# Patient Record
Sex: FEMALE | Race: WHITE | NOT HISPANIC OR LATINO | Employment: OTHER | URBAN - METROPOLITAN AREA
[De-identification: names, ages, dates, MRNs, and addresses within clinical notes are randomized per-mention and may not be internally consistent; named-entity substitution may affect disease eponyms.]

---

## 2017-01-18 ENCOUNTER — TRANSCRIBE ORDERS (OUTPATIENT)
Dept: ADMINISTRATIVE | Facility: HOSPITAL | Age: 62
End: 2017-01-18

## 2017-01-18 ENCOUNTER — LAB (OUTPATIENT)
Dept: LAB | Facility: HOSPITAL | Age: 62
End: 2017-01-18

## 2017-01-18 DIAGNOSIS — E03.9 UNSPECIFIED HYPOTHYROIDISM: ICD-10-CM

## 2017-01-18 DIAGNOSIS — I10 ESSENTIAL HYPERTENSION, MALIGNANT: Primary | ICD-10-CM

## 2017-01-18 DIAGNOSIS — D64.9 ANEMIA, UNSPECIFIED: ICD-10-CM

## 2017-01-18 DIAGNOSIS — I10 ESSENTIAL HYPERTENSION, MALIGNANT: ICD-10-CM

## 2017-01-18 DIAGNOSIS — E78.00 HYPERCHOLESTEREMIA: ICD-10-CM

## 2017-01-18 LAB
25(OH)D3 SERPL-MCNC: 26 NG/ML (ref 30–100)
ALBUMIN SERPL-MCNC: 4.3 G/DL (ref 3.5–5.2)
ALBUMIN/GLOB SERPL: 1.3 G/DL
ALP SERPL-CCNC: 91 U/L (ref 39–117)
ALT SERPL W P-5'-P-CCNC: 17 U/L (ref 1–33)
ANION GAP SERPL CALCULATED.3IONS-SCNC: 15.3 MMOL/L
AST SERPL-CCNC: 34 U/L (ref 1–32)
BASOPHILS # BLD AUTO: 0.04 10*3/MM3 (ref 0–0.2)
BASOPHILS NFR BLD AUTO: 0.6 % (ref 0–1.5)
BILIRUB SERPL-MCNC: 0.4 MG/DL (ref 0.1–1.2)
BUN BLD-MCNC: 32 MG/DL (ref 8–23)
BUN/CREAT SERPL: 26.2 (ref 7–25)
CALCIUM SPEC-SCNC: 9.5 MG/DL (ref 8.6–10.5)
CHLORIDE SERPL-SCNC: 102 MMOL/L (ref 98–107)
CHOLEST SERPL-MCNC: 222 MG/DL (ref 0–200)
CO2 SERPL-SCNC: 22.7 MMOL/L (ref 22–29)
CREAT BLD-MCNC: 1.22 MG/DL (ref 0.57–1)
DEPRECATED RDW RBC AUTO: 57.2 FL (ref 37–54)
EOSINOPHIL # BLD AUTO: 0.25 10*3/MM3 (ref 0–0.7)
EOSINOPHIL NFR BLD AUTO: 3.7 % (ref 0.3–6.2)
ERYTHROCYTE [DISTWIDTH] IN BLOOD BY AUTOMATED COUNT: 16.6 % (ref 11.7–13)
GFR SERPL CREATININE-BSD FRML MDRD: 45 ML/MIN/1.73
GLOBULIN UR ELPH-MCNC: 3.2 GM/DL
GLUCOSE BLD-MCNC: 111 MG/DL (ref 65–99)
HCT VFR BLD AUTO: 32.9 % (ref 35.6–45.5)
HDLC SERPL-MCNC: 69 MG/DL (ref 40–60)
HGB BLD-MCNC: 9.6 G/DL (ref 11.9–15.5)
IMM GRANULOCYTES # BLD: 0 10*3/MM3 (ref 0–0.03)
IMM GRANULOCYTES NFR BLD: 0 % (ref 0–0.5)
LDLC SERPL CALC-MCNC: 111 MG/DL (ref 0–100)
LDLC/HDLC SERPL: 1.6 {RATIO}
LYMPHOCYTES # BLD AUTO: 1.33 10*3/MM3 (ref 0.9–4.8)
LYMPHOCYTES NFR BLD AUTO: 19.6 % (ref 19.6–45.3)
MCH RBC QN AUTO: 27.4 PG (ref 26.9–32)
MCHC RBC AUTO-ENTMCNC: 29.2 G/DL (ref 32.4–36.3)
MCV RBC AUTO: 94 FL (ref 80.5–98.2)
MONOCYTES # BLD AUTO: 0.8 10*3/MM3 (ref 0.2–1.2)
MONOCYTES NFR BLD AUTO: 11.8 % (ref 5–12)
NEUTROPHILS # BLD AUTO: 4.38 10*3/MM3 (ref 1.9–8.1)
NEUTROPHILS NFR BLD AUTO: 64.3 % (ref 42.7–76)
PLATELET # BLD AUTO: 340 10*3/MM3 (ref 140–500)
PMV BLD AUTO: 9.7 FL (ref 6–12)
POTASSIUM BLD-SCNC: 4.4 MMOL/L (ref 3.5–5.2)
PROT SERPL-MCNC: 7.5 G/DL (ref 6–8.5)
RBC # BLD AUTO: 3.5 10*6/MM3 (ref 3.9–5.2)
SODIUM BLD-SCNC: 140 MMOL/L (ref 136–145)
TRIGL SERPL-MCNC: 212 MG/DL (ref 0–150)
TSH SERPL DL<=0.05 MIU/L-ACNC: 2.41 MIU/ML (ref 0.27–4.2)
VLDLC SERPL-MCNC: 42.4 MG/DL (ref 5–40)
WBC NRBC COR # BLD: 6.8 10*3/MM3 (ref 4.5–10.7)

## 2017-01-18 PROCEDURE — 84443 ASSAY THYROID STIM HORMONE: CPT | Performed by: INTERNAL MEDICINE

## 2017-01-18 PROCEDURE — 80061 LIPID PANEL: CPT | Performed by: INTERNAL MEDICINE

## 2017-01-18 PROCEDURE — 36415 COLL VENOUS BLD VENIPUNCTURE: CPT

## 2017-01-18 PROCEDURE — 85025 COMPLETE CBC W/AUTO DIFF WBC: CPT | Performed by: INTERNAL MEDICINE

## 2017-01-18 PROCEDURE — 80053 COMPREHEN METABOLIC PANEL: CPT | Performed by: INTERNAL MEDICINE

## 2017-01-18 PROCEDURE — 82306 VITAMIN D 25 HYDROXY: CPT | Performed by: INTERNAL MEDICINE

## 2017-01-26 ENCOUNTER — LAB (OUTPATIENT)
Dept: LAB | Facility: HOSPITAL | Age: 62
End: 2017-01-26

## 2017-01-26 ENCOUNTER — TRANSCRIBE ORDERS (OUTPATIENT)
Dept: LAB | Facility: HOSPITAL | Age: 62
End: 2017-01-26

## 2017-01-26 DIAGNOSIS — D64.9 ANEMIA, UNSPECIFIED: ICD-10-CM

## 2017-01-26 DIAGNOSIS — D64.9 ANEMIA, UNSPECIFIED: Primary | ICD-10-CM

## 2017-01-26 LAB
BASOPHILS # BLD AUTO: 0.05 10*3/MM3 (ref 0–0.2)
BASOPHILS NFR BLD AUTO: 0.8 % (ref 0–1.5)
DEPRECATED RDW RBC AUTO: 57.6 FL (ref 37–54)
EOSINOPHIL # BLD AUTO: 0.21 10*3/MM3 (ref 0–0.7)
EOSINOPHIL NFR BLD AUTO: 3.3 % (ref 0.3–6.2)
ERYTHROCYTE [DISTWIDTH] IN BLOOD BY AUTOMATED COUNT: 16.8 % (ref 11.7–13)
FERRITIN SERPL-MCNC: 26.59 NG/ML (ref 13–150)
FOLATE SERPL-MCNC: 9.66 NG/ML (ref 4.78–24.2)
HCT VFR BLD AUTO: 33.7 % (ref 35.6–45.5)
HGB BLD-MCNC: 9.6 G/DL (ref 11.9–15.5)
IMM GRANULOCYTES # BLD: 0.02 10*3/MM3 (ref 0–0.03)
IMM GRANULOCYTES NFR BLD: 0.3 % (ref 0–0.5)
IRON 24H UR-MRATE: 23 MCG/DL (ref 37–145)
LYMPHOCYTES # BLD AUTO: 1.17 10*3/MM3 (ref 0.9–4.8)
LYMPHOCYTES NFR BLD AUTO: 18.7 % (ref 19.6–45.3)
MCH RBC QN AUTO: 26.7 PG (ref 26.9–32)
MCHC RBC AUTO-ENTMCNC: 28.5 G/DL (ref 32.4–36.3)
MCV RBC AUTO: 93.9 FL (ref 80.5–98.2)
MONOCYTES # BLD AUTO: 0.67 10*3/MM3 (ref 0.2–1.2)
MONOCYTES NFR BLD AUTO: 10.7 % (ref 5–12)
NEUTROPHILS # BLD AUTO: 4.15 10*3/MM3 (ref 1.9–8.1)
NEUTROPHILS NFR BLD AUTO: 66.2 % (ref 42.7–76)
PLATELET # BLD AUTO: 330 10*3/MM3 (ref 140–500)
PMV BLD AUTO: 9.9 FL (ref 6–12)
RBC # BLD AUTO: 3.59 10*6/MM3 (ref 3.9–5.2)
RETICS/RBC NFR AUTO: 2.24 % (ref 0.5–1.5)
TRANSFERRIN SERPL-MCNC: 394 MG/DL (ref 200–360)
VIT B12 BLD-MCNC: 407 PG/ML (ref 211–946)
WBC NRBC COR # BLD: 6.27 10*3/MM3 (ref 4.5–10.7)

## 2017-01-26 PROCEDURE — 83540 ASSAY OF IRON: CPT | Performed by: INTERNAL MEDICINE

## 2017-01-26 PROCEDURE — 85045 AUTOMATED RETICULOCYTE COUNT: CPT | Performed by: INTERNAL MEDICINE

## 2017-01-26 PROCEDURE — 82728 ASSAY OF FERRITIN: CPT | Performed by: INTERNAL MEDICINE

## 2017-01-26 PROCEDURE — 84466 ASSAY OF TRANSFERRIN: CPT | Performed by: INTERNAL MEDICINE

## 2017-01-26 PROCEDURE — 85025 COMPLETE CBC W/AUTO DIFF WBC: CPT | Performed by: INTERNAL MEDICINE

## 2017-01-26 PROCEDURE — 36415 COLL VENOUS BLD VENIPUNCTURE: CPT

## 2017-01-26 PROCEDURE — 82607 VITAMIN B-12: CPT | Performed by: INTERNAL MEDICINE

## 2017-01-26 PROCEDURE — 82746 ASSAY OF FOLIC ACID SERUM: CPT | Performed by: INTERNAL MEDICINE

## 2017-03-20 ENCOUNTER — TRANSCRIBE ORDERS (OUTPATIENT)
Dept: ADMINISTRATIVE | Facility: HOSPITAL | Age: 62
End: 2017-03-20

## 2017-03-20 ENCOUNTER — LAB (OUTPATIENT)
Dept: LAB | Facility: HOSPITAL | Age: 62
End: 2017-03-20

## 2017-03-20 DIAGNOSIS — E78.5 HYPERLIPIDEMIA, UNSPECIFIED HYPERLIPIDEMIA TYPE: ICD-10-CM

## 2017-03-20 DIAGNOSIS — I10 ESSENTIAL HYPERTENSION, MALIGNANT: ICD-10-CM

## 2017-03-20 DIAGNOSIS — D55.9: ICD-10-CM

## 2017-03-20 DIAGNOSIS — D64.9 ANEMIA, UNSPECIFIED: Primary | ICD-10-CM

## 2017-03-20 DIAGNOSIS — D64.9 ANEMIA, UNSPECIFIED: ICD-10-CM

## 2017-03-20 LAB
25(OH)D3 SERPL-MCNC: 24.1 NG/ML (ref 30–100)
ALBUMIN SERPL-MCNC: 4.4 G/DL (ref 3.5–5.2)
ALBUMIN/GLOB SERPL: 1.4 G/DL
ALP SERPL-CCNC: 81 U/L (ref 39–117)
ALT SERPL W P-5'-P-CCNC: 17 U/L (ref 1–33)
ANION GAP SERPL CALCULATED.3IONS-SCNC: 18.1 MMOL/L
AST SERPL-CCNC: 26 U/L (ref 1–32)
BASOPHILS # BLD AUTO: 0.06 10*3/MM3 (ref 0–0.2)
BASOPHILS NFR BLD AUTO: 1.1 % (ref 0–1.5)
BILIRUB SERPL-MCNC: 0.5 MG/DL (ref 0.1–1.2)
BUN BLD-MCNC: 24 MG/DL (ref 8–23)
BUN/CREAT SERPL: 21.2 (ref 7–25)
CALCIUM SPEC-SCNC: 9.6 MG/DL (ref 8.6–10.5)
CHLORIDE SERPL-SCNC: 99 MMOL/L (ref 98–107)
CHOLEST SERPL-MCNC: 273 MG/DL (ref 0–200)
CO2 SERPL-SCNC: 23.9 MMOL/L (ref 22–29)
CREAT BLD-MCNC: 1.13 MG/DL (ref 0.57–1)
DEPRECATED RDW RBC AUTO: 79.7 FL (ref 37–54)
EOSINOPHIL # BLD AUTO: 0.23 10*3/MM3 (ref 0–0.7)
EOSINOPHIL NFR BLD AUTO: 4.2 % (ref 0.3–6.2)
ERYTHROCYTE [DISTWIDTH] IN BLOOD BY AUTOMATED COUNT: 23.7 % (ref 11.7–13)
FERRITIN SERPL-MCNC: 42.53 NG/ML (ref 13–150)
FOLATE SERPL-MCNC: 10 NG/ML (ref 4.78–24.2)
GFR SERPL CREATININE-BSD FRML MDRD: 49 ML/MIN/1.73
GLOBULIN UR ELPH-MCNC: 3.2 GM/DL
GLUCOSE BLD-MCNC: 120 MG/DL (ref 65–99)
HCT VFR BLD AUTO: 40.6 % (ref 35.6–45.5)
HDLC SERPL-MCNC: 75 MG/DL (ref 40–60)
HGB BLD-MCNC: 12.8 G/DL (ref 11.9–15.5)
IMM GRANULOCYTES # BLD: 0.02 10*3/MM3 (ref 0–0.03)
IMM GRANULOCYTES NFR BLD: 0.4 % (ref 0–0.5)
LDLC SERPL CALC-MCNC: 167 MG/DL (ref 0–100)
LDLC/HDLC SERPL: 2.22 {RATIO}
LYMPHOCYTES # BLD AUTO: 1.16 10*3/MM3 (ref 0.9–4.8)
LYMPHOCYTES NFR BLD AUTO: 21.4 % (ref 19.6–45.3)
MCH RBC QN AUTO: 29.6 PG (ref 26.9–32)
MCHC RBC AUTO-ENTMCNC: 31.5 G/DL (ref 32.4–36.3)
MCV RBC AUTO: 93.8 FL (ref 80.5–98.2)
MONOCYTES # BLD AUTO: 0.74 10*3/MM3 (ref 0.2–1.2)
MONOCYTES NFR BLD AUTO: 13.6 % (ref 5–12)
NEUTROPHILS # BLD AUTO: 3.22 10*3/MM3 (ref 1.9–8.1)
NEUTROPHILS NFR BLD AUTO: 59.3 % (ref 42.7–76)
PLATELET # BLD AUTO: 278 10*3/MM3 (ref 140–500)
PMV BLD AUTO: 9.9 FL (ref 6–12)
POTASSIUM BLD-SCNC: 4.1 MMOL/L (ref 3.5–5.2)
PROT SERPL-MCNC: 7.6 G/DL (ref 6–8.5)
RBC # BLD AUTO: 4.33 10*6/MM3 (ref 3.9–5.2)
SODIUM BLD-SCNC: 141 MMOL/L (ref 136–145)
TRIGL SERPL-MCNC: 157 MG/DL (ref 0–150)
VIT B12 BLD-MCNC: 232 PG/ML (ref 211–946)
VLDLC SERPL-MCNC: 31.4 MG/DL (ref 5–40)
WBC NRBC COR # BLD: 5.43 10*3/MM3 (ref 4.5–10.7)

## 2017-03-20 PROCEDURE — 82607 VITAMIN B-12: CPT | Performed by: INTERNAL MEDICINE

## 2017-03-20 PROCEDURE — 85025 COMPLETE CBC W/AUTO DIFF WBC: CPT | Performed by: INTERNAL MEDICINE

## 2017-03-20 PROCEDURE — 82746 ASSAY OF FOLIC ACID SERUM: CPT | Performed by: INTERNAL MEDICINE

## 2017-03-20 PROCEDURE — 36415 COLL VENOUS BLD VENIPUNCTURE: CPT

## 2017-03-20 PROCEDURE — 86757 RICKETTSIA ANTIBODY: CPT | Performed by: INTERNAL MEDICINE

## 2017-03-20 PROCEDURE — 82728 ASSAY OF FERRITIN: CPT | Performed by: INTERNAL MEDICINE

## 2017-03-20 PROCEDURE — 82306 VITAMIN D 25 HYDROXY: CPT | Performed by: INTERNAL MEDICINE

## 2017-03-20 PROCEDURE — 80061 LIPID PANEL: CPT | Performed by: INTERNAL MEDICINE

## 2017-03-20 PROCEDURE — 80053 COMPREHEN METABOLIC PANEL: CPT | Performed by: INTERNAL MEDICINE

## 2017-03-30 LAB
BRUCELLA IGG SER QL IA: NEGATIVE
BRUCELLA IGM SER QL IA: NEGATIVE
RICK SF IGG TITR SER IF: NORMAL {TITER}
RICK SF IGM TITR SER IF: NORMAL {TITER}
TYPHUS FEVER GROUP IGG: NORMAL
TYPHUS FEVER GROUP IGM: NORMAL

## 2017-10-09 ENCOUNTER — LAB (OUTPATIENT)
Dept: LAB | Facility: HOSPITAL | Age: 62
End: 2017-10-09

## 2017-10-09 ENCOUNTER — TRANSCRIBE ORDERS (OUTPATIENT)
Dept: ADMINISTRATIVE | Facility: HOSPITAL | Age: 62
End: 2017-10-09

## 2017-10-09 DIAGNOSIS — E03.9 MYXEDEMA HEART DISEASE: ICD-10-CM

## 2017-10-09 DIAGNOSIS — E78.5 HYPERLIPIDEMIA, UNSPECIFIED HYPERLIPIDEMIA TYPE: ICD-10-CM

## 2017-10-09 DIAGNOSIS — I10 ESSENTIAL HYPERTENSION, MALIGNANT: Primary | ICD-10-CM

## 2017-10-09 DIAGNOSIS — D64.9 ANEMIA, UNSPECIFIED TYPE: ICD-10-CM

## 2017-10-09 DIAGNOSIS — I51.9 MYXEDEMA HEART DISEASE: ICD-10-CM

## 2017-10-09 DIAGNOSIS — I10 ESSENTIAL HYPERTENSION, MALIGNANT: ICD-10-CM

## 2017-10-09 LAB
ALBUMIN SERPL-MCNC: 4.3 G/DL (ref 3.5–5.2)
ALBUMIN/GLOB SERPL: 1.3 G/DL
ALP SERPL-CCNC: 81 U/L (ref 39–117)
ALT SERPL W P-5'-P-CCNC: 14 U/L (ref 1–33)
ANION GAP SERPL CALCULATED.3IONS-SCNC: 13.5 MMOL/L
AST SERPL-CCNC: 21 U/L (ref 1–32)
BASOPHILS # BLD AUTO: 0.03 10*3/MM3 (ref 0–0.2)
BASOPHILS NFR BLD AUTO: 0.7 % (ref 0–1.5)
BILIRUB SERPL-MCNC: 0.4 MG/DL (ref 0.1–1.2)
BUN BLD-MCNC: 33 MG/DL (ref 8–23)
BUN/CREAT SERPL: 25.6 (ref 7–25)
CALCIUM SPEC-SCNC: 9.5 MG/DL (ref 8.6–10.5)
CHLORIDE SERPL-SCNC: 102 MMOL/L (ref 98–107)
CHOLEST SERPL-MCNC: 185 MG/DL (ref 0–200)
CO2 SERPL-SCNC: 24.5 MMOL/L (ref 22–29)
CREAT BLD-MCNC: 1.29 MG/DL (ref 0.57–1)
DEPRECATED RDW RBC AUTO: 54.6 FL (ref 37–54)
EOSINOPHIL # BLD AUTO: 0.21 10*3/MM3 (ref 0–0.7)
EOSINOPHIL NFR BLD AUTO: 4.8 % (ref 0.3–6.2)
ERYTHROCYTE [DISTWIDTH] IN BLOOD BY AUTOMATED COUNT: 14.2 % (ref 11.7–13)
FERRITIN SERPL-MCNC: 44.33 NG/ML (ref 13–150)
FOLATE SERPL-MCNC: 6.22 NG/ML (ref 4.78–24.2)
GFR SERPL CREATININE-BSD FRML MDRD: 42 ML/MIN/1.73
GLOBULIN UR ELPH-MCNC: 3.2 GM/DL
GLUCOSE BLD-MCNC: 96 MG/DL (ref 65–99)
HCT VFR BLD AUTO: 39.5 % (ref 35.6–45.5)
HDLC SERPL-MCNC: 68 MG/DL (ref 40–60)
HGB BLD-MCNC: 12.5 G/DL (ref 11.9–15.5)
IMM GRANULOCYTES # BLD: 0 10*3/MM3 (ref 0–0.03)
IMM GRANULOCYTES NFR BLD: 0 % (ref 0–0.5)
IRON 24H UR-MRATE: 83 MCG/DL (ref 37–145)
LDLC SERPL CALC-MCNC: 92 MG/DL (ref 0–100)
LDLC/HDLC SERPL: 1.36 {RATIO}
LYMPHOCYTES # BLD AUTO: 0.9 10*3/MM3 (ref 0.9–4.8)
LYMPHOCYTES NFR BLD AUTO: 20.6 % (ref 19.6–45.3)
MCH RBC QN AUTO: 33.3 PG (ref 26.9–32)
MCHC RBC AUTO-ENTMCNC: 31.6 G/DL (ref 32.4–36.3)
MCV RBC AUTO: 105.3 FL (ref 80.5–98.2)
MONOCYTES # BLD AUTO: 0.47 10*3/MM3 (ref 0.2–1.2)
MONOCYTES NFR BLD AUTO: 10.8 % (ref 5–12)
NEUTROPHILS # BLD AUTO: 2.76 10*3/MM3 (ref 1.9–8.1)
NEUTROPHILS NFR BLD AUTO: 63.1 % (ref 42.7–76)
PLAT MORPH BLD: NORMAL
PLATELET # BLD AUTO: 221 10*3/MM3 (ref 140–500)
PMV BLD AUTO: 10.5 FL (ref 6–12)
POTASSIUM BLD-SCNC: 4.3 MMOL/L (ref 3.5–5.2)
PROT SERPL-MCNC: 7.5 G/DL (ref 6–8.5)
RBC # BLD AUTO: 3.75 10*6/MM3 (ref 3.9–5.2)
RBC MORPH BLD: NORMAL
SODIUM BLD-SCNC: 140 MMOL/L (ref 136–145)
TRIGL SERPL-MCNC: 123 MG/DL (ref 0–150)
TSH SERPL DL<=0.05 MIU/L-ACNC: 1.5 MIU/ML (ref 0.27–4.2)
VIT B12 BLD-MCNC: 235 PG/ML (ref 211–946)
VLDLC SERPL-MCNC: 24.6 MG/DL (ref 5–40)
WBC MORPH BLD: NORMAL
WBC NRBC COR # BLD: 4.37 10*3/MM3 (ref 4.5–10.7)

## 2017-10-09 PROCEDURE — 82746 ASSAY OF FOLIC ACID SERUM: CPT | Performed by: INTERNAL MEDICINE

## 2017-10-09 PROCEDURE — 80053 COMPREHEN METABOLIC PANEL: CPT | Performed by: INTERNAL MEDICINE

## 2017-10-09 PROCEDURE — 85025 COMPLETE CBC W/AUTO DIFF WBC: CPT | Performed by: INTERNAL MEDICINE

## 2017-10-09 PROCEDURE — 36415 COLL VENOUS BLD VENIPUNCTURE: CPT

## 2017-10-09 PROCEDURE — 82607 VITAMIN B-12: CPT | Performed by: INTERNAL MEDICINE

## 2017-10-09 PROCEDURE — 84443 ASSAY THYROID STIM HORMONE: CPT | Performed by: INTERNAL MEDICINE

## 2017-10-09 PROCEDURE — 85007 BL SMEAR W/DIFF WBC COUNT: CPT | Performed by: INTERNAL MEDICINE

## 2017-10-09 PROCEDURE — 82728 ASSAY OF FERRITIN: CPT | Performed by: INTERNAL MEDICINE

## 2017-10-09 PROCEDURE — 83540 ASSAY OF IRON: CPT | Performed by: INTERNAL MEDICINE

## 2017-10-09 PROCEDURE — 80061 LIPID PANEL: CPT | Performed by: INTERNAL MEDICINE

## 2017-10-12 ENCOUNTER — OFFICE VISIT (OUTPATIENT)
Dept: SURGERY | Facility: CLINIC | Age: 62
End: 2017-10-12

## 2017-10-12 VITALS — HEART RATE: 89 BPM | WEIGHT: 266 LBS | HEIGHT: 70 IN | BODY MASS INDEX: 38.08 KG/M2 | OXYGEN SATURATION: 98 %

## 2017-10-12 DIAGNOSIS — K43.2 INCISIONAL HERNIA, WITHOUT OBSTRUCTION OR GANGRENE: Primary | ICD-10-CM

## 2017-10-12 PROCEDURE — 99244 OFF/OP CNSLTJ NEW/EST MOD 40: CPT | Performed by: SURGERY

## 2017-10-12 RX ORDER — AMLODIPINE BESYLATE 5 MG/1
10 TABLET ORAL DAILY
COMMUNITY
Start: 2017-09-17 | End: 2021-06-23 | Stop reason: HOSPADM

## 2017-10-12 RX ORDER — METOPROLOL TARTRATE 100 MG/1
200 TABLET ORAL 2 TIMES DAILY
COMMUNITY
Start: 2017-08-23

## 2017-10-12 RX ORDER — SERTRALINE HYDROCHLORIDE 100 MG/1
100 TABLET, FILM COATED ORAL DAILY
COMMUNITY
Start: 2017-07-31 | End: 2019-09-16

## 2017-10-13 NOTE — PROGRESS NOTES
SUMMARY (A/P):    62-year-old lady with moderate size incisional hernia secondary to previous primary epigastric hernia repair.  She is symptomatic enough that she does wish to proceed with open incisional hernia repair.  She understands that her risks with surgical intervention in general anesthetic are increased secondary to BMI of 38, associated medical comorbidities, and tobacco use (discussed potential of decreased morbidity associated with surgery as well as overall improvement in health associated with weight loss and tobacco cessation).  She also understands this increases her risk of recurrence markedly.  She understands the risks including but not limited to bleeding, infection, use of mesh, and recurrence.      CC:  Referred for consultation regarding hernia by Dr. Jimenez    HPI:  62-year-old lady with several month history of moderate epigastric abdominal pain that is worse with activity and associated with visible and palpable bulge.    PHYSICAL EXAM:   Constitutional: Well-developed well-nourished, no acute distress   Heart rate 89   Weight (pounds) 266   BMI 38   Height (inches) 70  Eyes: Conjunctiva normal, sclera nonicteric  ENMT: Hearing grossly normal, oral mucosa moist  Neck: Supple, no palpable mass, normal thyroid, trachea midline  Respiratory: Clear to auscultation, normal inspiratory effort  Cardiovascular: Regular rate, no murmur, no carotid bruit, no peripheral edema, no jugular venous distention  Gastrointestinal: Soft, nontender, no palpable mass, no hepatosplenomegaly, moderate palpable hernia in the epigastrium just superior to previous vertical midline scar above the umbilicus which is reducible in supine position, bowel sounds normal  Lymphatics (palpable nodes):  cervical-negative, axillary-negative  Skin:  Warm, dry, no rash on visualized skin surfaces  Musculoskeletal: Symmetric strength, normal gait  Psychiatric: Alert and oriented ×3, normal affect     ALLERGIES: reviewed, in  Epic    MEDICATIONS: reviewed, in Epic    PMH:    Hypertension  Hyperlipidemia  Arthritis    PSH:    -Open incarcerated epigastric primary hernia repair with Prolene suture 10/22/2015 Dr. Lopez  -Colonoscopy at the same time normal  -Laparoscopic cholecystectomy 2010  -Hysterectomy  -Bilateral total knee arthroplasty    FAMILY HISTORY:    Negative for colorectal cancer    SOCIAL HISTORY:   Daily tobacco use  Occasional alcohol use    ROS:  No chest pain or shortness of air.  Negative for constipation, dysuria, nausea, vomiting, unexpected weight loss.  All other systems reviewed and negative other than presenting complaints.    LABS:  10/9/2017  CMP: GFR 42, otherwise normal  CBC: WBC 4.37, hemoglobin 12.5, platelets 221    JUNIOR SANDRA M.D.

## 2017-10-23 ENCOUNTER — TELEPHONE (OUTPATIENT)
Dept: SURGERY | Facility: CLINIC | Age: 62
End: 2017-10-23

## 2017-11-01 ENCOUNTER — APPOINTMENT (OUTPATIENT)
Dept: PREADMISSION TESTING | Facility: HOSPITAL | Age: 62
End: 2017-11-01

## 2018-04-11 ENCOUNTER — LAB (OUTPATIENT)
Dept: LAB | Facility: HOSPITAL | Age: 63
End: 2018-04-11

## 2018-04-11 ENCOUNTER — TRANSCRIBE ORDERS (OUTPATIENT)
Dept: ADMINISTRATIVE | Facility: HOSPITAL | Age: 63
End: 2018-04-11

## 2018-04-11 DIAGNOSIS — D64.9 ANEMIA, UNSPECIFIED TYPE: ICD-10-CM

## 2018-04-11 DIAGNOSIS — I10 ESSENTIAL HYPERTENSION, MALIGNANT: Primary | ICD-10-CM

## 2018-04-11 DIAGNOSIS — E78.5 HYPERLIPIDEMIA, UNSPECIFIED HYPERLIPIDEMIA TYPE: ICD-10-CM

## 2018-04-11 DIAGNOSIS — E55.9 VITAMIN D DEFICIENCY: ICD-10-CM

## 2018-04-11 DIAGNOSIS — I10 ESSENTIAL HYPERTENSION, MALIGNANT: ICD-10-CM

## 2018-04-11 LAB
25(OH)D3 SERPL-MCNC: 42.3 NG/ML (ref 30–100)
ALBUMIN SERPL-MCNC: 4.3 G/DL (ref 3.5–5.2)
ALBUMIN/GLOB SERPL: 1.4 G/DL
ALP SERPL-CCNC: 79 U/L (ref 39–117)
ALT SERPL W P-5'-P-CCNC: 11 U/L (ref 1–33)
ANION GAP SERPL CALCULATED.3IONS-SCNC: 14.7 MMOL/L
AST SERPL-CCNC: 17 U/L (ref 1–32)
BASOPHILS # BLD AUTO: 0.05 10*3/MM3 (ref 0–0.2)
BASOPHILS NFR BLD AUTO: 1 % (ref 0–1.5)
BILIRUB SERPL-MCNC: 0.4 MG/DL (ref 0.1–1.2)
BUN BLD-MCNC: 34 MG/DL (ref 8–23)
BUN/CREAT SERPL: 25.2 (ref 7–25)
CALCIUM SPEC-SCNC: 9.6 MG/DL (ref 8.6–10.5)
CHLORIDE SERPL-SCNC: 101 MMOL/L (ref 98–107)
CHOLEST SERPL-MCNC: 199 MG/DL (ref 0–200)
CO2 SERPL-SCNC: 25.3 MMOL/L (ref 22–29)
CREAT BLD-MCNC: 1.35 MG/DL (ref 0.57–1)
DEPRECATED RDW RBC AUTO: 53.7 FL (ref 37–54)
EOSINOPHIL # BLD AUTO: 0.22 10*3/MM3 (ref 0–0.7)
EOSINOPHIL NFR BLD AUTO: 4.3 % (ref 0.3–6.2)
ERYTHROCYTE [DISTWIDTH] IN BLOOD BY AUTOMATED COUNT: 14 % (ref 11.7–13)
FERRITIN SERPL-MCNC: 73.09 NG/ML (ref 13–150)
FOLATE SERPL-MCNC: 6.28 NG/ML (ref 4.78–24.2)
GFR SERPL CREATININE-BSD FRML MDRD: 40 ML/MIN/1.73
GLOBULIN UR ELPH-MCNC: 3.1 GM/DL
GLUCOSE BLD-MCNC: 115 MG/DL (ref 65–99)
HCT VFR BLD AUTO: 40.6 % (ref 35.6–45.5)
HDLC SERPL-MCNC: 66 MG/DL (ref 40–60)
HGB BLD-MCNC: 12.5 G/DL (ref 11.9–15.5)
IMM GRANULOCYTES # BLD: 0.02 10*3/MM3 (ref 0–0.03)
IMM GRANULOCYTES NFR BLD: 0.4 % (ref 0–0.5)
IRON 24H UR-MRATE: 97 MCG/DL (ref 37–145)
LDLC SERPL CALC-MCNC: 106 MG/DL (ref 0–100)
LDLC/HDLC SERPL: 1.6 {RATIO}
LYMPHOCYTES # BLD AUTO: 1.3 10*3/MM3 (ref 0.9–4.8)
LYMPHOCYTES NFR BLD AUTO: 25.7 % (ref 19.6–45.3)
MCH RBC QN AUTO: 32.7 PG (ref 26.9–32)
MCHC RBC AUTO-ENTMCNC: 30.8 G/DL (ref 32.4–36.3)
MCV RBC AUTO: 106.3 FL (ref 80.5–98.2)
MONOCYTES # BLD AUTO: 0.56 10*3/MM3 (ref 0.2–1.2)
MONOCYTES NFR BLD AUTO: 11.1 % (ref 5–12)
NEUTROPHILS # BLD AUTO: 2.91 10*3/MM3 (ref 1.9–8.1)
NEUTROPHILS NFR BLD AUTO: 57.5 % (ref 42.7–76)
PLATELET # BLD AUTO: 227 10*3/MM3 (ref 140–500)
PMV BLD AUTO: 10.7 FL (ref 6–12)
POTASSIUM BLD-SCNC: 4.4 MMOL/L (ref 3.5–5.2)
PROT SERPL-MCNC: 7.4 G/DL (ref 6–8.5)
RBC # BLD AUTO: 3.82 10*6/MM3 (ref 3.9–5.2)
SODIUM BLD-SCNC: 141 MMOL/L (ref 136–145)
T4 FREE SERPL-MCNC: 0.81 NG/DL (ref 0.93–1.7)
T4 SERPL-MCNC: 4.55 MCG/DL (ref 4.5–11.7)
TRIGL SERPL-MCNC: 136 MG/DL (ref 0–150)
TSH SERPL DL<=0.05 MIU/L-ACNC: 1.98 MIU/ML (ref 0.27–4.2)
VIT B12 BLD-MCNC: 226 PG/ML (ref 211–946)
VLDLC SERPL-MCNC: 27.2 MG/DL (ref 5–40)
WBC NRBC COR # BLD: 5.06 10*3/MM3 (ref 4.5–10.7)

## 2018-04-11 PROCEDURE — 84436 ASSAY OF TOTAL THYROXINE: CPT | Performed by: INTERNAL MEDICINE

## 2018-04-11 PROCEDURE — 84443 ASSAY THYROID STIM HORMONE: CPT | Performed by: INTERNAL MEDICINE

## 2018-04-11 PROCEDURE — 82728 ASSAY OF FERRITIN: CPT | Performed by: INTERNAL MEDICINE

## 2018-04-11 PROCEDURE — 85025 COMPLETE CBC W/AUTO DIFF WBC: CPT | Performed by: INTERNAL MEDICINE

## 2018-04-11 PROCEDURE — 36415 COLL VENOUS BLD VENIPUNCTURE: CPT

## 2018-04-11 PROCEDURE — 82306 VITAMIN D 25 HYDROXY: CPT | Performed by: INTERNAL MEDICINE

## 2018-04-11 PROCEDURE — 82746 ASSAY OF FOLIC ACID SERUM: CPT | Performed by: INTERNAL MEDICINE

## 2018-04-11 PROCEDURE — 80053 COMPREHEN METABOLIC PANEL: CPT | Performed by: INTERNAL MEDICINE

## 2018-04-11 PROCEDURE — 82607 VITAMIN B-12: CPT | Performed by: INTERNAL MEDICINE

## 2018-04-11 PROCEDURE — 84439 ASSAY OF FREE THYROXINE: CPT | Performed by: INTERNAL MEDICINE

## 2018-04-11 PROCEDURE — 83540 ASSAY OF IRON: CPT | Performed by: INTERNAL MEDICINE

## 2018-04-11 PROCEDURE — 80061 LIPID PANEL: CPT | Performed by: INTERNAL MEDICINE

## 2018-10-09 ENCOUNTER — LAB (OUTPATIENT)
Dept: LAB | Facility: HOSPITAL | Age: 63
End: 2018-10-09

## 2018-10-09 ENCOUNTER — TRANSCRIBE ORDERS (OUTPATIENT)
Dept: ADMINISTRATIVE | Facility: HOSPITAL | Age: 63
End: 2018-10-09

## 2018-10-09 DIAGNOSIS — D64.9 ANEMIA, UNSPECIFIED TYPE: ICD-10-CM

## 2018-10-09 DIAGNOSIS — E03.9 HYPOTHYROIDISM, UNSPECIFIED TYPE: ICD-10-CM

## 2018-10-09 DIAGNOSIS — I10 ESSENTIAL HYPERTENSION, MALIGNANT: ICD-10-CM

## 2018-10-09 DIAGNOSIS — E78.5 HYPERLIPIDEMIA, UNSPECIFIED HYPERLIPIDEMIA TYPE: ICD-10-CM

## 2018-10-09 DIAGNOSIS — Z00.00 ROUTINE GENERAL MEDICAL EXAMINATION AT A HEALTH CARE FACILITY: ICD-10-CM

## 2018-10-09 DIAGNOSIS — Z00.00 ROUTINE GENERAL MEDICAL EXAMINATION AT A HEALTH CARE FACILITY: Primary | ICD-10-CM

## 2018-10-09 DIAGNOSIS — E11.9 DIABETES MELLITUS WITHOUT COMPLICATION (HCC): ICD-10-CM

## 2018-10-09 LAB
ALBUMIN SERPL-MCNC: 4.6 G/DL (ref 3.5–5.2)
ALBUMIN/GLOB SERPL: 1.4 G/DL
ALP SERPL-CCNC: 88 U/L (ref 39–117)
ALT SERPL W P-5'-P-CCNC: 18 U/L (ref 1–33)
ANION GAP SERPL CALCULATED.3IONS-SCNC: 20.1 MMOL/L
AST SERPL-CCNC: 32 U/L (ref 1–32)
BASOPHILS # BLD AUTO: 0.04 10*3/MM3 (ref 0–0.2)
BASOPHILS NFR BLD AUTO: 0.7 % (ref 0–1.5)
BILIRUB SERPL-MCNC: 0.6 MG/DL (ref 0.1–1.2)
BILIRUB UR QL STRIP: NEGATIVE
BUN BLD-MCNC: 32 MG/DL (ref 8–23)
BUN/CREAT SERPL: 23 (ref 7–25)
CALCIUM SPEC-SCNC: 9.6 MG/DL (ref 8.6–10.5)
CHLORIDE SERPL-SCNC: 98 MMOL/L (ref 98–107)
CHOLEST SERPL-MCNC: 201 MG/DL (ref 0–200)
CLARITY UR: CLEAR
CO2 SERPL-SCNC: 18.9 MMOL/L (ref 22–29)
COLOR UR: YELLOW
CREAT BLD-MCNC: 1.39 MG/DL (ref 0.57–1)
DEPRECATED RDW RBC AUTO: 53.4 FL (ref 37–54)
EOSINOPHIL # BLD AUTO: 0.13 10*3/MM3 (ref 0–0.7)
EOSINOPHIL NFR BLD AUTO: 2.2 % (ref 0.3–6.2)
ERYTHROCYTE [DISTWIDTH] IN BLOOD BY AUTOMATED COUNT: 13.9 % (ref 11.7–13)
FERRITIN SERPL-MCNC: 95.84 NG/ML (ref 13–150)
FOLATE SERPL-MCNC: 7.26 NG/ML (ref 4.78–24.2)
GFR SERPL CREATININE-BSD FRML MDRD: 38 ML/MIN/1.73
GLOBULIN UR ELPH-MCNC: 3.4 GM/DL
GLUCOSE BLD-MCNC: 101 MG/DL (ref 65–99)
GLUCOSE UR STRIP-MCNC: NEGATIVE MG/DL
HCT VFR BLD AUTO: 43.5 % (ref 35.6–45.5)
HDLC SERPL-MCNC: 85 MG/DL (ref 40–60)
HGB BLD-MCNC: 13.5 G/DL (ref 11.9–15.5)
HGB UR QL STRIP.AUTO: NEGATIVE
IMM GRANULOCYTES # BLD: 0.03 10*3/MM3 (ref 0–0.03)
IMM GRANULOCYTES NFR BLD: 0.5 % (ref 0–0.5)
IRON 24H UR-MRATE: 155 MCG/DL (ref 37–145)
KETONES UR QL STRIP: ABNORMAL
LDLC SERPL CALC-MCNC: 96 MG/DL (ref 0–100)
LDLC/HDLC SERPL: 1.13 {RATIO}
LEUKOCYTE ESTERASE UR QL STRIP.AUTO: NEGATIVE
LYMPHOCYTES # BLD AUTO: 1.1 10*3/MM3 (ref 0.9–4.8)
LYMPHOCYTES NFR BLD AUTO: 18.8 % (ref 19.6–45.3)
MCH RBC QN AUTO: 32.8 PG (ref 26.9–32)
MCHC RBC AUTO-ENTMCNC: 31 G/DL (ref 32.4–36.3)
MCV RBC AUTO: 105.6 FL (ref 80.5–98.2)
MONOCYTES # BLD AUTO: 0.68 10*3/MM3 (ref 0.2–1.2)
MONOCYTES NFR BLD AUTO: 11.6 % (ref 5–12)
NEUTROPHILS # BLD AUTO: 3.88 10*3/MM3 (ref 1.9–8.1)
NEUTROPHILS NFR BLD AUTO: 66.2 % (ref 42.7–76)
NITRITE UR QL STRIP: NEGATIVE
PH UR STRIP.AUTO: 5.5 [PH] (ref 5–8)
PLATELET # BLD AUTO: 260 10*3/MM3 (ref 140–500)
PMV BLD AUTO: 10.2 FL (ref 6–12)
POTASSIUM BLD-SCNC: 4.8 MMOL/L (ref 3.5–5.2)
PROT SERPL-MCNC: 8 G/DL (ref 6–8.5)
PROT UR QL STRIP: NEGATIVE
RBC # BLD AUTO: 4.12 10*6/MM3 (ref 3.9–5.2)
SODIUM BLD-SCNC: 137 MMOL/L (ref 136–145)
SP GR UR STRIP: 1.02 (ref 1–1.03)
T3 SERPL-MCNC: 70.3 NG/DL (ref 80–200)
T4 FREE SERPL-MCNC: 1.06 NG/DL (ref 0.93–1.7)
TRIGL SERPL-MCNC: 101 MG/DL (ref 0–150)
TSH SERPL DL<=0.05 MIU/L-ACNC: 1.47 MIU/ML (ref 0.27–4.2)
UROBILINOGEN UR QL STRIP: ABNORMAL
VIT B12 BLD-MCNC: 327 PG/ML (ref 211–946)
VLDLC SERPL-MCNC: 20.2 MG/DL (ref 5–40)
WBC NRBC COR # BLD: 5.86 10*3/MM3 (ref 4.5–10.7)

## 2018-10-09 PROCEDURE — 80053 COMPREHEN METABOLIC PANEL: CPT | Performed by: INTERNAL MEDICINE

## 2018-10-09 PROCEDURE — 83540 ASSAY OF IRON: CPT | Performed by: INTERNAL MEDICINE

## 2018-10-09 PROCEDURE — 85025 COMPLETE CBC W/AUTO DIFF WBC: CPT | Performed by: INTERNAL MEDICINE

## 2018-10-09 PROCEDURE — 80061 LIPID PANEL: CPT | Performed by: INTERNAL MEDICINE

## 2018-10-09 PROCEDURE — 81003 URINALYSIS AUTO W/O SCOPE: CPT | Performed by: INTERNAL MEDICINE

## 2018-10-09 PROCEDURE — 82746 ASSAY OF FOLIC ACID SERUM: CPT | Performed by: INTERNAL MEDICINE

## 2018-10-09 PROCEDURE — 84443 ASSAY THYROID STIM HORMONE: CPT | Performed by: INTERNAL MEDICINE

## 2018-10-09 PROCEDURE — 82607 VITAMIN B-12: CPT | Performed by: INTERNAL MEDICINE

## 2018-10-09 PROCEDURE — 36415 COLL VENOUS BLD VENIPUNCTURE: CPT

## 2018-10-09 PROCEDURE — 82728 ASSAY OF FERRITIN: CPT | Performed by: INTERNAL MEDICINE

## 2018-10-09 PROCEDURE — 84439 ASSAY OF FREE THYROXINE: CPT | Performed by: INTERNAL MEDICINE

## 2018-10-09 PROCEDURE — 84480 ASSAY TRIIODOTHYRONINE (T3): CPT | Performed by: INTERNAL MEDICINE

## 2018-10-18 ENCOUNTER — TRANSCRIBE ORDERS (OUTPATIENT)
Dept: ADMINISTRATIVE | Facility: HOSPITAL | Age: 63
End: 2018-10-18

## 2018-10-18 DIAGNOSIS — Z78.0 POST-MENOPAUSAL: ICD-10-CM

## 2018-10-18 DIAGNOSIS — Z12.39 SCREENING BREAST EXAMINATION: Primary | ICD-10-CM

## 2018-10-24 ENCOUNTER — HOSPITAL ENCOUNTER (OUTPATIENT)
Dept: MAMMOGRAPHY | Facility: HOSPITAL | Age: 63
Discharge: HOME | End: 2018-10-24
Admitting: INTERNAL MEDICINE

## 2018-10-24 ENCOUNTER — HOSPITAL ENCOUNTER (OUTPATIENT)
Dept: BONE DENSITY | Facility: HOSPITAL | Age: 63
Discharge: HOME | End: 2018-10-24

## 2018-10-24 DIAGNOSIS — Z78.0 POST-MENOPAUSAL: ICD-10-CM

## 2018-10-24 DIAGNOSIS — Z12.39 SCREENING BREAST EXAMINATION: ICD-10-CM

## 2018-10-24 PROCEDURE — 77080 DXA BONE DENSITY AXIAL: CPT

## 2018-10-24 PROCEDURE — 77067 SCR MAMMO BI INCL CAD: CPT

## 2018-10-24 PROCEDURE — 77063 BREAST TOMOSYNTHESIS BI: CPT

## 2019-01-15 ENCOUNTER — TRANSCRIBE ORDERS (OUTPATIENT)
Dept: ADMINISTRATIVE | Facility: HOSPITAL | Age: 64
End: 2019-01-15

## 2019-01-15 ENCOUNTER — LAB (OUTPATIENT)
Dept: LAB | Facility: HOSPITAL | Age: 64
End: 2019-01-15

## 2019-01-15 DIAGNOSIS — I10 ESSENTIAL HYPERTENSION, MALIGNANT: ICD-10-CM

## 2019-01-15 DIAGNOSIS — I10 ESSENTIAL HYPERTENSION, MALIGNANT: Primary | ICD-10-CM

## 2019-01-15 DIAGNOSIS — D64.9 ANEMIA, UNSPECIFIED TYPE: ICD-10-CM

## 2019-01-15 DIAGNOSIS — E78.5 HYPERLIPIDEMIA, UNSPECIFIED HYPERLIPIDEMIA TYPE: ICD-10-CM

## 2019-01-15 LAB
ALBUMIN SERPL-MCNC: 4.3 G/DL (ref 3.5–5.2)
ALBUMIN/GLOB SERPL: 1.3 G/DL
ALP SERPL-CCNC: 82 U/L (ref 39–117)
ALT SERPL W P-5'-P-CCNC: 16 U/L (ref 1–33)
ANION GAP SERPL CALCULATED.3IONS-SCNC: 16.6 MMOL/L
AST SERPL-CCNC: 34 U/L (ref 1–32)
BASOPHILS # BLD AUTO: 0.04 10*3/MM3 (ref 0–0.2)
BASOPHILS NFR BLD AUTO: 0.7 % (ref 0–1.5)
BILIRUB SERPL-MCNC: 0.6 MG/DL (ref 0.1–1.2)
BILIRUB UR QL STRIP: NEGATIVE
BUN BLD-MCNC: 32 MG/DL (ref 8–23)
BUN/CREAT SERPL: 30.8 (ref 7–25)
CALCIUM SPEC-SCNC: 9.7 MG/DL (ref 8.6–10.5)
CHLORIDE SERPL-SCNC: 97 MMOL/L (ref 98–107)
CHOLEST SERPL-MCNC: 245 MG/DL (ref 0–200)
CLARITY UR: CLEAR
CO2 SERPL-SCNC: 23.4 MMOL/L (ref 22–29)
COLOR UR: YELLOW
CREAT BLD-MCNC: 1.04 MG/DL (ref 0.57–1)
DEPRECATED RDW RBC AUTO: 53.8 FL (ref 37–54)
EOSINOPHIL # BLD AUTO: 0.17 10*3/MM3 (ref 0–0.7)
EOSINOPHIL NFR BLD AUTO: 2.9 % (ref 0.3–6.2)
ERYTHROCYTE [DISTWIDTH] IN BLOOD BY AUTOMATED COUNT: 13.8 % (ref 11.7–13)
FOLATE SERPL-MCNC: 5.89 NG/ML (ref 4.78–24.2)
GFR SERPL CREATININE-BSD FRML MDRD: 54 ML/MIN/1.73
GLOBULIN UR ELPH-MCNC: 3.4 GM/DL
GLUCOSE BLD-MCNC: 117 MG/DL (ref 65–99)
GLUCOSE UR STRIP-MCNC: NEGATIVE MG/DL
HCT VFR BLD AUTO: 43.3 % (ref 35.6–45.5)
HDLC SERPL-MCNC: 77 MG/DL (ref 40–60)
HGB BLD-MCNC: 14.2 G/DL (ref 11.9–15.5)
HGB UR QL STRIP.AUTO: NEGATIVE
IMM GRANULOCYTES # BLD AUTO: 0.02 10*3/MM3 (ref 0–0.03)
IMM GRANULOCYTES NFR BLD AUTO: 0.3 % (ref 0–0.5)
KETONES UR QL STRIP: NEGATIVE
LDLC SERPL CALC-MCNC: 142 MG/DL (ref 0–100)
LDLC/HDLC SERPL: 1.84 {RATIO}
LEUKOCYTE ESTERASE UR QL STRIP.AUTO: NEGATIVE
LYMPHOCYTES # BLD AUTO: 1.42 10*3/MM3 (ref 0.9–4.8)
LYMPHOCYTES NFR BLD AUTO: 24.2 % (ref 19.6–45.3)
MCH RBC QN AUTO: 34.6 PG (ref 26.9–32)
MCHC RBC AUTO-ENTMCNC: 32.8 G/DL (ref 32.4–36.3)
MCV RBC AUTO: 105.6 FL (ref 80.5–98.2)
MONOCYTES # BLD AUTO: 0.54 10*3/MM3 (ref 0.2–1.2)
MONOCYTES NFR BLD AUTO: 9.2 % (ref 5–12)
NEUTROPHILS # BLD AUTO: 3.7 10*3/MM3 (ref 1.9–8.1)
NEUTROPHILS NFR BLD AUTO: 63 % (ref 42.7–76)
NITRITE UR QL STRIP: NEGATIVE
PH UR STRIP.AUTO: 6 [PH] (ref 5–8)
PLATELET # BLD AUTO: 225 10*3/MM3 (ref 140–500)
PMV BLD AUTO: 10.5 FL (ref 6–12)
POTASSIUM BLD-SCNC: 4.6 MMOL/L (ref 3.5–5.2)
PROT SERPL-MCNC: 7.7 G/DL (ref 6–8.5)
PROT UR QL STRIP: NEGATIVE
RBC # BLD AUTO: 4.1 10*6/MM3 (ref 3.9–5.2)
SODIUM BLD-SCNC: 137 MMOL/L (ref 136–145)
SP GR UR STRIP: 1.01 (ref 1–1.03)
TRIGL SERPL-MCNC: 131 MG/DL (ref 0–150)
UROBILINOGEN UR QL STRIP: NORMAL
VIT B12 BLD-MCNC: 676 PG/ML (ref 211–946)
VLDLC SERPL-MCNC: 26.2 MG/DL (ref 5–40)
WBC NRBC COR # BLD: 5.87 10*3/MM3 (ref 4.5–10.7)

## 2019-01-15 PROCEDURE — 82607 VITAMIN B-12: CPT | Performed by: INTERNAL MEDICINE

## 2019-01-15 PROCEDURE — 36415 COLL VENOUS BLD VENIPUNCTURE: CPT

## 2019-01-15 PROCEDURE — 80053 COMPREHEN METABOLIC PANEL: CPT | Performed by: INTERNAL MEDICINE

## 2019-01-15 PROCEDURE — 85025 COMPLETE CBC W/AUTO DIFF WBC: CPT | Performed by: INTERNAL MEDICINE

## 2019-01-15 PROCEDURE — 81003 URINALYSIS AUTO W/O SCOPE: CPT | Performed by: INTERNAL MEDICINE

## 2019-01-15 PROCEDURE — 82746 ASSAY OF FOLIC ACID SERUM: CPT | Performed by: INTERNAL MEDICINE

## 2019-01-15 PROCEDURE — 80061 LIPID PANEL: CPT | Performed by: INTERNAL MEDICINE

## 2019-09-04 ENCOUNTER — HOSPITAL ENCOUNTER (EMERGENCY)
Facility: HOSPITAL | Age: 64
Discharge: HOME | End: 2019-09-04
Attending: EMERGENCY MEDICINE | Admitting: EMERGENCY MEDICINE

## 2019-09-04 ENCOUNTER — APPOINTMENT (OUTPATIENT)
Dept: CT IMAGING | Facility: HOSPITAL | Age: 64
End: 2019-09-04

## 2019-09-04 VITALS
OXYGEN SATURATION: 98 % | HEART RATE: 56 BPM | SYSTOLIC BLOOD PRESSURE: 148 MMHG | BODY MASS INDEX: 38.17 KG/M2 | DIASTOLIC BLOOD PRESSURE: 93 MMHG | TEMPERATURE: 97.8 F | RESPIRATION RATE: 16 BRPM | HEIGHT: 70 IN

## 2019-09-04 DIAGNOSIS — R10.9 ABDOMINAL PAIN IN FEMALE: Primary | ICD-10-CM

## 2019-09-04 DIAGNOSIS — K43.9 VENTRAL HERNIA WITHOUT OBSTRUCTION OR GANGRENE: ICD-10-CM

## 2019-09-04 LAB
ALBUMIN SERPL-MCNC: 4.4 G/DL (ref 3.5–5.2)
ALBUMIN/GLOB SERPL: 1.3 G/DL
ALP SERPL-CCNC: 81 U/L (ref 39–117)
ALT SERPL W P-5'-P-CCNC: 13 U/L (ref 1–33)
ANION GAP SERPL CALCULATED.3IONS-SCNC: 16.9 MMOL/L (ref 5–15)
AST SERPL-CCNC: 23 U/L (ref 1–32)
BACTERIA UR QL AUTO: ABNORMAL /HPF
BASOPHILS # BLD AUTO: 0.03 10*3/MM3 (ref 0–0.2)
BASOPHILS NFR BLD AUTO: 0.6 % (ref 0–1.5)
BILIRUB SERPL-MCNC: 0.6 MG/DL (ref 0.2–1.2)
BILIRUB UR QL STRIP: NEGATIVE
BUN BLD-MCNC: 20 MG/DL (ref 8–23)
BUN/CREAT SERPL: 17.9 (ref 7–25)
CALCIUM SPEC-SCNC: 9.5 MG/DL (ref 8.6–10.5)
CHLORIDE SERPL-SCNC: 97 MMOL/L (ref 98–107)
CLARITY UR: ABNORMAL
CO2 SERPL-SCNC: 23.1 MMOL/L (ref 22–29)
COLOR UR: ABNORMAL
CREAT BLD-MCNC: 1.12 MG/DL (ref 0.57–1)
DEPRECATED RDW RBC AUTO: 47.5 FL (ref 37–54)
EOSINOPHIL # BLD AUTO: 0.16 10*3/MM3 (ref 0–0.4)
EOSINOPHIL NFR BLD AUTO: 3.2 % (ref 0.3–6.2)
ERYTHROCYTE [DISTWIDTH] IN BLOOD BY AUTOMATED COUNT: 12.6 % (ref 12.3–15.4)
GFR SERPL CREATININE-BSD FRML MDRD: 49 ML/MIN/1.73
GLOBULIN UR ELPH-MCNC: 3.4 GM/DL
GLUCOSE BLD-MCNC: 133 MG/DL (ref 65–99)
GLUCOSE UR STRIP-MCNC: NEGATIVE MG/DL
HCT VFR BLD AUTO: 42.6 % (ref 34–46.6)
HGB BLD-MCNC: 13.8 G/DL (ref 12–15.9)
HGB UR QL STRIP.AUTO: ABNORMAL
HOLD SPECIMEN: NORMAL
HOLD SPECIMEN: NORMAL
HYALINE CASTS UR QL AUTO: ABNORMAL /LPF
IMM GRANULOCYTES # BLD AUTO: 0.02 10*3/MM3 (ref 0–0.05)
IMM GRANULOCYTES NFR BLD AUTO: 0.4 % (ref 0–0.5)
KETONES UR QL STRIP: ABNORMAL
LEUKOCYTE ESTERASE UR QL STRIP.AUTO: ABNORMAL
LIPASE SERPL-CCNC: 35 U/L (ref 13–60)
LYMPHOCYTES # BLD AUTO: 0.85 10*3/MM3 (ref 0.7–3.1)
LYMPHOCYTES NFR BLD AUTO: 17 % (ref 19.6–45.3)
MCH RBC QN AUTO: 33.3 PG (ref 26.6–33)
MCHC RBC AUTO-ENTMCNC: 32.4 G/DL (ref 31.5–35.7)
MCV RBC AUTO: 102.9 FL (ref 79–97)
MONOCYTES # BLD AUTO: 0.41 10*3/MM3 (ref 0.1–0.9)
MONOCYTES NFR BLD AUTO: 8.2 % (ref 5–12)
NEUTROPHILS # BLD AUTO: 3.53 10*3/MM3 (ref 1.7–7)
NEUTROPHILS NFR BLD AUTO: 70.6 % (ref 42.7–76)
NITRITE UR QL STRIP: NEGATIVE
NRBC BLD AUTO-RTO: 0 /100 WBC (ref 0–0.2)
PH UR STRIP.AUTO: <=5 [PH] (ref 5–8)
PLATELET # BLD AUTO: 209 10*3/MM3 (ref 140–450)
PMV BLD AUTO: 10.2 FL (ref 6–12)
POTASSIUM BLD-SCNC: 3.5 MMOL/L (ref 3.5–5.2)
PROT SERPL-MCNC: 7.8 G/DL (ref 6–8.5)
PROT UR QL STRIP: ABNORMAL
RBC # BLD AUTO: 4.14 10*6/MM3 (ref 3.77–5.28)
RBC # UR: ABNORMAL /HPF
REF LAB TEST METHOD: ABNORMAL
SODIUM BLD-SCNC: 137 MMOL/L (ref 136–145)
SP GR UR STRIP: 1.03 (ref 1–1.03)
SQUAMOUS #/AREA URNS HPF: ABNORMAL /HPF
UROBILINOGEN UR QL STRIP: ABNORMAL
WBC NRBC COR # BLD: 5 10*3/MM3 (ref 3.4–10.8)
WBC UR QL AUTO: ABNORMAL /HPF
WHOLE BLOOD HOLD SPECIMEN: NORMAL
WHOLE BLOOD HOLD SPECIMEN: NORMAL
YEAST URNS QL MICRO: ABNORMAL /HPF

## 2019-09-04 PROCEDURE — 25010000002 IOPAMIDOL 61 % SOLUTION: Performed by: NURSE PRACTITIONER

## 2019-09-04 PROCEDURE — 81001 URINALYSIS AUTO W/SCOPE: CPT | Performed by: NURSE PRACTITIONER

## 2019-09-04 PROCEDURE — 85025 COMPLETE CBC W/AUTO DIFF WBC: CPT | Performed by: NURSE PRACTITIONER

## 2019-09-04 PROCEDURE — 80053 COMPREHEN METABOLIC PANEL: CPT | Performed by: NURSE PRACTITIONER

## 2019-09-04 PROCEDURE — 83690 ASSAY OF LIPASE: CPT | Performed by: NURSE PRACTITIONER

## 2019-09-04 PROCEDURE — 74177 CT ABD & PELVIS W/CONTRAST: CPT

## 2019-09-04 PROCEDURE — 99283 EMERGENCY DEPT VISIT LOW MDM: CPT

## 2019-09-04 RX ORDER — DICYCLOMINE HCL 20 MG
20 TABLET ORAL EVERY 8 HOURS PRN
Qty: 9 TABLET | Refills: 0 | Status: SHIPPED | OUTPATIENT
Start: 2019-09-04 | End: 2021-06-23 | Stop reason: HOSPADM

## 2019-09-04 RX ADMIN — IOPAMIDOL 85 ML: 612 INJECTION, SOLUTION INTRAVENOUS at 13:19

## 2019-09-04 RX ADMIN — SODIUM CHLORIDE 1000 ML: 9 INJECTION, SOLUTION INTRAVENOUS at 12:25

## 2019-09-04 NOTE — ED PROVIDER NOTES
Pt is a 64 y.o. female who presents to the ED complaining of intermittent generalized abdominal pain for several months. She states her pain typically occurs every 1-2 weeks, but states this episode has been lasting longer than normal and has been present since yesterday. She states when she coughs and sneezes she reports pain in the middle of her abdomen. She states she is scheduled to f/u with Willy on the 26th for her hernia.     On exam,  Constitutional: NAD  Abdomen: soft, mild diffuse abd tenderness.   Neurological: Awake, alert       Plan: Check labs and CT a/p      MD ATTESTATION NOTE    The XOCHITL and I have discussed this patient's history, physical exam, and treatment plan.  I have reviewed the documentation and personally had a face to face interaction with the patient. I affirm the documentation and agree with the treatment and plan.  The attached note describes my personal findings.      Documentation assistance provided by inge Valenzuela for Dr. Steele. Information recorded by the scribe was done at my direction and has been verified and validated by me.             Caesar Valenzuela  09/04/19 7533       Yury Steele MD  09/04/19 9038

## 2019-09-04 NOTE — ED NOTES
"Had a hernia repair by Dr. Lopez years ago, cannot remember when. Was supposed to have it redone by Dr. Aguilera in 2015, but her 's health declined and he passed away, so patient never had the second hernia repair. For past several months, has felt abdominal discomfort. Patient denies nausea, vomiting, constipation or diarrhea, but complains of worsening bloating and bilateral abdominal pain for the past 2-3 weeks. Last bowel movement yesterday. Denies dysuria. Reports episodes of chills and sweating at home when she has these \"attacks\" of pain.     Nicki Shea, RN  09/04/19 1211    "

## 2019-09-04 NOTE — ED NOTES
Patient requesting a prescription for pain medicine or antacids to help with her abdominal discomfort. Waiting for provider availability to communicate request.     Nicki Shea RN  09/04/19 4537

## 2019-09-04 NOTE — DISCHARGE INSTRUCTIONS
Home to rest  Follow up with Dr Aguilera. Call today to schedule appt to be seen sooner.  Return if worse or new concerns   Continue care with your primary care physician and have your blood pressure regularly checked and managed. Normal blood pressure is 120/80.

## 2019-09-04 NOTE — ED PROVIDER NOTES
EMERGENCY DEPARTMENT ENCOUNTER    CHIEF COMPLAINT  Chief Complaint: abdominal pain  History given by: patient  History limited by: nothing  Room Number: 39/39  PMD: Anatoly Jimenez MD      HPI:  Pt is a 64 y.o. female with a hx of a-fib who presents complaining of mid-abdominal pain that worsened yesterday morning but has been ongoing for 'months.' Pt states her pain has improved since yesterday. Pain is worsened with movement and relieved when pt is laying flat on her back. She had two normal BMs yesterday. She confirms decreased PO intake and chills but denies vomiting, diarrhea, dysuria, SOA, CP and all other complaints at this time. Pt smokes 1ppd and drinks 3 drinks per day. She has not consumed EtOH today.  She note a hx of cholecystectomy and hernia hx. She denies a hx of diverticulitis. She had an unremarkable colonoscopy in the past.     Duration:  One day  Onset: gradual  Timing: constant  Location: mid-abd  Radiation: none  Quality: pain  Intensity/Severity: moderate  Progression: improved  Associated Symptoms: decreased PO intake and chills  Aggravating Factors: movement  Alleviating Factors: laying flat  Previous Episodes: none  Treatment before arrival: none    PAST MEDICAL HISTORY  Active Ambulatory Problems     Diagnosis Date Noted   • Incisional hernia, without obstruction or gangrene 10/12/2017     Resolved Ambulatory Problems     Diagnosis Date Noted   • No Resolved Ambulatory Problems     Past Medical History:   Diagnosis Date   • Arthritis    • Asthma    • Colon polyps 2015   • Hyperlipidemia    • Hypertension        PAST SURGICAL HISTORY  Past Surgical History:   Procedure Laterality Date   • COLONOSCOPY N/A 10/22/2015    Rectal polyp-Dr. Elías Keating   • HERNIA REPAIR N/A 10/22/2015    Open reduction of incarcerated ventral hernia with layered closure-Dr. Elías Keating   • HYSTERECTOMY  2001   • LAPAROSCOPIC CHOLECYSTECTOMY N/A 01/04/2010    Dr. Heath Whitlock   • OOPHORECTOMY  2001   •  REPLACEMENT TOTAL KNEE Left 06/22/2015    Dr. Yo Aguayo   • REPLACEMENT TOTAL KNEE Right 02/26/2015    Dr. Yo Aguayo       FAMILY HISTORY  Family History   Problem Relation Age of Onset   • No Known Problems Mother    • No Known Problems Father        SOCIAL HISTORY  Social History     Socioeconomic History   • Marital status:      Spouse name: Not on file   • Number of children: Not on file   • Years of education: Not on file   • Highest education level: Not on file   Tobacco Use   • Smoking status: Current Every Day Smoker   Substance and Sexual Activity   • Alcohol use: Yes       ALLERGIES  Patient has no known allergies.    REVIEW OF SYSTEMS  Review of Systems   Constitutional: Positive for appetite change (decreased) and chills. Negative for fever.   HENT: Negative for sore throat.    Eyes: Negative.    Respiratory: Negative for cough and shortness of breath.    Cardiovascular: Negative for chest pain.   Gastrointestinal: Positive for abdominal pain (mid). Negative for diarrhea and vomiting.   Genitourinary: Negative for dysuria.   Musculoskeletal: Negative for neck pain.   Skin: Negative for rash.   Allergic/Immunologic: Negative.    Neurological: Negative for weakness, numbness and headaches.   Hematological: Negative.    Psychiatric/Behavioral: Negative.    All other systems reviewed and are negative.      PHYSICAL EXAM  ED Triage Vitals   Temp Heart Rate Resp BP SpO2   09/04/19 1146 09/04/19 1146 09/04/19 1146 09/04/19 1200 09/04/19 1146   97.8 °F (36.6 °C) 56 16 (!) 138/102 100 %       Physical Exam   Constitutional: She is oriented to person, place, and time. No distress.   HENT:   Head: Normocephalic and atraumatic.   Eyes: EOM are normal. Pupils are equal, round, and reactive to light.   Neck: Normal range of motion. Neck supple.   Cardiovascular: Normal rate, regular rhythm and normal heart sounds.   Pulmonary/Chest: Effort normal and breath sounds normal. No respiratory distress.    Abdominal: Soft. There is tenderness (diffuse). There is no rebound and no guarding.   Diffuse tenderness.   Musculoskeletal: Normal range of motion. She exhibits no edema.   Neurological: She is alert and oriented to person, place, and time. She has normal sensation and normal strength.   Skin: Skin is warm and dry. No rash noted.   Psychiatric: Mood and affect normal.   Nursing note and vitals reviewed.      LAB RESULTS  Lab Results (last 24 hours)     Procedure Component Value Units Date/Time    CBC & Differential [[id number]] Collected:  09/04/19 1212    Specimen:  Blood Updated:  09/04/19 1222    Narrative:       The following orders were created for panel order CBC & Differential.  Procedure                               Abnormality         Status                     ---------                               -----------         ------                     CBC Auto Differential[[id number]]        Abnormal            Final result                 Please view results for these tests on the individual orders.    Comprehensive Metabolic Panel [[id number]]  (Abnormal) Collected:  09/04/19 1212    Specimen:  Blood Updated:  09/04/19 1251     Glucose 133 mg/dL      BUN 20 mg/dL      Creatinine 1.12 mg/dL      Sodium 137 mmol/L      Potassium 3.5 mmol/L      Chloride 97 mmol/L      CO2 23.1 mmol/L      Calcium 9.5 mg/dL      Total Protein 7.8 g/dL      Albumin 4.40 g/dL      ALT (SGPT) 13 U/L      AST (SGOT) 23 U/L      Alkaline Phosphatase 81 U/L      Total Bilirubin 0.6 mg/dL      eGFR Non African Amer 49 mL/min/1.73      Globulin 3.4 gm/dL      A/G Ratio 1.3 g/dL      BUN/Creatinine Ratio 17.9     Anion Gap 16.9 mmol/L     Narrative:       GFR Normal >60  Chronic Kidney Disease <60  Kidney Failure <15    Lipase [[id number]]  (Normal) Collected:  09/04/19 1212    Specimen:  Blood Updated:  09/04/19 1251     Lipase 35 U/L     CBC Auto Differential [[id number]]  (Abnormal) Collected:  09/04/19 1212    Specimen:  Blood  Updated:  09/04/19 1222     WBC 5.00 10*3/mm3      RBC 4.14 10*6/mm3      Hemoglobin 13.8 g/dL      Hematocrit 42.6 %      .9 fL      MCH 33.3 pg      MCHC 32.4 g/dL      RDW 12.6 %      RDW-SD 47.5 fl      MPV 10.2 fL      Platelets 209 10*3/mm3      Neutrophil % 70.6 %      Lymphocyte % 17.0 %      Monocyte % 8.2 %      Eosinophil % 3.2 %      Basophil % 0.6 %      Immature Grans % 0.4 %      Neutrophils, Absolute 3.53 10*3/mm3      Lymphocytes, Absolute 0.85 10*3/mm3      Monocytes, Absolute 0.41 10*3/mm3      Eosinophils, Absolute 0.16 10*3/mm3      Basophils, Absolute 0.03 10*3/mm3      Immature Grans, Absolute 0.02 10*3/mm3      nRBC 0.0 /100 WBC     Urinalysis With Microscopic If Indicated (No Culture) - Urine, Clean Catch [[id number]]  (Abnormal) Collected:  09/04/19 1224    Specimen:  Urine, Clean Catch Updated:  09/04/19 1252     Color, UA Dark Yellow     Appearance, UA Cloudy     pH, UA <=5.0     Specific Gravity, UA 1.027     Glucose, UA Negative     Ketones, UA Trace     Bilirubin, UA Negative     Blood, UA Trace     Protein, UA Trace     Leuk Esterase, UA Small (1+)     Nitrite, UA Negative     Urobilinogen, UA 1.0 E.U./dL    Urinalysis, Microscopic Only - Urine, Clean Catch [[id number]]  (Abnormal) Collected:  09/04/19 1224    Specimen:  Urine, Clean Catch Updated:  09/04/19 1323     RBC, UA 3-5 /HPF      WBC, UA 31-50 /HPF      Bacteria, UA 4+ /HPF      Squamous Epithelial Cells, UA 31-50 /HPF      Yeast, UA Moderate/2+ Yeast /HPF      Hyaline Casts, UA 21-30 /LPF      Methodology Manual Light Microscopy          I ordered the above labs and reviewed the results    RADIOLOGY  CT Abdomen Pelvis With Contrast   Final Result   Large midline supraumbilical ventral abdominal wall hernia   containing numerous loops of small bowel. There is mild edema within the   mesenteric fat within the hernia that is nonspecific. There is no   evidence of obstruction. Small hepatic cysts and bilateral renal cysts.    Small right renal angiomyolipoma.       This report was finalized on 9/4/2019 1:53 PM by Dr. Wei Loza M.D.               I ordered the above noted radiological studies. Interpreted by radiologist. Reviewed by me in PACS.         PROGRESS AND CONSULTS  Final Diagnosis: as of Sep 04 1734   Abdominal pain in female   Ventral hernia without obstruction or gangrene     1212. Ordered labwork for workup.     1216. Ordered CT abd/pelvis for workup.     1250. Discussed case with Dr Steele. After a bedside evaluation, he agrees with the plan of care.     1403. BP- 148/93 HR- 56 Temp- 97.8 °F (36.6 °C) (Tympanic) O2 sat- 99%  Rechecked the patient who is in NAD and states her pain has improved. Informed pt of nothing emergent seen on CT but discussed the need to follow-up with Dr Aguilera for an evaluation of her chronic hernia with worsening pain. Pt understands and agrees with the plan, all questions answered.      MEDICAL DECISION MAKING  Results were reviewed/discussed with the patient and they were also made aware of online access. Pt also made aware that some labs, such as cultures, will not be resulted during ER visit and follow up with PMD is necessary.     MDM  Number of Diagnoses or Management Options  Abdominal pain in female:   Ventral hernia without obstruction or gangrene:      Amount and/or Complexity of Data Reviewed  Clinical lab tests: reviewed and ordered (Creatinine 1.12)  Tests in the radiology section of CPT®: reviewed and ordered (ventral abdominal wall hernia without bowel obstruction seen on CT abd/pelvis)  Discuss the patient with other providers: yes (Dr Steele)    Patient Progress  Patient progress: stable         DIAGNOSIS  Final diagnoses:   Abdominal pain in female   Ventral hernia without obstruction or gangrene       DISPOSITION  DISCHARGE    Patient discharged in stable condition.    Reviewed implications of results, diagnosis, meds, responsibility to follow up, warning signs and  symptoms of possible worsening, potential complications and reasons to return to ER.    Patient/Family voiced understanding of above instructions.    Discussed plan for discharge, as there is no emergent indication for admission. Patient referred to primary care provider for BP management due to today's BP. Pt/family is agreeable and understands need for follow up and repeat testing.  Pt is aware that discharge does not mean that nothing is wrong but it indicates no emergency is present that requires admission and they must continue care with follow-up as given below or physician of their choice.     FOLLOW-UP  Anatoly Jimenez MD  [address]  [facility] [id number] [phone]    Call       Yo Aguilera MD  [address]  [facility] [id number] [phone]    Call            Medication List      New Prescriptions    dicyclomine 20 MG tablet  Commonly known as:  BENTYL  Take 1 tablet by mouth Every 8 (Eight) Hours As Needed (pain).           Latest Documented Vital Signs:  As of 5:34 PM  BP- 148/93 HR- 56 Temp- 97.8 °F (36.6 °C) (Tympanic) O2 sat- 98%    --  Documentation assistance provided by inge Smith for DEBBI Meier.  Information recorded by the scribe was done at my direction and has been verified and validated by me.     Tash Smith  09/04/19 0224       Johanne Meier APRN  09/04/19 9448

## 2019-09-16 ENCOUNTER — OFFICE VISIT (OUTPATIENT)
Dept: SURGERY | Facility: CLINIC | Age: 64
End: 2019-09-16

## 2019-09-16 VITALS — OXYGEN SATURATION: 98 % | HEIGHT: 70 IN | WEIGHT: 293 LBS | HEART RATE: 88 BPM | BODY MASS INDEX: 41.95 KG/M2

## 2019-09-16 DIAGNOSIS — K43.2 INCISIONAL HERNIA, WITHOUT OBSTRUCTION OR GANGRENE: Primary | ICD-10-CM

## 2019-09-16 PROCEDURE — 99214 OFFICE O/P EST MOD 30 MIN: CPT | Performed by: SURGERY

## 2019-09-18 NOTE — PROGRESS NOTES
SUMMARY (A/P):    64-year-old lady with incisional hernia superior to the umbilicus.  She continues to smoke half pack per day of cigarettes and has gained 37 pounds since her visit here in 2017, now with a BMI of 43.5.  Due to morbid obesity, associated comorbidities, and pack per day tobacco use, I feel that her risks of surgical intervention far exceed any benefit and certainly exceed the risk of strangulation.  I described to her the symptoms to look for in terms of strangulation and the rationale for avoiding surgery at this point.  I offered assistance in terms of smoking cessation and weight loss recommendations.      CC:    Hernia    HPI:    64-year-old lady with intermittent moderately severe pain in the supraumbilical region associated with visible and palpable bulge, symptoms have been present for several years.  I saw her previously for this problem in October 2017.    PSH:    Colonoscopy 2015 Dr. Lopez  Open ventral hernia repair 2015 Dr. Lopez  Right total knee replacement 2015  Laparoscopic cholecystectomy 2010  Hysterectomy 2000    PMH:    Morbid obesity  Hypertension  Sleep apnea  Hyperlipidemia  Atrial fibrillation  Arthritis  Asthma    FAMILY HISTORY:    Negative for colorectal cancer    SOCIAL HISTORY:   Pack per day tobacco use  Discussed risks of surgery with patient and in particular increased risk of wound infection, poor wound healing, hernias (with abdominal surgery) and post-operative pulmonary complications associated with smoking.   Occasional alcohol use    ALLERGIES: reviewed, in Epic    MEDICATIONS: reviewed, in Epic    ROS:  No chest pain or shortness of air.  All other systems reviewed and negative other than presenting complaints.    RADIOLOGY/ENDOSCOPY:    CT abdomen pelvis 9/4/2019: Large midline supraumbilical ventral hernia containing loops of small bowel.  I reviewed the images and concur.    PHYSICAL EXAM:   Constitutional: Well-developed well-nourished, no acute  distress  Vital signs: HR 88, weight 303 pounds, height 70 inches, BMI 43.5  Eyes: Conjunctiva normal, sclera nonicteric  ENMT: Hearing grossly normal, oral mucosa moist  Neck: Supple, no palpable mass, trachea midline  Respiratory: Clear to auscultation, normal inspiratory effort  Cardiovascular: Regular rate, no murmur, no carotid bruit, no peripheral edema, no jugular venous distention  Gastrointestinal: Soft, nontender, no palpable mass, no hepatosplenomegaly, asymmetric protrusion above into the right of the umbilicus with palpable bulge consistent with hernia, bowel sounds normal  Lymphatics (palpable nodes):  cervical-negative, axillary-negative  Skin:  Warm, dry, no rash on visualized skin surfaces  Musculoskeletal: Symmetric strength, normal gait  Psychiatric: Alert and oriented ×3, normal affect     JUNIOR SANDRA M.D.

## 2019-09-21 ENCOUNTER — APPOINTMENT (OUTPATIENT)
Dept: CT IMAGING | Facility: HOSPITAL | Age: 64
End: 2019-09-21

## 2019-09-21 ENCOUNTER — HOSPITAL ENCOUNTER (EMERGENCY)
Facility: HOSPITAL | Age: 64
Discharge: HOME | End: 2019-09-21
Attending: EMERGENCY MEDICINE | Admitting: EMERGENCY MEDICINE

## 2019-09-21 VITALS
OXYGEN SATURATION: 97 % | RESPIRATION RATE: 18 BRPM | HEART RATE: 89 BPM | TEMPERATURE: 97.8 F | DIASTOLIC BLOOD PRESSURE: 90 MMHG | SYSTOLIC BLOOD PRESSURE: 127 MMHG

## 2019-09-21 DIAGNOSIS — K43.9 VENTRAL HERNIA WITHOUT OBSTRUCTION OR GANGRENE: Primary | ICD-10-CM

## 2019-09-21 LAB
ALBUMIN SERPL-MCNC: 4.6 G/DL (ref 3.5–5.2)
ALBUMIN/GLOB SERPL: 1.4 G/DL
ALP SERPL-CCNC: 91 U/L (ref 39–117)
ALT SERPL W P-5'-P-CCNC: 17 U/L (ref 1–33)
ANION GAP SERPL CALCULATED.3IONS-SCNC: 19.3 MMOL/L (ref 5–15)
AST SERPL-CCNC: 33 U/L (ref 1–32)
BASOPHILS # BLD AUTO: 0.04 10*3/MM3 (ref 0–0.2)
BASOPHILS NFR BLD AUTO: 0.7 % (ref 0–1.5)
BILIRUB SERPL-MCNC: 0.7 MG/DL (ref 0.2–1.2)
BUN BLD-MCNC: 19 MG/DL (ref 8–23)
BUN/CREAT SERPL: 15.4 (ref 7–25)
CALCIUM SPEC-SCNC: 9.2 MG/DL (ref 8.6–10.5)
CHLORIDE SERPL-SCNC: 96 MMOL/L (ref 98–107)
CO2 SERPL-SCNC: 22.7 MMOL/L (ref 22–29)
CREAT BLD-MCNC: 1.23 MG/DL (ref 0.57–1)
D-LACTATE SERPL-SCNC: 2.5 MMOL/L (ref 0.5–2)
DEPRECATED RDW RBC AUTO: 46.5 FL (ref 37–54)
EOSINOPHIL # BLD AUTO: 0.11 10*3/MM3 (ref 0–0.4)
EOSINOPHIL NFR BLD AUTO: 1.9 % (ref 0.3–6.2)
ERYTHROCYTE [DISTWIDTH] IN BLOOD BY AUTOMATED COUNT: 12.7 % (ref 12.3–15.4)
GFR SERPL CREATININE-BSD FRML MDRD: 44 ML/MIN/1.73
GLOBULIN UR ELPH-MCNC: 3.2 GM/DL
GLUCOSE BLD-MCNC: 185 MG/DL (ref 65–99)
HCT VFR BLD AUTO: 43.5 % (ref 34–46.6)
HGB BLD-MCNC: 14.1 G/DL (ref 12–15.9)
HOLD SPECIMEN: NORMAL
IMM GRANULOCYTES # BLD AUTO: 0.04 10*3/MM3 (ref 0–0.05)
IMM GRANULOCYTES NFR BLD AUTO: 0.7 % (ref 0–0.5)
LIPASE SERPL-CCNC: 31 U/L (ref 13–60)
LYMPHOCYTES # BLD AUTO: 1.35 10*3/MM3 (ref 0.7–3.1)
LYMPHOCYTES NFR BLD AUTO: 23 % (ref 19.6–45.3)
MAGNESIUM SERPL-MCNC: 2.1 MG/DL (ref 1.6–2.4)
MCH RBC QN AUTO: 32.6 PG (ref 26.6–33)
MCHC RBC AUTO-ENTMCNC: 32.4 G/DL (ref 31.5–35.7)
MCV RBC AUTO: 100.5 FL (ref 79–97)
MONOCYTES # BLD AUTO: 0.44 10*3/MM3 (ref 0.1–0.9)
MONOCYTES NFR BLD AUTO: 7.5 % (ref 5–12)
NEUTROPHILS # BLD AUTO: 3.9 10*3/MM3 (ref 1.7–7)
NEUTROPHILS NFR BLD AUTO: 66.2 % (ref 42.7–76)
NRBC BLD AUTO-RTO: 0.2 /100 WBC (ref 0–0.2)
PLATELET # BLD AUTO: 249 10*3/MM3 (ref 140–450)
PMV BLD AUTO: 9.7 FL (ref 6–12)
POTASSIUM BLD-SCNC: 3.4 MMOL/L (ref 3.5–5.2)
PROT SERPL-MCNC: 7.8 G/DL (ref 6–8.5)
RBC # BLD AUTO: 4.33 10*6/MM3 (ref 3.77–5.28)
SODIUM BLD-SCNC: 138 MMOL/L (ref 136–145)
TROPONIN T SERPL-MCNC: <0.01 NG/ML (ref 0–0.03)
WBC NRBC COR # BLD: 5.88 10*3/MM3 (ref 3.4–10.8)

## 2019-09-21 PROCEDURE — 85025 COMPLETE CBC W/AUTO DIFF WBC: CPT | Performed by: EMERGENCY MEDICINE

## 2019-09-21 PROCEDURE — 74177 CT ABD & PELVIS W/CONTRAST: CPT

## 2019-09-21 PROCEDURE — 83605 ASSAY OF LACTIC ACID: CPT | Performed by: EMERGENCY MEDICINE

## 2019-09-21 PROCEDURE — 84484 ASSAY OF TROPONIN QUANT: CPT | Performed by: EMERGENCY MEDICINE

## 2019-09-21 PROCEDURE — 99284 EMERGENCY DEPT VISIT MOD MDM: CPT

## 2019-09-21 PROCEDURE — 83690 ASSAY OF LIPASE: CPT | Performed by: EMERGENCY MEDICINE

## 2019-09-21 PROCEDURE — 96374 THER/PROPH/DIAG INJ IV PUSH: CPT

## 2019-09-21 PROCEDURE — 80053 COMPREHEN METABOLIC PANEL: CPT | Performed by: EMERGENCY MEDICINE

## 2019-09-21 PROCEDURE — 25010000002 IOPAMIDOL 61 % SOLUTION: Performed by: EMERGENCY MEDICINE

## 2019-09-21 PROCEDURE — 83735 ASSAY OF MAGNESIUM: CPT | Performed by: EMERGENCY MEDICINE

## 2019-09-21 PROCEDURE — 96375 TX/PRO/DX INJ NEW DRUG ADDON: CPT

## 2019-09-21 PROCEDURE — 25010000002 MORPHINE PER 10 MG: Performed by: EMERGENCY MEDICINE

## 2019-09-21 PROCEDURE — 25010000002 ONDANSETRON PER 1 MG: Performed by: EMERGENCY MEDICINE

## 2019-09-21 RX ORDER — ONDANSETRON 2 MG/ML
4 INJECTION INTRAMUSCULAR; INTRAVENOUS ONCE
Status: COMPLETED | OUTPATIENT
Start: 2019-09-21 | End: 2019-09-21

## 2019-09-21 RX ORDER — MORPHINE SULFATE 2 MG/ML
4 INJECTION, SOLUTION INTRAMUSCULAR; INTRAVENOUS ONCE
Status: COMPLETED | OUTPATIENT
Start: 2019-09-21 | End: 2019-09-21

## 2019-09-21 RX ORDER — DICYCLOMINE HYDROCHLORIDE 10 MG/1
20 CAPSULE ORAL 3 TIMES DAILY PRN
Qty: 20 CAPSULE | Refills: 0 | Status: ON HOLD | OUTPATIENT
Start: 2019-09-21 | End: 2021-06-17 | Stop reason: DRUGHIGH

## 2019-09-21 RX ADMIN — SODIUM CHLORIDE 500 ML: 9 INJECTION, SOLUTION INTRAVENOUS at 12:39

## 2019-09-21 RX ADMIN — ONDANSETRON 4 MG: 2 INJECTION INTRAMUSCULAR; INTRAVENOUS at 13:13

## 2019-09-21 RX ADMIN — MORPHINE SULFATE 4 MG: 2 INJECTION, SOLUTION INTRAMUSCULAR; INTRAVENOUS at 13:13

## 2019-09-21 RX ADMIN — SODIUM CHLORIDE 500 ML: 9 INJECTION, SOLUTION INTRAVENOUS at 14:48

## 2019-09-21 RX ADMIN — IOPAMIDOL 85 ML: 612 INJECTION, SOLUTION INTRAVENOUS at 13:59

## 2019-09-21 NOTE — ED PROVIDER NOTES
" EMERGENCY DEPARTMENT ENCOUNTER    Room Number:  MARYLIN/I  Date of encounter:  9/21/2019  PCP: Anatoly Jimenez MD  Historian: patient      HPI:  Chief Complaint: abdominal pain  Context: Marilu Avery is a 64 y.o. female with Hx of a-fib, who presents to the ED c/o an acute exacerbation of generalized abd pain that began this morning. She reports a Hx of a known hernia that will intermittently \"pop out\", causing her pain. The hernia is typically reducible, but today the hernia was \"rock hard\" and was \"severely painful\". At the time of my examination, he pain is improving and she feels that the hernia is beginning to subside. Pt confirms associated nausea and vomiting. She reports having a small BM this morning. Currently on Xarelto. Denies fever. There are no other complaints.    PAST MEDICAL HISTORY  Active Ambulatory Problems     Diagnosis Date Noted   • Incisional hernia, without obstruction or gangrene 10/12/2017     Resolved Ambulatory Problems     Diagnosis Date Noted   • No Resolved Ambulatory Problems     Past Medical History:   Diagnosis Date   • Arthritis    • Asthma    • Atrial fibrillation (CMS/HCC)    • Colon polyps 2015   • Hyperlipidemia    • Hypertension    • Sleep apnea          PAST SURGICAL HISTORY  Past Surgical History:   Procedure Laterality Date   • BACK SURGERY N/A 2001   • COLONOSCOPY N/A 10/22/2015    Rectal polyp-Dr. Elías Keating   • HYSTERECTOMY N/A 2000   • LAPAROSCOPIC CHOLECYSTECTOMY N/A 01/04/2010    Dr. Heath Whitlock   • OOPHORECTOMY  2001   • REPLACEMENT TOTAL KNEE Left 06/22/2015    Dr. Yo Aguayo   • REPLACEMENT TOTAL KNEE Right 02/26/2015    Dr. Yo Aguayo   • VENTRAL HERNIA REPAIR N/A 10/22/2015    Open reduction of incarcerated ventral hernia with layered closure-Dr. Elías Keating         FAMILY HISTORY  Family History   Problem Relation Age of Onset   • No Known Problems Mother    • No Known Problems Father          SOCIAL HISTORY  Social History     Socioeconomic " History   • Marital status:      Spouse name: Not on file   • Number of children: Not on file   • Years of education: Not on file   • Highest education level: Not on file   Tobacco Use   • Smoking status: Current Every Day Smoker     Packs/day: 1.00   Substance and Sexual Activity   • Alcohol use: Yes     Comment: 3 drinks daily   • Sexual activity: Defer         ALLERGIES  Patient has no known allergies.        REVIEW OF SYSTEMS  Review of Systems   Constitutional: Negative for fever.   HENT: Negative for sore throat.    Eyes: Negative.    Respiratory: Negative for cough and shortness of breath.    Cardiovascular: Negative for chest pain.   Gastrointestinal: Positive for abdominal pain, nausea and vomiting. Negative for diarrhea.   Genitourinary: Negative for dysuria.   Musculoskeletal: Negative for neck pain.   Skin: Negative for rash.   Allergic/Immunologic: Negative.    Neurological: Negative for weakness, numbness and headaches.   Hematological: Negative.    Psychiatric/Behavioral: Negative.    All other systems reviewed and are negative.     PHYSICAL EXAM    I have reviewed the triage vital signs and nursing notes.    ED Triage Vitals   Temp Heart Rate Resp BP SpO2   09/21/19 1144 09/21/19 1142 09/21/19 1142 09/21/19 1142 09/21/19 1142   97.8 °F (36.6 °C) 98 24 160/98 98 %      Temp src Heart Rate Source Patient Position BP Location FiO2 (%)   09/21/19 1144 09/21/19 1204 -- -- --   Tympanic Monitor            Physical Exam   Constitutional: She is oriented to person, place, and time and well-developed, well-nourished, and in no distress.  Non-toxic appearance. No distress.   Appears uncomfortable.    HENT:   Head: Normocephalic.   Mouth/Throat: Mucous membranes are normal.   Eyes: EOM are normal.   Neck: Normal range of motion.   Cardiovascular: Normal rate and regular rhythm. Exam reveals no gallop and no friction rub.   No murmur heard.  Pulmonary/Chest: Effort normal. No respiratory distress. She has  no wheezes. She has no rhonchi. She has no rales.   Abdominal: Soft. Bowel sounds are normal. She exhibits no distension. There is generalized tenderness (mild). There is no guarding.   Reducible anterior R abd wall hernia with no skin changes.    Musculoskeletal: Normal range of motion. She exhibits no deformity.   Neurological: She is alert and oriented to person, place, and time. GCS score is 15.   moving all extremities, no focal deficits   Skin: Skin is warm and dry.   Psychiatric:   Calm, cooperative   Nursing note and vitals reviewed.      LAB RESULTS  Recent Results (from the past 24 hour(s))   Lactic Acid, Plasma    Collection Time: 09/21/19 12:37 PM   Result Value Ref Range    Lactate 2.5 (C) 0.5 - 2.0 mmol/L   CBC Auto Differential    Collection Time: 09/21/19 12:37 PM   Result Value Ref Range    WBC 5.88 3.40 - 10.80 10*3/mm3    RBC 4.33 3.77 - 5.28 10*6/mm3    Hemoglobin 14.1 12.0 - 15.9 g/dL    Hematocrit 43.5 34.0 - 46.6 %    .5 (H) 79.0 - 97.0 fL    MCH 32.6 26.6 - 33.0 pg    MCHC 32.4 31.5 - 35.7 g/dL    RDW 12.7 12.3 - 15.4 %    RDW-SD 46.5 37.0 - 54.0 fl    MPV 9.7 6.0 - 12.0 fL    Platelets 249 140 - 450 10*3/mm3    Neutrophil % 66.2 42.7 - 76.0 %    Lymphocyte % 23.0 19.6 - 45.3 %    Monocyte % 7.5 5.0 - 12.0 %    Eosinophil % 1.9 0.3 - 6.2 %    Basophil % 0.7 0.0 - 1.5 %    Immature Grans % 0.7 (H) 0.0 - 0.5 %    Neutrophils, Absolute 3.90 1.70 - 7.00 10*3/mm3    Lymphocytes, Absolute 1.35 0.70 - 3.10 10*3/mm3    Monocytes, Absolute 0.44 0.10 - 0.90 10*3/mm3    Eosinophils, Absolute 0.11 0.00 - 0.40 10*3/mm3    Basophils, Absolute 0.04 0.00 - 0.20 10*3/mm3    Immature Grans, Absolute 0.04 0.00 - 0.05 10*3/mm3    nRBC 0.2 0.0 - 0.2 /100 WBC   Lactic Acid, Reflex Timer (This will reflex a repeat order 3-3:15 hours after ordered.)    Collection Time: 09/21/19 12:37 PM   Result Value Ref Range    Extra Tube Hold for add-ons.    Comprehensive Metabolic Panel    Collection Time: 09/21/19 12:39  PM   Result Value Ref Range    Glucose 185 (H) 65 - 99 mg/dL    BUN 19 8 - 23 mg/dL    Creatinine 1.23 (H) 0.57 - 1.00 mg/dL    Sodium 138 136 - 145 mmol/L    Potassium 3.4 (L) 3.5 - 5.2 mmol/L    Chloride 96 (L) 98 - 107 mmol/L    CO2 22.7 22.0 - 29.0 mmol/L    Calcium 9.2 8.6 - 10.5 mg/dL    Total Protein 7.8 6.0 - 8.5 g/dL    Albumin 4.60 3.50 - 5.20 g/dL    ALT (SGPT) 17 1 - 33 U/L    AST (SGOT) 33 (H) 1 - 32 U/L    Alkaline Phosphatase 91 39 - 117 U/L    Total Bilirubin 0.7 0.2 - 1.2 mg/dL    eGFR Non African Amer 44 (L) >60 mL/min/1.73    Globulin 3.2 gm/dL    A/G Ratio 1.4 g/dL    BUN/Creatinine Ratio 15.4 7.0 - 25.0    Anion Gap 19.3 (H) 5.0 - 15.0 mmol/L   Lipase    Collection Time: 09/21/19 12:39 PM   Result Value Ref Range    Lipase 31 13 - 60 U/L   Troponin    Collection Time: 09/21/19 12:39 PM   Result Value Ref Range    Troponin T <0.010 0.000 - 0.030 ng/mL   Magnesium    Collection Time: 09/21/19 12:39 PM   Result Value Ref Range    Magnesium 2.1 1.6 - 2.4 mg/dL       Ordered the above labs and independently reviewed the results.        RADIOLOGY  Ct Abdomen Pelvis With Contrast    Result Date: 9/21/2019  CT ABDOMEN AND PELVIS WITH CONTRAST  HISTORY: 64-year-old female with a history of abdominal pain and vomiting.  TECHNIQUE: Contiguous axial images were obtained through the abdomen and pelvis following intravenous administration of nonionic contrast. Oral contrast was not administered.  COMPARISON: CT of the abdomen and pelvis with contrast, 09/04/2019.  FINDINGS: The visualized portions of the lung bases are clear. The visualized portion of the heart has a normal appearance. The spleen, adrenal glands, liver and pancreas appear normal. 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 A 1.3 cm right renal cyst is noted. There is a 1.4 cm left renal cyst.  There is a right-sided anterior abdominal hernia with the hernia sac measuring on the order of 16 x 4.7 cm in transverse dimensions compared to prior  measurements of 14 x 5.5 cm. No significant change in the overall volume of bowel extending through the hernia into the subcutaneous tissues is appreciated. There is some stranding of the mesenteric fat in the region. Scattered diverticulosis of the sigmoid colon is noted. A scant amount of free fluid is seen within the pelvis. No evidence for intra-abdominal or pelvic adenopathy is appreciated. Moderate atheromatous calcification of the abdominal aorta and bilateral iliac arteries is noted. No adenopathy in the abdomen or pelvis is appreciated.      1. There is no significant change in the overall size of the right hemiabdominal ventral hernia. However, there is some stranding within the omental fat that extends into the hernia sac as well as the fat in the midabdominal mesentery. No adenopathy is appreciated. A scant amount of free fluid in the pelvis is noted. 2. There is stable colonic diverticulosis.  Radiation dose reduction techniques were utilized, including automated exposure control and exposure modulation based on body size.  This report was finalized on 9/21/2019 3:46 PM by Dr. Krunal Howell M.D.        I ordered the above noted radiological studies. Reviewed by me. Discussed with radiologist (Dr. Howell).  See dictation for official radiology interpretation.      PROCEDURES    Procedures    MEDICATIONS GIVEN IN ER    Medications   ondansetron (ZOFRAN) injection 4 mg (4 mg Intravenous Given 9/21/19 1313)   sodium chloride 0.9 % bolus 500 mL (0 mL Intravenous Stopped 9/21/19 1314)   morphine injection 4 mg (4 mg Intravenous Given 9/21/19 1313)   iopamidol (ISOVUE-300) 61 % injection 100 mL (85 mL Intravenous Given by Other 9/21/19 1359)   sodium chloride 0.9 % bolus 500 mL (0 mL Intravenous Stopped 9/21/19 1530)         PROGRESS, DATA ANALYSIS, CONSULTS, AND MEDICAL DECISION MAKING    All labs have been independently reviewed by me.  All radiology studies have been reviewed by me and discussed with  "radiologist dictating the report.   EKG's independently viewed and interpreted by me.  Discussion below represents my analysis of pertinent findings related to patient's condition, differential diagnosis, treatment plan and final disposition.    ED Course as of Sep 21 2001   Sat Sep 21, 2019   1504 Patient was evaluated in the emergency department on September 4 for abdominal pain related to her hernia.  [DC]   1504 Patient came in for evaluation today for severe abdominal pain with nausea and one episode of emesis today.  She states that she gets these \"flareups \"every couple of weeks.  She states that she has seen surgery but they currently do not recommend surgery due to her obesity and smoking status at this time.  On exam, the patient hernia was reducible without significant difficulty, no evidence of acute strangulation or incarceration on exam.  [DC]   1505   Laboratory work-up shows a mild elevated lactic acid of 2.5 without leukocytosis.  She has been given a liter of IV fluids.  Pain is significantly improved and nearly resolved after a dose of IV morphine.  I discussed the CT findings of some mesenteric stranding with Dr. Aguilera with general surgery, and at this time he states that since the hernia is still easily reducible he does not feel like there is anything to move forward with at this time.  I have given the patient appropriate home precautions including avoiding constipation and increasing her water intake, and to continue to follow-up with her surgeon as needed.  She has been advised to return to the emergency department for worsening symptoms as needed.  [DC]      ED Course User Index  [DC] Lucho Vivas MD     1414- Discussed the pt's CT abd results with Dr. Howell (Radiologist). He states that there are new stranding and inflammatory markers within the bowel-containing hernia. This is new compared to previous CT scans.     1419- Rechecked pt. Pt is resting comfortably. Her pain and nausea " have improved. Notified pt of her imaging and lab results. Discussed the plan to discuss the pt with general surgery. Pt understands and agrees with the plan, all questions answered.    1428- Discussed pt with Dr. Agiulera (General Surgeon). He states that the pt can be d/c'd home for outpatient f/u.     1432- Rechecked pt. Pt is resting comfortably. Notified pt of my discussion with Dr. Aguilera. Discussed the plan to discharge the pt home with Rx for Bentyl. I instructed the pt to f/u with her PCP and Surgery. Advised pt to consume a diet that will help avoid constipation. RTED instructions given. Pt understands and agrees with the plan, all questions answered.  --  AS OF 8:01 PM VITALS:    BP - 127/90  HR - 89  TEMP - 97.8 °F (36.6 °C) (Tympanic)  02 SATS - 97%  --    DIAGNOSIS  Final diagnoses:   Ventral hernia without obstruction or gangrene         DISPOSITION  DISCHARGE    Patient discharged in stable condition.    Reviewed implications of results, diagnosis, meds, responsibility to follow up, warning signs and symptoms of possible worsening, potential complications and reasons to return to ER.    Patient/Family voiced understanding of above instructions.    Discussed plan for discharge, as there is no emergent indication for admission. Patient referred to primary care provider for BP management due to today's BP. Pt/family is agreeable and understands need for follow up and repeat testing.  Pt is aware that discharge does not mean that nothing is wrong but it indicates no emergency is present that requires admission and they must continue care with follow-up as given below or physician of their choice.     FOLLOW-UP  [facility] Emergency Department  [address]  [facility] [address] [phone]    As needed, If symptoms worsen    Anatoly Jimenez MD  [address] [facility] [address]  [facility] [address]  [phone]    Schedule an appointment as soon as possible for a visit   As  needed    Yo Aguilera MD  [address]  [address] [address]  [phone]    Schedule an appointment as soon as possible for a visit   As needed         Medication List      Changed    * dicyclomine 20 MG tablet  Commonly known as:  BENTYL  Take 1 tablet by mouth Every 8 (Eight) Hours As Needed (pain).  What changed:  Another medication with the same name was added. Make sure   you understand how and when to take each.     * dicyclomine 10 MG capsule  Commonly known as:  BENTYL  Take 2 capsules by mouth 3 (Three) Times a Day As Needed (bowel spasm).   Drink plenty of water  What changed:  You were already taking a medication with the same name,   and this prescription was added. Make sure you understand how and when to   take each.         * This list has 2 medication(s) that are the same as other medications   prescribed for you. Read the directions carefully, and ask your doctor or   other care provider to review them with you.              --  Documentation assistance provided by inge Zhang for Dr. Ortega MD.  Information recorded by the scribe was done at my direction and has been verified and validated by me.       Caesar Zhang  09/21/19 1539       Lucho Vivas MD  09/21/19 2001

## 2019-09-21 NOTE — DISCHARGE INSTRUCTIONS
Continue current medications, drink plenty of water, the counter bowel regimen may help limit any constipation and prevent serious obstructive process, follow-up with your primary care provider and your surgeon for reevaluation as needed, return to the emergency department for worsening symptoms as needed.

## 2019-11-11 ENCOUNTER — LAB (OUTPATIENT)
Dept: LAB | Facility: HOSPITAL | Age: 64
End: 2019-11-11

## 2019-11-11 ENCOUNTER — TRANSCRIBE ORDERS (OUTPATIENT)
Dept: ADMINISTRATIVE | Facility: HOSPITAL | Age: 64
End: 2019-11-11

## 2019-11-11 DIAGNOSIS — I10 ESSENTIAL HYPERTENSION, MALIGNANT: ICD-10-CM

## 2019-11-11 DIAGNOSIS — E11.9 DIABETES MELLITUS WITHOUT COMPLICATION (HCC): ICD-10-CM

## 2019-11-11 DIAGNOSIS — E55.9 AVITAMINOSIS D: ICD-10-CM

## 2019-11-11 DIAGNOSIS — E78.5 HYPERLIPIDEMIA, UNSPECIFIED HYPERLIPIDEMIA TYPE: ICD-10-CM

## 2019-11-11 DIAGNOSIS — Z00.00 ROUTINE GENERAL MEDICAL EXAMINATION AT A HEALTH CARE FACILITY: ICD-10-CM

## 2019-11-11 DIAGNOSIS — Z00.00 ROUTINE GENERAL MEDICAL EXAMINATION AT A HEALTH CARE FACILITY: Primary | ICD-10-CM

## 2019-11-11 LAB
25(OH)D3 SERPL-MCNC: 35.4 NG/ML (ref 30–100)
ALBUMIN SERPL-MCNC: 4.6 G/DL (ref 3.5–5.2)
ALBUMIN/GLOB SERPL: 1.4 G/DL
ALP SERPL-CCNC: 76 U/L (ref 39–117)
ALT SERPL W P-5'-P-CCNC: 15 U/L (ref 1–33)
ANION GAP SERPL CALCULATED.3IONS-SCNC: 17.1 MMOL/L (ref 5–15)
AST SERPL-CCNC: 23 U/L (ref 1–32)
BACTERIA UR QL AUTO: ABNORMAL /HPF
BASOPHILS # BLD AUTO: 0.05 10*3/MM3 (ref 0–0.2)
BASOPHILS NFR BLD AUTO: 1 % (ref 0–1.5)
BILIRUB SERPL-MCNC: 0.6 MG/DL (ref 0.2–1.2)
BILIRUB UR QL STRIP: NEGATIVE
BUN BLD-MCNC: 29 MG/DL (ref 8–23)
BUN/CREAT SERPL: 22.5 (ref 7–25)
CALCIUM SPEC-SCNC: 9.7 MG/DL (ref 8.6–10.5)
CHLORIDE SERPL-SCNC: 98 MMOL/L (ref 98–107)
CHOLEST SERPL-MCNC: 185 MG/DL (ref 0–200)
CLARITY UR: CLEAR
CO2 SERPL-SCNC: 22.9 MMOL/L (ref 22–29)
COLOR UR: YELLOW
CREAT BLD-MCNC: 1.29 MG/DL (ref 0.57–1)
DEPRECATED RDW RBC AUTO: 50.6 FL (ref 37–54)
EOSINOPHIL # BLD AUTO: 0.17 10*3/MM3 (ref 0–0.4)
EOSINOPHIL NFR BLD AUTO: 3.2 % (ref 0.3–6.2)
ERYTHROCYTE [DISTWIDTH] IN BLOOD BY AUTOMATED COUNT: 13.2 % (ref 12.3–15.4)
FOLATE SERPL-MCNC: 10.1 NG/ML (ref 4.78–24.2)
GFR SERPL CREATININE-BSD FRML MDRD: 42 ML/MIN/1.73
GLOBULIN UR ELPH-MCNC: 3.3 GM/DL
GLUCOSE BLD-MCNC: 130 MG/DL (ref 65–99)
GLUCOSE UR STRIP-MCNC: NEGATIVE MG/DL
HBA1C MFR BLD: 6.41 % (ref 4.8–5.6)
HCT VFR BLD AUTO: 42.3 % (ref 34–46.6)
HDLC SERPL-MCNC: 48 MG/DL (ref 40–60)
HGB BLD-MCNC: 13.5 G/DL (ref 12–15.9)
HGB UR QL STRIP.AUTO: NEGATIVE
HYALINE CASTS UR QL AUTO: ABNORMAL /LPF
IMM GRANULOCYTES # BLD AUTO: 0.02 10*3/MM3 (ref 0–0.05)
IMM GRANULOCYTES NFR BLD AUTO: 0.4 % (ref 0–0.5)
KETONES UR QL STRIP: NEGATIVE
LDLC SERPL CALC-MCNC: 94 MG/DL (ref 0–100)
LDLC/HDLC SERPL: 1.96 {RATIO}
LEUKOCYTE ESTERASE UR QL STRIP.AUTO: ABNORMAL
LYMPHOCYTES # BLD AUTO: 1.4 10*3/MM3 (ref 0.7–3.1)
LYMPHOCYTES NFR BLD AUTO: 26.6 % (ref 19.6–45.3)
MCH RBC QN AUTO: 32.8 PG (ref 26.6–33)
MCHC RBC AUTO-ENTMCNC: 31.9 G/DL (ref 31.5–35.7)
MCV RBC AUTO: 102.7 FL (ref 79–97)
MONOCYTES # BLD AUTO: 0.57 10*3/MM3 (ref 0.1–0.9)
MONOCYTES NFR BLD AUTO: 10.8 % (ref 5–12)
NEUTROPHILS # BLD AUTO: 3.05 10*3/MM3 (ref 1.7–7)
NEUTROPHILS NFR BLD AUTO: 58 % (ref 42.7–76)
NITRITE UR QL STRIP: NEGATIVE
NRBC BLD AUTO-RTO: 0 /100 WBC (ref 0–0.2)
PH UR STRIP.AUTO: 6 [PH] (ref 5–8)
PLATELET # BLD AUTO: 254 10*3/MM3 (ref 140–450)
PMV BLD AUTO: 10.5 FL (ref 6–12)
POTASSIUM BLD-SCNC: 4.8 MMOL/L (ref 3.5–5.2)
PROT SERPL-MCNC: 7.9 G/DL (ref 6–8.5)
PROT UR QL STRIP: NEGATIVE
RBC # BLD AUTO: 4.12 10*6/MM3 (ref 3.77–5.28)
RBC # UR: ABNORMAL /HPF
REF LAB TEST METHOD: ABNORMAL
SODIUM BLD-SCNC: 138 MMOL/L (ref 136–145)
SP GR UR STRIP: 1.01 (ref 1–1.03)
SQUAMOUS #/AREA URNS HPF: ABNORMAL /HPF
T3 SERPL-MCNC: 94.2 NG/DL (ref 80–200)
T4 FREE SERPL-MCNC: 1.08 NG/DL (ref 0.93–1.7)
TRIGL SERPL-MCNC: 215 MG/DL (ref 0–150)
TSH SERPL DL<=0.05 MIU/L-ACNC: 1.77 UIU/ML (ref 0.27–4.2)
UROBILINOGEN UR QL STRIP: ABNORMAL
VIT B12 BLD-MCNC: 346 PG/ML (ref 211–946)
VLDLC SERPL-MCNC: 43 MG/DL (ref 5–40)
WBC NRBC COR # BLD: 5.26 10*3/MM3 (ref 3.4–10.8)
WBC UR QL AUTO: ABNORMAL /HPF

## 2019-11-11 PROCEDURE — 84439 ASSAY OF FREE THYROXINE: CPT | Performed by: INTERNAL MEDICINE

## 2019-11-11 PROCEDURE — 82746 ASSAY OF FOLIC ACID SERUM: CPT | Performed by: INTERNAL MEDICINE

## 2019-11-11 PROCEDURE — 80053 COMPREHEN METABOLIC PANEL: CPT | Performed by: INTERNAL MEDICINE

## 2019-11-11 PROCEDURE — 85025 COMPLETE CBC W/AUTO DIFF WBC: CPT | Performed by: INTERNAL MEDICINE

## 2019-11-11 PROCEDURE — 36415 COLL VENOUS BLD VENIPUNCTURE: CPT

## 2019-11-11 PROCEDURE — 80061 LIPID PANEL: CPT | Performed by: INTERNAL MEDICINE

## 2019-11-11 PROCEDURE — 84443 ASSAY THYROID STIM HORMONE: CPT | Performed by: INTERNAL MEDICINE

## 2019-11-11 PROCEDURE — 82306 VITAMIN D 25 HYDROXY: CPT | Performed by: INTERNAL MEDICINE

## 2019-11-11 PROCEDURE — 81001 URINALYSIS AUTO W/SCOPE: CPT | Performed by: INTERNAL MEDICINE

## 2019-11-11 PROCEDURE — 84480 ASSAY TRIIODOTHYRONINE (T3): CPT | Performed by: INTERNAL MEDICINE

## 2019-11-11 PROCEDURE — 83036 HEMOGLOBIN GLYCOSYLATED A1C: CPT | Performed by: INTERNAL MEDICINE

## 2019-11-11 PROCEDURE — 82607 VITAMIN B-12: CPT | Performed by: INTERNAL MEDICINE

## 2020-03-18 ENCOUNTER — APPOINTMENT (OUTPATIENT)
Dept: LAB | Facility: HOSPITAL | Age: 65
End: 2020-03-18

## 2020-03-18 ENCOUNTER — TRANSCRIBE ORDERS (OUTPATIENT)
Dept: ADMINISTRATIVE | Facility: HOSPITAL | Age: 65
End: 2020-03-18

## 2020-03-18 DIAGNOSIS — E78.5 HYPERLIPIDEMIA, UNSPECIFIED HYPERLIPIDEMIA TYPE: ICD-10-CM

## 2020-03-18 DIAGNOSIS — E03.9 MYXEDEMA HEART DISEASE: ICD-10-CM

## 2020-03-18 DIAGNOSIS — Z00.00 ROUTINE GENERAL MEDICAL EXAMINATION AT A HEALTH CARE FACILITY: Primary | ICD-10-CM

## 2020-03-18 DIAGNOSIS — I51.9 MYXEDEMA HEART DISEASE: ICD-10-CM

## 2020-03-18 DIAGNOSIS — I10 ESSENTIAL HYPERTENSION, MALIGNANT: ICD-10-CM

## 2020-03-18 DIAGNOSIS — E55.9 AVITAMINOSIS D: ICD-10-CM

## 2020-03-18 DIAGNOSIS — E11.9 DIABETES MELLITUS WITHOUT COMPLICATION (HCC): ICD-10-CM

## 2021-06-13 ENCOUNTER — APPOINTMENT (OUTPATIENT)
Dept: CT IMAGING | Facility: HOSPITAL | Age: 66
End: 2021-06-13

## 2021-06-13 ENCOUNTER — HOSPITAL ENCOUNTER (INPATIENT)
Facility: HOSPITAL | Age: 66
LOS: 10 days | Discharge: NURSING, REHAB OR LONG-TERM CARE | End: 2021-06-23
Attending: EMERGENCY MEDICINE | Admitting: SURGERY

## 2021-06-13 DIAGNOSIS — K43.6 VENTRAL HERNIA WITH BOWEL OBSTRUCTION: Primary | ICD-10-CM

## 2021-06-13 DIAGNOSIS — Z79.01 ANTICOAGULATED: ICD-10-CM

## 2021-06-13 DIAGNOSIS — K46.0 INCARCERATED HERNIA: ICD-10-CM

## 2021-06-13 DIAGNOSIS — K56.609 SMALL BOWEL OBSTRUCTION (HCC): ICD-10-CM

## 2021-06-13 DIAGNOSIS — E66.01 MORBID OBESITY (HCC): ICD-10-CM

## 2021-06-13 DIAGNOSIS — J44.9 CHRONIC OBSTRUCTIVE PULMONARY DISEASE, UNSPECIFIED COPD TYPE (HCC): ICD-10-CM

## 2021-06-13 PROBLEM — I48.91 ATRIAL FIBRILLATION (HCC): Status: ACTIVE | Noted: 2021-06-13

## 2021-06-13 PROBLEM — G47.33 OSA (OBSTRUCTIVE SLEEP APNEA): Status: ACTIVE | Noted: 2021-06-13

## 2021-06-13 PROBLEM — N18.30 CKD (CHRONIC KIDNEY DISEASE) STAGE 3, GFR 30-59 ML/MIN (HCC): Status: ACTIVE | Noted: 2021-06-13

## 2021-06-13 PROBLEM — E78.5 HLD (HYPERLIPIDEMIA): Status: ACTIVE | Noted: 2021-06-13

## 2021-06-13 PROBLEM — I10 HTN (HYPERTENSION): Status: ACTIVE | Noted: 2021-06-13

## 2021-06-13 LAB
ALBUMIN SERPL-MCNC: 4.8 G/DL (ref 3.5–5.2)
ALBUMIN/GLOB SERPL: 1.5 G/DL
ALP SERPL-CCNC: 89 U/L (ref 39–117)
ALT SERPL W P-5'-P-CCNC: 23 U/L (ref 1–33)
ANION GAP SERPL CALCULATED.3IONS-SCNC: 15.8 MMOL/L (ref 5–15)
APTT PPP: 31.9 SECONDS (ref 22.7–35.4)
AST SERPL-CCNC: 33 U/L (ref 1–32)
BASOPHILS # BLD AUTO: 0.05 10*3/MM3 (ref 0–0.2)
BASOPHILS NFR BLD AUTO: 0.6 % (ref 0–1.5)
BILIRUB SERPL-MCNC: 1.1 MG/DL (ref 0–1.2)
BUN SERPL-MCNC: 16 MG/DL (ref 8–23)
BUN/CREAT SERPL: 12.1 (ref 7–25)
CALCIUM SPEC-SCNC: 10.2 MG/DL (ref 8.6–10.5)
CHLORIDE SERPL-SCNC: 96 MMOL/L (ref 98–107)
CO2 SERPL-SCNC: 22.2 MMOL/L (ref 22–29)
CREAT SERPL-MCNC: 1.32 MG/DL (ref 0.57–1)
D-LACTATE SERPL-SCNC: 1.6 MMOL/L (ref 0.5–2)
DEPRECATED RDW RBC AUTO: 47.3 FL (ref 37–54)
EOSINOPHIL # BLD AUTO: 0.08 10*3/MM3 (ref 0–0.4)
EOSINOPHIL NFR BLD AUTO: 1 % (ref 0.3–6.2)
ERYTHROCYTE [DISTWIDTH] IN BLOOD BY AUTOMATED COUNT: 13.1 % (ref 12.3–15.4)
GFR SERPL CREATININE-BSD FRML MDRD: 41 ML/MIN/1.73
GLOBULIN UR ELPH-MCNC: 3.2 GM/DL
GLUCOSE SERPL-MCNC: 142 MG/DL (ref 65–99)
HCT VFR BLD AUTO: 45.3 % (ref 34–46.6)
HGB BLD-MCNC: 15.8 G/DL (ref 12–15.9)
IMM GRANULOCYTES # BLD AUTO: 0.04 10*3/MM3 (ref 0–0.05)
IMM GRANULOCYTES NFR BLD AUTO: 0.5 % (ref 0–0.5)
INR PPP: 1.31 (ref 0.9–1.1)
LYMPHOCYTES # BLD AUTO: 0.93 10*3/MM3 (ref 0.7–3.1)
LYMPHOCYTES NFR BLD AUTO: 11.1 % (ref 19.6–45.3)
MCH RBC QN AUTO: 34.2 PG (ref 26.6–33)
MCHC RBC AUTO-ENTMCNC: 34.9 G/DL (ref 31.5–35.7)
MCV RBC AUTO: 98.1 FL (ref 79–97)
MONOCYTES # BLD AUTO: 0.55 10*3/MM3 (ref 0.1–0.9)
MONOCYTES NFR BLD AUTO: 6.6 % (ref 5–12)
NEUTROPHILS NFR BLD AUTO: 6.74 10*3/MM3 (ref 1.7–7)
NEUTROPHILS NFR BLD AUTO: 80.2 % (ref 42.7–76)
NRBC BLD AUTO-RTO: 0 /100 WBC (ref 0–0.2)
PLATELET # BLD AUTO: 246 10*3/MM3 (ref 140–450)
PMV BLD AUTO: 9.4 FL (ref 6–12)
POTASSIUM SERPL-SCNC: 3.8 MMOL/L (ref 3.5–5.2)
PROT SERPL-MCNC: 8 G/DL (ref 6–8.5)
PROTHROMBIN TIME: 16.1 SECONDS (ref 11.7–14.2)
RBC # BLD AUTO: 4.62 10*6/MM3 (ref 3.77–5.28)
SARS-COV-2 RNA RESP QL NAA+PROBE: NOT DETECTED
SODIUM SERPL-SCNC: 134 MMOL/L (ref 136–145)
WBC # BLD AUTO: 8.39 10*3/MM3 (ref 3.4–10.8)

## 2021-06-13 PROCEDURE — 85025 COMPLETE CBC W/AUTO DIFF WBC: CPT | Performed by: EMERGENCY MEDICINE

## 2021-06-13 PROCEDURE — 25010000002 ONDANSETRON PER 1 MG: Performed by: NURSE PRACTITIONER

## 2021-06-13 PROCEDURE — 25010000002 HYDROMORPHONE PER 4 MG: Performed by: EMERGENCY MEDICINE

## 2021-06-13 PROCEDURE — U0003 INFECTIOUS AGENT DETECTION BY NUCLEIC ACID (DNA OR RNA); SEVERE ACUTE RESPIRATORY SYNDROME CORONAVIRUS 2 (SARS-COV-2) (CORONAVIRUS DISEASE [COVID-19]), AMPLIFIED PROBE TECHNIQUE, MAKING USE OF HIGH THROUGHPUT TECHNOLOGIES AS DESCRIBED BY CMS-2020-01-R: HCPCS | Performed by: EMERGENCY MEDICINE

## 2021-06-13 PROCEDURE — 25010000002 ONDANSETRON PER 1 MG: Performed by: EMERGENCY MEDICINE

## 2021-06-13 PROCEDURE — 80053 COMPREHEN METABOLIC PANEL: CPT | Performed by: EMERGENCY MEDICINE

## 2021-06-13 PROCEDURE — 74177 CT ABD & PELVIS W/CONTRAST: CPT

## 2021-06-13 PROCEDURE — 25010000002 IOPAMIDOL 61 % SOLUTION: Performed by: EMERGENCY MEDICINE

## 2021-06-13 PROCEDURE — 0D9670Z DRAINAGE OF STOMACH WITH DRAINAGE DEVICE, VIA NATURAL OR ARTIFICIAL OPENING: ICD-10-PCS | Performed by: HOSPITALIST

## 2021-06-13 PROCEDURE — 85610 PROTHROMBIN TIME: CPT | Performed by: EMERGENCY MEDICINE

## 2021-06-13 PROCEDURE — 25010000002 HYDROMORPHONE PER 4 MG: Performed by: NURSE PRACTITIONER

## 2021-06-13 PROCEDURE — 83605 ASSAY OF LACTIC ACID: CPT | Performed by: EMERGENCY MEDICINE

## 2021-06-13 PROCEDURE — 99254 IP/OBS CNSLTJ NEW/EST MOD 60: CPT | Performed by: SURGERY

## 2021-06-13 PROCEDURE — 85730 THROMBOPLASTIN TIME PARTIAL: CPT | Performed by: EMERGENCY MEDICINE

## 2021-06-13 PROCEDURE — 99284 EMERGENCY DEPT VISIT MOD MDM: CPT

## 2021-06-13 RX ORDER — SODIUM CHLORIDE 9 MG/ML
75 INJECTION, SOLUTION INTRAVENOUS CONTINUOUS
Status: DISCONTINUED | OUTPATIENT
Start: 2021-06-13 | End: 2021-06-17

## 2021-06-13 RX ORDER — NITROGLYCERIN 0.4 MG/1
0.4 TABLET SUBLINGUAL
Status: DISCONTINUED | OUTPATIENT
Start: 2021-06-13 | End: 2021-06-23 | Stop reason: HOSPADM

## 2021-06-13 RX ORDER — SODIUM CHLORIDE 9 MG/ML
125 INJECTION, SOLUTION INTRAVENOUS CONTINUOUS
Status: DISCONTINUED | OUTPATIENT
Start: 2021-06-13 | End: 2021-06-13

## 2021-06-13 RX ORDER — METOPROLOL TARTRATE 50 MG/1
150 TABLET, FILM COATED ORAL EVERY 12 HOURS SCHEDULED
Status: DISCONTINUED | OUTPATIENT
Start: 2021-06-14 | End: 2021-06-14

## 2021-06-13 RX ORDER — ALLOPURINOL 100 MG/1
100 TABLET ORAL DAILY
COMMUNITY

## 2021-06-13 RX ORDER — HYDROMORPHONE HYDROCHLORIDE 1 MG/ML
0.5 INJECTION, SOLUTION INTRAMUSCULAR; INTRAVENOUS; SUBCUTANEOUS ONCE
Status: COMPLETED | OUTPATIENT
Start: 2021-06-13 | End: 2021-06-13

## 2021-06-13 RX ORDER — SODIUM CHLORIDE 0.9 % (FLUSH) 0.9 %
10 SYRINGE (ML) INJECTION AS NEEDED
Status: DISCONTINUED | OUTPATIENT
Start: 2021-06-13 | End: 2021-06-23 | Stop reason: HOSPADM

## 2021-06-13 RX ORDER — BUDESONIDE 0.5 MG/2ML
0.25 INHALANT ORAL
Status: DISCONTINUED | OUTPATIENT
Start: 2021-06-14 | End: 2021-06-16

## 2021-06-13 RX ORDER — HYDROMORPHONE HYDROCHLORIDE 1 MG/ML
0.5 INJECTION, SOLUTION INTRAMUSCULAR; INTRAVENOUS; SUBCUTANEOUS
Status: DISCONTINUED | OUTPATIENT
Start: 2021-06-13 | End: 2021-06-18

## 2021-06-13 RX ORDER — ALLOPURINOL 100 MG/1
100 TABLET ORAL DAILY
Status: DISCONTINUED | OUTPATIENT
Start: 2021-06-14 | End: 2021-06-14

## 2021-06-13 RX ORDER — ONDANSETRON 2 MG/ML
4 INJECTION INTRAMUSCULAR; INTRAVENOUS ONCE
Status: COMPLETED | OUTPATIENT
Start: 2021-06-13 | End: 2021-06-13

## 2021-06-13 RX ORDER — ONDANSETRON 2 MG/ML
4 INJECTION INTRAMUSCULAR; INTRAVENOUS EVERY 6 HOURS PRN
Status: DISCONTINUED | OUTPATIENT
Start: 2021-06-13 | End: 2021-06-23 | Stop reason: HOSPADM

## 2021-06-13 RX ORDER — COLCHICINE 0.6 MG/1
0.6 TABLET ORAL DAILY
Status: DISCONTINUED | OUTPATIENT
Start: 2021-06-14 | End: 2021-06-14

## 2021-06-13 RX ORDER — SODIUM CHLORIDE 0.9 % (FLUSH) 0.9 %
10 SYRINGE (ML) INJECTION EVERY 12 HOURS SCHEDULED
Status: DISCONTINUED | OUTPATIENT
Start: 2021-06-13 | End: 2021-06-23 | Stop reason: HOSPADM

## 2021-06-13 RX ORDER — ACETAMINOPHEN 160 MG/5ML
650 SOLUTION ORAL EVERY 4 HOURS PRN
Status: DISCONTINUED | OUTPATIENT
Start: 2021-06-13 | End: 2021-06-23 | Stop reason: HOSPADM

## 2021-06-13 RX ORDER — ACETAMINOPHEN 325 MG/1
650 TABLET ORAL EVERY 4 HOURS PRN
Status: DISCONTINUED | OUTPATIENT
Start: 2021-06-13 | End: 2021-06-23 | Stop reason: HOSPADM

## 2021-06-13 RX ORDER — AMLODIPINE BESYLATE 10 MG/1
10 TABLET ORAL DAILY
Status: DISCONTINUED | OUTPATIENT
Start: 2021-06-14 | End: 2021-06-14

## 2021-06-13 RX ORDER — VITAMIN B COMPLEX
2 CAPSULE ORAL DAILY
COMMUNITY

## 2021-06-13 RX ORDER — ACETAMINOPHEN 650 MG/1
650 SUPPOSITORY RECTAL EVERY 4 HOURS PRN
Status: DISCONTINUED | OUTPATIENT
Start: 2021-06-13 | End: 2021-06-23 | Stop reason: HOSPADM

## 2021-06-13 RX ORDER — ATORVASTATIN CALCIUM 80 MG/1
80 TABLET, FILM COATED ORAL DAILY
Status: DISCONTINUED | OUTPATIENT
Start: 2021-06-14 | End: 2021-06-14

## 2021-06-13 RX ORDER — COLCHICINE 0.6 MG/1
0.6 TABLET ORAL DAILY
COMMUNITY
End: 2021-06-23 | Stop reason: HOSPADM

## 2021-06-13 RX ORDER — LISINOPRIL 40 MG/1
40 TABLET ORAL
Status: DISCONTINUED | OUTPATIENT
Start: 2021-06-14 | End: 2021-06-14

## 2021-06-13 RX ADMIN — ONDANSETRON 4 MG: 2 INJECTION INTRAMUSCULAR; INTRAVENOUS at 22:42

## 2021-06-13 RX ADMIN — HYDROMORPHONE HYDROCHLORIDE 0.5 MG: 1 INJECTION, SOLUTION INTRAMUSCULAR; INTRAVENOUS; SUBCUTANEOUS at 22:41

## 2021-06-13 RX ADMIN — HYDROMORPHONE HYDROCHLORIDE 0.5 MG: 1 INJECTION, SOLUTION INTRAMUSCULAR; INTRAVENOUS; SUBCUTANEOUS at 18:17

## 2021-06-13 RX ADMIN — IOPAMIDOL 85 ML: 612 INJECTION, SOLUTION INTRAVENOUS at 19:18

## 2021-06-13 RX ADMIN — SODIUM CHLORIDE 500 ML: 9 INJECTION, SOLUTION INTRAVENOUS at 18:19

## 2021-06-13 RX ADMIN — ONDANSETRON 4 MG: 2 INJECTION INTRAMUSCULAR; INTRAVENOUS at 18:18

## 2021-06-13 RX ADMIN — HYDROMORPHONE HYDROCHLORIDE 0.5 MG: 1 INJECTION, SOLUTION INTRAMUSCULAR; INTRAVENOUS; SUBCUTANEOUS at 18:52

## 2021-06-13 RX ADMIN — SODIUM CHLORIDE, PRESERVATIVE FREE 10 ML: 5 INJECTION INTRAVENOUS at 22:24

## 2021-06-13 RX ADMIN — SODIUM CHLORIDE 500 ML: 9 INJECTION, SOLUTION INTRAVENOUS at 18:50

## 2021-06-13 RX ADMIN — SODIUM CHLORIDE 100 ML/HR: 9 INJECTION, SOLUTION INTRAVENOUS at 22:24

## 2021-06-13 RX ADMIN — SODIUM CHLORIDE 125 ML/HR: 9 INJECTION, SOLUTION INTRAVENOUS at 18:50

## 2021-06-13 NOTE — ED TRIAGE NOTES
Pt c/o generalized abd pain x 2 days, endorses nausea. Pt arrives aaox4, abc's intact, NAD noted at this time. Pt placed in mask at triage. This RN also wearing a mask.

## 2021-06-14 ENCOUNTER — ANESTHESIA (OUTPATIENT)
Dept: PERIOP | Facility: HOSPITAL | Age: 66
End: 2021-06-14

## 2021-06-14 ENCOUNTER — ANESTHESIA EVENT (OUTPATIENT)
Dept: PERIOP | Facility: HOSPITAL | Age: 66
End: 2021-06-14

## 2021-06-14 PROBLEM — E66.01 MORBID OBESITY WITH BMI OF 40.0-44.9, ADULT: Status: ACTIVE | Noted: 2021-06-14

## 2021-06-14 PROBLEM — Z72.0 TOBACCO ABUSE: Status: ACTIVE | Noted: 2021-06-14

## 2021-06-14 PROBLEM — J44.9 COPD (CHRONIC OBSTRUCTIVE PULMONARY DISEASE) (HCC): Status: ACTIVE | Noted: 2021-06-14

## 2021-06-14 PROBLEM — N18.32 STAGE 3B CHRONIC KIDNEY DISEASE: Status: ACTIVE | Noted: 2021-06-13

## 2021-06-14 LAB
ANION GAP SERPL CALCULATED.3IONS-SCNC: 14 MMOL/L (ref 5–15)
BUN SERPL-MCNC: 17 MG/DL (ref 8–23)
BUN/CREAT SERPL: 15.2 (ref 7–25)
CALCIUM SPEC-SCNC: 9 MG/DL (ref 8.6–10.5)
CHLORIDE SERPL-SCNC: 99 MMOL/L (ref 98–107)
CO2 SERPL-SCNC: 24 MMOL/L (ref 22–29)
CREAT SERPL-MCNC: 1.12 MG/DL (ref 0.57–1)
DEPRECATED RDW RBC AUTO: 48.9 FL (ref 37–54)
ERYTHROCYTE [DISTWIDTH] IN BLOOD BY AUTOMATED COUNT: 13 % (ref 12.3–15.4)
GFR SERPL CREATININE-BSD FRML MDRD: 49 ML/MIN/1.73
GLUCOSE SERPL-MCNC: 128 MG/DL (ref 65–99)
HCT VFR BLD AUTO: 42.3 % (ref 34–46.6)
HGB BLD-MCNC: 14.1 G/DL (ref 12–15.9)
INR PPP: 1.21 (ref 0.9–1.1)
MCH RBC QN AUTO: 33.9 PG (ref 26.6–33)
MCHC RBC AUTO-ENTMCNC: 33.3 G/DL (ref 31.5–35.7)
MCV RBC AUTO: 101.7 FL (ref 79–97)
PLATELET # BLD AUTO: 185 10*3/MM3 (ref 140–450)
PMV BLD AUTO: 9.7 FL (ref 6–12)
POTASSIUM SERPL-SCNC: 4 MMOL/L (ref 3.5–5.2)
PROTHROMBIN TIME: 15.1 SECONDS (ref 11.7–14.2)
RBC # BLD AUTO: 4.16 10*6/MM3 (ref 3.77–5.28)
SODIUM SERPL-SCNC: 137 MMOL/L (ref 136–145)
WBC # BLD AUTO: 4.01 10*3/MM3 (ref 3.4–10.8)

## 2021-06-14 PROCEDURE — 25010000002 HYDROMORPHONE PER 4 MG: Performed by: SURGERY

## 2021-06-14 PROCEDURE — 25010000002 ONDANSETRON PER 1 MG: Performed by: ANESTHESIOLOGY

## 2021-06-14 PROCEDURE — 85027 COMPLETE CBC AUTOMATED: CPT | Performed by: NURSE PRACTITIONER

## 2021-06-14 PROCEDURE — 36415 COLL VENOUS BLD VENIPUNCTURE: CPT | Performed by: NURSE PRACTITIONER

## 2021-06-14 PROCEDURE — 85610 PROTHROMBIN TIME: CPT | Performed by: NURSE PRACTITIONER

## 2021-06-14 PROCEDURE — 25010000002 FENTANYL CITRATE (PF) 50 MCG/ML SOLUTION: Performed by: ANESTHESIOLOGY

## 2021-06-14 PROCEDURE — 25010000002 HYDROMORPHONE PER 4 MG: Performed by: ANESTHESIOLOGY

## 2021-06-14 PROCEDURE — 99231 SBSQ HOSP IP/OBS SF/LOW 25: CPT | Performed by: SURGERY

## 2021-06-14 PROCEDURE — 88304 TISSUE EXAM BY PATHOLOGIST: CPT | Performed by: SURGERY

## 2021-06-14 PROCEDURE — C1781 MESH (IMPLANTABLE): HCPCS | Performed by: SURGERY

## 2021-06-14 PROCEDURE — 80048 BASIC METABOLIC PNL TOTAL CA: CPT | Performed by: NURSE PRACTITIONER

## 2021-06-14 PROCEDURE — 25010000002 CEFAZOLIN PER 500 MG: Performed by: SURGERY

## 2021-06-14 PROCEDURE — 25010000002 HYDROMORPHONE PER 4 MG: Performed by: NURSE PRACTITIONER

## 2021-06-14 PROCEDURE — 0DTJ0ZZ RESECTION OF APPENDIX, OPEN APPROACH: ICD-10-PCS | Performed by: SURGERY

## 2021-06-14 PROCEDURE — 94799 UNLISTED PULMONARY SVC/PX: CPT

## 2021-06-14 PROCEDURE — 49560 PR REPAIR INCISIONAL HERNIA,REDUCIBLE: CPT | Performed by: PHYSICIAN ASSISTANT

## 2021-06-14 PROCEDURE — 88302 TISSUE EXAM BY PATHOLOGIST: CPT | Performed by: SURGERY

## 2021-06-14 PROCEDURE — 49560 PR REPAIR INCISIONAL HERNIA,REDUCIBLE: CPT | Performed by: SURGERY

## 2021-06-14 PROCEDURE — 25010000002 MIDAZOLAM PER 1 MG: Performed by: ANESTHESIOLOGY

## 2021-06-14 PROCEDURE — 49568 PR IMPLANT MESH HERNIA REPAIR/DEBRIDEMENT CLOSURE: CPT | Performed by: PHYSICIAN ASSISTANT

## 2021-06-14 PROCEDURE — 49568 PR IMPLANT MESH HERNIA REPAIR/DEBRIDEMENT CLOSURE: CPT | Performed by: SURGERY

## 2021-06-14 PROCEDURE — 25010000002 PROPOFOL 10 MG/ML EMULSION: Performed by: ANESTHESIOLOGY

## 2021-06-14 PROCEDURE — 0WUF0JZ SUPPLEMENT ABDOMINAL WALL WITH SYNTHETIC SUBSTITUTE, OPEN APPROACH: ICD-10-PCS | Performed by: SURGERY

## 2021-06-14 DEVICE — MESH TISS REINFORCEMENT BIO/A 20X20: Type: IMPLANTABLE DEVICE | Site: ABDOMEN | Status: FUNCTIONAL

## 2021-06-14 RX ORDER — EPHEDRINE SULFATE 50 MG/ML
INJECTION, SOLUTION INTRAVENOUS AS NEEDED
Status: DISCONTINUED | OUTPATIENT
Start: 2021-06-14 | End: 2021-06-14 | Stop reason: SURG

## 2021-06-14 RX ORDER — IPRATROPIUM BROMIDE AND ALBUTEROL SULFATE 2.5; .5 MG/3ML; MG/3ML
3 SOLUTION RESPIRATORY (INHALATION) EVERY 4 HOURS PRN
Status: DISCONTINUED | OUTPATIENT
Start: 2021-06-14 | End: 2021-06-23 | Stop reason: HOSPADM

## 2021-06-14 RX ORDER — BUPIVACAINE HYDROCHLORIDE 2.5 MG/ML
INJECTION, SOLUTION EPIDURAL; INFILTRATION; INTRACAUDAL AS NEEDED
Status: DISCONTINUED | OUTPATIENT
Start: 2021-06-14 | End: 2021-06-14 | Stop reason: HOSPADM

## 2021-06-14 RX ORDER — SODIUM CHLORIDE 0.9 % (FLUSH) 0.9 %
3-10 SYRINGE (ML) INJECTION AS NEEDED
Status: DISCONTINUED | OUTPATIENT
Start: 2021-06-14 | End: 2021-06-14 | Stop reason: HOSPADM

## 2021-06-14 RX ORDER — PROMETHAZINE HYDROCHLORIDE 25 MG/1
12.5 TABLET ORAL ONCE AS NEEDED
Status: DISCONTINUED | OUTPATIENT
Start: 2021-06-14 | End: 2021-06-14

## 2021-06-14 RX ORDER — IPRATROPIUM BROMIDE AND ALBUTEROL SULFATE 2.5; .5 MG/3ML; MG/3ML
3 SOLUTION RESPIRATORY (INHALATION)
Status: DISCONTINUED | OUTPATIENT
Start: 2021-06-14 | End: 2021-06-23 | Stop reason: HOSPADM

## 2021-06-14 RX ORDER — MAGNESIUM HYDROXIDE 1200 MG/15ML
LIQUID ORAL AS NEEDED
Status: DISCONTINUED | OUTPATIENT
Start: 2021-06-14 | End: 2021-06-14 | Stop reason: HOSPADM

## 2021-06-14 RX ORDER — FENTANYL CITRATE 50 UG/ML
50 INJECTION, SOLUTION INTRAMUSCULAR; INTRAVENOUS
Status: DISCONTINUED | OUTPATIENT
Start: 2021-06-14 | End: 2021-06-14 | Stop reason: HOSPADM

## 2021-06-14 RX ORDER — ONDANSETRON 2 MG/ML
INJECTION INTRAMUSCULAR; INTRAVENOUS AS NEEDED
Status: DISCONTINUED | OUTPATIENT
Start: 2021-06-14 | End: 2021-06-14 | Stop reason: SURG

## 2021-06-14 RX ORDER — SODIUM CHLORIDE 0.9 % (FLUSH) 0.9 %
3 SYRINGE (ML) INJECTION EVERY 12 HOURS SCHEDULED
Status: DISCONTINUED | OUTPATIENT
Start: 2021-06-14 | End: 2021-06-14 | Stop reason: HOSPADM

## 2021-06-14 RX ORDER — FAMOTIDINE 10 MG/ML
20 INJECTION, SOLUTION INTRAVENOUS EVERY 12 HOURS SCHEDULED
Status: DISCONTINUED | OUTPATIENT
Start: 2021-06-14 | End: 2021-06-21

## 2021-06-14 RX ORDER — ROCURONIUM BROMIDE 10 MG/ML
INJECTION, SOLUTION INTRAVENOUS AS NEEDED
Status: DISCONTINUED | OUTPATIENT
Start: 2021-06-14 | End: 2021-06-14 | Stop reason: SURG

## 2021-06-14 RX ORDER — FENTANYL CITRATE 50 UG/ML
50 INJECTION, SOLUTION INTRAMUSCULAR; INTRAVENOUS
Status: DISCONTINUED | OUTPATIENT
Start: 2021-06-14 | End: 2021-06-14

## 2021-06-14 RX ORDER — LIDOCAINE HYDROCHLORIDE 20 MG/ML
INJECTION, SOLUTION INFILTRATION; PERINEURAL AS NEEDED
Status: DISCONTINUED | OUTPATIENT
Start: 2021-06-14 | End: 2021-06-14 | Stop reason: SURG

## 2021-06-14 RX ORDER — PROMETHAZINE HYDROCHLORIDE 25 MG/1
25 SUPPOSITORY RECTAL ONCE AS NEEDED
Status: DISCONTINUED | OUTPATIENT
Start: 2021-06-14 | End: 2021-06-14

## 2021-06-14 RX ORDER — SODIUM CHLORIDE, SODIUM LACTATE, POTASSIUM CHLORIDE, CALCIUM CHLORIDE 600; 310; 30; 20 MG/100ML; MG/100ML; MG/100ML; MG/100ML
9 INJECTION, SOLUTION INTRAVENOUS CONTINUOUS
Status: DISCONTINUED | OUTPATIENT
Start: 2021-06-14 | End: 2021-06-14

## 2021-06-14 RX ORDER — LIDOCAINE HYDROCHLORIDE 10 MG/ML
0.5 INJECTION, SOLUTION EPIDURAL; INFILTRATION; INTRACAUDAL; PERINEURAL ONCE AS NEEDED
Status: DISCONTINUED | OUTPATIENT
Start: 2021-06-14 | End: 2021-06-14 | Stop reason: HOSPADM

## 2021-06-14 RX ORDER — HYDROMORPHONE HYDROCHLORIDE 1 MG/ML
0.25 INJECTION, SOLUTION INTRAMUSCULAR; INTRAVENOUS; SUBCUTANEOUS
Status: DISCONTINUED | OUTPATIENT
Start: 2021-06-14 | End: 2021-06-14

## 2021-06-14 RX ORDER — CEFAZOLIN SODIUM IN 0.9 % NACL 3 G/100 ML
3 INTRAVENOUS SOLUTION, PIGGYBACK (ML) INTRAVENOUS ONCE
Status: COMPLETED | OUTPATIENT
Start: 2021-06-14 | End: 2021-06-14

## 2021-06-14 RX ORDER — FENTANYL CITRATE 50 UG/ML
INJECTION, SOLUTION INTRAMUSCULAR; INTRAVENOUS AS NEEDED
Status: DISCONTINUED | OUTPATIENT
Start: 2021-06-14 | End: 2021-06-14 | Stop reason: SURG

## 2021-06-14 RX ORDER — MIDAZOLAM HYDROCHLORIDE 1 MG/ML
1 INJECTION INTRAMUSCULAR; INTRAVENOUS
Status: DISCONTINUED | OUTPATIENT
Start: 2021-06-14 | End: 2021-06-14 | Stop reason: HOSPADM

## 2021-06-14 RX ORDER — FAMOTIDINE 10 MG/ML
20 INJECTION, SOLUTION INTRAVENOUS ONCE
Status: COMPLETED | OUTPATIENT
Start: 2021-06-14 | End: 2021-06-14

## 2021-06-14 RX ORDER — PROPOFOL 10 MG/ML
VIAL (ML) INTRAVENOUS AS NEEDED
Status: DISCONTINUED | OUTPATIENT
Start: 2021-06-14 | End: 2021-06-14 | Stop reason: SURG

## 2021-06-14 RX ADMIN — HYDROMORPHONE HYDROCHLORIDE 0.5 MG: 1 INJECTION, SOLUTION INTRAMUSCULAR; INTRAVENOUS; SUBCUTANEOUS at 10:03

## 2021-06-14 RX ADMIN — ROCURONIUM BROMIDE 35 MG: 50 INJECTION INTRAVENOUS at 12:18

## 2021-06-14 RX ADMIN — SUGAMMADEX 200 MG: 100 INJECTION, SOLUTION INTRAVENOUS at 14:17

## 2021-06-14 RX ADMIN — METOPROLOL TARTRATE 5 MG: 5 INJECTION, SOLUTION INTRAVENOUS at 09:01

## 2021-06-14 RX ADMIN — LIDOCAINE HYDROCHLORIDE 60 MG: 20 INJECTION, SOLUTION INFILTRATION; PERINEURAL at 12:18

## 2021-06-14 RX ADMIN — FAMOTIDINE 20 MG: 10 INJECTION INTRAVENOUS at 11:18

## 2021-06-14 RX ADMIN — ROCURONIUM BROMIDE 15 MG: 50 INJECTION INTRAVENOUS at 13:12

## 2021-06-14 RX ADMIN — HYDROMORPHONE HYDROCHLORIDE 0.25 MG: 1 INJECTION, SOLUTION INTRAMUSCULAR; INTRAVENOUS; SUBCUTANEOUS at 16:00

## 2021-06-14 RX ADMIN — HYDROMORPHONE HYDROCHLORIDE 0.5 MG: 1 INJECTION, SOLUTION INTRAMUSCULAR; INTRAVENOUS; SUBCUTANEOUS at 17:56

## 2021-06-14 RX ADMIN — HYDROMORPHONE HYDROCHLORIDE 0.5 MG: 1 INJECTION, SOLUTION INTRAMUSCULAR; INTRAVENOUS; SUBCUTANEOUS at 07:31

## 2021-06-14 RX ADMIN — FAMOTIDINE 20 MG: 10 INJECTION INTRAVENOUS at 20:05

## 2021-06-14 RX ADMIN — FENTANYL CITRATE 50 MCG: 50 INJECTION INTRAMUSCULAR; INTRAVENOUS at 12:18

## 2021-06-14 RX ADMIN — FENTANYL CITRATE 50 MCG: 50 INJECTION, SOLUTION INTRAMUSCULAR; INTRAVENOUS at 14:49

## 2021-06-14 RX ADMIN — FENTANYL CITRATE 50 MCG: 50 INJECTION, SOLUTION INTRAMUSCULAR; INTRAVENOUS at 15:23

## 2021-06-14 RX ADMIN — HYDROMORPHONE HYDROCHLORIDE 0.25 MG: 1 INJECTION, SOLUTION INTRAMUSCULAR; INTRAVENOUS; SUBCUTANEOUS at 15:15

## 2021-06-14 RX ADMIN — PROPOFOL 200 MG: 10 INJECTION, EMULSION INTRAVENOUS at 12:18

## 2021-06-14 RX ADMIN — HYDROMORPHONE HYDROCHLORIDE 0.25 MG: 1 INJECTION, SOLUTION INTRAMUSCULAR; INTRAVENOUS; SUBCUTANEOUS at 15:05

## 2021-06-14 RX ADMIN — METOPROLOL TARTRATE 5 MG: 5 INJECTION, SOLUTION INTRAVENOUS at 17:56

## 2021-06-14 RX ADMIN — SODIUM CHLORIDE, POTASSIUM CHLORIDE, SODIUM LACTATE AND CALCIUM CHLORIDE: 600; 310; 30; 20 INJECTION, SOLUTION INTRAVENOUS at 13:31

## 2021-06-14 RX ADMIN — SODIUM CHLORIDE, POTASSIUM CHLORIDE, SODIUM LACTATE AND CALCIUM CHLORIDE 9 ML/HR: 600; 310; 30; 20 INJECTION, SOLUTION INTRAVENOUS at 11:19

## 2021-06-14 RX ADMIN — MIDAZOLAM 1 MG: 1 INJECTION INTRAMUSCULAR; INTRAVENOUS at 11:30

## 2021-06-14 RX ADMIN — BUDESONIDE 0.25 MG: 0.5 INHALANT ORAL at 20:40

## 2021-06-14 RX ADMIN — HYDROMORPHONE HYDROCHLORIDE 0.5 MG: 1 INJECTION, SOLUTION INTRAMUSCULAR; INTRAVENOUS; SUBCUTANEOUS at 04:01

## 2021-06-14 RX ADMIN — HYDROMORPHONE HYDROCHLORIDE 0.5 MG: 1 INJECTION, SOLUTION INTRAMUSCULAR; INTRAVENOUS; SUBCUTANEOUS at 20:05

## 2021-06-14 RX ADMIN — SODIUM CHLORIDE, PRESERVATIVE FREE 10 ML: 5 INJECTION INTRAVENOUS at 20:05

## 2021-06-14 RX ADMIN — HYDROMORPHONE HYDROCHLORIDE 0.5 MG: 1 INJECTION, SOLUTION INTRAMUSCULAR; INTRAVENOUS; SUBCUTANEOUS at 22:26

## 2021-06-14 RX ADMIN — EPHEDRINE SULFATE 10 MG: 50 INJECTION INTRAVENOUS at 13:57

## 2021-06-14 RX ADMIN — FENTANYL CITRATE 50 MCG: 50 INJECTION INTRAMUSCULAR; INTRAVENOUS at 12:41

## 2021-06-14 RX ADMIN — SODIUM CHLORIDE, PRESERVATIVE FREE 10 ML: 5 INJECTION INTRAVENOUS at 09:01

## 2021-06-14 RX ADMIN — IPRATROPIUM BROMIDE AND ALBUTEROL SULFATE 3 ML: 2.5; .5 SOLUTION RESPIRATORY (INHALATION) at 07:05

## 2021-06-14 RX ADMIN — IPRATROPIUM BROMIDE AND ALBUTEROL SULFATE 3 ML: 2.5; .5 SOLUTION RESPIRATORY (INHALATION) at 20:41

## 2021-06-14 RX ADMIN — FAMOTIDINE 20 MG: 10 INJECTION INTRAVENOUS at 09:01

## 2021-06-14 RX ADMIN — HYDROMORPHONE HYDROCHLORIDE 0.25 MG: 1 INJECTION, SOLUTION INTRAMUSCULAR; INTRAVENOUS; SUBCUTANEOUS at 16:10

## 2021-06-14 RX ADMIN — HYDROMORPHONE HYDROCHLORIDE 0.5 MG: 1 INJECTION, SOLUTION INTRAMUSCULAR; INTRAVENOUS; SUBCUTANEOUS at 01:24

## 2021-06-14 RX ADMIN — SODIUM CHLORIDE 75 ML/HR: 9 INJECTION, SOLUTION INTRAVENOUS at 19:41

## 2021-06-14 RX ADMIN — ONDANSETRON 4 MG: 2 INJECTION INTRAMUSCULAR; INTRAVENOUS at 14:16

## 2021-06-14 RX ADMIN — METOPROLOL TARTRATE 5 MG: 5 INJECTION, SOLUTION INTRAVENOUS at 01:25

## 2021-06-14 RX ADMIN — CEFAZOLIN SODIUM 3 G: 10 INJECTION, POWDER, FOR SOLUTION INTRAVENOUS at 12:04

## 2021-06-14 NOTE — PROGRESS NOTES
Name: Marilu Avery ADMIT: 2021   : 1955  PCP: Anatoly Jimenez MD    MRN: [id number] LOS: 1 days   AGE/SEX: 65 y.o. female  ROOM: [facility]     Subjective   Subjective    just back from surgery some abdominal pain    Review of Systems   Constitutional: Negative for fever.   Respiratory: Negative for cough and shortness of breath.    Cardiovascular: Negative for chest pain.   Gastrointestinal: Positive for abdominal pain.      Objective   Objective   Vital Signs  Temp:  [97.8 °F (36.6 °C)-99.3 °F (37.4 °C)] 99.3 °F (37.4 °C)  Heart Rate:  [] 120  Resp:  [11-20] 16  BP: (114-151)/() 114/92  SpO2:  [89 %-100 %] 93 %  on  Flow (L/min):  [2-6] 4;   Device (Oxygen Therapy): nasal cannula  Body mass index is 43.33 kg/m².    Physical Exam  Constitutional:       Appearance: Normal appearance.   HENT:      Head: Normocephalic and atraumatic.      Comments: NGT  Cardiovascular:      Rate and Rhythm: Normal rate and regular rhythm.   Pulmonary:      Effort: Pulmonary effort is normal. No respiratory distress.      Breath sounds: Wheezing (mild end expiratory) present.   Abdominal:      Tenderness: There is abdominal tenderness.      Comments: abdominal binder   Musculoskeletal:         General: No swelling.      Right lower leg: No edema.      Left lower leg: No edema.   Skin:     General: Skin is warm and dry.   Neurological:      General: No focal deficit present.      Mental Status: She is alert.   Psychiatric:         Mood and Affect: Mood normal.         Behavior: Behavior normal.     Results Review  I reviewed the patient's new clinical results.  Results from last 7 days   Lab Units 21  0512 21  1810   WBC 10*3/mm3 4.01 8.39   HEMOGLOBIN g/dL 14.1 15.8   PLATELETS 10*3/mm3 185 246     Results from last 7 days   Lab Units 21  0512 21  1810   SODIUM mmol/L 137 134*   POTASSIUM mmol/L 4.0 3.8   CHLORIDE mmol/L 99 96*   CO2 mmol/L 24.0 22.2   BUN mg/dL 17 16   CREATININE  mg/dL 1.12* 1.32*   GLUCOSE mg/dL 128* 142*     Estimated Creatinine Clearance: 76.8 mL/min (A) (by C-G formula based on SCr of 1.12 mg/dL (H)).    Results from last 7 days   Lab Units 06/13/21  1810   ALBUMIN g/dL 4.80   BILIRUBIN mg/dL 1.1   ALK PHOS U/L 89   AST (SGOT) U/L 33*   ALT (SGPT) U/L 23     Results from last 7 days   Lab Units 06/14/21  0512 06/13/21  1810   CALCIUM mg/dL 9.0 10.2   ALBUMIN g/dL  --  4.80     Results from last 7 days   Lab Units 06/13/21  1810   LACTATE mmol/L 1.6     No results found for: HGBA1C, POCGLU    Scheduled Meds  budesonide, 0.25 mg, Nebulization, BID - RT  famotidine, 20 mg, Intravenous, Q12H  ipratropium-albuterol, 3 mL, Nebulization, 4x Daily - RT  metoprolol tartrate, 5 mg, Intravenous, Q8H  sodium chloride, 10 mL, Intravenous, Q12H    Continuous Infusions  sodium chloride, 100 mL/hr, Last Rate: 100 mL/hr (06/13/21 2224)    PRN Meds  •  acetaminophen **OR** acetaminophen **OR** acetaminophen  •  HYDROmorphone  •  ipratropium-albuterol  •  nitroglycerin  •  ondansetron  •  [COMPLETED] Insert peripheral IV **AND** sodium chloride  •  sodium chloride    sodium chloride, 100 mL/hr, Last Rate: 100 mL/hr (06/13/21 2224)    Diet  NPO Diet     I personally reviewed images     Assessment/Plan     Active Hospital Problems    Diagnosis  POA   • **Incarcerated ventral hernia [K43.6]  Yes   • Tobacco abuse [Z72.0]  Yes   • Morbid obesity with BMI of 40.0-44.9, adult (CMS/[facility]) [E66.01, Z68.41]  Not Applicable   • COPD (chronic obstructive pulmonary disease) (CMS/[facility]) [J44.9]  Yes   • Atrial fibrillation (CMS/[facility]) [I48.91]  Yes   • HTN (hypertension) [I10]  Yes   • LISA (obstructive sleep apnea) [G47.33]  Yes   • Stage 3b chronic kidney disease (CMS/HCC) [N18.32]  Yes   • Chronic anticoagulation [Z79.01]  Not Applicable   • SBO (small bowel obstruction) (CMS/HCC) [K56.609]  Yes      Resolved Hospital Problems   No resolved problems to display.     65 y.o. female admitted with Incarcerated  ventral hernia.    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  Resume anticoagulation when okay with surgery  Body mass index is 43.33 kg/m².   Full code  Discussed with patient and nursing staff  Anticipate discharge home timing yet to be determined.    Dex Solano MD  [facility]  06/14/21  16:53 EDT    I wore protective equipment throughout this patient encounter including a face mask, gloves, and protective eyewear.  Hand hygiene was performed before donning protective equipment and after removal when leaving the room.

## 2021-06-14 NOTE — CONSULTS
Cc: Incisional hernia    History of presenting illness:   This is a nice morbidly obese 65-year-old female with a history of atrial fibrillation (on Xarelto) and a longstanding incisional hernia.  She has seen Dr. Aguilera over the years and when she last saw him was considering repair.  The patient end up canceling the procedure due to some trouble in the family and then over the course of the last year the pandemic slowed her interest in elective repair.  She reports that she has gained substantial weight, she estimates around 40 or 50 pounds since she last saw Dr. Aguilera.  She describes some intermittent periods of pain which typically resolve over the course of an hour or 2.  She began having pain now about a day and a half ago, which has not resolved.  She has had no bowel function since that time.  She had a small amount of vomiting but more retching and nausea.  There is no appetite.  Since arriving here in the emergency room, her pain is much improved, after she received some narcotics.    Past Medical History: Asthma, obesity, hypertension, sleep apnea, hyperlipidemia, arthritis    Past Surgical History: Relevant for hysterectomy in 2000, laparoscopic cholecystectomy 2010 (Dr. Omkar Whtilock), colonoscopy and open primary incisional hernia repair with Prolene suture 2015 (Dr. Keating).  She also had a right total knee replacement 2015.    Medications: Reviewed, in epic.  Noted to be on Xarelto, most recently taken this morning    Allergies: None known    Social History: Patient is a long-term smoker and continues to smoke, nearly 1 pack/day.  I discussed with the patient the fact that smoking is associated with increased risk of wound infection, poor wound healing, recurrent incisional hernia and pulmonary complications.    Family History: Negative for colon and rectal cancer    Review of Systems:  Constitutional: Positive for decreased appetite, negative for fever or chills  HENT: no hearing change, no runny  nose  Eyes: no visual changes or icterus  Neck: no swollen glands or dysphagia or odynophagia  Respiratory: negative for SOB, cough, hemoptysis or wheezing  Cardiovascular: negative for chest pain, palpitations or peripheral edema  Gastrointestinal: Positive for abdominal pain, nausea, vomiting, negative for rectal bleeding  Musculoskeletal: negative for myalgias or joint pain  Skin: patient denies icterus or rash  Hematologic: negative for easy bleeding or bruising      Physical Exam:  Body mass index: 43  Vitals: Temperature 98.6 heart rate 82 blood pressure 132/97 oxygenation 99% on room air  General: alert and oriented, appropriate, no acute distress  HENT:  Head is normocephalic, atraumatic, mucous membranes are moist  Eyes: Extraocular movements are intact, no scleral icterus  Neck: Supple without lymphadenopathy or thyromegaly, trachea is in the midline  Respiratory: Lungs are clear bilaterally without wheezing, no use of accessory muscles is noted  Cardiovascular: Irregularly irregular rate, no murmur, 2+ peripheral edema  Gastrointestinal: Abdomen is obese.  There is a firm hernia which is minimally tender on exam currently.  She does allow me to push on it fairly well without much resistance, but I am unable to reduce this hernia.  There is no overlying skin change.  The hernia extends over to the patient's left side above the umbilicus.  The extent of the fascial defect is not able to be appreciated on the exam.  Musculoskeletal: Muscle bulk and strength is normal and equal bilaterally.  No gross deformity.  Skin: No rash or icterus noted    Laboratory data: White blood cell count 8.4 hemoglobin 15.8 creatinine 1.3 sodium 134 potassium 3.8    Imaging data: CT images are reviewed by me.  There is evidence for what is likely an incarcerated incisional hernia with some dilated and decompressed small bowel loops seen within the hernia sac.  There is no evidence of perforation or pneumatosis.  The fascial  defect is approximately 6 cm in width and around 7 cm craniocaudal.      Assessment and plan:   -Recurrent incisional hernia, incarcerated with associated at least partial small bowel obstruction  -Morbid obesity with body mass index greater than 40, complicating above  -Tobacco abuse, chronic, complicating above  -Atrial fibrillation on Xarelto, complicating above  -Currently the patient appears reasonably comfortable.  Based on physical exam and laboratory data, I do not see any clear evidence of strangulated or compromised small bowel.  Her symptoms are much improved prior to admission.  -She is an extremely poor candidate for operative intervention given her obesity, tobacco abuse, atrial fibrillation etc.  Still, given her current situation, she is likely require intervention to relieve this obstruction during this admission.  I am not able to completely reduce the hernia.  Given the fact that she is relatively stable at the moment, I would like to decompress her with nasogastric tube placement and hold her Xarelto to try to allow her coagulopathy to improve.  I have tentatively placed the patient on the schedule for tomorrow for incisional hernia repair.  The precise nature of this repair is to be determined.  The primary goal would be the relief of her obstruction.  I do not know that a complex incisional hernia repair at this moment is in her best interest.  I did begin a discussion of the complications known to be associated with hernia repair, particularly given her comorbidities, including bleeding, infection, dehiscence of the wound, hernia recurrence, fistula, injury to intra-abdominal structures and the possible need for further revisional procedures.  We also discussed the possibility of mesh placement and the associated pluses and minuses that go along with this.  Patient is agreeable to proceed as outlined.  We will follow along closely.      Arnulfo Leonard MD, FACS  General, Minimally Invasive  and Endoscopic Surgery  [facility]    [address], [address]  [place], [place], [address]  P: [phone]  F: [phone]

## 2021-06-14 NOTE — OP NOTE
Operative Note :   Arnulfo Leonard MD    Marilu Avery  1955    Procedure Date: 06/14/21    Pre-op Diagnosis:  Incarcerated recurrent incisional hernia with associated small bowel obstruction    Post-Op Diagnosis:  Same    Procedure:   · Open ventral hernia repair with mesh placement  · Incidental appendectomy    Surgeon: Arnulfo Leonard MD    Assistant:Baltazar Mcdaniel PA was responsible for performing the following activities: Retraction, Suction, Irrigation, Suturing, Closing and Placing Dressing and their skilled assistance was necessary for the success of this case.      Anesthesia:  General (general endotracheal tube)    EBL:   100ml    Specimens:   Appendix  Hernia sac    Indications:  · See consult note, but essentially 65-year-old morbidly obese smoker on Xarelto seen previously by Dr. Aguilera for incisional hernia, patient elected to hold off having it repaired and presented last night with acute incarcerated recurrent incisional hernia with associated small bowel obstruction. Decompressed overnight with nasogastric tube, no significant improvement and brought today for repair.    Findings:   · Reversibly ischemic bowel within the hernia sac and proximal and distal points of obstruction identified  · Distinctly abnormal appearing appendix, extremely long at about 18 cm with a bulbous tip.  · Upon initially entering the hernia sac, the bowel was purple, but did not appear grossly necrotic. After relieving the obstruction, the bowel pinked up and appeared viable    Recommendations:   · Long period postoperatively of avoiding lifting, patient is extremely high risk for hernia recurrence giving her obesity and smoking    Description of procedure:    After obtaining informed consent, the patient was brought to the operating room and placed under a general anesthetic. Her abdomen was sterilely prepped and draped after placement of Barrios catheter. Despite relaxation of the patient, I was unable to reduce the  hernia. Incision was made just above the maximal mass and dissected through the subcutaneous tissue. I work my way down onto the fascia just above the hernia sac. I then began to dissect the hernia sac away from the subcutaneous tissues. Once we could clearly visualize the hernia sac, you could see that there was purple bowel within the hernia sac. The hernia sac was opened and there was some murky fluid identified. The hernia sac was dissected completely free from the subcutaneous tissue circumferentially. I continue to attempt to reduce the small bowel, but could not do so. Eventually I identified the Prolene sutures from her prior primary repair done around 6 years ago at the inferior portion of this hernia and remove the sutures. There was a small defect just below this repair anyways. Once I had connected these 2 defects, I was able to reduce the bowel. There was 1 limb of small bowel which was relatively adherent to the peritoneum which was taken down sharply. The small bowel was then run from 1 end to the other. After period of time it began to pink up. There was no evidence of perforation or necrotic bowel. There were no significant interloop adhesions. The small bowel was then finally ran all the way from the terminal ileum to the ligament of Treitz. The appendix was visualized. It was extremely long, at about 18 cm. The tip was a bit bulbous, and I was worried for possible neoplasm. I felt at this time that given the murky fluid, I would probably not attempt a complex repair with permanent mesh at this time and instead do a more simple repair, understanding that there is a very high likelihood of recurrence. As such, I felt performing appendectomy at this time was appropriate to try to minimize the risk of contamination with her next operation. The mesial appendix was taken down between clamps. A 2-0 silk pursestring suture was placed around the base of the appendix. A straight hemostat was placed on the  appendix and a tie was then placed over this. The appendix was divided and passed off. The appendiceal stump was dunked and then the pursestring suture was tied down. This area was oversewn with 2-0 silk sutures. We then confirmed placement of the nasogastric tube. With some difficulty, the small bowel was returned to the abdominal cavity. At this point I elected for a simple repair with primary closure and an onlay of bio a mesh. The fascia was cleared off circumferentially for about 7 or 8 cm in all directions. I placed interrupted U stitch type 2-0 Vicryl sutures circumferentially about 4 or 5 cm from the edge of the fascia. The midline fascia was then reapproximated with running 0 PDS. An onlay bio a mesh was then placed. I selected a 20 x 20 cm mesh and cut this appropriately. The mesh was secured to the fascia circumferentially with these interrupted 2-0 Vicryl sutures. The mesh lay nicely and there did not appear to be undue tension. The abdomen was irrigated. I then placed 2 19 [place] Avinash drains in the subcutaneous space. The subcutaneous tissues were then reapproximated with 2-0 Vicryl suture and the skin was closed with staples. Sterile dressings were applied.    Arnulfo Leonard MD  General and Endoscopic Surgery  [facility]    [address], [address]  [place], [place], [address]  P: [phone]  F: [phone]

## 2021-06-14 NOTE — PAYOR COMM NOTE
"Randi Avery (65 y.o. Female)     PLEASE SEE ATTACHED CLINICAL REVIEW.     REF#[id number]    PLEASE CALL   OR  [phone] WITH INPT AUTH AND DAYS APPROVED    THANK YOU    CLIFFORD MURPHY LPN CCP    Date of Birth Social Security Number Address Home Phone MRN    1955  [address]  [place] [address]  [id number]    Anabaptism Marital Status          Adventism        Admission Date Admission Type Admitting Provider Attending Provider Department, Room/Bed    6/13/21 Emergency Uriah Landers MD Nguyen, Minh Loc, MD [facility] MAIN OR, [facility] Main OR/MAIN OR    Discharge Date Discharge Disposition Discharge Destination                       Attending Provider: Dex Solano MD    Allergies: No Known Allergies    Isolation: None   Infection: None   Code Status: CPR    Ht: 177.8 cm (70\")   Wt: 137 kg (302 lb)    Admission Cmt: None   Principal Problem: Incarcerated ventral hernia [K43.6]                 Active Insurance as of 6/13/2021     Primary Coverage     Payor Plan Insurance Group Employer/Plan Group    ANTH [employer]TH [employer] TRANSITION HMO NON PAR [id number]     Payor Plan Address Payor Plan Phone Number Payor Plan Fax Number Effective Dates    [address]   1/1/2021 - None Entered    [facility] [id number]       Subscriber Name Subscriber Birth Date Member ID       RANDI AVERY 1955 [id number]                 Emergency Contacts      (Rel.) Home Phone Work Phone Mobile Phone    Jameel Avery (Son) [phone] -- [phone]               History & Physical      Rhina Dawkins APRN at 06/13/21 2031     Attestation signed by Uriah Landers MD at 06/14/21 0031 (Updated)      I have seen and examined the patient, performing an independent face-to-face diagnostic evaluation with plan of care reviewed and developed with the APRN.    65-year-old female presents with abdominal pain and known ventral hernia.  CT scan " shows bowel obstruction.  General surgery has seen and ordered NG tube and considering surgical intervention.  She has atrial fibrillation and is on Eliquis.  This will be held.  Continue fluids, IV analgesics, antiemetics etc.    She is currently n.p.o. with NGT.  Will stop her oral medications including Lopressor.  Will start her on Lopressor 5 mg IV every 8 hours.  She has COPD and continued tobacco abuse.  Scheduled nebulizers as well as incentive spirometry.  IV Pepcid ordered.    Attending Physical Exam  Temp:  [98.6 °F (37 °C)] 98.6 °F (37 °C)  Heart Rate:  [] 107  Resp:  [16-20] 20  BP: (125-136)/(83-97) 136/83  Constitutional: alert, cooperative, and mild distress  Neck: no JVD  Respiratory: diminished breath sounds  Cardiovascular: irregularly irregular rhythm with tachycardia and murmur  Abdominal: obese, abnormal findings: hypoactive bowel sounds and mild tenderness in the entire abdomen  Musculoskeletal: trace edema BLE  Neurological: alert and oriented x 3    Active Hospital Problems    Diagnosis  POA   • **Incarcerated ventral hernia [K43.6]  Yes   • Tobacco abuse [Z72.0]  Yes   • Morbid obesity with BMI of 40.0-44.9, adult (CMS/[facility]) [E66.01, Z68.41]  Not Applicable   • COPD (chronic obstructive pulmonary disease) (CMS/[facility]) [J44.9]  Yes   • Atrial fibrillation (CMS/[facility]) [I48.91]  Yes   • HTN (hypertension) [I10]  Yes   • LISA (obstructive sleep apnea) [G47.33]  Yes   • Stage 3b chronic kidney disease (CMS/[facility]) [N18.32]  Yes   • Chronic anticoagulation [Z79.01]  Not Applicable   • SBO (small bowel obstruction) (CMS/[facility]) [K56.609]  Yes      Resolved Hospital Problems   No resolved problems to display.                           Patient Name:  Marilu Avery  YOB: 1955  MRN:  [id number]  Admit Date:  6/13/2021  Patient Care Team:  Anatoly Jimenez MD as PCP - General  Anatoly Jimenez MD as PCP - Family Medicine      Chief Complaint   Patient presents with   • Abdominal Pain              Subjective     Ms. Avery is a 65 y.o. female with a history of a fib on Eliquis, HTN, HLD, CKD3, LISA that presents to [facility] complaining of abdominal pain. Patient reports history of abdominal hernia for years and has been seen by surgery in the past. She reports abdominal distention and pain for past 2 days and states that her abdominal hernia isn't reducing like it normally does. Patient reports the pain as a burning sensation with associated gas discomfort and constipation, no aggravating or alleviating factors. Patient additionally confirms chronic shortness of breath and cough, though denies fever or chest pain. She reports swelling of lower extremities since several medication changes were made by her PCP a few months ago, and she was taken off of her Lasix. Labs done tonight were fairly unremarkable. CT abdomen showed small bowel obstruction with transition point suspected within ventral hernia sac in addition to stranding and fluid within hernia sac. General surgery was consulted and saw patient while in ED tonight, NG tube was ordered. She was given IV Dilaudid with reported improvement in her pain and appears in no acute distress on exam.     History of Present Illness    Past Medical History:   Diagnosis Date   • Arthritis    • Asthma    • Atrial fibrillation (CMS/HCC)    • Colon polyps 2015    hyperplastic rectal polyp   • Hyperlipidemia    • Hypertension    • Sleep apnea      Past Surgical History:   Procedure Laterality Date   • BACK SURGERY N/A 2001   • COLONOSCOPY N/A 10/22/2015    Rectal polyp-Dr. Elías Keating   • HYSTERECTOMY N/A 2000   • LAPAROSCOPIC CHOLECYSTECTOMY N/A 01/04/2010    Dr. Heath Whitlock   • OOPHORECTOMY  2001   • REPLACEMENT TOTAL KNEE Left 06/22/2015    Dr. Yo Aguayo   • REPLACEMENT TOTAL KNEE Right 02/26/2015    Dr. Yo Aguayo   • VENTRAL HERNIA REPAIR N/A 10/22/2015    Open reduction of incarcerated ventral hernia with layered closure-Dr. Mckinley  Zuri     Family History   Problem Relation Age of Onset   • No Known Problems Mother    • No Known Problems Father      Social History     Tobacco Use   • Smoking status: Current Every Day Smoker     Packs/day: 1.00   Substance Use Topics   • Alcohol use: Yes     Comment: 3 drinks daily   • Drug use: Not on file     (Not in a hospital admission)    Allergies:  No Known Allergies    Review of Systems   Constitutional: Negative.  Negative for chills and fever.   HENT: Negative.  Negative for congestion and sore throat.    Eyes: Negative.  Negative for visual disturbance.   Respiratory: Positive for cough (chronic) and shortness of breath.    Cardiovascular: Positive for leg swelling. Negative for chest pain.   Gastrointestinal: Positive for abdominal pain, constipation and nausea. Negative for vomiting.   Endocrine: Negative.    Genitourinary: Negative.  Negative for dysuria, frequency and urgency.   Musculoskeletal: Negative.  Negative for arthralgias and myalgias.   Skin: Negative.  Negative for color change and pallor.   Allergic/Immunologic: Negative.    Neurological: Negative.  Negative for dizziness, weakness and light-headedness.   Hematological: Negative.    Psychiatric/Behavioral: Negative.  Negative for agitation, behavioral problems and confusion.        Objective    Vital Signs  Temp:  [98.6 °F (37 °C)] 98.6 °F (37 °C)  Heart Rate:  [70-86] 85  Resp:  [16-18] 16  BP: (125-132)/(92-97) 132/97  SpO2:  [94 %-100 %] 100 %  on   ;   Device (Oxygen Therapy): room air  Body mass index is 43.48 kg/m².    Physical Exam  Vitals and nursing note reviewed.   Constitutional:       General: She is not in acute distress.     Appearance: Normal appearance. She is obese.   HENT:      Head: Normocephalic and atraumatic.      Nose: Nose normal.      Mouth/Throat:      Mouth: Mucous membranes are moist.   Eyes:      Extraocular Movements: Extraocular movements intact.   Cardiovascular:      Rate and Rhythm: Normal rate and  regular rhythm.      Pulses: Normal pulses.      Heart sounds: Normal heart sounds.   Pulmonary:      Effort: Pulmonary effort is normal. No respiratory distress.      Breath sounds: Normal breath sounds.   Abdominal:      General: Bowel sounds are decreased. There is distension.      Tenderness: There is generalized abdominal tenderness. There is no guarding.      Hernia: A hernia is present.      Comments: Firm, rounded abdomen   Musculoskeletal:         General: Swelling (moderate edema BLE) present. Normal range of motion.      Cervical back: Normal range of motion and neck supple. No rigidity.   Skin:     General: Skin is warm and dry.   Neurological:      General: No focal deficit present.      Mental Status: She is alert and oriented to person, place, and time. Mental status is at baseline.   Psychiatric:         Mood and Affect: Mood normal.         Behavior: Behavior normal.         Thought Content: Thought content normal.         Judgment: Judgment normal.         Results Review:   I reviewed the patient's new clinical results including all labs and xrays.    Lab Results (last 24 hours)     Procedure Component Value Units Date/Time    CBC & Differential [[id number]]  (Abnormal) Collected: 06/13/21 1810    Specimen: Blood Updated: 06/13/21 1818    Narrative:      The following orders were created for panel order CBC & Differential.  Procedure                               Abnormality         Status                     ---------                               -----------         ------                     CBC Auto Differential[[id number]]        Abnormal            Final result                 Please view results for these tests on the individual orders.    Comprehensive Metabolic Panel [[id number]]  (Abnormal) Collected: 06/13/21 1810    Specimen: Blood Updated: 06/13/21 1839     Glucose 142 mg/dL      BUN 16 mg/dL      Creatinine 1.32 mg/dL      Sodium 134 mmol/L      Potassium 3.8 mmol/L      Comment: Slight  hemolysis detected by analyzer. Results may be affected.        Chloride 96 mmol/L      CO2 22.2 mmol/L      Calcium 10.2 mg/dL      Total Protein 8.0 g/dL      Albumin 4.80 g/dL      ALT (SGPT) 23 U/L      AST (SGOT) 33 U/L      Alkaline Phosphatase 89 U/L      Total Bilirubin 1.1 mg/dL      eGFR Non African Amer 41 mL/min/1.73      Globulin 3.2 gm/dL      A/G Ratio 1.5 g/dL      BUN/Creatinine Ratio 12.1     Anion Gap 15.8 mmol/L     Narrative:      GFR Normal >60  Chronic Kidney Disease <60  Kidney Failure <15      aPTT [[id number]]  (Normal) Collected: 06/13/21 1810    Specimen: Blood Updated: 06/13/21 1829     PTT 31.9 seconds     Protime-INR [[id number]]  (Abnormal) Collected: 06/13/21 1810    Specimen: Blood Updated: 06/13/21 1829     Protime 16.1 Seconds      INR 1.31    CBC Auto Differential [[id number]]  (Abnormal) Collected: 06/13/21 1810    Specimen: Blood Updated: 06/13/21 1818     WBC 8.39 10*3/mm3      RBC 4.62 10*6/mm3      Hemoglobin 15.8 g/dL      Hematocrit 45.3 %      MCV 98.1 fL      MCH 34.2 pg      MCHC 34.9 g/dL      RDW 13.1 %      RDW-SD 47.3 fl      MPV 9.4 fL      Platelets 246 10*3/mm3      Neutrophil % 80.2 %      Lymphocyte % 11.1 %      Monocyte % 6.6 %      Eosinophil % 1.0 %      Basophil % 0.6 %      Immature Grans % 0.5 %      Neutrophils, Absolute 6.74 10*3/mm3      Lymphocytes, Absolute 0.93 10*3/mm3      Monocytes, Absolute 0.55 10*3/mm3      Eosinophils, Absolute 0.08 10*3/mm3      Basophils, Absolute 0.05 10*3/mm3      Immature Grans, Absolute 0.04 10*3/mm3      nRBC 0.0 /100 WBC     Lactic Acid, Plasma [[id number]]  (Normal) Collected: 06/13/21 1810    Specimen: Blood Updated: 06/13/21 1838     Lactate 1.6 mmol/L     COVID PRE-OP / PRE-PROCEDURE SCREENING ORDER (NO ISOLATION) - Swab, Nasopharynx [[id number]]  (Normal) Collected: 06/13/21 1854    Specimen: Swab from Nasopharynx Updated: 06/13/21 2018    Narrative:      The following orders were created for panel order COVID PRE-OP  / PRE-PROCEDURE SCREENING ORDER (NO ISOLATION) - Swab, Nasopharynx.  Procedure                               Abnormality         Status                     ---------                               -----------         ------                     COVID-19,[facility] IN[facility]HOUSE...[[id number]]  Normal              Final result                 Please view results for these tests on the individual orders.    COVID-19,[facility]HOU[facility] CEPHEID/KADEEM NP SWAB IN TRANSPORT MEDIA 8-12 HR TAT - Swab, Nasopharynx [[id number]]  (Normal) Collected: 06/13/21 1854    Specimen: Swab from Nasopharynx Updated: 06/13/21 2018     COVID19 Not Detected    Narrative:      Fact sheet for providers: https://www.fda.gov/media/220519/download     Fact sheet for patients: https://www.fda.gov/media/374989/download          CT Abdomen Pelvis With Contrast   Final Result       1. Small bowel obstruction, with transition point suspected within the   patient's ventral hernia sac. No pneumatosis or free air is seen at this   time, although there is stranding and trace fluid identified within the   hernia sac.   2. 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   Continued surveillance is recommended, with next follow-up recommended   in one year.       Radiation dose reduction techniques were utilized, including automated   exposure control and exposure modulation based on body size.       This report was finalized on 6/13/2021 7:45 PM by Dr. Kiesha Oconnor M.D.            Assessment/Plan      Active Hospital Problems    Diagnosis  POA   • **Incarcerated ventral hernia [K43.6]  Yes   • Atrial fibrillation (CMS/HCC) [I48.91]  Yes   • HLD (hyperlipidemia) [E78.5]  Yes   • HTN (hypertension) [I10]  Yes   • LISA (obstructive sleep apnea) [G47.33]  Yes   • CKD (chronic kidney disease) stage 3, GFR 30-59 ml/min (CMS/HCC) [N18.30]  Yes   • Chronic anticoagulation [Z79.01]  Not Applicable   • SBO (small bowel obstruction) (CMS/HCC) [K56.609]  Yes       "  Resolved Hospital Problems   No resolved problems to display.     Incarcerated ventral hernia/SBO  -surgery consulted and NG tube ordered  -NPO and IVF overnight  -continue pain medication and antiemetics PRN  -hold home dose Eliquis for now in anticipation for surgery  -labs in AM    CKD3  -baseline renal function tonight, IVF as per above    HTN/HLD/a fib  -stable, may continue home medications with exception of Eliquis once med rec complete    VTE Ppx  -SCDs    CODE status  -full    I discussed the patients findings and my recommendations with patient.    DEBBI García  [facility]  06/13/21  8:31 PM EDT    Electronically signed by Uriah Landers MD at 06/14/21 0031          Emergency Department Notes      Jennifer Hendrix, RN at 06/13/21 1732        Pt c/o generalized abd pain x 2 days, endorses nausea. Pt arrives aaox4, abc's intact, NAD noted at this time. Pt placed in mask at triage. This RN also wearing a mask.      Electronically signed by Jennifer Hendrix, RN at 06/13/21 1733     Julien Decker MD at 06/13/21 1802           EMERGENCY DEPARTMENT ENCOUNTER    Room Number:  21/21  Date of encounter:  6/13/2021  PCP: Anatoly Jimenez MD  Historian: Patient      HPI:  Chief Complaint: Abdominal pain, nausea, vomiting  A complete HPI/ROS/PMH/PSH/SH/FH are unobtainable due to: N/A    Context: Marilu Avery is a 65 y.o. female who presents to the ED c/o abrupt onset of constant \"sharp\" midline abdominal pain starting Saturday morning around 3 AM.  She is very nauseated and has been retching but has not actually vomited.  She reports no bowel movement in 2 days as well as decreased flatus.  Symptoms are aggravated by palpation, there are no relieving factors.  She has had these symptoms several times in the past due to a midline incisional hernia-in fact, Dr. Aguilera was going to repair it electively but she never called to schedule this.    She smokes.  She has atrial " fibrillation and is anticoagulated.  She has NOT been vaccinated for COVID-19.    The patient was placed in a mask in triage, hand hygiene was performed before and after my interaction with the patient.  I wore a mask, safety glasses and gloves during my entire interaction with the patient.    PAST MEDICAL HISTORY  Active Ambulatory Problems     Diagnosis Date Noted   • Incisional hernia, without obstruction or gangrene 10/12/2017     Resolved Ambulatory Problems     Diagnosis Date Noted   • No Resolved Ambulatory Problems     Past Medical History:   Diagnosis Date   • Arthritis    • Asthma    • Atrial fibrillation (CMS/HCC)    • Colon polyps 2015   • Hyperlipidemia    • Hypertension    • Sleep apnea          PAST SURGICAL HISTORY  Past Surgical History:   Procedure Laterality Date   • BACK SURGERY N/A 2001   • COLONOSCOPY N/A 10/22/2015    Rectal polyp-Dr. Elías Keating   • HYSTERECTOMY N/A 2000   • LAPAROSCOPIC CHOLECYSTECTOMY N/A 01/04/2010    Dr. Heath Whitlock   • OOPHORECTOMY  2001   • REPLACEMENT TOTAL KNEE Left 06/22/2015    Dr. Yo Aguayo   • REPLACEMENT TOTAL KNEE Right 02/26/2015    Dr. Yo Aguayo   • VENTRAL HERNIA REPAIR N/A 10/22/2015    Open reduction of incarcerated ventral hernia with layered closure-Dr. Elías Keating         FAMILY HISTORY  Family History   Problem Relation Age of Onset   • No Known Problems Mother    • No Known Problems Father          SOCIAL HISTORY  Social History     Socioeconomic History   • Marital status:      Spouse name: Not on file   • Number of children: Not on file   • Years of education: Not on file   • Highest education level: Not on file   Tobacco Use   • Smoking status: Current Every Day Smoker     Packs/day: 1.00   Substance and Sexual Activity   • Alcohol use: Yes     Comment: 3 drinks daily   • Sexual activity: Defer         ALLERGIES  Patient has no known allergies.        REVIEW OF SYSTEMS  Review of Systems   Constitutional: Negative for chills and  fever.   Respiratory: Positive for shortness of breath (Chronic) and wheezing.    Cardiovascular: Negative for chest pain.   Gastrointestinal: Positive for abdominal pain and nausea. Negative for diarrhea.        All systems reviewed and negative except for those discussed in HPI.       PHYSICAL EXAM    I have reviewed the triage vital signs and nursing notes.    ED Triage Vitals [06/13/21 1733]   Temp Heart Rate Resp BP SpO2   98.6 °F (37 °C) 82 18 -- 96 %      Temp src Heart Rate Source Patient Position BP Location FiO2 (%)   Tympanic -- -- -- --       Physical Exam   Constitutional: Pt. is oriented to person, place, and time and well-developed, well-nourished, and in no distress.  She appears uncomfortable.   HENT: Normocephalic and atraumatic.  Neck: Normal range of motion. Neck supple.  Cardiovascular: Normal rate, regular rhythm and normal heart sounds. Exam reveals no gallop and no friction rub.   No murmur heard.  Pulmonary/Chest: Effort normal and breath sounds normal. No stridor. No respiratory distress.  Mild expiratory scattered wheezing.  No rales..   Abdominal: Obese.  Bowel sounds are diminished.  There is a large, tender midline hernia extending to the right.  This is not reducible.  Musculoskeletal: Normal range of motion.  Bilateral lower extremity edema, no tenderness or deformity.   Neurological: Pt. is alert and oriented to person, place, and time.  She has no focal neurologic deficits.  Skin: Skin is warm and dry. No rash noted. Pt. is not diaphoretic. No erythema.   Psychiatric: Mood, affect and judgment normal.   Nursing note and vitals reviewed.        LAB RESULTS  Recent Results (from the past 24 hour(s))   Comprehensive Metabolic Panel    Collection Time: 06/13/21  6:10 PM    Specimen: Blood   Result Value Ref Range    Glucose 142 (H) 65 - 99 mg/dL    BUN 16 8 - 23 mg/dL    Creatinine 1.32 (H) 0.57 - 1.00 mg/dL    Sodium 134 (L) 136 - 145 mmol/L    Potassium 3.8 3.5 - 5.2 mmol/L     Chloride 96 (L) 98 - 107 mmol/L    CO2 22.2 22.0 - 29.0 mmol/L    Calcium 10.2 8.6 - 10.5 mg/dL    Total Protein 8.0 6.0 - 8.5 g/dL    Albumin 4.80 3.50 - 5.20 g/dL    ALT (SGPT) 23 1 - 33 U/L    AST (SGOT) 33 (H) 1 - 32 U/L    Alkaline Phosphatase 89 39 - 117 U/L    Total Bilirubin 1.1 0.0 - 1.2 mg/dL    eGFR Non African Amer 41 (L) >60 mL/min/1.73    Globulin 3.2 gm/dL    A/G Ratio 1.5 g/dL    BUN/Creatinine Ratio 12.1 7.0 - 25.0    Anion Gap 15.8 (H) 5.0 - 15.0 mmol/L   aPTT    Collection Time: 06/13/21  6:10 PM    Specimen: Blood   Result Value Ref Range    PTT 31.9 22.7 - 35.4 seconds   Protime-INR    Collection Time: 06/13/21  6:10 PM    Specimen: Blood   Result Value Ref Range    Protime 16.1 (H) 11.7 - 14.2 Seconds    INR 1.31 (H) 0.90 - 1.10   CBC Auto Differential    Collection Time: 06/13/21  6:10 PM    Specimen: Blood   Result Value Ref Range    WBC 8.39 3.40 - 10.80 10*3/mm3    RBC 4.62 3.77 - 5.28 10*6/mm3    Hemoglobin 15.8 12.0 - 15.9 g/dL    Hematocrit 45.3 34.0 - 46.6 %    MCV 98.1 (H) 79.0 - 97.0 fL    MCH 34.2 (H) 26.6 - 33.0 pg    MCHC 34.9 31.5 - 35.7 g/dL    RDW 13.1 12.3 - 15.4 %    RDW-SD 47.3 37.0 - 54.0 fl    MPV 9.4 6.0 - 12.0 fL    Platelets 246 140 - 450 10*3/mm3    Neutrophil % 80.2 (H) 42.7 - 76.0 %    Lymphocyte % 11.1 (L) 19.6 - 45.3 %    Monocyte % 6.6 5.0 - 12.0 %    Eosinophil % 1.0 0.3 - 6.2 %    Basophil % 0.6 0.0 - 1.5 %    Immature Grans % 0.5 0.0 - 0.5 %    Neutrophils, Absolute 6.74 1.70 - 7.00 10*3/mm3    Lymphocytes, Absolute 0.93 0.70 - 3.10 10*3/mm3    Monocytes, Absolute 0.55 0.10 - 0.90 10*3/mm3    Eosinophils, Absolute 0.08 0.00 - 0.40 10*3/mm3    Basophils, Absolute 0.05 0.00 - 0.20 10*3/mm3    Immature Grans, Absolute 0.04 0.00 - 0.05 10*3/mm3    nRBC 0.0 0.0 - 0.2 /100 WBC   Lactic Acid, Plasma    Collection Time: 06/13/21  6:10 PM    Specimen: Blood   Result Value Ref Range    Lactate 1.6 0.5 - 2.0 mmol/L       Ordered the above labs and independently reviewed  the results.        RADIOLOGY  CT Abdomen Pelvis With Contrast    Result Date: 6/13/2021  CT OF THE ABDOMEN AND PELVIS WITH CONTRAST  HISTORY: Hernia. Nausea.  COMPARISON: 09/21/2019  TECHNIQUE: Axial CT imaging was obtained through the abdomen and pelvis. IV contrast was administered.  FINDINGS: Images through the lung bases demonstrate some bibasilar scarring. The stomach does appear distended and fluid-filled. Adrenal glands are normal. Tiny cysts or hemangiomata are identified on the spleen. There is a small cyst identified within the liver. Gallbladder is surgically absent. Pancreas is within normal limits. There is some intra and extrahepatic biliary dilatation. This may be postcholecystectomy nature. Similar findings were present on prior study. The kidneys enhance symmetrically. There is no hydronephrosis. Small low-attenuation lesions are identified on both kidneys. A lesion on the right is in attenuation, and is most likely an angiomyolipoma. It measures 1.6 cm. This is slightly larger than on prior exam. Additional lesions on the kidneys are favored to be cysts. There is a retroaortic left renal vein. There is calcification of the aorta. There are multiple dilated and fluid-filled loops of small bowel, and the patient's colon appears relatively decompressed. Transition point appears to be within this patient's ventral hernia sac, which contains both dilated and nondilated loops of small bowel. No pneumatosis or free air is seen. However, there is stranding identified within the mesentery, as well as a trace amount of fluid noted within the hernia sac. The patient does have colonic diverticulosis. There is no diverticulitis. Urinary bladder appears unremarkable. Uterus is surgically absent. There is a trace amount of free fluid noted within the pelvis, likely reactive. The maximum width of the defect within the anterior abdominal wall is approximately 6.2 cm. No acute osseous abnormalities are seen.        1. Small bowel obstruction, with transition point suspected within the patient's ventral hernia sac. No pneumatosis or free air is seen at this time, although there is stranding and trace fluid identified within the hernia sac. 2. 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 Continued surveillance is recommended, with next follow-up recommended in one year.  Radiation dose reduction techniques were utilized, including automated exposure control and exposure modulation based on body size.  This report was finalized on 6/13/2021 7:45 PM by Dr. Kiesha Oconnor M.D.        I ordered the above noted radiological studies. Reviewed by me and discussed with radiologist.  See dictation for official radiology interpretation.      PROCEDURES    Procedures      MEDICATIONS GIVEN IN ER    Medications   sodium chloride 0.9 % flush 10 mL (has no administration in time range)   sodium chloride 0.9 % infusion (125 mL/hr Intravenous New Bag 6/13/21 1850)   sodium chloride 0.9 % bolus 500 mL (0 mL Intravenous Stopped 6/13/21 1848)   HYDROmorphone (DILAUDID) injection 0.5 mg (0.5 mg Intravenous Given 6/13/21 1817)   ondansetron (ZOFRAN) injection 4 mg (4 mg Intravenous Given 6/13/21 1818)   sodium chloride 0.9 % bolus 500 mL (500 mL Intravenous New Bag 6/13/21 1850)   HYDROmorphone (DILAUDID) injection 0.5 mg (0.5 mg Intravenous Given 6/13/21 1852)   iopamidol (ISOVUE-300) 61 % injection 100 mL (85 mL Intravenous Given 6/13/21 1918)         PROGRESS, DATA ANALYSIS, CONSULTS, AND MEDICAL DECISION MAKING    Any/all labs have been independently reviewed by me.  Any/all radiology studies have been reviewed by me and discussed with radiologist dictating the report.   EKG's independently viewed and interpreted by me.  Discussion below represents my analysis of pertinent findings related to patient's condition, differential diagnosis, treatment plan and final disposition.      ED Course as of Jun 13 1955   Sun Jun 13,  2021 1819 CBC is unremarkable.    [WC]   1819 Discussed with Dr. Leonard  (general surgery)-I discussed my physical exam findings and working diagnosis of an incarcerated ventral hernia.  He is currently in the middle of a case, but he will see the patient when he is finished.    [WC]   1829 Noted.   INR(!): 1.31 [WC]   1839 Normal.   Lactate: 1.6 [WC]   1842 Glucose(!): 142 [WC]   1842 Creatinine(!): 1.32 [WC]   1842 Sodium(!): 134 [WC]   1936 Patient states her pain is much improved, however ventral hernia remains tender and firm to palpation.  Discussed with Dr. Leonard who agrees to see the patient in consult but requests medicine to admit her primarily given her multiple comorbidities.    [WC]   1949 Discussed with DEBBI Smyth (on-call for [facility])-she accepts the patient for admission to a telemetry bed on behalf of Dr. Landers.    [WC]      ED Course User Index  [WC] Julien Decker MD       AS OF 19:55 EDT VITALS:    BP - 132/97  HR - 86  TEMP - 98.6 °F (37 °C) (Tympanic)  02 SATS - 100%        DIAGNOSIS  Final diagnoses:   Chronic obstructive pulmonary disease, unspecified COPD type (CMS/HCC)   Anticoagulated   Morbid obesity (CMS/HCC)   Ventral hernia with bowel obstruction         DISPOSITION  Admitted-telemetry           Julien Decker MD  06/13/21 1952       Julien Decker MD  06/13/21 1955      Electronically signed by Julien Decker MD at 06/13/21 1955     Angeline Glaser, RN at 06/13/21 2042        PPE per protocol utilized.       Angeline Glaser, RN  06/13/21 2042      Electronically signed by Angeline Glaser, RN at 06/13/21 2042       {Outbreak/Travel/Exposure Documentation......;  Question Available Choices Patient Response   COVID-19 Outbreak Screen:  Do you currently have a new onset of the following symptoms?        Fever/Chills, Cough, Shortness of air, Loss of taste or smell, No, Unknown  No (06/13/21 2241)   COVID-19 Outbreak Screen: In the last 14 days, have you had  contact with anyone who is ill, has show any of the symptoms listed above and/or has been diagnosis with the 2019 Novel Coronavirus? This includes any immediate household members but excludes any patients with whom you have been in contact within your normal work duties wearing proper PPE, if you are a healthcare worker.  Yes, No, Unknown              No (06/13/21 2241)   COVID-19 Outbreak Screen: Who was notified? Free text (not recorded)   Ebola Screening Outbreak Screen: Have you traveled to the [place] or [place] within the past 21 days?  Yes, No, Unknown No (06/13/21 2241)   Ebola Screening Outbreak Screen: Do you have ANY of the following symptoms: Fever/Chills, Vomiting, Diarrhea, Fatigue, Headache, Muscle pain, Unexplained bleeding, Abdominal (stomach) pain, No, Unknown (not recorded)   Ebola Screening Outbreak Screen: Name of Person notified Free text (not recorded)   Travel Screen: Have you traveled in the last month? If so, to what country have you traveled? If [place] what state? Yes, No, Unknown  List of all countries  List of all States No (06/13/21 2241)  (not recorded)  (not recorded)   Infection Risk: Do you currently have the following symptoms?  (If cough is selected, the Tuberculosis Screen is performed.) Cough, Fever, Rash, No No (06/13/21 2241)   Tuberculosis Screen: Do you have any of the following Tuberculosis Risks?  · Have you lived or spent time with anyone who had or may have TB?  · Have you lived in or visited any of the following areas for more than one month: [place], [place], [place], [place] or [place], the [place] or [place]?  · Do you have HIV/AIDS?  · Have you lived in or worked in a nursing home, homeless shelter, correctional facility, or substance abuse treatment facility?   · No    If Yes do you have any of the following symptoms? Yes responses display to the right    If Yes, symptoms listed are:  Cough greater than or equal to 3 weeks, Loss of  appetite, Unexplained weight loss, Night sweats, Bloody sputum or hemoptysis, Hoarseness, Fever, Fatigue, Chest pain, No (not recorded)  (not recorded)   Exposure Screen: Have you been exposed to any of these contagious diseases in the last month? Measles, Chickenpox, Meningitis, Pertussis, Whooping Cough, No No (06/13/21 2241)       Oxygen Therapy (since admission)     Date/Time   SpO2   Device (Oxygen Therapy)   Flow (L/min)   Oxygen Concentration (%)   ETCO2 (mmHg)    06/14/21 1525   94   --   5   --   --    06/14/21 1515   93   --   5   --   --    06/14/21 1500   93   --   5   --   --    06/14/21 1445   (!) 89   nasal cannula   6   --   --    06/14/21 1440   92   nasal cannula   6   --   --    06/14/21 1435   (!) 89   --   6   --   --    06/14/21 1432   90   nasal cannula   6   --   --    06/14/21 1110   97   room air   --   --   --    06/14/21 0857   --   nasal cannula   2   --   --    06/14/21 0727   95   nasal cannula   2   --   --    06/14/21 0706   97   nasal cannula   2   --   --    06/14/21 0125   93   --   --   --   --    06/14/21 0005   --   nasal cannula   2   --   --    06/13/21 2241   --   nasal cannula   2   --   --    06/13/21 2230   96   nasal cannula   2   --   --    06/13/21 1910   100   --   --   --   --    06/13/21 1854   95   --   --   --   --    06/13/21 1824   94   --   --   --   --    06/13/21 1733   96   room air   --   --   --            Intake & Output (last 2 days)       06/12 0701 - 06/13 0700 06/13 0701 - 06/14 0700 06/14 0701 - 06/15 0700    I.V. (mL/kg)   1300 (9.5)    IV Piggyback  2000     Total Intake(mL/kg)  2000 (14.6) 1300 (9.5)    Urine (mL/kg/hr)   225 (0.2)    Emesis/NG output  700 200    Total Output  700 425    Net  +1300 +875               Lines, Drains & Airways    Active LDAs     Name:   Placement date:   Placement time:   Site:   Days:    Peripheral IV 06/13/21 1809 Right Antecubital   06/13/21 1809    Antecubital   less than 1    Closed/Suction Drain 1 RUQ Bulb  19 Fr.   06/14/21    1409    RUQ   less than 1    Closed/Suction Drain 2 RLQ Bulb 19 Fr.   06/14/21    1409    RLQ   less than 1    NG/OG Tube Nasogastric 16 Fr Right nostril   06/14/21    0005    Right nostril   less than 1    Urethral Catheter Non-latex;Silicone 16 Fr.   06/14/21    1241     less than 1         Inactive LDAs     Name:   Placement date:   Placement time:   Removal date:   Removal time:   Site:   Days:    [REMOVED] ETT    06/14/21    1222 created via procedure documentation    06/14/21    1426     less than 1                CIWA (since admission)     None            Blood Administration Record (From admission, onward)    None        Lab Results (all)     Procedure Component Value Units Date/Time    Tissue Pathology Exam [[id number]] Collected: 06/14/21 1302    Specimen: Tissue from Large Intestine, Appendix; Tissue from Hernia, Sac Updated: 06/14/21 1443    Basic Metabolic Panel [[id number]]  (Abnormal) Collected: 06/14/21 0512    Specimen: Blood Updated: 06/14/21 0628     Glucose 128 mg/dL      BUN 17 mg/dL      Creatinine 1.12 mg/dL      Sodium 137 mmol/L      Potassium 4.0 mmol/L      Chloride 99 mmol/L      CO2 24.0 mmol/L      Calcium 9.0 mg/dL      eGFR Non African Amer 49 mL/min/1.73      BUN/Creatinine Ratio 15.2     Anion Gap 14.0 mmol/L     Narrative:      GFR Normal >60  Chronic Kidney Disease <60  Kidney Failure <15      Protime-INR [[id number]]  (Abnormal) Collected: 06/14/21 0512    Specimen: Blood Updated: 06/14/21 0604     Protime 15.1 Seconds      INR 1.21    CBC (No Diff) [[id number]]  (Abnormal) Collected: 06/14/21 0512    Specimen: Blood Updated: 06/14/21 0557     WBC 4.01 10*3/mm3      RBC 4.16 10*6/mm3      Hemoglobin 14.1 g/dL      Hematocrit 42.3 %      .7 fL      MCH 33.9 pg      MCHC 33.3 g/dL      RDW 13.0 %      RDW-SD 48.9 fl      MPV 9.7 fL      Platelets 185 10*3/mm3     COVID PRE-OP / PRE-PROCEDURE SCREENING ORDER (NO ISOLATION) - Swab, Nasopharynx [[id number]]   (Normal) Collected: 06/13/21 1854    Specimen: Swab from Nasopharynx Updated: 06/13/21 2018    Narrative:      The following orders were created for panel order COVID PRE-OP / PRE-PROCEDURE SCREENING ORDER (NO ISOLATION) - Swab, Nasopharynx.  Procedure                               Abnormality         Status                     ---------                               -----------         ------                     COVID-19,[facility]...[[id number]]  Normal              Final result                 Please view results for these tests on the individual orders.    COVID-19,[facility] CEPHEID/KADEEM NP SWAB IN TRANSPORT MEDIA 8-12 HR TAT - Swab, Nasopharynx [[id number]]  (Normal) Collected: 06/13/21 1854    Specimen: Swab from Nasopharynx Updated: 06/13/21 2018     COVID19 Not Detected    Narrative:      Fact sheet for providers: https://www.fda.gov/media/113619/download     Fact sheet for patients: https://www.fda.gov/media/613479/download    Comprehensive Metabolic Panel [[id number]]  (Abnormal) Collected: 06/13/21 1810    Specimen: Blood Updated: 06/13/21 1839     Glucose 142 mg/dL      BUN 16 mg/dL      Creatinine 1.32 mg/dL      Sodium 134 mmol/L      Potassium 3.8 mmol/L      Comment: Slight hemolysis detected by analyzer. Results may be affected.        Chloride 96 mmol/L      CO2 22.2 mmol/L      Calcium 10.2 mg/dL      Total Protein 8.0 g/dL      Albumin 4.80 g/dL      ALT (SGPT) 23 U/L      AST (SGOT) 33 U/L      Alkaline Phosphatase 89 U/L      Total Bilirubin 1.1 mg/dL      eGFR Non African Amer 41 mL/min/1.73      Globulin 3.2 gm/dL      A/G Ratio 1.5 g/dL      BUN/Creatinine Ratio 12.1     Anion Gap 15.8 mmol/L     Narrative:      GFR Normal >60  Chronic Kidney Disease <60  Kidney Failure <15      Lactic Acid, Plasma [[id number]]  (Normal) Collected: 06/13/21 1810    Specimen: Blood Updated: 06/13/21 1838     Lactate 1.6 mmol/L     aPTT [[id number]]  (Normal) Collected: 06/13/21 1810    Specimen: Blood  Updated: 06/13/21 1829     PTT 31.9 seconds     Protime-INR [[id number]]  (Abnormal) Collected: 06/13/21 1810    Specimen: Blood Updated: 06/13/21 1829     Protime 16.1 Seconds      INR 1.31    CBC & Differential [[id number]]  (Abnormal) Collected: 06/13/21 1810    Specimen: Blood Updated: 06/13/21 1818    Narrative:      The following orders were created for panel order CBC & Differential.  Procedure                               Abnormality         Status                     ---------                               -----------         ------                     CBC Auto Differential[[id number]]        Abnormal            Final result                 Please view results for these tests on the individual orders.    CBC Auto Differential [[id number]]  (Abnormal) Collected: 06/13/21 1810    Specimen: Blood Updated: 06/13/21 1818     WBC 8.39 10*3/mm3      RBC 4.62 10*6/mm3      Hemoglobin 15.8 g/dL      Hematocrit 45.3 %      MCV 98.1 fL      MCH 34.2 pg      MCHC 34.9 g/dL      RDW 13.1 %      RDW-SD 47.3 fl      MPV 9.4 fL      Platelets 246 10*3/mm3      Neutrophil % 80.2 %      Lymphocyte % 11.1 %      Monocyte % 6.6 %      Eosinophil % 1.0 %      Basophil % 0.6 %      Immature Grans % 0.5 %      Neutrophils, Absolute 6.74 10*3/mm3      Lymphocytes, Absolute 0.93 10*3/mm3      Monocytes, Absolute 0.55 10*3/mm3      Eosinophils, Absolute 0.08 10*3/mm3      Basophils, Absolute 0.05 10*3/mm3      Immature Grans, Absolute 0.04 10*3/mm3      nRBC 0.0 /100 WBC           Imaging Results (All)     Procedure Component Value Units Date/Time    CT Abdomen Pelvis With Contrast [[id number]] Collected: 06/13/21 1929     Updated: 06/13/21 1948    Narrative:      CT OF THE ABDOMEN AND PELVIS WITH CONTRAST     HISTORY: Hernia. Nausea.     COMPARISON: 09/21/2019     TECHNIQUE: Axial CT imaging was obtained through the abdomen and pelvis.  IV contrast was administered.     FINDINGS:  Images through the lung bases demonstrate some  bibasilar scarring. The  stomach does appear distended and fluid-filled. Adrenal glands are  normal. Tiny cysts or hemangiomata are identified on the spleen. There  is a small cyst identified within the liver. Gallbladder is surgically  absent. Pancreas is within normal limits. There is some intra and  extrahepatic biliary dilatation. This may be postcholecystectomy nature.  Similar findings were present on prior study. The kidneys enhance  symmetrically. There is no hydronephrosis. Small low-attenuation lesions  are identified on both kidneys. A lesion on the right is in attenuation,  and is most likely an angiomyolipoma. It measures 1.6 cm. This is  slightly larger than on prior exam. Additional lesions on the kidneys  are favored to be cysts. There is a retroaortic left renal vein. There  is calcification of the aorta. There are multiple dilated and  fluid-filled loops of small bowel, and the patient's colon appears  relatively decompressed. Transition point appears to be within this  patient's ventral hernia sac, which contains both dilated and nondilated  loops of small bowel. No pneumatosis or free air is seen. However, there  is stranding identified within the mesentery, as well as a trace amount  of fluid noted within the hernia sac. The patient does have colonic  diverticulosis. There is no diverticulitis. Urinary bladder appears  unremarkable. Uterus is surgically absent. There is a trace amount of  free fluid noted within the pelvis, likely reactive. The maximum width  of the defect within the anterior abdominal wall is approximately 6.2  cm. No acute osseous abnormalities are seen.       Impression:         1. Small bowel obstruction, with transition point suspected within the  patient's ventral hernia sac. No pneumatosis or free air is seen at this  time, although there is stranding and trace fluid identified within the  hernia sac.  2. 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  Continued surveillance is recommended, with next follow-up recommended  in one year.     Radiation dose reduction techniques were utilized, including automated  exposure control and exposure modulation based on body size.     This report was finalized on 6/13/2021 7:45 PM by Dr. Kiesha Oconnor M.D.             ECG/EMG Results (all)     None        Orders (all)      Start     Ordered    06/14/21 1458  Assess Need for Indwelling Urinary Catheter - Follow Removal Protocol  Continuous     Comments: Indwelling Urinary Catheter Removal Criteria  Discontinue Indwelling Urinary Catheter Unless One of the Following is Present:  Urinary Retention or Obstruction  Chronic Urinary Catheter Use  End of Life  Critical Illness with Strict I/O   Tract or Abdominal Surgery  Stage 3/4 Sacral / Perineal Wound  Required Activity Restriction: Trauma  Required Activity Restriction: Spine Surgery  If Patient is Being Followed by Urology Contact Them PRIOR to Removal  Do Not Remove Indwelling Urinary Catheter Order is Present with a CLINICAL REASON to Maintain the Catheter. Provider is Required to Include a Clinical Reason to Maintain a Urinary Catheter    Patient Admitted With Indwelling Urinary Catheter (Not Placed at [facility] [place], operated by [facility])  Assess for Continued Need & Document Medical Necessity  If Infection is Suspected, Contact the Provider        06/14/21 1457    06/14/21 1458  Urinary Catheter Care  Every Shift      06/14/21 1457    06/14/21 1424  Oxygen Therapy- Nasal Cannula; Titrate for SPO2: 90% - 95%  Continuous      06/14/21 1423    06/14/21 1424  Pulse Oximetry, Continuous  Continuous      06/14/21 1423    06/14/21 1424  Vital Signs Every 5 Minutes for 15 Minutes, Every 15 Minutes Thereafter.  Continuous      06/14/21 1423    06/14/21 1424  Notify Anesthesia of Any Acute Changes in Patient Condition  Until Discontinued      06/14/21 1423    06/14/21 1424  Notify Anesthesia for Unrelieved Pain  Until Discontinued       06/14/21 1423    06/14/21 1424  Once Discharge Criteria to Floor Met, Follow Surgeon Orders  Continuous      06/14/21 1423    06/14/21 1424  Discharge Patient From PACU When Discharge Criteria Met  Continuous      06/14/21 1423    06/14/21 1423  promethazine (PHENERGAN) suppository 25 mg  Once As Needed      06/14/21 1423    06/14/21 1423  promethazine (PHENERGAN) tablet 12.5 mg  Once As Needed      06/14/21 1423    06/14/21 1423  HYDROmorphone (DILAUDID) injection 0.25 mg  Every 10 Minutes PRN      06/14/21 1423    06/14/21 1423  fentaNYL citrate (PF) (SUBLIMAZE) injection 50 mcg  Every 10 Minutes PRN      06/14/21 1423    06/14/21 1305  Tissue Pathology Exam  RELEASE UPON ORDERING      06/14/21 1305    06/14/21 1240  bupivacaine (PF) (MARCAINE) 0.25 % injection  As Needed,   Status:  Discontinued      06/14/21 1240    06/14/21 1240  sodium chloride (NS) irrigation solution  As Needed,   Status:  Discontinued      06/14/21 1240    06/14/21 1220  Insert Indwelling Urinary Catheter  Once,   Status:  Canceled      06/14/21 1243    06/14/21 1154  ceFAZolin in Sodium Chloride (ANCEF) IVPB solution 3 g  Once      06/14/21 1152    06/14/21 1115  sodium chloride 0.9 % flush 3 mL  Every 12 Hours Scheduled,   Status:  Discontinued      06/14/21 1113    06/14/21 1115  lactated ringers infusion  Continuous      06/14/21 1113    06/14/21 1115  famotidine (PEPCID) injection 20 mg  Once      06/14/21 1113    06/14/21 1114  Insert Peripheral IV  Once,   Status:  Canceled      06/14/21 1113    06/14/21 1114  Saline Lock & Maintain IV Access  Continuous      06/14/21 1113    06/14/21 1114  May take Beta Blocker from home with sip of water.  Once,   Status:  Canceled     Comments: Only applicable to patients on home Beta Blocker    06/14/21 1113    06/14/21 1114  Pulse Oximetry, Continuous  Continuous,   Status:  Canceled      06/14/21 1113    06/14/21 1114  POC Glucose Once  Once,   Status:  Canceled     Comments: For all diabetic  patients. Notify Anesthesiologist for blood sugar greater than 180 or less than 60..      06/14/21 1113 06/14/21 1113  Vital Signs - Per Anesthesia Protocol  As Needed,   Status:  Canceled      06/14/21 1113 06/14/21 1113  Oxygen Therapy- Nasal Cannula; Titrate for SPO2: Per Policy  Continuous PRN,   Status:  Canceled      06/14/21 1113    06/14/21 1113  Pulse Oximetry, Continuous  Continuous PRN,   Status:  Canceled      06/14/21 1113 06/14/21 1113  POC Glucose PRN  As Needed,   Status:  Canceled     Comments: For all diabetic patients. Notify Anesthesiologist for blood sugar > 180.      06/14/21 1113 06/14/21 1113  sodium chloride 0.9 % flush 3-10 mL  As Needed,   Status:  Discontinued      06/14/21 1113 06/14/21 1113  lidocaine PF 1% (XYLOCAINE) injection 0.5 mL  Once As Needed,   Status:  Discontinued      06/14/21 1113 06/14/21 1113  fentaNYL citrate (PF) (SUBLIMAZE) injection 50 mcg  Every 10 Minutes PRN,   Status:  Discontinued      06/14/21 1113 06/14/21 1113  midazolam (VERSED) injection 1 mg  Every 10 Minutes PRN,   Status:  Discontinued      06/14/21 1113 06/14/21 0900  allopurinol (ZYLOPRIM) tablet 100 mg  Daily,   Status:  Discontinued      06/13/21 2305 06/14/21 0900  amLODIPine (NORVASC) tablet 10 mg  Daily,   Status:  Discontinued      06/13/21 2305 06/14/21 0900  atorvastatin (LIPITOR) tablet 80 mg  Daily,   Status:  Discontinued      06/13/21 2305 06/14/21 0900  colchicine tablet 0.6 mg  Daily,   Status:  Discontinued      06/13/21 2305 06/14/21 0900  lisinopril (PRINIVIL,ZESTRIL) tablet 40 mg  Every 24 Hours Scheduled,   Status:  Discontinued      06/13/21 2305 06/14/21 0830  [MAR Hold]  ipratropium-albuterol (DUO-NEB) nebulizer solution 3 mL  4 Times Daily - RT     (MAR Hold since Mon 6/14/2021 at 1059.Hold Reason: Unreviewed Transfer Orders.)    06/14/21 0028    06/14/21 0600  Basic Metabolic Panel  Morning Draw      06/13/21 2155    06/14/21 0600  CBC (No  Diff)  Morning Draw      06/13/21 2155 06/14/21 0600  Protime-INR  Morning Draw      06/13/21 2155 06/14/21 0600  Incentive Spirometry  Every 2 Hours While Awake      06/14/21 0028    06/14/21 0115  famotidine (PEPCID) injection 20 mg  Every 12 Hours Scheduled      06/14/21 0028    06/14/21 0115  metoprolol tartrate (LOPRESSOR) injection 5 mg  Every 8 Hours      06/14/21 0028    06/14/21 0027  Oxygen Therapy- Nasal Cannula; Titrate for SPO2: 88% - 92%  Continuous PRN,   Status:  Canceled     Comments: If Patient Develops Unresponsiveness, Acute Dyspnea, Cyanosis or Suspected Hypoxemia Start Continuous Pulse Ox Monitoring, Apply Oxygen & Notify Provider    06/14/21 0028 06/14/21 0025  [MAR Hold]  ipratropium-albuterol (DUO-NEB) nebulizer solution 3 mL  Every 4 Hours PRN     (MAR Hold since Mon 6/14/2021 at 1059.Hold Reason: Unreviewed Transfer Orders.)    06/14/21 0028    06/14/21 0000  Vital Signs  Every 4 Hours      06/13/21 2155 06/14/21 0000  metoprolol tartrate (LOPRESSOR) tablet 150 mg  Every 12 Hours Scheduled,   Status:  Discontinued      06/13/21 2305 06/14/21 0000  [MAR Hold]  budesonide (PULMICORT) nebulizer solution 0.25 mg  2 Times Daily - RT     (MAR Hold since Mon 6/14/2021 at 1059.Hold Reason: Unreviewed Transfer Orders.)    06/13/21 2305 06/13/21 2314  Inpatient Case Management  Consult  Once     Provider:  (Not yet assigned)    06/13/21 2313 06/13/21 2157  [MAR Hold]  sodium chloride 0.9 % flush 10 mL  Every 12 Hours Scheduled     (MAR Hold since Mon 6/14/2021 at 1059.Hold Reason: Unreviewed Transfer Orders.)    06/13/21 2155 06/13/21 2157  sodium chloride 0.9 % infusion  Continuous      06/13/21 2155 06/13/21 2156  Inpatient General Surgery Consult  Once     Specialty:  General Surgery  Provider:  (Not yet assigned)    06/13/21 2155    06/13/21 2154  [MAR Hold]  ondansetron (ZOFRAN) injection 4 mg  Every 6 Hours PRN     (MAR Hold since Mon 6/14/2021 at  1059.Hold Reason: Unreviewed Transfer Orders.)    06/13/21 2155 06/13/21 2154  Code Status and Medical Interventions:  Continuous      06/13/21 2155 06/13/21 2154  Place Sequential Compression Device  Once      06/13/21 2155 06/13/21 2154  Maintain Sequential Compression Device  Continuous      06/13/21 2155 06/13/21 2154  Telemetry - Maintain IV Access  Continuous,   Status:  Canceled      06/13/21 2155 06/13/21 2154  Continuous Cardiac Monitoring  Continuous      06/13/21 2155 06/13/21 2154  May Be Off Telemetry for Tests  Continuous      06/13/21 2155 06/13/21 2154  ACLS Protocol For Life Threatening Dysrhythmias (Unless Code Status Indicates Otherwise)  Continuous      06/13/21 2155 06/13/21 2154  Notify Provider if ACLS Protocol Activated  Until Discontinued      06/13/21 2155 06/13/21 2154  NPO Diet  Diet Effective Now      06/13/21 2155 06/13/21 2153  Telemetry - Pulse Oximetry  Continuous PRN,   Status:  Canceled     Comments: If Patient Develops Unresponsiveness, Acute Dyspnea, Cyanosis or Suspected Hypoxemia Start Continuous Pulse Ox Monitoring, Apply Oxygen & Notify Provider    06/13/21 2155 06/13/21 2153  Oxygen Therapy- Nasal Cannula; Titrate for SPO2: 90% - 95%  Continuous PRN,   Status:  Canceled     Comments: If Patient Develops Unresponsiveness, Acute Dyspnea, Cyanosis or Suspected Hypoxemia Start Continuous Pulse Ox Monitoring, Apply Oxygen & Notify Provider    06/13/21 2155 06/13/21 2153  [MAR Hold]  nitroglycerin (NITROSTAT) SL tablet 0.4 mg  Every 5 Minutes PRN     (MAR Hold since Mon 6/14/2021 at 1059.Hold Reason: Unreviewed Transfer Orders.)    06/13/21 2155 06/13/21 2153  Intake & Output  Every Shift      06/13/21 2155 06/13/21 2153  Weigh Patient  Once      06/13/21 2155 06/13/21 2153  Oxygen Therapy- Nasal Cannula; Titrate for SPO2: 90%  Continuous,   Status:  Canceled      06/13/21 2155 06/13/21 2153  Insert Peripheral IV  Once      06/13/21  2155 06/13/21 2153  Saline Lock & Maintain IV Access  Continuous,   Status:  Canceled      06/13/21 2155 06/13/21 2152  [MAR Hold]  sodium chloride 0.9 % flush 10 mL  As Needed     (MAR Hold since Mon 6/14/2021 at 1059.Hold Reason: Unreviewed Transfer Orders.)    06/13/21 2155 06/13/21 2152  [MAR Hold]  acetaminophen (TYLENOL) tablet 650 mg  Every 4 Hours PRN     (MAR Hold since Mon 6/14/2021 at 1059.Hold Reason: Unreviewed Transfer Orders.)    06/13/21 2155 06/13/21 2152  [MAR Hold]  acetaminophen (TYLENOL) 160 MG/5ML solution 650 mg  Every 4 Hours PRN     (MAR Hold since Mon 6/14/2021 at 1059.Hold Reason: Unreviewed Transfer Orders.)    06/13/21 2155 06/13/21 2152  [MAR Hold]  acetaminophen (TYLENOL) suppository 650 mg  Every 4 Hours PRN     (MAR Hold since Mon 6/14/2021 at 1059.Hold Reason: Unreviewed Transfer Orders.)    06/13/21 2155 06/13/21 2152  [MAR Hold]  HYDROmorphone (DILAUDID) injection 0.5 mg  Every 2 Hours PRN     (MAR Hold since Mon 6/14/2021 at 1059.Hold Reason: Unreviewed Transfer Orders.)    06/13/21 2155 06/13/21 1957  Nasogastric tube insertion  Once      06/1956 06/13/21 1952  Inpatient Admission  Once      06/13/21 1952 06/13/21 1937  [facility] (on-call MD unless specified) Details  Once     Specialty:  Hospitalist  Provider:  (Not yet assigned)    06/13/21 1936 06/13/21 1920  iopamidol (ISOVUE-300) 61 % injection 100 mL  Once in Imaging      06/13/21 1918 06/13/21 1840  sodium chloride 0.9 % bolus 500 mL  Once      06/13/21 1838 06/13/21 1840  HYDROmorphone (DILAUDID) injection 0.5 mg  Once      06/13/21 1839 06/13/21 1840  Oxygen Therapy- Nasal Cannula; 2 LPM; Titrate for SPO2: equal to or greater than, 92%  Continuous,   Status:  Canceled      06/13/21 1839 06/13/21 1818  COVID PRE-OP / PRE-PROCEDURE SCREENING ORDER (NO ISOLATION) - Swab, Nasopharynx  Once      06/13/21 1817 06/13/21 1818  [facility] SWAB IN  TRANSPORT MEDIA 8-12 HR TAT - Swab, Nasopharynx  PROCEDURE ONCE      06/13/21 1817    06/13/21 1807  Lactic Acid, Plasma  Once      06/13/21 1806    06/13/21 1807  IP General Consult (Use specialty-specific consult if known)  Once     Provider:  (Not yet assigned)    06/13/21 1806    06/13/21 1804  sodium chloride 0.9 % bolus 500 mL  Once      06/13/21 1802    06/13/21 1804  sodium chloride 0.9 % infusion  Continuous,   Status:  Discontinued      06/13/21 1802    06/13/21 1804  HYDROmorphone (DILAUDID) injection 0.5 mg  Once      06/13/21 1802    06/13/21 1804  ondansetron (ZOFRAN) injection 4 mg  Once      06/13/21 1802    06/13/21 1803  COVID PRE-OP / PRE-PROCEDURE SCREENING ORDER (NO ISOLATION) - Swab, Nasopharynx  Once,   Status:  Canceled      06/13/21 1802    06/13/21 1803  CBC & Differential  Once      06/13/21 1802    06/13/21 1803  Comprehensive Metabolic Panel  Once      06/13/21 1802    06/13/21 1803  aPTT  Once      06/13/21 1802    06/13/21 1803  Protime-INR  Once      06/13/21 1802    06/13/21 1803  CT Abdomen Pelvis With Contrast  1 Time Imaging     Comments: IV CONTRAST ONLY      06/13/21 1802    06/13/21 1803  Monitor Blood Pressure  Per Hospital Policy      06/13/21 1802    06/13/21 1803  Cardiac Monitoring  Once,   Status:  Canceled      06/13/21 1802    06/13/21 1803  Pulse Oximetry, Continuous  Continuous,   Status:  Canceled      06/13/21 1802    06/13/21 1803  NPO Diet  Diet Effective Now,   Status:  Canceled      06/13/21 1802    06/13/21 1803  Insert peripheral IV  Once      06/13/21 1802    06/13/21 1803  COVID-19,[facility] IN-HOUSE, NP/OP SWAB IN UTM/VTM/SALINE TRANSPORT MEDIA,24 HR TAT - Swab, Nasopharynx  PROCEDURE ONCE,   Status:  Canceled      06/13/21 1802    06/13/21 1803  CBC Auto Differential  PROCEDURE ONCE      06/13/21 1803    06/13/21 1802  [MAR Hold]  sodium chloride 0.9 % flush 10 mL  As Needed     (MAR Hold since Mon 6/14/2021 at 1059.Hold Reason: Unreviewed  Transfer Orders.)    06/13/21 1802    Unscheduled  ECG 12 Lead  As Needed     Comments: Nurse to Release if Patient Expericences Acute Chest Pain or Dysrhythmias    06/13/21 2155    Unscheduled  Potassium  As Needed     Comments: For Ventricular Arrhythmias      06/13/21 2155    Unscheduled  Magnesium  As Needed     Comments: For Ventricular Arrhythmias      06/13/21 2155    Unscheduled  Troponin  As Needed     Comments: For Chest Pain      06/13/21 2155    Unscheduled  Blood Gas, Arterial -  As Needed     Comments: Per O2 PolicyNotify Physician      06/13/21 2155    Unscheduled  Up With Assistance  As Needed      06/13/21 2155    --  apixaban (ELIQUIS) 5 MG tablet tablet  2 Times Daily      06/13/21 2233    --  colchicine 0.6 MG tablet  Daily      06/13/21 2233    --  allopurinol (ZYLOPRIM) 100 MG tablet  Daily      06/13/21 2233    --  vitamin D3 125 MCG (5000 UT) capsule capsule  Daily      06/13/21 2233    --  B Complex Vitamins (vitamin b complex) capsule capsule  Daily      06/13/21 2233                   Operative/Procedure Notes (all)      Arnulfo Leonard MD at 06/14/21 1234  Version 1 of 1       Operative Note :   MD Marilu Urias [facility]  1955    Procedure Date: 06/14/21    Pre-op Diagnosis:  Incarcerated recurrent incisional hernia with associated small bowel obstruction    Post-Op Diagnosis:  Same    Procedure:   · Open ventral hernia repair with mesh placement  · Incidental appendectomy    Surgeon: Arnulfo Leonard MD    Assistant:Baltazar Mcdaniel PA was responsible for performing the following activities: Retraction, Suction, Irrigation, Suturing, Closing and Placing Dressing and their skilled assistance was necessary for the success of this case.      Anesthesia:  General (general endotracheal tube)    EBL:   100ml    Specimens:   Appendix  Hernia sac    Indications:  · See consult note, but essentially 65-year-old morbidly obese smoker on Xarelto seen previously by Dr. Aguilera for  incisional hernia, patient elected to hold off having it repaired and presented last night with acute incarcerated recurrent incisional hernia with associated small bowel obstruction. Decompressed overnight with nasogastric tube, no significant improvement and brought today for repair.    Findings:   · Reversibly ischemic bowel within the hernia sac and proximal and distal points of obstruction identified  · Distinctly abnormal appearing appendix, extremely long at about 18 cm with a bulbous tip.  · Upon initially entering the hernia sac, the bowel was purple, but did not appear grossly necrotic. After relieving the obstruction, the bowel pinked up and appeared viable    Recommendations:   · Long period postoperatively of avoiding lifting, patient is extremely high risk for hernia recurrence giving her obesity and smoking    Description of procedure:    After obtaining informed consent, the patient was brought to the operating room and placed under a general anesthetic. Her abdomen was sterilely prepped and draped after placement of Barrios catheter. Despite relaxation of the patient, I was unable to reduce the hernia. Incision was made just above the maximal mass and dissected through the subcutaneous tissue. I work my way down onto the fascia just above the hernia sac. I then began to dissect the hernia sac away from the subcutaneous tissues. Once we could clearly visualize the hernia sac, you could see that there was purple bowel within the hernia sac. The hernia sac was opened and there was some murky fluid identified. The hernia sac was dissected completely free from the subcutaneous tissue circumferentially. I continue to attempt to reduce the small bowel, but could not do so. Eventually I identified the Prolene sutures from her prior primary repair done around 6 years ago at the inferior portion of this hernia and remove the sutures. There was a small defect just below this repair anyways. Once I had connected  these 2 defects, I was able to reduce the bowel. There was 1 limb of small bowel which was relatively adherent to the peritoneum which was taken down sharply. The small bowel was then run from 1 end to the other. After period of time it began to pink up. There was no evidence of perforation or necrotic bowel. There were no significant interloop adhesions. The small bowel was then finally ran all the way from the terminal ileum to the ligament of Treitz. The appendix was visualized. It was extremely long, at about 18 cm. The tip was a bit bulbous, and I was worried for possible neoplasm. I felt at this time that given the murky fluid, I would probably not attempt a complex repair with permanent mesh at this time and instead do a more simple repair, understanding that there is a very high likelihood of recurrence. As such, I felt performing appendectomy at this time was appropriate to try to minimize the risk of contamination with her next operation. The mesial appendix was taken down between clamps. A 2-0 silk pursestring suture was placed around the base of the appendix. A straight hemostat was placed on the appendix and a tie was then placed over this. The appendix was divided and passed off. The appendiceal stump was dunked and then the pursestring suture was tied down. This area was oversewn with 2-0 silk sutures. We then confirmed placement of the nasogastric tube. With some difficulty, the small bowel was returned to the abdominal cavity. At this point I elected for a simple repair with primary closure and an onlay of bio a mesh. The fascia was cleared off circumferentially for about 7 or 8 cm in all directions. I placed interrupted U stitch type 2-0 Vicryl sutures circumferentially about 4 or 5 cm from the edge of the fascia. The midline fascia was then reapproximated with running 0 PDS. An onlay bio a mesh was then placed. I selected a 20 x 20 cm mesh and cut this appropriately. The mesh was secured to the  fascia circumferentially with these interrupted 2-0 Vicryl sutures. The mesh lay nicely and there did not appear to be undue tension. The abdomen was irrigated. I then placed 2 19 [place] Avinash drains in the subcutaneous space. The subcutaneous tissues were then reapproximated with 2-0 Vicryl suture and the skin was closed with staples. Sterile dressings were applied.    Arnulfo Leonard MD  General and Endoscopic Surgery  [facility]    [address], [address]  [place], [place], [address]  P: [phone]  F: [phone]        Electronically signed by Arnulfo Leonard MD at 06/14/21 1448          Physician Progress Notes (all)      Arnulfo Leonard MD at 06/14/21 0918        Follow-up small bowel obstruction secondary to incarcerated hernia    Subjective:  Feels about the same this morning.  Pain persists, not intolerable but no real relief.  No GI function.  NG tube was placed with success, no further vomiting per patient.    Objective:  Patient is afebrile, heart rate 90s to 100s blood pressure 144/98  General: Awake and alert, appears slightly uncomfortable but no acute distress  Eyes: No icterus  Abdomen: Remains slightly distended, the hernia remains quite firm and still on reducible.  There is some associated tenderness, but no guarding or rebound tenderness.    Assessment and plan:  -Small bowel obstruction secondary to incarcerated incisional hernia  -Morbid obesity complicating above  -Tobacco abuse complicating above  -Atrial fibrillation, on Xarelto, last taken greater than 24 hours ago  -Discussed again with the patient today, unfortunately I think her hand is being forced and she is going to require exploration.  She is certainly at high risk for postoperative complications, not the least of which is hernia recurrence.  Despite this, I think we really have no other good options.  I plan to take her today for exploration and repair of hernia, possibly with mesh placement.  Patient is  agreeable to proceed as outlined.    Arnulfo Leonard MD  General and Endoscopic Surgery  [facility]    [address], [address]  [place], [place], [address]  P: [phone]  F: [phone]        Electronically signed by Arnulfo Leonard MD at 06/14/21 0913          Consult Notes (all)      Arnulfo Leonard MD at 06/13/21 2031      Consult Orders    1. IP General Consult (Use specialty-specific consult if known) [[id number]] ordered by Julien Decker MD at 06/13/21 1804             Cc: Incisional hernia    History of presenting illness:   This is a nice morbidly obese 65-year-old female with a history of atrial fibrillation (on Xarelto) and a longstanding incisional hernia.  She has seen Dr. Aguilera over the years and when she last saw him was considering repair.  The patient end up canceling the procedure due to some trouble in the family and then over the course of the last year the pandemic slowed her interest in elective repair.  She reports that she has gained substantial weight, she estimates around 40 or 50 pounds since she last saw Dr. Aguilera.  She describes some intermittent periods of pain which typically resolve over the course of an hour or 2.  She began having pain now about a day and a half ago, which has not resolved.  She has had no bowel function since that time.  She had a small amount of vomiting but more retching and nausea.  There is no appetite.  Since arriving here in the emergency room, her pain is much improved, after she received some narcotics.    Past Medical History: Asthma, obesity, hypertension, sleep apnea, hyperlipidemia, arthritis    Past Surgical History: Relevant for hysterectomy in 2000, laparoscopic cholecystectomy 2010 (Dr. Omkar Whitlock), colonoscopy and open primary incisional hernia repair with Prolene suture 2015 (Dr. Keating).  She also had a right total knee replacement 2015.    Medications: Reviewed, in epic.  Noted to be on Xarelto, most recently  taken this morning    Allergies: None known    Social History: Patient is a long-term smoker and continues to smoke, nearly 1 pack/day.  I discussed with the patient the fact that smoking is associated with increased risk of wound infection, poor wound healing, recurrent incisional hernia and pulmonary complications.    Family History: Negative for colon and rectal cancer    Review of Systems:  Constitutional: Positive for decreased appetite, negative for fever or chills  HENT: no hearing change, no runny nose  Eyes: no visual changes or icterus  Neck: no swollen glands or dysphagia or odynophagia  Respiratory: negative for SOB, cough, hemoptysis or wheezing  Cardiovascular: negative for chest pain, palpitations or peripheral edema  Gastrointestinal: Positive for abdominal pain, nausea, vomiting, negative for rectal bleeding  Musculoskeletal: negative for myalgias or joint pain  Skin: patient denies icterus or rash  Hematologic: negative for easy bleeding or bruising      Physical Exam:  Body mass index: 43  Vitals: Temperature 98.6 heart rate 82 blood pressure 132/97 oxygenation 99% on room air  General: alert and oriented, appropriate, no acute distress  HENT:  Head is normocephalic, atraumatic, mucous membranes are moist  Eyes: Extraocular movements are intact, no scleral icterus  Neck: Supple without lymphadenopathy or thyromegaly, trachea is in the midline  Respiratory: Lungs are clear bilaterally without wheezing, no use of accessory muscles is noted  Cardiovascular: Irregularly irregular rate, no murmur, 2+ peripheral edema  Gastrointestinal: Abdomen is obese.  There is a firm hernia which is minimally tender on exam currently.  She does allow me to push on it fairly well without much resistance, but I am unable to reduce this hernia.  There is no overlying skin change.  The hernia extends over to the patient's left side above the umbilicus.  The extent of the fascial defect is not able to be appreciated on  the exam.  Musculoskeletal: Muscle bulk and strength is normal and equal bilaterally.  No gross deformity.  Skin: No rash or icterus noted    Laboratory data: White blood cell count 8.4 hemoglobin 15.8 creatinine 1.3 sodium 134 potassium 3.8    Imaging data: CT images are reviewed by me.  There is evidence for what is likely an incarcerated incisional hernia with some dilated and decompressed small bowel loops seen within the hernia sac.  There is no evidence of perforation or pneumatosis.  The fascial defect is approximately 6 cm in width and around 7 cm craniocaudal.      Assessment and plan:   -Recurrent incisional hernia, incarcerated with associated at least partial small bowel obstruction  -Morbid obesity with body mass index greater than 40, complicating above  -Tobacco abuse, chronic, complicating above  -Atrial fibrillation on Xarelto, complicating above  -Currently the patient appears reasonably comfortable.  Based on physical exam and laboratory data, I do not see any clear evidence of strangulated or compromised small bowel.  Her symptoms are much improved prior to admission.  -She is an extremely poor candidate for operative intervention given her obesity, tobacco abuse, atrial fibrillation etc.  Still, given her current situation, she is likely require intervention to relieve this obstruction during this admission.  I am not able to completely reduce the hernia.  Given the fact that she is relatively stable at the moment, I would like to decompress her with nasogastric tube placement and hold her Xarelto to try to allow her coagulopathy to improve.  I have tentatively placed the patient on the schedule for tomorrow for incisional hernia repair.  The precise nature of this repair is to be determined.  The primary goal would be the relief of her obstruction.  I do not know that a complex incisional hernia repair at this moment is in her best interest.  I did begin a discussion of the complications known  to be associated with hernia repair, particularly given her comorbidities, including bleeding, infection, dehiscence of the wound, hernia recurrence, fistula, injury to intra-abdominal structures and the possible need for further revisional procedures.  We also discussed the possibility of mesh placement and the associated pluses and minuses that go along with this.  Patient is agreeable to proceed as outlined.  We will follow along closely.      Arnulfo Leonard MD, FACS  General, Minimally Invasive and Endoscopic Surgery  [facility]    [address], [address]  [place], [place], [address]  P: [phone]  F: [phone]         Electronically signed by Arnulfo Leonard MD at 06/13/21 1320

## 2021-06-14 NOTE — PROGRESS NOTES
Follow-up small bowel obstruction secondary to incarcerated hernia    Subjective:  Feels about the same this morning.  Pain persists, not intolerable but no real relief.  No GI function.  NG tube was placed with success, no further vomiting per patient.    Objective:  Patient is afebrile, heart rate 90s to 100s blood pressure 144/98  General: Awake and alert, appears slightly uncomfortable but no acute distress  Eyes: No icterus  Abdomen: Remains slightly distended, the hernia remains quite firm and still on reducible.  There is some associated tenderness, but no guarding or rebound tenderness.    Assessment and plan:  -Small bowel obstruction secondary to incarcerated incisional hernia  -Morbid obesity complicating above  -Tobacco abuse complicating above  -Atrial fibrillation, on Xarelto, last taken greater than 24 hours ago  -Discussed again with the patient today, unfortunately I think her hand is being forced and she is going to require exploration.  She is certainly at high risk for postoperative complications, not the least of which is hernia recurrence.  Despite this, I think we really have no other good options.  I plan to take her today for exploration and repair of hernia, possibly with mesh placement.  Patient is agreeable to proceed as outlined.    Arnulfo Leonard MD  General and Endoscopic Surgery  [facility]    [address], [address]  [place], [place], [address]  P: [phone]  F: [phone]

## 2021-06-14 NOTE — PLAN OF CARE
Goal Outcome Evaluation:  Plan of Care Reviewed With: patient        Progress: no change  Outcome Summary: VSS. Patient went to OR around 10:30 this AM. Returned to floor at 16:45 after having appendectomy and hernia repair with mesh.  NG remains in place to LWS.  PADILLA to RUQ and RLQ. Scant amount of bloody drainage on incisional dressing/midline abdomen. Pt having repeated requests for pain medication and water.  Will monitor.

## 2021-06-14 NOTE — ANESTHESIA PREPROCEDURE EVALUATION
Anesthesia Evaluation     Patient summary reviewed and Nursing notes reviewed                Airway   Mallampati: III  TM distance: >3 FB  Neck ROM: full  Possible difficult intubation  Dental      Pulmonary    (+) a smoker Current, COPD, asthma,  Cardiovascular     ECG reviewed  PT is on anticoagulation therapy  Rhythm: irregular  Rate: normal    (+) hypertension, dysrhythmias Atrial Fib, hyperlipidemia,       Neuro/Psych- negative ROS  GI/Hepatic/Renal/Endo    (+) morbid obesity,  renal disease CRI,     Musculoskeletal     Abdominal    Substance History - negative use     OB/GYN negative ob/gyn ROS         Other   arthritis,                    Anesthesia Plan    ASA 3 - emergent     general   Rapid sequence(Possible post op vent    I have reviewed the patient's history with the patient and the chart, including all pertinent laboratory results and imaging. I have explained the risks of anesthesia including but not limited to dental damage, corneal abrasion, nerve injury, MI, stroke, and death. Questions asked and answered. Anesthetic plan discussed with patient and team as indicated. Patient expressed understanding of the above.     CMAC RSI    NG to suction in place)  intravenous induction     Anesthetic plan, all risks, benefits, and alternatives have been provided, discussed and informed consent has been obtained with: patient.

## 2021-06-14 NOTE — ANESTHESIA PROCEDURE NOTES
Airway  Urgency: elective    Date/Time: 6/14/2021 12:22 PM  Airway not difficult    General Information and Staff    Patient location during procedure: OR  Anesthesiologist: Igor Powers MD    Indications and Patient Condition  Indications for airway management: airway protection    Preoxygenated: yes  MILS maintained throughout  Mask difficulty assessment: 2 - vent by mask + OA or adjuvant +/- NMBA    Final Airway Details  Final airway type: endotracheal airway      Successful airway: ETT  Cuffed: yes   Successful intubation technique: direct laryngoscopy  Endotracheal tube insertion site: oral  Blade: Mallory  Blade size: 2  ETT size (mm): 7.0  Cormack-Lehane Classification: grade I - full view of glottis  Placement verified by: chest auscultation   Inital cuff pressure (cm H2O): 22  Number of attempts at approach: 1  Assessment: lips, teeth, and gum same as pre-op

## 2021-06-14 NOTE — ANESTHESIA POSTPROCEDURE EVALUATION
"Patient: Marilu Avery    Procedure Summary     Date: 06/14/21 Room / Location: [facility] OR 02 / [facility] MAIN OR    Anesthesia Start: 1211 Anesthesia Stop: 1435    Procedure: OPEN VENTRAL/INCISIONAL HERNIA REPAIR WITH MESH AND APPENDECTOMY (N/A Abdomen) Diagnosis:     Surgeons: Arnulfo Leonard MD Provider: Igor Powers MD    Anesthesia Type: general ASA Status: 3 - Emergent          Anesthesia Type: general    Vitals  Vitals Value Taken Time   /92 06/14/21 1610   Temp 37.4 °C (99.3 °F) 06/14/21 1610   Pulse 118 06/14/21 1625   Resp 16 06/14/21 1610   SpO2 93 % 06/14/21 1626   Vitals shown include unvalidated device data.        Post Anesthesia Care and Evaluation    Patient location during evaluation: bedside  Patient participation: complete - patient participated  Level of consciousness: sleepy but conscious  Pain score: 0  Pain management: adequate  Airway patency: patent  Anesthetic complications: No anesthetic complications    Cardiovascular status: acceptable  Respiratory status: acceptable  Hydration status: acceptable    Comments: /92 (BP Location: Left arm, Patient Position: Lying)   Pulse 120   Temp 37.4 °C (99.3 °F) (Oral)   Resp 16   Ht 177.8 cm (70\")   Wt (!) 137 kg (302 lb)   SpO2 93%   BMI 43.33 kg/m²         "

## 2021-06-14 NOTE — CASE MANAGEMENT/SOCIAL WORK
No discussion with pt re non-par insurance, as pt is unstable and is to have emergent surgery this day. Informed on call manager. Brooklyn Herring RN

## 2021-06-14 NOTE — H&P
Patient Name:  Marilu Avery  YOB: 1955  MRN:  [id number]  Admit Date:  6/13/2021  Patient Care Team:  Anatoly Jimenez MD as PCP - General  Anatoly Jimenez MD as PCP - Family Medicine      Chief Complaint   Patient presents with   • Abdominal Pain       Subjective     Ms. Avery is a 65 y.o. female with a history of a fib on Eliquis, HTN, HLD, CKD3, LISA that presents to [facility] complaining of abdominal pain. Patient reports history of abdominal hernia for years and has been seen by surgery in the past. She reports abdominal distention and pain for past 2 days and states that her abdominal hernia isn't reducing like it normally does. Patient reports the pain as a burning sensation with associated gas discomfort and constipation, no aggravating or alleviating factors. Patient additionally confirms chronic shortness of breath and cough, though denies fever or chest pain. She reports swelling of lower extremities since several medication changes were made by her PCP a few months ago, and she was taken off of her Lasix. Labs done tonight were fairly unremarkable. CT abdomen showed small bowel obstruction with transition point suspected within ventral hernia sac in addition to stranding and fluid within hernia sac. General surgery was consulted and saw patient while in ED tonight, NG tube was ordered. She was given IV Dilaudid with reported improvement in her pain and appears in no acute distress on exam.     History of Present Illness    Past Medical History:   Diagnosis Date   • Arthritis    • Asthma    • Atrial fibrillation (CMS/HCC)    • Colon polyps 2015    hyperplastic rectal polyp   • Hyperlipidemia    • Hypertension    • Sleep apnea      Past Surgical History:   Procedure Laterality Date   • BACK SURGERY N/A 2001   • COLONOSCOPY N/A 10/22/2015    Rectal polyp-Dr. Elías Keating   • HYSTERECTOMY N/A 2000   • LAPAROSCOPIC CHOLECYSTECTOMY N/A 01/04/2010    Dr. Heath Whitlock    • OOPHORECTOMY  2001   • REPLACEMENT TOTAL KNEE Left 06/22/2015    Dr. Yo Aguayo   • REPLACEMENT TOTAL KNEE Right 02/26/2015    Dr. Yo Aguayo   • VENTRAL HERNIA REPAIR N/A 10/22/2015    Open reduction of incarcerated ventral hernia with layered closure-Dr. Elías Keating     Family History   Problem Relation Age of Onset   • No Known Problems Mother    • No Known Problems Father      Social History     Tobacco Use   • Smoking status: Current Every Day Smoker     Packs/day: 1.00   Substance Use Topics   • Alcohol use: Yes     Comment: 3 drinks daily   • Drug use: Not on file     (Not in a hospital admission)    Allergies:  No Known Allergies    Review of Systems   Constitutional: Negative.  Negative for chills and fever.   HENT: Negative.  Negative for congestion and sore throat.    Eyes: Negative.  Negative for visual disturbance.   Respiratory: Positive for cough (chronic) and shortness of breath.    Cardiovascular: Positive for leg swelling. Negative for chest pain.   Gastrointestinal: Positive for abdominal pain, constipation and nausea. Negative for vomiting.   Endocrine: Negative.    Genitourinary: Negative.  Negative for dysuria, frequency and urgency.   Musculoskeletal: Negative.  Negative for arthralgias and myalgias.   Skin: Negative.  Negative for color change and pallor.   Allergic/Immunologic: Negative.    Neurological: Negative.  Negative for dizziness, weakness and light-headedness.   Hematological: Negative.    Psychiatric/Behavioral: Negative.  Negative for agitation, behavioral problems and confusion.        Objective    Vital Signs  Temp:  [98.6 °F (37 °C)] 98.6 °F (37 °C)  Heart Rate:  [70-86] 85  Resp:  [16-18] 16  BP: (125-132)/(92-97) 132/97  SpO2:  [94 %-100 %] 100 %  on   ;   Device (Oxygen Therapy): room air  Body mass index is 43.48 kg/m².    Physical Exam  Vitals and nursing note reviewed.   Constitutional:       General: She is not in acute distress.     Appearance: Normal  appearance. She is obese.   HENT:      Head: Normocephalic and atraumatic.      Nose: Nose normal.      Mouth/Throat:      Mouth: Mucous membranes are moist.   Eyes:      Extraocular Movements: Extraocular movements intact.   Cardiovascular:      Rate and Rhythm: Normal rate and regular rhythm.      Pulses: Normal pulses.      Heart sounds: Normal heart sounds.   Pulmonary:      Effort: Pulmonary effort is normal. No respiratory distress.      Breath sounds: Normal breath sounds.   Abdominal:      General: Bowel sounds are decreased. There is distension.      Tenderness: There is generalized abdominal tenderness. There is no guarding.      Hernia: A hernia is present.      Comments: Firm, rounded abdomen   Musculoskeletal:         General: Swelling (moderate edema BLE) present. Normal range of motion.      Cervical back: Normal range of motion and neck supple. No rigidity.   Skin:     General: Skin is warm and dry.   Neurological:      General: No focal deficit present.      Mental Status: She is alert and oriented to person, place, and time. Mental status is at baseline.   Psychiatric:         Mood and Affect: Mood normal.         Behavior: Behavior normal.         Thought Content: Thought content normal.         Judgment: Judgment normal.         Results Review:   I reviewed the patient's new clinical results including all labs and xrays.    Lab Results (last 24 hours)     Procedure Component Value Units Date/Time    CBC & Differential [[id number]]  (Abnormal) Collected: 06/13/21 1810    Specimen: Blood Updated: 06/13/21 1818    Narrative:      The following orders were created for panel order CBC & Differential.  Procedure                               Abnormality         Status                     ---------                               -----------         ------                     CBC Auto Differential[[id number]]        Abnormal            Final result                 Please view results for these tests on the  individual orders.    Comprehensive Metabolic Panel [[id number]]  (Abnormal) Collected: 06/13/21 1810    Specimen: Blood Updated: 06/13/21 1839     Glucose 142 mg/dL      BUN 16 mg/dL      Creatinine 1.32 mg/dL      Sodium 134 mmol/L      Potassium 3.8 mmol/L      Comment: Slight hemolysis detected by analyzer. Results may be affected.        Chloride 96 mmol/L      CO2 22.2 mmol/L      Calcium 10.2 mg/dL      Total Protein 8.0 g/dL      Albumin 4.80 g/dL      ALT (SGPT) 23 U/L      AST (SGOT) 33 U/L      Alkaline Phosphatase 89 U/L      Total Bilirubin 1.1 mg/dL      eGFR Non African Amer 41 mL/min/1.73      Globulin 3.2 gm/dL      A/G Ratio 1.5 g/dL      BUN/Creatinine Ratio 12.1     Anion Gap 15.8 mmol/L     Narrative:      GFR Normal >60  Chronic Kidney Disease <60  Kidney Failure <15      aPTT [[id number]]  (Normal) Collected: 06/13/21 1810    Specimen: Blood Updated: 06/13/21 1829     PTT 31.9 seconds     Protime-INR [[id number]]  (Abnormal) Collected: 06/13/21 1810    Specimen: Blood Updated: 06/13/21 1829     Protime 16.1 Seconds      INR 1.31    CBC Auto Differential [[id number]]  (Abnormal) Collected: 06/13/21 1810    Specimen: Blood Updated: 06/13/21 1818     WBC 8.39 10*3/mm3      RBC 4.62 10*6/mm3      Hemoglobin 15.8 g/dL      Hematocrit 45.3 %      MCV 98.1 fL      MCH 34.2 pg      MCHC 34.9 g/dL      RDW 13.1 %      RDW-SD 47.3 fl      MPV 9.4 fL      Platelets 246 10*3/mm3      Neutrophil % 80.2 %      Lymphocyte % 11.1 %      Monocyte % 6.6 %      Eosinophil % 1.0 %      Basophil % 0.6 %      Immature Grans % 0.5 %      Neutrophils, Absolute 6.74 10*3/mm3      Lymphocytes, Absolute 0.93 10*3/mm3      Monocytes, Absolute 0.55 10*3/mm3      Eosinophils, Absolute 0.08 10*3/mm3      Basophils, Absolute 0.05 10*3/mm3      Immature Grans, Absolute 0.04 10*3/mm3      nRBC 0.0 /100 WBC     Lactic Acid, Plasma [[id number]]  (Normal) Collected: 06/13/21 1810    Specimen: Blood Updated: 06/13/21 1838     Lactate  1.6 mmol/L     COVID PRE-OP / PRE-PROCEDURE SCREENING ORDER (NO ISOLATION) - Swab, Nasopharynx [[id number]]  (Normal) Collected: 06/13/21 1854    Specimen: Swab from Nasopharynx Updated: 06/13/21 2018    Narrative:      The following orders were created for panel order COVID PRE-OP / PRE-PROCEDURE SCREENING ORDER (NO ISOLATION) - Swab, Nasopharynx.  Procedure                               Abnormality         Status                     ---------                               -----------         ------                     COVID-19,[facility]-HOUSE...[[id number]]  Normal              Final result                 Please view results for these tests on the individual orders.    COVID-19,[facility]-HOUSE CEPHEID/KADEEM NP SWAB IN TRANSPORT MEDIA 8-12 HR TAT - Swab, Nasopharynx [[id number]]  (Normal) Collected: 06/13/21 1854    Specimen: Swab from Nasopharynx Updated: 06/13/21 2018     COVID19 Not Detected    Narrative:      Fact sheet for providers: https://www.fda.gov/media/603513/download     Fact sheet for patients: https://www.fda.gov/media/230235/download          CT Abdomen Pelvis With Contrast   Final Result       1. Small bowel obstruction, with transition point suspected within the   patient's ventral hernia sac. No pneumatosis or free air is seen at this   time, although there is stranding and trace fluid identified within the   hernia sac.   2. 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   Continued surveillance is recommended, with next follow-up recommended   in one year.       Radiation dose reduction techniques were utilized, including automated   exposure control and exposure modulation based on body size.       This report was finalized on 6/13/2021 7:45 PM by Dr. Kiesha Oconnor M.D.            Assessment/Plan      Active Hospital Problems    Diagnosis  POA   • **Incarcerated ventral hernia [K43.6]  Yes   • Atrial fibrillation (CMS/HCC) [I48.91]  Yes   • HLD (hyperlipidemia) [E78.5]  Yes    • HTN (hypertension) [I10]  Yes   • LISA (obstructive sleep apnea) [G47.33]  Yes   • CKD (chronic kidney disease) stage 3, GFR 30-59 ml/min (CMS/[facility]) [N18.30]  Yes   • Chronic anticoagulation [Z79.01]  Not Applicable   • SBO (small bowel obstruction) (CMS/[facility]) [K56.609]  Yes      Resolved Hospital Problems   No resolved problems to display.     Incarcerated ventral hernia/SBO  -surgery consulted and NG tube ordered  -NPO and IVF overnight  -continue pain medication and antiemetics PRN  -hold home dose Eliquis for now in anticipation for surgery  -labs in AM    CKD3  -baseline renal function tonight, IVF as per above    HTN/HLD/a fib  -stable, may continue home medications with exception of Eliquis once med rec complete    VTE Ppx  -SCDs    CODE status  -full    I discussed the patients findings and my recommendations with patient.    DEBBI García  [facility]  06/13/21  8:31 PM EDT

## 2021-06-15 ENCOUNTER — APPOINTMENT (OUTPATIENT)
Dept: GENERAL RADIOLOGY | Facility: HOSPITAL | Age: 66
End: 2021-06-15

## 2021-06-15 LAB
CYTO UR: NORMAL
LAB AP CASE REPORT: NORMAL
PATH REPORT.FINAL DX SPEC: NORMAL
PATH REPORT.GROSS SPEC: NORMAL
QT INTERVAL: 338 MS

## 2021-06-15 PROCEDURE — 25010000002 HYDROMORPHONE PER 4 MG: Performed by: SURGERY

## 2021-06-15 PROCEDURE — 94799 UNLISTED PULMONARY SVC/PX: CPT

## 2021-06-15 PROCEDURE — 71045 X-RAY EXAM CHEST 1 VIEW: CPT

## 2021-06-15 PROCEDURE — 99024 POSTOP FOLLOW-UP VISIT: CPT | Performed by: SURGERY

## 2021-06-15 PROCEDURE — 93005 ELECTROCARDIOGRAM TRACING: CPT | Performed by: HOSPITALIST

## 2021-06-15 PROCEDURE — 93010 ELECTROCARDIOGRAM REPORT: CPT | Performed by: INTERNAL MEDICINE

## 2021-06-15 PROCEDURE — 94760 N-INVAS EAR/PLS OXIMETRY 1: CPT

## 2021-06-15 RX ADMIN — HYDROMORPHONE HYDROCHLORIDE 0.5 MG: 1 INJECTION, SOLUTION INTRAMUSCULAR; INTRAVENOUS; SUBCUTANEOUS at 23:52

## 2021-06-15 RX ADMIN — SODIUM CHLORIDE, PRESERVATIVE FREE 10 ML: 5 INJECTION INTRAVENOUS at 08:58

## 2021-06-15 RX ADMIN — FAMOTIDINE 20 MG: 10 INJECTION INTRAVENOUS at 08:58

## 2021-06-15 RX ADMIN — METOPROLOL TARTRATE 5 MG: 5 INJECTION, SOLUTION INTRAVENOUS at 15:22

## 2021-06-15 RX ADMIN — IPRATROPIUM BROMIDE AND ALBUTEROL SULFATE 3 ML: 2.5; .5 SOLUTION RESPIRATORY (INHALATION) at 00:47

## 2021-06-15 RX ADMIN — METOPROLOL TARTRATE 5 MG: 5 INJECTION, SOLUTION INTRAVENOUS at 21:18

## 2021-06-15 RX ADMIN — IPRATROPIUM BROMIDE AND ALBUTEROL SULFATE 3 ML: 2.5; .5 SOLUTION RESPIRATORY (INHALATION) at 11:40

## 2021-06-15 RX ADMIN — BUDESONIDE 0.25 MG: 0.5 INHALANT ORAL at 08:29

## 2021-06-15 RX ADMIN — HYDROMORPHONE HYDROCHLORIDE 0.5 MG: 1 INJECTION, SOLUTION INTRAMUSCULAR; INTRAVENOUS; SUBCUTANEOUS at 15:21

## 2021-06-15 RX ADMIN — METOPROLOL TARTRATE 5 MG: 5 INJECTION, SOLUTION INTRAVENOUS at 08:58

## 2021-06-15 RX ADMIN — HYDROMORPHONE HYDROCHLORIDE 0.5 MG: 1 INJECTION, SOLUTION INTRAMUSCULAR; INTRAVENOUS; SUBCUTANEOUS at 04:48

## 2021-06-15 RX ADMIN — HYDROMORPHONE HYDROCHLORIDE 0.5 MG: 1 INJECTION, SOLUTION INTRAMUSCULAR; INTRAVENOUS; SUBCUTANEOUS at 08:58

## 2021-06-15 RX ADMIN — HYDROMORPHONE HYDROCHLORIDE 0.5 MG: 1 INJECTION, SOLUTION INTRAMUSCULAR; INTRAVENOUS; SUBCUTANEOUS at 21:21

## 2021-06-15 RX ADMIN — HYDROMORPHONE HYDROCHLORIDE 0.5 MG: 1 INJECTION, SOLUTION INTRAMUSCULAR; INTRAVENOUS; SUBCUTANEOUS at 18:49

## 2021-06-15 RX ADMIN — HYDROMORPHONE HYDROCHLORIDE 0.5 MG: 1 INJECTION, SOLUTION INTRAMUSCULAR; INTRAVENOUS; SUBCUTANEOUS at 02:46

## 2021-06-15 RX ADMIN — METOPROLOL TARTRATE 5 MG: 5 INJECTION, SOLUTION INTRAVENOUS at 00:16

## 2021-06-15 RX ADMIN — HYDROMORPHONE HYDROCHLORIDE 0.5 MG: 1 INJECTION, SOLUTION INTRAMUSCULAR; INTRAVENOUS; SUBCUTANEOUS at 00:15

## 2021-06-15 RX ADMIN — HYDROMORPHONE HYDROCHLORIDE 0.5 MG: 1 INJECTION, SOLUTION INTRAMUSCULAR; INTRAVENOUS; SUBCUTANEOUS at 13:00

## 2021-06-15 RX ADMIN — IPRATROPIUM BROMIDE AND ALBUTEROL SULFATE 3 ML: 2.5; .5 SOLUTION RESPIRATORY (INHALATION) at 15:46

## 2021-06-15 RX ADMIN — BUDESONIDE 0.25 MG: 0.5 INHALANT ORAL at 21:41

## 2021-06-15 RX ADMIN — IPRATROPIUM BROMIDE AND ALBUTEROL SULFATE 3 ML: 2.5; .5 SOLUTION RESPIRATORY (INHALATION) at 21:41

## 2021-06-15 RX ADMIN — FAMOTIDINE 20 MG: 10 INJECTION INTRAVENOUS at 20:22

## 2021-06-15 NOTE — PLAN OF CARE
Goal Outcome Evaluation:  Plan of Care Reviewed With: patient        Progress: no change  Outcome Summary: A/O, medicated for pain q2hrs, li to gravity, PADILLA X2, abd binder, IS at bedside, NGT, HR will go into 140s when in pain and moving, will continue to monitor.

## 2021-06-15 NOTE — CASE MANAGEMENT/SOCIAL WORK
Discharge Planning Assessment  [facility]     Patient Name: Marilu Avery  MRN: [id number]  Today's Date: 6/15/2021    Admit Date: 6/13/2021    Discharge Needs Assessment     Row Name 06/15/21 1657       Living Environment    Lives With  alone    Current Living Arrangements  home/apartment/condo    Primary Care Provided by  self    Provides Primary Care For  no one    Family Caregiver if Needed  friend(s)    Family Caregiver Names  friend named bess, son lives in [place]    Quality of Family Relationships  helpful;involved    Able to Return to Prior Arrangements  yes       Resource/Environmental Concerns    Resource/Environmental Concerns  home accessibility    Transportation Concerns  car, none       Transition Planning    Patient/Family Anticipates Transition to  home with help/services;inpatient rehabilitation facility    Transportation Anticipated  family or friend will provide;health plan transportation;car, drives self       Discharge Needs Assessment    Readmission Within the Last 30 Days  no previous admission in last 30 days    Equipment Currently Used at Home  cpap    Concerns to be Addressed  discharge planning    Anticipated Changes Related to Illness  inability to care for self    Equipment Needed After Discharge  other (see comments)    Current Discharge Risk  lives alone        Discharge Plan     Row Name 06/15/21 9939       Plan    Plan  Pending Hospital course- Follow for dc needs    Provided Post Acute Provider List?  Yes    Post Acute Provider List  Nursing Home;Home Health    Provided Post Acute Provider Quality & Resource List?  N/A    Delivered To  Patient    Method of Delivery  In person    Patient/Family in Agreement with Plan  yes    Plan Comments  Attempted to speak with pt at bedside, pt in a deep sleep. CCP left resources at bedside. CCP called pts son Jameel Avery [phone] via outbound call. Introduced self and explained CCP role. Jameel states he lives in [place]. He states  CCP will have to confirm PCP and her stair situation with her as he has not been to her home yet. He states prior to admission pt was IADL with mobility and driving. Does not use any DME for mobility. Does have a CPAP but unsure of DME provider. Pt has no SNF or HH hx. CCP discussed dc planning, explained HH vs SNF and will follow hospital course pending pt dc needs. He verbalized understanding. He states pt has a good friend named Ella who lives in the complex that could assist her. CCP verbalized understanding. Provided CCP contact info and 5N nurses marques Aguilar RN/ZULMA        Continued Care and Services - Admitted Since 6/13/2021    Coordination has not been started for this encounter.         Demographic Summary     Row Name 06/15/21 3075       General Information    Admission Type  inpatient    Referral Source  admission list    Reason for Consult  discharge planning;decision-making    Preferred Language  English     Used During This Interaction  no       Contact Information    Permission Granted to Share Info With  family/designee;facility         Functional Status     Row Name 06/15/21 9382       Functional Status    Usual Activity Tolerance  excellent    Current Activity Tolerance  fair       Functional Status, IADL    Medications  independent    Meal Preparation  independent    Housekeeping  independent    Laundry  independent    Shopping  independent       Mental Status    General Appearance WDL  WDL       Mental Status Summary    Recent Changes in Mental Status/Cognitive Functioning  unable to assess        Psychosocial    No documentation.       Abuse/Neglect    No documentation.       Legal    No documentation.       Substance Abuse    No documentation.       Patient Forms    No documentation.           Ara Jim RN

## 2021-06-15 NOTE — PROGRESS NOTES
Name: Marilu Avery ADMIT: 2021   : 1955  PCP: Anatoly Jimenez MD    MRN: [id number] LOS: 2 days   AGE/SEX: 65 y.o. female  ROOM: [facility]     Subjective   Subjective    No new complaints.  Asking for some water.    Review of Systems   Constitutional: Negative for fever.   Respiratory: Negative for cough and shortness of breath.    Cardiovascular: Negative for chest pain.   Gastrointestinal: Positive for abdominal pain.      Objective   Objective   Vital Signs  Temp:  [97.8 °F (36.6 °C)-99.3 °F (37.4 °C)] 97.9 °F (36.6 °C)  Heart Rate:  [107-136] 115  Resp:  [16-20] 18  BP: (111-151)/() 120/85  SpO2:  [90 %-95 %] 94 %  on  Flow (L/min):  [2.5-5] 3;   Device (Oxygen Therapy): nasal cannula  Body mass index is 43.33 kg/m².    Physical Exam  Constitutional:       Appearance: Normal appearance.   HENT:      Head: Normocephalic and atraumatic.      Comments: NGT  Cardiovascular:      Rate and Rhythm: Normal rate and regular rhythm.   Pulmonary:      Effort: Pulmonary effort is normal. No respiratory distress.      Breath sounds: Wheezing (mild end expiratory) present.   Abdominal:      Tenderness: There is abdominal tenderness.      Comments: abdominal binder   Musculoskeletal:         General: Swelling present.      Comments: trace   Skin:     General: Skin is warm and dry.   Neurological:      General: No focal deficit present.      Mental Status: She is alert.   Psychiatric:         Mood and Affect: Mood normal.         Behavior: Behavior normal.     Results Review  I reviewed the patient's new clinical results.  Results from last 7 days   Lab Units 21  0512 21  1810   WBC 10*3/mm3 4.01 8.39   HEMOGLOBIN g/dL 14.1 15.8   PLATELETS 10*3/mm3 185 246     Results from last 7 days   Lab Units 21  0512 21  1810   SODIUM mmol/L 137 134*   POTASSIUM mmol/L 4.0 3.8   CHLORIDE mmol/L 99 96*   CO2 mmol/L 24.0 22.2   BUN mg/dL 17 16   CREATININE mg/dL 1.12* 1.32*   GLUCOSE mg/dL  128* 142*     Estimated Creatinine Clearance: 76.8 mL/min (A) (by C-G formula based on SCr of 1.12 mg/dL (H)).    Results from last 7 days   Lab Units 06/13/21  1810   ALBUMIN g/dL 4.80   BILIRUBIN mg/dL 1.1   ALK PHOS U/L 89   AST (SGOT) U/L 33*   ALT (SGPT) U/L 23     Results from last 7 days   Lab Units 06/14/21  0512 06/13/21  1810   CALCIUM mg/dL 9.0 10.2   ALBUMIN g/dL  --  4.80     Results from last 7 days   Lab Units 06/13/21  1810   LACTATE mmol/L 1.6     No results found for: HGBA1C, POCGLU    Scheduled Meds  budesonide, 0.25 mg, Nebulization, BID - RT  famotidine, 20 mg, Intravenous, Q12H  ipratropium-albuterol, 3 mL, Nebulization, 4x Daily - RT  metoprolol tartrate, 5 mg, Intravenous, Q8H  sodium chloride, 10 mL, Intravenous, Q12H    Continuous Infusions  sodium chloride, 75 mL/hr, Last Rate: 75 mL/hr (06/14/21 1941)    PRN Meds  •  acetaminophen **OR** acetaminophen **OR** acetaminophen  •  HYDROmorphone  •  ipratropium-albuterol  •  nitroglycerin  •  ondansetron  •  [COMPLETED] Insert peripheral IV **AND** sodium chloride  •  sodium chloride    sodium chloride, 75 mL/hr, Last Rate: 75 mL/hr (06/14/21 1941)    Diet  NPO Diet      Assessment/Plan     Active Hospital Problems    Diagnosis  POA   • **Incarcerated ventral hernia [K43.6]  Yes   • Tobacco abuse [Z72.0]  Yes   • Morbid obesity with BMI of 40.0-44.9, adult (CMS/[facility]) [E66.01, Z68.41]  Not Applicable   • COPD (chronic obstructive pulmonary disease) (CMS/[facility]) [J44.9]  Yes   • Atrial fibrillation (CMS/[facility]) [I48.91]  Yes   • HTN (hypertension) [I10]  Yes   • LISA (obstructive sleep apnea) [G47.33]  Yes   • Stage 3b chronic kidney disease (CMS/[facility]) [N18.32]  Yes   • Chronic anticoagulation [Z79.01]  Not Applicable   • SBO (small bowel obstruction) (CMS/HCC) [K56.609]  Yes      Resolved Hospital Problems   No resolved problems to display.     65 y.o. female admitted with Incarcerated ventral hernia.    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  Anticoagulation as above  Body mass index is 43.33 kg/m².   Full code  Discussed with patient and nursing staff  Anticipate discharge home timing yet to be determined.    Dex Solano MD  [facility]  06/15/21  14:46 EDT    I wore protective equipment throughout this patient encounter including a face mask, gloves, and protective eyewear.  Hand hygiene was performed before donning protective equipment and after removal when leaving the room.

## 2021-06-15 NOTE — PROGRESS NOTES
Postop day 1 after laparotomy, lysis of adhesions and repair of incarcerated incisional hernia    Subjective:  Doing reasonably well, pain is in different character than it was preoperatively.  No GI function to this point.    Objective:  Patient is afebrile with stable vitals  General: Awake and alert, no distress  Abdomen is soft, appropriately tender, dressings are in place  Drain 1 was 65 out yesterday, drain to 40 out, bloody appearance    Assessment and plan:  -Postop day 1 after laparotomy and lysis of adhesions for repair of incarcerated incisional hernia with associated small bowel obstruction  -Reasonable progress to this point, await return of GI function  -Keep abdominal binder on at this time  -NG tube to remain for now  -Watch drain output, hopefully can get back on some sort of anticoagulation (probably Lovenox to start) tomorrow  -Repair will be tenuous for some time    Arnulfo Leonard MD  General and Endoscopic Surgery  [facility]    [address], [address]  [place], [place], [address]  P: [phone]  F: [phone]

## 2021-06-16 PROBLEM — E87.6 HYPOPOTASSEMIA: Status: ACTIVE | Noted: 2021-06-16

## 2021-06-16 LAB
ANION GAP SERPL CALCULATED.3IONS-SCNC: 11.2 MMOL/L (ref 5–15)
ARTERIAL PATENCY WRIST A: ABNORMAL
ATMOSPHERIC PRESS: 750.5 MMHG
BASE EXCESS BLDA CALC-SCNC: -0.4 MMOL/L (ref 0–2)
BDY SITE: ABNORMAL
BUN SERPL-MCNC: 19 MG/DL (ref 8–23)
BUN/CREAT SERPL: 24.4 (ref 7–25)
CALCIUM SPEC-SCNC: 8.1 MG/DL (ref 8.6–10.5)
CHLORIDE SERPL-SCNC: 105 MMOL/L (ref 98–107)
CO2 SERPL-SCNC: 22.8 MMOL/L (ref 22–29)
CREAT SERPL-MCNC: 0.78 MG/DL (ref 0.57–1)
GAS FLOW AIRWAY: 6 LPM
GFR SERPL CREATININE-BSD FRML MDRD: 74 ML/MIN/1.73
GLUCOSE SERPL-MCNC: 119 MG/DL (ref 65–99)
HCO3 BLDA-SCNC: 24.5 MMOL/L (ref 22–28)
HCT VFR BLD AUTO: 34.4 % (ref 34–46.6)
HGB BLD-MCNC: 11.6 G/DL (ref 12–15.9)
MAGNESIUM SERPL-MCNC: 2 MG/DL (ref 1.6–2.4)
MODALITY: ABNORMAL
NT-PROBNP SERPL-MCNC: 2317 PG/ML (ref 0–900)
PCO2 BLDA: 40.2 MM HG (ref 35–45)
PH BLDA: 7.39 PH UNITS (ref 7.35–7.45)
PO2 BLDA: 59.3 MM HG (ref 80–100)
POTASSIUM SERPL-SCNC: 3.3 MMOL/L (ref 3.5–5.2)
SAO2 % BLDCOA: 90.1 % (ref 92–99)
SODIUM SERPL-SCNC: 139 MMOL/L (ref 136–145)
TOTAL RATE: 16 BREATHS/MINUTE

## 2021-06-16 PROCEDURE — 82803 BLOOD GASES ANY COMBINATION: CPT

## 2021-06-16 PROCEDURE — 85018 HEMOGLOBIN: CPT | Performed by: HOSPITALIST

## 2021-06-16 PROCEDURE — 25010000002 FUROSEMIDE PER 20 MG: Performed by: HOSPITALIST

## 2021-06-16 PROCEDURE — 83880 ASSAY OF NATRIURETIC PEPTIDE: CPT | Performed by: INTERNAL MEDICINE

## 2021-06-16 PROCEDURE — 94799 UNLISTED PULMONARY SVC/PX: CPT

## 2021-06-16 PROCEDURE — 83735 ASSAY OF MAGNESIUM: CPT | Performed by: INTERNAL MEDICINE

## 2021-06-16 PROCEDURE — 80048 BASIC METABOLIC PNL TOTAL CA: CPT | Performed by: HOSPITALIST

## 2021-06-16 PROCEDURE — 25010000002 DIGOXIN PER 500 MCG: Performed by: INTERNAL MEDICINE

## 2021-06-16 PROCEDURE — 25010000002 HYDROMORPHONE 1 MG/ML SOLUTION: Performed by: SURGERY

## 2021-06-16 PROCEDURE — 25010000002 HYDROMORPHONE PER 4 MG: Performed by: SURGERY

## 2021-06-16 PROCEDURE — 99024 POSTOP FOLLOW-UP VISIT: CPT | Performed by: SURGERY

## 2021-06-16 PROCEDURE — 99252 IP/OBS CONSLTJ NEW/EST SF 35: CPT | Performed by: NURSE PRACTITIONER

## 2021-06-16 PROCEDURE — 85014 HEMATOCRIT: CPT | Performed by: HOSPITALIST

## 2021-06-16 PROCEDURE — 36600 WITHDRAWAL OF ARTERIAL BLOOD: CPT

## 2021-06-16 RX ORDER — BUDESONIDE 0.25 MG/2ML
0.25 INHALANT ORAL
Status: DISCONTINUED | OUTPATIENT
Start: 2021-06-16 | End: 2021-06-23 | Stop reason: HOSPADM

## 2021-06-16 RX ORDER — POTASSIUM CHLORIDE 1.5 G/1.77G
40 POWDER, FOR SOLUTION ORAL AS NEEDED
Status: DISCONTINUED | OUTPATIENT
Start: 2021-06-16 | End: 2021-06-23 | Stop reason: HOSPADM

## 2021-06-16 RX ORDER — FUROSEMIDE 10 MG/ML
20 INJECTION INTRAMUSCULAR; INTRAVENOUS ONCE
Status: COMPLETED | OUTPATIENT
Start: 2021-06-16 | End: 2021-06-16

## 2021-06-16 RX ORDER — POTASSIUM CHLORIDE 7.45 MG/ML
10 INJECTION INTRAVENOUS
Status: DISCONTINUED | OUTPATIENT
Start: 2021-06-16 | End: 2021-06-23 | Stop reason: HOSPADM

## 2021-06-16 RX ORDER — POTASSIUM CHLORIDE 750 MG/1
40 TABLET, FILM COATED, EXTENDED RELEASE ORAL AS NEEDED
Status: DISCONTINUED | OUTPATIENT
Start: 2021-06-16 | End: 2021-06-23 | Stop reason: HOSPADM

## 2021-06-16 RX ORDER — DIGOXIN 0.25 MG/ML
500 INJECTION INTRAMUSCULAR; INTRAVENOUS ONCE
Status: COMPLETED | OUTPATIENT
Start: 2021-06-16 | End: 2021-06-16

## 2021-06-16 RX ADMIN — METOPROLOL TARTRATE 5 MG: 5 INJECTION, SOLUTION INTRAVENOUS at 14:56

## 2021-06-16 RX ADMIN — SODIUM CHLORIDE, PRESERVATIVE FREE 10 ML: 5 INJECTION INTRAVENOUS at 08:56

## 2021-06-16 RX ADMIN — IPRATROPIUM BROMIDE AND ALBUTEROL SULFATE 3 ML: 2.5; .5 SOLUTION RESPIRATORY (INHALATION) at 19:12

## 2021-06-16 RX ADMIN — FAMOTIDINE 20 MG: 10 INJECTION INTRAVENOUS at 20:56

## 2021-06-16 RX ADMIN — METOPROLOL TARTRATE 5 MG: 5 INJECTION, SOLUTION INTRAVENOUS at 03:22

## 2021-06-16 RX ADMIN — HYDROMORPHONE HYDROCHLORIDE 0.5 MG: 1 INJECTION, SOLUTION INTRAMUSCULAR; INTRAVENOUS; SUBCUTANEOUS at 20:56

## 2021-06-16 RX ADMIN — SODIUM CHLORIDE, PRESERVATIVE FREE 10 ML: 5 INJECTION INTRAVENOUS at 20:57

## 2021-06-16 RX ADMIN — FAMOTIDINE 20 MG: 10 INJECTION INTRAVENOUS at 10:38

## 2021-06-16 RX ADMIN — BUDESONIDE 0.25 MG: 0.25 SUSPENSION RESPIRATORY (INHALATION) at 19:13

## 2021-06-16 RX ADMIN — BUDESONIDE 0.25 MG: 0.25 SUSPENSION RESPIRATORY (INHALATION) at 10:09

## 2021-06-16 RX ADMIN — HYDROMORPHONE HYDROCHLORIDE 0.5 MG: 1 INJECTION, SOLUTION INTRAMUSCULAR; INTRAVENOUS; SUBCUTANEOUS at 12:24

## 2021-06-16 RX ADMIN — HYDROMORPHONE HYDROCHLORIDE 0.5 MG: 1 INJECTION, SOLUTION INTRAMUSCULAR; INTRAVENOUS; SUBCUTANEOUS at 18:07

## 2021-06-16 RX ADMIN — METOPROLOL TARTRATE 5 MG: 5 INJECTION, SOLUTION INTRAVENOUS at 20:56

## 2021-06-16 RX ADMIN — METOPROLOL TARTRATE 5 MG: 5 INJECTION, SOLUTION INTRAVENOUS at 09:01

## 2021-06-16 RX ADMIN — FUROSEMIDE 20 MG: 10 INJECTION, SOLUTION INTRAMUSCULAR; INTRAVENOUS at 14:56

## 2021-06-16 RX ADMIN — DIGOXIN 500 MCG: 250 INJECTION, SOLUTION INTRAMUSCULAR; INTRAVENOUS; PARENTERAL at 17:42

## 2021-06-16 RX ADMIN — HYDROMORPHONE HYDROCHLORIDE 0.5 MG: 1 INJECTION, SOLUTION INTRAMUSCULAR; INTRAVENOUS; SUBCUTANEOUS at 02:20

## 2021-06-16 RX ADMIN — HYDROMORPHONE HYDROCHLORIDE 0.5 MG: 1 INJECTION, SOLUTION INTRAMUSCULAR; INTRAVENOUS; SUBCUTANEOUS at 09:06

## 2021-06-16 RX ADMIN — HYDROMORPHONE HYDROCHLORIDE 0.5 MG: 1 INJECTION, SOLUTION INTRAMUSCULAR; INTRAVENOUS; SUBCUTANEOUS at 23:15

## 2021-06-16 RX ADMIN — HYDROMORPHONE HYDROCHLORIDE 0.5 MG: 1 INJECTION, SOLUTION INTRAMUSCULAR; INTRAVENOUS; SUBCUTANEOUS at 06:05

## 2021-06-16 RX ADMIN — IPRATROPIUM BROMIDE AND ALBUTEROL SULFATE 3 ML: 2.5; .5 SOLUTION RESPIRATORY (INHALATION) at 15:38

## 2021-06-16 NOTE — PROGRESS NOTES
Postop day 2 after repair incarcerated incisional hernia    Subjective:  Overall doing all right.  No GI function.  No significant nausea.    Objective:  Afebrile, heart rate 120s to 130s blood pressure 149/84  General: Awake and alert, no obvious distress  Abdomen: Soft, no evidence of recurrence at this time, dressings are clean    Drains serosanguineous, #1 with minimal output    Hemoglobin dipped a little bit to 11.6 from 14, probably reflects some blood loss and some delusional effect.    Assessment and plan:  -Incarcerated incisional hernia, now 2 days out from primary repair  -We should try to get her up a little bit, at least to the side of the bed today  -Leave nasogastric tube for now, await for return of GI function  -May be able to remove 1 drain tomorrow  -Recheck labs tomorrow    Arnulfo Leonard MD  General and Endoscopic Surgery  [facility]    [address], [address]  [place], [place], [address]  P: [phone]  F: [phone]

## 2021-06-16 NOTE — PROGRESS NOTES
Name: Marilu Avery ADMIT: 2021   : 1955  PCP: Anatoly Jimenez MD    MRN: [id number] LOS: 3 days   AGE/SEX: 65 y.o. female  ROOM: [facility]     Subjective   Subjective    complaining of ankle swelling which is not new but states worse after her PCP dropped lasix from 80 mg a day to 20 mg a day. some abdominal pain but tolerable    Review of Systems   Constitutional: Negative for fever.   Respiratory: Negative for cough and shortness of breath.    Cardiovascular: Negative for chest pain.   Gastrointestinal: Positive for abdominal pain.      Objective   Objective   Vital Signs  Temp:  [97.3 °F (36.3 °C)-98.5 °F (36.9 °C)] 98.5 °F (36.9 °C)  Heart Rate:  [] 136  Resp:  [12-24] 24  BP: (120-149)/(77-85) 149/84  SpO2:  [89 %-96 %] 90 %  on  Flow (L/min):  [3-4] 4;   Device (Oxygen Therapy): nasal cannula  Body mass index is 43.76 kg/m².    Physical Exam  Constitutional:       Appearance: Normal appearance.   HENT:      Head: Normocephalic and atraumatic.      Comments: NGT  Cardiovascular:      Rate and Rhythm: Tachycardia present. Rhythm irregular.   Pulmonary:      Effort: Pulmonary effort is normal. No respiratory distress.      Breath sounds: No wheezing or rhonchi.   Abdominal:      Tenderness: There is abdominal tenderness.      Comments: abdominal binder   Musculoskeletal:         General: Swelling present.      Comments: trace   Skin:     General: Skin is warm and dry.   Neurological:      General: No focal deficit present.      Mental Status: She is alert and oriented to person, place, and time.   Psychiatric:         Mood and Affect: Mood normal.         Behavior: Behavior normal.     Results Review  I reviewed the patient's new clinical results.  Results from last 7 days   Lab Units 21  0516 21  0512 21  1810   WBC 10*3/mm3  --  4.01 8.39   HEMOGLOBIN g/dL 11.6* 14.1 15.8   PLATELETS 10*3/mm3  --  185 246     Results from last 7 days   Lab Units 21  0516  06/14/21  0512 06/13/21  1810   SODIUM mmol/L 139 137 134*   POTASSIUM mmol/L 3.3* 4.0 3.8   CHLORIDE mmol/L 105 99 96*   CO2 mmol/L 22.8 24.0 22.2   BUN mg/dL 19 17 16   CREATININE mg/dL 0.78 1.12* 1.32*   GLUCOSE mg/dL 119* 128* 142*     Estimated Creatinine Clearance: 110.8 mL/min (by C-G formula based on SCr of 0.78 mg/dL).    Results from last 7 days   Lab Units 06/13/21  1810   ALBUMIN g/dL 4.80   BILIRUBIN mg/dL 1.1   ALK PHOS U/L 89   AST (SGOT) U/L 33*   ALT (SGPT) U/L 23     Results from last 7 days   Lab Units 06/16/21  0516 06/14/21  0512 06/13/21  1810   CALCIUM mg/dL 8.1* 9.0 10.2   ALBUMIN g/dL  --   --  4.80     Results from last 7 days   Lab Units 06/13/21  1810   LACTATE mmol/L 1.6     No results found for: HGBA1C, POCGLU    Scheduled Meds  budesonide, 0.25 mg, Nebulization, BID - RT  famotidine, 20 mg, Intravenous, Q12H  ipratropium-albuterol, 3 mL, Nebulization, 4x Daily - RT  metoprolol tartrate, 5 mg, Intravenous, Q6H  sodium chloride, 10 mL, Intravenous, Q12H    Continuous Infusions  sodium chloride, 75 mL/hr, Last Rate: 75 mL/hr (06/14/21 1941)    PRN Meds  •  acetaminophen **OR** acetaminophen **OR** acetaminophen  •  HYDROmorphone  •  ipratropium-albuterol  •  nitroglycerin  •  ondansetron  •  [COMPLETED] Insert peripheral IV **AND** sodium chloride  •  sodium chloride    sodium chloride, 75 mL/hr, Last Rate: 75 mL/hr (06/14/21 1941)    Diet  NPO Diet     I personally reviewed CXR     Assessment/Plan     Active Hospital Problems    Diagnosis  POA   • **Incarcerated ventral hernia [K43.6]  Yes   • Hypopotassemia [E87.6]  Unknown   • Tobacco abuse [Z72.0]  Yes   • Morbid obesity with BMI of 40.0-44.9, adult (CMS/[facility]) [E66.01, Z68.41]  Not Applicable   • COPD (chronic obstructive pulmonary disease) (CMS/[facility]) [J44.9]  Yes   • Atrial fibrillation (CMS/[facility]) [I48.91]  Yes   • HTN (hypertension) [I10]  Yes   • LISA (obstructive sleep apnea) [G47.33]  Yes   • Stage 3b chronic kidney disease (CMS/[facility])  [N18.32]  Yes   • Chronic anticoagulation [Z79.01]  Not Applicable   • SBO (small bowel obstruction) (CMS/HCC) [K56.609]  Yes      Resolved Hospital Problems   No resolved problems to display.     65 y.o. female admitted with Incarcerated ventral hernia.    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  Anticoagulation as above  Body mass index is 43.76 kg/m².   Full code  Discussed with patient and nursing staff  Anticipate discharge home timing yet to be determined. PT rima Solano MD  [facility]  06/16/21  13:14 EDT    I wore protective equipment throughout this patient encounter including a face mask, gloves, and protective eyewear.  Hand hygiene was performed before donning protective equipment and after removal when leaving the room.

## 2021-06-16 NOTE — PLAN OF CARE
Goal Outcome Evaluation:  Plan of Care Reviewed With: patient           Outcome Summary: A/Ox4 makes all needs known clearly, Barrios cath discontinued today voiding well, purwick in place but does have BSC in room when ready. JPx2 with midline incision abd binder to remain on at all times. NGT to suction continuously I.V. Lopressor 5mg, IV and I.V. Lanoxin 500mg tolerated well for HR control still running tachy 119-140's cardio has ordered an Echo, awaiting transport currently. Now in bariatric bed, able to assist with comfort, did sit up on bedside today and is now able to forcefully cough and moving secretions.  O2 4L heated via NC SATS have been running 85-91%, I.V. Dilaudid 0.5mg Q2hrs last administered @1800.  In bed all needs in reach will CTM

## 2021-06-16 NOTE — CONSULTS
Patient Name: Marilu Avery  :1955  65 y.o.    Date of Admission: 2021  Date of Consultation:  21  Encounter Provider: DEBBI Melton  Place of Service: [facility] CARDIOLOGY  Referring Provider: Uriah Landers MD  Patient Care Team:  Anatoly Jimenez MD as PCP - General  Anatoly Jimenez MD as PCP - Family Medicine      Chief complaint: Atrial fibrillation      History of Present Illness:  She is a patient with a past medical history significant for hypertension, hyperlipidemia, CKD stage III, obstructive sleep apnea, and atrial fibrillation.  In , she was seen in our office by Dr. Perez for evaluation and management of atrial fibrillation.  She had previously been started on Toprol and Xarelto by another provider and subsequently reverted to normal sinus rhythm.  She was reporting tachypalpitations with associated symptoms of exertional dyspnea and fatigue.  Dr. Perez recommended she remain on Xarelto.  In regards to her symptomatic nature of atrial fibrillation, he felt she would benefit from an antiarrhythmic.  She was scheduled for a treadmill stress test to rule out coronary disease.  If this was normal, he would recommend flecainide therapy 100 mg twice daily.  She also underwent an echocardiogram which revealed normal LV systolic function, grade 1 diastolic dysfunction, and no significant valvular abnormalities.  She has not been seen since that time.    On 2021, she presented with abdominal pain and was found to have a bowel obstruction.  General surgery was consulted and she is now 2 days postop from a laparotomy and lysis of adhesions for repair of incarcerated incisional hernia with associated small bowel obstruction.  As such, her anticoagulation has been on hold.    We have been consulted for management of atrial fibrillation.  She is normally on chronic anticoagulation with Eliquis and is rate controlled normally with metoprolol  tartrate.  Given her bowel obstruction and n.p.o. status, she is receiving IV Lopressor every 6 hours.  Heart rate poorly controlled.     Upon assessment, she tells me that her PCP has been managing her atrial fibrillation.  She is normally is regular rhythm with only periodic episodes that are minutes in duration.  She is chronically short of breath due to COPD/smoking.  She does say at home she has difficulty breathing at night but blames this mostly on her CPAP.  She denies any chest pain.      Past Medical History:   Diagnosis Date   • Arthritis    • Asthma    • Atrial fibrillation (CMS/HCC)    • Colon polyps 2015    hyperplastic rectal polyp   • Hyperlipidemia    • Hypertension    • Sleep apnea        Past Surgical History:   Procedure Laterality Date   • BACK SURGERY N/A 2001   • COLONOSCOPY N/A 10/22/2015    Rectal polyp-Dr. Elías Keating   • HYSTERECTOMY N/A 2000   • LAPAROSCOPIC CHOLECYSTECTOMY N/A 01/04/2010    Dr. Heath Whitlock   • OOPHORECTOMY  2001   • REPLACEMENT TOTAL KNEE Left 06/22/2015    Dr. Yo Aguayo   • REPLACEMENT TOTAL KNEE Right 02/26/2015    Dr. Yo Aguayo   • VENTRAL HERNIA REPAIR N/A 10/22/2015    Open reduction of incarcerated ventral hernia with layered closure-Dr. Elías Keating   • VENTRAL/INCISIONAL HERNIA REPAIR N/A 6/14/2021    Procedure: OPEN VENTRAL/INCISIONAL HERNIA REPAIR WITH MESH AND APPENDECTOMY;  Surgeon: Arnulfo Leonard MD;  Location: [facility];  Service: General;  Laterality: N/A;         Prior to Admission medications    Medication Sig Start Date End Date Taking? Authorizing Provider   allopurinol (ZYLOPRIM) 100 MG tablet Take 100 mg by mouth Daily.   Yes Provider, MD Leelee   amLODIPine (NORVASC) 5 MG tablet Take 10 mg by mouth Daily. 9/17/17  Yes Provider, MD Leelee   apixaban (ELIQUIS) 5 MG tablet tablet Take 5 mg by mouth 2 (Two) Times a Day.   Yes ProviderLeelee MD   atorvastatin (LIPITOR) 20 MG tablet Take 80 mg by mouth Daily.   Yes  Leelee Godfrey MD   B Complex Vitamins (vitamin b complex) capsule capsule Take 2 capsules by mouth Daily.   Yes Leelee Godfrey MD   colchicine 0.6 MG tablet Take 0.6 mg by mouth Daily.   Yes Leelee Godfrey MD   furosemide (LASIX) 40 MG tablet Take 20 mg by mouth Daily. 12/16/15  Yes Leelee Godfrey MD   metoprolol tartrate (LOPRESSOR) 100 MG tablet Take 150 mg by mouth 2 (Two) Times a Day. 8/23/17  Yes Leelee Godfrey MD   vitamin D3 125 MCG (5000 UT) capsule capsule Take 2,000 Units by mouth Daily.   Yes Leelee Godfrey MD   Cholecalciferol (VITAMIN D PO) Take 1 tablet by mouth Daily.    Leelee Godfrey MD   dicyclomine (BENTYL) 10 MG capsule Take 2 capsules by mouth 3 (Three) Times a Day As Needed (bowel spasm). Drink plenty of water 9/21/19   Lucho Vivas MD   dicyclomine (BENTYL) 20 MG tablet Take 1 tablet by mouth Every 8 (Eight) Hours As Needed (pain). 9/4/19   Johanne Meeir APRN   lisinopril (PRINIVIL,ZESTRIL) 40 MG tablet Take  by mouth daily. 12/16/15   Leelee Godfrey MD   Mometasone Furoate (ASMANEX HFA) 200 MCG/ACT aerosol Asmanex HFA AERO; Patient Sig: Asmanex HFA AERO ; 0; 16-Dec-2015; Active 12/16/15   Leelee Godfrey MD   rivaroxaban (XARELTO) 20 MG tablet Take 20 mg by mouth Daily.    Leelee Godfrey MD       No Known Allergies    Social History     Socioeconomic History   • Marital status:      Spouse name: Not on file   • Number of children: Not on file   • Years of education: Not on file   • Highest education level: Not on file   Tobacco Use   • Smoking status: Current Every Day Smoker     Packs/day: 1.00   Substance and Sexual Activity   • Alcohol use: Yes     Comment: 3 drinks daily   • Sexual activity: Defer       Family History   Problem Relation Age of Onset   • No Known Problems Mother    • No Known Problems Father        REVIEW OF SYSTEMS:   Review of Systems   Constitutional: Negative for chills, fever and  malaise/fatigue.   Cardiovascular: Negative for chest pain, dyspnea on exertion, leg swelling, near-syncope, orthopnea, palpitations, paroxysmal nocturnal dyspnea and syncope.   Respiratory: Positive for cough and shortness of breath.    Musculoskeletal: Negative for joint pain, joint swelling and myalgias.   Gastrointestinal: Positive for abdominal pain. Negative for diarrhea, melena, nausea and vomiting.   Genitourinary: Negative for frequency and hematuria.   Neurological: Negative for light-headedness, numbness, paresthesias and seizures.   Allergic/Immunologic: Negative.    All other systems reviewed and are negative.          Objective:     Vitals:    06/16/21 1010 06/16/21 1015 06/16/21 1538 06/16/21 1549   BP:       BP Location:       Patient Position:       Pulse: (!) 126 (!) 136 118 (!) 125   Resp: 24 24 24 24   Temp:       TempSrc:       SpO2: 90%  94%    Weight:       Height:         Body mass index is 43.76 kg/m².    Physical Exam:  Constitutional:       General: Not in acute distress.     Appearance: Well-developed. Not diaphoretic.   Eyes:      Pupils: Pupils are equal, round, and reactive to light.   HENT:      Head: Normocephalic and atraumatic.   Neck:      Thyroid: No thyromegaly.      Vascular: No JVD.   Pulmonary:      Effort: Pulmonary effort is normal. No respiratory distress.      Breath sounds: Normal breath sounds.   Cardiovascular:      Tachycardia present. Irregular rhythm.   Pulses:     Intact distal pulses.   Edema:     Peripheral edema present.  Abdominal:      General: Bowel sounds are normal. There is no distension.      Palpations: Abdomen is soft. There is no hepatomegaly or splenomegaly.      Tenderness: There is no abdominal tenderness.   Musculoskeletal: Normal range of motion. Skin:     General: Skin is warm and dry.      Findings: No erythema.   Neurological:      Mental Status: Alert and oriented to person, place, and time.   Psychiatric:         Behavior: Behavior normal.          Judgment: Judgment normal.           Lab Review:   Results from last 7 days   Lab Units 06/16/21  0516 06/14/21  0512 06/13/21  1810   SODIUM mmol/L 139 137 134*   POTASSIUM mmol/L 3.3* 4.0 3.8   CHLORIDE mmol/L 105 99 96*   CO2 mmol/L 22.8 24.0 22.2   BUN mg/dL 19 17 16   CREATININE mg/dL 0.78 1.12* 1.32*   GLUCOSE mg/dL 119* 128* 142*   CALCIUM mg/dL 8.1* 9.0 10.2         Results from last 7 days   Lab Units 06/16/21  0516 06/14/21  0512 06/13/21  1810   WBC 10*3/mm3  --  4.01 8.39   HEMOGLOBIN g/dL 11.6* 14.1 15.8   HEMATOCRIT % 34.4 42.3 45.3   PLATELETS 10*3/mm3  --  185 246     Results from last 7 days   Lab Units 06/14/21  0512 06/13/21  1810   INR  1.21* 1.31*   APTT seconds  --  31.9                   I personally viewed and interpreted the patient's EKG/Telemetry data.      Current Facility-Administered Medications:   •  acetaminophen (TYLENOL) tablet 650 mg, 650 mg, Oral, Q4H PRN **OR** acetaminophen (TYLENOL) 160 MG/5ML solution 650 mg, 650 mg, Oral, Q4H PRN **OR** acetaminophen (TYLENOL) suppository 650 mg, 650 mg, Rectal, Q4H PRN, Arnulfo Leonard MD  •  budesonide (PULMICORT) nebulizer solution 0.25 mg, 0.25 mg, Nebulization, BID - RT, Dex Solano MD, 0.25 mg at 06/16/21 1009  •  digoxin (LANOXIN) injection 500 mcg, 500 mcg, Intravenous, Once, Wes Sandoval MD  •  famotidine (PEPCID) injection 20 mg, 20 mg, Intravenous, Q12H, Arnulfo Leonard MD, 20 mg at 06/16/21 1038  •  HYDROmorphone (DILAUDID) injection 0.5 mg, 0.5 mg, Intravenous, Q2H PRN, Arnulfo Leonard MD, 0.5 mg at 06/16/21 1224  •  ipratropium-albuterol (DUO-NEB) nebulizer solution 3 mL, 3 mL, Nebulization, 4x Daily - RT, Arnulfo Leonard MD, 3 mL at 06/16/21 1538  •  ipratropium-albuterol (DUO-NEB) nebulizer solution 3 mL, 3 mL, Nebulization, Q4H PRN, Arnulfo Leonard MD, 3 mL at 06/15/21 0047  •  metoprolol tartrate (LOPRESSOR) injection 5 mg, 5 mg, Intravenous, Q6H, Dex Solano MD, 5 mg at 06/16/21  1456  •  nitroglycerin (NITROSTAT) SL tablet 0.4 mg, 0.4 mg, Sublingual, Q5 Min PRN, Arnulfo Leonard MD  •  ondansetron (ZOFRAN) injection 4 mg, 4 mg, Intravenous, Q6H PRN, Arnulfo Leonard MD, 4 mg at 06/13/21 2242  •  potassium chloride (K-DUR,KLOR-CON) ER tablet 40 mEq, 40 mEq, Oral, PRN **OR** potassium chloride (KLOR-CON) packet 40 mEq, 40 mEq, Oral, PRN **OR** potassium chloride 10 mEq in 100 mL IVPB, 10 mEq, Intravenous, Q1H PRN, Dex Solano MD  •  [COMPLETED] Insert peripheral IV, , , Once **AND** sodium chloride 0.9 % flush 10 mL, 10 mL, Intravenous, PRN, Arnulfo Leonard MD  •  sodium chloride 0.9 % flush 10 mL, 10 mL, Intravenous, Q12H, Arnulfo Leonard MD, 10 mL at 06/16/21 0856  •  sodium chloride 0.9 % flush 10 mL, 10 mL, Intravenous, PRN, Arnulfo Leonard MD  •  sodium chloride 0.9 % infusion, 75 mL/hr, Intravenous, Continuous, Dex Solano MD, Last Rate: 75 mL/hr at 06/14/21 1941, 75 mL/hr at 06/14/21 1941    Assessment and Plan:       Active Hospital Problems    Diagnosis  POA   • **Incarcerated ventral hernia [K43.6]  Yes   • Hypopotassemia [E87.6]  Unknown   • Tobacco abuse [Z72.0]  Yes   • Morbid obesity with BMI of 40.0-44.9, adult (CMS/[facility]) [E66.01, Z68.41]  Not Applicable   • COPD (chronic obstructive pulmonary disease) (CMS/[facility]) [J44.9]  Yes   • Atrial fibrillation (CMS/[facility]) [I48.91]  Yes   • HTN (hypertension) [I10]  Yes   • LISA (obstructive sleep apnea) [G47.33]  Yes   • Stage 3b chronic kidney disease (CMS/[facility]) [N18.32]  Yes   • Chronic anticoagulation [Z79.01]  Not Applicable   • SBO (small bowel obstruction) (CMS/HCC) [K56.606]  Yes      Resolved Hospital Problems   No resolved problems to display.       1.  Incarcerated incisional hernia with small bowel obstruction.  Status post open repair with mesh and incidental appendectomy.  2.  Atrial fibrillation RVR.  She was formally seen in 2015 by Dr. Perez.  At that time, an antiarrhythmic was recommended if her  stress test was normal.  Unfortunately, she was unable to complete the stress test.  Echocardiogram at that time showed normal LV systolic function and no significant valvular abnormalities.  She is currently in atrial fibrillation RVR despite IV Lopressor every 6 hours.  She is NPO.     -Anticoagulation has been on hold given the small bowel obstruction and surgery.  Would recommend resuming anticoagulation when cleared from a surgical standpoint.   -For rate control, we are a little limited given her NPO status.   Will give a one time dose of IV digoxin.  If her rate does not respond, will start a diltiazem drip.   -I think it would be a good idea to check another echo as she has not had one since 2015.  This can wait until tomorrow, as we need to achieve better rate control first.    -She has historically been symptomatic with afib.  She is reporting shortness of breath although she is short of breath at baseline.  Will check a BNP.  She may need more diuresis.   3.  Hypertension.  Stable  4.  COPD.  5.  Current smoker.  6.  Obstructive sleep apnea.  7.  CKD3.  This is listed in her history.  Although her BMP today shows normal creatinine and GFR.    8.  Obesity.        Isa Ennis, APRN  06/16/21  15:53 EDT

## 2021-06-16 NOTE — PLAN OF CARE
Goal Outcome Evaluation:  Plan of Care Reviewed With: patient        Progress: no change  Outcome Summary: A/O, medicated for pain q2hrs, li to gravity, JPX2, abd binder, NGT, HR will still go into the 140s, complained of foot pain as well, will continue to monitor.

## 2021-06-17 ENCOUNTER — APPOINTMENT (OUTPATIENT)
Dept: CARDIOLOGY | Facility: HOSPITAL | Age: 66
End: 2021-06-17

## 2021-06-17 ENCOUNTER — APPOINTMENT (OUTPATIENT)
Dept: GENERAL RADIOLOGY | Facility: HOSPITAL | Age: 66
End: 2021-06-17

## 2021-06-17 LAB
ANION GAP SERPL CALCULATED.3IONS-SCNC: 10.9 MMOL/L (ref 5–15)
BH CV LOWER VASCULAR LEFT COMMON FEMORAL AUGMENT: NORMAL
BH CV LOWER VASCULAR LEFT COMMON FEMORAL COMPETENT: NORMAL
BH CV LOWER VASCULAR LEFT COMMON FEMORAL COMPRESS: NORMAL
BH CV LOWER VASCULAR LEFT COMMON FEMORAL PHASIC: NORMAL
BH CV LOWER VASCULAR LEFT COMMON FEMORAL SPONT: NORMAL
BH CV LOWER VASCULAR LEFT DISTAL FEMORAL COMPRESS: NORMAL
BH CV LOWER VASCULAR LEFT GASTRONEMIUS COMPRESS: NORMAL
BH CV LOWER VASCULAR LEFT GREATER SAPH AK COMPRESS: NORMAL
BH CV LOWER VASCULAR LEFT GREATER SAPH BK COMPRESS: NORMAL
BH CV LOWER VASCULAR LEFT LESSER SAPH COMPRESS: NORMAL
BH CV LOWER VASCULAR LEFT MID FEMORAL AUGMENT: NORMAL
BH CV LOWER VASCULAR LEFT MID FEMORAL COMPETENT: NORMAL
BH CV LOWER VASCULAR LEFT MID FEMORAL COMPRESS: NORMAL
BH CV LOWER VASCULAR LEFT MID FEMORAL PHASIC: NORMAL
BH CV LOWER VASCULAR LEFT MID FEMORAL SPONT: NORMAL
BH CV LOWER VASCULAR LEFT PERONEAL COMPRESS: NORMAL
BH CV LOWER VASCULAR LEFT POPLITEAL AUGMENT: NORMAL
BH CV LOWER VASCULAR LEFT POPLITEAL COMPETENT: NORMAL
BH CV LOWER VASCULAR LEFT POPLITEAL COMPRESS: NORMAL
BH CV LOWER VASCULAR LEFT POPLITEAL PHASIC: NORMAL
BH CV LOWER VASCULAR LEFT POPLITEAL SPONT: NORMAL
BH CV LOWER VASCULAR LEFT POSTERIOR TIBIAL COMPRESS: NORMAL
BH CV LOWER VASCULAR LEFT PROFUNDA FEMORAL COMPRESS: NORMAL
BH CV LOWER VASCULAR LEFT PROXIMAL FEMORAL COMPRESS: NORMAL
BH CV LOWER VASCULAR LEFT SAPHENOFEMORAL JUNCTION COMPRESS: NORMAL
BH CV LOWER VASCULAR RIGHT COMMON FEMORAL AUGMENT: NORMAL
BH CV LOWER VASCULAR RIGHT COMMON FEMORAL COMPETENT: NORMAL
BH CV LOWER VASCULAR RIGHT COMMON FEMORAL COMPRESS: NORMAL
BH CV LOWER VASCULAR RIGHT COMMON FEMORAL PHASIC: NORMAL
BH CV LOWER VASCULAR RIGHT COMMON FEMORAL SPONT: NORMAL
BH CV LOWER VASCULAR RIGHT DISTAL FEMORAL COMPRESS: NORMAL
BH CV LOWER VASCULAR RIGHT GASTRONEMIUS COMPRESS: NORMAL
BH CV LOWER VASCULAR RIGHT GREATER SAPH AK COMPRESS: NORMAL
BH CV LOWER VASCULAR RIGHT GREATER SAPH BK COMPRESS: NORMAL
BH CV LOWER VASCULAR RIGHT LESSER SAPH COMPRESS: NORMAL
BH CV LOWER VASCULAR RIGHT MID FEMORAL AUGMENT: NORMAL
BH CV LOWER VASCULAR RIGHT MID FEMORAL COMPETENT: NORMAL
BH CV LOWER VASCULAR RIGHT MID FEMORAL COMPRESS: NORMAL
BH CV LOWER VASCULAR RIGHT MID FEMORAL PHASIC: NORMAL
BH CV LOWER VASCULAR RIGHT MID FEMORAL SPONT: NORMAL
BH CV LOWER VASCULAR RIGHT PERONEAL COMPRESS: NORMAL
BH CV LOWER VASCULAR RIGHT POPLITEAL AUGMENT: NORMAL
BH CV LOWER VASCULAR RIGHT POPLITEAL COMPETENT: NORMAL
BH CV LOWER VASCULAR RIGHT POPLITEAL COMPRESS: NORMAL
BH CV LOWER VASCULAR RIGHT POPLITEAL PHASIC: NORMAL
BH CV LOWER VASCULAR RIGHT POPLITEAL SPONT: NORMAL
BH CV LOWER VASCULAR RIGHT POSTERIOR TIBIAL COMPRESS: NORMAL
BH CV LOWER VASCULAR RIGHT PROFUNDA FEMORAL COMPRESS: NORMAL
BH CV LOWER VASCULAR RIGHT PROXIMAL FEMORAL COMPRESS: NORMAL
BH CV LOWER VASCULAR RIGHT SAPHENOFEMORAL JUNCTION COMPRESS: NORMAL
BUN SERPL-MCNC: 17 MG/DL (ref 8–23)
BUN/CREAT SERPL: 23 (ref 7–25)
CALCIUM SPEC-SCNC: 8.4 MG/DL (ref 8.6–10.5)
CHLORIDE SERPL-SCNC: 106 MMOL/L (ref 98–107)
CO2 SERPL-SCNC: 24.1 MMOL/L (ref 22–29)
CREAT SERPL-MCNC: 0.74 MG/DL (ref 0.57–1)
GFR SERPL CREATININE-BSD FRML MDRD: 79 ML/MIN/1.73
GLUCOSE SERPL-MCNC: 106 MG/DL (ref 65–99)
POTASSIUM SERPL-SCNC: 3.3 MMOL/L (ref 3.5–5.2)
SODIUM SERPL-SCNC: 141 MMOL/L (ref 136–145)

## 2021-06-17 PROCEDURE — 25010000002 FUROSEMIDE PER 20 MG: Performed by: INTERNAL MEDICINE

## 2021-06-17 PROCEDURE — 94799 UNLISTED PULMONARY SVC/PX: CPT

## 2021-06-17 PROCEDURE — 71045 X-RAY EXAM CHEST 1 VIEW: CPT

## 2021-06-17 PROCEDURE — 25010000002 HYDROMORPHONE PER 4 MG: Performed by: SURGERY

## 2021-06-17 PROCEDURE — 25010000003 POTASSIUM CHLORIDE 10 MEQ/100ML SOLUTION: Performed by: HOSPITALIST

## 2021-06-17 PROCEDURE — 93970 EXTREMITY STUDY: CPT

## 2021-06-17 PROCEDURE — 97162 PT EVAL MOD COMPLEX 30 MIN: CPT

## 2021-06-17 PROCEDURE — 99024 POSTOP FOLLOW-UP VISIT: CPT | Performed by: SURGERY

## 2021-06-17 PROCEDURE — 99233 SBSQ HOSP IP/OBS HIGH 50: CPT | Performed by: INTERNAL MEDICINE

## 2021-06-17 PROCEDURE — 80048 BASIC METABOLIC PNL TOTAL CA: CPT | Performed by: HOSPITALIST

## 2021-06-17 RX ORDER — FUROSEMIDE 10 MG/ML
40 INJECTION INTRAMUSCULAR; INTRAVENOUS ONCE
Status: COMPLETED | OUTPATIENT
Start: 2021-06-17 | End: 2021-06-17

## 2021-06-17 RX ADMIN — METOPROLOL TARTRATE 5 MG: 5 INJECTION, SOLUTION INTRAVENOUS at 14:28

## 2021-06-17 RX ADMIN — HYDROMORPHONE HYDROCHLORIDE 0.5 MG: 1 INJECTION, SOLUTION INTRAMUSCULAR; INTRAVENOUS; SUBCUTANEOUS at 08:25

## 2021-06-17 RX ADMIN — FUROSEMIDE 40 MG: 10 INJECTION, SOLUTION INTRAMUSCULAR; INTRAVENOUS at 13:14

## 2021-06-17 RX ADMIN — METOPROLOL TARTRATE 5 MG: 5 INJECTION, SOLUTION INTRAVENOUS at 20:01

## 2021-06-17 RX ADMIN — POTASSIUM CHLORIDE 10 MEQ: 7.46 INJECTION, SOLUTION INTRAVENOUS at 20:01

## 2021-06-17 RX ADMIN — FAMOTIDINE 20 MG: 10 INJECTION INTRAVENOUS at 20:01

## 2021-06-17 RX ADMIN — IPRATROPIUM BROMIDE AND ALBUTEROL SULFATE 3 ML: 2.5; .5 SOLUTION RESPIRATORY (INHALATION) at 06:36

## 2021-06-17 RX ADMIN — FAMOTIDINE 20 MG: 10 INJECTION INTRAVENOUS at 08:29

## 2021-06-17 RX ADMIN — POTASSIUM CHLORIDE 10 MEQ: 7.46 INJECTION, SOLUTION INTRAVENOUS at 12:24

## 2021-06-17 RX ADMIN — POTASSIUM CHLORIDE 10 MEQ: 7.46 INJECTION, SOLUTION INTRAVENOUS at 08:23

## 2021-06-17 RX ADMIN — SODIUM CHLORIDE, PRESERVATIVE FREE 10 ML: 5 INJECTION INTRAVENOUS at 08:42

## 2021-06-17 RX ADMIN — HYDROMORPHONE HYDROCHLORIDE 0.5 MG: 1 INJECTION, SOLUTION INTRAMUSCULAR; INTRAVENOUS; SUBCUTANEOUS at 02:47

## 2021-06-17 RX ADMIN — METOPROLOL TARTRATE 5 MG: 5 INJECTION, SOLUTION INTRAVENOUS at 08:42

## 2021-06-17 RX ADMIN — IPRATROPIUM BROMIDE AND ALBUTEROL SULFATE 3 ML: 2.5; .5 SOLUTION RESPIRATORY (INHALATION) at 11:57

## 2021-06-17 RX ADMIN — SODIUM CHLORIDE, PRESERVATIVE FREE 10 ML: 5 INJECTION INTRAVENOUS at 20:01

## 2021-06-17 RX ADMIN — HYDROMORPHONE HYDROCHLORIDE 0.5 MG: 1 INJECTION, SOLUTION INTRAMUSCULAR; INTRAVENOUS; SUBCUTANEOUS at 22:04

## 2021-06-17 RX ADMIN — IPRATROPIUM BROMIDE AND ALBUTEROL SULFATE 3 ML: 2.5; .5 SOLUTION RESPIRATORY (INHALATION) at 16:06

## 2021-06-17 RX ADMIN — POTASSIUM CHLORIDE 10 MEQ: 7.46 INJECTION, SOLUTION INTRAVENOUS at 14:26

## 2021-06-17 RX ADMIN — IPRATROPIUM BROMIDE AND ALBUTEROL SULFATE 3 ML: 2.5; .5 SOLUTION RESPIRATORY (INHALATION) at 20:25

## 2021-06-17 RX ADMIN — HYDROMORPHONE HYDROCHLORIDE 0.5 MG: 1 INJECTION, SOLUTION INTRAMUSCULAR; INTRAVENOUS; SUBCUTANEOUS at 12:24

## 2021-06-17 RX ADMIN — HYDROMORPHONE HYDROCHLORIDE 0.5 MG: 1 INJECTION, SOLUTION INTRAMUSCULAR; INTRAVENOUS; SUBCUTANEOUS at 20:01

## 2021-06-17 RX ADMIN — BUDESONIDE 0.25 MG: 0.25 SUSPENSION RESPIRATORY (INHALATION) at 06:38

## 2021-06-17 RX ADMIN — BUDESONIDE 0.25 MG: 0.25 SUSPENSION RESPIRATORY (INHALATION) at 20:26

## 2021-06-17 RX ADMIN — METOPROLOL TARTRATE 5 MG: 5 INJECTION, SOLUTION INTRAVENOUS at 02:47

## 2021-06-17 NOTE — PLAN OF CARE
Goal Outcome Evaluation:  Plan of Care Reviewed With: patient      Pt admit with incarcerated ventrla hernia and is s/p open repair. She is currently on NG suction and 4 L O2. Pt also with afib and RVR post op. PMH of COPD, obesity and CKDPt PLOF is  indep all aspects. She lives alone. in a condo no steps to enter. Pt demonstrated general weakness, impaired balance during transfers and amb , impaired endurance and impaired mobility in all areas. Pt will likely benefit from cont skilled PT for progression toward indep amb with least vs no AD. Anticipate snf rehab vs home with home health. Pt does not have family available to asst. Pt able to STS to rwx and amb 3 steps to recliner today. Pt soa , anxious and reported codey foot pain due to edema as limiting factors today.

## 2021-06-17 NOTE — CONSULTS
Patient Identification:  Marilu Avery  65 y.o.  female  1955  [id number]          LOS 4    Requesting physician: Dr Solano     Reason for Consultation: Respiratory failure    History of Present Illness:     Admitted on the 13th by hospitalist service with abdominal pain and known ventral hernia.  Had evidence of incarceration and bowel obstruction.  Status post surgical repair.  Did not require supplemental oxygen previously now requiring 4 L to maintain saturations in the low 90s.  Much more short of breath when laying flat.  Sitting up in bedside chair at time of visit and she says that she is breathing much better in this position.  Has history of COPD and a persistent smoker.  Denies productive cough, chest pain or worsened wheezing.    Past Medical History:  Past Medical History:   Diagnosis Date   • Arthritis    • Asthma    • Atrial fibrillation (CMS/HCC)    • Colon polyps 2015    hyperplastic rectal polyp   • Hyperlipidemia    • Hypertension    • Sleep apnea        Past Surgical History:  Past Surgical History:   Procedure Laterality Date   • BACK SURGERY N/A 2001   • COLONOSCOPY N/A 10/22/2015    Rectal polyp-Dr. Elías Keating   • HYSTERECTOMY N/A 2000   • LAPAROSCOPIC CHOLECYSTECTOMY N/A 01/04/2010    Dr. Heath Whitlock   • OOPHORECTOMY  2001   • REPLACEMENT TOTAL KNEE Left 06/22/2015    Dr. Yo Aguayo   • REPLACEMENT TOTAL KNEE Right 02/26/2015    Dr. Yo Aguayo   • VENTRAL HERNIA REPAIR N/A 10/22/2015    Open reduction of incarcerated ventral hernia with layered closure-Dr. Elías Keating   • VENTRAL/INCISIONAL HERNIA REPAIR N/A 6/14/2021    Procedure: OPEN VENTRAL/INCISIONAL HERNIA REPAIR WITH MESH AND APPENDECTOMY;  Surgeon: Arnulfo Leonard MD;  Location: [facility];  Service: General;  Laterality: N/A;        Home Meds:  Medications Prior to Admission   Medication Sig Dispense Refill Last Dose   • allopurinol (ZYLOPRIM) 100 MG tablet Take 100 mg by mouth Daily.      • amLODIPine  (NORVASC) 5 MG tablet Take 10 mg by mouth Daily.   Patient Taking Differently at Unknown time   • apixaban (ELIQUIS) 5 MG tablet tablet Take 5 mg by mouth 2 (Two) Times a Day.      • atorvastatin (LIPITOR) 20 MG tablet Take 80 mg by mouth Daily.   Patient Taking Differently at Unknown time   • B Complex Vitamins (vitamin b complex) capsule capsule Take 2 capsules by mouth Daily.      • colchicine 0.6 MG tablet Take 0.6 mg by mouth Daily.      • furosemide (LASIX) 40 MG tablet Take 20 mg by mouth Daily.   Patient Taking Differently at Unknown time   • metoprolol tartrate (LOPRESSOR) 100 MG tablet Take 150 mg by mouth 2 (Two) Times a Day.   Patient Taking Differently at Unknown time   • vitamin D3 125 MCG (5000 UT) capsule capsule Take 2,000 Units by mouth Daily.      • Cholecalciferol (VITAMIN D PO) Take 1 tablet by mouth Daily.      • dicyclomine (BENTYL) 10 MG capsule Take 2 capsules by mouth 3 (Three) Times a Day As Needed (bowel spasm). Drink plenty of water 20 capsule 0    • dicyclomine (BENTYL) 20 MG tablet Take 1 tablet by mouth Every 8 (Eight) Hours As Needed (pain). 9 tablet 0    • lisinopril (PRINIVIL,ZESTRIL) 40 MG tablet Take  by mouth daily.      • Mometasone Furoate (ASMANEX HFA) 200 MCG/ACT aerosol Asmanex HFA AERO; Patient Sig: Asmanex HFA AERO ; 0; 16-Dec-2015; Active      • rivaroxaban (XARELTO) 20 MG tablet Take 20 mg by mouth Daily.            Allergies:  No Known Allergies    Social History:   Social History     Socioeconomic History   • Marital status:      Spouse name: Not on file   • Number of children: Not on file   • Years of education: Not on file   • Highest education level: Not on file   Tobacco Use   • Smoking status: Current Every Day Smoker     Packs/day: 1.00   Substance and Sexual Activity   • Alcohol use: Yes     Comment: 3 drinks daily   • Sexual activity: Defer       Family History:  Family History   Problem Relation Age of Onset   • No Known Problems Mother    • No Known  "Problems Father        Review of Systems:  Denies fevers or chills  Denies nausea or vomiting  No new vision or hearing changes  No chest pain  shortness of breath  No diarrhea, hematemesis or hematochezia, no dysuria or frequency  No musculoskeletal complaints  No heat or cold intolerance  No skin rashes  No dizziness or confusion.  No seizure activity  No new anxiety or depression  12 system review of systems performed and all else negative    Objective:    PHYSICAL EXAM:    /91   Pulse (!) 123   Temp 97.5 °F (36.4 °C) (Oral)   Resp 20   Ht 177.8 cm (70\")   Wt 131 kg (289 lb 11.2 oz)   SpO2 93%   BMI 41.57 kg/m²  Body mass index is 41.57 kg/m². 93% 131 kg (289 lb 11.2 oz)    GENERAL APPEARANCE:   · Morbidly obese  · Well nourished  · No acute distress   EYES:    · PERRL                                                                           · Conjunctivae normal  · Sclerae nonicteric.  HENT:   · Atraumatic, normocephalic  · External ears and nose normal  · Moist mucous membranes and no ulcers  NECK:  · Thyroid not enlarged  · Trachea midline   RESPIRATORY:    · Nonlabored breathing   · Diminished bilateral breath sounds  · No rales. No wheezing  · No dullness  CARDIOVASCULAR:    · RRR  · Normal S1, S2  · No murmur  · Lower extremity edema: Nonpitting edema  GI:   · Bowel sounds normal  · Abdomen soft , nondistended, nontender  · No abdominal masses  MUSCULOSKELETAL:  · Normal movement of extremities  · No tenderness, no deformities  · No clubbing or cyanosis   Skin:    · No visible rashes  · No palpable nodules  · Cap refill normal.  No mottling.   PSYCHIATRIC:  · Speech and behavior appropriate  · Normal mood and affect  · Oriented to person, place and time  NEUROLOGIC:  Cranial nerves II through XII grossly intact.  Sensation intact.      Lab Review:      Results    Collected Updated Procedure Result Status    06/17/2021 0535 06/17/2021 0654 Basic Metabolic Panel [[id number]]    (Abnormal)   Blood "    Final result Component Value Units   Glucose 106High  mg/dL   BUN 17 mg/dL   Creatinine 0.74 mg/dL   Sodium 141 mmol/L   Potassium 3.3Low  mmol/L   Chloride 106 mmol/L   CO2 24.1 mmol/L   Calcium 8.4Low  mg/dL   eGFR Non African Am 79 mL/min/1.73   BUN/Creatinine Ratio 23.0    Anion Gap 10.9 mmol/L           06/16/2021 1632 06/16/2021 1634 Blood Gas, Arterial - [[id number]]   (Abnormal)   Arterial Blood    Final result Component Value Units   Site Arterial: right brachial    Masoud's Test N/A    pH, Arterial 7.393 pH units   pCO2, Arterial 40.2 mm Hg   pO2, Arterial 59.3Low  mm Hg   HCO3, Arterial 24.5 mmol/L   Base Excess -0.4Low  mmol/L   O2 Saturation Calculated 90.1Low  %   Barometric Pressure for Blood Gas 750.5 mmHg   Modality Cannula    Flow Rate 6 lpm   Rate 16 Breaths/minute           06/16/2021 0516 06/16/2021 0644 Hemoglobin & Hematocrit, Blood [[id number]]   (Abnormal)   Blood    Final result Component Value Units   Hemoglobin 11.6Low  g/dL   Hematocrit 34.4 %           06/16/2021 0516 06/16/2021 0708 Basic Metabolic Panel [[id number]]    (Abnormal)   Blood    Final result Component Value Units   Glucose 119High  mg/dL   BUN 19 mg/dL   Creatinine 0.78 mg/dL   Sodium 139 mmol/L   Potassium 3.3Low  mmol/L   Chloride 105 mmol/L   CO2 22.8 mmol/L   Calcium 8.1Low  mg/dL   eGFR Non African Am 74 mL/min/1.73   BUN/Creatinine Ratio 24.4    Anion Gap 11.2 mmol/L           06/16/2021 0516 06/16/2021 1626 Magnesium [[id number]]   Blood    Final result Component Value Units   Magnesium 2.0 mg/dL           06/16/2021 0516 06/16/2021 1626 BNP [[id number]]    (Abnormal)   Blood    Final result Component Value Units   proBNP 2,317.0High  pg/mL           06/14/2021 0512 06/14/2021 0628 Basic Metabolic Panel [[id number]]    (Abnormal)   Blood    Final result Component Value Units   Glucose 128High  mg/dL   BUN 17 mg/dL   Creatinine 1.12High  mg/dL   Sodium 137 mmol/L   Potassium 4.0 mmol/L   Chloride 99 mmol/L   CO2 24.0  mmol/L   Calcium 9.0 mg/dL   eGFR Non African Am 49Low  mL/min/1.73   BUN/Creatinine Ratio 15.2    Anion Gap 14.0 mmol/L           06/14/2021 0512 06/14/2021 0557 CBC (No Diff) [[id number]]   (Abnormal)   Blood    Final result Component Value Units   WBC 4.01 10*3/mm3   RBC 4.16 10*6/mm3   Hemoglobin 14.1 g/dL   Hematocrit 42.3 %   .7High  fL   MCH 33.9High  pg   MCHC 33.3 g/dL   RDW 13.0 %   RDW-SD 48.9 fl   MPV 9.7 fL   Platelets 185 10*3/mm3           06/14/2021 0512 06/14/2021 0604 Protime-INR [[id number]]   (Abnormal)   Blood    Final result Component Value Units   Protime 15.1High  Seconds   INR 1.21High                     Imaging reviewed  chest X-ray 6/15 reviewed shows no focal consolidation.  Cardiomegaly noted without significant vascular edema or effusion.        Microbiology reviewed           Assessment:    Acute hypoxemic respiratory failure requiring 4 L supplemental oxygen to maintain sats in the 90s  LISA on CPAP  Recurrent incisional hernia with small bowel obstruction/incarceration status post repair  COPD  Atrial fibrillation  Chronic kidney disease 3  Hypertension  Morbid obesity: BMI 41.6 kg/m²          Recommendations:    Repeat chest x-ray for evaluation of persistent hypoxemia but suspect that this is predominantly postoperative related with atelectasis and pain.  Encourage incentive spirometer use.  Previously on Eliquis for anticoagulation for atrial fibrillation which has been held due to surgery and plan to resume Lovenox when okay with surgery.  Low suspicion for PE as a cause but if no significant abnormality on chest x-ray would consider further evaluation as a potential component of her hypoxemia.  Would like to avoid using contrast however to evaluate due to her chronic kidney disease.  Would be worth considering venous Dopplers however.  Continue bronchodilators for COPD symptoms.  CPAP for sleep apnea.      Thank you for allowing me to participate in the care of this  patient.  I will continue to follow along with you.      Anatoly Mcdaniel MD  [facility], [facility]  Pulmonary and Critical Care Medicine    6/17/2021  09:48 EDT

## 2021-06-17 NOTE — PROGRESS NOTES
Name: Marilu Avery ADMIT: 2021   : 1955  PCP: Anatoly Jimenez MD    MRN: [id number] LOS: 4 days   AGE/SEX: 65 y.o. female  ROOM: [facility]     Subjective   Subjective    No new complaints.  Sitting up in chair.  Denies any cough, shortness of breath.  Still some abdominal discomfort postoperatively.  Per nursing staff she is having flatus.    Review of Systems   Constitutional: Negative for fever.   Respiratory: Negative for cough and shortness of breath.    Cardiovascular: Negative for chest pain.   Gastrointestinal: Positive for abdominal pain.      Objective   Objective   Vital Signs  Temp:  [97.1 °F (36.2 °C)-98 °F (36.7 °C)] 98 °F (36.7 °C)  Heart Rate:  [] 118  Resp:  [18-24] 18  BP: (124-151)/(74-91) 134/75  SpO2:  [92 %-96 %] 92 %  on  Flow (L/min):  [2-15] 2;   Device (Oxygen Therapy): nasal cannula  Body mass index is 41.57 kg/m².    Physical Exam  Constitutional:       Appearance: Normal appearance.   HENT:      Head: Normocephalic and atraumatic.      Comments: NGT  Cardiovascular:      Rate and Rhythm: Tachycardia present. Rhythm irregular.   Pulmonary:      Effort: Pulmonary effort is normal. No respiratory distress.      Breath sounds: No wheezing or rhonchi.   Abdominal:      Tenderness: There is abdominal tenderness.      Comments: abdominal binder   Musculoskeletal:         General: Swelling present.      Comments: trace   Skin:     General: Skin is warm and dry.   Neurological:      General: No focal deficit present.      Mental Status: She is alert and oriented to person, place, and time.   Psychiatric:         Mood and Affect: Mood normal.         Behavior: Behavior normal.     Results Review  I reviewed the patient's new clinical results.  Results from last 7 days   Lab Units 21  0516 21  0512 21  1810   WBC 10*3/mm3  --  4.01 8.39   HEMOGLOBIN g/dL 11.6* 14.1 15.8   PLATELETS 10*3/mm3  --  185 246     Results from last 7 days   Lab Units 21  0535  06/16/21  0516 06/14/21  0512 06/13/21  1810   SODIUM mmol/L 141 139 137 134*   POTASSIUM mmol/L 3.3* 3.3* 4.0 3.8   CHLORIDE mmol/L 106 105 99 96*   CO2 mmol/L 24.1 22.8 24.0 22.2   BUN mg/dL 17 19 17 16   CREATININE mg/dL 0.74 0.78 1.12* 1.32*   GLUCOSE mg/dL 106* 119* 128* 142*     Estimated Creatinine Clearance: 113.4 mL/min (by C-G formula based on SCr of 0.74 mg/dL).    Results from last 7 days   Lab Units 06/13/21  1810   ALBUMIN g/dL 4.80   BILIRUBIN mg/dL 1.1   ALK PHOS U/L 89   AST (SGOT) U/L 33*   ALT (SGPT) U/L 23     Results from last 7 days   Lab Units 06/17/21  0535 06/16/21  0516 06/14/21  0512 06/13/21  1810   CALCIUM mg/dL 8.4* 8.1* 9.0 10.2   ALBUMIN g/dL  --   --   --  4.80   MAGNESIUM mg/dL  --  2.0  --   --      Results from last 7 days   Lab Units 06/13/21  1810   LACTATE mmol/L 1.6     No results found for: HGBA1C, POCGLU    Scheduled Meds  budesonide, 0.25 mg, Nebulization, BID - RT  famotidine, 20 mg, Intravenous, Q12H  ipratropium-albuterol, 3 mL, Nebulization, 4x Daily - RT  metoprolol tartrate, 5 mg, Intravenous, Q6H  sodium chloride, 10 mL, Intravenous, Q12H    Continuous Infusions   PRN Meds  •  acetaminophen **OR** acetaminophen **OR** acetaminophen  •  HYDROmorphone  •  ipratropium-albuterol  •  nitroglycerin  •  ondansetron  •  potassium chloride **OR** potassium chloride **OR** potassium chloride  •  [COMPLETED] Insert peripheral IV **AND** sodium chloride  •  sodium chloride    sodium chloride, 75 mL/hr, Last Rate: 75 mL/hr (06/14/21 1941)    Diet  NPO Diet       Intake/Output Summary (Last 24 hours) at 6/17/2021 1452  Last data filed at 6/17/2021 1426  Gross per 24 hour   Intake 1125 ml   Output 2040 ml   Net -915 ml         Assessment/Plan     Active Hospital Problems    Diagnosis  POA   • **Incarcerated ventral hernia [K43.6]  Yes   • Hypopotassemia [E87.6]  Unknown   • Tobacco abuse [Z72.0]  Yes   • Morbid obesity with BMI of 40.0-44.9, adult (CMS/[facility]) [E66.01, Z68.41]  Not  Applicable   • COPD (chronic obstructive pulmonary disease) (CMS/[facility]) [J44.9]  Yes   • Atrial fibrillation (CMS/[facility]) [I48.91]  Yes   • HTN (hypertension) [I10]  Yes   • LISA (obstructive sleep apnea) [G47.33]  Yes   • Stage 3b chronic kidney disease (CMS/[facility]) [N18.32]  Yes   • Chronic anticoagulation [Z79.01]  Not Applicable   • SBO (small bowel obstruction) (CMS/[facility]) [K56.609]  Yes      Resolved Hospital Problems   No resolved problems to display.     65 y.o. female admitted with Incarcerated ventral hernia.    Incarcerated recurrent incisional hernia with small bowel obstruction   Status post open ventral hernia repair with mesh and incidental appendectomy 6/14/2021  Postop care per general surgery: Anticipate NGT removal today  Hypokalemia: Replace K   COPD, smoker, LISA  scheduled nebs, no wheezing today  Getting frequent Dilaudid monitor closely  Pulmonology following  Atrial fibrillation  Anticoagulation cleared by surgery resume Eliquis when taking p.o.  Scheduled IV metoprolol, IV dig per cardiology  Had diuresis with IV furosemide yesterday, cardiology continuing today. 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  When taking p.o. resume Eliquis  Body mass index is 41.57 kg/m².   Full code  Discussed with patient and nursing staff  Anticipate discharge home timing yet to be determined. PT eval, encourage increase activity    Dex Solano MD  [facility]  06/17/21  14:50 EDT    I wore protective equipment throughout this patient encounter including a face mask, gloves, and protective eyewear.  Hand hygiene was performed before donning protective equipment and after removal when leaving the room.

## 2021-06-17 NOTE — CASE MANAGEMENT/SOCIAL WORK
Continued Stay Note  [facility]     Patient Name: Marilu Avery  MRN: [id number]  Today's Date: 6/17/2021    Admit Date: 6/13/2021    Discharge Plan     Row Name 06/17/21 1711       Plan    Plan  Referral to [facility] and [facility] vs Home with [facility] Referral    Patient/Family in Agreement with Plan  yes    Plan Comments  CCP Spoke with pt at bedside, pt still has NG tube but feeling better than yesterday. CCP discussed dc planning SNF vs HH services and pre-cert process. Provided her the SNF/HH list and she is unsure of her dc plan. CCP encouraged to have both HH and SNF plan pending on her course. Pt would like HH referral to [facility] and SNF referral to [facility]. CCP made referrals. eli genao/ccp        Discharge Codes    No documentation.             Ara Jim RN

## 2021-06-17 NOTE — PROGRESS NOTES
Postop day #3 open ventral hernia repair    Subjective:  Continues to feel slightly better.  Reports passing a small amount of flatus.  Pain seems adequately controlled.    Objective:  Afebrile, heart rate 110s today, blood pressure 145/91  General: Awake and alert, no acute distress  Abdomen: Softer today, still distended and firm, incision is in good order    Drains are serosanguineous    Assessment and plan:  -Small bowel obstruction secondary to incarcerated incisional hernia, now 3 days out from open ventral hernia repair  -Beginning to show some signs of regaining GI function, NG output is decreased, so I think we can try to get it out today.  We will clamp it first.  -Perhaps clear liquids tomorrow  -Begin to mobilize  -Okay for anticoagulation from my standpoint    Arnulfo Leonard MD  General and Endoscopic Surgery  [facility]    [address], [address]  [place], [place], [address]  P: [phone]  F: [phone]

## 2021-06-17 NOTE — PROGRESS NOTES
"CC: Persistent atrial fibrillation    Interval History: No new acute events overnight      Vital Signs  Temp:  [97.1 °F (36.2 °C)-98 °F (36.7 °C)] 97.5 °F (36.4 °C)  Heart Rate:  [] 123  Resp:  [20-24] 20  BP: (124-151)/(74-91) 145/91    Intake/Output Summary (Last 24 hours) at 6/17/2021 0942  Last data filed at 6/17/2021 0906  Gross per 24 hour   Intake 825 ml   Output 2600 ml   Net -1775 ml     Flowsheet Rows      First Filed Value   Admission Height  177.8 cm (70\") Documented at 06/13/2021 1733   Admission Weight  (!) 137 kg (302 lb) Documented at 06/13/2021 2230          PHYSICAL EXAM:  General: No acute distress, NG tube in place, morbidly obese, patient sitting on chair  Resp:NL Rate, symmetric chest expansion,unlabored, clear  CV: Irregularly irregular, NL PMI, NL S1 and S2, no Murmur, no gallop, no rub, No JVD.   ABD: Dressed surgical site wound  Neuro: alert,cooperative and oriented  Extr:Normal pedal pulses, trace pedal edema, moves all extremities      Results Review:    Results from last 7 days   Lab Units 06/17/21  0535   SODIUM mmol/L 141   POTASSIUM mmol/L 3.3*   CHLORIDE mmol/L 106   CO2 mmol/L 24.1   BUN mg/dL 17   CREATININE mg/dL 0.74   GLUCOSE mg/dL 106*   CALCIUM mg/dL 8.4*         Results from last 7 days   Lab Units 06/16/21  0516 06/14/21  0512   WBC 10*3/mm3  --  4.01   HEMOGLOBIN g/dL 11.6* 14.1   HEMATOCRIT % 34.4 42.3   PLATELETS 10*3/mm3  --  185     Results from last 7 days   Lab Units 06/14/21  0512 06/13/21  1810   INR  1.21* 1.31*   APTT seconds  --  31.9         Results from last 7 days   Lab Units 06/16/21  0516   MAGNESIUM mg/dL 2.0         I reviewed the patient's new clinical results.  I personally viewed and interpreted the patient's EKG/Telemetry data        Medication Review:   Meds reviewed    sodium chloride, 75 mL/hr, Last Rate: 75 mL/hr (06/14/21 1941)        Assessment/Plan    1.  Persistent atrial fibrillation with RVR  -Received IV digoxin 500 mcg x 1 yesterday, " and currently on metoprolol 5 mg IV every 6 hours.  -Also received a dose of IV Lasix 20 mg yesterday.  -Heart rate control is acceptable.  -Anticoagulation on hold because of surgery-DVT prophylactic dose would suffice at this point but restart Eliquis when cleared by surgery.  2.  Acute hypoxemic respiratory failure-on supplemental oxygen  -She has good urine output with a dose of Lasix given yesterday.  She takes Lasix at home regularly.  proBNP is mildly elevated.  I will repeat a dose of IV Lasix today.  3.. Incarcerated incisional hernia status post primary repair on 6/14/2021  3.  Morbidly obese with BMI of 41.6 kg/m² with comorbidities including*active sleep apnea  4.  Reportedly have CKD stage III: Creatinine improved during current hospitalization.  5.  Hypertension-blood pressure almost within normal range on IV metoprolol.  6.  History of tobacco use  7.  Hypercholesterolemia    Patient currently hemodynamically stable.  Heart rate control is reasonable considering acute distress.  Patient has started passing gas and asking for water.  I will switch medications to oral once she started oral intake.  Start Eliquis 5 mg p.o. twice daily when okay with surgery.  Give dose of Lasix 40 mg IV x1 today.      Wes Sandoval MD  06/17/21  09:42 EDT

## 2021-06-17 NOTE — PLAN OF CARE
Goal Outcome Evaluation:  Plan of Care Reviewed With: patient        Progress: improving  Outcome Summary: VSS. Alert, HR still tachy, controlled with IV metoprolol. Currently on 5L with sats in 92-95%. C/O of pain, IV dilaudid given with relief. NG in place to low wall suction, Plan for Echo today per note. Will CTM

## 2021-06-17 NOTE — PROGRESS NOTES
Clinical Pharmacy Services: Medication History    Marilu Avery is a 65 y.o. female presenting to [facility] for Morbid obesity (CMS/HCC) [E66.01]  Incarcerated ventral hernia [K43.6]  Anticoagulated [Z79.01]  Ventral hernia with bowel obstruction [K43.6]  Chronic obstructive pulmonary disease, unspecified COPD type (CMS/HCC) [J44.9]    She  has a past medical history of Arthritis, Asthma, Atrial fibrillation (CMS/HCC), Colon polyps (2015), Hyperlipidemia, Hypertension, and Sleep apnea.    Allergies as of 06/13/2021   • (No Known Allergies)       Medication information was obtained from: patient interview   Pharmacy and Phone Number: [facility] [phone]    Prior to Admission Medications     Prescriptions Last Dose Informant Patient Reported? Taking?    allopurinol (ZYLOPRIM) 100 MG tablet   Yes Yes    Take 100 mg by mouth Daily.    amLODIPine (NORVASC) 5 MG tablet Patient Taking Differently  Yes Yes    Take 10 mg by mouth Daily.    apixaban (ELIQUIS) 5 MG tablet tablet   Yes Yes    Take 5 mg by mouth 2 (Two) Times a Day.    atorvastatin (LIPITOR) 20 MG tablet Patient Taking Differently  Yes Yes    Take 80 mg by mouth Daily.    B Complex Vitamins (vitamin b complex) capsule capsule   Yes Yes    Take 2 capsules by mouth Daily.    colchicine 0.6 MG tablet   Yes Yes    Take 0.6 mg by mouth Daily.    furosemide (LASIX) 40 MG tablet Patient Taking Differently  Yes Yes    Take 20 mg by mouth Daily.    metoprolol tartrate (LOPRESSOR) 100 MG tablet Patient Taking Differently  Yes Yes    Take 150 mg by mouth 2 (Two) Times a Day.    vitamin D3 125 MCG (5000 UT) capsule capsule   Yes Yes    Take 2,000 Units by mouth Daily.    Cholecalciferol (VITAMIN D PO)   Yes No    Take 1 tablet by mouth Daily.    dicyclomine (BENTYL) 20 MG tablet   No No    Take 1 tablet by mouth Every 8 (Eight) Hours As Needed (pain).            Medication notes:   Removed following medications:  Mometasone inhaler, duplicate dicyclomine  order, lisinopril, and rivaroxaban as patient is no longer taking these.    This medication list is complete to the best of my knowledge as of 6/17/2021    Please call if questions.    Thanks,  Rupesh Tobar.D, [facility]  6/17/2021

## 2021-06-17 NOTE — PLAN OF CARE
Goal Outcome Evaluation:  Plan of Care Reviewed With: patient        Progress: improving  Outcome Summary: Vitals stable, HR not sustaining above 120.  Up to chair for 2.5 hrs.  Decreased O2 demand, weaned to 2L.  Pulm consulted. Pt working on IS.  Venous duplex completed.  IV lasix given.  NG out, passing gas.  Continue to monitor.

## 2021-06-17 NOTE — DISCHARGE PLACEMENT REQUEST
"Randi Avery (65 y.o. Female)     Date of Birth Social Security Number Address Home Phone MRN    1955  [address]  [place] [address]  [id number]    Orthodox Marital Status          Congregational        Admission Date Admission Type Admitting Provider Attending Provider Department, Room/Bed    6/13/21 Emergency Ray, MD Xiomara Kruse Minh Loc, MD [address], [address]    Discharge Date Discharge Disposition Discharge Destination                       Attending Provider: Dex Solano MD    Allergies: No Known Allergies    Isolation: None   Infection: None   Code Status: CPR    Ht: 177.8 cm (70\")   Wt: 131 kg (289 lb 11.2 oz)    Admission Cmt: None   Principal Problem: Incarcerated ventral hernia [K43.6]                 Active Insurance as of 6/13/2021     Primary Coverage     Payor Plan Insurance Group Employer/Plan Group    [facility] ANTH PATHWAYS TRANSITION HMO NON PAR [id number]     Payor Plan Address Payor Plan Phone Number Payor Plan Fax Number Effective Dates    [address]   1/1/2021 - None Entered    [facility] [place] [address]       Subscriber Name Subscriber Birth Date Member ID       RANDI AVERY 1955 [id number]                 Emergency Contacts      (Rel.) Home Phone Work Phone Mobile Phone    Jameel Avery (Son) [phone] -- [phone]              "

## 2021-06-17 NOTE — THERAPY EVALUATION
Patient Name: Marilu Avery  : 1955    MRN: [id number]                              Today's Date: 2021       Admit Date: 2021    Visit Dx:     ICD-10-CM ICD-9-CM   1. Ventral hernia with bowel obstruction  K43.6 552.20   2. Chronic obstructive pulmonary disease, unspecified COPD type (CMS/[facility])  J44.9 496   3. Anticoagulated  Z79.01 V58.61   4. Morbid obesity (CMS/[facility])  E66.01 278.01   5. Small bowel obstruction (CMS/[facility])  K56.609 560.9   6. Incarcerated hernia  K46.0 552.9     Patient Active Problem List   Diagnosis   • Incisional hernia, without obstruction or gangrene   • Incarcerated ventral hernia   • Atrial fibrillation (CMS/[facility])   • HLD (hyperlipidemia)   • HTN (hypertension)   • LISA (obstructive sleep apnea)   • Stage 3b chronic kidney disease (CMS/[facility])   • Chronic anticoagulation   • SBO (small bowel obstruction) (CMS/[facility])   • Tobacco abuse   • Morbid obesity with BMI of 40.0-44.9, adult (CMS/[facility])   • COPD (chronic obstructive pulmonary disease) (CMS/[facility])   • Hypopotassemia     Past Medical History:   Diagnosis Date   • Arthritis    • Asthma    • Atrial fibrillation (CMS/[facility])    • Colon polyps 2015    hyperplastic rectal polyp   • Hyperlipidemia    • Hypertension    • Sleep apnea      Past Surgical History:   Procedure Laterality Date   • BACK SURGERY N/A    • COLONOSCOPY N/A 10/22/2015    Rectal polyp-Dr. Elías Keating   • HYSTERECTOMY N/A    • LAPAROSCOPIC CHOLECYSTECTOMY N/A 2010    Dr. Heath Whitlock   • OOPHORECTOMY     • REPLACEMENT TOTAL KNEE Left 2015    Dr. Yo Aguayo   • REPLACEMENT TOTAL KNEE Right 2015    Dr. Yo Aguayo   • VENTRAL HERNIA REPAIR N/A 10/22/2015    Open reduction of incarcerated ventral hernia with layered closure-Dr. Elías Keating   • VENTRAL/INCISIONAL HERNIA REPAIR N/A 2021    Procedure: OPEN VENTRAL/INCISIONAL HERNIA REPAIR WITH MESH AND APPENDECTOMY;  Surgeon: Arnulfo Leonard MD;  Location: [facility] OR;  Service:  General;  Laterality: N/A;     General Information     Row Name 06/17/21 0850          Physical Therapy Time and Intention    Document Type  evaluation  -SV     Mode of Treatment  individual therapy;physical therapy  -SV     Row Name 06/17/21 0850          General Information    Patient Profile Reviewed  yes  -SV     Prior Level of Function  independent:  -SV     Existing Precautions/Restrictions  fall;oxygen therapy device and L/min  -SV     Barriers to Rehab  -- abdominal surgery  -SV     Row Name 06/17/21 0850          Living Environment    Lives With  alone  -SV     Row Name 06/17/21 0850          Home Main Entrance    Number of Stairs, Main Entrance  none  -SV     Row Name 06/17/21 0850          Stairs Within Home, Primary    Stairs, Within Home, Primary  condo first floor  -SV     Number of Stairs, Within Home, Primary  none  -SV     Row Name 06/17/21 0850          Cognition    Orientation Status (Cognition)  oriented x 4  -SV     Row Name 06/17/21 0850          Safety Issues, Functional Mobility    Impairments Affecting Function (Mobility)  balance;endurance/activity tolerance;shortness of breath;pain;strength  -SV       User Key  (r) = Recorded By, (t) = Taken By, (c) = Cosigned By    Initials Name Provider Type    SV Loretta Barrett, PT Physical Therapist        Mobility     Row Name 06/17/21 1523          Bed Mobility    Comment (Bed Mobility)  NT due to up oneob with nsg asst  -SV     Row Name 06/17/21 1520          Sit-Stand Transfer    Sit-Stand [place] (Transfers)  minimum assist (75% patient effort);moderate assist (50% patient effort);2 person assist  -SV     Assistive Device (Sit-Stand Transfers)  walker, front-wheeled  -SV     Row Name 06/17/21 152          Gait/Stairs (Locomotion)    [place] Level (Gait)  minimum assist (75% patient effort);2 person assist  -SV     Distance in Feet (Gait)  2-3 steps from eob to recliner, shuffle steps, ng suction off  -SV       User Key  (r) = Recorded  By, (t) = Taken By, (c) = Cosigned By    Initials Name Provider Type     Loretta Barrett, PT Physical Therapist        Obj/Interventions     Row Name 06/17/21 1530          Range of Motion Comprehensive    General Range of Motion  no range of motion deficits identified  -     Row Name 06/17/21 1530          Strength Comprehensive (MMT)    Comment, General Manual Muscle Testing (MMT) Assessment  BUE/ BLE gross general strength >3/5 obs with mobility  -     Row Name 06/17/21 1530          Motor Skills    Therapeutic Exercise  -- laq x 5 codey  -SV     Row Name 06/17/21 1530          Balance    Comment, Balance  sit bal cga  -SV     Row Name 06/17/21 1530          Sensory Assessment (Somatosensory)    Sensory Assessment (Somatosensory)  not tested  -       User Key  (r) = Recorded By, (t) = Taken By, (c) = Cosigned By    Initials Name Provider Type     Loretta Barrett, PT Physical Therapist        Goals/Plan     Row Name 06/17/21 1533          Bed Mobility Goal 1 (PT)    Activity/Assistive Device (Bed Mobility Goal 1, PT)  sidelying to sit/sit to sidelying  -SV     [place] Level/Cues Needed (Bed Mobility Goal 1, PT)  supervision required;modified independence  -SV     Time Frame (Bed Mobility Goal 1, PT)  10 days  -SV     Row Name 06/17/21 1533          Transfer Goal 1 (PT)    Activity/Assistive Device (Transfer Goal 1, PT)  sit-to-stand/stand-to-sit  -SV     [place] Level/Cues Needed (Transfer Goal 1, PT)  supervision required;modified independence  -SV     Time Frame (Transfer Goal 1, PT)  10 days  -SV     Strategies/Barriers (Transfers Goal 1, PT)  abd incision/pain, least vs no AD  -SV     Row Name 06/17/21 1533          Gait Training Goal 1 (PT)    Activity/Assistive Device (Gait Training Goal 1, PT)  gait (walking locomotion)  -SV     [place] Level (Gait Training Goal 1, PT)  supervision required;modified independence  -SV     Distance (Gait Training Goal 1, PT)  200'  with least vs no AD   -SV     Time Frame (Gait Training Goal 1, PT)  10 days  -SV       User Key  (r) = Recorded By, (t) = Taken By, (c) = Cosigned By    Initials Name Provider Type    SV Loretta Barrett, PT Physical Therapist        Clinical Impression     Row Name 06/17/21 1531          Pain    Additional Documentation  Pain Scale: FACES Pre/Post-Treatment (Group)  -SV     Row Name 06/17/21 1531          Pain Scale: Numbers Pre/Post-Treatment    Pain Intervention(s)  Ambulation/increased activity;Repositioned  -[facility] Name 06/17/21 1531          Pain Scale: FACES Pre/Post-Treatment    Pain: FACES Scale, Pretreatment  6-->hurts even more  -SV     Posttreatment Pain Rating  6-->hurts even more  -SV     Pain Location - Side  Bilateral  -SV     Pain Location  foot  -SV     Pre/Posttreatment Pain Comment  edemal noted codey feet  -SV     Row Name 06/17/21 1531          Plan of Care Review    Plan of Care Reviewed With  patient  -SV     Row Name 06/17/21 1531          Therapy Assessment/Plan (PT)    Patient/Family Therapy Goals Statement (PT)  return to indep amb  -SV     Rehab Potential (PT)  good, to achieve stated therapy goals  -SV     Criteria for Skilled Interventions Met (PT)  yes  -SV     Predicted Duration of Therapy Intervention (PT)  1-2 weeks  -SV     [facility] Name 06/17/21 1531          Vital Signs    Pre Systolic BP Rehab  137  -SV     Pre Treatment Diastolic BP  82  -SV     Pretreatment Heart Rate (beats/min)  111  -SV     Pre SpO2 (%)  93  -SV     O2 Delivery Pre Treatment  supplemental O2  -SV     Intra SpO2 (%)  94  -SV     O2 Delivery Intra Treatment  supplemental O2  -SV     Post SpO2 (%)  5  -SV     O2 Delivery Post Treatment  supplemental O2  -SV     Pre Patient Position  Sitting  -SV     Intra Patient Position  Standing  -SV     Post Patient Position  Sitting  -SV     Row Name 06/17/21 1531          Positioning and Restraints    Pre-Treatment Position  in bed  -SV     Post Treatment Position  chair  -SV     In Chair   notified nsg;sitting;call light within reach;encouraged to call for assist;exit alarm on  -SV       User Key  (r) = Recorded By, (t) = Taken By, (c) = Cosigned By    Initials Name Provider Type    SV Loretta Barrett, PT Physical Therapist        Outcome Measures     Row Name 06/17/21 1534          How much help from another person do you currently need...    Turning from your back to your side while in flat bed without using bedrails?  2  -SV     Moving from lying on back to sitting on the side of a flat bed without bedrails?  2  -SV     Moving to and from a bed to a chair (including a wheelchair)?  2  -SV     Standing up from a chair using your arms (e.g., wheelchair, bedside chair)?  2  -SV     Climbing 3-5 steps with a railing?  1  -SV     To walk in hospital room?  2  -SV     AM-PAC 6 Clicks Score (PT)  11  -SV     Row Name 06/17/21 1534          Functional Assessment    Outcome Measure Options  AM-PAC 6 Clicks Basic Mobility (PT)  -SV       User Key  (r) = Recorded By, (t) = Taken By, (c) = Cosigned By    Initials Name Provider Type    SV Loretta Barrett, PT Physical Therapist        Physical Therapy Education                 Title: PT OT SLP Therapies (Done)     Topic: Physical Therapy (Done)     Point: Mobility training (Done)     Learning Progress Summary           Patient Eager, E,TB,D, VU,NR by  at 6/17/2021 1535                   Point: Home exercise program (Done)     Learning Progress Summary           Patient Eager, E,TB,D, VU,NR by  at 6/17/2021 1535                   Point: Body mechanics (Done)     Learning Progress Summary           Patient Eager, E,TB,D, VU,NR by  at 6/17/2021 1535                               User Key     Initials Effective Dates Name Provider Type Discipline     06/16/21 -  Loretta Barrett, PT Physical Therapist PT              PT Recommendation and Plan  Planned Therapy Interventions (PT): balance training, bed mobility training, gait training, home exercise  program, patient/family education, strengthening, stair training, transfer training  Plan of Care Reviewed With: patient     Time Calculation:   PT Charges     Row Name 06/17/21 1540             Time Calculation    Start Time  0850  -SV      Stop Time  0906  -SV      Time Calculation (min)  16 min  -SV      PT Received On  06/17/21  -SV      PT - Next Appointment  06/18/21  -SV      PT Goal Re-Cert Due Date  06/27/21  -SV        User Key  (r) = Recorded By, (t) = Taken By, (c) = Cosigned By    Initials Name Provider Type    SV Loretta Barrett, PT Physical Therapist        Therapy Charges for Today     Code Description Service Date Service Provider Modifiers Qty    10903319444 HC PT EVAL MOD COMPLEXITY 1 6/17/2021 Loretta Barrett, PT GP 1    80976415366 HC PT THER SUPP EA 15 MIN 6/17/2021 Loretta Barrett, PT GP 1          PT G-Codes  Outcome Measure Options: AM-PAC 6 Clicks Basic Mobility (PT)  AM-PAC 6 Clicks Score (PT): 11    Loretta Barrett PT  6/17/2021

## 2021-06-18 LAB — POTASSIUM SERPL-SCNC: 3.4 MMOL/L (ref 3.5–5.2)

## 2021-06-18 PROCEDURE — 99232 SBSQ HOSP IP/OBS MODERATE 35: CPT | Performed by: INTERNAL MEDICINE

## 2021-06-18 PROCEDURE — 84132 ASSAY OF SERUM POTASSIUM: CPT | Performed by: HOSPITALIST

## 2021-06-18 PROCEDURE — 25010000002 HYDROMORPHONE PER 4 MG: Performed by: HOSPITALIST

## 2021-06-18 PROCEDURE — 25010000002 HYDROMORPHONE PER 4 MG: Performed by: SURGERY

## 2021-06-18 PROCEDURE — 25010000002 FUROSEMIDE PER 20 MG: Performed by: INTERNAL MEDICINE

## 2021-06-18 PROCEDURE — 94799 UNLISTED PULMONARY SVC/PX: CPT

## 2021-06-18 PROCEDURE — 99024 POSTOP FOLLOW-UP VISIT: CPT | Performed by: SURGERY

## 2021-06-18 RX ORDER — METOPROLOL TARTRATE 50 MG/1
100 TABLET, FILM COATED ORAL EVERY 12 HOURS SCHEDULED
Status: DISCONTINUED | OUTPATIENT
Start: 2021-06-18 | End: 2021-06-19

## 2021-06-18 RX ORDER — HYDROMORPHONE HYDROCHLORIDE 1 MG/ML
0.5 INJECTION, SOLUTION INTRAMUSCULAR; INTRAVENOUS; SUBCUTANEOUS EVERY 4 HOURS PRN
Status: DISCONTINUED | OUTPATIENT
Start: 2021-06-18 | End: 2021-06-20

## 2021-06-18 RX ORDER — FUROSEMIDE 40 MG/1
40 TABLET ORAL DAILY
Status: DISCONTINUED | OUTPATIENT
Start: 2021-06-19 | End: 2021-06-23 | Stop reason: HOSPADM

## 2021-06-18 RX ORDER — FUROSEMIDE 10 MG/ML
40 INJECTION INTRAMUSCULAR; INTRAVENOUS ONCE
Status: COMPLETED | OUTPATIENT
Start: 2021-06-18 | End: 2021-06-18

## 2021-06-18 RX ORDER — LOSARTAN POTASSIUM 25 MG/1
25 TABLET ORAL
Status: DISCONTINUED | OUTPATIENT
Start: 2021-06-18 | End: 2021-06-23 | Stop reason: HOSPADM

## 2021-06-18 RX ADMIN — LOSARTAN POTASSIUM 25 MG: 25 TABLET, FILM COATED ORAL at 17:08

## 2021-06-18 RX ADMIN — METOPROLOL TARTRATE 5 MG: 5 INJECTION, SOLUTION INTRAVENOUS at 04:03

## 2021-06-18 RX ADMIN — BUDESONIDE 0.25 MG: 0.25 SUSPENSION RESPIRATORY (INHALATION) at 20:32

## 2021-06-18 RX ADMIN — FAMOTIDINE 20 MG: 10 INJECTION INTRAVENOUS at 22:00

## 2021-06-18 RX ADMIN — IPRATROPIUM BROMIDE AND ALBUTEROL SULFATE 3 ML: 2.5; .5 SOLUTION RESPIRATORY (INHALATION) at 10:14

## 2021-06-18 RX ADMIN — HYDROMORPHONE HYDROCHLORIDE 0.5 MG: 1 INJECTION, SOLUTION INTRAMUSCULAR; INTRAVENOUS; SUBCUTANEOUS at 13:00

## 2021-06-18 RX ADMIN — FAMOTIDINE 20 MG: 10 INJECTION INTRAVENOUS at 09:03

## 2021-06-18 RX ADMIN — SODIUM CHLORIDE, PRESERVATIVE FREE 10 ML: 5 INJECTION INTRAVENOUS at 09:04

## 2021-06-18 RX ADMIN — HYDROMORPHONE HYDROCHLORIDE 0.5 MG: 1 INJECTION, SOLUTION INTRAMUSCULAR; INTRAVENOUS; SUBCUTANEOUS at 17:09

## 2021-06-18 RX ADMIN — HYDROMORPHONE HYDROCHLORIDE 0.5 MG: 1 INJECTION, SOLUTION INTRAMUSCULAR; INTRAVENOUS; SUBCUTANEOUS at 04:16

## 2021-06-18 RX ADMIN — IPRATROPIUM BROMIDE AND ALBUTEROL SULFATE 3 ML: 2.5; .5 SOLUTION RESPIRATORY (INHALATION) at 17:17

## 2021-06-18 RX ADMIN — HYDROMORPHONE HYDROCHLORIDE 0.5 MG: 1 INJECTION, SOLUTION INTRAMUSCULAR; INTRAVENOUS; SUBCUTANEOUS at 00:14

## 2021-06-18 RX ADMIN — LOSARTAN POTASSIUM 25 MG: 25 TABLET, FILM COATED ORAL at 16:59

## 2021-06-18 RX ADMIN — METOPROLOL TARTRATE 100 MG: 50 TABLET, FILM COATED ORAL at 19:55

## 2021-06-18 RX ADMIN — APIXABAN 5 MG: 5 TABLET, FILM COATED ORAL at 21:10

## 2021-06-18 RX ADMIN — METOPROLOL TARTRATE 5 MG: 5 INJECTION, SOLUTION INTRAVENOUS at 09:03

## 2021-06-18 RX ADMIN — FUROSEMIDE 40 MG: 40 INJECTION, SOLUTION INTRAMUSCULAR; INTRAVENOUS at 16:03

## 2021-06-18 RX ADMIN — HYDROMORPHONE HYDROCHLORIDE 0.5 MG: 1 INJECTION, SOLUTION INTRAMUSCULAR; INTRAVENOUS; SUBCUTANEOUS at 09:03

## 2021-06-18 RX ADMIN — BUDESONIDE 0.25 MG: 0.25 SUSPENSION RESPIRATORY (INHALATION) at 10:16

## 2021-06-18 RX ADMIN — IPRATROPIUM BROMIDE AND ALBUTEROL SULFATE 3 ML: 2.5; .5 SOLUTION RESPIRATORY (INHALATION) at 13:46

## 2021-06-18 RX ADMIN — IPRATROPIUM BROMIDE AND ALBUTEROL SULFATE 3 ML: 2.5; .5 SOLUTION RESPIRATORY (INHALATION) at 20:31

## 2021-06-18 RX ADMIN — HYDROMORPHONE HYDROCHLORIDE 0.5 MG: 1 INJECTION, SOLUTION INTRAMUSCULAR; INTRAVENOUS; SUBCUTANEOUS at 21:10

## 2021-06-18 NOTE — PROGRESS NOTES
Name: Marilu Avery ADMIT: 2021   : 1955  PCP: Anatoly Jimenez MD    MRN: [id number] LOS: 5 days   AGE/SEX: 65 y.o. female  ROOM: [facility]     Subjective   Subjective    No new complaints. +gas, no BM    Review of Systems   Constitutional: Negative for fever.   Respiratory: Negative for cough and shortness of breath.    Cardiovascular: Negative for chest pain.      Objective   Objective   Vital Signs  Temp:  [98 °F (36.7 °C)-98.4 °F (36.9 °C)] 98.4 °F (36.9 °C)  Heart Rate:  [] 107  Resp:  [18-20] 20  BP: (121-177)/(72-86) 144/86  SpO2:  [92 %-96 %] 96 %  on  Flow (L/min):  [2] 2;   Device (Oxygen Therapy): nasal cannula  Body mass index is 41.57 kg/m².    Physical Exam  Constitutional:       Appearance: Normal appearance.   HENT:      Head: Normocephalic and atraumatic.   Cardiovascular:      Rate and Rhythm: Normal rate. Rhythm irregular.   Pulmonary:      Effort: Pulmonary effort is normal. No respiratory distress.      Breath sounds: No wheezing or rhonchi.   Abdominal:      Comments: abdominal binder   Musculoskeletal:         General: Swelling (improved) present.   Skin:     General: Skin is warm and dry.   Neurological:      General: No focal deficit present.      Mental Status: She is alert and oriented to person, place, and time.   Psychiatric:         Mood and Affect: Mood normal.         Behavior: Behavior normal.     Results Review  I reviewed the patient's new clinical results.  Results from last 7 days   Lab Units 21  0516 21  0512 21  1810   WBC 10*3/mm3  --  4.01 8.39   HEMOGLOBIN g/dL 11.6* 14.1 15.8   PLATELETS 10*3/mm3  --  185 246     Results from last 7 days   Lab Units 21  0535 21  0516 21  0512 21  1810   SODIUM mmol/L 141 139 137 134*   POTASSIUM mmol/L 3.3* 3.3* 4.0 3.8   CHLORIDE mmol/L 106 105 99 96*   CO2 mmol/L 24.1 22.8 24.0 22.2   BUN mg/dL 17 19 17 16   CREATININE mg/dL 0.74 0.78 1.12* 1.32*   GLUCOSE mg/dL 106* 119*  128* 142*     Estimated Creatinine Clearance: 113.4 mL/min (by C-G formula based on SCr of 0.74 mg/dL).    Results from last 7 days   Lab Units 06/13/21  1810   ALBUMIN g/dL 4.80   BILIRUBIN mg/dL 1.1   ALK PHOS U/L 89   AST (SGOT) U/L 33*   ALT (SGPT) U/L 23     Results from last 7 days   Lab Units 06/17/21  0535 06/16/21  0516 06/14/21  0512 06/13/21  1810   CALCIUM mg/dL 8.4* 8.1* 9.0 10.2   ALBUMIN g/dL  --   --   --  4.80   MAGNESIUM mg/dL  --  2.0  --   --      Results from last 7 days   Lab Units 06/13/21  1810   LACTATE mmol/L 1.6     No results found for: HGBA1C, POCGLU    Scheduled Meds  budesonide, 0.25 mg, Nebulization, BID - RT  famotidine, 20 mg, Intravenous, Q12H  furosemide, 40 mg, Intravenous, Once  ipratropium-albuterol, 3 mL, Nebulization, 4x Daily - RT  metoprolol tartrate, 5 mg, Intravenous, Q6H  sodium chloride, 10 mL, Intravenous, Q12H    Continuous Infusions   PRN Meds  •  acetaminophen **OR** acetaminophen **OR** acetaminophen  •  HYDROmorphone  •  ipratropium-albuterol  •  nitroglycerin  •  ondansetron  •  potassium chloride **OR** potassium chloride **OR** potassium chloride  •  [COMPLETED] Insert peripheral IV **AND** sodium chloride  •  sodium chloride     Diet  NPO Diet       Intake/Output Summary (Last 24 hours) at 6/18/2021 1232  Last data filed at 6/18/2021 0749  Gross per 24 hour   Intake 300 ml   Output 2425 ml   Net -2125 ml         Assessment/Plan     Active Hospital Problems    Diagnosis  POA   • **Incarcerated ventral hernia [K43.6]  Yes   • Hypopotassemia [E87.6]  Unknown   • Tobacco abuse [Z72.0]  Yes   • Morbid obesity with BMI of 40.0-44.9, adult (CMS/[facility]) [E66.01, Z68.41]  Not Applicable   • COPD (chronic obstructive pulmonary disease) (CMS/[facility]) [J44.9]  Yes   • Atrial fibrillation (CMS/HCC) [I48.91]  Yes   • HTN (hypertension) [I10]  Yes   • LISA (obstructive sleep apnea) [G47.33]  Yes   • Stage 3b chronic kidney disease (CMS/HCC) [N18.32]  Yes   • Chronic anticoagulation  [Z79.01]  Not Applicable   • SBO (small bowel obstruction) (CMS/HCC) [K56.609]  Yes      Resolved Hospital Problems   No resolved problems to display.     65 y.o. female admitted with Incarcerated ventral hernia.    Incarcerated recurrent incisional hernia with small bowel obstruction   Status post open ventral hernia repair with mesh and incidental appendectomy 6/14/2021  Postop care per general surgery:  Hypokalemia: Replace K   Getting frequent Dilaudid actually less the last few days will increase interval between as needed doses  NPO Diet   COPD, smoker, LISA  Pulmonology following  Atrial fibrillation  currently n.p.o. 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  See above  Body mass index is 41.57 kg/m².   Full code  Discussed with patient and nursing staff  Anticipate discharge home timing yet to be determined. PT eval, encourage increase activity again today    Dex Solano MD  [facility]  06/18/21  12:32 EDT    I wore protective equipment throughout this patient encounter including a face mask, gloves, and protective eyewear.  Hand hygiene was performed before donning protective equipment and after removal when leaving the room.

## 2021-06-18 NOTE — PROGRESS NOTES
[facility] INPATIENT PROGRESS NOTE         [address]    2021      PATIENT IDENTIFICATION:  Name: Marilu Avery ADMIT: 2021   : 1955  PCP: Anatoly Jimenez MD    MRN: [id number] LOS: 5 days   AGE/SEX: 65 y.o. female  ROOM: [facility]                     LOS 5    Reason for visit: Respiratory failure      SUBJECTIVE:      Oxygen down to 2 L/min.  Encourage incentive spirometer use.  Denies productive cough or chest pain.  Trace to 1+ lower extremity edema bilaterally.  I am seeing the patient for the first time today.  All patient problems are new to me.      Objective   OBJECTIVE:    Vital Sign Min/Max for last 24 hours  Temp  Min: 98 °F (36.7 °C)  Max: 98.4 °F (36.9 °C)   BP  Min: 121/72  Max: 177/85   Pulse  Min: 100  Max: 119   Resp  Min: 18  Max: 20   SpO2  Min: 92 %  Max: 95 %   No data recorded   No data recorded                         Body mass index is 41.57 kg/m².    Intake/Output Summary (Last 24 hours) at 2021 1000  Last data filed at 2021 0749  Gross per 24 hour   Intake 300 ml   Output 2425 ml   Net -2125 ml         Exam:  GEN:  No distress, appears stated age  EYES:   PERRL, anicteric sclera  ENT:    External ears/nose normal, OP clear  NECK:  No adenopathy, midline trachea  LUNGS: Normal chest on inspection, palpation and diminished breath sounds bilaterally on auscultation  CV:  Normal S1S2, without murmur  ABD:  Postoperative changes stable, Non tender, non distended, no hepatosplenomegaly, +BS  EXT:  Trace edema.  No cyanosis or clubbing.  No mottling and normal cap refill.    Assessment     Scheduled meds:  budesonide, 0.25 mg, Nebulization, BID - RT  famotidine, 20 mg, Intravenous, Q12H  ipratropium-albuterol, 3 mL, Nebulization, 4x Daily - RT  metoprolol tartrate, 5 mg, Intravenous, Q6H  sodium chloride, 10 mL, Intravenous, Q12H      IV meds:                         Data Review:  Results from last 7 days   Lab Units 21  0535 21  0516  06/14/21  0512 06/13/21  1810   SODIUM mmol/L 141 139 137 134*   POTASSIUM mmol/L 3.3* 3.3* 4.0 3.8   CHLORIDE mmol/L 106 105 99 96*   CO2 mmol/L 24.1 22.8 24.0 22.2   BUN mg/dL 17 19 17 16   CREATININE mg/dL 0.74 0.78 1.12* 1.32*   GLUCOSE mg/dL 106* 119* 128* 142*   CALCIUM mg/dL 8.4* 8.1* 9.0 10.2         Estimated Creatinine Clearance: 113.4 mL/min (by C-G formula based on SCr of 0.74 mg/dL).  Results from last 7 days   Lab Units 06/16/21  0516 06/14/21  0512 06/13/21  1810   WBC 10*3/mm3  --  4.01 8.39   HEMOGLOBIN g/dL 11.6* 14.1 15.8   PLATELETS 10*3/mm3  --  185 246     Results from last 7 days   Lab Units 06/14/21  0512 06/13/21  1810   INR  1.21* 1.31*     Results from last 7 days   Lab Units 06/13/21  1810   ALT (SGPT) U/L 23   AST (SGOT) U/L 33*     Results from last 7 days   Lab Units 06/16/21  1632   PH, ARTERIAL pH units 7.393   PO2 ART mm Hg 59.3*   PCO2, ARTERIAL mm Hg 40.2   HCO3 ART mmol/L 24.5     Results from last 7 days   Lab Units 06/13/21  1810   LACTATE mmol/L 1.6         No results found for: HGBA1C, POCGLU      Venous Dopplers reviewed: Negative      Chest x-ray 6/17 reviewed shows minimal atelectasis.        Microbiology reviewed          Active Hospital Problems    Diagnosis  POA   • **Incarcerated ventral hernia [K43.6]  Yes   • Hypopotassemia [E87.6]  Unknown   • Tobacco abuse [Z72.0]  Yes   • Morbid obesity with BMI of 40.0-44.9, adult (CMS/[facility]) [E66.01, Z68.41]  Not Applicable   • COPD (chronic obstructive pulmonary disease) (CMS/[facility]) [J44.9]  Yes   • Atrial fibrillation (CMS/[facility]) [I48.91]  Yes   • HTN (hypertension) [I10]  Yes   • LISA (obstructive sleep apnea) [G47.33]  Yes   • Stage 3b chronic kidney disease (CMS/HCC) [N18.32]  Yes   • Chronic anticoagulation [Z79.01]  Not Applicable   • SBO (small bowel obstruction) (CMS/HCC) [K56.609]  Yes      Resolved Hospital Problems   No resolved problems to display.         ASSESSMENT:    Acute hypoxemic respiratory failure requiring 4 L  supplemental oxygen to maintain sats in the 90s  LISA on CPAP  Recurrent incisional hernia with small bowel obstruction/incarceration status post repair  COPD  Atrial fibrillation  Chronic kidney disease 3  Hypertension  Morbid obesity: BMI 41.6 kg/m²        PLAN:    Encourage incentive spirometer use.  Weaning oxygen as able.  Bronchodilators for COPD symptoms.  CPAP for sleep apnea.  Diuresed well.      Anatoly Mcdaniel MD  Pulmonary and Critical Care Medicine  [facility], [facility]  6/18/2021    10:00 EDT

## 2021-06-18 NOTE — THERAPY TREATMENT NOTE
Patient Name: Marilu Avery  : 1955    MRN: [id number]                              Today's Date: 2021       Admit Date: 2021    Visit Dx:     ICD-10-CM ICD-9-CM   1. Ventral hernia with bowel obstruction  K43.6 552.20   2. Chronic obstructive pulmonary disease, unspecified COPD type (CMS/[facility])  J44.9 496   3. Anticoagulated  Z79.01 V58.61   4. Morbid obesity (CMS/[facility])  E66.01 278.01   5. Small bowel obstruction (CMS/[facility])  K56.609 560.9   6. Incarcerated hernia  K46.0 552.9     Patient Active Problem List   Diagnosis   • Incisional hernia, without obstruction or gangrene   • Incarcerated ventral hernia   • Atrial fibrillation (CMS/[facility])   • HLD (hyperlipidemia)   • HTN (hypertension)   • LISA (obstructive sleep apnea)   • Stage 3b chronic kidney disease (CMS/[facility])   • Chronic anticoagulation   • SBO (small bowel obstruction) (CMS/[facility])   • Tobacco abuse   • Morbid obesity with BMI of 40.0-44.9, adult (CMS/[facility])   • COPD (chronic obstructive pulmonary disease) (CMS/[facility])   • Hypopotassemia     Past Medical History:   Diagnosis Date   • Arthritis    • Asthma    • Atrial fibrillation (CMS/[facility])    • Colon polyps 2015    hyperplastic rectal polyp   • Hyperlipidemia    • Hypertension    • Sleep apnea      Past Surgical History:   Procedure Laterality Date   • BACK SURGERY N/A    • COLONOSCOPY N/A 10/22/2015    Rectal polyp-Dr. Elías Keating   • HYSTERECTOMY N/A    • LAPAROSCOPIC CHOLECYSTECTOMY N/A 2010    Dr. Heath Whitlock   • OOPHORECTOMY     • REPLACEMENT TOTAL KNEE Left 2015    Dr. Yo Aguayo   • REPLACEMENT TOTAL KNEE Right 2015    Dr. Yo Aguayo   • VENTRAL HERNIA REPAIR N/A 10/22/2015    Open reduction of incarcerated ventral hernia with layered closure-Dr. Elías Keating   • VENTRAL/INCISIONAL HERNIA REPAIR N/A 2021    Procedure: OPEN VENTRAL/INCISIONAL HERNIA REPAIR WITH MESH AND APPENDECTOMY;  Surgeon: Arnulfo Leonard MD;  Location: [facility] OR;  Service:  General;  Laterality: N/A;     General Information     Row Name 06/18/21 1553          Physical Therapy Time and Intention    Document Type  therapy note (daily note)  -     Mode of Treatment  individual therapy;physical therapy  -[facility] Name 06/18/21 1553          General Information    Patient Profile Reviewed  yes  -     Existing Precautions/Restrictions  fall;oxygen therapy device and L/min  -     Row Name 06/18/21 1553          Cognition    Orientation Status (Cognition)  oriented x 4  -     Row Name 06/18/21 1553          Safety Issues, Functional Mobility    Impairments Affecting Function (Mobility)  balance;endurance/activity tolerance;pain  -       User Key  (r) = Recorded By, (t) = Taken By, (c) = Cosigned By    Initials Name Provider Type    Johanne Nunez PTA Physical Therapy Assistant        Mobility     Row Name 06/18/21 1554          Sit-Stand Transfer    Sit-Stand Howard (Transfers)  2 person assist;minimum assist (75% patient effort);moderate assist (50% patient effort)  -     Assistive Device (Sit-Stand Transfers)  walker, front-wheeled  -     Row Name 06/18/21 1558          Gait/Stairs (Locomotion)    Howard Level (Gait)  2 person assist;minimum assist (75% patient effort)  -     Distance in Feet (Gait)  4 shuffle steps to chair, fearful and painful L foot-10/10  -     Comment (Gait/Stairs)  cues to lock in knees, and use UEs to push through rwx to relieve some of pain in L foot  -       User Key  (r) = Recorded By, (t) = Taken By, (c) = Cosigned By    Initials Name Provider Type    Johanne Nunez PTA Physical Therapy Assistant        Obj/Interventions     Row Name 06/18/21 1555          Motor Skills    Therapeutic Exercise  -- AP, QS, LAQS x5-10 reps , easily off task -pre-occupied w/pain  -       User Key  (r) = Recorded By, (t) = Taken By, (c) = Cosigned By    Initials Name Provider Type    Johanne Nunez PTA Physical Therapy Assistant         Goals/Plan    No documentation.       Clinical Impression     [facility] Name 06/18/21 1555          Pain    Additional Documentation  Pain Scale: Numbers Pre/Post-Treatment (Group)  -[facility] Name 06/18/21 1555          Pain Scale: Numbers Pre/Post-Treatment    Pretreatment Pain Rating  7/10  -     Posttreatment Pain Rating  10/10  -     Pain Location - Side  Left  -     Pain Location  ankle;foot  -     Pre/Posttreatment Pain Comment  and 7/10 in abdomen-has binder on  -     Pain Intervention(s)  Medication (See MAR);Repositioned;Rest  -[facility] Name 06/18/21 4665          Plan of Care Review    Plan of Care Reviewed With  patient  -     Progress  improving  -     Outcome Summary  pt agreed to PT, actually asked to get OOB; pt was able to take shuffle steps bed to chair; limited dist due mainly to L foot /ankle pain 10/10 w/meds; pt currently requires assist of 2 for safety-may need SNU at [facility] Name 06/18/21 9915          Therapy Assessment/Plan (PT)    Rehab Potential (PT)  good, to achieve stated therapy goals  -     Criteria for Skilled Interventions Met (PT)  yes  -[facility] Name 06/18/21 1555          Vital Signs    O2 Delivery Pre Treatment  supplemental O2  -[facility] Name 06/18/21 1525          Positioning and Restraints    Pre-Treatment Position  in bed  -     Post Treatment Position  chair  -     In Chair  sitting;call light within reach;encouraged to call for assist;exit alarm on;notified nsg;with other staff MD in room  -       User Key  (r) = Recorded By, (t) = Taken By, (c) = Cosigned By    Initials Name Provider Type    Johanne Nunze PTA Physical Therapy Assistant        Outcome Measures     [facility] Name 06/18/21 8550          How much help from another person do you currently need...    Turning from your back to your side while in flat bed without using bedrails?  2  -     Moving from lying on back to sitting on the side of a flat bed without bedrails?  2   -JM     Moving to and from a bed to a chair (including a wheelchair)?  2  -JM     Standing up from a chair using your arms (e.g., wheelchair, bedside chair)?  2  -JM     Climbing 3-5 steps with a railing?  1  -JM     To walk in hospital room?  2  -JM     AM-PAC 6 Clicks Score (PT)  11  -       User Key  (r) = Recorded By, (t) = Taken By, (c) = Cosigned By    Initials Name Provider Type     Johanne Mallory PTA Physical Therapy Assistant        Physical Therapy Education                 Title: PT OT SLP Therapies (Done)     Topic: Physical Therapy (Done)     Point: Mobility training (Done)     Learning Progress Summary           Patient Acceptance, E,TB,D, VU,NR by  at 6/18/2021 1558    Eager, E,TB,D, VU,NR by  at 6/17/2021 1535                   Point: Home exercise program (Done)     Learning Progress Summary           Patient Acceptance, E,TB,D, VU,NR by  at 6/18/2021 1558    Eager, E,TB,D, VU,NR by  at 6/17/2021 1535                   Point: Body mechanics (Done)     Learning Progress Summary           Patient Acceptance, E,TB,D, VU,NR by  at 6/18/2021 1558    Eager, E,TB,D, VU,NR by  at 6/17/2021 1535                               User Key     Initials Effective Dates Name Provider Type [facility] 03/07/18 -  Johanne Mallory PTA Physical Therapy Assistant PT     06/16/21 -  Loretta Barrett, PT Physical Therapist PT              PT Recommendation and Plan     Plan of Care Reviewed With: patient  Progress: improving  Outcome Summary: pt agreed to PT, actually asked to get OOB; pt was able to take shuffle steps bed to chair; limited dist due mainly to L foot /ankle pain 10/10 w/meds; pt currently requires assist of 2 for safety-may need SNU at [place]     Time Calculation:         PT G-Codes  Outcome Measure Options: AM-PAC 6 Clicks Basic Mobility (PT)  AM-PAC 6 Clicks Score (PT): 11    Johanne Mallory PTA  6/18/2021

## 2021-06-18 NOTE — PROGRESS NOTES
Postop day 4 open ventral hernia repair    Subjective:  Looks okay, NG out, passing flatus, wants to try liquids.    Objective:  Patient remains afebrile, heart rate 90s to 110s blood pressure 150/76  General: Awake and alert, no distress, up in chair  Abdomen: Soft, appropriately tender, incision is in good order    Drain 1 with minimal output, both are serosanguineous    Assessment and plan:  -Postop day 4 after open ventral hernia repair for small bowel obstruction secondary to incarcerated ventral hernia  -Okay for anticoagulation from my standpoint  -Remove PADILLA 1  -Clear liquids today  -Mobilize as able    Arnulfo Leonard MD  General and Endoscopic Surgery  [facility]    [address], [address]  [place], [place], [address]  P: [phone]  F: [phone]

## 2021-06-18 NOTE — CASE MANAGEMENT/SOCIAL WORK
Continued Stay Note  [facility]     Patient Name: Marilu Avery  MRN: [id number]  Today's Date: 6/18/2021    Admit Date: 6/13/2021    Discharge Plan     Row Name 06/18/21 1000       Plan    Plan  Awaiting other SNF referral choices vs Home with HH--- Pt will need pre-cert for SNF    Patient/Family in Agreement with Plan  yes    Plan Comments  Spoke with [facility], no beds available until mid next week. Spoke with [facility] they are out of network with pts insurance. Spoke with pt at bedside and provided update on referrals, she will review SNF list and let CCP know. Pt agreeable for CCP to make area HH referrals to determine who is in network. Pt will need pre-cert for SNF. Packet in ccp office. Jeff ZAFAR/CCP        Discharge Codes    No documentation.             Ara Jim RN

## 2021-06-18 NOTE — PROGRESS NOTES
"CC: Atrial fibrillation    Interval History:       Vital Signs  Temp:  [98 °F (36.7 °C)-98.4 °F (36.9 °C)] 98.4 °F (36.9 °C)  Heart Rate:  [100-119] 119  Resp:  [18-20] 18  BP: (121-177)/(72-86) 144/86    Intake/Output Summary (Last 24 hours) at 6/18/2021 0906  Last data filed at 6/18/2021 0749  Gross per 24 hour   Intake 300 ml   Output 2425 ml   Net -2125 ml     Flowsheet Rows      First Filed Value   Admission Height  177.8 cm (70\") Documented at 06/13/2021 1733   Admission Weight  (!) 137 kg (302 lb) Documented at 06/13/2021 2230          PHYSICAL EXAM:  General: Mild distress, on supplemental oxygen  Resp:NL Rate, symmetric chest expansion,unlabored, clear  CV: Regularly regular, NL PMI, NL S1 and S2, no Murmur, no gallop, no rub, No JVD.   ABD:Nl sounds, no masses or tenderness, nondistended, no guarding or rebound  Neuro: alert,cooperative and oriented  Extr: Eating pedal edema      Results Review:    Results from last 7 days   Lab Units 06/17/21  0535   SODIUM mmol/L 141   POTASSIUM mmol/L 3.3*   CHLORIDE mmol/L 106   CO2 mmol/L 24.1   BUN mg/dL 17   CREATININE mg/dL 0.74   GLUCOSE mg/dL 106*   CALCIUM mg/dL 8.4*         Results from last 7 days   Lab Units 06/16/21  0516 06/14/21  0512   WBC 10*3/mm3  --  4.01   HEMOGLOBIN g/dL 11.6* 14.1   HEMATOCRIT % 34.4 42.3   PLATELETS 10*3/mm3  --  185     Results from last 7 days   Lab Units 06/14/21  0512 06/13/21  1810   INR  1.21* 1.31*   APTT seconds  --  31.9         Results from last 7 days   Lab Units 06/16/21  0516   MAGNESIUM mg/dL 2.0         I reviewed the patient's new clinical results.  I personally viewed and interpreted the patient's EKG/Telemetry data        Medication Review:   Meds reviewed         Assessment/Plan    1.  Persistent atrial fibrillation with RVR  -Received IV digoxin 500 mcg x 1 on 6/16/2021, and currently on metoprolol 5 mg IV every 6 -heart rate control is overall acceptable  -Resume Eliquis when okay with surgery  2.  Acute " hypoxemic respiratory failure-on supplemental oxygen  -Likely there is a component of diastolic CHF, she was on oral Lasix at home.  She had good response to IV Lasix yesterday and I'll give her another dose of IV Lasix.  3.. Incarcerated incisional hernia status post primary repair on 6/14/2021  3.  Morbidly obese with BMI of 41.6 kg/m² with comorbidities including*active sleep apnea  4.  Reportedly have CKD stage III: Creatinine improved during current hospitalization.  5.  Hypertension-pressure slightly on the higher side today.  6.  History of tobacco use  7.  Hypercholesterolemia    Start oral metoprolol and Eliquis when feasible. She chronic leg swelling and currently on amlodipine that I will switch to losartan 25 mg p.o. daily.      Wes Sandoval MD  06/18/21  09:06 EDT

## 2021-06-18 NOTE — PLAN OF CARE
Goal Outcome Evaluation:  Plan of Care Reviewed With: patient        Progress: improving  Outcome Summary: pt agreed to PT, actually asked to get OOB; pt was able to take shuffle steps bed to chair; limited dist due mainly to L foot /ankle pain 10/10 w/meds; pt currently requires assist of 2 for safety-may need SNU at [place]    Patient was intermittently wearing a face mask during this therapy encounter. Therapist used appropriate personal protective equipment including eye protection, mask, and gloves.  Mask used was standard procedure mask. Appropriate PPE was worn during the entire therapy session. Hand hygiene was completed before and after therapy session. Patient is not in enhanced droplet precautions.

## 2021-06-18 NOTE — CASE MANAGEMENT/SOCIAL WORK
Continued Stay Note  [facility]     Patient Name: Marilu Avery  MRN: [id number]  Today's Date: 6/18/2021    Admit Date: 6/13/2021    Discharge Plan     Row Name 06/18/21 1432       Plan    Plan  [facility] would need pre-cert for SNF    Patient/Family in Agreement with Plan  yes    Plan Comments  Followed up with pt and reviewed list, she would like to stay close to [facility]. She requested ref[facility], [facility] and [facility]. Anticipate pt dc early next week. Pt will need pre-cert. Packet in ccp office.Jeff genao/ccp        Discharge Codes    No documentation.             Ara Jim RN

## 2021-06-18 NOTE — PROGRESS NOTES
"Pharmacy Consult - Lovenox    Marilu Avery has been consulted for pharmacy to dose enoxaparin for a fibrillation per Dr. Solano's request.     Relevant clinical data and objective history reviewed:  65 y.o. female 177.8 cm (70\") 131 kg (289 lb 11.2 oz) Body mass index is 41.57 kg/m².    Home Anticoagulation:  apixaban    Past Medical History:   Diagnosis Date   • Arthritis    • Asthma    • Atrial fibrillation (CMS/[facility])    • Colon polyps 2015    hyperplastic rectal polyp   • Hyperlipidemia    • Hypertension    • Sleep apnea      has No Known Allergies.    Lab Results   Component Value Date    WBC 4.01 06/14/2021    HGB 11.6 (L) 06/16/2021    HCT 34.4 06/16/2021    .7 (H) 06/14/2021     06/14/2021     Lab Results   Component Value Date    GLUCOSE 106 (H) 06/17/2021    CALCIUM 8.4 (L) 06/17/2021     06/17/2021    K 3.3 (L) 06/17/2021    CO2 24.1 06/17/2021     06/17/2021    BUN 17 06/17/2021    CREATININE 0.74 06/17/2021    EGFRIFAFRI  09/15/2016      Comment:      <15 Indicative of kidney failure.    EGFRIFNONA 79 06/17/2021    BCR 23.0 06/17/2021    ANIONGAP 10.9 06/17/2021       Serum creatinine: 0.74 mg/dL 06/17/21 0535  Estimated creatinine clearance: 113.4 mL/min    Active Inpatient Anticoagulation Orders: none    Assessment/Plan    Will start patient on 130 mg (1 mg/kg) subcutaneous every 12 hours, adjusted for renal function. Labs to be ordered: cbc if not already done . Pharmacy will continue to follow. Anticipate change to home apixaban soon.    Rupesh Rodriguez, Pharm.D, BCCCP    "

## 2021-06-19 PROBLEM — M10.9 GOUT: Status: ACTIVE | Noted: 2021-06-19

## 2021-06-19 LAB
ANION GAP SERPL CALCULATED.3IONS-SCNC: 12.1 MMOL/L (ref 5–15)
BUN SERPL-MCNC: 14 MG/DL (ref 8–23)
BUN/CREAT SERPL: 21.5 (ref 7–25)
CALCIUM SPEC-SCNC: 8.8 MG/DL (ref 8.6–10.5)
CHLORIDE SERPL-SCNC: 102 MMOL/L (ref 98–107)
CO2 SERPL-SCNC: 26.9 MMOL/L (ref 22–29)
CREAT SERPL-MCNC: 0.65 MG/DL (ref 0.57–1)
DEPRECATED RDW RBC AUTO: 45.6 FL (ref 37–54)
ERYTHROCYTE [DISTWIDTH] IN BLOOD BY AUTOMATED COUNT: 12.4 % (ref 12.3–15.4)
GFR SERPL CREATININE-BSD FRML MDRD: 92 ML/MIN/1.73
GLUCOSE SERPL-MCNC: 100 MG/DL (ref 65–99)
HCT VFR BLD AUTO: 36.6 % (ref 34–46.6)
HGB BLD-MCNC: 12.2 G/DL (ref 12–15.9)
MCH RBC QN AUTO: 33.2 PG (ref 26.6–33)
MCHC RBC AUTO-ENTMCNC: 33.3 G/DL (ref 31.5–35.7)
MCV RBC AUTO: 99.7 FL (ref 79–97)
PLATELET # BLD AUTO: 246 10*3/MM3 (ref 140–450)
PMV BLD AUTO: 9.3 FL (ref 6–12)
POTASSIUM SERPL-SCNC: 3.3 MMOL/L (ref 3.5–5.2)
POTASSIUM SERPL-SCNC: 4.4 MMOL/L (ref 3.5–5.2)
RBC # BLD AUTO: 3.67 10*6/MM3 (ref 3.77–5.28)
SODIUM SERPL-SCNC: 141 MMOL/L (ref 136–145)
WBC # BLD AUTO: 5.65 10*3/MM3 (ref 3.4–10.8)

## 2021-06-19 PROCEDURE — 85027 COMPLETE CBC AUTOMATED: CPT | Performed by: HOSPITALIST

## 2021-06-19 PROCEDURE — 84132 ASSAY OF SERUM POTASSIUM: CPT | Performed by: HOSPITALIST

## 2021-06-19 PROCEDURE — 25010000002 HYDROMORPHONE PER 4 MG: Performed by: HOSPITALIST

## 2021-06-19 PROCEDURE — 94799 UNLISTED PULMONARY SVC/PX: CPT

## 2021-06-19 PROCEDURE — 63710000001 PREDNISONE PER 1 MG: Performed by: INTERNAL MEDICINE

## 2021-06-19 PROCEDURE — 99232 SBSQ HOSP IP/OBS MODERATE 35: CPT | Performed by: NURSE PRACTITIONER

## 2021-06-19 PROCEDURE — 80048 BASIC METABOLIC PNL TOTAL CA: CPT | Performed by: HOSPITALIST

## 2021-06-19 PROCEDURE — 97110 THERAPEUTIC EXERCISES: CPT

## 2021-06-19 RX ORDER — HYDROCODONE BITARTRATE AND ACETAMINOPHEN 5; 325 MG/1; MG/1
1 TABLET ORAL EVERY 6 HOURS PRN
Status: DISCONTINUED | OUTPATIENT
Start: 2021-06-19 | End: 2021-06-20

## 2021-06-19 RX ORDER — PREDNISONE 20 MG/1
20 TABLET ORAL ONCE
Status: COMPLETED | OUTPATIENT
Start: 2021-06-19 | End: 2021-06-19

## 2021-06-19 RX ORDER — POTASSIUM CHLORIDE 750 MG/1
20 TABLET, FILM COATED, EXTENDED RELEASE ORAL DAILY
Status: DISCONTINUED | OUTPATIENT
Start: 2021-06-20 | End: 2021-06-23 | Stop reason: HOSPADM

## 2021-06-19 RX ORDER — METOPROLOL TARTRATE 50 MG/1
150 TABLET, FILM COATED ORAL EVERY 12 HOURS SCHEDULED
Status: DISCONTINUED | OUTPATIENT
Start: 2021-06-19 | End: 2021-06-23 | Stop reason: HOSPADM

## 2021-06-19 RX ORDER — POTASSIUM CHLORIDE 750 MG/1
20 TABLET, FILM COATED, EXTENDED RELEASE ORAL DAILY
Status: DISCONTINUED | OUTPATIENT
Start: 2021-06-20 | End: 2021-06-19

## 2021-06-19 RX ORDER — POLYETHYLENE GLYCOL 3350 17 G/17G
17 POWDER, FOR SOLUTION ORAL DAILY
Status: DISCONTINUED | OUTPATIENT
Start: 2021-06-19 | End: 2021-06-23 | Stop reason: HOSPADM

## 2021-06-19 RX ADMIN — IPRATROPIUM BROMIDE AND ALBUTEROL SULFATE 3 ML: 2.5; .5 SOLUTION RESPIRATORY (INHALATION) at 20:51

## 2021-06-19 RX ADMIN — HYDROMORPHONE HYDROCHLORIDE 0.5 MG: 1 INJECTION, SOLUTION INTRAMUSCULAR; INTRAVENOUS; SUBCUTANEOUS at 05:18

## 2021-06-19 RX ADMIN — HYDROMORPHONE HYDROCHLORIDE 0.5 MG: 1 INJECTION, SOLUTION INTRAMUSCULAR; INTRAVENOUS; SUBCUTANEOUS at 01:12

## 2021-06-19 RX ADMIN — IPRATROPIUM BROMIDE AND ALBUTEROL SULFATE 3 ML: 2.5; .5 SOLUTION RESPIRATORY (INHALATION) at 08:27

## 2021-06-19 RX ADMIN — FAMOTIDINE 20 MG: 10 INJECTION INTRAVENOUS at 09:22

## 2021-06-19 RX ADMIN — HYDROMORPHONE HYDROCHLORIDE 0.5 MG: 1 INJECTION, SOLUTION INTRAMUSCULAR; INTRAVENOUS; SUBCUTANEOUS at 09:22

## 2021-06-19 RX ADMIN — BUDESONIDE 0.25 MG: 0.25 SUSPENSION RESPIRATORY (INHALATION) at 20:54

## 2021-06-19 RX ADMIN — HYDROMORPHONE HYDROCHLORIDE 0.5 MG: 1 INJECTION, SOLUTION INTRAMUSCULAR; INTRAVENOUS; SUBCUTANEOUS at 21:57

## 2021-06-19 RX ADMIN — APIXABAN 5 MG: 5 TABLET, FILM COATED ORAL at 21:57

## 2021-06-19 RX ADMIN — PREDNISONE 20 MG: 20 TABLET ORAL at 11:05

## 2021-06-19 RX ADMIN — METOPROLOL TARTRATE 150 MG: 50 TABLET, FILM COATED ORAL at 21:57

## 2021-06-19 RX ADMIN — SODIUM CHLORIDE, PRESERVATIVE FREE 10 ML: 5 INJECTION INTRAVENOUS at 21:57

## 2021-06-19 RX ADMIN — POTASSIUM CHLORIDE 40 MEQ: 750 TABLET, EXTENDED RELEASE ORAL at 17:35

## 2021-06-19 RX ADMIN — METOPROLOL TARTRATE 100 MG: 50 TABLET, FILM COATED ORAL at 09:19

## 2021-06-19 RX ADMIN — HYDROCODONE BITARTRATE AND ACETAMINOPHEN 1 TABLET: 5; 325 TABLET ORAL at 18:18

## 2021-06-19 RX ADMIN — POLYETHYLENE GLYCOL 3350 17 G: 17 POWDER, FOR SOLUTION ORAL at 11:05

## 2021-06-19 RX ADMIN — IPRATROPIUM BROMIDE AND ALBUTEROL SULFATE 3 ML: 2.5; .5 SOLUTION RESPIRATORY (INHALATION) at 16:36

## 2021-06-19 RX ADMIN — FUROSEMIDE 40 MG: 40 TABLET ORAL at 09:22

## 2021-06-19 RX ADMIN — HYDROCODONE BITARTRATE AND ACETAMINOPHEN 1 TABLET: 5; 325 TABLET ORAL at 12:03

## 2021-06-19 RX ADMIN — HYDROMORPHONE HYDROCHLORIDE 0.5 MG: 1 INJECTION, SOLUTION INTRAMUSCULAR; INTRAVENOUS; SUBCUTANEOUS at 13:19

## 2021-06-19 RX ADMIN — BUDESONIDE 0.25 MG: 0.25 SUSPENSION RESPIRATORY (INHALATION) at 08:27

## 2021-06-19 RX ADMIN — FAMOTIDINE 20 MG: 10 INJECTION INTRAVENOUS at 21:56

## 2021-06-19 RX ADMIN — SODIUM CHLORIDE, PRESERVATIVE FREE 10 ML: 5 INJECTION INTRAVENOUS at 09:41

## 2021-06-19 RX ADMIN — IPRATROPIUM BROMIDE AND ALBUTEROL SULFATE 3 ML: 2.5; .5 SOLUTION RESPIRATORY (INHALATION) at 12:45

## 2021-06-19 RX ADMIN — HYDROMORPHONE HYDROCHLORIDE 0.5 MG: 1 INJECTION, SOLUTION INTRAMUSCULAR; INTRAVENOUS; SUBCUTANEOUS at 17:35

## 2021-06-19 RX ADMIN — APIXABAN 5 MG: 5 TABLET, FILM COATED ORAL at 09:22

## 2021-06-19 RX ADMIN — LOSARTAN POTASSIUM 25 MG: 25 TABLET, FILM COATED ORAL at 09:22

## 2021-06-19 RX ADMIN — POTASSIUM CHLORIDE 40 MEQ: 750 TABLET, EXTENDED RELEASE ORAL at 12:03

## 2021-06-19 NOTE — PROGRESS NOTES
Name: Marilu Avery ADMIT: 2021   : 1955  PCP: Anatoly Jimenez MD    MRN: [id number] LOS: 6 days   AGE/SEX: 65 y.o. female  ROOM: [facility]     Subjective   Subjective    Complaining of gout in her feet.  She states it similar to prior episodes of gout.  Pain is the top of both feet.  States she is not able to bear weight on it.    Review of Systems   Constitutional: Negative for fever.   Respiratory: Negative for cough and shortness of breath.    Cardiovascular: Negative for chest pain.   Musculoskeletal: Positive for arthralgias.      Objective   Objective   Vital Signs  Temp:  [97.4 °F (36.3 °C)-98.1 °F (36.7 °C)] 97.4 °F (36.3 °C)  Heart Rate:  [] 109  Resp:  [18-20] 18  BP: (140-152)/(76-99) 152/88  SpO2:  [92 %-98 %] 98 %  on  Flow (L/min):  [0.2-2] 2;   Device (Oxygen Therapy): nasal cannula  Body mass index is 41.57 kg/m².    Physical Exam  Constitutional:       Appearance: Normal appearance.   HENT:      Head: Normocephalic and atraumatic.   Cardiovascular:      Rate and Rhythm: Tachycardia present. Rhythm irregular.   Pulmonary:      Effort: Pulmonary effort is normal. No respiratory distress.      Breath sounds: No wheezing or rhonchi.   Abdominal:      Comments: abdominal binder   Musculoskeletal:         General: Swelling present.      Right foot: Swelling present.      Left foot: Swelling present.      Comments: Tenderness to palpation both feet.  No erythema   Skin:     General: Skin is warm and dry.   Neurological:      General: No focal deficit present.      Mental Status: She is alert and oriented to person, place, and time.   Psychiatric:         Mood and Affect: Mood normal.         Behavior: Behavior normal.     Results Review  I reviewed the patient's new clinical results.  Results from last 7 days   Lab Units 21  0823 21  0516 21  0512 21  1810   WBC 10*3/mm3 5.65  --  4.01 8.39   HEMOGLOBIN g/dL 12.2 11.6* 14.1 15.8   PLATELETS 10*3/mm3 246  --   185 246     Results from last 7 days   Lab Units 06/19/21  0823 06/18/21  1819 06/17/21  0535 06/16/21  0516 06/14/21  0512   SODIUM mmol/L 141  --  141 139 137   POTASSIUM mmol/L 3.3* 3.4* 3.3* 3.3* 4.0   CHLORIDE mmol/L 102  --  106 105 99   CO2 mmol/L 26.9  --  24.1 22.8 24.0   BUN mg/dL 14  --  17 19 17   CREATININE mg/dL 0.65  --  0.74 0.78 1.12*   GLUCOSE mg/dL 100*  --  106* 119* 128*     Estimated Creatinine Clearance: 129.1 mL/min (by C-G formula based on SCr of 0.65 mg/dL).    Results from last 7 days   Lab Units 06/13/21  1810   ALBUMIN g/dL 4.80   BILIRUBIN mg/dL 1.1   ALK PHOS U/L 89   AST (SGOT) U/L 33*   ALT (SGPT) U/L 23     Results from last 7 days   Lab Units 06/19/21  0823 06/17/21  0535 06/16/21  0516 06/14/21  0512 06/13/21  1810   CALCIUM mg/dL 8.8 8.4* 8.1* 9.0 10.2   ALBUMIN g/dL  --   --   --   --  4.80   MAGNESIUM mg/dL  --   --  2.0  --   --      Results from last 7 days   Lab Units 06/13/21  1810   LACTATE mmol/L 1.6     No results found for: HGBA1C, POCGLU    Scheduled Meds  apixaban, 5 mg, Oral, Q12H  budesonide, 0.25 mg, Nebulization, BID - RT  famotidine, 20 mg, Intravenous, Q12H  furosemide, 40 mg, Oral, Daily  ipratropium-albuterol, 3 mL, Nebulization, 4x Daily - RT  losartan, 25 mg, Oral, Q24H  metoprolol tartrate, 100 mg, Oral, Q12H  predniSONE, 20 mg, Oral, Once  sodium chloride, 10 mL, Intravenous, Q12H    Continuous Infusions  Pharmacy to Dose enoxaparin (LOVENOX),     PRN Meds  •  acetaminophen **OR** acetaminophen **OR** acetaminophen  •  HYDROmorphone  •  ipratropium-albuterol  •  nitroglycerin  •  ondansetron  •  Pharmacy to Dose enoxaparin (LOVENOX)  •  potassium chloride **OR** potassium chloride **OR** potassium chloride  •  [COMPLETED] Insert peripheral IV **AND** sodium chloride  •  sodium chloride    Pharmacy to Dose enoxaparin (LOVENOX),     Diet  Diet Clear Liquid; Consistent Carbohydrate       Intake/Output Summary (Last 24 hours) at 6/19/2021 0982  Last data filed  at 6/19/2021 0935  Gross per 24 hour   Intake 0 ml   Output 1065 ml   Net -1065 ml         Assessment/Plan     Active Hospital Problems    Diagnosis  POA   • **Incarcerated ventral hernia [K43.6]  Yes   • Gout [M10.9]  Unknown   • Hypopotassemia [E87.6]  Unknown   • Tobacco abuse [Z72.0]  Yes   • Morbid obesity with BMI of 40.0-44.9, adult (CMS/[facility]) [E66.01, Z68.41]  Not Applicable   • COPD (chronic obstructive pulmonary disease) (CMS/[facility]) [J44.9]  Yes   • Atrial fibrillation (CMS/[facility]) [I48.91]  Yes   • HTN (hypertension) [I10]  Yes   • LISA (obstructive sleep apnea) [G47.33]  Yes   • Stage 3b chronic kidney disease (CMS/[facility]) [N18.32]  Yes   • Chronic anticoagulation [Z79.01]  Not Applicable   • SBO (small bowel obstruction) (CMS/[facility]) [K56.609]  Yes      Resolved Hospital Problems   No resolved problems to display.     65 y.o. female admitted with Incarcerated ventral hernia. Body mass index is 41.57 kg/m².     Incarcerated recurrent incisional hernia with small bowel obstruction   Status post open ventral hernia repair with mesh and incidental appendectomy 6/14/2021  Postop care per general surgery:  Hypokalemia: Replace K   Now that she is on a diet add p.o. pain medication  Diet Clear Liquid; Consistent Carbohydrate   COPD, smoker, LISA  Pulmonology following  gout  Similar to prior episodes  Agree with prednisone  Atrial fibrillation  Eliquis resumed  Back p.o. 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    Body mass index is 41.57 kg/m².   Full code  Discussed with patient and nursing staff  Anticipate discharge to SNU facility in 1-2 days.     Dex Solano MD  [facility]  06/19/21  09:59 EDT    I wore protective equipment throughout this patient encounter including a face mask, gloves, and protective eyewear.  Hand hygiene was performed before donning protective equipment  and after removal when leaving the room.

## 2021-06-19 NOTE — PLAN OF CARE
Goal Outcome Evaluation:  Plan of Care Reviewed With: patient        Progress: no change    VSS on 2 L NC. A&O x 4. PRN dilaudid given as charted. Turned q2h, pt able to pull herself up in bed with trapeze bar. Small amt of serosanguinous drainage from PADILLA drain. Will CTM.

## 2021-06-19 NOTE — PROGRESS NOTES
"   LOS: 6 days   Patient Care Team:  Anatoly Jimenez MD as PCP - General  Anatoly Jimenez MD as PCP - Family Medicine      Chief Complaint:  Following for atrial fibrillation      Interval History:   Eliquis resumed yesterday after cleared by surgeons.  Was transitioned to p.o. metoprolol tartrate last night.  Heart rate overall improved, 80s-90s but does often elevate into low 100s.  Potassium low this morning, to be replaced per protocol.  We will add in scheduled dose starting tomorrow.  Hemoglobin stable after resuming anticoagulation.  Denies any chest pain or discomfort.          Objective   Vital Signs  Temp:  [97.4 °F (36.3 °C)-98.1 °F (36.7 °C)] 97.4 °F (36.3 °C)  Heart Rate:  [] 81  Resp:  [18-20] 18  BP: (140-152)/(76-99) 152/88    Intake/Output Summary (Last 24 hours) at 6/19/2021 1202  Last data filed at 6/19/2021 0935  Gross per 24 hour   Intake 0 ml   Output 1065 ml   Net -1065 ml       Last Weight and Admission Weight        06/17/21  0612   Weight: 131 kg (289 lb 11.2 oz)     Flowsheet Rows      First Filed Value   Admission Height  177.8 cm (70\") Documented at 06/13/2021 1733   Admission Weight  (!) 137 kg (302 lb) Documented at 06/13/2021 2230                  Physical Exam  Constitutional:       General: She is not in acute distress.     Appearance: She is obese.   Cardiovascular:      Rate and Rhythm: Normal rate. Rhythm irregular.      Pulses: Normal pulses.      Heart sounds: Normal heart sounds. No murmur heard.     Pulmonary:      Effort: Pulmonary effort is normal. No respiratory distress.      Breath sounds: Normal breath sounds.   Abdominal:      Comments: Bowel sounds present, not distended, soft   Musculoskeletal:         General: No swelling or tenderness. Normal range of motion.      Cervical back: Neck supple.   Skin:     General: Skin is warm and dry.      Capillary Refill: Capillary refill takes less than 2 seconds.      Findings: No erythema.   Neurological:      Mental " Status: She is alert and oriented to person, place, and time.           Results Review:      Results from last 7 days   Lab Units 06/19/21  0823 06/18/21  1819 06/17/21  0535 06/16/21  0516   SODIUM mmol/L 141  --  141 139   POTASSIUM mmol/L 3.3* 3.4* 3.3* 3.3*   CHLORIDE mmol/L 102  --  106 105   CO2 mmol/L 26.9  --  24.1 22.8   BUN mg/dL 14  --  17 19   CREATININE mg/dL 0.65  --  0.74 0.78   GLUCOSE mg/dL 100*  --  106* 119*   CALCIUM mg/dL 8.8  --  8.4* 8.1*         Results from last 7 days   Lab Units 06/19/21  0823 06/16/21  0516 06/14/21  0512 06/13/21  1810   WBC 10*3/mm3 5.65  --  4.01 8.39   HEMOGLOBIN g/dL 12.2 11.6* 14.1 15.8   HEMATOCRIT % 36.6 34.4 42.3 45.3   PLATELETS 10*3/mm3 246  --  185 246     Results from last 7 days   Lab Units 06/14/21  0512 06/13/21  1810   INR  1.21* 1.31*   APTT seconds  --  31.9         Results from last 7 days   Lab Units 06/16/21  0516   MAGNESIUM mg/dL 2.0           I reviewed the patient's new clinical results.  I reviewedpatient's EKG/Telemetry data.            Medication Review:   apixaban, 5 mg, Oral, Q12H  budesonide, 0.25 mg, Nebulization, BID - RT  famotidine, 20 mg, Intravenous, Q12H  furosemide, 40 mg, Oral, Daily  ipratropium-albuterol, 3 mL, Nebulization, 4x Daily - RT  losartan, 25 mg, Oral, Q24H  metoprolol tartrate, 150 mg, Oral, Q12H  polyethylene glycol, 17 g, Oral, Daily  [START ON 6/20/2021] potassium chloride, 20 mEq, Oral, Daily  sodium chloride, 10 mL, Intravenous, Q12H             Assessment/Plan   1. Persistent atrial fibrillation: Eliquis resumed after being cleared by surgery team.  Back on p.o. metoprolol.  We will continue to monitor rate and make adjustments as indicated.  Overall heart rate looks improved this morning.  2. Hypertension: Blood pressure still little high.  We will continue to monitor as pain becomes better controlled and may need to adjust medications for better control of blood pressure and heart rate.  3. Incarcerated  incisional hernia with small bowel obstruction status post open ventral hernia repair with mesh and incidental appendectomy 6/14/21  4. Hypokalemia: We will replace via protocol today, discussed this with RN.  Will start scheduled potassium starting tomorrow.  5. CKD: Appears stable on current medications and with diuretic.  6. COPD: Pulmonology following  7. Gout    -Hemoglobin appears stable back on Eliquis, continue to monitor  -Continue PO metoprolol--- will increase back to her home dose of 150 mg twice daily but continue to monitor and watch for any hypotension or bradycardia.  Call for heart rate less than 60 or systolic blood pressure less than 110.  -Continue Lasix and recheck AM BMP  -Add scheduled potassium starting tomorrow but will replace and recheck via protocol today, discussed with RN  -We will continue to follow patient          Thanks,    Edwina Quiros, PAYTON, APRN  06/19/21  12:02 EDT

## 2021-06-19 NOTE — PROGRESS NOTES
"General Surgery Progress Note    Summary: Mrs. Marilu Avery is a 65 y.o. year old lady POD 5 s/p ventral hernia repair.  Tolerating clear liquid diet, advance to full liquid diet.  Add bowel regimen.  Okay for DVT prophylaxis from general surgery standpoint.  Out of bed and ambulate as tolerated.    Interval Events: No acute events overnight.  Mild tachycardia.  Passing flatus, no bowel movement.  Tolerated clear liquid diet fine.  Did get out of bed yesterday.    Vitals: /88 (BP Location: Left arm, Patient Position: Lying)   Pulse 109   Temp 97.4 °F (36.3 °C) (Oral)   Resp 18   Ht 177.8 cm (70\")   Wt 131 kg (289 lb 11.2 oz)   SpO2 98%   BMI 41.57 kg/m²     GENERAL: alert, well appearing, and in no distress  HEENT: normocephalic, atraumatic, moist mucous membranes, clear sclerae   CHEST: clear to auscultation, no wheezes, rales or rhonchi, symmetric air entry  CARDIAC: regular rate and rhythm    ABDOMEN: Soft, nondistended, appropriately tender to palpation, incision clean and dry  EXTREMITIES: no cyanosis, clubbing, or edema   SKIN: Warm and moist, no rashes    Labs:  Results from last 7 days   Lab Units 06/19/21  0823 06/16/21  0516 06/14/21  0512 06/13/21  1810   WBC 10*3/mm3 5.65  --  4.01 8.39   HEMOGLOBIN g/dL 12.2 11.6* 14.1 15.8   HEMATOCRIT % 36.6 34.4 42.3 45.3   PLATELETS 10*3/mm3 246  --  185 246     Results from last 7 days   Lab Units 06/19/21  0823 06/18/21  1819 06/17/21  0535 06/16/21  0516 06/13/21  1810   SODIUM mmol/L 141  --  141 139 134*   POTASSIUM mmol/L 3.3* 3.4* 3.3* 3.3* 3.8   CHLORIDE mmol/L 102  --  106 105 96*   CO2 mmol/L 26.9  --  24.1 22.8 22.2   BUN mg/dL 14  --  17 19 16   CREATININE mg/dL 0.65  --  0.74 0.78 1.32*   CALCIUM mg/dL 8.8  --  8.4* 8.1* 10.2   BILIRUBIN mg/dL  --   --   --   --  1.1   ALK PHOS U/L  --   --   --   --  89   ALT (SGPT) U/L  --   --   --   --  23   AST (SGOT) U/L  --   --   --   --  33*   GLUCOSE mg/dL 100*  --  106* 119* 142*     Results " from last 7 days   Lab Units 06/14/21  0512 06/13/21  1810   INR  1.21* 1.31*   APTT seconds  --  31.9       MANDI KC MD  General and Endoscopic Surgery  [facility]    [address], [address]  [place], [place], [address]  P: [phone]  F: [phone]

## 2021-06-19 NOTE — THERAPY TREATMENT NOTE
Patient Name: Marilu Avery  : 1955    MRN: [id number]                              Today's Date: 2021       Admit Date: 2021    Visit Dx:     ICD-10-CM ICD-9-CM   1. Ventral hernia with bowel obstruction  K43.6 552.20   2. Chronic obstructive pulmonary disease, unspecified COPD type (CMS/Formerly [facility])  J44.9 496   3. Anticoagulated  Z79.01 V58.61   4. Morbid obesity (CMS/Formerly [facility])  E66.01 278.01   5. Small bowel obstruction (CMS/Formerly [facility])  K56.609 560.9   6. Incarcerated hernia  K46.0 552.9     Patient Active Problem List   Diagnosis   • Incisional hernia, without obstruction or gangrene   • Incarcerated ventral hernia   • Atrial fibrillation (CMS/Formerly [facility])   • HLD (hyperlipidemia)   • HTN (hypertension)   • LISA (obstructive sleep apnea)   • Stage 3b chronic kidney disease (CMS/Formerly [facility])   • Chronic anticoagulation   • SBO (small bowel obstruction) (CMS/Formerly [facility])   • Tobacco abuse   • Morbid obesity with BMI of 40.0-44.9, adult (CMS/Formerly [facility])   • COPD (chronic obstructive pulmonary disease) (CMS/Formerly [facility])   • Hypopotassemia   • Gout     Past Medical History:   Diagnosis Date   • Arthritis    • Asthma    • Atrial fibrillation (CMS/Formerly [facility])    • Colon polyps 2015    hyperplastic rectal polyp   • Hyperlipidemia    • Hypertension    • Sleep apnea      Past Surgical History:   Procedure Laterality Date   • BACK SURGERY N/A    • COLONOSCOPY N/A 10/22/2015    Rectal polyp-Dr. Elías Keating   • HYSTERECTOMY N/A    • LAPAROSCOPIC CHOLECYSTECTOMY N/A 2010    Dr. Heath Whitlock   • OOPHORECTOMY     • REPLACEMENT TOTAL KNEE Left 2015    Dr. Yo Aguayo   • REPLACEMENT TOTAL KNEE Right 2015    Dr. Yo Aguayo   • VENTRAL HERNIA REPAIR N/A 10/22/2015    Open reduction of incarcerated ventral hernia with layered closure-Dr. Elías Keating   • VENTRAL/INCISIONAL HERNIA REPAIR N/A 2021    Procedure: OPEN VENTRAL/INCISIONAL HERNIA REPAIR WITH MESH AND APPENDECTOMY;  Surgeon: Arnulfo Leonard MD;  Location: [facility] OR;   Service: General;  Laterality: N/A;     General Information     [facility] Name 06/19/21 1437          Physical Therapy Time and Intention    Document Type  therapy note (daily note)  -     Mode of Treatment  individual therapy;physical therapy  -[facility] Name 06/19/21 1437          General Information    Patient Profile Reviewed  yes  -     Existing Precautions/Restrictions  fall;oxygen therapy device and L/min cannula in nose, but not on, SATS 89% -turned to 2L-notified RN  -[facility] Name 06/19/21 1437          Cognition    Orientation Status (Cognition)  oriented x 4  -[facility] Name 06/19/21 1437          Safety Issues, Functional Mobility    Impairments Affecting Function (Mobility)  balance;endurance/activity tolerance;pain  -       User Key  (r) = Recorded By, (t) = Taken By, (c) = Cosigned By    Initials Name Provider Type    Johanne Nunez PTA Physical Therapy Assistant        Mobility     Row Name 06/19/21 1439          Sit-Stand Transfer    Sit-Stand Cross (Transfers)  2 person assist;minimum assist (75% patient effort);moderate assist (50% patient effort)  -     Assistive Device (Sit-Stand Transfers)  walker, front-wheeled  -     Row Name 06/19/21 1439          Gait/Stairs (Locomotion)    Cross Level (Gait)  2 person assist;minimum assist (75% patient effort)  -     Distance in Feet (Gait)  5ft forw and back , slow paced, apprehensive, cues for each step as pt fearful  -     Deviations/Abnormal Patterns (Gait)  antalgic;faith decreased;festinating/shuffling  -       User Key  (r) = Recorded By, (t) = Taken By, (c) = Cosigned By    Initials Name Provider Type    Johanne Nunez PTA Physical Therapy Assistant        Obj/Interventions     Row Name 06/19/21 1440          Motor Skills    Therapeutic Exercise  -- LAQs x5  -       User Key  (r) = Recorded By, (t) = Taken By, (c) = Cosigned By    Initials Name Provider Type    Johanne Nunez PTA Physical Therapy  Assistant        Goals/Plan    No documentation.       Clinical Impression     Row Name 06/19/21 1440          Pain Scale: Numbers Pre/Post-Treatment    Pretreatment Pain Rating  5/10  -     Posttreatment Pain Rating  6/10  -     Pain Location - Side  Left  -     Pain Location  ankle;foot  -     Row Name 06/19/21 1440          Plan of Care Review    Plan of Care Reviewed With  patient  -     Progress  improving  -     Outcome Summary  Pt incr amb dist, feels better today but still reports L foot and ankle pain, pt plans SNU /RHB at [place]; did require encouragement to do PT, but only because she had just received pn meds; agreeable upon return  -     Row Name 06/19/21 1440          Therapy Assessment/Plan (PT)    Rehab Potential (PT)  good, to achieve stated therapy goals  -     Criteria for Skilled Interventions Met (PT)  yes  -     Row Name 06/19/21 1440          Vital Signs    O2 Delivery Pre Treatment  room air  -     O2 Delivery Intra Treatment  supplemental O2  -     O2 Delivery Post Treatment  supplemental O2  -     Row Name 06/19/21 1440          Positioning and Restraints    Pre-Treatment Position  in bed  -     Post Treatment Position  bed  -     In Bed  supine;encouraged to call for assist;call light within reach on jaziel bed-no alarm  -       User Key  (r) = Recorded By, (t) = Taken By, (c) = Cosigned By    Initials Name Provider Type    Johanne Nunez PTA Physical Therapy Assistant        Outcome Measures     Row Name 06/19/21 1448          How much help from another person do you currently need...    Turning from your back to your side while in flat bed without using bedrails?  2  -JM     Moving from lying on back to sitting on the side of a flat bed without bedrails?  2  -JM     Moving to and from a bed to a chair (including a wheelchair)?  2  -JM     Standing up from a chair using your arms (e.g., wheelchair, bedside chair)?  2  -JM     Climbing 3-5 steps with a  railing?  1  -     To walk in hospital room?  2  -     AM-PAC 6 Clicks Score (PT)  11  -       User Key  (r) = Recorded By, (t) = Taken By, (c) = Cosigned By    Initials Name Provider Type     Johanne Mallory PTA Physical Therapy Assistant        Physical Therapy Education                 Title: PT OT SLP Therapies (Done)     Topic: Physical Therapy (Done)     Point: Mobility training (Done)     Learning Progress Summary           Patient Acceptance, E,TB,D, VU,NR by  at 6/19/2021 1450    Acceptance, E,TB,D, VU,NR by  at 6/18/2021 1558    Eager, E,TB,D, VU,NR by  at 6/17/2021 1535                   Point: Home exercise program (Done)     Learning Progress Summary           Patient Acceptance, E,TB,D, VU,NR by  at 6/19/2021 1450    Acceptance, E,TB,D, VU,NR by  at 6/18/2021 1558    Eager, E,TB,D, VU,NR by  at 6/17/2021 1535                   Point: Body mechanics (Done)     Learning Progress Summary           Patient Acceptance, E,TB,D, VU,NR by  at 6/19/2021 1450    Acceptance, E,TB,D, VU,NR by  at 6/18/2021 1558    Eager, E,TB,D, VU,NR by  at 6/17/2021 1535                               User Key     Initials Effective Dates Name Provider Type [facility] 03/07/18 -  Johanne Mallory PTA Physical Therapy Assistant PT     06/16/21 -  Loretta Barrett PT Physical Therapist PT              PT Recommendation and Plan     Plan of Care Reviewed With: patient  Progress: improving  Outcome Summary: Pt incr amb dist, feels better today but still reports L foot and ankle pain, pt plans SNU /RHB at [place]; did require encouragement to do PT, but only because she had just received pn meds; agreeable upon return     Time Calculation:   PT Charges     Row Name 06/19/21 6074             Time Calculation    Start Time  1357  -      Stop Time  1420  -      Time Calculation (min)  23 min  -      PT Received On  06/19/21  -        User Key  (r) = Recorded By, (t) = Taken By, (c) = Cosigned By     Initials Name Provider Type     Johanne Mallory PTA Physical Therapy Assistant        Therapy Charges for Today     Code Description Service Date Service Provider Modifiers Qty    39718411108 HC PT THER PROC EA 15 MIN 6/19/2021 Johanne Mallory PTA GP 2    51167837225 HC PT THER SUPP EA 15 MIN 6/19/2021 Johanne Mallory PTA GP 2          PT G-Codes  Outcome Measure Options: AM-PAC 6 Clicks Basic Mobility (PT)  AM-PAC 6 Clicks Score (PT): 11    Johanne Mallory PTA  6/19/2021

## 2021-06-19 NOTE — PROGRESS NOTES
[facility]     Mar/chart reviewed  Follow up AHRF  Not much cough  No chest pain  C/o gout  Not getting out of bed much    Vital Sign Min/Max for last 24 hours  Temp  Min: 97.4 °F (36.3 °C)  Max: 98.1 °F (36.7 °C)   BP  Min: 140/99  Max: 152/88   Pulse  Min: 91  Max: 116   Resp  Min: 18  Max: 20   SpO2  Min: 92 %  Max: 96 %   Flow (L/min)  Min: 0.2  Max: 2   No data recorded     Appears ill,  axox3,   perrl, eomi, no icterus,  mmm, no jvd, trachea midline, neck supple,  chest fair ae bilaterally, no crackles, no wheezes,   Tachy, irrg  soft, nt, nd +bs,  no c/c/ trace edema  Skin warm, dry no rashes    Labs: 6/19: reviewed:  Nothing new      ASSESSMENT:     Acute hypoxemic respiratory failure requiring 4 L supplemental oxygen to maintain sats in the 90s  LISA on CPAP  Recurrent incisional hernia with small bowel obstruction/incarceration status post repair  COPD  Atrial fibrillation  Chronic kidney disease 3  Hypertension  Morbid obesity: BMI 41.6 kg/m²           PLAN:     Encourage incentive spirometer use.  Weaning oxygen as able.  Bronchodilators for COPD symptoms.  CPAP for sleep apnea.  Dose of prednisone for gout    Patient is new to me today

## 2021-06-19 NOTE — PLAN OF CARE
Goal Outcome Evaluation:  Plan of Care Reviewed With: patient        Progress: improving  Outcome Summary: Pt incr amb dist, feels better today but still reports L foot and ankle pain, pt plans SNU /RHB at [place]; did require encouragement to do PT, but only because she had just received pn meds; agreeable upon return    Patient was not wearing a face mask during this therapy encounter. Therapist used appropriate personal protective equipment including eye protection, mask, and gloves.  Mask used was standard procedure mask. Appropriate PPE was worn during the entire therapy session. Hand hygiene was completed before and after therapy session. Patient is not in enhanced droplet precautions.      Mitul Sarabia, PT Tech present

## 2021-06-20 LAB
ANION GAP SERPL CALCULATED.3IONS-SCNC: 8.5 MMOL/L (ref 5–15)
BUN SERPL-MCNC: 14 MG/DL (ref 8–23)
BUN/CREAT SERPL: 19.4 (ref 7–25)
CALCIUM SPEC-SCNC: 8.7 MG/DL (ref 8.6–10.5)
CHLORIDE SERPL-SCNC: 104 MMOL/L (ref 98–107)
CO2 SERPL-SCNC: 26.5 MMOL/L (ref 22–29)
CREAT SERPL-MCNC: 0.72 MG/DL (ref 0.57–1)
DEPRECATED RDW RBC AUTO: 43.7 FL (ref 37–54)
ERYTHROCYTE [DISTWIDTH] IN BLOOD BY AUTOMATED COUNT: 12.4 % (ref 12.3–15.4)
GFR SERPL CREATININE-BSD FRML MDRD: 82 ML/MIN/1.73
GLUCOSE SERPL-MCNC: 93 MG/DL (ref 65–99)
HCT VFR BLD AUTO: 33.7 % (ref 34–46.6)
HGB BLD-MCNC: 11.4 G/DL (ref 12–15.9)
MCH RBC QN AUTO: 33 PG (ref 26.6–33)
MCHC RBC AUTO-ENTMCNC: 33.8 G/DL (ref 31.5–35.7)
MCV RBC AUTO: 97.7 FL (ref 79–97)
PLATELET # BLD AUTO: 246 10*3/MM3 (ref 140–450)
PMV BLD AUTO: 9.6 FL (ref 6–12)
POTASSIUM SERPL-SCNC: 4.1 MMOL/L (ref 3.5–5.2)
RBC # BLD AUTO: 3.45 10*6/MM3 (ref 3.77–5.28)
SODIUM SERPL-SCNC: 139 MMOL/L (ref 136–145)
WBC # BLD AUTO: 5.45 10*3/MM3 (ref 3.4–10.8)

## 2021-06-20 PROCEDURE — 94799 UNLISTED PULMONARY SVC/PX: CPT

## 2021-06-20 PROCEDURE — 80048 BASIC METABOLIC PNL TOTAL CA: CPT | Performed by: NURSE PRACTITIONER

## 2021-06-20 PROCEDURE — 85027 COMPLETE CBC AUTOMATED: CPT | Performed by: NURSE PRACTITIONER

## 2021-06-20 PROCEDURE — 99232 SBSQ HOSP IP/OBS MODERATE 35: CPT | Performed by: NURSE PRACTITIONER

## 2021-06-20 PROCEDURE — 25010000002 HYDROMORPHONE PER 4 MG: Performed by: INTERNAL MEDICINE

## 2021-06-20 PROCEDURE — 63710000001 PREDNISONE PER 1 MG: Performed by: INTERNAL MEDICINE

## 2021-06-20 PROCEDURE — 25010000002 HYDROMORPHONE PER 4 MG: Performed by: HOSPITALIST

## 2021-06-20 RX ORDER — PREDNISONE 20 MG/1
20 TABLET ORAL 2 TIMES DAILY WITH MEALS
Status: COMPLETED | OUTPATIENT
Start: 2021-06-20 | End: 2021-06-21

## 2021-06-20 RX ORDER — HYDROMORPHONE HYDROCHLORIDE 1 MG/ML
0.5 INJECTION, SOLUTION INTRAMUSCULAR; INTRAVENOUS; SUBCUTANEOUS
Status: DISCONTINUED | OUTPATIENT
Start: 2021-06-20 | End: 2021-06-23 | Stop reason: HOSPADM

## 2021-06-20 RX ORDER — HYDROCODONE BITARTRATE AND ACETAMINOPHEN 5; 325 MG/1; MG/1
1 TABLET ORAL EVERY 4 HOURS PRN
Status: DISCONTINUED | OUTPATIENT
Start: 2021-06-20 | End: 2021-06-23 | Stop reason: HOSPADM

## 2021-06-20 RX ADMIN — HYDROCODONE BITARTRATE AND ACETAMINOPHEN 1 TABLET: 5; 325 TABLET ORAL at 00:33

## 2021-06-20 RX ADMIN — HYDROCODONE BITARTRATE AND ACETAMINOPHEN 1 TABLET: 5; 325 TABLET ORAL at 20:28

## 2021-06-20 RX ADMIN — SODIUM CHLORIDE, PRESERVATIVE FREE 10 ML: 5 INJECTION INTRAVENOUS at 22:03

## 2021-06-20 RX ADMIN — APIXABAN 5 MG: 5 TABLET, FILM COATED ORAL at 08:16

## 2021-06-20 RX ADMIN — HYDROCODONE BITARTRATE AND ACETAMINOPHEN 1 TABLET: 5; 325 TABLET ORAL at 16:10

## 2021-06-20 RX ADMIN — IPRATROPIUM BROMIDE AND ALBUTEROL SULFATE 3 ML: 2.5; .5 SOLUTION RESPIRATORY (INHALATION) at 12:50

## 2021-06-20 RX ADMIN — BUDESONIDE 0.25 MG: 0.25 SUSPENSION RESPIRATORY (INHALATION) at 20:30

## 2021-06-20 RX ADMIN — FAMOTIDINE 20 MG: 10 INJECTION INTRAVENOUS at 20:28

## 2021-06-20 RX ADMIN — APIXABAN 5 MG: 5 TABLET, FILM COATED ORAL at 20:28

## 2021-06-20 RX ADMIN — BUDESONIDE 0.25 MG: 0.25 SUSPENSION RESPIRATORY (INHALATION) at 08:31

## 2021-06-20 RX ADMIN — IPRATROPIUM BROMIDE AND ALBUTEROL SULFATE 3 ML: 2.5; .5 SOLUTION RESPIRATORY (INHALATION) at 16:25

## 2021-06-20 RX ADMIN — HYDROMORPHONE HYDROCHLORIDE 0.5 MG: 1 INJECTION, SOLUTION INTRAMUSCULAR; INTRAVENOUS; SUBCUTANEOUS at 22:02

## 2021-06-20 RX ADMIN — FUROSEMIDE 40 MG: 40 TABLET ORAL at 08:16

## 2021-06-20 RX ADMIN — HYDROMORPHONE HYDROCHLORIDE 0.5 MG: 1 INJECTION, SOLUTION INTRAMUSCULAR; INTRAVENOUS; SUBCUTANEOUS at 04:10

## 2021-06-20 RX ADMIN — LOSARTAN POTASSIUM 25 MG: 25 TABLET, FILM COATED ORAL at 08:16

## 2021-06-20 RX ADMIN — FAMOTIDINE 20 MG: 10 INJECTION INTRAVENOUS at 08:23

## 2021-06-20 RX ADMIN — POLYETHYLENE GLYCOL 3350 17 G: 17 POWDER, FOR SOLUTION ORAL at 08:16

## 2021-06-20 RX ADMIN — HYDROCODONE BITARTRATE AND ACETAMINOPHEN 1 TABLET: 5; 325 TABLET ORAL at 08:23

## 2021-06-20 RX ADMIN — SODIUM CHLORIDE, PRESERVATIVE FREE 10 ML: 5 INJECTION INTRAVENOUS at 08:17

## 2021-06-20 RX ADMIN — POTASSIUM CHLORIDE 20 MEQ: 750 TABLET, EXTENDED RELEASE ORAL at 08:16

## 2021-06-20 RX ADMIN — PREDNISONE 20 MG: 20 TABLET ORAL at 17:13

## 2021-06-20 RX ADMIN — METOPROLOL TARTRATE 150 MG: 50 TABLET, FILM COATED ORAL at 08:16

## 2021-06-20 RX ADMIN — HYDROMORPHONE HYDROCHLORIDE 0.5 MG: 1 INJECTION, SOLUTION INTRAMUSCULAR; INTRAVENOUS; SUBCUTANEOUS at 13:00

## 2021-06-20 RX ADMIN — IPRATROPIUM BROMIDE AND ALBUTEROL SULFATE 3 ML: 2.5; .5 SOLUTION RESPIRATORY (INHALATION) at 20:30

## 2021-06-20 RX ADMIN — IPRATROPIUM BROMIDE AND ALBUTEROL SULFATE 3 ML: 2.5; .5 SOLUTION RESPIRATORY (INHALATION) at 08:31

## 2021-06-20 RX ADMIN — METOPROLOL TARTRATE 150 MG: 50 TABLET, FILM COATED ORAL at 20:28

## 2021-06-20 NOTE — PROGRESS NOTES
"General Surgery Progress Note    Summary: Mrs. Marilu Avery is a 65 y.o. year old lady POD6 s/p ventral hernia repair.  Doing well.  Passing flatus but no bowel movement.  Advance to regular diet.  Out of bed and ambulate as tolerated.  On home Eliquis.    Interval Events: No acute events overnight.  Pain well controlled.  Tolerating full liquid diet without issue.  Passing flatus but no bowel movement.    Vitals: /85 (BP Location: Left arm, Patient Position: Lying)   Pulse 105   Temp 97.6 °F (36.4 °C) (Oral)   Resp 18   Ht 177.8 cm (70\")   Wt 131 kg (289 lb 11.2 oz)   SpO2 98%   BMI 41.57 kg/m²     GENERAL: alert, well appearing, and in no distress  HEENT: normocephalic, atraumatic, moist mucous membranes, clear sclerae   CHEST: On 2 L oxygen, no increased work of breathing symmetric air entry  CARDIAC: regular rate and rhythm    ABDOMEN: Soft, nondistended, appropriately tender to palpation, incision clean and dry  EXTREMITIES: no cyanosis, clubbing, or edema   SKIN: Warm and moist, no rashes    Labs:  Results from last 7 days   Lab Units 06/20/21  0528 06/19/21  0823 06/16/21  0516 06/14/21  0512   WBC 10*3/mm3 5.45 5.65  --  4.01   HEMOGLOBIN g/dL 11.4* 12.2 11.6* 14.1   HEMATOCRIT % 33.7* 36.6 34.4 42.3   PLATELETS 10*3/mm3 246 246  --  185     Results from last 7 days   Lab Units 06/20/21  0528 06/19/21  2058 06/19/21  0823 06/17/21  0535 06/13/21  1810   SODIUM mmol/L 139  --  141 141 134*   POTASSIUM mmol/L 4.1 4.4 3.3* 3.3* 3.8   CHLORIDE mmol/L 104  --  102 106 96*   CO2 mmol/L 26.5  --  26.9 24.1 22.2   BUN mg/dL 14  --  14 17 16   CREATININE mg/dL 0.72  --  0.65 0.74 1.32*   CALCIUM mg/dL 8.7  --  8.8 8.4* 10.2   BILIRUBIN mg/dL  --   --   --   --  1.1   ALK PHOS U/L  --   --   --   --  89   ALT (SGPT) U/L  --   --   --   --  23   AST (SGOT) U/L  --   --   --   --  33*   GLUCOSE mg/dL 93  --  100* 106* 142*     Results from last 7 days   Lab Units 06/14/21  0512 06/13/21  1810   INR  1.21* " 1.31*   APTT seconds  --  31.9       MANDI KC MD  General and Endoscopic Surgery  [facility]    [address], [address]  [place], [place], [address]  P: [phone]  F: [phone]

## 2021-06-20 NOTE — PLAN OF CARE
Goal Outcome Evaluation:              Outcome Summary: Pt AO/VSS, she has been quite and resting in bed this shift. Ax2, no issues to report. We will continue to monitor.

## 2021-06-20 NOTE — PLAN OF CARE
Goal Outcome Evaluation:  Plan of Care Reviewed With: patient        Progress: no change  Outcome Summary: Patient alert and oriented.  Report of 8/10 abdominal pain and PO and IV pain medication given per MD order.  PADILLA drain had 10 cc output  of serosanguineous fluid.  VSS.  Will continue to monitor.

## 2021-06-20 NOTE — PROGRESS NOTES
"   LOS: 7 days   Patient Care Team:  Anatoly Jimenez MD as PCP - General  Anatoly Jimenez MD as PCP - Family Medicine      Chief Complaint:  Following for atrial fibrillation        Interval History:   Creatinine and K+ stable today on current regimen.  Net I&O negative.  Denies cardiac symptoms or concerns.  Blood pressure appears stable on increased metoprolol tartrate dose and current dose of losartan.  Heart rate sustaining in this 80s while provider at bedside.        Objective   Vital Signs  Temp:  [97.5 °F (36.4 °C)-97.8 °F (36.6 °C)] 97.6 °F (36.4 °C)  Heart Rate:  [] 105  Resp:  [16-20] 18  BP: (129-151)/(69-95) 144/85    Intake/Output Summary (Last 24 hours) at 6/20/2021 1111  Last data filed at 6/20/2021 1040  Gross per 24 hour   Intake 450 ml   Output 1580 ml   Net -1130 ml       Last Weight and Admission Weight        06/17/21  0612   Weight: 131 kg (289 lb 11.2 oz)     Flowsheet Rows      First Filed Value   Admission Height  177.8 cm (70\") Documented at 06/13/2021 1733   Admission Weight  (!) 137 kg (302 lb) Documented at 06/13/2021 2230              Physical Exam  Constitutional:       General: She is not in acute distress.  Sitting comfortably in bed eating some lunch.     Appearance: She is obese.   Cardiovascular:      Rate and Rhythm: Normal rate. Rhythm irregular.      Pulses: Normal pulses.      Heart sounds: Normal heart sounds. No murmur heard.     Pulmonary:      Effort: Pulmonary effort is normal. No respiratory distress.      Breath sounds: Normal breath sounds.   Abdominal:      Comments: Bowel sounds within normal limits.  Abdomen nondistended and soft.  Musculoskeletal:         General: No swelling or tenderness. Normal range of motion.      Cervical back: Neck supple.   Skin:     General: Skin is warm and dry.      Capillary Refill: Capillary refill takes less than 2 seconds.      Findings: No erythema.   Neurological:      Mental Status: She is alert and oriented to person, " place, and time.           Results Review:      Results from last 7 days   Lab Units 06/20/21 0528 06/19/21 2058 06/19/21 0823 06/17/21  0535   SODIUM mmol/L 139  --  141 141   POTASSIUM mmol/L 4.1 4.4 3.3* 3.3*   CHLORIDE mmol/L 104  --  102 106   CO2 mmol/L 26.5  --  26.9 24.1   BUN mg/dL 14  --  14 17   CREATININE mg/dL 0.72  --  0.65 0.74   GLUCOSE mg/dL 93  --  100* 106*   CALCIUM mg/dL 8.7  --  8.8 8.4*         Results from last 7 days   Lab Units 06/20/21 0528 06/19/21 0823 06/16/21  0516 06/14/21  0512   WBC 10*3/mm3 5.45 5.65  --  4.01   HEMOGLOBIN g/dL 11.4* 12.2 11.6* 14.1   HEMATOCRIT % 33.7* 36.6 34.4 42.3   PLATELETS 10*3/mm3 246 246  --  185     Results from last 7 days   Lab Units 06/14/21  0512 06/13/21  1810   INR  1.21* 1.31*   APTT seconds  --  31.9         Results from last 7 days   Lab Units 06/16/21  0516   MAGNESIUM mg/dL 2.0                   Medication Review:   apixaban, 5 mg, Oral, Q12H  budesonide, 0.25 mg, Nebulization, BID - RT  famotidine, 20 mg, Intravenous, Q12H  furosemide, 40 mg, Oral, Daily  ipratropium-albuterol, 3 mL, Nebulization, 4x Daily - RT  losartan, 25 mg, Oral, Q24H  metoprolol tartrate, 150 mg, Oral, Q12H  polyethylene glycol, 17 g, Oral, Daily  potassium chloride, 20 mEq, Oral, Daily  sodium chloride, 10 mL, Intravenous, Q12H             Assessment/Plan   1. Persistent atrial fibrillation: Back on Eliquis after being cleared by surgery team.  Back on p.o. metoprolol--dose increased back to home dose yesterday.  We will continue to monitor rate and make adjustments as indicated.    2. Hypertension: Overall appears stable.  May need to look at adjusting medications for better control if does not improve on increased dose of metoprolol tartrate.  3. Incarcerated incisional hernia with small bowel obstruction status post open ventral hernia repair with mesh and incidental appendectomy 6/14/21  4. Hypokalemia: Improved today after replacing yesterday and scheduled  potassium was started this morning.  5. CKD: Appears stable on current medications  6. COPD: Pulmonology following  7. Gout      -Heart rate appears improved on current regimen/home dose of metoprolol tartrate.  We will continue to monitor but if remains elevated may need to add in low-dose Cardizem in lieu of the amlodipine that she was on prior to hospitalization.          Thanks,    Edwina Quiros, PAYTON, APRN  06/20/21  11:11 EDT

## 2021-06-20 NOTE — PROGRESS NOTES
[facility]      Mar/chart reviewed  Follow up AHRF  Not much cough  No chest pain  Some shortness of breath associated with anxiety    Vital Sign Min/Max for last 24 hours  Temp  Min: 97.5 °F (36.4 °C)  Max: 97.8 °F (36.6 °C)   BP  Min: 129/69  Max: 151/93   Pulse  Min: 81  Max: 124   Resp  Min: 16  Max: 20   SpO2  Min: 90 %  Max: 98 %   Flow (L/min)  Min: 1  Max: 2   No data recorded     Appears ill,  axox3,   perrl, eomi, no icterus,  mmm, no jvd, trachea midline, neck supple,  chest fair ae bilaterally, no crackles, no wheezes,   Tachy, irrg  soft, nt, nd +bs,  no c/c/ trace edema  Skin warm, dry no rashes    Labs: 6/20: reviewed:  Cr 0.71  Bicarb 26  Wbc 5.4  hgb 11.4  plts 246    ASSESSMENT:     Acute hypoxemic respiratory failure requiring 4 L supplemental oxygen to maintain sats in the 90s  LISA on CPAP  Recurrent incisional hernia with small bowel obstruction/incarceration status post repair  COPD  Atrial fibrillation  Chronic kidney disease 3  Hypertension  Morbid obesity: BMI 41.6 kg/m²           PLAN:     Encourage incentive spirometer use.  Weaning oxygen as able.  Bronchodilators for COPD symptoms.  CPAP for sleep apnea.

## 2021-06-21 LAB
ANION GAP SERPL CALCULATED.3IONS-SCNC: 10.3 MMOL/L (ref 5–15)
BUN SERPL-MCNC: 14 MG/DL (ref 8–23)
BUN/CREAT SERPL: 19.7 (ref 7–25)
CALCIUM SPEC-SCNC: 8.4 MG/DL (ref 8.6–10.5)
CHLORIDE SERPL-SCNC: 101 MMOL/L (ref 98–107)
CO2 SERPL-SCNC: 26.7 MMOL/L (ref 22–29)
CREAT SERPL-MCNC: 0.71 MG/DL (ref 0.57–1)
DEPRECATED RDW RBC AUTO: 44 FL (ref 37–54)
ERYTHROCYTE [DISTWIDTH] IN BLOOD BY AUTOMATED COUNT: 12.3 % (ref 12.3–15.4)
GFR SERPL CREATININE-BSD FRML MDRD: 83 ML/MIN/1.73
GLUCOSE SERPL-MCNC: 120 MG/DL (ref 65–99)
HCT VFR BLD AUTO: 35.6 % (ref 34–46.6)
HGB BLD-MCNC: 11.9 G/DL (ref 12–15.9)
MAGNESIUM SERPL-MCNC: 1.9 MG/DL (ref 1.6–2.4)
MCH RBC QN AUTO: 32.7 PG (ref 26.6–33)
MCHC RBC AUTO-ENTMCNC: 33.4 G/DL (ref 31.5–35.7)
MCV RBC AUTO: 97.8 FL (ref 79–97)
PLATELET # BLD AUTO: 271 10*3/MM3 (ref 140–450)
PMV BLD AUTO: 9.9 FL (ref 6–12)
POTASSIUM SERPL-SCNC: 4.5 MMOL/L (ref 3.5–5.2)
RBC # BLD AUTO: 3.64 10*6/MM3 (ref 3.77–5.28)
SODIUM SERPL-SCNC: 138 MMOL/L (ref 136–145)
URATE SERPL-MCNC: 5.8 MG/DL (ref 2.4–5.7)
WBC # BLD AUTO: 5.33 10*3/MM3 (ref 3.4–10.8)

## 2021-06-21 PROCEDURE — 97530 THERAPEUTIC ACTIVITIES: CPT

## 2021-06-21 PROCEDURE — 63710000001 PREDNISONE PER 1 MG: Performed by: INTERNAL MEDICINE

## 2021-06-21 PROCEDURE — 80048 BASIC METABOLIC PNL TOTAL CA: CPT | Performed by: NURSE PRACTITIONER

## 2021-06-21 PROCEDURE — 94799 UNLISTED PULMONARY SVC/PX: CPT

## 2021-06-21 PROCEDURE — 99024 POSTOP FOLLOW-UP VISIT: CPT | Performed by: SURGERY

## 2021-06-21 PROCEDURE — 99232 SBSQ HOSP IP/OBS MODERATE 35: CPT | Performed by: INTERNAL MEDICINE

## 2021-06-21 PROCEDURE — 85027 COMPLETE CBC AUTOMATED: CPT | Performed by: INTERNAL MEDICINE

## 2021-06-21 PROCEDURE — 25010000002 HYDROMORPHONE PER 4 MG: Performed by: INTERNAL MEDICINE

## 2021-06-21 PROCEDURE — 84550 ASSAY OF BLOOD/URIC ACID: CPT | Performed by: INTERNAL MEDICINE

## 2021-06-21 PROCEDURE — 83735 ASSAY OF MAGNESIUM: CPT | Performed by: INTERNAL MEDICINE

## 2021-06-21 RX ORDER — FAMOTIDINE 20 MG/1
20 TABLET, FILM COATED ORAL
Status: DISCONTINUED | OUTPATIENT
Start: 2021-06-21 | End: 2021-06-23 | Stop reason: HOSPADM

## 2021-06-21 RX ORDER — ALLOPURINOL 100 MG/1
100 TABLET ORAL DAILY
Status: DISCONTINUED | OUTPATIENT
Start: 2021-06-22 | End: 2021-06-23 | Stop reason: HOSPADM

## 2021-06-21 RX ORDER — ATORVASTATIN CALCIUM 80 MG/1
80 TABLET, FILM COATED ORAL NIGHTLY
Status: DISCONTINUED | OUTPATIENT
Start: 2021-06-21 | End: 2021-06-23 | Stop reason: HOSPADM

## 2021-06-21 RX ADMIN — BUDESONIDE 0.25 MG: 0.25 SUSPENSION RESPIRATORY (INHALATION) at 08:06

## 2021-06-21 RX ADMIN — APIXABAN 5 MG: 5 TABLET, FILM COATED ORAL at 08:57

## 2021-06-21 RX ADMIN — FUROSEMIDE 40 MG: 40 TABLET ORAL at 08:57

## 2021-06-21 RX ADMIN — BUDESONIDE 0.25 MG: 0.25 SUSPENSION RESPIRATORY (INHALATION) at 19:11

## 2021-06-21 RX ADMIN — HYDROCODONE BITARTRATE AND ACETAMINOPHEN 1 TABLET: 5; 325 TABLET ORAL at 00:56

## 2021-06-21 RX ADMIN — PREDNISONE 20 MG: 20 TABLET ORAL at 09:04

## 2021-06-21 RX ADMIN — PREDNISONE 20 MG: 20 TABLET ORAL at 18:34

## 2021-06-21 RX ADMIN — IPRATROPIUM BROMIDE AND ALBUTEROL SULFATE 3 ML: 2.5; .5 SOLUTION RESPIRATORY (INHALATION) at 08:03

## 2021-06-21 RX ADMIN — IPRATROPIUM BROMIDE AND ALBUTEROL SULFATE 3 ML: 2.5; .5 SOLUTION RESPIRATORY (INHALATION) at 15:33

## 2021-06-21 RX ADMIN — SODIUM CHLORIDE, PRESERVATIVE FREE 10 ML: 5 INJECTION INTRAVENOUS at 08:56

## 2021-06-21 RX ADMIN — LOSARTAN POTASSIUM 25 MG: 25 TABLET, FILM COATED ORAL at 08:57

## 2021-06-21 RX ADMIN — HYDROMORPHONE HYDROCHLORIDE 0.5 MG: 1 INJECTION, SOLUTION INTRAMUSCULAR; INTRAVENOUS; SUBCUTANEOUS at 06:20

## 2021-06-21 RX ADMIN — HYDROMORPHONE HYDROCHLORIDE 0.5 MG: 1 INJECTION, SOLUTION INTRAMUSCULAR; INTRAVENOUS; SUBCUTANEOUS at 02:22

## 2021-06-21 RX ADMIN — METOPROLOL TARTRATE 150 MG: 50 TABLET, FILM COATED ORAL at 08:57

## 2021-06-21 RX ADMIN — FAMOTIDINE 20 MG: 20 TABLET, FILM COATED ORAL at 09:02

## 2021-06-21 RX ADMIN — HYDROMORPHONE HYDROCHLORIDE 0.5 MG: 1 INJECTION, SOLUTION INTRAMUSCULAR; INTRAVENOUS; SUBCUTANEOUS at 19:20

## 2021-06-21 RX ADMIN — APIXABAN 5 MG: 5 TABLET, FILM COATED ORAL at 20:44

## 2021-06-21 RX ADMIN — HYDROCODONE BITARTRATE AND ACETAMINOPHEN 1 TABLET: 5; 325 TABLET ORAL at 05:42

## 2021-06-21 RX ADMIN — POLYETHYLENE GLYCOL 3350 17 G: 17 POWDER, FOR SOLUTION ORAL at 08:57

## 2021-06-21 RX ADMIN — HYDROMORPHONE HYDROCHLORIDE 0.5 MG: 1 INJECTION, SOLUTION INTRAMUSCULAR; INTRAVENOUS; SUBCUTANEOUS at 12:33

## 2021-06-21 RX ADMIN — IPRATROPIUM BROMIDE AND ALBUTEROL SULFATE 3 ML: 2.5; .5 SOLUTION RESPIRATORY (INHALATION) at 12:32

## 2021-06-21 RX ADMIN — HYDROCODONE BITARTRATE AND ACETAMINOPHEN 1 TABLET: 5; 325 TABLET ORAL at 16:28

## 2021-06-21 RX ADMIN — IPRATROPIUM BROMIDE AND ALBUTEROL SULFATE 3 ML: 2.5; .5 SOLUTION RESPIRATORY (INHALATION) at 19:12

## 2021-06-21 RX ADMIN — METOPROLOL TARTRATE 150 MG: 50 TABLET, FILM COATED ORAL at 20:44

## 2021-06-21 RX ADMIN — POTASSIUM CHLORIDE 20 MEQ: 750 TABLET, EXTENDED RELEASE ORAL at 08:56

## 2021-06-21 RX ADMIN — FAMOTIDINE 20 MG: 20 TABLET, FILM COATED ORAL at 18:34

## 2021-06-21 RX ADMIN — ATORVASTATIN CALCIUM 80 MG: 80 TABLET, FILM COATED ORAL at 20:44

## 2021-06-21 RX ADMIN — HYDROCODONE BITARTRATE AND ACETAMINOPHEN 1 TABLET: 5; 325 TABLET ORAL at 20:54

## 2021-06-21 NOTE — PROGRESS NOTES
Name: Marilu Avery ADMIT: 2021   : 1955  PCP: Anatoly Jimenez MD    MRN: [id number] LOS: 8 days   AGE/SEX: 65 y.o. female  ROOM: [facility]   Subjective   Chief Complaint   Patient presents with   • Abdominal Pain      Feet feel better. Still some tenderness right ankle. No CP SOA NVD.    Objective   Vital Signs  Temp:  [97.6 °F (36.4 °C)-98.5 °F (36.9 °C)] 98 °F (36.7 °C)  Heart Rate:  [] 86  Resp:  [18] 18  BP: (125-133)/(78-93) 133/85  SpO2:  [92 %-99 %] 99 %  on  Flow (L/min):  [2] 2;   Device (Oxygen Therapy): humidified;nasal cannula  Body mass index is 41.57 kg/m².    Physical Exam  Vitals and nursing note reviewed.   Constitutional:       General: She is not in acute distress.     Appearance: She is obese. She is not diaphoretic.   HENT:      Head: Normocephalic and atraumatic.   Eyes:      Extraocular Movements: Extraocular movements intact.      Conjunctiva/sclera: Conjunctivae normal.   Cardiovascular:      Rate and Rhythm: Normal rate. Rhythm irregular.      Pulses: Normal pulses.   Pulmonary:      Effort: Pulmonary effort is normal.      Breath sounds: Decreased breath sounds present. No wheezing.   Abdominal:      Palpations: Abdomen is soft.      Comments: Binder in place   Musculoskeletal:         General: Tenderness present.      Cervical back: Neck supple. No tenderness.      Right lower leg: Edema present.      Left lower leg: Edema present.   Skin:     General: Skin is warm and dry.      Findings: No erythema.   Neurological:      Mental Status: She is alert and oriented to person, place, and time.   Psychiatric:         Mood and Affect: Mood normal.         Behavior: Behavior normal.         Results Review:       I reviewed the patient's new clinical results.        Results from last 7 days   Lab Units 21  0423 21  0528 21  0823 21  0516   WBC 10*3/mm3 5.33 5.45 5.65  --    HEMOGLOBIN g/dL 11.9* 11.4* 12.2 11.6*   PLATELETS 10*3/mm3 271 246 246  --       Results from last 7 days   Lab Units 06/21/21  0423 06/20/21  0528 06/19/21 2058 06/19/21  0823 06/17/21  0535   SODIUM mmol/L 138 139  --  141 141   POTASSIUM mmol/L 4.5 4.1 4.4 3.3* 3.3*   CHLORIDE mmol/L 101 104  --  102 106   CO2 mmol/L 26.7 26.5  --  26.9 24.1   BUN mg/dL 14 14  --  14 17   CREATININE mg/dL 0.71 0.72  --  0.65 0.74   GLUCOSE mg/dL 120* 93  --  100* 106*   Estimated Creatinine Clearance: 118.2 mL/min (by C-G formula based on SCr of 0.71 mg/dL).  Results from last 7 days   Lab Units 06/21/21 0423 06/20/21 0528 06/19/21 0823 06/17/21  0535 06/16/21  0516   CALCIUM mg/dL 8.4* 8.7 8.8 8.4* 8.1*   MAGNESIUM mg/dL 1.9  --   --   --  2.0            apixaban, 5 mg, Oral, Q12H  budesonide, 0.25 mg, Nebulization, BID - RT  famotidine, 20 mg, Oral, BID AC  furosemide, 40 mg, Oral, Daily  ipratropium-albuterol, 3 mL, Nebulization, 4x Daily - RT  losartan, 25 mg, Oral, Q24H  metoprolol tartrate, 150 mg, Oral, Q12H  polyethylene glycol, 17 g, Oral, Daily  potassium chloride, 20 mEq, Oral, Daily  predniSONE, 20 mg, Oral, BID With Meals  sodium chloride, 10 mL, Intravenous, Q12H       Diet Regular    Assessment/Plan      Active Hospital Problems    Diagnosis  POA   • **Incarcerated ventral hernia [K43.6]  Yes   • Gout [M10.9]  Unknown   • Hypopotassemia [E87.6]  Unknown   • Tobacco abuse [Z72.0]  Yes   • Morbid obesity with BMI of 40.0-44.9, adult (CMS/[facility]) [E66.01, Z68.41]  Not Applicable   • COPD (chronic obstructive pulmonary disease) (CMS/[facility]) [J44.9]  Yes   • Atrial fibrillation (CMS/[facility]) [I48.91]  Yes   • HTN (hypertension) [I10]  Yes   • LISA (obstructive sleep apnea) [G47.33]  Yes   • Stage 3b chronic kidney disease (CMS/HCC) [N18.32]  Yes   • Chronic anticoagulation [Z79.01]  Not Applicable   • SBO (small bowel obstruction) (CMS/HCC) [K56.609]  Yes      Resolved Hospital Problems   No resolved problems to display.       · Incarcerated ventral hernia: Status post open ventral hernia repair with  mesh and incidental appendectomy 6/14.  Diet advancing.  Passing flatus but no bowel movement reported to me.  Surgery following.  · Gout: Keep current prednisone dose today. Will start to wean dose tomorrow. Uric acid level was elevated. Resume allopurinol in morning as well.  · COPD/acute hypoxic respiratory failure/LISA: Pulmonology following.  Wean oxygen as able.  No acute bronchospasm currently.  Continue breathing treatments.  · Atrial fibrillation: Metoprolol.  Eliquis.  Cardiology following.  · Hypertension: Acceptable acutely.  Continue current regimen.  Monitor.  · CKD 3: Renal function stable.  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    During the entire encounter, I was wearing recommended PPE including face mask and eye protection. Hand sanitization was performed prior to entering room and upon exit.

## 2021-06-21 NOTE — PROGRESS NOTES
Postop day #7 after ventral hernia repair    Subjective:  Continues to feel well.  Still with only gas, no bowel movement but now tolerating solid food and appetite is gradually improving.    Objective:  Afebrile, vital stable  General: Awake alert and oriented, no distress  Abdomen: Soft and benign, incision is in good order    Drain serosanguineous, output recorded as 700 last night which I am sure is inaccurate, the minimal output this morning.    Labs are unremarkable today    Assessment and plan:  -Small bowel obstruction secondary to incarcerated incisional hernia, now 7 days out from repair  -Overall looks good from surgical standpoint, drain probably out in the next day or so  -Awaiting full return of GI function, she will likely require rehabilitation    Arnulfo Leonard MD  General and Endoscopic Surgery  [facility]    [address], [address]  [place], [place], [address]  P: [phone]  F: [phone]

## 2021-06-21 NOTE — PLAN OF CARE
Goal Outcome Evaluation:  Plan of Care Reviewed With: patient           Outcome Summary: Pt in chair at arrival and agreeable to participate with PT this afternoon. Pt able to perform 6 reps of sit<>stands for functional training and seated ther ex. Pt also able to march in place with cga using RW for support. Pt fatigues quickly and is on 2L continuous O2. PT will continue to follow and progress as able. Continue to rec snf at d/c.    Patient was intermittently wearing a face mask during this therapy encounter. Therapist used appropriate personal protective equipment including eye protection, mask, and gloves.  Mask used was standard procedure mask. Appropriate PPE was worn during the entire therapy session. Hand hygiene was completed before and after therapy session. Patient is not in enhanced droplet precautions.

## 2021-06-21 NOTE — THERAPY TREATMENT NOTE
Patient Name: Marilu Avery  : 1955    MRN: [id number]                              Today's Date: 2021       Admit Date: 2021    Visit Dx:     ICD-10-CM ICD-9-CM   1. Ventral hernia with bowel obstruction  K43.6 552.20   2. Chronic obstructive pulmonary disease, unspecified COPD type (CMS/[facility])  J44.9 496   3. Anticoagulated  Z79.01 V58.61   4. Morbid obesity (CMS/[facility])  E66.01 278.01   5. Small bowel obstruction (CMS/[facility])  K56.609 560.9   6. Incarcerated hernia  K46.0 552.9     Patient Active Problem List   Diagnosis   • Incisional hernia, without obstruction or gangrene   • Incarcerated ventral hernia   • Atrial fibrillation (CMS/[facility])   • HLD (hyperlipidemia)   • HTN (hypertension)   • LISA (obstructive sleep apnea)   • Stage 3b chronic kidney disease (CMS/[facility])   • Chronic anticoagulation   • SBO (small bowel obstruction) (CMS/[facility])   • Tobacco abuse   • Morbid obesity with BMI of 40.0-44.9, adult (CMS/[facility])   • COPD (chronic obstructive pulmonary disease) (CMS/[facility])   • Hypopotassemia   • Gout     Past Medical History:   Diagnosis Date   • Arthritis    • Asthma    • Atrial fibrillation (CMS/[facility])    • Colon polyps 2015    hyperplastic rectal polyp   • Hyperlipidemia    • Hypertension    • Sleep apnea      Past Surgical History:   Procedure Laterality Date   • BACK SURGERY N/A    • COLONOSCOPY N/A 10/22/2015    Rectal polyp-Dr. Elías Keating   • HYSTERECTOMY N/A    • LAPAROSCOPIC CHOLECYSTECTOMY N/A 2010    Dr. Heath Whitlock   • OOPHORECTOMY     • REPLACEMENT TOTAL KNEE Left 2015    Dr. Yo Aguayo   • REPLACEMENT TOTAL KNEE Right 2015    Dr. Yo Aguayo   • VENTRAL HERNIA REPAIR N/A 10/22/2015    Open reduction of incarcerated ventral hernia with layered closure-Dr. Elías Keating   • VENTRAL/INCISIONAL HERNIA REPAIR N/A 2021    Procedure: OPEN VENTRAL/INCISIONAL HERNIA REPAIR WITH MESH AND APPENDECTOMY;  Surgeon: Arnulfo Leonard MD;  Location: [facility] OR;   Service: General;  Laterality: N/A;     General Information     Row Name 06/21/21 1507          Physical Therapy Time and Intention    Document Type  therapy note (daily note)  -     Mode of Treatment  physical therapy;individual therapy  -     Row Name 06/21/21 1507          General Information    Patient Profile Reviewed  yes  -     Existing Precautions/Restrictions  fall;oxygen therapy device and L/min  -     Row Name 06/21/21 1507          Cognition    Orientation Status (Cognition)  oriented x 4  -     Row Name 06/21/21 1507          Safety Issues, Functional Mobility    Impairments Affecting Function (Mobility)  balance;endurance/activity tolerance;pain;strength  -       User Key  (r) = Recorded By, (t) = Taken By, (c) = Cosigned By    Initials Name Provider Type     Rachael Garcia PT Physical Therapist        Mobility     Row Name 06/21/21 1507          Bed Mobility    Comment (Bed Mobility)  NT in chair at arrival  -     Row Name 06/21/21 1507          Transfers    Comment (Transfers)  Cues for hand placement. Completed x6 reps of sit<>stands for functional training. Pt fatigues quickly  -     Row Name 06/21/21 1507          Sit-Stand Transfer    Sit-Stand [place] (Transfers)  minimum assist (75% patient effort);contact guard;1 person assist;verbal cues  -     Assistive Device (Sit-Stand Transfers)  walker, front-wheeled  -       User Key  (r) = Recorded By, (t) = Taken By, (c) = Cosigned By    Initials Name Provider Type    CF Rachael Garcia, CHUCK Physical Therapist        Obj/Interventions     Row Name 06/21/21 1508          Motor Skills    Therapeutic Exercise  other (see comments) x10 reps marches in place in standing. B laq 2x10 reps, B marches, hip add with pillow, GS x10 reps while seated in chair  -       User Key  (r) = Recorded By, (t) = Taken By, (c) = Cosigned By    Initials Name Provider Type    Rachael Morrow, CHUCK Physical Therapist        Goals/Plan    No  documentation.       Clinical Impression     Row Name 06/21/21 1508          Pain    Additional Documentation  Pain Scale: Numbers Pre/Post-Treatment (Group)  -CF     Row Name 06/21/21 1508          Pain Scale: Numbers Pre/Post-Treatment    Pretreatment Pain Rating  6/10  -CF     Posttreatment Pain Rating  6/10  -CF     Pain Location  abdomen  -CF     Pre/Posttreatment Pain Comment  abdominal binder on  -CF     Pain Intervention(s)  Medication (See MAR);Repositioned;Ambulation/increased activity  -CF     Row Name 06/21/21 1508          Plan of Care Review    Plan of Care Reviewed With  patient  -CF     Outcome Summary  Pt in chair at arrival and agreeable to participate with PT this afternoon. Pt able to perform 6 reps of sit<>stands for functional training and seated ther ex. Pt also able to march in place with cga using RW for support. Pt fatigues quickly and is on 2L continuous O2. PT will continue to follow and progress as able. Continue to rec snf at d/c.  -CF     Row Name 06/21/21 1508          Vital Signs    Pre SpO2 (%)  92  -CF     O2 Delivery Pre Treatment  nasal cannula 2L  -CF     Intra SpO2 (%)  87  -CF     O2 Delivery Intra Treatment  nasal cannula  -CF     Post SpO2 (%)  94  -CF     O2 Delivery Post Treatment  nasal cannula 2L  -CF     Row Name 06/21/21 1508          Positioning and Restraints    Pre-Treatment Position  sitting in chair/recliner  -CF     Post Treatment Position  chair  -CF     In Chair  sitting;call light within reach;encouraged to call for assist;exit alarm on;notified nsg  -CF       User Key  (r) = Recorded By, (t) = Taken By, (c) = Cosigned By    Initials Name Provider Type    CF Rachael Garcia, CHUCK Physical Therapist        Outcome Measures     Row Name 06/21/21 1511          How much help from another person do you currently need...    Turning from your back to your side while in flat bed without using bedrails?  2  -CF     Moving from lying on back to sitting on the side of a  flat bed without bedrails?  2  -CF     Moving to and from a bed to a chair (including a wheelchair)?  3  -CF     Standing up from a chair using your arms (e.g., wheelchair, bedside chair)?  3  -CF     Climbing 3-5 steps with a railing?  1  -CF     To walk in hospital room?  2  -CF     AM-PAC 6 Clicks Score (PT)  13  -CF     Row Name 06/21/21 1511          Functional Assessment    Outcome Measure Options  AM-PAC 6 Clicks Basic Mobility (PT)  -CF       User Key  (r) = Recorded By, (t) = Taken By, (c) = Cosigned By    Initials Name Provider Type    CF Rachael Garcia, PT Physical Therapist        Physical Therapy Education                 Title: PT OT SLP Therapies (Done)     Topic: Physical Therapy (Done)     Point: Mobility training (Done)     Learning Progress Summary           Patient Acceptance, E, VU,NR by  at 6/21/2021 1512    Acceptance, E,TB,D, VU,NR by  at 6/19/2021 1450    Acceptance, E,TB,D, VU,NR by  at 6/18/2021 1558    Eager, E,TB,D, VU,NR by  at 6/17/2021 1535                   Point: Home exercise program (Done)     Learning Progress Summary           Patient Acceptance, E, VU,NR by  at 6/21/2021 1512    Acceptance, E,TB,D, VU,NR by  at 6/19/2021 1450    Acceptance, E,TB,D, VU,NR by  at 6/18/2021 1558    Eager, E,TB,D, VU,NR by  at 6/17/2021 1535                   Point: Body mechanics (Done)     Learning Progress Summary           Patient Acceptance, E, VU,NR by  at 6/21/2021 1512    Acceptance, E,TB,D, VU,NR by  at 6/19/2021 1450    Acceptance, E,TB,D, VU,NR by  at 6/18/2021 1558    Eager, E,TB,D, VU,NR by  at 6/17/2021 1535                               User Key     Initials Effective Dates Name Provider Type Discipline     03/07/18 -  Johanne Mallory, PTA Physical Therapy Assistant PT     06/16/21 -  Loretta Barrett, PT Physical Therapist PT    CF 06/16/21 -  Rachael Garcia, CHUCK Physical Therapist PT              PT Recommendation and Plan     Plan of Care Reviewed  With: patient  Outcome Summary: Pt in chair at arrival and agreeable to participate with PT this afternoon. Pt able to perform 6 reps of sit<>stands for functional training and seated ther ex. Pt also able to march in place with cga using RW for support. Pt fatigues quickly and is on 2L continuous O2. PT will continue to follow and progress as able. Continue to rec snf at d/c.     Time Calculation:   PT Charges     Row Name 06/21/21 1506             Time Calculation    Start Time  1438  -CF      Stop Time  1455  -CF      Time Calculation (min)  17 min  -CF      PT Received On  06/21/21  -CF      PT - Next Appointment  06/22/21  -CF        User Key  (r) = Recorded By, (t) = Taken By, (c) = Cosigned By    Initials Name Provider Type    CF Rachael Garcia PT Physical Therapist        Therapy Charges for Today     Code Description Service Date Service Provider Modifiers Qty    [id number]  PT THERAPEUTIC ACT EA 15 MIN 6/21/2021 Rachael Garcia PT GP 1          PT G-Codes  Outcome Measure Options: AM-PAC 6 Clicks Basic Mobility (PT)  AM-PAC 6 Clicks Score (PT): 13    Rachael Garcia PT  6/21/2021

## 2021-06-21 NOTE — CASE MANAGEMENT/SOCIAL WORK
Continued Stay Note  [facility]     Patient Name: Marilu Avery  MRN: [id number]  Today's Date: 6/21/2021    Admit Date: 6/13/2021    Discharge Plan     Row Name 06/21/21 1300       Plan    Plan  SNF pending in-network facility and precert    Plan Comments  Met with patient at bedside. Informed her the previous SNF referrals all declined due to out network insurance. She was agreeable to referral to [facility], as they are in-network. Will continue to follow.        Discharge Codes    No documentation.             Nneka Feliz RN

## 2021-06-21 NOTE — PLAN OF CARE
Goal Outcome Evaluation:               Pt A&O x4.C/o abd pain, medicated per MAR. Pt was up to chair for a few hours today. Pt SOB upon returning to bed. Nasal O2 @ 2Liters. Pt encouraged to shift weight and utilize trapeze bar. Will CTM

## 2021-06-21 NOTE — PROGRESS NOTES
[facility]      Mar/chart reviewed  Follow up AHRF  Not much cough  No chest pain  Some shortness of breath associated with anxiety --improving    Vital Sign Min/Max for last 24 hours  Temp  Min: 97.6 °F (36.4 °C)  Max: 98.5 °F (36.9 °C)   BP  Min: 125/78  Max: 139/94   Pulse  Min: 81  Max: 108   Resp  Min: 18  Max: 20   SpO2  Min: 93 %  Max: 98 %   Flow (L/min)  Min: 2  Max: 2   No data recorded     Appears ill,  axox3,   perrl, eomi, no icterus,  mmm, no jvd, trachea midline, neck supple,  chest fair ae bilaterally, no crackles, no wheezes,   Tachy, irrg  soft, nt, nd +bs,  no c/c/ trace edema  Skin warm, dry no rashes    Labs: 6/21: reviewed:  Glucose 120  Bun 14  Cr 0.71  Bicarb 26  Wbc 5  hgb 11.9  ptls 271    ASSESSMENT:     Acute hypoxemic respiratory failure requiring 4 L supplemental oxygen to maintain sats in the 90s  LISA on CPAP  Recurrent incisional hernia with small bowel obstruction/incarceration status post repair  COPD  Atrial fibrillation  Chronic kidney disease 3  Hypertension  Morbid obesity: BMI 41.6 kg/m²           PLAN:     Encourage incentive spirometer use.  Weaning oxygen as able.  Bronchodilators for COPD symptoms.  CPAP for sleep apnea.

## 2021-06-21 NOTE — PROGRESS NOTES
"Patient Name: Marilu Avery  :1955  65 y.o.      Patient Care Team:  Anatoly iJmenez MD as PCP - General  Anatoly Jimenez MD as PCP - Family Medicine    Interval History:   Back on oral anticoagulation.  Rate controlled.    Subjective:  Following for atrial fibrillation    Objective   Vital Signs  Temp:  [97.6 °F (36.4 °C)-98.5 °F (36.9 °C)] 98 °F (36.7 °C)  Heart Rate:  [] 86  Resp:  [18] 18  BP: (125-133)/(78-93) 133/85    Intake/Output Summary (Last 24 hours) at 2021 1335  Last data filed at 2021 0900  Gross per 24 hour   Intake 720 ml   Output 1320 ml   Net -600 ml     Flowsheet Rows      First Filed Value   Admission Height  177.8 cm (70\") Documented at 2021 1733   Admission Weight  (!) 137 kg (302 lb) Documented at 2021 2230          Physical Exam:   General Appearance:    Alert, cooperative, in no acute distress   Lungs:     Clear to auscultation.  Normal respiratory effort and rate.      Heart:   Irregularly irregular.   Chest Wall:    No abnormalities observed   Abdomen:     Soft, nontender, positive bowel sounds.     Extremities:   no cyanosis, clubbing or edema.  No marked joint deformities.  Adequate musculoskeletal strength.       Results Review:    Results from last 7 days   Lab Units 21  0423   SODIUM mmol/L 138   POTASSIUM mmol/L 4.5   CHLORIDE mmol/L 101   CO2 mmol/L 26.7   BUN mg/dL 14   CREATININE mg/dL 0.71   GLUCOSE mg/dL 120*   CALCIUM mg/dL 8.4*         Results from last 7 days   Lab Units 21  0423   WBC 10*3/mm3 5.33   HEMOGLOBIN g/dL 11.9*   HEMATOCRIT % 35.6   PLATELETS 10*3/mm3 271             Results from last 7 days   Lab Units 21  0423   MAGNESIUM mg/dL 1.9             Medication Review:   [START ON 2021] allopurinol, 100 mg, Oral, Daily  apixaban, 5 mg, Oral, Q12H  atorvastatin, 80 mg, Oral, Nightly  budesonide, 0.25 mg, Nebulization, BID - RT  famotidine, 20 mg, Oral, BID AC  furosemide, 40 mg, Oral, " Daily  ipratropium-albuterol, 3 mL, Nebulization, 4x Daily - RT  losartan, 25 mg, Oral, Q24H  metoprolol tartrate, 150 mg, Oral, Q12H  polyethylene glycol, 17 g, Oral, Daily  potassium chloride, 20 mEq, Oral, Daily  predniSONE, 20 mg, Oral, BID With Meals  [START ON 6/22/2021] predniSONE, 30 mg, Oral, Daily With Breakfast  sodium chloride, 10 mL, Intravenous, Q12H              Assessment/Plan     1.  Persistent atrial fibrillation.  Rate controlled.  Anticoagulated with Eliquis.  No further cardiac work-up needed.  Follow-up with Dr. Sandoval in 6 weeks.  2.  Hypertension.  Stable  3.  Incarcerated hernia with bowel obstruction status post repair and appendectomy June 14, 2021  4.  Chronic kidney disease  5.  COPD  6.  Obesity.    Johanne Hudson MD, [facility]  06/21/21  13:35 EDT

## 2021-06-21 NOTE — PLAN OF CARE
Goal Outcome Evaluation:  Plan of Care Reviewed With: patient        Progress: improving  Outcome Summary: Patient reported abdominal pain and Norco given x 2 during the shift and Dilaudid given x 2 during during the shift.  No bowel movement.  VSS.  Will continue to monitor.

## 2021-06-22 LAB
ANION GAP SERPL CALCULATED.3IONS-SCNC: 7.5 MMOL/L (ref 5–15)
BUN SERPL-MCNC: 16 MG/DL (ref 8–23)
BUN/CREAT SERPL: 22.2 (ref 7–25)
CALCIUM SPEC-SCNC: 8.6 MG/DL (ref 8.6–10.5)
CHLORIDE SERPL-SCNC: 102 MMOL/L (ref 98–107)
CO2 SERPL-SCNC: 29.5 MMOL/L (ref 22–29)
CREAT SERPL-MCNC: 0.72 MG/DL (ref 0.57–1)
GFR SERPL CREATININE-BSD FRML MDRD: 82 ML/MIN/1.73
GLUCOSE SERPL-MCNC: 121 MG/DL (ref 65–99)
POTASSIUM SERPL-SCNC: 4.2 MMOL/L (ref 3.5–5.2)
SODIUM SERPL-SCNC: 139 MMOL/L (ref 136–145)

## 2021-06-22 PROCEDURE — 97110 THERAPEUTIC EXERCISES: CPT

## 2021-06-22 PROCEDURE — 63710000001 PREDNISONE PER 5 MG: Performed by: INTERNAL MEDICINE

## 2021-06-22 PROCEDURE — 25010000002 HYDROMORPHONE PER 4 MG: Performed by: INTERNAL MEDICINE

## 2021-06-22 PROCEDURE — 94799 UNLISTED PULMONARY SVC/PX: CPT

## 2021-06-22 PROCEDURE — 99024 POSTOP FOLLOW-UP VISIT: CPT | Performed by: SURGERY

## 2021-06-22 PROCEDURE — 63710000001 PREDNISONE PER 1 MG: Performed by: INTERNAL MEDICINE

## 2021-06-22 PROCEDURE — 80048 BASIC METABOLIC PNL TOTAL CA: CPT | Performed by: NURSE PRACTITIONER

## 2021-06-22 RX ORDER — BISACODYL 10 MG
10 SUPPOSITORY, RECTAL RECTAL DAILY
Status: DISCONTINUED | OUTPATIENT
Start: 2021-06-22 | End: 2021-06-23 | Stop reason: HOSPADM

## 2021-06-22 RX ADMIN — IPRATROPIUM BROMIDE AND ALBUTEROL SULFATE 3 ML: 2.5; .5 SOLUTION RESPIRATORY (INHALATION) at 15:43

## 2021-06-22 RX ADMIN — LOSARTAN POTASSIUM 25 MG: 25 TABLET, FILM COATED ORAL at 08:22

## 2021-06-22 RX ADMIN — HYDROCODONE BITARTRATE AND ACETAMINOPHEN 1 TABLET: 5; 325 TABLET ORAL at 20:57

## 2021-06-22 RX ADMIN — ATORVASTATIN CALCIUM 80 MG: 80 TABLET, FILM COATED ORAL at 20:50

## 2021-06-22 RX ADMIN — APIXABAN 5 MG: 5 TABLET, FILM COATED ORAL at 20:50

## 2021-06-22 RX ADMIN — HYDROCODONE BITARTRATE AND ACETAMINOPHEN 1 TABLET: 5; 325 TABLET ORAL at 00:44

## 2021-06-22 RX ADMIN — FAMOTIDINE 20 MG: 20 TABLET, FILM COATED ORAL at 06:31

## 2021-06-22 RX ADMIN — HYDROMORPHONE HYDROCHLORIDE 0.5 MG: 1 INJECTION, SOLUTION INTRAMUSCULAR; INTRAVENOUS; SUBCUTANEOUS at 19:47

## 2021-06-22 RX ADMIN — PREDNISONE 30 MG: 20 TABLET ORAL at 08:23

## 2021-06-22 RX ADMIN — HYDROCODONE BITARTRATE AND ACETAMINOPHEN 1 TABLET: 5; 325 TABLET ORAL at 16:55

## 2021-06-22 RX ADMIN — POTASSIUM CHLORIDE 20 MEQ: 750 TABLET, EXTENDED RELEASE ORAL at 08:21

## 2021-06-22 RX ADMIN — BUDESONIDE 0.25 MG: 0.25 SUSPENSION RESPIRATORY (INHALATION) at 19:27

## 2021-06-22 RX ADMIN — FUROSEMIDE 40 MG: 40 TABLET ORAL at 08:22

## 2021-06-22 RX ADMIN — ALLOPURINOL 100 MG: 100 TABLET ORAL at 08:22

## 2021-06-22 RX ADMIN — METOPROLOL TARTRATE 150 MG: 50 TABLET, FILM COATED ORAL at 20:50

## 2021-06-22 RX ADMIN — BUDESONIDE 0.25 MG: 0.25 SUSPENSION RESPIRATORY (INHALATION) at 08:04

## 2021-06-22 RX ADMIN — IPRATROPIUM BROMIDE AND ALBUTEROL SULFATE 3 ML: 2.5; .5 SOLUTION RESPIRATORY (INHALATION) at 19:23

## 2021-06-22 RX ADMIN — HYDROMORPHONE HYDROCHLORIDE 0.5 MG: 1 INJECTION, SOLUTION INTRAMUSCULAR; INTRAVENOUS; SUBCUTANEOUS at 14:32

## 2021-06-22 RX ADMIN — POLYETHYLENE GLYCOL 3350 17 G: 17 POWDER, FOR SOLUTION ORAL at 08:20

## 2021-06-22 RX ADMIN — HYDROCODONE BITARTRATE AND ACETAMINOPHEN 1 TABLET: 5; 325 TABLET ORAL at 06:31

## 2021-06-22 RX ADMIN — METOPROLOL TARTRATE 150 MG: 50 TABLET, FILM COATED ORAL at 08:21

## 2021-06-22 RX ADMIN — BISACODYL 10 MG: 10 SUPPOSITORY RECTAL at 15:23

## 2021-06-22 RX ADMIN — IPRATROPIUM BROMIDE AND ALBUTEROL SULFATE 3 ML: 2.5; .5 SOLUTION RESPIRATORY (INHALATION) at 12:34

## 2021-06-22 RX ADMIN — FAMOTIDINE 20 MG: 20 TABLET, FILM COATED ORAL at 16:46

## 2021-06-22 RX ADMIN — HYDROMORPHONE HYDROCHLORIDE 0.5 MG: 1 INJECTION, SOLUTION INTRAMUSCULAR; INTRAVENOUS; SUBCUTANEOUS at 08:22

## 2021-06-22 RX ADMIN — IPRATROPIUM BROMIDE AND ALBUTEROL SULFATE 3 ML: 2.5; .5 SOLUTION RESPIRATORY (INHALATION) at 08:02

## 2021-06-22 RX ADMIN — APIXABAN 5 MG: 5 TABLET, FILM COATED ORAL at 08:23

## 2021-06-22 RX ADMIN — SODIUM CHLORIDE, PRESERVATIVE FREE 10 ML: 5 INJECTION INTRAVENOUS at 10:11

## 2021-06-22 NOTE — PLAN OF CARE
Goal Outcome Evaluation:               Pt ambulates with walker. Had BM today. On Specialty Bed. A&O VSS on 1L of O2. Afib. C/o abd pain, medicated per MAR. WCTM

## 2021-06-22 NOTE — PLAN OF CARE
Goal Outcome Evaluation:           Progress: (P) improving   Patient was c/o left side abdominal pain and given prn norco twice and dilaudid once. Patient also c/o not being able to get comfortable. Positioning was changed when requested. 2L via nasal cannula. PADILLA drain and incision checked with no problems. Vss. Wctm.

## 2021-06-22 NOTE — PLAN OF CARE
Goal Outcome Evaluation:  Plan of Care Reviewed With: patient        Progress: improving  Outcome Summary: Pt agreed to PT, matilde 10ft x2 amb w/seated rest required; improving w/tfers also; pt motivated and plans SNU at [place]; will perf LE exer later pm after rested    Patient was wearing a face mask during this therapy encounter. Therapist used appropriate personal protective equipment including eye protection, mask, and gloves.  Mask used was standard procedure mask. Appropriate PPE was worn during the entire therapy session. Hand hygiene was completed before and after therapy session. Patient is not in enhanced droplet precautions.

## 2021-06-22 NOTE — PROGRESS NOTES
Name: Marilu Avery ADMIT: 2021   : 1955  PCP: Anatoly Jimenez MD    MRN: [id number] LOS: 9 days   AGE/SEX: 65 y.o. female  ROOM: [facility]   Subjective   Chief Complaint   Patient presents with   • Abdominal Pain      Feet improving. No CP SOA NVD.    Objective   Vital Signs  Temp:  [97.9 °F (36.6 °C)-98.3 °F (36.8 °C)] 97.9 °F (36.6 °C)  Heart Rate:  [] 92  Resp:  [18-20] 18  BP: (129-148)/(84-97) 137/86  SpO2:  [93 %-99 %] 97 %  on  Flow (L/min):  [1-2] 1;   Device (Oxygen Therapy): nasal cannula;humidified  Body mass index is 41.57 kg/m².    Physical Exam  Vitals and nursing note reviewed.   Constitutional:       General: She is not in acute distress.     Appearance: She is obese. She is not diaphoretic.   HENT:      Head: Normocephalic and atraumatic.   Eyes:      Extraocular Movements: Extraocular movements intact.      Conjunctiva/sclera: Conjunctivae normal.   Cardiovascular:      Rate and Rhythm: Normal rate. Rhythm irregular.      Pulses: Normal pulses.   Pulmonary:      Effort: Pulmonary effort is normal.      Breath sounds: Decreased breath sounds present. No wheezing.   Abdominal:      Palpations: Abdomen is soft.      Comments: Binder in place   Musculoskeletal:         General: Tenderness present.      Cervical back: Neck supple. No tenderness.      Right lower leg: Edema present.      Left lower leg: Edema present.   Skin:     General: Skin is warm and dry.      Findings: No erythema.   Neurological:      Mental Status: She is alert and oriented to person, place, and time.   Psychiatric:         Mood and Affect: Mood normal.         Behavior: Behavior normal.         Results Review:       I reviewed the patient's new clinical results.        Results from last 7 days   Lab Units 21  0423 21  0528 21  0823 21  0516   WBC 10*3/mm3 5.33 5.45 5.65  --    HEMOGLOBIN g/dL 11.9* 11.4* 12.2 11.6*   PLATELETS 10*3/mm3 271 246 246  --      Results from last 7 days    Lab Units 06/22/21  0649 06/21/21  0423 06/20/21  0528 06/19/21  2058 06/19/21  0823   SODIUM mmol/L 139 138 139  --  141   POTASSIUM mmol/L 4.2 4.5 4.1 4.4 3.3*   CHLORIDE mmol/L 102 101 104  --  102   CO2 mmol/L 29.5* 26.7 26.5  --  26.9   BUN mg/dL 16 14 14  --  14   CREATININE mg/dL 0.72 0.71 0.72  --  0.65   GLUCOSE mg/dL 121* 120* 93  --  100*   Estimated Creatinine Clearance: 116.5 mL/min (by C-G formula based on SCr of 0.72 mg/dL).  Results from last 7 days   Lab Units 06/22/21  0649 06/21/21 0423 06/20/21 0528 06/19/21  0823 06/16/21  0516   CALCIUM mg/dL 8.6 8.4* 8.7 8.8 8.1*   MAGNESIUM mg/dL  --  1.9  --   --  2.0            allopurinol, 100 mg, Oral, Daily  apixaban, 5 mg, Oral, Q12H  atorvastatin, 80 mg, Oral, Nightly  bisacodyl, 10 mg, Rectal, Daily  budesonide, 0.25 mg, Nebulization, BID - RT  famotidine, 20 mg, Oral, BID AC  furosemide, 40 mg, Oral, Daily  ipratropium-albuterol, 3 mL, Nebulization, 4x Daily - RT  losartan, 25 mg, Oral, Q24H  metoprolol tartrate, 150 mg, Oral, Q12H  polyethylene glycol, 17 g, Oral, Daily  potassium chloride, 20 mEq, Oral, Daily  predniSONE, 30 mg, Oral, Daily With Breakfast  sodium chloride, 10 mL, Intravenous, Q12H       Diet Regular    Assessment/Plan      Active Hospital Problems    Diagnosis  POA   • **Incarcerated ventral hernia [K43.6]  Yes   • Gout [M10.9]  Unknown   • Hypopotassemia [E87.6]  Unknown   • Tobacco abuse [Z72.0]  Yes   • Morbid obesity with BMI of 40.0-44.9, adult (CMS/[facility]) [E66.01, Z68.41]  Not Applicable   • COPD (chronic obstructive pulmonary disease) (CMS/[facility]) [J44.9]  Yes   • Atrial fibrillation (CMS/HCC) [I48.91]  Yes   • HTN (hypertension) [I10]  Yes   • LISA (obstructive sleep apnea) [G47.33]  Yes   • Stage 3b chronic kidney disease (CMS/HCC) [N18.32]  Yes   • Chronic anticoagulation [Z79.01]  Not Applicable   • SBO (small bowel obstruction) (CMS/HCC) [K56.609]  Yes      Resolved Hospital Problems   No resolved problems to display.        · Incarcerated ventral hernia: Status post open ventral hernia repair with mesh and incidental appendectomy 6/14. Awaiting full return of bowel function.  Surgery following.  · Gout: Allopurinol resumed. Prednisone taper.  · COPD/acute hypoxic respiratory failure/LISA: Pulmonology following.  Wean oxygen as able.  No acute bronchospasm currently.  Continue breathing treatments.  · Atrial fibrillation: Metoprolol.  Eliquis.  Cardiology following.  · Hypertension: Acceptable acutely.  Continue current regimen.  Monitor.  · CKD 3: Renal function stable.  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    During the entire encounter, I was wearing recommended PPE including face mask and eye protection. Hand sanitization was performed prior to entering room and upon exit.

## 2021-06-22 NOTE — THERAPY TREATMENT NOTE
Patient Name: Marilu Avery  : 1955    MRN: [id number]                              Today's Date: 2021       Admit Date: 2021    Visit Dx:     ICD-10-CM ICD-9-CM   1. Ventral hernia with bowel obstruction  K43.6 552.20   2. Chronic obstructive pulmonary disease, unspecified COPD type (CMS/[facility])  J44.9 496   3. Anticoagulated  Z79.01 V58.61   4. Morbid obesity (CMS/[facility])  E66.01 278.01   5. Small bowel obstruction (CMS/[facility])  K56.609 560.9   6. Incarcerated hernia  K46.0 552.9     Patient Active Problem List   Diagnosis   • Incisional hernia, without obstruction or gangrene   • Incarcerated ventral hernia   • Atrial fibrillation (CMS/[facility])   • HLD (hyperlipidemia)   • HTN (hypertension)   • LISA (obstructive sleep apnea)   • Stage 3b chronic kidney disease (CMS/[facility])   • Chronic anticoagulation   • SBO (small bowel obstruction) (CMS/[facility])   • Tobacco abuse   • Morbid obesity with BMI of 40.0-44.9, adult (CMS/[facility])   • COPD (chronic obstructive pulmonary disease) (CMS/[facility])   • Hypopotassemia   • Gout     Past Medical History:   Diagnosis Date   • Arthritis    • Asthma    • Atrial fibrillation (CMS/[facility])    • Colon polyps 2015    hyperplastic rectal polyp   • Hyperlipidemia    • Hypertension    • Sleep apnea      Past Surgical History:   Procedure Laterality Date   • BACK SURGERY N/A    • COLONOSCOPY N/A 10/22/2015    Rectal polyp-Dr. Elías Keating   • HYSTERECTOMY N/A    • LAPAROSCOPIC CHOLECYSTECTOMY N/A 2010    Dr. Heath Whitlock   • OOPHORECTOMY     • REPLACEMENT TOTAL KNEE Left 2015    Dr. Yo Aguayo   • REPLACEMENT TOTAL KNEE Right 2015    Dr. Yo Aguayo   • VENTRAL HERNIA REPAIR N/A 10/22/2015    Open reduction of incarcerated ventral hernia with layered closure-Dr. Elías Keating   • VENTRAL/INCISIONAL HERNIA REPAIR N/A 2021    Procedure: OPEN VENTRAL/INCISIONAL HERNIA REPAIR WITH MESH AND APPENDECTOMY;  Surgeon: Arnulfo Leonard MD;  [facility];   Service: General;  Laterality: N/A;     General Information     Row Name 06/22/21 1450          Physical Therapy Time and Intention    Document Type  therapy note (daily note)  -     Mode of Treatment  individual therapy;physical therapy  -     Row Name 06/22/21 1450          General Information    Patient Profile Reviewed  yes  -     Existing Precautions/Restrictions  fall;oxygen therapy device and L/min  -     Row Name 06/22/21 1450          Living Environment    Lives With  alone  -     Row Name 06/22/21 1450          Cognition    Orientation Status (Cognition)  oriented x 4  -     Row Name 06/22/21 1450          Safety Issues, Functional Mobility    Impairments Affecting Function (Mobility)  endurance/activity tolerance;strength  -       User Key  (r) = Recorded By, (t) = Taken By, (c) = Cosigned By    Initials Name Provider Type    Johanne Nunez PTA Physical Therapy Assistant        Mobility     Row Name 06/22/21 1457          Bed Mobility    Supine-Sit [place] (Bed Mobility)  minimum assist (75% patient effort);contact guard;verbal cues  -     Assistive Device (Bed Mobility)  bed rails;head of bed elevated  -     Row Name 06/22/21 1451          Sit-Stand Transfer    Sit-Stand [place] (Transfers)  minimum assist (75% patient effort);contact guard;verbal cues  -     Row Name 06/22/21 1451          Gait/Stairs (Locomotion)    [place] Level (Gait)  minimum assist (75% patient effort);contact guard;verbal cues  -     Assistive Device (Gait)  walker, front-wheeled  -     Distance in Feet (Gait)  10ft x2, seated rest EOB in between walks  -     Deviations/Abnormal Patterns (Gait)  faith decreased  -     Bilateral Gait Deviations  forward flexed posture  -       User Key  (r) = Recorded By, (t) = Taken By, (c) = Cosigned By    Initials Name Provider Type    Johanne Nunez PTA Physical Therapy Assistant        Obj/Interventions     Row Name 06/22/21 1456           Motor Skills    Therapeutic Exercise  -- fatigued at present but educ on perf ad gudelia LE exer later today  -       User Key  (r) = Recorded By, (t) = Taken By, (c) = Cosigned By    Initials Name Provider Type    Johanne Nunez PTA Physical Therapy Assistant        Goals/Plan    No documentation.       Clinical Impression     Row Name 06/22/21 1451          Pain Scale: Numbers Pre/Post-Treatment    Pretreatment Pain Rating  5/10  -JM     Posttreatment Pain Rating  5/10  -     Pain Location - Orientation  generalized  -     Pain Intervention(s)  Medication (See MAR);Repositioned;Ambulation/increased activity  -     Row Name 06/22/21 1455          Plan of Care Review    Plan of Care Reviewed With  patient  -     Progress  improving  -     Outcome Summary  Pt agreed to PT, matilde 10ft x2 amb w/seated rest required; improving w/tfers also; pt [facility]; will perf LE exer later pm after rested  -     Row Name 06/22/21 1458          Therapy Assessment/Plan (PT)    Rehab Potential (PT)  good, to achieve stated therapy goals  -     Criteria for Skilled Interventions Met (PT)  yes  -       User Key  (r) = Recorded By, (t) = Taken By, (c) = Cosigned By    Initials Name Provider Type    Johanne Nunez PTA Physical Therapy Assistant        Outcome Measures     Row Name 06/22/21 3105          How much help from another person do you currently need...    Turning from your back to your side while in flat bed without using bedrails?  3  -JM     Moving from lying on back to sitting on the side of a flat bed without bedrails?  3  -JM     Moving to and from a bed to a chair (including a wheelchair)?  3  -JM     Standing up from a chair using your arms (e.g., wheelchair, bedside chair)?  3  -JM     Climbing 3-5 steps with a railing?  1  -JM     To walk in hospital room?  3  -JM     AM-PAC 6 Clicks Score (PT)  16  -       User Key  (r) = Recorded By, (t) = Taken By, (c) = Cosigned By     Initials Name Provider Type     Johanne Mallory PTA Physical Therapy Assistant        Physical Therapy Education                 Title: PT OT SLP Therapies (Done)     Topic: Physical Therapy (Done)     Point: Mobility training (Done)     Learning Progress Summary           Patient Eager, E,TB,D, VU,DU by  at 6/22/2021 1457    Acceptance, E, VU,NR by  at 6/21/2021 1512    Acceptance, E,TB,D, VU,NR by  at 6/19/2021 1450    Acceptance, E,TB,D, VU,NR by  at 6/18/2021 1558    Eager, E,TB,D, VU,NR by  at 6/17/2021 1535                   Point: Home exercise program (Done)     Learning Progress Summary           Patient Eager, E,TB,D, VU,DU by  at 6/22/2021 1457    Acceptance, E, VU,NR by  at 6/21/2021 1512    Acceptance, E,TB,D, VU,NR by  at 6/19/2021 1450    Acceptance, E,TB,D, VU,NR by  at 6/18/2021 1558    Eager, E,TB,D, VU,NR by  at 6/17/2021 1535                   Point: Body mechanics (Done)     Learning Progress Summary           Patient Eager, E,TB,D, VU,DU by  at 6/22/2021 1457    Acceptance, E, VU,NR by  at 6/21/2021 1512    Acceptance, E,TB,D, VU,NR by  at 6/19/2021 1450    Acceptance, E,TB,D, VU,NR by  at 6/18/2021 1558    Eager, E,TB,D, VU,NR by  at 6/17/2021 1535                               User Key     Initials Effective Dates Name Provider Type [facility] 03/07/18 -  Johanne Mallory PTA Physical Therapy Assistant PT     06/16/21 -  Loretta Barrett, PT Physical Therapist PT    [facility] 06/16/21 -  Rachael Garcia, CHUCK Physical Therapist PT              PT Recommendation and Plan     Plan of Care Reviewed With: patient  Progress: improving  Outcome Summary: Pt agreed to PT, matilde 10ft x2 amb w/seated rest required; improving w/tfers also; pt motivated and plans SNU at [place]; will perf LE exer later pm after rested     Time Calculation:   PT Charges     Row Name 06/22/21 1444             Time Calculation    Start Time  1105  -JM      Stop Time  1130  -JM      Time  Calculation (min)  25 min  -SARA      PT Received On  06/22/21  -SARA      PT - Next Appointment  06/23/21  -SARA        User Key  (r) = Recorded By, (t) = Taken By, (c) = Cosigned By    Initials Name Provider Type    Johanne Nunez PTA Physical Therapy Assistant        Therapy Charges for Today     Code Description Service Date Service Provider Modifiers Qty    50540432740 HC PT THER PROC EA 15 MIN 6/22/2021 Johanne Mallory PTA GP 2          PT G-Codes  Outcome Measure Options: AM-PAC 6 Clicks Basic Mobility (PT)  AM-PAC 6 Clicks Score (PT): 16    Johanne Mallory PTA  6/22/2021

## 2021-06-22 NOTE — PROGRESS NOTES
Postop day 8 after ventral hernia repair for incarcerated small bowel    Subjective:  Doing well, passing gas but no bowel movement, tolerating a diet and was able to stand up yesterday.    Objective:  Patient is afebrile with stable vitals  General: Awake and alert, no distress  Abdomen: Soft and benign, incision is in good order    Drain output minimal, serosanguineous appearance    Assessment and plan:  -Status post open incisional hernia repair for small bowel obstruction secondary to incarcerated hernia  -Doing well from surgical standpoint, awaiting full return of GI function, try suppository today  -Probably ready for discharge from surgical standpoint in the next day or 2, drain likely out tomorrow    Arnulfo Leonard MD  General and Endoscopic Surgery  [facility]    [address], [address]  [place], [place], [address]  P: [phone]  F: [phone]

## 2021-06-23 VITALS
SYSTOLIC BLOOD PRESSURE: 128 MMHG | WEIGHT: 289.7 LBS | OXYGEN SATURATION: 94 % | HEIGHT: 70 IN | BODY MASS INDEX: 41.48 KG/M2 | DIASTOLIC BLOOD PRESSURE: 82 MMHG | RESPIRATION RATE: 16 BRPM | TEMPERATURE: 97.8 F | HEART RATE: 87 BPM

## 2021-06-23 LAB
ANION GAP SERPL CALCULATED.3IONS-SCNC: 9.4 MMOL/L (ref 5–15)
BUN SERPL-MCNC: 19 MG/DL (ref 8–23)
BUN/CREAT SERPL: 21.1 (ref 7–25)
CALCIUM SPEC-SCNC: 8.5 MG/DL (ref 8.6–10.5)
CHLORIDE SERPL-SCNC: 103 MMOL/L (ref 98–107)
CO2 SERPL-SCNC: 26.6 MMOL/L (ref 22–29)
CREAT SERPL-MCNC: 0.9 MG/DL (ref 0.57–1)
GFR SERPL CREATININE-BSD FRML MDRD: 63 ML/MIN/1.73
GLUCOSE SERPL-MCNC: 94 MG/DL (ref 65–99)
MAGNESIUM SERPL-MCNC: 2 MG/DL (ref 1.6–2.4)
POTASSIUM SERPL-SCNC: 3.8 MMOL/L (ref 3.5–5.2)
QT INTERVAL: 409 MS
SODIUM SERPL-SCNC: 139 MMOL/L (ref 136–145)

## 2021-06-23 PROCEDURE — 93005 ELECTROCARDIOGRAM TRACING: CPT | Performed by: INTERNAL MEDICINE

## 2021-06-23 PROCEDURE — 99024 POSTOP FOLLOW-UP VISIT: CPT | Performed by: SURGERY

## 2021-06-23 PROCEDURE — 80048 BASIC METABOLIC PNL TOTAL CA: CPT | Performed by: NURSE PRACTITIONER

## 2021-06-23 PROCEDURE — 63710000001 PREDNISONE PER 1 MG: Performed by: INTERNAL MEDICINE

## 2021-06-23 PROCEDURE — 94799 UNLISTED PULMONARY SVC/PX: CPT

## 2021-06-23 PROCEDURE — 83735 ASSAY OF MAGNESIUM: CPT | Performed by: INTERNAL MEDICINE

## 2021-06-23 PROCEDURE — 25010000002 HYDROMORPHONE PER 4 MG: Performed by: INTERNAL MEDICINE

## 2021-06-23 PROCEDURE — 93010 ELECTROCARDIOGRAM REPORT: CPT | Performed by: INTERNAL MEDICINE

## 2021-06-23 PROCEDURE — 97110 THERAPEUTIC EXERCISES: CPT

## 2021-06-23 RX ORDER — FUROSEMIDE 40 MG/1
40 TABLET ORAL DAILY
Start: 2021-06-23 | End: 2021-08-20

## 2021-06-23 RX ORDER — BUDESONIDE AND FORMOTEROL FUMARATE DIHYDRATE 160; 4.5 UG/1; UG/1
2 AEROSOL RESPIRATORY (INHALATION)
Refills: 12
Start: 2021-06-23

## 2021-06-23 RX ORDER — ALBUTEROL SULFATE 90 UG/1
2 AEROSOL, METERED RESPIRATORY (INHALATION) EVERY 4 HOURS PRN
Start: 2021-06-23

## 2021-06-23 RX ORDER — HYDROCODONE BITARTRATE AND ACETAMINOPHEN 5; 325 MG/1; MG/1
1 TABLET ORAL EVERY 6 HOURS PRN
Qty: 12 TABLET | Refills: 0 | Status: SHIPPED | OUTPATIENT
Start: 2021-06-23 | End: 2021-07-14 | Stop reason: SDUPTHER

## 2021-06-23 RX ORDER — POTASSIUM CHLORIDE 20 MEQ/1
20 TABLET, EXTENDED RELEASE ORAL DAILY
Start: 2021-06-24 | End: 2021-08-20 | Stop reason: SDUPTHER

## 2021-06-23 RX ORDER — PREDNISONE 10 MG/1
TABLET ORAL
Start: 2021-06-23 | End: 2021-08-20

## 2021-06-23 RX ORDER — LOSARTAN POTASSIUM 25 MG/1
25 TABLET ORAL
Start: 2021-06-24 | End: 2021-08-20 | Stop reason: SDUPTHER

## 2021-06-23 RX ORDER — POLYETHYLENE GLYCOL 3350 17 G/17G
17 POWDER, FOR SOLUTION ORAL DAILY
Start: 2021-06-24

## 2021-06-23 RX ORDER — FAMOTIDINE 20 MG/1
20 TABLET, FILM COATED ORAL
Start: 2021-06-23

## 2021-06-23 RX ORDER — AMOXICILLIN 250 MG
2 CAPSULE ORAL DAILY PRN
Start: 2021-06-23 | End: 2021-08-20

## 2021-06-23 RX ADMIN — LOSARTAN POTASSIUM 25 MG: 25 TABLET, FILM COATED ORAL at 09:06

## 2021-06-23 RX ADMIN — POLYETHYLENE GLYCOL 3350 17 G: 17 POWDER, FOR SOLUTION ORAL at 09:06

## 2021-06-23 RX ADMIN — POTASSIUM CHLORIDE 20 MEQ: 750 TABLET, EXTENDED RELEASE ORAL at 09:06

## 2021-06-23 RX ADMIN — HYDROMORPHONE HYDROCHLORIDE 0.5 MG: 1 INJECTION, SOLUTION INTRAMUSCULAR; INTRAVENOUS; SUBCUTANEOUS at 03:51

## 2021-06-23 RX ADMIN — METOPROLOL TARTRATE 150 MG: 50 TABLET, FILM COATED ORAL at 09:06

## 2021-06-23 RX ADMIN — IPRATROPIUM BROMIDE AND ALBUTEROL SULFATE 3 ML: 2.5; .5 SOLUTION RESPIRATORY (INHALATION) at 10:11

## 2021-06-23 RX ADMIN — HYDROMORPHONE HYDROCHLORIDE 0.5 MG: 1 INJECTION, SOLUTION INTRAMUSCULAR; INTRAVENOUS; SUBCUTANEOUS at 00:01

## 2021-06-23 RX ADMIN — FUROSEMIDE 40 MG: 40 TABLET ORAL at 09:06

## 2021-06-23 RX ADMIN — PREDNISONE 30 MG: 20 TABLET ORAL at 09:06

## 2021-06-23 RX ADMIN — IPRATROPIUM BROMIDE AND ALBUTEROL SULFATE 3 ML: 2.5; .5 SOLUTION RESPIRATORY (INHALATION) at 07:54

## 2021-06-23 RX ADMIN — BUDESONIDE 0.25 MG: 0.25 SUSPENSION RESPIRATORY (INHALATION) at 07:54

## 2021-06-23 RX ADMIN — ALLOPURINOL 100 MG: 100 TABLET ORAL at 09:06

## 2021-06-23 RX ADMIN — SODIUM CHLORIDE, PRESERVATIVE FREE 10 ML: 5 INJECTION INTRAVENOUS at 09:07

## 2021-06-23 RX ADMIN — HYDROCODONE BITARTRATE AND ACETAMINOPHEN 1 TABLET: 5; 325 TABLET ORAL at 09:07

## 2021-06-23 RX ADMIN — APIXABAN 5 MG: 5 TABLET, FILM COATED ORAL at 09:06

## 2021-06-23 RX ADMIN — FAMOTIDINE 20 MG: 20 TABLET, FILM COATED ORAL at 11:38

## 2021-06-23 NOTE — THERAPY TREATMENT NOTE
Patient Name: Marilu Avery  : 1955    MRN: [id number]                              Today's Date: 2021       Admit Date: 2021    Visit Dx:     ICD-10-CM ICD-9-CM   1. Ventral hernia with bowel obstruction  K43.6 552.20   2. Chronic obstructive pulmonary disease, unspecified COPD type (CMS/[facility])  J44.9 496   3. Anticoagulated  Z79.01 V58.61   4. Morbid obesity (CMS/[facility])  E66.01 278.01   5. Small bowel obstruction (CMS/[facility])  K56.609 560.9   6. Incarcerated hernia  K46.0 552.9     Patient Active Problem List   Diagnosis   • Incisional hernia, without obstruction or gangrene   • Incarcerated ventral hernia   • Atrial fibrillation (CMS/[facility])   • HLD (hyperlipidemia)   • HTN (hypertension)   • LISA (obstructive sleep apnea)   • Stage 3b chronic kidney disease (CMS/[facility])   • Chronic anticoagulation   • SBO (small bowel obstruction) (CMS/[facility])   • Tobacco abuse   • Morbid obesity with BMI of 40.0-44.9, adult (CMS/[facility])   • COPD (chronic obstructive pulmonary disease) (CMS/[facility])   • Hypopotassemia   • Gout     Past Medical History:   Diagnosis Date   • Arthritis    • Asthma    • Atrial fibrillation (CMS/[facility])    • Colon polyps 2015    hyperplastic rectal polyp   • Hyperlipidemia    • Hypertension    • Sleep apnea      Past Surgical History:   Procedure Laterality Date   • BACK SURGERY N/A    • COLONOSCOPY N/A 10/22/2015    Rectal polyp-Dr. Elías Keating   • HYSTERECTOMY N/A    • LAPAROSCOPIC CHOLECYSTECTOMY N/A 2010    Dr. Heath Whitlock   • OOPHORECTOMY     • REPLACEMENT TOTAL KNEE Left 2015    Dr. Yo Aguayo   • REPLACEMENT TOTAL KNEE Right 2015    Dr. Yo Aguayo   • VENTRAL HERNIA REPAIR N/A 10/22/2015    Open reduction of incarcerated ventral hernia with layered closure-Dr. Elías Keating   • VENTRAL/INCISIONAL HERNIA REPAIR N/A 2021    Procedure: OPEN VENTRAL/INCISIONAL HERNIA REPAIR WITH MESH AND APPENDECTOMY;  Surgeon: Arnulfo Leonard MD;  Location: [facility] OR;   Service: General;  Laterality: N/A;     General Information     [facility] Name 06/23/21 1626          Physical Therapy Time and Intention    Document Type  therapy note (daily note)  -     Mode of Treatment  individual therapy;physical therapy  -[facility] Name 06/23/21 1626          General Information    Patient Profile Reviewed  yes  -     Existing Precautions/Restrictions  fall;oxygen therapy device and L/min  -     Row Name 06/23/21 1626          Cognition    Orientation Status (Cognition)  oriented x 4  -[facility] Name 06/23/21 1626          Safety Issues, Functional Mobility    Impairments Affecting Function (Mobility)  endurance/activity tolerance;strength  -       User Key  (r) = Recorded By, (t) = Taken By, (c) = Cosigned By    Initials Name Provider Type    Johanne Nunez PTA Physical Therapy Assistant        Mobility     Row Name 06/23/21 1626          Bed Mobility    Supine-Sit [place] (Bed Mobility)  minimum assist (75% patient effort);contact guard;verbal cues  -     Assistive Device (Bed Mobility)  bed rails;head of bed elevated  -[facility] Name 06/23/21 1626          Gait/Stairs (Locomotion)    Distance in Feet (Gait)  15ft x2, did not require seated rest , but slowed pace once fatigued  -     Deviations/Abnormal Patterns (Gait)  faith decreased  -     Bilateral Gait Deviations  forward flexed posture  -       User Key  (r) = Recorded By, (t) = Taken By, (c) = Cosigned By    Initials Name Provider Type    Johanne Nunez PTA Physical Therapy Assistant        Obj/Interventions     Row Name 06/23/21 1628          Motor Skills    Therapeutic Exercise  -- APs, QS, seated MIP, LAQs x10 reps  -       User Key  (r) = Recorded By, (t) = Taken By, (c) = Cosigned By    Initials Name Provider Type    Johanne Nunez PTA Physical Therapy Assistant        Goals/Plan    No documentation.       Clinical Impression     Row Name 06/23/21 1628          Pain Scale: Numbers  Pre/Post-Treatment    Pretreatment Pain Rating  5/10  -     Posttreatment Pain Rating  6/10  -     Pain Location - Side  Left  -     Pain Location  flank  -     Pain Intervention(s)  Medication (See MAR);Repositioned;Ambulation/increased activity  -     Row Name 06/23/21 1628          Plan of Care Review    Plan of Care Reviewed With  patient  -     Progress  improving  -     Outcome Summary  Pt agreed to amb and get into chair, really eager to get moving and incr amb distance, pt still plans SNU at [place] as she is not indep yet  -     Row Name 06/23/21 1628          Therapy Assessment/Plan (PT)    Rehab Potential (PT)  good, to achieve stated therapy goals  -     Criteria for Skilled Interventions Met (PT)  yes  -     Row Name 06/23/21 1628          Vital Signs    O2 Delivery Pre Treatment  supplemental O2  -     Row Name 06/23/21 1628          Positioning and Restraints    Pre-Treatment Position  in bed  -     Post Treatment Position  chair  -JM     In Chair  reclined;call light within reach;encouraged to call for assist;exit alarm on;notified nsg;with other staff RT w/pt for rx  -       User Key  (r) = Recorded By, (t) = Taken By, (c) = Cosigned By    Initials Name Provider Type    Johanne Nunez PTA Physical Therapy Assistant        Outcome Measures     Row Name 06/23/21 1632          How much help from another person do you currently need...    Turning from your back to your side while in flat bed without using bedrails?  3  -JM     Moving from lying on back to sitting on the side of a flat bed without bedrails?  3  -JM     Moving to and from a bed to a chair (including a wheelchair)?  3  -JM     Standing up from a chair using your arms (e.g., wheelchair, bedside chair)?  3  -JM     Climbing 3-5 steps with a railing?  1  -JM     To walk in hospital room?  3  -JM     AM-PAC 6 Clicks Score (PT)  16  -JM       User Key  (r) = Recorded By, (t) = Taken By, (c) = Cosigned By    Initials  Name Provider Type    Johanne Nunez PTA Physical Therapy Assistant        Physical Therapy Education                 Title: PT OT SLP Therapies (Done)     Topic: Physical Therapy (Done)     Point: Mobility training (Done)     Learning Progress Summary           Patient Eager, E,TB,D, VU,DU by  at 6/23/2021 1636    Eager, E,TB,D, VU,DU by  at 6/22/2021 1457    Acceptance, E, VU,NR by  at 6/21/2021 1512    Acceptance, E,TB,D, VU,NR by  at 6/19/2021 1450    Acceptance, E,TB,D, VU,NR by  at 6/18/2021 1558    Eager, E,TB,D, VU,NR by  at 6/17/2021 1535                   Point: Home exercise program (Done)     Learning Progress Summary           Patient Eager, E,TB,D, VU,DU by  at 6/23/2021 1636    Eager, E,TB,D, VU,DU by  at 6/22/2021 1457    Acceptance, E, VU,NR by  at 6/21/2021 1512    Acceptance, E,TB,D, VU,NR by  at 6/19/2021 1450    Acceptance, E,TB,D, VU,NR by  at 6/18/2021 1558    Eager, E,TB,D, VU,NR by  at 6/17/2021 1535                   Point: Body mechanics (Done)     Learning Progress Summary           Patient Eager, E,TB,D, VU,DU by  at 6/23/2021 1636    Eager, E,TB,D, VU,DU by  at 6/22/2021 1457    Acceptance, E, VU,NR by  at 6/21/2021 1512    Acceptance, E,TB,D, VU,NR by  at 6/19/2021 1450    Acceptance, E,TB,D, VU,NR by  at 6/18/2021 1558    Eager, E,TB,D, VU,NR by  at 6/17/2021 1535                               User Key     Initials Effective Dates Name Provider Type [facility] 03/07/18 -  Johanne Mallory PTA Physical Therapy Assistant PT    SV 06/16/21 -  Barrett, Loretta M, PT Physical Therapist PT    CF 06/16/21 -  Rachael Garcia, PT Physical Therapist PT              PT Recommendation and Plan     Plan of Care Reviewed With: patient  Progress: improving  Outcome Summary: Pt agreed to amb and get into chair, really eager to get moving and incr amb distance, pt still plans SNU at [place] as she is not indep yet     Time Calculation:   PT Charges     Row  Name 06/23/21 1640             Time Calculation    Start Time  0948  -SARA      Stop Time  1011  -SARA      Time Calculation (min)  23 min  -SARA      PT Received On  06/23/21  -SARA      PT - Next Appointment  06/24/21  -SARA        User Key  (r) = Recorded By, (t) = Taken By, (c) = Cosigned By    Initials Name Provider Type    Johanne Nunez PTA Physical Therapy Assistant        Therapy Charges for Today     Code Description Service Date Service Provider Modifiers Qty    68438616781 HC PT THER PROC EA 15 MIN 6/22/2021 Johanne Mallory PTA GP 2    34725665134 HC PT THER PROC EA 15 MIN 6/23/2021 Johanne Mallory PTA GP 2          PT G-Codes  Outcome Measure Options: AM-PAC 6 Clicks Basic Mobility (PT)  AM-PAC 6 Clicks Score (PT): 16    Johanne Mallory PTA  6/23/2021

## 2021-06-23 NOTE — PROGRESS NOTES
Postop day 9 ventral hernia repair    Doing well at this time, positive bowel movement yesterday, tolerating diet and has been up and out of bed.    Drain output minimal, removed today  Incision is in good order, no evidence of infection or hernia recurrence    Okay for discharge from surgery standpoint today, staples out Monday next week, either in my office or at rehab.  No physical demanding activity at this time.    Arnulfo Leonard MD  General and Endoscopic Surgery  [facility]    [address], [address]  [place], [place], [address]  P: [phone]  F: [phone]

## 2021-06-23 NOTE — CASE MANAGEMENT/SOCIAL WORK
Case Management Discharge Note      Final Note: Discharged to [facility]. SNF    Provided Post Acute Provider List?: Yes  Post Acute Provider List: Nursing Home, Home Health  Provided Post Acute Provider Quality & Resource List?: N/A  Delivered To: Patient  Method of Delivery: In person    Selected Continued Care - Admitted Since 6/13/2021     Destination Coordination complete    Service Provider Selected Services Address Phone Fax Patient Preferred    [facility]  Skilled Nursing [address] [place] [address] [phone] --          Durable Medical Equipment    No services have been selected for the patient.              Dialysis/Infusion    No services have been selected for the patient.              Home Medical Care    No services have been selected for the patient.              Therapy    No services have been selected for the patient.              Community Resources    No services have been selected for the patient.              Community & DME    No services have been selected for the patient.                  Transportation Services  W/C Van: Other ([facility])    Final Discharge Disposition Code: 03 - skilled nursing facility (SNF)

## 2021-06-23 NOTE — NURSING NOTE
Regarding previous nursing note about 4.8 second pause, this was entered on the incorrect pt. Monitor tech told this RN room 526, however meant to say room 529.    526 Bennie did not have pauses seen on heart monitor tonight. EKG was already completed but no rhythm changes seen.

## 2021-06-23 NOTE — PLAN OF CARE
Goal Outcome Evaluation:  Plan of Care Reviewed With: patient        Progress: improving  Outcome Summary: VSS on 0.5 L NC, tried to wean to RA but pt is anxious about this. A&O x 4. A fib on monitor, several 3+ second pauses this shift, longest 4.8 seconds. [facility] made aware, EKG and labs ordered, see results. PRN dilaudid and norco given as charted. Will CTM.     Addendum: pt did not have pauses. See updated nursing note.

## 2021-06-23 NOTE — DISCHARGE SUMMARY
Date of Admission: 6/13/2021  Date of Discharge:  6/23/2021  Primary Care Physician: Anatoly Jimenez MD     Discharge Diagnosis:  Active Hospital Problems    Diagnosis  POA   • **Incarcerated ventral hernia [K43.6]  Yes   • Gout [M10.9]  Yes   • Hypopotassemia [E87.6]  Yes   • Tobacco abuse [Z72.0]  Yes   • Morbid obesity with BMI of 40.0-44.9, adult (CMS/[facility]) [E66.01, Z68.41]  Not Applicable   • COPD (chronic obstructive pulmonary disease) (CMS/[facility]) [J44.9]  Yes   • Atrial fibrillation (CMS/[facility]) [I48.91]  Yes   • HTN (hypertension) [I10]  Yes   • LISA (obstructive sleep apnea) [G47.33]  Yes   • Stage 3b chronic kidney disease (CMS/[facility]) [N18.32]  Yes   • Chronic anticoagulation [Z79.01]  Not Applicable   • SBO (small bowel obstruction) (CMS/[facility]) [K56.609]  Yes      Resolved Hospital Problems   No resolved problems to display.       Presenting Problem/History of Present Illness:  Morbid obesity (CMS/[facility]) [E66.01]  Incarcerated ventral hernia [K43.6]  Anticoagulated [Z79.01]  Ventral hernia with bowel obstruction [K43.6]  Chronic obstructive pulmonary disease, unspecified COPD type (CMS/[facility]) [J44.9]     Ms. Avery is a 65 y.o. female with a history of a fib on Eliquis, HTN, HLD, CKD3, LISA that presents to [facility] complaining of abdominal pain. Patient reports history of abdominal hernia for years and has been seen by surgery in the past. She reports abdominal distention and pain for past 2 days and states that her abdominal hernia isn't reducing like it normally does. Patient reports the pain as a burning sensation with associated gas discomfort and constipation, no aggravating or alleviating factors. Patient additionally confirms chronic shortness of breath and cough, though denies fever or chest pain. She reports swelling of lower extremities since several medication changes were made by her PCP a few months ago, and she was taken off of her Lasix. Labs done tonight were fairly unremarkable. CT  abdomen showed small bowel obstruction with transition point suspected within ventral hernia sac in addition to stranding and fluid within hernia sac. General surgery was consulted and saw patient while in ED tonight, NG tube was ordered. She was given IV Dilaudid with reported improvement in her pain and appears in no acute distress on exam.    Hospital Course:  The patient is a 65 y.o. female who presented with incarcerated recurrent incisional hernia and small bowel obstruction.  She was admitted and surgery consulted.  She underwent open ventral hernia repair with mesh and incidental appendectomy on 6/14.  She had slow return of bowel function but is having bowel movements now.  Her drains were removed sequentially and surgery has cleared her for discharge.  They have recommended no strenuous movement and are planning on removing the staples in about 1 week.  She will need to follow-up with surgery in clinic.    She had COPD acute hypoxic respiratory failure and sleep apnea.  Pulmonology was consulted.  She was given breathing treatments for the COPD and oxygen was weaned as tolerated.  She still is requiring some supplemental oxygen at this time.  Continue breathing treatments although I am adjusting them to outpatient regimen with Symbicort and Spiriva with as needed albuterol.    She has atrial fibrillation and was continued on metoprolol.  Eliquis was resumed and postoperatively.  Cardiology was consulted and she is going to follow-up with her cardiologist in clinic.  She has had an increase in her prior Lasix dose to 40 mg daily.    Has hypertension CKD stage III and obesity.    There was likely angiomyolipoma seen on her right kidney.  Repeat follow-up imaging in 1 year was recommended.    She did develop a gout flare which was present diffusely in both feet but mostly her right ankle.  She was given prednisone with improvement in symptoms and resumed on her home allopurinol after symptoms improved.  Have  written for a prednisone taper at discharge.  She was previously on daily colchicine.  I personally do not like doing this in people with CKD so will stop that.  Resumption of that could be considered as an outpatient if needed.    Supplemental oxygen was taken off prior to discharge.  She was monitored and did not desaturated to less than 88%.  For that reason she is being transported off of submental oxygen.  Given her COPD and LISA she may require oxygen at the facility.    Exam Today:  Constitutional:       General: She is not in acute distress.     Appearance: She is obese. She is not diaphoretic.   HENT:      Head: Normocephalic and atraumatic.   Eyes:      Extraocular Movements: Extraocular movements intact.      Conjunctiva/sclera: Conjunctivae normal.   Cardiovascular:      Rate and Rhythm: Normal rate. Rhythm irregular.      Pulses: Normal pulses.   Pulmonary:      Effort: Pulmonary effort is normal.      Breath sounds: Decreased breath sounds present. No wheezing.   Abdominal:      Palpations: Abdomen is soft.      Comments: Binder in place   Musculoskeletal:         General: Tenderness present.      Cervical back: Neck supple. No tenderness.      Right lower leg: Edema present.      Left lower leg: Edema present.   Skin:     General: Skin is warm and dry.      Findings: No erythema.   Neurological:      Mental Status: She is alert and oriented to person, place, and time.   Psychiatric:         Mood and Affect: Mood normal.         Behavior: Behavior normal.     Results:  Surgical Pathology  Final Diagnosis   1. Appendix, Appendectomy:               A. Appendix with serosal adhesions.                 2. Hernia Sac, Herniorrhaphy:               A. Fragments of mesothelial-lined fibroconnective tissue with vascular congestion, consistent with hernia sac.     CT Abdomen/Pelvis  1. Small bowel obstruction, with transition point suspected within the  patient's ventral hernia sac. No pneumatosis or free air is  seen at this  time, although there is stranding and trace fluid identified within the  hernia sac.  2. 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  Continued surveillance is recommended, with next follow-up recommended  in one year.    CXR  No focal pulmonary consolidation. Cardiomegaly. Tortuous  aorta. Follow-up as clinically indicated.    CXR  Minimal atelectasis or infiltrate at the left base.  Borderline heart size. Tortuous aorta.    Duplex BLE  · Normal bilateral lower extremity venous duplex scan.    Procedures Performed:  Procedure(s):  OPEN VENTRAL/INCISIONAL HERNIA REPAIR WITH MESH AND APPENDECTOMY       Consults:   Consults     Date and Time Order Name Status Description    6/17/2021  8:12 AM Inpatient Pulmonology Consult Completed     6/16/2021  1:20 PM Inpatient Cardiology Consult Completed     6/13/2021  9:55 PM Inpatient General Surgery Consult Completed     6/13/2021  7:37 PM [facility] (on-call MD unless specified) Details Completed     6/13/2021  6:06 PM IP General Consult (Use specialty-specific consult if known) Completed            Discharge Disposition:  Skilled Nursing Facility ([facility] - External)    Discharge Medications:     Discharge Medications      New Medications      Instructions Start Date   albuterol sulfate  (90 Base) MCG/ACT inhaler  Commonly known as: PROVENTIL HFA;VENTOLIN HFA;PROAIR HFA   2 puffs, Inhalation, Every 4 Hours PRN      budesonide-formoterol 160-4.5 MCG/ACT inhaler  Commonly known as: Symbicort   2 puffs, Inhalation, 2 Times Daily - RT      famotidine 20 MG tablet  Commonly known as: PEPCID   20 mg, Oral, 2 Times Daily Before Meals      HYDROcodone-acetaminophen 5-325 MG per tablet  Commonly known as: NORCO   1 tablet, Oral, Every 6 Hours PRN      losartan 25 MG tablet  Commonly known as: COZAAR   25 mg, Oral, Every 24 Hours Scheduled   Start Date: June 24, 2021     polyethylene glycol 17 g packet  Commonly known as: MIRALAX   17 g, Oral, Daily    Start Date: June 24, 2021     potassium chloride 20 MEQ CR tablet  Commonly known as: K-DUR,KLOR-CON   20 mEq, Oral, Daily   Start Date: June 24, 2021     predniSONE 10 MG tablet  Commonly known as: DELTASONE   Take PO daily: 30mgx1 day, then 20mg x2 days, then 10mg x2 days      sennosides-docusate 8.6-50 MG per tablet  Commonly known as: PERICOLACE   2 tablets, Oral, Daily PRN      tiotropium 18 MCG per inhalation capsule  Commonly known as: Spiriva HandiHaler   1 capsule, Inhalation, Daily - RT         Changes to Medications      Instructions Start Date   furosemide 40 MG tablet  Commonly known as: Lasix  What changed: how much to take   40 mg, Oral, Daily         Continue These Medications      Instructions Start Date   allopurinol 100 MG tablet  Commonly known as: ZYLOPRIM   100 mg, Oral, Daily      apixaban 5 MG tablet tablet  Commonly known as: ELIQUIS   5 mg, Oral, 2 Times Daily      atorvastatin 20 MG tablet  Commonly known as: LIPITOR   80 mg, Oral, Daily      metoprolol tartrate 100 MG tablet  Commonly known as: LOPRESSOR   150 mg, Oral, 2 Times Daily      vitamin b complex capsule capsule   2 capsules, Oral, Daily      VITAMIN D PO   1 tablet, Oral, Daily      vitamin D3 125 MCG (5000 UT) capsule capsule   2,000 Units, Oral, Daily         Stop These Medications    amLODIPine 5 MG tablet  Commonly known as: NORVASC     colchicine 0.6 MG tablet     dicyclomine 20 MG tablet  Commonly known as: BENTYL            Discharge Diet:   Diet Instructions     Advance Diet As Tolerated            Activity at Discharge:   Activity Instructions     Other Activity Instructions      Activity Instructions: Per surgery instructions: Staples out Monday next week, either in my office or at rehab.  No physical demanding activity at this time.          Follow-up Appointments:   Contact information for follow-up providers     Anatoly Jimenez MD Follow up.    Specialty: Internal Medicine  Contact information:  [address]  [facility] [address]  [phone]             Arnulfo Leonard MD Follow up.    Specialty: General Surgery  Contact information:  [address] [facility] [address]  [place] [id number] [phone]             Wes Sandoval MD Follow up.    Specialty: Cardiology  Contact information:  [address] [facility] [address]  [facility] [address]  [phone]                   Contact information for after-discharge care     Destination     [facility] .    Service: Skilled Nursing  Contact information:  [address] [facility] [address] [phone]                             Test Results Pending at Discharge:       Tj Mehta MD  06/23/21  12:52 EDT    Time Spent on Discharge Activities: >30 minutes    Dictated portions using Dragon dictation software.  During the entire encounter, I was wearing recommended PPE including face mask and eye protection. Hand sanitization was performed prior to entering room and upon exit.

## 2021-06-23 NOTE — PLAN OF CARE
Goal Outcome Evaluation:           Progress: improving  Outcome Summary: Pt AO/VSS discharging to [facility] today

## 2021-06-23 NOTE — PLAN OF CARE
Goal Outcome Evaluation:  Plan of Care Reviewed With: patient        Progress: improving  Outcome Summary: Pt agreed to amb and get into chair, really eager to get moving and incr amb distance, pt still plans SNU at [place] as she is not indep yet  Patient was wearing a face mask during this therapy encounter. Therapist used appropriate personal protective equipment including eye protection, mask, and gloves.  Mask used was standard procedure mask. Appropriate PPE was worn during the entire therapy session. Hand hygiene was completed before and after therapy session. Patient is not in enhanced droplet precautions.

## 2021-06-24 NOTE — PAYOR COMM NOTE
"Randi Avery (65 y.o. Female)     PLEASE SEE ATTACHED DC SUMMARY    REF#[id number]    THANK YOU    CLIFFORD MURPHY LPN CC      Date of Birth Social Security Number Address Home Phone MRN    1955  [id number]  [address]  [facility] [address]  [id number]    Bahai Marital Status          Hinduism        Admission Date Admission Type Admitting Provider Attending Provider Department, Room/Bed    6/13/21 Emergency Uriah Landers MD  [address], [address]/1    Discharge Date Discharge Disposition Discharge Destination        6/23/2021 Skilled Nursing Facility (DC - External)              Attending Provider: (none)   Allergies: No Known Allergies    Isolation: None   Infection: None   Code Status: Prior    Ht: 177.8 cm (70\")   Wt: 131 kg (289 lb 11.2 oz)    Admission Cmt: None   Principal Problem: Incarcerated ventral hernia [K43.6]                 Active Insurance as of 6/13/2021     Primary Coverage     Payor Plan Insurance Group Employer/Plan Group    [facility] TRANSITION HMO NON PAR [id number]     Payor Plan Address Payor Plan Phone Number Payor Plan Fax Number Effective Dates    [phone]   1/1/2021 - None Entered    [facility] [id number]       Subscriber Name Subscriber Birth Date Member ID       RANDI AVERY 1955 [id number]                 Emergency Contacts      (Rel.) Home Phone Work Phone Mobile Phone    Jameel Avery (Son) [phone] -- [phone]               Discharge Summary      Tj Mehta MD at 06/23/21 1252              Date of Admission: 6/13/2021  Date of Discharge:  6/23/2021  Primary Care Physician: Anatoly Jimenez MD     Discharge Diagnosis:  Active Hospital Problems    Diagnosis  POA   • **Incarcerated ventral hernia [K43.6]  Yes   • Gout [M10.9]  Yes   • Hypopotassemia [E87.6]  Yes   • Tobacco abuse [Z72.0]  Yes   • Morbid obesity with BMI of 40.0-44.9, adult (CMS/HCC) [E66.01, Z68.41]  Not " Applicable   • COPD (chronic obstructive pulmonary disease) (CMS/HCC) [J44.9]  Yes   • Atrial fibrillation (CMS/HCC) [I48.91]  Yes   • HTN (hypertension) [I10]  Yes   • LISA (obstructive sleep apnea) [G47.33]  Yes   • Stage 3b chronic kidney disease (CMS/HCC) [N18.32]  Yes   • Chronic anticoagulation [Z79.01]  Not Applicable   • SBO (small bowel obstruction) (CMS/[facility]) [K56.609]  Yes      Resolved Hospital Problems   No resolved problems to display.       Presenting Problem/History of Present Illness:  Morbid obesity (CMS/HCC) [E66.01]  Incarcerated ventral hernia [K43.6]  Anticoagulated [Z79.01]  Ventral hernia with bowel obstruction [K43.6]  Chronic obstructive pulmonary disease, unspecified COPD type (CMS/HCC) [J44.9]     Ms. Avery is a 65 y.o. female with a history of a fib on Eliquis, HTN, HLD, CKD3, LISA that presents to [facility] complaining of abdominal pain. Patient reports history of abdominal hernia for years and has been seen by surgery in the past. She reports abdominal distention and pain for past 2 days and states that her abdominal hernia isn't reducing like it normally does. Patient reports the pain as a burning sensation with associated gas discomfort and constipation, no aggravating or alleviating factors. Patient additionally confirms chronic shortness of breath and cough, though denies fever or chest pain. She reports swelling of lower extremities since several medication changes were made by her PCP a few months ago, and she was taken off of her Lasix. Labs done tonight were fairly unremarkable. CT abdomen showed small bowel obstruction with transition point suspected within ventral hernia sac in addition to stranding and fluid within hernia sac. General surgery was consulted and saw patient while in ED tonight, NG tube was ordered. She was given IV Dilaudid with reported improvement in her pain and appears in no acute distress on exam.    Hospital Course:  The patient is a 65 y.o.  female who presented with incarcerated recurrent incisional hernia and small bowel obstruction.  She was admitted and surgery consulted.  She underwent open ventral hernia repair with mesh and incidental appendectomy on 6/14.  She had slow return of bowel function but is having bowel movements now.  Her drains were removed sequentially and surgery has cleared her for discharge.  They have recommended no strenuous movement and are planning on removing the staples in about 1 week.  She will need to follow-up with surgery in clinic.    She had COPD acute hypoxic respiratory failure and sleep apnea.  Pulmonology was consulted.  She was given breathing treatments for the COPD and oxygen was weaned as tolerated.  She still is requiring some supplemental oxygen at this time.  Continue breathing treatments although I am adjusting them to outpatient regimen with Symbicort and Spiriva with as needed albuterol.    She has atrial fibrillation and was continued on metoprolol.  Eliquis was resumed and postoperatively.  Cardiology was consulted and she is going to follow-up with her cardiologist in clinic.  She has had an increase in her prior Lasix dose to 40 mg daily.    Has hypertension CKD stage III and obesity.    There was likely angiomyolipoma seen on her right kidney.  Repeat follow-up imaging in 1 year was recommended.    She did develop a gout flare which was present diffusely in both feet but mostly her right ankle.  She was given prednisone with improvement in symptoms and resumed on her home allopurinol after symptoms improved.  Have written for a prednisone taper at discharge.  She was previously on daily colchicine.  I personally do not like doing this in people with CKD so will stop that.  Resumption of that could be considered as an outpatient if needed.    Supplemental oxygen was taken off prior to discharge.  She was monitored and did not desaturated to less than 88%.  For that reason she is being transported  off of submental oxygen.  Given her COPD and LISA she may require oxygen at the facility.    Exam Today:  Constitutional:       General: She is not in acute distress.     Appearance: She is obese. She is not diaphoretic.   HENT:      Head: Normocephalic and atraumatic.   Eyes:      Extraocular Movements: Extraocular movements intact.      Conjunctiva/sclera: Conjunctivae normal.   Cardiovascular:      Rate and Rhythm: Normal rate. Rhythm irregular.      Pulses: Normal pulses.   Pulmonary:      Effort: Pulmonary effort is normal.      Breath sounds: Decreased breath sounds present. No wheezing.   Abdominal:      Palpations: Abdomen is soft.      Comments: Binder in place   Musculoskeletal:         General: Tenderness present.      Cervical back: Neck supple. No tenderness.      Right lower leg: Edema present.      Left lower leg: Edema present.   Skin:     General: Skin is warm and dry.      Findings: No erythema.   Neurological:      Mental Status: She is alert and oriented to person, place, and time.   Psychiatric:         Mood and Affect: Mood normal.         Behavior: Behavior normal.     Results:  Surgical Pathology  Final Diagnosis   1. Appendix, Appendectomy:               A. Appendix with serosal adhesions.                 2. Hernia Sac, Herniorrhaphy:               A. Fragments of mesothelial-lined fibroconnective tissue with vascular congestion, consistent with hernia sac.     CT Abdomen/Pelvis  1. Small bowel obstruction, with transition point suspected within the  patient's ventral hernia sac. No pneumatosis or free air is seen at this  time, although there is stranding and trace fluid identified within the  hernia sac.  2. 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  Continued surveillance is recommended, with next follow-up recommended  in one year.    CXR  No focal pulmonary consolidation. Cardiomegaly. Tortuous  aorta. Follow-up as clinically indicated.    CXR  Minimal atelectasis  or infiltrate at the left base.  Borderline heart size. Tortuous aorta.    Duplex BLE  · Normal bilateral lower extremity venous duplex scan.    Procedures Performed:  Procedure(s):  OPEN VENTRAL/INCISIONAL HERNIA REPAIR WITH MESH AND APPENDECTOMY       Consults:   Consults     Date and Time Order Name Status Description    6/17/2021  8:12 AM Inpatient Pulmonology Consult Completed     6/16/2021  1:20 PM Inpatient Cardiology Consult Completed     6/13/2021  9:55 PM Inpatient General Surgery Consult Completed     6/13/2021  7:37 PM [facility] (on-call MD unless specified) Details Completed     6/13/2021  6:06 PM IP General Consult (Use specialty-specific consult if known) Completed            Discharge Disposition:  Skilled Nursing Facility (DC - External)    Discharge Medications:     Discharge Medications      New Medications      Instructions Start Date   albuterol sulfate  (90 Base) MCG/ACT inhaler  Commonly known as: PROVENTIL HFA;VENTOLIN HFA;PROAIR HFA   2 puffs, Inhalation, Every 4 Hours PRN      budesonide-formoterol 160-4.5 MCG/ACT inhaler  Commonly known as: Symbicort   2 puffs, Inhalation, 2 Times Daily - RT      famotidine 20 MG tablet  Commonly known as: PEPCID   20 mg, Oral, 2 Times Daily Before Meals      HYDROcodone-acetaminophen 5-325 MG per tablet  Commonly known as: NORCO   1 tablet, Oral, Every 6 Hours PRN      losartan 25 MG tablet  Commonly known as: COZAAR   25 mg, Oral, Every 24 Hours Scheduled   Start Date: June 24, 2021     polyethylene glycol 17 g packet  Commonly known as: MIRALAX   17 g, Oral, Daily   Start Date: June 24, 2021     potassium chloride 20 MEQ CR tablet  Commonly known as: K-DUR,KLOR-CON   20 mEq, Oral, Daily   Start Date: June 24, 2021     predniSONE 10 MG tablet  Commonly known as: DELTASONE   Take PO daily: 30mgx1 day, then 20mg x2 days, then 10mg x2 days      sennosides-docusate 8.6-50 MG per tablet  Commonly known as: PERICOLACE   2 tablets, Oral, Daily PRN         tiotropium 18 MCG per inhalation capsule  Commonly known as: Spiriva HandiHaler   1 capsule, Inhalation, Daily - RT         Changes to Medications      Instructions Start Date   furosemide 40 MG tablet  Commonly known as: Lasix  What changed: how much to take   40 mg, Oral, Daily         Continue These Medications      Instructions Start Date   allopurinol 100 MG tablet  Commonly known as: ZYLOPRIM   100 mg, Oral, Daily      apixaban 5 MG tablet tablet  Commonly known as: ELIQUIS   5 mg, Oral, 2 Times Daily      atorvastatin 20 MG tablet  Commonly known as: LIPITOR   80 mg, Oral, Daily      metoprolol tartrate 100 MG tablet  Commonly known as: LOPRESSOR   150 mg, Oral, 2 Times Daily      vitamin b complex capsule capsule   2 capsules, Oral, Daily      VITAMIN D PO   1 tablet, Oral, Daily      vitamin D3 125 MCG (5000 UT) capsule capsule   2,000 Units, Oral, Daily         Stop These Medications    amLODIPine 5 MG tablet  Commonly known as: NORVASC     colchicine 0.6 MG tablet     dicyclomine 20 MG tablet  Commonly known as: BENTYL            Discharge Diet:   Diet Instructions     Advance Diet As Tolerated            Activity at Discharge:   Activity Instructions     Other Activity Instructions      Activity Instructions: Per surgery instructions: Staples out Monday next week, either in my office or at rehab.  No physical demanding activity at this time.          Follow-up Appointments:   Contact information for follow-up providers     Anatoly Jimenez MD Follow up.    Specialty: Internal Medicine  Contact information:  [address] [facility] [address]  [place] [id number] [phone]             Arnulfo Leonard MD Follow up.    Specialty: General Surgery  Contact information:  [address] [facility] [address]  [place] [id number] [phone]             Wes Sandoval MD Follow up.    Specialty: Cardiology  Contact information:  [address] [facility] [address]  [place] [id number] [phone]                    Contact information for after-discharge care     Destination     [facility] .    Service: Skilled Nursing  Contact information:  [address]  [facility] [address] [phone]                             Test Results Pending at Discharge:       Tj Mehta MD  06/23/21  12:52 EDT    Time Spent on Discharge Activities: >30 minutes    Dictated portions using Dragon dictation software.  During the entire encounter, I was wearing recommended PPE including face mask and eye protection. Hand sanitization was performed prior to entering room and upon exit.    Electronically signed by Tj Mehta MD at 06/23/21 7418

## 2021-07-14 ENCOUNTER — OFFICE VISIT (OUTPATIENT)
Dept: SURGERY | Facility: CLINIC | Age: 66
End: 2021-07-14

## 2021-07-14 DIAGNOSIS — K43.2 INCISIONAL HERNIA, WITHOUT OBSTRUCTION OR GANGRENE: Primary | ICD-10-CM

## 2021-07-14 DIAGNOSIS — K46.0 INCARCERATED HERNIA: ICD-10-CM

## 2021-07-14 DIAGNOSIS — K43.6 VENTRAL HERNIA WITH BOWEL OBSTRUCTION: ICD-10-CM

## 2021-07-14 PROCEDURE — 99024 POSTOP FOLLOW-UP VISIT: CPT | Performed by: SURGERY

## 2021-07-14 RX ORDER — HYDROCODONE BITARTRATE AND ACETAMINOPHEN 5; 325 MG/1; MG/1
1 TABLET ORAL EVERY 6 HOURS PRN
Qty: 12 TABLET | Refills: 0 | Status: SHIPPED | OUTPATIENT
Start: 2021-07-14 | End: 2021-08-20

## 2021-07-14 NOTE — PROGRESS NOTES
Postop open ventral hernia repair    Subjective:  This nice lady is about a month out from her operation.  She had previously seen Dr. Aguilera with this ventral hernia who had declined to operate on her due to her multiple comorbidities.  She presented with an incarcerated bowel obstruction and associated hernia.  She underwent an open repair with primary closure and onlay of bio a mesh.  She has been recovering in a nursing facility but is gradually improving.  She still has some mild discomfort.  She is eating well with good bowel function.    Objective:  Body mass index 41.5  General: Awake alert and oriented, no distress  Abdomen: Soft, benign, incision is healing nicely, no evidence at this point of hernia recurrence    Assessment and plan:  -Small bowel obstruction secondary to incarcerated incisional hernia, now about a month out from open repair with onlay bio a mesh placement  -To this point no evidence of recurrence, but she is certainly at high risk  -I have insisted that she stop smoking, she is going to make an effort  -She is also going to attempt to lose weight as she understands that this places her at a higher risk for recurrence  -She has multiple debilities, which all complicates the above  -I think that if her hernia recurs, it would probably be wise to avoid fixing it unless she can remedy some of these comorbidities.  Hopefully if it were to be repaired, she could have a more extensive and definitive operation.  Her operation this time was limited by the reversibly ischemic bowel present in the hernia sac.    Arnulfo Leonard MD  General and Endoscopic Surgery  [facility]    [address], [address]  [place], [place], [address]  P: [phone]  F: [phone]

## 2021-07-26 ENCOUNTER — TRANSCRIBE ORDERS (OUTPATIENT)
Dept: ADMINISTRATIVE | Facility: HOSPITAL | Age: 66
End: 2021-07-26

## 2021-07-26 ENCOUNTER — LAB (OUTPATIENT)
Dept: LAB | Facility: HOSPITAL | Age: 66
End: 2021-07-26

## 2021-07-26 DIAGNOSIS — I10 HYPERTENSION, UNSPECIFIED TYPE: ICD-10-CM

## 2021-07-26 DIAGNOSIS — M10.9 GOUT, UNSPECIFIED CAUSE, UNSPECIFIED CHRONICITY, UNSPECIFIED SITE: ICD-10-CM

## 2021-07-26 DIAGNOSIS — D64.9 ANEMIA, UNSPECIFIED TYPE: Primary | ICD-10-CM

## 2021-07-26 DIAGNOSIS — E55.9 AVITAMINOSIS D: ICD-10-CM

## 2021-07-26 DIAGNOSIS — E11.9 DIABETES MELLITUS WITHOUT COMPLICATION (HCC): ICD-10-CM

## 2021-07-26 DIAGNOSIS — D64.9 ANEMIA, UNSPECIFIED TYPE: ICD-10-CM

## 2021-07-26 DIAGNOSIS — E78.5 HYPERLIPIDEMIA, UNSPECIFIED HYPERLIPIDEMIA TYPE: ICD-10-CM

## 2021-07-26 LAB
25(OH)D3 SERPL-MCNC: 44.2 NG/ML
ALBUMIN SERPL-MCNC: 4.4 G/DL (ref 3.5–5.2)
ALBUMIN/GLOB SERPL: 2.3 G/DL
ALP SERPL-CCNC: 81 U/L (ref 39–117)
ALT SERPL W P-5'-P-CCNC: 14 U/L (ref 1–33)
ANION GAP SERPL CALCULATED.3IONS-SCNC: 16.7 MMOL/L (ref 5–15)
AST SERPL-CCNC: 19 U/L (ref 1–32)
BILIRUB SERPL-MCNC: 0.5 MG/DL (ref 0–1.2)
BUN SERPL-MCNC: 12 MG/DL (ref 8–23)
BUN/CREAT SERPL: 12.5 (ref 7–25)
CALCIUM SPEC-SCNC: 9.5 MG/DL (ref 8.6–10.5)
CHLORIDE SERPL-SCNC: 101 MMOL/L (ref 98–107)
CHOLEST SERPL-MCNC: 104 MG/DL (ref 0–200)
CO2 SERPL-SCNC: 23.3 MMOL/L (ref 22–29)
CREAT SERPL-MCNC: 0.96 MG/DL (ref 0.57–1)
FOLATE SERPL-MCNC: 18.6 NG/ML (ref 4.78–24.2)
GFR SERPL CREATININE-BSD FRML MDRD: 58 ML/MIN/1.73
GLOBULIN UR ELPH-MCNC: 1.9 GM/DL
GLUCOSE SERPL-MCNC: 117 MG/DL (ref 65–99)
HBA1C MFR BLD: 5.91 % (ref 4.8–5.6)
HDLC SERPL-MCNC: 49 MG/DL (ref 40–60)
LDLC SERPL CALC-MCNC: 33 MG/DL (ref 0–100)
LDLC/HDLC SERPL: 0.62 {RATIO}
POTASSIUM SERPL-SCNC: 3.9 MMOL/L (ref 3.5–5.2)
PROT SERPL-MCNC: 6.3 G/DL (ref 6–8.5)
SODIUM SERPL-SCNC: 141 MMOL/L (ref 136–145)
TRIGL SERPL-MCNC: 122 MG/DL (ref 0–150)
URATE SERPL-MCNC: 6.7 MG/DL (ref 2.4–5.7)
VIT B12 BLD-MCNC: 340 PG/ML (ref 211–946)
VLDLC SERPL-MCNC: 22 MG/DL (ref 5–40)

## 2021-07-26 PROCEDURE — 82746 ASSAY OF FOLIC ACID SERUM: CPT

## 2021-07-26 PROCEDURE — 82607 VITAMIN B-12: CPT

## 2021-07-26 PROCEDURE — 36415 COLL VENOUS BLD VENIPUNCTURE: CPT

## 2021-07-26 PROCEDURE — 80053 COMPREHEN METABOLIC PANEL: CPT

## 2021-07-26 PROCEDURE — 84550 ASSAY OF BLOOD/URIC ACID: CPT

## 2021-07-26 PROCEDURE — 80061 LIPID PANEL: CPT

## 2021-07-26 PROCEDURE — 83036 HEMOGLOBIN GLYCOSYLATED A1C: CPT

## 2021-07-26 PROCEDURE — 82306 VITAMIN D 25 HYDROXY: CPT

## 2021-08-20 ENCOUNTER — OFFICE VISIT (OUTPATIENT)
Dept: CARDIOLOGY | Facility: CLINIC | Age: 66
End: 2021-08-20

## 2021-08-20 ENCOUNTER — TRANSCRIBE ORDERS (OUTPATIENT)
Dept: CARDIOLOGY | Facility: CLINIC | Age: 66
End: 2021-08-20

## 2021-08-20 VITALS
HEIGHT: 70 IN | WEIGHT: 293 LBS | HEART RATE: 86 BPM | SYSTOLIC BLOOD PRESSURE: 138 MMHG | BODY MASS INDEX: 41.95 KG/M2 | OXYGEN SATURATION: 99 % | DIASTOLIC BLOOD PRESSURE: 86 MMHG

## 2021-08-20 DIAGNOSIS — I10 ESSENTIAL HYPERTENSION: ICD-10-CM

## 2021-08-20 DIAGNOSIS — N18.32 STAGE 3B CHRONIC KIDNEY DISEASE (HCC): ICD-10-CM

## 2021-08-20 DIAGNOSIS — Z72.0 TOBACCO ABUSE: ICD-10-CM

## 2021-08-20 DIAGNOSIS — I48.19 ATRIAL FIBRILLATION, PERSISTENT (HCC): Primary | ICD-10-CM

## 2021-08-20 DIAGNOSIS — G47.33 OSA (OBSTRUCTIVE SLEEP APNEA): ICD-10-CM

## 2021-08-20 DIAGNOSIS — Z79.01 CHRONIC ANTICOAGULATION: ICD-10-CM

## 2021-08-20 DIAGNOSIS — E78.2 MIXED HYPERLIPIDEMIA: ICD-10-CM

## 2021-08-20 DIAGNOSIS — Z01.818 OTHER SPECIFIED PRE-OPERATIVE EXAMINATION: Primary | ICD-10-CM

## 2021-08-20 DIAGNOSIS — R06.02 EXERTIONAL SHORTNESS OF BREATH: ICD-10-CM

## 2021-08-20 PROCEDURE — 93000 ELECTROCARDIOGRAM COMPLETE: CPT | Performed by: INTERNAL MEDICINE

## 2021-08-20 PROCEDURE — 99214 OFFICE O/P EST MOD 30 MIN: CPT | Performed by: INTERNAL MEDICINE

## 2021-08-20 RX ORDER — FUROSEMIDE 40 MG/1
40 TABLET ORAL DAILY
Start: 2021-08-20 | End: 2021-09-10

## 2021-08-20 RX ORDER — LOSARTAN POTASSIUM 100 MG/1
100 TABLET ORAL DAILY
COMMUNITY
Start: 2021-07-28

## 2021-08-20 RX ORDER — POTASSIUM CHLORIDE 20 MEQ/1
20 TABLET, EXTENDED RELEASE ORAL DAILY
Qty: 30 TABLET | Refills: 3
Start: 2021-08-20 | End: 2021-09-20 | Stop reason: SDUPTHER

## 2021-08-20 RX ORDER — ATORVASTATIN CALCIUM 80 MG/1
80 TABLET, FILM COATED ORAL
COMMUNITY
Start: 2021-07-30

## 2021-08-20 RX ORDER — AMLODIPINE BESYLATE 2.5 MG/1
TABLET ORAL
COMMUNITY
Start: 2021-07-28 | End: 2021-09-10

## 2021-08-20 NOTE — PROGRESS NOTES
PATIENTINFORMATION    Date of Office Visit: 2021  Encounter Provider: Wes Sandoval MD  Place of Service: [facility] CARDIOLOGY  Patient Name: Marilu Avery  : 1955    Subjective:     Encounter Date:2021      Patient ID: Marilu Avery is a 66 y.o. female.    Chief Complaint   Patient presents with   • Persistent atrial fibrillation w/ RVR     Hospital follow up    • Chronic Kidney Disease   • Hyperlipidemia   • Nicotine Dependence   • Hypertension     HPI  Ms. Avery is a 66 years old female patient with history of persistent atrial fibrillation diagnosed during recent hospitalization came to cardiology clinic for post hospital discharge follow-up.  She went into A. fib following repair for incarcerated hernia and subsequently started on beta-blocker and anticoagulation.  She lives myself and ambulates using a walker and reports continued shortness of breath particularly during walking and nighttime orthopnea.  She denies any significant palpitations, presyncope syncope or significant chest discomfort.  She has been on Lasix 40 mg p.o. daily for bilateral lower extremity swelling that seems to be stable but still there.  She has cut down on tobacco but currently smokes half pack per day.  She is compliant with all her medications otherwise including CPAP machine at night.  Reports being insomniac since after discharge from hospital and sleeps for only about 2 h at night and feels very tired during daytime.  She reports drinking about 3 cocktails at night that she has been doing for years.  She does not exercise and reports being mostly sedentary    ROS   All systems reviewed and negative except as noted in HPI.    Past Medical History:   Diagnosis Date   • Arthritis    • Asthma    • Atrial fibrillation (CMS/HCC)    • Colon polyps 2015    hyperplastic rectal polyp   • Hyperlipidemia    • Hypertension    • Sleep apnea        Past Surgical History:   Procedure Laterality  "Date   • BACK SURGERY N/A 2001   • COLONOSCOPY N/A 10/22/2015    Rectal polyp-Dr. Elías Keating   • HYSTERECTOMY N/A 2000   • LAPAROSCOPIC CHOLECYSTECTOMY N/A 01/04/2010    Dr. Heath Whitlock   • OOPHORECTOMY  2001   • REPLACEMENT TOTAL KNEE Left 06/22/2015    Dr. Yo Aguayo   • REPLACEMENT TOTAL KNEE Right 02/26/2015    Dr. Yo Aguayo   • VENTRAL HERNIA REPAIR N/A 10/22/2015    Open reduction of incarcerated ventral hernia with layered closure-Dr. Elías Keating   • VENTRAL/INCISIONAL HERNIA REPAIR N/A 6/14/2021    Procedure: OPEN VENTRAL/INCISIONAL HERNIA REPAIR WITH MESH AND APPENDECTOMY;  Surgeon: Arnulfo Leonard MD;  Location: [facility];  Service: General;  Laterality: N/A;       Social History     Socioeconomic History   • Marital status:      Spouse name: Not on file   • Number of children: Not on file   • Years of education: Not on file   • Highest education level: Not on file   Tobacco Use   • Smoking status: Current Every Day Smoker     Packs/day: 1.00   • Smokeless tobacco: Never Used   • Tobacco comment: admits to smoking since age 56    Vaping Use   • Vaping Use: Never used   Substance and Sexual Activity   • Alcohol use: Yes     Comment: 3 drinks daily   • Sexual activity: Defer       Family History   Problem Relation Age of Onset   • No Known Problems Mother    • No Known Problems Father            ECG 12 Lead    Date/Time: 8/20/2021 1:11 PM  Performed by: Wes Sandoval MD  Authorized by: Wes Sandoval MD   Comparison: compared with previous ECG from 6/23/2021  Rhythm: atrial fibrillation  Ectopy: unifocal PVCs  Rate: normal  Conduction: conduction normal  ST Segments: ST segments normal  T Waves: T waves normal  QRS axis: normal  Other: no other findings    Clinical impression: abnormal EKG               Objective:     /86   Pulse 86   Ht 177.8 cm (70\")   Wt 133 kg (293 lb)   SpO2 99%   BMI 42.04 kg/m²  Body mass index is 42.04 kg/m².     Constitutional:  "      General: Not in acute distress.     Appearance: Morbidly obese. Not diaphoretic.   Eyes:      Pupils: Pupils are equal, round, and reactive to light.   HENT:      Head: Normocephalic and atraumatic.   Neck:      Thyroid: No thyromegaly.   Pulmonary:      Effort: Pulmonary effort is normal. No respiratory distress.      Breath sounds: Normal breath sounds. No wheezing. No rales.   Chest:      Chest wall: Not tender to palpatation.   Cardiovascular:      Normal rate. Irregularly irregular rhythm.      No gallop.   Pulses:     Intact distal pulses.   Edema:     Peripheral edema absent.   Abdominal:      General: Bowel sounds are normal. There is no distension.      Palpations: Abdomen is soft.      Tenderness: There is no guarding.   Musculoskeletal: Normal range of motion.         General: No deformity.      Cervical back: Normal range of motion and neck supple. Skin:     General: Skin is warm and dry.      Findings: No rash.   Neurological:      Mental Status: Alert and oriented to person, place, and time.      Cranial Nerves: No cranial nerve deficit.      Deep Tendon Reflexes: Reflexes are normal and symmetric.   Psychiatric:         Judgment: Judgment normal.         Review Of Data: I have reviewed recent labs and documentation      Assessment/Plan:       1.  Symptomatic persistent atrial fibrillation-rate on the higher side-97 heart rate-increase metoprolol to 200 mg p.o. twice daily, from 150  -Some of her symptoms concerning for heart failure including shortness of breath and orthopnea-increase Lasix to 80 mg p.o. daily for the next 3 days  -She reports being very compliant with Eliquis that she has taken without interruption for almost 2 months-I will schedule her for DCCV next week  - do tte, probnp  2.  Hypertension-fairly controlled on losartan 100, metoprolol.  I am holding off amlodipine 2.5 mg p.o. as that may contribute to extremity swelling-increase metoprolol to 200 twice daily  3.  Morbidly  obese with complications/sleep apnea-encourage patient to modify diet, increase level of activity to lose weight  4.  Chronic tobacco use-encourage patient to cut down further  5.  Chronic kidney disease-stage III-improved or stable creatinine  6. Hyperlipidemia     Diagnosis and plan of care discussed with patient and verbalized understanding.           Wes Sandoval MD  08/20/21  13:40 EDT

## 2021-08-24 ENCOUNTER — LAB (OUTPATIENT)
Dept: LAB | Facility: HOSPITAL | Age: 66
End: 2021-08-24

## 2021-08-24 DIAGNOSIS — Z01.818 OTHER SPECIFIED PRE-OPERATIVE EXAMINATION: ICD-10-CM

## 2021-08-24 LAB — SARS-COV-2 ORF1AB RESP QL NAA+PROBE: NOT DETECTED

## 2021-08-24 PROCEDURE — U0004 COV-19 TEST NON-CDC HGH THRU: HCPCS

## 2021-08-24 PROCEDURE — U0005 INFEC AGEN DETEC AMPLI PROBE: HCPCS

## 2021-08-24 PROCEDURE — C9803 HOPD COVID-19 SPEC COLLECT: HCPCS

## 2021-08-26 ENCOUNTER — HOSPITAL ENCOUNTER (OUTPATIENT)
Dept: POSTOP/PACU | Facility: HOSPITAL | Age: 66
Discharge: HOME | End: 2021-08-26

## 2021-08-26 ENCOUNTER — ANESTHESIA (OUTPATIENT)
Dept: POSTOP/PACU | Facility: HOSPITAL | Age: 66
End: 2021-08-26

## 2021-08-26 ENCOUNTER — ANESTHESIA EVENT (OUTPATIENT)
Dept: POSTOP/PACU | Facility: HOSPITAL | Age: 66
End: 2021-08-26

## 2021-08-26 VITALS
RESPIRATION RATE: 18 BRPM | SYSTOLIC BLOOD PRESSURE: 117 MMHG | DIASTOLIC BLOOD PRESSURE: 84 MMHG | HEART RATE: 81 BPM | TEMPERATURE: 98.4 F | OXYGEN SATURATION: 96 %

## 2021-08-26 VITALS — DIASTOLIC BLOOD PRESSURE: 83 MMHG | OXYGEN SATURATION: 97 % | HEART RATE: 89 BPM | SYSTOLIC BLOOD PRESSURE: 122 MMHG

## 2021-08-26 DIAGNOSIS — I48.19 ATRIAL FIBRILLATION, PERSISTENT (HCC): ICD-10-CM

## 2021-08-26 LAB
ANION GAP SERPL CALCULATED.3IONS-SCNC: 14.1 MMOL/L (ref 5–15)
BUN SERPL-MCNC: 22 MG/DL (ref 8–23)
BUN/CREAT SERPL: 22.4 (ref 7–25)
CALCIUM SPEC-SCNC: 9.7 MG/DL (ref 8.6–10.5)
CHLORIDE SERPL-SCNC: 101 MMOL/L (ref 98–107)
CO2 SERPL-SCNC: 22.9 MMOL/L (ref 22–29)
CREAT SERPL-MCNC: 0.98 MG/DL (ref 0.57–1)
GFR SERPL CREATININE-BSD FRML MDRD: 57 ML/MIN/1.73
GLUCOSE SERPL-MCNC: 107 MG/DL (ref 65–99)
POTASSIUM SERPL-SCNC: 4.1 MMOL/L (ref 3.5–5.2)
QT INTERVAL: 383 MS
SODIUM SERPL-SCNC: 138 MMOL/L (ref 136–145)

## 2021-08-26 PROCEDURE — 93010 ELECTROCARDIOGRAM REPORT: CPT | Performed by: INTERNAL MEDICINE

## 2021-08-26 PROCEDURE — 93005 ELECTROCARDIOGRAM TRACING: CPT | Performed by: INTERNAL MEDICINE

## 2021-08-26 PROCEDURE — 80048 BASIC METABOLIC PNL TOTAL CA: CPT | Performed by: INTERNAL MEDICINE

## 2021-08-26 PROCEDURE — 25010000002 FUROSEMIDE PER 20 MG: Performed by: INTERNAL MEDICINE

## 2021-08-26 PROCEDURE — 25010000002 PROPOFOL 10 MG/ML EMULSION: Performed by: ANESTHESIOLOGY

## 2021-08-26 PROCEDURE — 92960 CARDIOVERSION ELECTRIC EXT: CPT | Performed by: INTERNAL MEDICINE

## 2021-08-26 PROCEDURE — 92960 CARDIOVERSION ELECTRIC EXT: CPT

## 2021-08-26 RX ORDER — FAMOTIDINE 10 MG/ML
20 INJECTION, SOLUTION INTRAVENOUS
Status: DISCONTINUED | OUTPATIENT
Start: 2021-08-26 | End: 2021-08-27 | Stop reason: HOSPADM

## 2021-08-26 RX ORDER — FUROSEMIDE 10 MG/ML
40 INJECTION INTRAMUSCULAR; INTRAVENOUS ONCE
Status: COMPLETED | OUTPATIENT
Start: 2021-08-26 | End: 2021-08-26

## 2021-08-26 RX ORDER — SODIUM CHLORIDE 0.9 % (FLUSH) 0.9 %
10 SYRINGE (ML) INJECTION EVERY 12 HOURS SCHEDULED
Status: DISCONTINUED | OUTPATIENT
Start: 2021-08-26 | End: 2021-08-27 | Stop reason: HOSPADM

## 2021-08-26 RX ORDER — IPRATROPIUM BROMIDE AND ALBUTEROL SULFATE 2.5; .5 MG/3ML; MG/3ML
3 SOLUTION RESPIRATORY (INHALATION) ONCE AS NEEDED
Status: DISCONTINUED | OUTPATIENT
Start: 2021-08-26 | End: 2021-08-27 | Stop reason: HOSPADM

## 2021-08-26 RX ORDER — PROPOFOL 10 MG/ML
VIAL (ML) INTRAVENOUS AS NEEDED
Status: DISCONTINUED | OUTPATIENT
Start: 2021-08-26 | End: 2021-08-26 | Stop reason: SURG

## 2021-08-26 RX ORDER — AMIODARONE HYDROCHLORIDE 200 MG/1
200 TABLET ORAL 2 TIMES DAILY
Qty: 60 TABLET | Refills: 3 | Status: SHIPPED | OUTPATIENT
Start: 2021-08-26 | End: 2021-11-18

## 2021-08-26 RX ORDER — SODIUM CHLORIDE 0.9 % (FLUSH) 0.9 %
10 SYRINGE (ML) INJECTION AS NEEDED
Status: DISCONTINUED | OUTPATIENT
Start: 2021-08-26 | End: 2021-08-27 | Stop reason: HOSPADM

## 2021-08-26 RX ORDER — SODIUM CHLORIDE, SODIUM LACTATE, POTASSIUM CHLORIDE, CALCIUM CHLORIDE 600; 310; 30; 20 MG/100ML; MG/100ML; MG/100ML; MG/100ML
9 INJECTION, SOLUTION INTRAVENOUS CONTINUOUS PRN
Status: DISCONTINUED | OUTPATIENT
Start: 2021-08-26 | End: 2021-08-27 | Stop reason: HOSPADM

## 2021-08-26 RX ORDER — MIDAZOLAM HYDROCHLORIDE 1 MG/ML
0.5 INJECTION INTRAMUSCULAR; INTRAVENOUS
Status: DISCONTINUED | OUTPATIENT
Start: 2021-08-26 | End: 2021-08-27 | Stop reason: HOSPADM

## 2021-08-26 RX ADMIN — PROPOFOL 200 MG: 10 INJECTION, EMULSION INTRAVENOUS at 07:23

## 2021-08-26 RX ADMIN — SODIUM CHLORIDE, POTASSIUM CHLORIDE, SODIUM LACTATE AND CALCIUM CHLORIDE: 600; 310; 30; 20 INJECTION, SOLUTION INTRAVENOUS at 07:19

## 2021-08-26 RX ADMIN — PROPOFOL 50 MG: 10 INJECTION, EMULSION INTRAVENOUS at 07:26

## 2021-08-26 RX ADMIN — FUROSEMIDE 40 MG: 10 INJECTION, SOLUTION INTRAMUSCULAR; INTRAVENOUS at 07:53

## 2021-08-26 NOTE — ANESTHESIA PREPROCEDURE EVALUATION
Anesthesia Evaluation     Patient summary reviewed   NPO Solid Status: > 8 hours             Airway   Mallampati: II  Neck ROM: full  No difficulty expected  Dental      Pulmonary    (+) COPD, asthma,shortness of breath, sleep apnea,   Cardiovascular     Rhythm: irregular    (+) hypertension, dysrhythmias Atrial Fib,       Neuro/Psych  GI/Hepatic/Renal/Endo    (+) obesity,   renal disease CRI,     Musculoskeletal     Abdominal    Substance History      OB/GYN          Other   arthritis,                    Anesthesia Plan    ASA 3     MAC       Anesthetic plan, all risks, benefits, and alternatives have been provided, discussed and informed consent has been obtained with: patient.  Use of blood products discussed with patient .

## 2021-08-26 NOTE — H&P
Date of Hospital Visit: 2021    Encounter Provider: Wes Sandoval MD  Place of Service: [facility] CARDIOLOGY  Patient Name: Marilu Avery  :1955  Referral Provider: Wes Sandoval MD    Chief complaint-persistent symptomatic atrial fibrillation    History of Present Illness  Mrs. Avery is a 66 years old female with past medical history of persistent atrial fibrillation on chronic anticoagulation came today for elective direct current cardioversion.  Patient has been compliant with anticoagulation without interruption for more than 4 weeks and denied any significant new symptoms.      Past Medical History:   Diagnosis Date   • Arthritis    • Asthma    • Atrial fibrillation (CMS/HCC)    • Colon polyps 2015    hyperplastic rectal polyp   • Hyperlipidemia    • Hypertension    • Sleep apnea        Past Surgical History:   Procedure Laterality Date   • BACK SURGERY N/A    • COLONOSCOPY N/A 10/22/2015    Rectal polyp-Dr. Elías Keating   • HYSTERECTOMY N/A    • LAPAROSCOPIC CHOLECYSTECTOMY N/A 2010    Dr. Heath Whitlock   • OOPHORECTOMY     • REPLACEMENT TOTAL KNEE Left 2015    Dr. Yo Aguayo   • REPLACEMENT TOTAL KNEE Right 2015    Dr. Yo Aguayo   • VENTRAL HERNIA REPAIR N/A 10/22/2015    Open reduction of incarcerated ventral hernia with layered closure-Dr. Elías Keating   • VENTRAL/INCISIONAL HERNIA REPAIR N/A 2021    Procedure: OPEN VENTRAL/INCISIONAL HERNIA REPAIR WITH MESH AND APPENDECTOMY;  Surgeon: Arnulfo Leonard MD;  Location: [facility];  Service: General;  Laterality: N/A;       (Not in a hospital admission)      Current Meds  Scheduled Meds:famotidine, 20 mg, Intravenous, 60 Min Pre-Op  sodium chloride, 10 mL, Intravenous, Q12H      Continuous Infusions:lactated ringers, 9 mL/hr      PRN Meds:.lactated ringers  •  midazolam  •  sodium chloride    Allergies as of 2021   • (No Known Allergies)        Social History     Socioeconomic History   • Marital status:      Spouse name: Not on file   • Number of children: Not on file   • Years of education: Not on file   • Highest education level: Not on file   Tobacco Use   • Smoking status: Current Every Day Smoker     Packs/day: 1.00   • Smokeless tobacco: Never Used   • Tobacco comment: admits to smoking since age 56    Vaping Use   • Vaping Use: Never used   Substance and Sexual Activity   • Alcohol use: Yes     Comment: 3 drinks daily   • Sexual activity: Defer       Family History   Problem Relation Age of Onset   • No Known Problems Mother    • No Known Problems Father        REVIEW OF SYSTEMS:   All systems reviewed and negative except as noted in HPI.       Objective:   Temp:  [98.4 °F (36.9 °C)] 98.4 °F (36.9 °C)  Heart Rate:  [88] 88  Resp:  [18] 18  BP: (153)/(99) 153/99  There is no height or weight on file to calculate BMI.    Vitals:    08/26/21 0603   BP: 153/99   Pulse: 88   Resp: 18   Temp: 98.4 °F (36.9 °C)   SpO2: 95%       General Appearance:    Alert, cooperative, in no acute distress   Head:    Normocephalic, without obvious abnormality, atraumatic   Eyes:            Lids and lashes normal, conjunctivae and sclerae normal, no   icterus, no pallor, corneas clear, PERRLA   Ears:    Ears appear intact with no abnormalities noted   Throat:   No oral lesions, no thrush, oral mucosa moist   Neck:   No adenopathy, supple, trachea midline, no thyromegaly, no   carotid bruit, no JVD   Back:     No kyphosis present, no scoliosis present, no skin lesions, erythema or scars, no tenderness to percussion or palpation, range of motion normal   Lungs:     Clear to auscultation,respirations regular, even and unlabored    Heart:   Irregularly irregular, normal S1 and S2, no murmur, no gallop, no rub, no click   Chest Wall:    No abnormalities observed   Abdomen:     Normal bowel sounds, no masses, no organomegaly, soft nontender, nondistended, no  guarding, no rebound  tenderness   Extremities:   Moves all extremities well, no edema, no cyanosis, no redness   Pulses:   Pulses palpable and equal bilaterally. Normal radial, carotid, femoral, dorsalis pedis and posterior tibial pulses bilaterally. Normal abdominal aorta   Skin:  Neurology:   Psychiatric:   No bleeding, bruising or rash   Normal speech and cranial nerve exam, no focal deficit   Alert and oriented x 3, normal mood and affect                 Review of Data:          Invalid input(s): LABLEX, PROT      @LABRCNTbnp@              @[facility](chol,trig,hdl,ldl)    I personally viewed and interpreted the patient's EKG/Telemetry data  )  Patient Active Problem List   Diagnosis   • Incisional hernia, without obstruction or gangrene   • Incarcerated ventral hernia   • Atrial fibrillation, persistent (CMS/HCC)   • HLD (hyperlipidemia)   • HTN (hypertension)   • LISA (obstructive sleep apnea)   • Stage 3b chronic kidney disease (CMS/HCC)   • Chronic anticoagulation   • SBO (small bowel obstruction) (CMS/HCC)   • Tobacco abuse   • Morbid obesity with BMI of 40.0-44.9, adult (CMS/HCC)   • COPD (chronic obstructive pulmonary disease) (CMS/HCC)   • Hypopotassemia   • Gout   • Chronic anticoagulation     Assessment and Plan:    Persistent symptomatic atrial fibrillation adequately anticoagulated  -Proceed with DCCV    Wes Sandoval MD  08/26/21  07:11 EDT.  Time spent in reviewing chart, discussion and examination:

## 2021-08-26 NOTE — PERIOPERATIVE NURSING NOTE
PT refused to be put on heart monitor while awaiting discharge, states she doesn't want any more stickers on her.

## 2021-08-26 NOTE — ANESTHESIA POSTPROCEDURE EVALUATION
Patient: Marilu Avery    Procedure Summary     Date: 08/26/21 Room / Location: [facility] PACU    Anesthesia Start: 0715 Anesthesia Stop: 0734    Procedure: CARDIOVERSION EXTERNAL IN CARDIOLOGY DEPARTMENT Diagnosis:       Atrial fibrillation, persistent (CMS/HCC)      (afib)    Scheduled Providers: Wes Sandoval MD Provider: Smooth Hall MD    Anesthesia Type: MAC ASA Status: 3          Anesthesia Type: MAC    Vitals  Vitals Value Taken Time   /68 08/26/21 0821   Temp     Pulse 85 08/26/21 0826   Resp 22 08/26/21 0800   SpO2 88 % 08/26/21 0827   Vitals shown include unvalidated device data.        Post Anesthesia Care and Evaluation    Patient location during evaluation: bedside  Patient participation: complete - patient participated  Level of consciousness: sleepy but conscious  Pain score: 0  Pain management: adequate  Airway patency: patent  Anesthetic complications: No anesthetic complications    Cardiovascular status: acceptable  Respiratory status: acceptable  Hydration status: acceptable    Comments: /84   Pulse 81   Temp 36.9 °C (98.4 °F) (Oral)   Resp 18   SpO2 96%

## 2021-09-03 ENCOUNTER — HOSPITAL ENCOUNTER (OUTPATIENT)
Dept: CARDIOLOGY | Facility: HOSPITAL | Age: 66
Discharge: HOME | End: 2021-09-03
Admitting: INTERNAL MEDICINE

## 2021-09-03 VITALS
HEIGHT: 70 IN | SYSTOLIC BLOOD PRESSURE: 144 MMHG | DIASTOLIC BLOOD PRESSURE: 80 MMHG | HEART RATE: 84 BPM | BODY MASS INDEX: 41.95 KG/M2 | WEIGHT: 293 LBS

## 2021-09-03 DIAGNOSIS — I48.19 ATRIAL FIBRILLATION, PERSISTENT (HCC): ICD-10-CM

## 2021-09-03 LAB
AORTIC DIMENSIONLESS INDEX: 0.6 (DI)
ASCENDING AORTA: 2.6 CM
BH CV ECHO MEAS - AO MAX PG (FULL): 3.9 MMHG
BH CV ECHO MEAS - AO MAX PG: 7.5 MMHG
BH CV ECHO MEAS - AO MEAN PG (FULL): 2.6 MMHG
BH CV ECHO MEAS - AO MEAN PG: 4.9 MMHG
BH CV ECHO MEAS - AO ROOT AREA (BSA CORRECTED): 1.3
BH CV ECHO MEAS - AO ROOT AREA: 8.6 CM^2
BH CV ECHO MEAS - AO ROOT DIAM: 3.3 CM
BH CV ECHO MEAS - AO V2 MAX: 136.8 CM/SEC
BH CV ECHO MEAS - AO V2 MEAN: 106.7 CM/SEC
BH CV ECHO MEAS - AO V2 VTI: 29.3 CM
BH CV ECHO MEAS - ASC AORTA: 2.6 CM
BH CV ECHO MEAS - AVA(I,A): 2.2 CM^2
BH CV ECHO MEAS - AVA(I,D): 2.2 CM^2
BH CV ECHO MEAS - AVA(V,A): 2.3 CM^2
BH CV ECHO MEAS - AVA(V,D): 2.3 CM^2
BH CV ECHO MEAS - BSA(HAYCOCK): 2.6 M^2
BH CV ECHO MEAS - BSA: 2.5 M^2
BH CV ECHO MEAS - BZI_BMI: 42.5 KILOGRAMS/M^2
BH CV ECHO MEAS - BZI_METRIC_HEIGHT: 177.8 CM
BH CV ECHO MEAS - BZI_METRIC_WEIGHT: 134.3 KG
BH CV ECHO MEAS - EDV(CUBED): 77 ML
BH CV ECHO MEAS - EDV(MOD-SP2): 142 ML
BH CV ECHO MEAS - EDV(MOD-SP4): 133 ML
BH CV ECHO MEAS - EDV(TEICH): 81 ML
BH CV ECHO MEAS - EF(CUBED): 62 %
BH CV ECHO MEAS - EF(MOD-BP): 56.8 %
BH CV ECHO MEAS - EF(MOD-SP2): 64.1 %
BH CV ECHO MEAS - EF(MOD-SP4): 54.9 %
BH CV ECHO MEAS - EF(TEICH): 53.9 %
BH CV ECHO MEAS - ESV(CUBED): 29.2 ML
BH CV ECHO MEAS - ESV(MOD-SP2): 51 ML
BH CV ECHO MEAS - ESV(MOD-SP4): 60 ML
BH CV ECHO MEAS - ESV(TEICH): 37.3 ML
BH CV ECHO MEAS - FS: 27.6 %
BH CV ECHO MEAS - IVS/LVPW: 0.81
BH CV ECHO MEAS - IVSD: 0.89 CM
BH CV ECHO MEAS - LAT PEAK E' VEL: 16.1 CM/SEC
BH CV ECHO MEAS - LV DIASTOLIC VOL/BSA (35-75): 53.9 ML/M^2
BH CV ECHO MEAS - LV MASS(C)D: 138.6 GRAMS
BH CV ECHO MEAS - LV MASS(C)DI: 56.2 GRAMS/M^2
BH CV ECHO MEAS - LV MAX PG: 3.6 MMHG
BH CV ECHO MEAS - LV MEAN PG: 2.3 MMHG
BH CV ECHO MEAS - LV SYSTOLIC VOL/BSA (12-30): 24.3 ML/M^2
BH CV ECHO MEAS - LV V1 MAX: 94.3 CM/SEC
BH CV ECHO MEAS - LV V1 MEAN: 71.7 CM/SEC
BH CV ECHO MEAS - LV V1 VTI: 18.9 CM
BH CV ECHO MEAS - LVIDD: 4.3 CM
BH CV ECHO MEAS - LVIDS: 3.1 CM
BH CV ECHO MEAS - LVLD AP2: 8.3 CM
BH CV ECHO MEAS - LVLD AP4: 8.1 CM
BH CV ECHO MEAS - LVLS AP2: 6.5 CM
BH CV ECHO MEAS - LVLS AP4: 7.7 CM
BH CV ECHO MEAS - LVOT AREA (M): 3.5 CM^2
BH CV ECHO MEAS - LVOT AREA: 3.3 CM^2
BH CV ECHO MEAS - LVOT DIAM: 2.1 CM
BH CV ECHO MEAS - LVPWD: 1.1 CM
BH CV ECHO MEAS - MED PEAK E' VEL: 13.3 CM/SEC
BH CV ECHO MEAS - MV DEC SLOPE: 406.6 CM/SEC^2
BH CV ECHO MEAS - MV DEC TIME: 0.28 SEC
BH CV ECHO MEAS - MV E MAX VEL: 81.8 CM/SEC
BH CV ECHO MEAS - MV MAX PG: 3.9 MMHG
BH CV ECHO MEAS - MV MEAN PG: 0.78 MMHG
BH CV ECHO MEAS - MV P1/2T MAX VEL: 99 CM/SEC
BH CV ECHO MEAS - MV P1/2T: 71.3 MSEC
BH CV ECHO MEAS - MV V2 MAX: 98.2 CM/SEC
BH CV ECHO MEAS - MV V2 MEAN: 38.4 CM/SEC
BH CV ECHO MEAS - MV V2 VTI: 33.5 CM
BH CV ECHO MEAS - MVA P1/2T LCG: 2.2 CM^2
BH CV ECHO MEAS - MVA(P1/2T): 3.1 CM^2
BH CV ECHO MEAS - MVA(VTI): 1.9 CM^2
BH CV ECHO MEAS - PA ACC TIME: 0.06 SEC
BH CV ECHO MEAS - PA MAX PG (FULL): 1.2 MMHG
BH CV ECHO MEAS - PA MAX PG: 2.2 MMHG
BH CV ECHO MEAS - PA PR(ACCEL): 53.6 MMHG
BH CV ECHO MEAS - PA V2 MAX: 74.7 CM/SEC
BH CV ECHO MEAS - PVA(V,A): 3.7 CM^2
BH CV ECHO MEAS - PVA(V,D): 3.7 CM^2
BH CV ECHO MEAS - QP/QS: 0.98
BH CV ECHO MEAS - RAP SYSTOLE: 15 MMHG
BH CV ECHO MEAS - RV MAX PG: 1.1 MMHG
BH CV ECHO MEAS - RV MEAN PG: 0.58 MMHG
BH CV ECHO MEAS - RV V1 MAX: 51.4 CM/SEC
BH CV ECHO MEAS - RV V1 MEAN: 35.8 CM/SEC
BH CV ECHO MEAS - RV V1 VTI: 11.5 CM
BH CV ECHO MEAS - RVOT AREA: 5.4 CM^2
BH CV ECHO MEAS - RVOT DIAM: 2.6 CM
BH CV ECHO MEAS - RVSP: 32.9 MMHG
BH CV ECHO MEAS - SI(AO): 101.8 ML/M^2
BH CV ECHO MEAS - SI(CUBED): 19.4 ML/M^2
BH CV ECHO MEAS - SI(LVOT): 25.7 ML/M^2
BH CV ECHO MEAS - SI(MOD-SP2): 36.9 ML/M^2
BH CV ECHO MEAS - SI(MOD-SP4): 29.6 ML/M^2
BH CV ECHO MEAS - SI(TEICH): 17.7 ML/M^2
BH CV ECHO MEAS - SV(AO): 251.1 ML
BH CV ECHO MEAS - SV(CUBED): 47.7 ML
BH CV ECHO MEAS - SV(LVOT): 63.4 ML
BH CV ECHO MEAS - SV(MOD-SP2): 91 ML
BH CV ECHO MEAS - SV(MOD-SP4): 73 ML
BH CV ECHO MEAS - SV(RVOT): 62.1 ML
BH CV ECHO MEAS - SV(TEICH): 43.6 ML
BH CV ECHO MEAS - TAPSE (>1.6): 1.9 CM
BH CV ECHO MEAS - TR MAX VEL: 211.8 CM/SEC
BH CV ECHO MEASUREMENTS AVERAGE E/E' RATIO: 5.56
BH CV XLRA - RV BASE: 3.8 CM
BH CV XLRA - RV LENGTH: 7.1 CM
BH CV XLRA - RV MID: 2.8 CM
BH CV XLRA - TDI S': 13.3 CM/SEC
LEFT ATRIUM VOLUME INDEX: 30 ML/M2
MAXIMAL PREDICTED HEART RATE: 155 BPM
SINUS: 3.1 CM
STJ: 2.3 CM
STRESS TARGET HR: 132 BPM

## 2021-09-03 PROCEDURE — 25010000002 PERFLUTREN (DEFINITY) 8.476 MG IN SODIUM CHLORIDE (PF) 0.9 % 10 ML INJECTION: Performed by: INTERNAL MEDICINE

## 2021-09-03 PROCEDURE — 93306 TTE W/DOPPLER COMPLETE: CPT

## 2021-09-03 PROCEDURE — 93306 TTE W/DOPPLER COMPLETE: CPT | Performed by: INTERNAL MEDICINE

## 2021-09-03 RX ADMIN — PERFLUTREN 2 ML: 6.52 INJECTION, SUSPENSION INTRAVENOUS at 14:15

## 2021-09-07 ENCOUNTER — TELEPHONE (OUTPATIENT)
Dept: CARDIOLOGY | Facility: CLINIC | Age: 66
End: 2021-09-07

## 2021-09-07 NOTE — TELEPHONE ENCOUNTER
----- Message from Wes Sandoval MD sent at 9/3/2021  5:04 PM EDT -----  Please notify patient that her echo is normal.  Thank you

## 2021-09-10 ENCOUNTER — OFFICE VISIT (OUTPATIENT)
Dept: CARDIOLOGY | Facility: CLINIC | Age: 66
End: 2021-09-10

## 2021-09-10 VITALS
WEIGHT: 284 LBS | HEIGHT: 70 IN | SYSTOLIC BLOOD PRESSURE: 158 MMHG | BODY MASS INDEX: 40.66 KG/M2 | OXYGEN SATURATION: 95 % | DIASTOLIC BLOOD PRESSURE: 104 MMHG | HEART RATE: 80 BPM

## 2021-09-10 DIAGNOSIS — Z79.01 CHRONIC ANTICOAGULATION: ICD-10-CM

## 2021-09-10 DIAGNOSIS — R06.02 EXERTIONAL SHORTNESS OF BREATH: ICD-10-CM

## 2021-09-10 DIAGNOSIS — E78.2 MIXED HYPERLIPIDEMIA: ICD-10-CM

## 2021-09-10 DIAGNOSIS — I48.19 ATRIAL FIBRILLATION, PERSISTENT (HCC): Primary | ICD-10-CM

## 2021-09-10 DIAGNOSIS — I10 ESSENTIAL HYPERTENSION: ICD-10-CM

## 2021-09-10 PROCEDURE — 99214 OFFICE O/P EST MOD 30 MIN: CPT | Performed by: INTERNAL MEDICINE

## 2021-09-10 PROCEDURE — 93000 ELECTROCARDIOGRAM COMPLETE: CPT | Performed by: INTERNAL MEDICINE

## 2021-09-10 RX ORDER — AMLODIPINE BESYLATE 5 MG/1
5 TABLET ORAL DAILY
Qty: 90 TABLET | Refills: 3 | Status: SHIPPED | OUTPATIENT
Start: 2021-09-10 | End: 2021-10-13

## 2021-09-10 RX ORDER — FUROSEMIDE 40 MG/1
40 TABLET ORAL 2 TIMES DAILY
Qty: 120 TABLET | Refills: 3
Start: 2021-09-10

## 2021-09-10 NOTE — PROGRESS NOTES
PATIENTINFORMATION    Date of Office Visit: 09/10/2021  Encounter Provider: Wes Sandoval MD  Place of Service: [facility] CARDIOLOGY  Patient Name: Marilu Avery  : 1955    Subjective:     Encounter Date:09/10/2021      Patient ID: Marilu Avery is a 66 y.o. female.    Chief Complaint   Patient presents with   • Atrial Fibrillation     one month    • Hyperlipidemia   • Nicotine Dependence     HPI  Ms. Avery is a 66 years old female with symptomatic persistent atrial fibrillation diagnosed about 2 months came to cardiology clinic for follow-up visit.  She had a failed cardioversion on 21 despite 2 attempts.  She has been on amiodarone 200 mg BID  since 21.  She continues to report exertional shortness of breath but denies any significant chest pain.  She she had some response to Lasix when dose was increased to 40 mg p.o. twice daily but currently on 40 daily.  She has bilateral lower extremity swelling but denies any orthopnea, PND, palpitations, presyncope or syncope.  She admits her blood pressure runs high during home blood pressure meds monitoring despite medication compliance.  She denied any bleeding on anticoagulation.    ROS   All systems reviewed and negative except as noted in HPI.    Past Medical History:   Diagnosis Date   • Arthritis    • Asthma    • Atrial fibrillation (CMS/HCC)    • Colon polyps 2015    hyperplastic rectal polyp   • Hyperlipidemia    • Hypertension    • Sleep apnea        Past Surgical History:   Procedure Laterality Date   • BACK SURGERY N/A    • COLONOSCOPY N/A 10/22/2015    Rectal polyp-Dr. Elías Keating   • HYSTERECTOMY N/A    • LAPAROSCOPIC CHOLECYSTECTOMY N/A 2010    Dr. Heath Whitlock   • OOPHORECTOMY     • REPLACEMENT TOTAL KNEE Left 2015    Dr. Yo Aguayo   • REPLACEMENT TOTAL KNEE Right 2015    Dr. Yo Aguayo   • VENTRAL HERNIA REPAIR N/A 10/22/2015    Open reduction of incarcerated ventral  "hernia with layered closure-Dr. Elías Keating   • VENTRAL/INCISIONAL HERNIA REPAIR N/A 6/14/2021    Procedure: OPEN VENTRAL/INCISIONAL HERNIA REPAIR WITH MESH AND APPENDECTOMY;  Surgeon: Arnulfo Leonard MD;  Location: [facility] OR;  Service: General;  Laterality: N/A;       Social History     Socioeconomic History   • Marital status:      Spouse name: Not on file   • Number of children: Not on file   • Years of education: Not on file   • Highest education level: Not on file   Tobacco Use   • Smoking status: Current Every Day Smoker     Packs/day: 1.00   • Smokeless tobacco: Never Used   • Tobacco comment: admits to smoking since age 56    Vaping Use   • Vaping Use: Never used   Substance and Sexual Activity   • Alcohol use: Yes     Comment: 3 drinks daily   • Sexual activity: Defer       Family History   Problem Relation Age of Onset   • No Known Problems Mother    • No Known Problems Father            ECG 12 Lead    Date/Time: 9/10/2021 1:56 PM  Performed by: Wes Sandoval MD  Authorized by: Wes Sandoval MD   Comparison: compared with previous ECG from 8/26/2021  Similar to previous ECG  Rhythm: sinus rhythm  Rate: normal  Conduction: conduction normal  ST Segments: ST segments normal  T Waves: T waves normal  QRS axis: normal  Other: no other findings    Clinical impression: normal ECG               Objective:     BP (!) 158/104   Pulse 80   Ht 177.8 cm (70\")   Wt 129 kg (284 lb)   SpO2 95%   BMI 40.75 kg/m²  Body mass index is 40.75 kg/m².     Constitutional:       General: Not in acute distress.     Appearance: Morbidly obese. Not diaphoretic.   Eyes:      Pupils: Pupils are equal, round, and reactive to light.   HENT:      Head: Normocephalic and atraumatic.   Neck:      Thyroid: No thyromegaly.   Pulmonary:      Effort: Pulmonary effort is normal. No respiratory distress.      Breath sounds: Normal breath sounds. No wheezing. No rales.   Chest:      Chest wall: Not tender to " palpatation.   Cardiovascular:      Normal rate. Irregularly irregular rhythm.      No gallop.   Pulses:     Intact distal pulses.   Edema:     Peripheral edema absent.   Abdominal:      General: Bowel sounds are normal. There is no distension.      Palpations: Abdomen is soft.      Tenderness: There is no guarding.   Musculoskeletal: Normal range of motion.         General: No deformity.      Cervical back: Normal range of motion and neck supple. Skin:     General: Skin is warm and dry.      Findings: No rash.   Neurological:      Mental Status: Alert and oriented to person, place, and time.      Cranial Nerves: No cranial nerve deficit.      Deep Tendon Reflexes: Reflexes are normal and symmetric.   Psychiatric:         Judgment: Judgment normal.         Review Of Data: I have reviewed recent labs and echocardiogram   Normal stress test and echo in 2015      Assessment/Plan:       1.  Symptomatic persistent atrial fibrillation-echo done recently with preserved left ventricular ejection fraction  -Failed DCCV cardioversion on 8/26/2021-started on amiodarone 200 mg p.o. twice daily  -Her heart rate overall better today-80 bpm on EKG-still in A. Fib  -She reports compliance with beta-blocker and amiodarone and anticoagulated  2.  Hypertension-BP has been running on the higher side today's reading in the office was 150/100.  I have increased amlodipine to 5 mg p.o. daily  3.  Morbidly obese with complications/sleep apnea-encourage patient to modify diet, increase level of activity to lose weight  -She reports losing about 23 pounds over the last 3 months.  4.  Chronic tobacco use-she is trying to cut down tobacco  5.  Chronic kidney disease-stage III-stable creatinine  6. Hyperlipidemia -on high intensity statin  7.  Because of body habitus and joint pains she uses a walker to get around.    She had a stress test back in 2015.  Do myocardial perfusion study to rule out any significant ischemia as she has persistent  shortness of breath despite her control.  Suspect a. fib causing her symptoms.  She will continue to work on her weight, cut down tobacco use.  I have increased Lasix to 40 mg p.o. twice daily and she will get BMP in 2 weeks    Diagnosis and plan of care discussed with patient and verbalized understanding.           Wes Sandoval MD  09/10/21  14:22 EDT

## 2021-09-17 ENCOUNTER — HOSPITAL ENCOUNTER (OUTPATIENT)
Dept: CARDIOLOGY | Facility: HOSPITAL | Age: 66
Discharge: HOME | End: 2021-09-17
Admitting: INTERNAL MEDICINE

## 2021-09-17 VITALS — BODY MASS INDEX: 40.71 KG/M2 | WEIGHT: 284.39 LBS | HEIGHT: 70 IN

## 2021-09-17 DIAGNOSIS — R06.02 EXERTIONAL SHORTNESS OF BREATH: ICD-10-CM

## 2021-09-17 LAB
BH CV NUCLEAR PRIOR STUDY: 3
BH CV STRESS BP STAGE 1: NORMAL
BH CV STRESS COMMENTS STAGE 1: NORMAL
BH CV STRESS DOSE REGADENOSON STAGE 1: 0.4
BH CV STRESS DURATION MIN STAGE 1: 0
BH CV STRESS DURATION SEC STAGE 1: 10
BH CV STRESS HR STAGE 1: 90
BH CV STRESS NUCLEAR ISOTOPE DOSE: 42 MCI
BH CV STRESS PROTOCOL 1: NORMAL
BH CV STRESS RECOVERY BP: NORMAL MMHG
BH CV STRESS RECOVERY HR: 81 BPM
BH CV STRESS STAGE 1: 1
LV EF NUC BP: 60 %
MAXIMAL PREDICTED HEART RATE: 155 BPM
PERCENT MAX PREDICTED HR: 58.06 %
STRESS BASELINE BP: NORMAL MMHG
STRESS BASELINE HR: 87 BPM
STRESS PERCENT HR: 68 %
STRESS POST EXERCISE DUR SEC: 10 SEC
STRESS POST PEAK BP: NORMAL MMHG
STRESS POST PEAK HR: 90 BPM
STRESS TARGET HR: 132 BPM

## 2021-09-17 PROCEDURE — A9502 TC99M TETROFOSMIN: HCPCS | Performed by: INTERNAL MEDICINE

## 2021-09-17 PROCEDURE — 78451 HT MUSCLE IMAGE SPECT SING: CPT

## 2021-09-17 PROCEDURE — 93017 CV STRESS TEST TRACING ONLY: CPT

## 2021-09-17 PROCEDURE — 93018 CV STRESS TEST I&R ONLY: CPT | Performed by: INTERNAL MEDICINE

## 2021-09-17 PROCEDURE — 0 TECHNETIUM TETROFOSMIN KIT: Performed by: INTERNAL MEDICINE

## 2021-09-17 PROCEDURE — 25010000002 REGADENOSON 0.4 MG/5ML SOLUTION: Performed by: INTERNAL MEDICINE

## 2021-09-17 PROCEDURE — 78451 HT MUSCLE IMAGE SPECT SING: CPT | Performed by: INTERNAL MEDICINE

## 2021-09-17 PROCEDURE — 93016 CV STRESS TEST SUPVJ ONLY: CPT | Performed by: INTERNAL MEDICINE

## 2021-09-17 RX ADMIN — TETROFOSMIN 1 DOSE: 1.38 INJECTION, POWDER, LYOPHILIZED, FOR SOLUTION INTRAVENOUS at 13:12

## 2021-09-17 RX ADMIN — REGADENOSON 0.4 MG: 0.08 INJECTION, SOLUTION INTRAVENOUS at 13:12

## 2021-09-17 NOTE — PROGRESS NOTES
Can you please call and let patient know the stress test is normal.  She did answer when I called and I was trying to find out how she has been doing especially with regards to breathing.  I need to see her in 1 month to decide on further treatment for atrial fibrillation.  Let me know if she has any questions.  Thank you

## 2021-09-20 RX ORDER — POTASSIUM CHLORIDE 20 MEQ/1
20 TABLET, EXTENDED RELEASE ORAL DAILY
Qty: 30 TABLET | Refills: 3
Start: 2021-09-20 | End: 2021-09-29

## 2021-09-27 ENCOUNTER — TRANSCRIBE ORDERS (OUTPATIENT)
Dept: ADMINISTRATIVE | Facility: HOSPITAL | Age: 66
End: 2021-09-27

## 2021-09-27 ENCOUNTER — LAB (OUTPATIENT)
Dept: LAB | Facility: HOSPITAL | Age: 66
End: 2021-09-27

## 2021-09-27 DIAGNOSIS — I10 ESSENTIAL HYPERTENSION, MALIGNANT: ICD-10-CM

## 2021-09-27 DIAGNOSIS — M10.9 GOUT, UNSPECIFIED CAUSE, UNSPECIFIED CHRONICITY, UNSPECIFIED SITE: ICD-10-CM

## 2021-09-27 DIAGNOSIS — M10.9 GOUT, UNSPECIFIED CAUSE, UNSPECIFIED CHRONICITY, UNSPECIFIED SITE: Primary | ICD-10-CM

## 2021-09-27 DIAGNOSIS — E78.5 HYPERLIPIDEMIA, UNSPECIFIED HYPERLIPIDEMIA TYPE: ICD-10-CM

## 2021-09-27 DIAGNOSIS — I10 ESSENTIAL HYPERTENSION: ICD-10-CM

## 2021-09-27 DIAGNOSIS — Z00.00 ROUTINE GENERAL MEDICAL EXAMINATION AT A HEALTH CARE FACILITY: ICD-10-CM

## 2021-09-27 LAB
ALBUMIN SERPL-MCNC: 4.1 G/DL (ref 3.5–5.2)
ALBUMIN/GLOB SERPL: 1.5 G/DL
ALP SERPL-CCNC: 106 U/L (ref 39–117)
ALT SERPL W P-5'-P-CCNC: 12 U/L (ref 1–33)
ANION GAP SERPL CALCULATED.3IONS-SCNC: 11.5 MMOL/L (ref 5–15)
AST SERPL-CCNC: 18 U/L (ref 1–32)
BASOPHILS # BLD AUTO: 0.04 10*3/MM3 (ref 0–0.2)
BASOPHILS NFR BLD AUTO: 0.7 % (ref 0–1.5)
BILIRUB SERPL-MCNC: 0.4 MG/DL (ref 0–1.2)
BUN SERPL-MCNC: 17 MG/DL (ref 8–23)
BUN/CREAT SERPL: 16.2 (ref 7–25)
CALCIUM SPEC-SCNC: 9.2 MG/DL (ref 8.6–10.5)
CHLORIDE SERPL-SCNC: 101 MMOL/L (ref 98–107)
CO2 SERPL-SCNC: 25.5 MMOL/L (ref 22–29)
CREAT SERPL-MCNC: 1.05 MG/DL (ref 0.57–1)
DEPRECATED RDW RBC AUTO: 48.2 FL (ref 37–54)
EOSINOPHIL # BLD AUTO: 0.15 10*3/MM3 (ref 0–0.4)
EOSINOPHIL NFR BLD AUTO: 2.7 % (ref 0.3–6.2)
ERYTHROCYTE [DISTWIDTH] IN BLOOD BY AUTOMATED COUNT: 13.4 % (ref 12.3–15.4)
GFR SERPL CREATININE-BSD FRML MDRD: 53 ML/MIN/1.73
GLOBULIN UR ELPH-MCNC: 2.7 GM/DL
GLUCOSE SERPL-MCNC: 107 MG/DL (ref 65–99)
HBA1C MFR BLD: 6.02 % (ref 4.8–5.6)
HCT VFR BLD AUTO: 34.8 % (ref 34–46.6)
HGB BLD-MCNC: 11.4 G/DL (ref 12–15.9)
IMM GRANULOCYTES # BLD AUTO: 0.01 10*3/MM3 (ref 0–0.05)
IMM GRANULOCYTES NFR BLD AUTO: 0.2 % (ref 0–0.5)
LYMPHOCYTES # BLD AUTO: 1.18 10*3/MM3 (ref 0.7–3.1)
LYMPHOCYTES NFR BLD AUTO: 21.2 % (ref 19.6–45.3)
MCH RBC QN AUTO: 32.5 PG (ref 26.6–33)
MCHC RBC AUTO-ENTMCNC: 32.8 G/DL (ref 31.5–35.7)
MCV RBC AUTO: 99.1 FL (ref 79–97)
MONOCYTES # BLD AUTO: 0.71 10*3/MM3 (ref 0.1–0.9)
MONOCYTES NFR BLD AUTO: 12.8 % (ref 5–12)
NEUTROPHILS NFR BLD AUTO: 3.47 10*3/MM3 (ref 1.7–7)
NEUTROPHILS NFR BLD AUTO: 62.4 % (ref 42.7–76)
NRBC BLD AUTO-RTO: 0.2 /100 WBC (ref 0–0.2)
PLATELET # BLD AUTO: 270 10*3/MM3 (ref 140–450)
PMV BLD AUTO: 9.2 FL (ref 6–12)
POTASSIUM SERPL-SCNC: 4.7 MMOL/L (ref 3.5–5.2)
PROT SERPL-MCNC: 6.8 G/DL (ref 6–8.5)
RBC # BLD AUTO: 3.51 10*6/MM3 (ref 3.77–5.28)
SODIUM SERPL-SCNC: 138 MMOL/L (ref 136–145)
TSH SERPL DL<=0.05 MIU/L-ACNC: 4.08 UIU/ML (ref 0.27–4.2)
URATE SERPL-MCNC: 5.4 MG/DL (ref 2.4–5.7)
WBC # BLD AUTO: 5.56 10*3/MM3 (ref 3.4–10.8)

## 2021-09-27 PROCEDURE — 84550 ASSAY OF BLOOD/URIC ACID: CPT

## 2021-09-27 PROCEDURE — 85025 COMPLETE CBC W/AUTO DIFF WBC: CPT

## 2021-09-27 PROCEDURE — 83036 HEMOGLOBIN GLYCOSYLATED A1C: CPT

## 2021-09-27 PROCEDURE — 84443 ASSAY THYROID STIM HORMONE: CPT

## 2021-09-27 PROCEDURE — 36415 COLL VENOUS BLD VENIPUNCTURE: CPT

## 2021-09-27 PROCEDURE — 80053 COMPREHEN METABOLIC PANEL: CPT

## 2021-10-13 ENCOUNTER — OFFICE VISIT (OUTPATIENT)
Dept: CARDIOLOGY | Facility: CLINIC | Age: 66
End: 2021-10-13

## 2021-10-13 VITALS
WEIGHT: 293 LBS | HEART RATE: 76 BPM | DIASTOLIC BLOOD PRESSURE: 80 MMHG | HEIGHT: 70 IN | OXYGEN SATURATION: 99 % | SYSTOLIC BLOOD PRESSURE: 120 MMHG | BODY MASS INDEX: 41.95 KG/M2

## 2021-10-13 DIAGNOSIS — R06.02 EXERTIONAL SHORTNESS OF BREATH: Primary | ICD-10-CM

## 2021-10-13 DIAGNOSIS — I48.19 ATRIAL FIBRILLATION, PERSISTENT (HCC): ICD-10-CM

## 2021-10-13 PROCEDURE — 93000 ELECTROCARDIOGRAM COMPLETE: CPT | Performed by: INTERNAL MEDICINE

## 2021-10-13 PROCEDURE — 99214 OFFICE O/P EST MOD 30 MIN: CPT | Performed by: INTERNAL MEDICINE

## 2021-10-13 NOTE — PROGRESS NOTES
PATIENTINFORMATION    Date of Office Visit: 10/13/2021  Encounter Provider: Wes Sandoval MD  Place of Service: [facility] CARDIOLOGY  Patient Name: Marilu Avery  : 1955    Subjective:     Encounter Date:10/13/2021      Patient ID: Marilu Avery is a 66 y.o. female.    Chief Complaint   Patient presents with   • Atrial Fibrillation     1 month follow up     HPI  Ms. Avery is a 66 years old female patient was persistent symptomatic atrial fibrillation, obesity and sleep apnea, hypertension and hyperlipidemia,a abdominal hernia came to cardiology clinic for follow-up visit.  She continues to report some exertional shortness of breath but has not changed significantly.  She also has bilateral lower extremity edema stable on Lasix.  She is trying to cut down tobacco and currently smoking only few cigarettes per day.  She ambulates using a walker  She checks her vital signs at home and blood pressure runs within normal range.  He is compliant with all current medications and denied any significant new side effects.  No bleeding from any site      ROS   All systems reviewed and negative except as noted in HPI.    Past Medical History:   Diagnosis Date   • Arthritis    • Asthma    • Atrial fibrillation (HCC)    • Colon polyps     hyperplastic rectal polyp   • Hyperlipidemia    • Hypertension    • Sleep apnea        Past Surgical History:   Procedure Laterality Date   • BACK SURGERY N/A    • COLONOSCOPY N/A 10/22/2015    Rectal polyp-Dr. Elías Keating   • HYSTERECTOMY N/A    • LAPAROSCOPIC CHOLECYSTECTOMY N/A 2010    Dr. Heath Whitlock   • OOPHORECTOMY     • REPLACEMENT TOTAL KNEE Left 2015    Dr. Yo Aguayo   • REPLACEMENT TOTAL KNEE Right 2015    Dr. Yo Aguayo   • VENTRAL HERNIA REPAIR N/A 10/22/2015    Open reduction of incarcerated ventral hernia with layered closure-Dr. Elías Keating   • VENTRAL/INCISIONAL HERNIA REPAIR N/A 2021     "Procedure: OPEN VENTRAL/INCISIONAL HERNIA REPAIR WITH MESH AND APPENDECTOMY;  Surgeon: Arnulfo Leonard MD;  Location: [facility] OR;  Service: General;  Laterality: N/A;       Social History     Socioeconomic History   • Marital status:    Tobacco Use   • Smoking status: Current Every Day Smoker     Packs/day: 1.00   • Smokeless tobacco: Never Used   • Tobacco comment: admits to smoking since age 56    Vaping Use   • Vaping Use: Never used   Substance and Sexual Activity   • Alcohol use: Yes     Comment: 3 drinks daily Daily caffine   • Drug use: Never   • Sexual activity: Defer       Family History   Problem Relation Age of Onset   • No Known Problems Mother    • No Known Problems Father            ECG 12 Lead    Date/Time: 10/13/2021 3:59 PM  Performed by: Wes Sandoval MD  Authorized by: Wes Sandoval MD   Comparison: compared with previous ECG from 9/10/2021  Similar to previous ECG  Comparison to previous ECG: Lower heart rate on this tracing   Rhythm: atrial fibrillation  Rate: normal  Conduction: conduction normal  ST Segments: ST segments normal  T Waves: T waves normal  QRS axis: normal  Other: no other findings    Clinical impression: abnormal EKG               Objective:     /80 (BP Location: Left arm)   Pulse 76   Ht 177.8 cm (70\")   Wt 134 kg (295 lb)   SpO2 99%   BMI 42.33 kg/m²  Body mass index is 42.33 kg/m².     Constitutional:       General: Not in acute distress.     Appearance: Well-developed. Not diaphoretic.   Eyes:      Pupils: Pupils are equal, round, and reactive to light.   HENT:      Head: Normocephalic and atraumatic.   Neck:      Thyroid: No thyromegaly.   Pulmonary:      Effort: Pulmonary effort is normal. No respiratory distress.      Breath sounds: Normal breath sounds. No wheezing. No rales.   Chest:      Chest wall: Not tender to palpatation.   Cardiovascular:      Normal rate. Irregularly irregular rhythm.      No gallop.   Pulses:     Intact " distal pulses.   Edema:     Pretibial: bilateral 1+ edema of the pretibial area.     Ankle: bilateral 1+ edema of the ankle.  Abdominal:      General: Bowel sounds are normal. There is no distension.      Palpations: Abdomen is soft.      Tenderness: There is no guarding.   Musculoskeletal: Normal range of motion.         General: No deformity.      Cervical back: Normal range of motion and neck supple. Skin:     General: Skin is warm and dry.      Findings: No rash.   Neurological:      Mental Status: Alert and oriented to person, place, and time.      Cranial Nerves: No cranial nerve deficit.      Deep Tendon Reflexes: Reflexes are normal and symmetric.   Psychiatric:         Judgment: Judgment normal.         Review Of Data: I have reviewed recent labs       Assessment/Plan:       1.  Persistent atrial fibrillation-rate well controlled on amiodarone 200 mg p.o. twice daily and metoprolol 200 mg p.o. twice daily  Failed DCCV on 8/26/2021  Negative myocardial perfusion study for ischemia in September 2021  Unremarkable echo in August 2021  ECG shows interment intermittent sinus P waves. HR 54  Patient would want to wait on repeat cardioversion till next visit    2. Exertional shortness of breath-likely multifactorial-diastolic dysfunction, atrial fibrillation, body habitus and prior tobacco use  -Continue diuretic with close monitoring    3.  Hypertension- well-controlled  -Because of ongoing chronic bilateral lower extreme edema I will hold amlodipine and see how she responds.Rosie losartan,bb     4. Obesity with abdominal ventral hernia-on CPAP machine for sleep apnea    5.  Bilateral lower extremity swelling-see plan above    6.  Tobacco use-patient cutting down    Check BMP, proBNP and amiodarone level.  Return to clinic in 3 months or sooner with any complaint    Diagnosis and plan of care discussed with patient and verbalized understanding.           Wes Sandoval MD  10/13/21  15:55 EDT

## 2021-10-29 ENCOUNTER — TELEPHONE (OUTPATIENT)
Dept: CARDIOLOGY | Facility: CLINIC | Age: 66
End: 2021-10-29

## 2021-10-29 DIAGNOSIS — I48.19 ATRIAL FIBRILLATION, PERSISTENT (HCC): Primary | ICD-10-CM

## 2021-11-01 ENCOUNTER — TELEPHONE (OUTPATIENT)
Dept: PHARMACY | Facility: HOSPITAL | Age: 66
End: 2021-11-01

## 2021-11-01 NOTE — TELEPHONE ENCOUNTER
Received referral for warfarin management from Dr. Sandoval.     Attempted to contact Ms. Avery to facilitate transition from apixaban to warfarin. Left  requesting return call.

## 2021-11-02 ENCOUNTER — ANTICOAGULATION VISIT (OUTPATIENT)
Dept: PHARMACY | Facility: HOSPITAL | Age: 66
End: 2021-11-02

## 2021-11-02 DIAGNOSIS — I48.19 ATRIAL FIBRILLATION, PERSISTENT (HCC): Primary | ICD-10-CM

## 2021-11-02 RX ORDER — WARFARIN SODIUM 5 MG/1
TABLET ORAL
Qty: 30 TABLET | Refills: 0 | Status: SHIPPED | OUTPATIENT
Start: 2021-11-02 | End: 2021-11-19 | Stop reason: SDUPTHER

## 2021-11-02 NOTE — PROGRESS NOTES
Anticoagulation Clinic Progress Note    Anticoagulation Summary  As of 11/2/2021    INR goal:  2.0-3.0   TTR:  --   INR used for dosing:  No new INR was available at the time of this encounter.   Warfarin maintenance plan:  No maintenance plan   Weekly warfarin total:  0 mg   Plan last modified:  Alcides Woodall, PharmD (11/2/2021)   Next INR check:  11/8/2021   Target end date:      Indications    Atrial fibrillation  persistent (HCC) [I48.19]             Anticoagulation Episode Summary     INR check location:      Preferred lab:      Send INR reminders to:   [facility] [place]    Comments:  Transitioning from apixaban. 1st visit 11/8/21.      Anticoagulation Care Providers     Provider Role Specialty Phone number    Wes Sandoval MD Referring Cardiology [phone]          Clinic Interview:  Patient Findings     Negatives:  Signs/symptoms of thrombosis, Signs/symptoms of bleeding,   Laboratory test error suspected, Change in health, Change in alcohol use,   Change in activity, Upcoming invasive procedure, Emergency department   visit, Upcoming dental procedure, Missed doses, Extra doses, Change in   medications, Change in diet/appetite, Hospital admission, Bruising, Other   complaints    Comments:  Reports apixaban supply will be finished after tomorrow   morning's dose.       Clinical Outcomes     Negatives:  Major bleeding event, Thromboembolic event,   Anticoagulation-related hospital admission, Anticoagulation-related ED   visit, Anticoagulation-related fatality    Comments:  Reports apixaban supply will be finished after tomorrow   morning's dose.         INR History:  Anticoagulation Monitoring 11/2/2021   INR Goal 2.0-3.0   Last Week Total 0 mg   Next Week Total 25 mg   Sun 5 mg (11/7)   Mon -   Tue -   Wed 5 mg (11/3)   Thu 5 mg (11/4)   Fri 5 mg (11/5)   Sat 5 mg (11/6)   Visit Report -       Plan:  1. INR is unknown today- see above in Anticoagulation Summary.   Will instruct Marilu ROME  Bennie to BEGIN warfarin 5 daily tomorrow evening after completing supply of apixaban - see above in Anticoagulation Summary.  2. Follow up in in clinic on 11/8/21 for INR check and warfarin education.   3. They have been instructed to call if any changes in medications, doses, concerns, etc. Patient expresses understanding and has no further questions at this time.    Alcides Woodall, PharmD

## 2021-11-08 ENCOUNTER — ANTICOAGULATION VISIT (OUTPATIENT)
Dept: PHARMACY | Facility: HOSPITAL | Age: 66
End: 2021-11-08

## 2021-11-08 DIAGNOSIS — I48.19 ATRIAL FIBRILLATION, PERSISTENT (HCC): Primary | ICD-10-CM

## 2021-11-08 LAB
INR PPP: 1.4 (ref 0.91–1.09)
PROTHROMBIN TIME: 16.5 SECONDS (ref 10–13.8)

## 2021-11-08 PROCEDURE — G0463 HOSPITAL OUTPT CLINIC VISIT: HCPCS

## 2021-11-08 PROCEDURE — 36416 COLLJ CAPILLARY BLOOD SPEC: CPT

## 2021-11-08 PROCEDURE — 85610 PROTHROMBIN TIME: CPT

## 2021-11-08 NOTE — PROGRESS NOTES
Anticoagulation Clinic Progress Note  Anticoagulation Summary  As of 2021    INR goal:  2.0-3.0   TTR:  --   INR used for dosin.4 (2021)   Warfarin maintenance plan:  7.5 mg every Mon, Wed, Fri; 5 mg all other days   Weekly warfarin total:  42.5 mg   Plan last modified:  Desiree Kingston RPH (2021)   Next INR check:  2021   Target end date:      Indications    Atrial fibrillation  persistent (HCC) [I48.19]             Anticoagulation Episode Summary     INR check location:      Preferred lab:      Send INR reminders to:  [facility] CLINICAL [place]    Comments:  Transitioning from apixaban. 1st visit 21.      Anticoagulation Care Providers     Provider Role Specialty Phone number    Wes Sandoval MD Referring Cardiology [phone]          Clinic Interview:  Patient Findings     Negatives:  Signs/symptoms of thrombosis, Signs/symptoms of bleeding,   Laboratory test error suspected, Change in health, Change in alcohol use,   Change in activity, Upcoming invasive procedure, Emergency department   visit, Upcoming dental procedure, Missed doses, Extra doses, Change in   medications, Change in diet/appetite, Hospital admission, Bruising, Other   complaints      Clinical Outcomes     Negatives:  Major bleeding event, Thromboembolic event,   Anticoagulation-related hospital admission, Anticoagulation-related ED   visit, Anticoagulation-related fatality        Education:  Marilu Avery is a new start in the [facility]. We discussed the followin) Warfarin's indication, mechanism, and dosing  2) Enforced the importance of taking warfarin as instructed and at the same time every day, preferably in the evening so that we can make dose adjustments more easily following subsequent clinic visits  3) What she should do about a missed dose; pts can take missed doses within about 12 hours of their usual scheduled dose, but she was instructed on the importance of not  doubling up on doses unless told to do so by the [facility]  4) Explained possible side effects of warfarin therapy, including increased risk of bleeding, s/sx of bleeding and s/sx of any additional clots/PE/CVA.   5) Discussed monitoring of warfarin, the INR, goal INR range, and the frequency of monitoring  6) Reviewed drug/food/tobacco/EtOH interactions and provided written information covering these topics in more detail, explaining that green, leafy vegetables interact most heavily with warfarin  7) Instructed the pt not to take or discontinue any medications without informing her physician/pharmacist and reminded her to inform us of any dietary changes, as well  8) Explained that she would be coming into the clinic more frequently in these first few weeks of therapy as we try to adjust her dose and achieve a therapeutic INR x 2 consecutive readings. Once that is achieved, patient will follow up in clinic every 4 weeks, on average.    She stated no problems with transportation or scheduling clinic appts in this manner. she expressed understanding of the information provided and has no additional questions at this time.    Marilu Avery was presented with a copy of the Patients Rights and Responsibilities. she expressed verbal consent and agreement to receive care in the [facility] under the current collaborative care agreement with [facility].       INR History:  11/3: Started on 5 mg daily of warfarin  11/8: 1.4, increase to 7.5 mg MWF, 5 mg AOD (recheck on Friday). May need additional days of 7.5 mg pending INR on Friday     Plan:  1. INR is Subtherapeutic today- see above in Anticoagulation Summary.   Will instruct Marilu Avery to Increase their warfarin regimen- see above in Anticoagulation Summary.  2. Follow up in 4 days  3. Patient declines warfarin refills.  4. Verbal and written information provided. Patient expresses understanding and has no further  questions at this time.    Desiree Kingston, RP

## 2021-11-12 ENCOUNTER — ANTICOAGULATION VISIT (OUTPATIENT)
Dept: PHARMACY | Facility: HOSPITAL | Age: 66
End: 2021-11-12

## 2021-11-12 DIAGNOSIS — I48.19 ATRIAL FIBRILLATION, PERSISTENT (HCC): Primary | ICD-10-CM

## 2021-11-12 LAB
INR PPP: 2.1 (ref 0.91–1.09)
PROTHROMBIN TIME: 25.7 SECONDS (ref 10–13.8)

## 2021-11-12 PROCEDURE — 85610 PROTHROMBIN TIME: CPT

## 2021-11-12 PROCEDURE — 36416 COLLJ CAPILLARY BLOOD SPEC: CPT

## 2021-11-12 NOTE — PROGRESS NOTES
Anticoagulation Clinic Progress Note    Anticoagulation Summary  As of 2021    INR goal:  2.0-3.0   TTR:  14.3 % (4 d)   INR used for dosin.1 (2021)   Warfarin maintenance plan:  7.5 mg every Mon, Wed, Fri; 5 mg all other days   Weekly warfarin total:  42.5 mg   No change documented:  Francoise Louis   Plan last modified:  Desiree Kingston RPH (2021)   Next INR check:  2021   Target end date:      Indications    Atrial fibrillation  persistent (HCC) [I48.19]             Anticoagulation Episode Summary     INR check location:      Preferred lab:      Send INR reminders to:  [facility] CLINICAL [place]    Comments:  Transitioning from apixaban. 1st visit 21.      Anticoagulation Care Providers     Provider Role Specialty Phone number    Wes Sandoval MD Referring Cardiology [phone]          Clinic Interview:  Patient Findings     Negatives:  Signs/symptoms of thrombosis, Signs/symptoms of bleeding,   Laboratory test error suspected, Change in health, Change in alcohol use,   Change in activity, Upcoming invasive procedure, Emergency department   visit, Upcoming dental procedure, Missed doses, Extra doses, Change in   medications, Change in diet/appetite, Hospital admission, Bruising, Other   complaints      Clinical Outcomes     Negatives:  Major bleeding event, Thromboembolic event,   Anticoagulation-related hospital admission, Anticoagulation-related ED   visit, Anticoagulation-related fatality        INR History:  Anticoagulation Monitoring 2021   INR - 1.4 2.1   INR Date - 2021   INR Goal 2.0-3.0 2.0-3.0 2.0-3.0   Trend - - Same   Last Week Total 0 mg 25 mg 40 mg   Next Week Total 25 mg 42.5 mg 42.5 mg   Sun 5 mg () - 5 mg   Mon - 7.5 mg () 7.5 mg   Tue - 5 mg 5 mg   Wed 5 mg (11/3) 7.5 mg 7.5 mg   Thu 5 mg () 5 mg 5 mg   Fri 5 mg () - 7.5 mg   Sat 5 mg () - 5 mg   Visit Report - - -   Some recent  data might be hidden       Plan:  1. INR is therapeutic today- see above in Anticoagulation Summary.   Will instruct Marilu Avery to continue their warfarin regimen- see above in Anticoagulation Summary.  2. Follow up in 1 weeks.  3. Patient declines warfarin refills.  4. Verbal and written information provided. Patient expresses understanding and has no further questions at this time.    Francoise Louis

## 2021-11-18 RX ORDER — AMIODARONE HYDROCHLORIDE 200 MG/1
TABLET ORAL
Qty: 180 TABLET | Refills: 1 | Status: SHIPPED | OUTPATIENT
Start: 2021-11-18 | End: 2022-06-01

## 2021-11-19 ENCOUNTER — ANTICOAGULATION VISIT (OUTPATIENT)
Dept: PHARMACY | Facility: HOSPITAL | Age: 66
End: 2021-11-19

## 2021-11-19 DIAGNOSIS — I48.19 ATRIAL FIBRILLATION, PERSISTENT (HCC): Primary | ICD-10-CM

## 2021-11-19 LAB
INR PPP: 2.5 (ref 0.91–1.09)
PROTHROMBIN TIME: 30.1 SECONDS (ref 10–13.8)

## 2021-11-19 PROCEDURE — 36416 COLLJ CAPILLARY BLOOD SPEC: CPT

## 2021-11-19 PROCEDURE — 85610 PROTHROMBIN TIME: CPT

## 2021-11-19 RX ORDER — WARFARIN SODIUM 5 MG/1
TABLET ORAL
Qty: 110 TABLET | Refills: 1 | Status: SHIPPED | OUTPATIENT
Start: 2021-11-19 | End: 2021-11-24

## 2021-11-19 NOTE — PROGRESS NOTES
Anticoagulation Clinic Progress Note    Anticoagulation Summary  As of 2021    INR goal:  2.0-3.0   TTR:  68.8 % (1.6 wk)   INR used for dosin.5 (2021)   Warfarin maintenance plan:  7.5 mg every Mon, Wed, Fri; 5 mg all other days   Weekly warfarin total:  42.5 mg   No change documented:  Alcides Woodall, PharmD   Plan last modified:  Desiree Kingston RPH (2021)   Next INR check:  12/3/2021   Target end date:      Indications    Atrial fibrillation  persistent (HCC) [I48.19]             Anticoagulation Episode Summary     INR check location:      Preferred lab:      [facility] [place]    Comments:  Transitioned from apixaban.      Anticoagulation Care Providers     Provider Role Specialty Phone number    Wes Sandoval MD Referring Cardiology [phone]          Clinic Interview:  Patient Findings     Negatives:  Signs/symptoms of thrombosis, Signs/symptoms of bleeding,   Laboratory test error suspected, Change in health, Change in alcohol use,   Change in activity, Upcoming invasive procedure, Emergency department   visit, Upcoming dental procedure, Missed doses, Extra doses, Change in   medications, Change in diet/appetite, Hospital admission, Bruising, Other   complaints      Clinical Outcomes     Negatives:  Major bleeding event, Thromboembolic event,   Anticoagulation-related hospital admission, Anticoagulation-related ED   visit, Anticoagulation-related fatality        INR History:  Anticoagulation Monitoring 2021   INR 1.4 2.1 2.5   INR Date 2021   INR Goal 2.0-3.0 2.0-3.0 2.0-3.0   Trend - Same Same   Last Week Total 25 mg 40 mg 42.5 mg   Next Week Total 42.5 mg 42.5 mg 42.5 mg   Sun - 5 mg 5 mg   Mon 7.5 mg () 7.5 mg 7.5 mg   Tue 5 mg 5 mg 5 mg   Wed 7.5 mg 7.5 mg 7.5 mg   Thu 5 mg 5 mg 5 mg   Fri - 7.5 mg 7.5 mg   Sat - 5 mg 5 mg   Visit Report - - -   Some recent data might be hidden        Plan:  1. INR is Therapeutic today- see above in Anticoagulation Summary.  Will instruct Marilu Avery to Continue their warfarin regimen- see above in Anticoagulation Summary.  2. Follow up in 2 weeks  3. Patient declines warfarin refills.  4. Verbal and written information provided. Patient expresses understanding and has no further questions at this time.    Alcides Woodall, PharmD

## 2021-11-24 RX ORDER — WARFARIN SODIUM 5 MG/1
TABLET ORAL
Qty: 110 TABLET | Refills: 1 | Status: SHIPPED | OUTPATIENT
Start: 2021-11-24 | End: 2022-01-18

## 2021-12-03 ENCOUNTER — ANTICOAGULATION VISIT (OUTPATIENT)
Dept: PHARMACY | Facility: HOSPITAL | Age: 66
End: 2021-12-03

## 2021-12-03 DIAGNOSIS — I48.19 ATRIAL FIBRILLATION, PERSISTENT (HCC): Primary | ICD-10-CM

## 2021-12-03 LAB
INR PPP: 2.4 (ref 0.91–1.09)
PROTHROMBIN TIME: 29.4 SECONDS (ref 10–13.8)

## 2021-12-03 PROCEDURE — 85610 PROTHROMBIN TIME: CPT

## 2021-12-03 PROCEDURE — 36416 COLLJ CAPILLARY BLOOD SPEC: CPT

## 2021-12-03 NOTE — PROGRESS NOTES
Anticoagulation Clinic Progress Note    Anticoagulation Summary  As of 12/3/2021    INR goal:  2.0-3.0   TTR:  86.3 % (3.6 wk)   INR used for dosin.4 (12/3/2021)   Warfarin maintenance plan:  7.5 mg every Mon, Wed, Fri; 5 mg all other days   Weekly warfarin total:  42.5 mg   No change documented:  Desiree Kingston RPH   Plan last modified:  Desiree Kingston RPH (2021)   Next INR check:  2021   Target end date:      Indications    Atrial fibrillation  persistent (HCC) [I48.19]             Anticoagulation Episode Summary     INR check location:      Preferred lab:      [facility] [place]    Comments:  Transitioned from apixaban.      Anticoagulation Care Providers     Provider Role Specialty Phone number    Wes Sandoval MD Referring Cardiology [phone]          Clinic Interview:  Patient Findings     Negatives:  Signs/symptoms of thrombosis, Signs/symptoms of bleeding,   Laboratory test error suspected, Change in health, Change in alcohol use,   Change in activity, Upcoming invasive procedure, Emergency department   visit, Upcoming dental procedure, Missed doses, Extra doses, Change in   medications, Change in diet/appetite, Hospital admission, Bruising, Other   complaints      Clinical Outcomes     Negatives:  Major bleeding event, Thromboembolic event,   Anticoagulation-related hospital admission, Anticoagulation-related ED   visit, Anticoagulation-related fatality        INR History:  Anticoagulation Monitoring 2021 2021 12/3/2021   INR 2.1 2.5 2.4   INR Date 2021 2021 12/3/2021   INR Goal 2.0-3.0 2.0-3.0 2.0-3.0   Trend Same Same Same   Last Week Total 40 mg 42.5 mg 42.5 mg   Next Week Total 42.5 mg 42.5 mg 42.5 mg   Sun 5 mg 5 mg 5 mg   Mon 7.5 mg 7.5 mg 7.5 mg   Tue 5 mg 5 mg 5 mg   Wed 7.5 mg 7.5 mg 7.5 mg   Thu 5 mg 5 mg 5 mg   Fri 7.5 mg 7.5 mg 7.5 mg   Sat 5 mg 5 mg 5 mg   Visit Report - - -   Some recent data might be  hidden       Plan:  1. INR is Therapeutic today- see above in Anticoagulation Summary.  Will instruct Marilu Avery to Continue their warfarin regimen- see above in Anticoagulation Summary.  2. Follow up in 1 month  3. Patient declines warfarin refills.  4. Verbal and written information provided. Patient expresses understanding and has no further questions at this time.    Desiree Kingston, [facility]

## 2021-12-17 ENCOUNTER — ANTICOAGULATION VISIT (OUTPATIENT)
Dept: PHARMACY | Facility: HOSPITAL | Age: 66
End: 2021-12-17

## 2021-12-17 DIAGNOSIS — I48.19 ATRIAL FIBRILLATION, PERSISTENT (HCC): Primary | ICD-10-CM

## 2021-12-17 LAB
INR PPP: 3.2 (ref 0.91–1.09)
PROTHROMBIN TIME: 38.6 SECONDS (ref 10–13.8)

## 2021-12-17 PROCEDURE — G0463 HOSPITAL OUTPT CLINIC VISIT: HCPCS

## 2021-12-17 PROCEDURE — 36416 COLLJ CAPILLARY BLOOD SPEC: CPT

## 2021-12-17 PROCEDURE — 85610 PROTHROMBIN TIME: CPT

## 2021-12-17 NOTE — PROGRESS NOTES
Anticoagulation Clinic Progress Note    Anticoagulation Summary  As of 12/17/2021    INR goal:  2.0-3.0   TTR:  82.2 % (1.3 mo)   INR used for dosing:  3.2 (12/17/2021)   Warfarin maintenance plan:  7.5 mg every Mon, Wed, Fri; 5 mg all other days   Weekly warfarin total:  42.5 mg   Plan last modified:  Desiree Kingston RPH (11/8/2021)   Next INR check:  1/3/2022   Target end date:      Indications    Atrial fibrillation  persistent (HCC) [I48.19]             Anticoagulation Episode Summary     INR check location:      Preferred lab:      Send INR reminders to:   [facility] [place]    Comments:  Transitioned from apixaban.      Anticoagulation Care Providers     Provider Role Specialty Phone number    Wes Sandoval MD Referring Cardiology [phone]          Clinic Interview:  Patient Findings     Positives:  Change in alcohol use, Missed doses    Negatives:  Signs/symptoms of thrombosis, Signs/symptoms of bleeding,   Laboratory test error suspected, Change in health, Change in activity,   Upcoming invasive procedure, Emergency department visit, Upcoming dental   procedure, Extra doses, Change in medications, Change in diet/appetite,   Hospital admission, Bruising, Other complaints    Comments:  Patient reports she thinks she missed a dose on Tuesday of   this week. Also, she reports that she had more alcohol than usual at 2   holiday parties this past week/weekend. She typically has 2-3 drinks per   day (vodka), and had more while at the holiday parties.       Clinical Outcomes     Negatives:  Major bleeding event, Thromboembolic event,   Anticoagulation-related hospital admission, Anticoagulation-related ED   visit, Anticoagulation-related fatality    Comments:  Patient reports she thinks she missed a dose on Tuesday of   this week. Also, she reports that she had more alcohol than usual at 2   holiday parties this past week/weekend. She typically has 2-3 drinks per   day (vodka), and had more  while at the holiday parties.         INR History:  Anticoagulation Monitoring 11/19/2021 12/3/2021 12/17/2021   INR 2.5 2.4 3.2   INR Date 11/19/2021 12/3/2021 12/17/2021   INR Goal 2.0-3.0 2.0-3.0 2.0-3.0   Trend Same Same Same   Last Week Total 42.5 mg 42.5 mg 37.5 mg   Next Week Total 42.5 mg 42.5 mg 40 mg   Sun 5 mg 5 mg 5 mg   Mon 7.5 mg 7.5 mg 7.5 mg   Tue 5 mg 5 mg 5 mg   Wed 7.5 mg 7.5 mg 7.5 mg   Thu 5 mg 5 mg 5 mg   Fri 7.5 mg 7.5 mg 5 mg (12/17); Otherwise 7.5 mg   Sat 5 mg 5 mg 5 mg   Visit Report - - -   Some recent data might be hidden       Comments: Counseled patient on how alcohol can interact with warfarin with acute increases. Advised patient to keep this in mind over the holidays.    Plan:  1. INR is Supratherapeutic today- see above in Anticoagulation Summary.  Will instruct Marilu Avery to Change their warfarin regimen to include a partial dose today, then resume normal- see above in Anticoagulation Summary.  2. Follow up in 2.5 weeks  3. Patient declines warfarin refills.  4. Verbal and written information provided. Patient expresses understanding and has no further questions at this time.    Dulce Peterson, PharmD

## 2022-01-03 ENCOUNTER — APPOINTMENT (OUTPATIENT)
Dept: PHARMACY | Facility: HOSPITAL | Age: 67
End: 2022-01-03

## 2022-01-03 ENCOUNTER — TELEPHONE (OUTPATIENT)
Dept: PHARMACY | Facility: HOSPITAL | Age: 67
End: 2022-01-03

## 2022-01-03 NOTE — TELEPHONE ENCOUNTER
Received a phone call from patient requesting that we take her off the schedule for today's appt.  After asking about reschduling, she proceeded to tell me that she was transitioning to Eliquis. When I did not see anything documented by the cardiologist in her chart or see a script called in for her, I suggested she call the Cardiologist and ask for their recommendation on how and when to stop the warfarin. Patient said she would call the pharmacy and have the script put on hold until she talked to the cardiologist. She made an appt. For an INR check 1/4.

## 2022-01-04 ENCOUNTER — ANTICOAGULATION VISIT (OUTPATIENT)
Dept: PHARMACY | Facility: HOSPITAL | Age: 67
End: 2022-01-04

## 2022-01-04 DIAGNOSIS — I48.19 ATRIAL FIBRILLATION, PERSISTENT: Primary | ICD-10-CM

## 2022-01-04 LAB
INR PPP: 3.7 (ref 0.91–1.09)
PROTHROMBIN TIME: 43.9 SECONDS (ref 10–13.8)

## 2022-01-04 PROCEDURE — G0463 HOSPITAL OUTPT CLINIC VISIT: HCPCS

## 2022-01-04 PROCEDURE — 85610 PROTHROMBIN TIME: CPT

## 2022-01-04 PROCEDURE — 36416 COLLJ CAPILLARY BLOOD SPEC: CPT

## 2022-01-04 NOTE — PROGRESS NOTES
Anticoagulation Clinic Progress Note    Anticoagulation Summary  As of 1/4/2022    INR goal:  2.0-3.0   TTR:  56.2 % (1.9 mo)   INR used for dosing:  3.7 (1/4/2022)   Warfarin maintenance plan:  7.5 mg every Mon, Fri; 5 mg all other days   Weekly warfarin total:  40 mg   Plan last modified:  Desiree Kingston RPH (1/4/2022)   Next INR check:  1/18/2022   Target end date:      Indications    Atrial fibrillation  persistent (HCC) [I48.19]             Anticoagulation Episode Summary     INR check location:      Preferred lab:      Send INR reminders to:   [facility] [place]    Comments:  Transitioned from apixaban.      Anticoagulation Care Providers     Provider Role Specialty Phone number    Wes Sandoval MD Referring Cardiology [phone]          Clinic Interview:  Patient Findings     Negatives:  Signs/symptoms of thrombosis, Signs/symptoms of bleeding,   Laboratory test error suspected, Change in health, Change in alcohol use,   Change in activity, Upcoming invasive procedure, Emergency department   visit, Upcoming dental procedure, Missed doses, Extra doses, Change in   medications, Change in diet/appetite, Hospital admission, Bruising, Other   complaints      Clinical Outcomes     Negatives:  Major bleeding event, Thromboembolic event,   Anticoagulation-related hospital admission, Anticoagulation-related ED   visit, Anticoagulation-related fatality        INR History:  Anticoagulation Monitoring 12/3/2021 12/17/2021 1/4/2022   INR 2.4 3.2 3.7   INR Date 12/3/2021 12/17/2021 1/4/2022   INR Goal 2.0-3.0 2.0-3.0 2.0-3.0   Trend Same Same Down   Last Week Total 42.5 mg 37.5 mg 42.5 mg   Next Week Total 42.5 mg 40 mg 35 mg   Sun 5 mg 5 mg 5 mg   Mon 7.5 mg 7.5 mg 7.5 mg   Tue 5 mg 5 mg Hold (1/4); Otherwise 5 mg   Wed 7.5 mg 7.5 mg 5 mg   Thu 5 mg 5 mg 5 mg   Fri 7.5 mg 5 mg (12/17); Otherwise 7.5 mg 7.5 mg   Sat 5 mg 5 mg 5 mg   Visit Report - - -   Some recent data might be hidden        Plan:  1. INR is Supratherapeutic today- see above in Anticoagulation Summary.  Will instruct Marilu Avery to Change their warfarin regimen- see above in Anticoagulation Summary.  2. Follow up in 2 weeks  3. Patient declines warfarin refills.  4. Verbal and written information provided. Patient expresses understanding and has no further questions at this time.    Desiree Kingston, [facility]

## 2022-01-18 ENCOUNTER — ANTICOAGULATION VISIT (OUTPATIENT)
Dept: PHARMACY | Facility: HOSPITAL | Age: 67
End: 2022-01-18

## 2022-01-18 ENCOUNTER — TELEPHONE (OUTPATIENT)
Dept: PHARMACY | Facility: HOSPITAL | Age: 67
End: 2022-01-18

## 2022-01-18 DIAGNOSIS — I48.19 ATRIAL FIBRILLATION, PERSISTENT: Primary | ICD-10-CM

## 2022-01-18 LAB
INR PPP: 2 (ref 0.91–1.09)
PROTHROMBIN TIME: 24.2 SECONDS (ref 10–13.8)

## 2022-01-18 PROCEDURE — 85610 PROTHROMBIN TIME: CPT

## 2022-01-18 PROCEDURE — 36416 COLLJ CAPILLARY BLOOD SPEC: CPT

## 2022-01-18 NOTE — TELEPHONE ENCOUNTER
Dr. Sandoval,     Mrs. Avery was previously prescribed apixaban (Eliquis), but she transitioned to warfarin due to cost. She reports that she has new prescription insurance, which will make apixaban (Eliquis) more affordable. She is requesting to transition from warfarin back to apixaban (Eliquis) at this time. Her INR today is 2.0 upon her visit to our clinic this afternoon.     If you are agreeable, I would be happy to instruct her to hold her warfarin today, and then initiate apixaban (Eliquis) tomorrow, at which time her INR would be expected to be <2.0. If so, please enter prescription for apixaban (Eliquis). Of note, you have an appointment with her scheduled for 1/28/22 if you would prefer to delay transition until following that encounter.     Please advise, and I will be happy to instruct her accordingly. Thank you!  Alcides

## 2022-01-19 ENCOUNTER — ANTICOAGULATION VISIT (OUTPATIENT)
Dept: PHARMACY | Facility: HOSPITAL | Age: 67
End: 2022-01-19

## 2022-01-19 DIAGNOSIS — I48.19 ATRIAL FIBRILLATION, PERSISTENT: Primary | ICD-10-CM

## 2022-01-19 NOTE — PROGRESS NOTES
This visit is for documentation purposes only: Patient is transitioning from warfarin (discontinued as of today) to apixaban. No longer following in the [facility]. It has been a pleasure being part of her care.

## 2022-01-19 NOTE — PROGRESS NOTES
Anticoagulation Clinic Progress Note    Anticoagulation Summary  As of 2022    INR goal:  2.0-3.0   TTR:  56.8 % (2.3 mo)   INR used for dosin.0 (2022)   Warfarin maintenance plan:  No maintenance plan   Weekly warfarin total:  40 mg   Plan last modified:  Desiree Kingston RPH (2022)   Next INR check:  2022   Target end date:      Indications    Atrial fibrillation  persistent (HCC) [I48.19]             Anticoagulation Episode Summary     INR check location:      Preferred lab:      Send INR reminders to:  [facility] CLINICAL [place]    Comments:  Transitioned from apixaban.      Anticoagulation Care Providers     Provider Role Specialty Phone number    Wes Sandoval MD Referring Cardiology [phone]          Clinic Interview:  Patient Findings     Positives:  Other complaints    Negatives:  Signs/symptoms of thrombosis, Signs/symptoms of bleeding,   Laboratory test error suspected, Change in health, Change in alcohol use,   Change in activity, Upcoming invasive procedure, Emergency department   visit, Upcoming dental procedure, Missed doses, Extra doses, Change in   medications, Change in diet/appetite, Hospital admission, Bruising    Comments:  Warfarin 5 mg taken already today (on 22).      Clinical Outcomes     Negatives:  Major bleeding event, Thromboembolic event,   Anticoagulation-related hospital admission, Anticoagulation-related ED   visit, Anticoagulation-related fatality    Comments:  Warfarin 5 mg taken already today (on 22).        INR History:  Anticoagulation Monitoring 2021   INR 3.2 3.7 2.0   INR Date 2021   INR Goal 2.0-3.0 2.0-3.0 2.0-3.0   Trend Same Down -   Last Week Total 37.5 mg 42.5 mg 40 mg   Next Week Total 40 mg 35 mg 5 mg   Sun 5 mg 5 mg -   Mon 7.5 mg 7.5 mg -   Tue 5 mg Hold (); Otherwise 5 mg 5 mg ()   Wed 7.5 mg 5 mg Hold ()   Thu 5 mg 5 mg -   Fri 5 mg (); Otherwise 7.5  mg 7.5 mg -   Sat 5 mg 5 mg -   Visit Report - - -   Some recent data might be hidden       Plan:  1. INR is Therapeutic today- see above in Anticoagulation Summary.  Will instruct Marilu Avery to Change their warfarin regimen- see above in Anticoagulation Summary. DISCONTINUE warfarin. INITIATE apixaban 5 mg BID per Cardiology on 1/20/22, at which time her INR is expected to be <2.0 (see 1/18/22 telephone note).   2. Follow up with Cardiology as scheduled on 1/28/22; no further follow-up with the [facility] is indicated with discontinuation of warfarin  3. Patient declines warfarin refills. Apixaban prescription entered by Cardiology.  4. Verbal and written information provided. Reviewed apixaban education. Patient expresses understanding and has no further questions at this time.    Alcides Woodall, PharmD

## 2022-06-01 ENCOUNTER — OFFICE VISIT (OUTPATIENT)
Dept: CARDIOLOGY | Facility: CLINIC | Age: 67
End: 2022-06-01

## 2022-06-01 VITALS
BODY MASS INDEX: 41.35 KG/M2 | DIASTOLIC BLOOD PRESSURE: 82 MMHG | WEIGHT: 288.8 LBS | HEART RATE: 71 BPM | HEIGHT: 70 IN | SYSTOLIC BLOOD PRESSURE: 136 MMHG

## 2022-06-01 DIAGNOSIS — I10 PRIMARY HYPERTENSION: ICD-10-CM

## 2022-06-01 DIAGNOSIS — I48.19 ATRIAL FIBRILLATION, PERSISTENT: Primary | ICD-10-CM

## 2022-06-01 PROCEDURE — 93000 ELECTROCARDIOGRAM COMPLETE: CPT | Performed by: INTERNAL MEDICINE

## 2022-06-01 PROCEDURE — 99214 OFFICE O/P EST MOD 30 MIN: CPT | Performed by: INTERNAL MEDICINE

## 2022-06-01 RX ORDER — AMIODARONE HYDROCHLORIDE 200 MG/1
200 TABLET ORAL DAILY
Qty: 180 TABLET | Refills: 1
Start: 2022-06-01 | End: 2022-07-06

## 2022-06-01 NOTE — PROGRESS NOTES
PATIENTINFORMATION    Date of Office Visit: 2022  Encounter Provider: Wes Sandoval MD  Place of Service: [facility] CARDIOLOGY  Patient Name: Marilu Avery  : 1955    Subjective:     Encounter Date:2022      Patient ID: Marilu Avery is a 66 y.o. female.    Chief Complaint   Patient presents with   • Follow-up   • Shortness of Breath   • Edema     HPI  Ms. Avery is a 65 yo lady who came to cardiology clinic for follow-up visit for atrial fibrillation.  She continues to experience exertional shortness of breath limiting her activity besides chronic degenerative joint disease.  She gets around using a walker but denies any significant falls.  She quit taking Coumadin because of required frequent blood work and some dietary restrictions.  Preauthorization submitted to [employer] for assistance but not yet approved.  She reports significantly improved bilateral lower extremity swelling and denies any chest pain, orthopnea, PND, palpitations, presyncope or syncope.  Compliant with all treatment regimen other than anticoagulation and her CPAP machine.  No ER visits or hospitalizations since last clinic visit.      ROS  All systems reviewed and negative except as noted in HPI.    Past Medical History:   Diagnosis Date   • Arthritis    • Asthma    • Atrial fibrillation (HCC)    • Colon polyps 2015    hyperplastic rectal polyp   • Hyperlipidemia    • Hypertension    • Sleep apnea        Past Surgical History:   Procedure Laterality Date   • BACK SURGERY N/A    • COLONOSCOPY N/A 10/22/2015    Rectal polyp-Dr. Elías Keating   • HYSTERECTOMY N/A    • LAPAROSCOPIC CHOLECYSTECTOMY N/A 2010    Dr. Heath Whitlock   • OOPHORECTOMY     • REPLACEMENT TOTAL KNEE Left 2015    Dr. Yo Aguayo   • REPLACEMENT TOTAL KNEE Right 2015    Dr. Yo Aguayo   • VENTRAL HERNIA REPAIR N/A 10/22/2015    Open reduction of incarcerated ventral hernia with layered  "closure-Dr. Elías Keating   • VENTRAL/INCISIONAL HERNIA REPAIR N/A 6/14/2021    Procedure: OPEN VENTRAL/INCISIONAL HERNIA REPAIR WITH MESH AND APPENDECTOMY;  Surgeon: Arnulfo Leonard MD;  Location: [facility];  Service: General;  Laterality: N/A;       Social History     Socioeconomic History   • Marital status:    Tobacco Use   • Smoking status: Current Every Day Smoker     Packs/day: 1.00   • Smokeless tobacco: Never Used   • Tobacco comment: admits to smoking since age 56    Vaping Use   • Vaping Use: Never used   Substance and Sexual Activity   • Alcohol use: Yes     Comment: 3 drinks daily Daily caffine   • Drug use: Never   • Sexual activity: Defer       Family History   Problem Relation Age of Onset   • No Known Problems Mother    • No Known Problems Father            ECG 12 Lead    Date/Time: 6/1/2022 2:31 PM  Performed by: Wes Sandoval MD  Authorized by: Wes Sandoval MD   Comparison: compared with previous ECG from 10/13/2021  Similar to previous ECG  Rhythm: atrial fibrillation  Rate: normal  Conduction: conduction normal  ST Segments: ST segments normal  T Waves: T waves normal  QRS axis: normal  Other: no other findings    Clinical impression: abnormal EKG               Objective:     /82 (BP Location: Left arm, Patient Position: Sitting)   Pulse 71   Ht 177.8 cm (70\")   Wt 131 kg (288 lb 12.8 oz)   BMI 41.44 kg/m²  Body mass index is 41.44 kg/m².     Constitutional:       General: Not in acute distress.     Appearance: Morbidly obese. Not diaphoretic.   Eyes:      Pupils: Pupils are equal, round, and reactive to light.   HENT:      Head: Normocephalic and atraumatic.   Neck:      Thyroid: No thyromegaly.   Pulmonary:      Effort: Pulmonary effort is normal. No respiratory distress.      Breath sounds: Normal breath sounds. No wheezing. No rales.   Chest:      Chest wall: Not tender to palpatation.   Cardiovascular:      Normal rate. Irregularly irregular rhythm. "      No gallop.   Pulses:     Intact distal pulses.   Edema:     Peripheral edema absent.   Abdominal:      General: Bowel sounds are normal. There is no distension.      Palpations: Abdomen is soft.      Tenderness: There is no guarding.   Musculoskeletal: Normal range of motion.         General: No deformity.      Cervical back: Normal range of motion and neck supple. Skin:     General: Skin is warm and dry.      Findings: No rash.   Neurological:      Mental Status: Alert and oriented to person, place, and time.      Cranial Nerves: No cranial nerve deficit.      Deep Tendon Reflexes: Reflexes are normal and symmetric.   Psychiatric:         Judgment: Judgment normal.         Review Of Data: I have reviewed pertinent recent labs, images and documents and pertinent findings included in HPI or assessment below.    Lipid Panel    Lipid Panel 7/26/21   Total Cholesterol 104   Triglycerides 122   HDL Cholesterol 49   VLDL Cholesterol 22   LDL Cholesterol  33   LDL/HDL Ratio 0.62               Assessment/Plan:         1.  Persistent atrial fibrillation-rate well controlled on amiodarone 200 mg p.o. twice daily and metoprolol 200 mg p.o. twice daily  Failed DCCV on 8/26/2021  Negative myocardial perfusion study for ischemia in September 2021  Unremarkable echo in August 2021  Plan was to attempt another cardioversion with adequate amiodarone dosing.  She is not sure if she wanted to go that route besides frequent interruptions in anticoagulation.  Decrease amiodarone to 200 mg p.o. daily and likely will change to another AV benita blocker during follow-up.  Provided Eliquis samples and a full 1 month.  2. Exertional shortness of breath-likely multifactorial-diastolic dysfunction, atrial fibrillation, body habitus and prior tobacco use  -Uses Lasix once or twice a day.    3.  Hypertension- well-controlled  -Fairly controlled on current regimen that include losartan    4. Obesity with abdominal ventral hernia-on CPAP  machine for sleep apnea    5.  Bilateral lower extremity swelling-resolved    6.  Tobacco use/daily alcohol use   Currently smokes about half pack per day and drinks 2 drinks per day (vodka)   Counseled       Return to clinic in 3 months  Diagnosis and plan of care discussed with patient and verbalized understanding.            Your medication list          Accurate as of June 1, 2022  2:59 PM. If you have any questions, ask your nurse or doctor.            CHANGE how you take these medications      Instructions Last Dose Given Next Dose Due   amiodarone 200 MG tablet  Commonly known as: PACERONE  What changed: when to take this  Changed by: Wes Sandoval MD      Take 1 tablet by mouth Daily.          CONTINUE taking these medications      Instructions Last Dose Given Next Dose Due   albuterol sulfate  (90 Base) MCG/ACT inhaler  Commonly known as: PROVENTIL HFA;VENTOLIN HFA;PROAIR HFA      Inhale 2 puffs Every 4 (Four) Hours As Needed for Wheezing or Shortness of Air.       allopurinol 100 MG tablet  Commonly known as: ZYLOPRIM      Take 100 mg by mouth Daily.       apixaban 5 MG tablet tablet  Commonly known as: ELIQUIS      Take 1 tablet by mouth 2 (Two) Times a Day.       atorvastatin 80 MG tablet  Commonly known as: LIPITOR      Take 80 mg by mouth every night at bedtime.       budesonide-formoterol 160-4.5 MCG/ACT inhaler  Commonly known as: Symbicort      Inhale 2 puffs 2 (Two) Times a Day.       famotidine 20 MG tablet  Commonly known as: PEPCID      Take 1 tablet by mouth 2 (Two) Times a Day Before Meals.       furosemide 40 MG tablet  Commonly known as: Lasix      Take 1 tablet by mouth 2 (two) times a day. Indications: Take 80 mg for the next 5 days       losartan 100 MG tablet  Commonly known as: COZAAR      Take 100 mg by mouth Daily.       metoprolol tartrate 100 MG tablet  Commonly known as: LOPRESSOR      Take 200 mg by mouth 2 (Two) Times a Day.       polyethylene glycol 17 g  packet  Commonly known as: MIRALAX      Take 17 g by mouth Daily.       tiotropium 18 MCG per inhalation capsule  Commonly known as: Spiriva HandiHaler      Place 1 capsule into inhaler and inhale Daily.       vitamin b complex capsule capsule      Take 2 capsules by mouth Daily.       VITAMIN D PO      Take 1 tablet by mouth Daily.       vitamin D3 125 MCG (5000 UT) capsule capsule      Take 2,000 Units by mouth Daily.             Where to Get Your Medications      Information about where to get these medications is not yet available    Ask your nurse or doctor about these medications  · amiodarone 200 MG tablet             Wes Sandoavl MD  06/01/22  14:59 EDT

## 2022-07-06 RX ORDER — AMIODARONE HYDROCHLORIDE 200 MG/1
TABLET ORAL
Qty: 180 TABLET | Refills: 1 | Status: SHIPPED | OUTPATIENT
Start: 2022-07-06 | End: 2022-08-11

## 2022-07-14 ENCOUNTER — TELEPHONE (OUTPATIENT)
Dept: CARDIOLOGY | Facility: CLINIC | Age: 67
End: 2022-07-14

## 2022-07-14 NOTE — TELEPHONE ENCOUNTER
[phone]    Pt had called and wanted to double check and see if yh wanted her to stop taking the pacerone and start another medication or not.   I explained to her that per yh notes he lowered the dosage and said they would discuss medication changes at next fu.  Pt verbalized understanding.

## 2022-08-11 RX ORDER — AMIODARONE HYDROCHLORIDE 200 MG/1
TABLET ORAL
Qty: 180 TABLET | Refills: 1 | Status: SHIPPED | OUTPATIENT
Start: 2022-08-11 | End: 2023-02-27

## 2022-08-26 ENCOUNTER — TRANSCRIBE ORDERS (OUTPATIENT)
Dept: ADMINISTRATIVE | Facility: HOSPITAL | Age: 67
End: 2022-08-26

## 2022-08-26 DIAGNOSIS — Z92.89 HISTORY OF MAMMOGRAPHY, SCREENING: Primary | ICD-10-CM

## 2022-09-08 ENCOUNTER — APPOINTMENT (OUTPATIENT)
Dept: MAMMOGRAPHY | Facility: HOSPITAL | Age: 67
End: 2022-09-08

## 2023-02-27 ENCOUNTER — OFFICE VISIT (OUTPATIENT)
Dept: CARDIOLOGY | Facility: CLINIC | Age: 68
End: 2023-02-27
Payer: MEDICARE

## 2023-02-27 VITALS
BODY MASS INDEX: 39.94 KG/M2 | OXYGEN SATURATION: 100 % | HEIGHT: 70 IN | SYSTOLIC BLOOD PRESSURE: 140 MMHG | HEART RATE: 62 BPM | DIASTOLIC BLOOD PRESSURE: 86 MMHG | WEIGHT: 279 LBS

## 2023-02-27 DIAGNOSIS — E78.2 MIXED HYPERLIPIDEMIA: ICD-10-CM

## 2023-02-27 DIAGNOSIS — I10 PRIMARY HYPERTENSION: ICD-10-CM

## 2023-02-27 DIAGNOSIS — Z79.01 CHRONIC ANTICOAGULATION: ICD-10-CM

## 2023-02-27 DIAGNOSIS — R94.31 PROLONGED Q-T INTERVAL ON ECG: ICD-10-CM

## 2023-02-27 DIAGNOSIS — Z72.0 TOBACCO ABUSE: ICD-10-CM

## 2023-02-27 DIAGNOSIS — J43.9 PULMONARY EMPHYSEMA, UNSPECIFIED EMPHYSEMA TYPE: ICD-10-CM

## 2023-02-27 DIAGNOSIS — E66.01 MORBID OBESITY WITH BMI OF 40.0-44.9, ADULT: ICD-10-CM

## 2023-02-27 DIAGNOSIS — G47.33 OSA (OBSTRUCTIVE SLEEP APNEA): ICD-10-CM

## 2023-02-27 DIAGNOSIS — I48.19 ATRIAL FIBRILLATION, PERSISTENT: Primary | ICD-10-CM

## 2023-02-27 PROCEDURE — 99214 OFFICE O/P EST MOD 30 MIN: CPT | Performed by: NURSE PRACTITIONER

## 2023-02-27 PROCEDURE — 93000 ELECTROCARDIOGRAM COMPLETE: CPT | Performed by: NURSE PRACTITIONER

## 2023-02-27 RX ORDER — LEVOTHYROXINE SODIUM 0.07 MG/1
1 TABLET ORAL DAILY
COMMUNITY
Start: 2023-02-02

## 2023-02-27 RX ORDER — MONTELUKAST SODIUM 10 MG/1
10 TABLET ORAL DAILY
COMMUNITY
Start: 2023-01-15

## 2023-02-27 RX ORDER — LEVOTHYROXINE SODIUM 0.07 MG/1
75 TABLET ORAL DAILY
COMMUNITY
Start: 2023-02-02

## 2023-02-27 RX ORDER — FLUTICASONE FUROATE, UMECLIDINIUM BROMIDE AND VILANTEROL TRIFENATATE 100; 62.5; 25 UG/1; UG/1; UG/1
POWDER RESPIRATORY (INHALATION)
COMMUNITY
Start: 2023-02-14

## 2023-02-27 RX ORDER — OMEPRAZOLE 40 MG/1
1 CAPSULE, DELAYED RELEASE ORAL DAILY
COMMUNITY
Start: 2023-02-14

## 2023-02-27 RX ORDER — AMLODIPINE BESYLATE 5 MG/1
5 TABLET ORAL DAILY
COMMUNITY
Start: 2023-02-14

## 2023-02-27 NOTE — PROGRESS NOTES
Date of Office Visit: 2023  Encounter Provider: DEBBI Arthur  Place of Service: [facility] CARDIOLOGY  Patient Name: Marilu Avery  :1955    Chief Complaint   Patient presents with   • Atrial Fibrillation     Follow up   :     HPI: Marilu Avery is a 67 y.o. female who is a patient of Dr. Sandoval.  He is new to me today and presents for a 6-month office follow-up has history of persistent atrial fibrillation, obstructive sleep apnea, chronic degenerative joint disease.  A prior authorization for Eliquis distance was placed on her last office visit in 2022.  Patient had stopped taking Coumadin due to frequent blood work required and dietary restrictions.      Patient presents today with no new complaints.  She continues to smoke.  She is noncompliant with her CPAP, as she states that it broke about 6 weeks ago.  Patient is now taking Eliquis for anticoagulation.  She remains in atrial fibrillation with a controlled ventricular rate.  At this time, she is on amiodarone 200 mg daily and metoprolol tartrate 200 mg twice daily.  EKG shows prolonged QT interval of 631 (previously 422 ).  She notes that she was recently started back on her amlodipine 5 mg daily by her PCP.  Her blood pressure is 140/86 today in the office.  She does not check her blood pressure at home.  I have recommended that she start checking her blood pressure on a normal basis at home.  Patient states that her labs are obtained and followed by PCP.  Patient has bilateral lower extremity edema about a 2-3+ pitting.  She notes that she takes her morning dose of Lasix and usually does not take her evening dose because it makes her stay up all night.      Previous testing and notes have been reviewed by me.   Past Medical History:   Diagnosis Date   • Arthritis    • Asthma    • Atrial fibrillation (HCC)    • Colon polyps 2015    hyperplastic rectal polyp   • Hyperlipidemia    •  Hypertension    • Sleep apnea        Past Surgical History:   Procedure Laterality Date   • BACK SURGERY N/A 2001   • COLONOSCOPY N/A 10/22/2015    Rectal polyp-Dr. Elías Keating   • HYSTERECTOMY N/A 2000   • LAPAROSCOPIC CHOLECYSTECTOMY N/A 01/04/2010    Dr. Heath Whitlock   • OOPHORECTOMY  2001   • REPLACEMENT TOTAL KNEE Left 06/22/2015    Dr. Yo Aguayo   • REPLACEMENT TOTAL KNEE Right 02/26/2015    Dr. Yo Aguayo   • VENTRAL HERNIA REPAIR N/A 10/22/2015    Open reduction of incarcerated ventral hernia with layered closure-Dr. Elías Keating   • VENTRAL/INCISIONAL HERNIA REPAIR N/A 6/14/2021    Procedure: OPEN VENTRAL/INCISIONAL HERNIA REPAIR WITH MESH AND APPENDECTOMY;  Surgeon: Arnulfo Leonard MD;  Location: [facility];  Service: General;  Laterality: N/A;       Social History     Socioeconomic History   • Marital status:    Tobacco Use   • Smoking status: Every Day     Packs/day: 1.00     Types: Cigarettes   • Smokeless tobacco: Never   • Tobacco comments:     admits to smoking since age 56    Vaping Use   • Vaping Use: Never used   Substance and Sexual Activity   • Alcohol use: Yes     Comment: 3 drinks daily Daily caffine   • Drug use: Never   • Sexual activity: Defer       Family History   Problem Relation Age of Onset   • No Known Problems Mother    • No Known Problems Father        Review of Systems   Constitutional: Negative.   HENT: Negative.    Eyes: Negative.    Cardiovascular: Positive for dyspnea on exertion, irregular heartbeat and leg swelling.   Respiratory: Negative.    Endocrine: Negative.    Hematologic/Lymphatic:        Eliquis 5 mg twice daily   Skin: Negative.    Musculoskeletal: Negative.    Gastrointestinal: Negative.    Genitourinary: Negative.    Neurological: Negative.    Psychiatric/Behavioral: Negative.    Allergic/Immunologic: Negative.        No Known Allergies      Current Outpatient Medications:   •  albuterol sulfate  (90 Base) MCG/ACT inhaler, Inhale 2  puffs Every 4 (Four) Hours As Needed for Wheezing or Shortness of Air., Disp: , Rfl:   •  allopurinol (ZYLOPRIM) 100 MG tablet, Take 100 mg by mouth Daily., Disp: , Rfl:   •  amLODIPine (NORVASC) 5 MG tablet, Take 5 mg by mouth Daily., Disp: , Rfl:   •  apixaban (ELIQUIS) 5 MG tablet tablet, Take 1 tablet by mouth 2 (Two) Times a Day., Disp: 180 tablet, Rfl: 3  •  atorvastatin (LIPITOR) 80 MG tablet, Take 80 mg by mouth every night at bedtime., Disp: , Rfl:   •  B Complex Vitamins (vitamin b complex) capsule capsule, Take 2 capsules by mouth Daily., Disp: , Rfl:   •  Cholecalciferol (VITAMIN D PO), Take 1 tablet by mouth Daily., Disp: , Rfl:   •  famotidine (PEPCID) 20 MG tablet, Take 1 tablet by mouth 2 (Two) Times a Day Before Meals., Disp: , Rfl:   •  Fluticasone-Umeclidin-Vilant (Trelegy Ellipta) 100-62.5-25 MCG/ACT inhaler, INHALE 1 PUFF BY INHALATION ROUTE ONCE DAILY AT THE SAME TIME EACH DAY, Disp: , Rfl:   •  furosemide (Lasix) 40 MG tablet, Take 1 tablet by mouth 2 (two) times a day. Indications: Take 80 mg for the next 5 days, Disp: 120 tablet, Rfl: 3  •  levothyroxine (SYNTHROID, LEVOTHROID) 75 MCG tablet, Take 75 mcg by mouth Daily., Disp: , Rfl:   •  losartan (COZAAR) 100 MG tablet, Take 100 mg by mouth Daily., Disp: , Rfl:   •  metoprolol tartrate (LOPRESSOR) 100 MG tablet, Take 200 mg by mouth 2 (Two) Times a Day., Disp: , Rfl:   •  montelukast (SINGULAIR) 10 MG tablet, Take 10 mg by mouth Daily., Disp: , Rfl:   •  omeprazole (priLOSEC) 40 MG capsule, Take 1 capsule by mouth Daily., Disp: , Rfl:   •  polyethylene glycol (MIRALAX) 17 g packet, Take 17 g by mouth Daily., Disp: , Rfl:   •  tiotropium (Spiriva HandiHaler) 18 MCG per inhalation capsule, Place 1 capsule into inhaler and inhale Daily., Disp: , Rfl:   •  vitamin D3 125 MCG (5000 UT) capsule capsule, Take 2,000 Units by mouth Daily., Disp: , Rfl:   •  budesonide-formoterol (Symbicort) 160-4.5 MCG/ACT inhaler, Inhale 2 puffs 2 (Two) Times a  "Day., Disp: , Rfl: 12  •  levothyroxine (SYNTHROID, LEVOTHROID) 75 MCG tablet, Take 1 tablet by mouth Daily., Disp: , Rfl:       Objective:     Vitals:    02/27/23 1454   BP: 140/86   Pulse: 62   SpO2: 100%   Weight: 127 kg (279 lb)   Height: 177.8 cm (70\")     Body mass index is 40.03 kg/m².     Lexiscan stress test 9/17/2021:  • Stress only pharmacologic stress testing. Findings consistent with a normal ECG stress test.  • Left ventricular ejection fraction is normal. (Calculated EF = 60%).  • Myocardial perfusion imaging indicates a normal myocardial perfusion study with no evidence of ischemia.  • Impressions are consistent with a low risk study.  • There is no prior study available for comparison.     2D Echocardiogram 09/03/2021:  • Calculated left ventricular EF = 56.8% Estimated left ventricular EF was in agreement with the calculated left ventricular EF. Left ventricular systolic function is normal.  • Left ventricular diastolic function was indeterminate.  • Estimated right ventricular systolic pressure from tricuspid regurgitation is normal (<35 mmHg).    Cardioversion 08/26/2021:  • Post cardioversion the patient displayed atrial fibrillation.  • The cardioversion was unsuccessful.    2D Echocardiogram 12/23/2015:  LVEF 67%, grade 1 diastolic dysfunction  Normal RV size and systolic function  No valvular heart disease    PHYSICAL EXAM:    Constitutional:       Appearance: Not in distress. Chronically ill-appearing.   Neck:      Vascular: No JVR. JVD normal.   Pulmonary:      Effort: Pulmonary effort is normal.      Breath sounds: Normal breath sounds. No wheezing. No rhonchi. No rales.   Chest:      Chest wall: Not tender to palpatation.   Cardiovascular:      PMI at left midclavicular line. Normal rate. Irregularly irregular rhythm. Normal S1. Normal S2.      Murmurs: There is no murmur.      No gallop. No click. No rub.   Pulses:     Intact distal pulses.   Edema:     Thigh: bilateral pitting edema of " the thigh.     Pretibial: bilateral 3+ pitting edema of the pretibial area.     Ankle: bilateral 3+ pitting edema of the ankle.     Feet: bilateral 3+ pitting edema of the feet.  Abdominal:      General: Bowel sounds are normal.      Palpations: Abdomen is soft.      Tenderness: There is no abdominal tenderness.   Musculoskeletal: Normal range of motion.         General: No tenderness.      Comments: Walks with walker Skin:     General: Skin is warm and dry.   Neurological:      General: No focal deficit present.      Mental Status: Alert and oriented to person, place and time.           ECG 12 Lead    Date/Time: 2/27/2023 4:07 PM  Performed by: Lacy See APRN  Authorized by: Lacy See APRN   Comparison: compared with previous ECG from 6/1/2022  Similar to previous ECG  Rhythm: atrial fibrillation  BPM: 62  ST Segments: ST segments normal  QRS axis: normal  Other findings: prolonged QTc interval  Other findings comments: 631                Assessment:       Diagnosis Plan   1. Atrial fibrillation, persistent (HCC)  ECG 12 Lead      2. Mixed hyperlipidemia  ECG 12 Lead      3. Primary hypertension  ECG 12 Lead      4. Chronic anticoagulation  ECG 12 Lead      5. Morbid obesity with BMI of 40.0-44.9, adult (HCC)  ECG 12 Lead      6. Pulmonary emphysema, unspecified emphysema type (HCC)  ECG 12 Lead      7. LISA (obstructive sleep apnea)  ECG 12 Lead      8. Tobacco abuse  ECG 12 Lead      9. Prolonged Q-T interval on ECG          Orders Placed This Encounter   Procedures   • ECG 12 Lead     This order was created via procedure documentation     Order Specific Question:   Release to patient     Answer:   Routine Release          Plan:       1.  Persistent atrial fibrillation: We will stop amiodarone at this time.  Continue metoprolol tartrate 200 mg twice daily.  Continue anticoagulation with Eliquis 5 mg twice daily  2.  Exertional shortness of breath: Multifactorial between COPD and body  habitus  3.  Obesity  4.  Obstructive sleep apnea: Noncompliant with CPAP.  Patient states that her CPAP broke about 6 weeks ago and she is scheduled for a sleep study on 3/13  5.  Bilateral lower extremity edema: Noncompliant with Lasix.  Patient takes only daily and she is prescribed twice daily.  6.  Daily alcohol use  7.  Tobacco abuse: She has smoked since she was in her early teens.  Patient states that she is down to 4 cigarettes a day.  8.  Hypertension:  On losartan and metoprolol tartrate.  Patient notes that she was recently started back up on amlodipine 5 mg daily about 1 to 2 weeks ago by her PCP  9.  Prolonged QT interval: Amiodarone stopped.  QTc 422-> 631    Ms. Bennie will follow-up with Dr. Sandoval in 6 months.  She will call sooner for any questions or concerns.          Your medication list          Accurate as of February 27, 2023  4:10 PM. If you have any questions, ask your nurse or doctor.            CONTINUE taking these medications      Instructions Last Dose Given Next Dose Due   albuterol sulfate  (90 Base) MCG/ACT inhaler  Commonly known as: PROVENTIL HFA;VENTOLIN HFA;PROAIR HFA      Inhale 2 puffs Every 4 (Four) Hours As Needed for Wheezing or Shortness of Air.       allopurinol 100 MG tablet  Commonly known as: ZYLOPRIM      Take 100 mg by mouth Daily.       amLODIPine 5 MG tablet  Commonly known as: NORVASC      Take 5 mg by mouth Daily.       apixaban 5 MG tablet tablet  Commonly known as: ELIQUIS      Take 1 tablet by mouth 2 (Two) Times a Day.       atorvastatin 80 MG tablet  Commonly known as: LIPITOR      Take 80 mg by mouth every night at bedtime.       budesonide-formoterol 160-4.5 MCG/ACT inhaler  Commonly known as: Symbicort      Inhale 2 puffs 2 (Two) Times a Day.       famotidine 20 MG tablet  Commonly known as: PEPCID      Take 1 tablet by mouth 2 (Two) Times a Day Before Meals.       furosemide 40 MG tablet  Commonly known as: Lasix      Take 1 tablet by mouth 2  (two) times a day. Indications: Take 80 mg for the next 5 days       levothyroxine 75 MCG tablet  Commonly known as: SYNTHROID, LEVOTHROID      Take 75 mcg by mouth Daily.       levothyroxine 75 MCG tablet  Commonly known as: SYNTHROID, LEVOTHROID      Take 1 tablet by mouth Daily.       losartan 100 MG tablet  Commonly known as: COZAAR      Take 100 mg by mouth Daily.       metoprolol tartrate 100 MG tablet  Commonly known as: LOPRESSOR      Take 200 mg by mouth 2 (Two) Times a Day.       montelukast 10 MG tablet  Commonly known as: SINGULAIR      Take 10 mg by mouth Daily.       omeprazole 40 MG capsule  Commonly known as: priLOSEC      Take 1 capsule by mouth Daily.       polyethylene glycol 17 g packet  Commonly known as: MIRALAX      Take 17 g by mouth Daily.       tiotropium 18 MCG per inhalation capsule  Commonly known as: Spiriva HandiHaler      Place 1 capsule into inhaler and inhale Daily.       Trelegy Ellipta 100-62.5-25 MCG/ACT inhaler  Generic drug: Fluticasone-Umeclidin-Vilant      INHALE 1 PUFF BY INHALATION ROUTE ONCE DAILY AT THE SAME TIME EACH DAY       vitamin b complex capsule capsule      Take 2 capsules by mouth Daily.       VITAMIN D PO      Take 1 tablet by mouth Daily.       vitamin D3 125 MCG (5000 UT) capsule capsule      Take 2,000 Units by mouth Daily.          STOP taking these medications    amiodarone 200 MG tablet  Commonly known as: PACERONE  Stopped by: DEBBI Arthur                 As always, it has been a pleasure to participate in your patient's care.      Sincerely,       DEBBI Albert

## 2023-08-17 ENCOUNTER — TRANSCRIBE ORDERS (OUTPATIENT)
Dept: HOME HEALTH SERVICES | Facility: HOME HEALTHCARE | Age: 68
End: 2023-08-17
Payer: MEDICARE

## 2023-08-17 ENCOUNTER — HOME HEALTH ADMISSION (OUTPATIENT)
Dept: HOME HEALTH SERVICES | Facility: HOME HEALTHCARE | Age: 68
End: 2023-08-17
Payer: MEDICARE

## 2023-08-17 DIAGNOSIS — M75.42 IMPINGEMENT SYNDROME OF LEFT SHOULDER: ICD-10-CM

## 2023-08-17 DIAGNOSIS — M75.41 IMPINGEMENT SYNDROME OF RIGHT SHOULDER: Primary | ICD-10-CM

## 2023-08-21 ENCOUNTER — HOME CARE VISIT (OUTPATIENT)
Dept: HOME HEALTH SERVICES | Facility: HOME HEALTHCARE | Age: 68
End: 2023-08-21
Payer: MEDICARE

## 2023-08-21 VITALS
SYSTOLIC BLOOD PRESSURE: 140 MMHG | OXYGEN SATURATION: 99 % | TEMPERATURE: 96.8 F | HEART RATE: 73 BPM | RESPIRATION RATE: 18 BRPM | DIASTOLIC BLOOD PRESSURE: 100 MMHG

## 2023-08-21 PROCEDURE — G0151 HHCP-SERV OF PT,EA 15 MIN: HCPCS

## 2023-08-21 NOTE — HOME HEALTH
Marilu Avery is homebound due to difficutly with walking, transfers and ADLs due to weakness, unsteadiness and pain. Physical therapy to focus on bilateral shoulder impingement and decrease mobility that causes client to be homebound.

## 2023-08-21 NOTE — HOME HEALTH
Routine Visit Note:    Skill/education provided: Ther ex, gait/transfer training, fall risk/prevention education, medication education    Patient/caregiver response: Well repeats instructions back to therapist    Plan for next visit: Ther ex, gait/transfer training    Other pertinent info: None

## 2023-08-25 ENCOUNTER — HOME CARE VISIT (OUTPATIENT)
Dept: HOME HEALTH SERVICES | Facility: HOME HEALTHCARE | Age: 68
End: 2023-08-25
Payer: MEDICARE

## 2023-08-25 PROCEDURE — G0151 HHCP-SERV OF PT,EA 15 MIN: HCPCS

## 2023-08-28 VITALS
RESPIRATION RATE: 18 BRPM | HEART RATE: 64 BPM | DIASTOLIC BLOOD PRESSURE: 80 MMHG | OXYGEN SATURATION: 98 % | SYSTOLIC BLOOD PRESSURE: 128 MMHG | TEMPERATURE: 97.6 F

## 2023-08-28 NOTE — HOME HEALTH
Routine Visit Note:    Skill/education provided: Ther ex, gait/transfer training    Patient/caregiver response: well, repeats instructions back to therapist    Plan for next visit: Improve ambulation outside of apartment, ther ex    Other pertinent info: none

## 2023-08-29 ENCOUNTER — HOME CARE VISIT (OUTPATIENT)
Dept: HOME HEALTH SERVICES | Facility: HOME HEALTHCARE | Age: 68
End: 2023-08-29
Payer: MEDICARE

## 2023-08-29 VITALS
TEMPERATURE: 96.2 F | OXYGEN SATURATION: 99 % | SYSTOLIC BLOOD PRESSURE: 133 MMHG | RESPIRATION RATE: 17 BRPM | DIASTOLIC BLOOD PRESSURE: 70 MMHG | HEART RATE: 66 BPM

## 2023-08-29 PROCEDURE — G0151 HHCP-SERV OF PT,EA 15 MIN: HCPCS

## 2023-08-29 NOTE — HOME HEALTH
Routine Visit Note:    Skill/education provided: Ther ex, gait/transfer training    Patient/caregiver response: Well, repeats instructions back to therapist    Plan for next visit: Improve independence with transfers, gait and HEP    Other pertinent info: None

## 2023-08-30 ENCOUNTER — HOME CARE VISIT (OUTPATIENT)
Dept: HOME HEALTH SERVICES | Facility: HOME HEALTHCARE | Age: 68
End: 2023-08-30
Payer: MEDICARE

## 2023-08-30 VITALS
OXYGEN SATURATION: 96 % | HEART RATE: 72 BPM | TEMPERATURE: 97.5 F | RESPIRATION RATE: 18 BRPM | DIASTOLIC BLOOD PRESSURE: 78 MMHG | SYSTOLIC BLOOD PRESSURE: 134 MMHG

## 2023-08-30 PROCEDURE — G0152 HHCP-SERV OF OT,EA 15 MIN: HCPCS

## 2023-08-30 NOTE — HOME HEALTH
Routine Visit Note:    Skill/education provided: OT educated patient on benefits of AE for bathing including HH shower head and shower seat in order to reduce risk of falls.    Patient/caregiver response: Pt agreeable to look into purchase.    Plan for next visit: self care, IADLs, home safety.    Other pertinent info: n/a

## 2023-08-30 NOTE — Clinical Note
Dear ,   OT rima performed this date, 8/30/23, with OT requesting 1 visit x 3 weeks addressing toileting I, bathing I, and IADLs including laundry/meal prep for increased home safety secondary to bilateral shoulder impingement with decline in function.   Thank you for the wonderful referral!  Coby Cuba OTR/LATRICIA

## 2023-09-01 ENCOUNTER — HOME CARE VISIT (OUTPATIENT)
Dept: HOME HEALTH SERVICES | Facility: HOME HEALTHCARE | Age: 68
End: 2023-09-01
Payer: MEDICARE

## 2023-09-01 PROCEDURE — G0151 HHCP-SERV OF PT,EA 15 MIN: HCPCS

## 2023-09-05 ENCOUNTER — HOME CARE VISIT (OUTPATIENT)
Dept: HOME HEALTH SERVICES | Facility: HOME HEALTHCARE | Age: 68
End: 2023-09-05
Payer: MEDICARE

## 2023-09-05 VITALS
DIASTOLIC BLOOD PRESSURE: 90 MMHG | OXYGEN SATURATION: 98 % | HEART RATE: 80 BPM | SYSTOLIC BLOOD PRESSURE: 132 MMHG | TEMPERATURE: 97.5 F

## 2023-09-05 VITALS
OXYGEN SATURATION: 98 % | TEMPERATURE: 96.5 F | RESPIRATION RATE: 19 BRPM | SYSTOLIC BLOOD PRESSURE: 112 MMHG | HEART RATE: 72 BPM | DIASTOLIC BLOOD PRESSURE: 70 MMHG

## 2023-09-05 PROCEDURE — G0151 HHCP-SERV OF PT,EA 15 MIN: HCPCS

## 2023-09-05 NOTE — HOME HEALTH
Routine Visit Note:    Skill/education provided: Ther ex, gait/transfer training    Patient/caregiver response: Well, repeats instructions back to therapist.    Plan for next visit: Try to walk to car and perform transfer    Other pertinent info: None

## 2023-09-05 NOTE — HOME HEALTH
"Patient states \"doing the exercises\" and \"walking in the [place]\" as prescribed.  Overall, she seemed to be pleased with the therapeutic intervention and feels she is making some progress.      Plan for next visit  Ambulate to the car and do a car transfer.   Amend HEP as needed."

## 2023-09-07 ENCOUNTER — HOME CARE VISIT (OUTPATIENT)
Dept: HOME HEALTH SERVICES | Facility: HOME HEALTHCARE | Age: 68
End: 2023-09-07
Payer: MEDICARE

## 2023-09-07 VITALS
TEMPERATURE: 97.3 F | SYSTOLIC BLOOD PRESSURE: 126 MMHG | DIASTOLIC BLOOD PRESSURE: 76 MMHG | OXYGEN SATURATION: 96 % | HEART RATE: 78 BPM

## 2023-09-07 PROCEDURE — G0152 HHCP-SERV OF OT,EA 15 MIN: HCPCS

## 2023-09-08 ENCOUNTER — HOME CARE VISIT (OUTPATIENT)
Dept: HOME HEALTH SERVICES | Facility: HOME HEALTHCARE | Age: 68
End: 2023-09-08
Payer: MEDICARE

## 2023-09-08 PROCEDURE — G0151 HHCP-SERV OF PT,EA 15 MIN: HCPCS

## 2023-09-08 NOTE — HOME HEALTH
Routine Visit Note:    Skill/education provided: OT educated patient on best t/f method and BUE placement to increase safety, I, and confidence with task.    Patient/caregiver response: Pt demo 100% carryover with edu but wanting to implement next visit with OT present.    Plan for next visit: shower, home safety    Other pertinent info: N/A

## 2023-09-10 VITALS
OXYGEN SATURATION: 95 % | SYSTOLIC BLOOD PRESSURE: 120 MMHG | HEART RATE: 69 BPM | DIASTOLIC BLOOD PRESSURE: 68 MMHG | TEMPERATURE: 96.9 F

## 2023-09-11 ENCOUNTER — HOME CARE VISIT (OUTPATIENT)
Dept: HOME HEALTH SERVICES | Facility: HOME HEALTHCARE | Age: 68
End: 2023-09-11
Payer: MEDICARE

## 2023-09-11 VITALS
OXYGEN SATURATION: 96 % | HEART RATE: 73 BPM | SYSTOLIC BLOOD PRESSURE: 128 MMHG | TEMPERATURE: 97.1 F | RESPIRATION RATE: 16 BRPM | DIASTOLIC BLOOD PRESSURE: 74 MMHG

## 2023-09-11 PROCEDURE — G0152 HHCP-SERV OF OT,EA 15 MIN: HCPCS

## 2023-09-11 NOTE — HOME HEALTH
Routine Visit Note:    Skill/education provided: OT educated patient on techniques to get in/out of bed and recommended AE in order to increase ease of task and reduce fall risk.    Patient/caregiver response: Pt purchasing AE in order to apply strategies recommended.    Plan for next visit: ADLs, home safety, IADLs    Other pertinent info: Pt BLE 2+ pitting edema. Encouraged pt to reach out to physician who makes at home visits, number provided by PT per pt report, in order to look at adjustments to medications.

## 2023-09-11 NOTE — HOME HEALTH
Routine Visit Note:    Skill/education provided: Ther ex, gait training    Patient/caregiver response: Well, repeats instructions back to therapist    Plan for next visit: Attempt to ambulate to car    Other pertinent info: None

## 2023-09-12 ENCOUNTER — HOME CARE VISIT (OUTPATIENT)
Dept: HOME HEALTH SERVICES | Facility: HOME HEALTHCARE | Age: 68
End: 2023-09-12
Payer: MEDICARE

## 2023-09-12 VITALS
DIASTOLIC BLOOD PRESSURE: 95 MMHG | TEMPERATURE: 96.3 F | SYSTOLIC BLOOD PRESSURE: 138 MMHG | RESPIRATION RATE: 20 BRPM | OXYGEN SATURATION: 99 % | HEART RATE: 57 BPM

## 2023-09-12 PROCEDURE — G0151 HHCP-SERV OF PT,EA 15 MIN: HCPCS

## 2023-09-18 ENCOUNTER — HOME CARE VISIT (OUTPATIENT)
Dept: HOME HEALTH SERVICES | Facility: HOME HEALTHCARE | Age: 68
End: 2023-09-18
Payer: MEDICARE

## 2023-09-18 PROCEDURE — G0151 HHCP-SERV OF PT,EA 15 MIN: HCPCS

## 2023-09-20 ENCOUNTER — HOME CARE VISIT (OUTPATIENT)
Dept: HOME HEALTH SERVICES | Facility: HOME HEALTHCARE | Age: 68
End: 2023-09-20
Payer: MEDICARE

## 2023-09-20 VITALS
HEART RATE: 78 BPM | SYSTOLIC BLOOD PRESSURE: 128 MMHG | OXYGEN SATURATION: 97 % | TEMPERATURE: 97.4 F | RESPIRATION RATE: 18 BRPM | DIASTOLIC BLOOD PRESSURE: 56 MMHG

## 2023-09-20 VITALS
HEART RATE: 84 BPM | DIASTOLIC BLOOD PRESSURE: 74 MMHG | OXYGEN SATURATION: 98 % | TEMPERATURE: 97.3 F | RESPIRATION RATE: 16 BRPM | SYSTOLIC BLOOD PRESSURE: 146 MMHG

## 2023-09-20 PROCEDURE — G0152 HHCP-SERV OF OT,EA 15 MIN: HCPCS

## 2023-09-20 NOTE — Clinical Note
Dear Dr. Owen,   OT reassessment performed this date, 9/20/23, with OT requesting 2 visits x 3 weeks addressing BUE ROM/strengthening, home safety, and ADL I secondary to B shoulde impingement as well as increase swelling in BLE with decline in function.   Thank you,  Coby Cuba OTR/L

## 2023-09-22 ENCOUNTER — HOME CARE VISIT (OUTPATIENT)
Dept: HOME HEALTH SERVICES | Facility: HOME HEALTHCARE | Age: 68
End: 2023-09-22

## 2023-09-25 ENCOUNTER — HOME CARE VISIT (OUTPATIENT)
Dept: HOME HEALTH SERVICES | Facility: HOME HEALTHCARE | Age: 68
End: 2023-09-25

## 2023-09-25 VITALS
DIASTOLIC BLOOD PRESSURE: 76 MMHG | SYSTOLIC BLOOD PRESSURE: 128 MMHG | HEART RATE: 74 BPM | TEMPERATURE: 97.1 F | OXYGEN SATURATION: 98 % | RESPIRATION RATE: 18 BRPM

## 2023-09-25 PROCEDURE — G0152 HHCP-SERV OF OT,EA 15 MIN: HCPCS

## 2023-09-25 NOTE — HOME HEALTH
Patient said her BLE are increasingly swollen and hurting with diagnosis of cellulitis. OT educated on increasing time supine during the day to assist with swelling. Pt agreeable to modification.   Plan for next visit: ADLS, IADLS  MEDICAL NECESSITY FOR ONGOING SKILLED THERAPY: Patient requires skilled occupational therapy for remediation of deficits to improve activities of daily living, home safety, falls prevention, monitor vital signs, including pulse oximetry, hand/ upper extremity function/range of motion/strength, therapeutic exercise, safety in home, and improve endurance and fatigue management with self care, and functional mobility with durable medical equipment as necessary

## 2023-09-26 ENCOUNTER — HOME CARE VISIT (OUTPATIENT)
Dept: HOME HEALTH SERVICES | Facility: HOME HEALTHCARE | Age: 68
End: 2023-09-26
Payer: MEDICARE

## 2023-09-26 PROCEDURE — G0151 HHCP-SERV OF PT,EA 15 MIN: HCPCS

## 2023-09-28 ENCOUNTER — HOME CARE VISIT (OUTPATIENT)
Dept: HOME HEALTH SERVICES | Facility: HOME HEALTHCARE | Age: 68
End: 2023-09-28
Payer: MEDICARE

## 2023-09-28 VITALS
HEART RATE: 53 BPM | SYSTOLIC BLOOD PRESSURE: 136 MMHG | RESPIRATION RATE: 18 BRPM | DIASTOLIC BLOOD PRESSURE: 80 MMHG | TEMPERATURE: 97.7 F | OXYGEN SATURATION: 99 %

## 2023-09-28 PROCEDURE — G0152 HHCP-SERV OF OT,EA 15 MIN: HCPCS

## 2023-09-28 NOTE — HOME HEALTH
Routine Visit Note:    Skill/education provided: Ther ex, gait training    Patient/caregiver response: Well, repeats instructions back to therapist    Plan for next visit: Attempt car transfer    Other pertinent info: None

## 2023-09-28 NOTE — HOME HEALTH
Patient said she has increased R shoulder/bicep pain with horizontal abduction and flexion. Pt advised to get xray/MRI if possible. Pt not receptive at this time.  Plan for next visit: IADLs  MEDICAL NECESSITY FOR ONGOING SKILLED THERAPY: Patient requires skilled occupational therapy for remediation of deficits to improve activities of daily living, home safety, falls prevention, monitor vital signs, including pulse oximetry, hand/ upper extremity function/range of motion/strength, therapeutic exercise, safety in home, and improve endurance and fatigue management with self care, and functional mobility with durable medical equipment as necessary

## 2023-09-29 ENCOUNTER — HOME CARE VISIT (OUTPATIENT)
Dept: HOME HEALTH SERVICES | Facility: HOME HEALTHCARE | Age: 68
End: 2023-09-29
Payer: MEDICARE

## 2023-09-29 PROCEDURE — G0151 HHCP-SERV OF PT,EA 15 MIN: HCPCS

## 2023-10-01 VITALS
SYSTOLIC BLOOD PRESSURE: 128 MMHG | HEART RATE: 78 BPM | DIASTOLIC BLOOD PRESSURE: 78 MMHG | OXYGEN SATURATION: 96 % | RESPIRATION RATE: 18 BRPM | TEMPERATURE: 97.2 F

## 2023-10-02 ENCOUNTER — HOME CARE VISIT (OUTPATIENT)
Dept: HOME HEALTH SERVICES | Facility: HOME HEALTHCARE | Age: 68
End: 2023-10-02
Payer: MEDICARE

## 2023-10-02 VITALS
TEMPERATURE: 97.3 F | SYSTOLIC BLOOD PRESSURE: 134 MMHG | HEART RATE: 77 BPM | RESPIRATION RATE: 18 BRPM | OXYGEN SATURATION: 98 % | DIASTOLIC BLOOD PRESSURE: 74 MMHG

## 2023-10-02 VITALS
OXYGEN SATURATION: 97 % | SYSTOLIC BLOOD PRESSURE: 132 MMHG | TEMPERATURE: 97.6 F | RESPIRATION RATE: 18 BRPM | HEART RATE: 68 BPM | DIASTOLIC BLOOD PRESSURE: 76 MMHG

## 2023-10-02 PROCEDURE — G0152 HHCP-SERV OF OT,EA 15 MIN: HCPCS

## 2023-10-02 NOTE — HOME HEALTH
Routine Visit Note:    Skill/education provided: Ther ex, education assist needed to leave home, benefits of power chair and lift chair, possible tear in rotator cuff and may have plateau with therapy and benefits of outpatient therapy.    Patient/caregiver response: She was somewhat receptive but had to emotionally handle.    Plan for next visit: Prepare for DC and work on truck transfer if she is able to.    Other pertinent info: None

## 2023-10-02 NOTE — HOME HEALTH
Patient said her BLE were weaping again and she was worried since her antibiotic for her cellulitis was over after tomorrow 10/3. OT encouraged pt to reach out to MD who prescribed the medication to follow up. OT assisted pt with attempted call this date but were unsuccessful.  Plan for next visit: IADls  MEDICAL NECESSITY FOR ONGOING SKILLED THERAPY: Patient requires skilled occupational therapy for remediation of deficits to improve activities of daily living, home safety, falls prevention, monitor vital signs, including pulse oximetry, hand/ upper extremity function/range of motion/strength, therapeutic exercise, safety in home, and improve endurance and fatigue management with self care, and functional mobility with durable medical equipment as necessary

## 2023-10-03 ENCOUNTER — HOME CARE VISIT (OUTPATIENT)
Dept: HOME HEALTH SERVICES | Facility: HOME HEALTHCARE | Age: 68
End: 2023-10-03
Payer: MEDICARE

## 2023-10-03 PROCEDURE — G0151 HHCP-SERV OF PT,EA 15 MIN: HCPCS

## 2023-10-04 ENCOUNTER — HOME CARE VISIT (OUTPATIENT)
Dept: HOME HEALTH SERVICES | Facility: HOME HEALTHCARE | Age: 68
End: 2023-10-04
Payer: MEDICARE

## 2023-10-04 VITALS
OXYGEN SATURATION: 96 % | RESPIRATION RATE: 18 BRPM | DIASTOLIC BLOOD PRESSURE: 74 MMHG | TEMPERATURE: 97.8 F | HEART RATE: 81 BPM | SYSTOLIC BLOOD PRESSURE: 134 MMHG

## 2023-10-04 VITALS
SYSTOLIC BLOOD PRESSURE: 122 MMHG | HEART RATE: 68 BPM | OXYGEN SATURATION: 98 % | DIASTOLIC BLOOD PRESSURE: 55 MMHG | RESPIRATION RATE: 17 BRPM | TEMPERATURE: 97.4 F

## 2023-10-04 PROCEDURE — G0152 HHCP-SERV OF OT,EA 15 MIN: HCPCS

## 2023-10-04 NOTE — HOME HEALTH
Patient said she took a shower by herself for the first time in months this date and was excited about her progress with I and safety with task through edu and AE.  Plan for next visit: IADLs, BUE strengthening  MEDICAL NECESSITY FOR ONGOING SKILLED THERAPY: Patient requires skilled occupational therapy for remediation of deficits to improve activities of daily living, home safety, falls prevention, monitor vital signs, including pulse oximetry, hand/ upper extremity function/range of motion/strength, therapeutic exercise, safety in home, and improve endurance and fatigue management with self care, and functional mobility with durable medical equipment as necessary

## 2023-10-04 NOTE — HOME HEALTH
Routine Visit Note:    Skill/education provided: Ther ex, gait training    Patient/caregiver response: Well, repeats instructions back to therapist.    Plan for next visit: DC client, address unmet goals    Other pertinent info: None

## 2023-10-09 ENCOUNTER — HOME CARE VISIT (OUTPATIENT)
Dept: HOME HEALTH SERVICES | Facility: HOME HEALTHCARE | Age: 68
End: 2023-10-09
Payer: MEDICARE

## 2023-10-09 VITALS — TEMPERATURE: 96.9 F

## 2023-10-09 PROCEDURE — G0152 HHCP-SERV OF OT,EA 15 MIN: HCPCS

## 2023-10-10 ENCOUNTER — HOME CARE VISIT (OUTPATIENT)
Dept: HOME HEALTH SERVICES | Facility: HOME HEALTHCARE | Age: 68
End: 2023-10-10
Payer: MEDICARE

## 2023-10-10 PROCEDURE — G0151 HHCP-SERV OF PT,EA 15 MIN: HCPCS

## 2023-10-10 NOTE — HOME HEALTH
Patient said she has had some rectal bleeding and has had increased fatigue. Pt believes she has hemmorrhoids as she has had them before. OT educated pt to try hemmorrhoid cream or to reach out to PCP if bleeding does not resolve in a few days or progresses.  Plan for next visit: IADLs, home safety, BUE AROM  MEDICAL NECESSITY FOR ONGOING SKILLED THERAPY: Patient requires skilled occupational therapy for remediation of deficits to improve activities of daily living, home safety, falls prevention, monitor vital signs, including pulse oximetry, hand/ upper extremity function/range of motion/strength, therapeutic exercise, safety in home, and improve endurance and fatigue management with self care, and functional mobility with durable medical equipment as necessary

## 2023-10-11 ENCOUNTER — HOME CARE VISIT (OUTPATIENT)
Dept: HOME HEALTH SERVICES | Facility: HOME HEALTHCARE | Age: 68
End: 2023-10-11
Payer: MEDICARE

## 2023-10-12 ENCOUNTER — HOME CARE VISIT (OUTPATIENT)
Dept: HOME HEALTH SERVICES | Facility: HOME HEALTHCARE | Age: 68
End: 2023-10-12
Payer: MEDICARE

## 2023-10-12 VITALS
TEMPERATURE: 97.2 F | DIASTOLIC BLOOD PRESSURE: 60 MMHG | RESPIRATION RATE: 18 BRPM | HEART RATE: 78 BPM | OXYGEN SATURATION: 99 % | SYSTOLIC BLOOD PRESSURE: 130 MMHG

## 2023-10-12 VITALS
OXYGEN SATURATION: 96 % | SYSTOLIC BLOOD PRESSURE: 134 MMHG | HEART RATE: 77 BPM | TEMPERATURE: 97.7 F | DIASTOLIC BLOOD PRESSURE: 78 MMHG | RESPIRATION RATE: 18 BRPM

## 2023-10-12 PROCEDURE — G0152 HHCP-SERV OF OT,EA 15 MIN: HCPCS

## 2023-10-12 NOTE — Clinical Note
Dear Dr. Owen,   OT rima performed this date, 10/12/23, with OT requesting 1 visits x 1 week addressing home safety, IADLs, and UE ROM secondary to bilateral shoulder impingment with decline in function.   Thank you,  Coby Cuba OTR/LATRICIA

## 2023-10-13 NOTE — HOME HEALTH
Patient said her hemmorrhoids have resolved since pt was able to locate some cream per OT recommendations.  Plan for next visit: BUE ROM/exercises and IADLs.  MEDICAL NECESSITY FOR ONGOING SKILLED THERAPY: Patient requires skilled occupational therapy for remediation of deficits to improve activities of daily living, home safety, falls prevention, monitor vital signs, including pulse oximetry, hand/ upper extremity function/range of motion/strength, therapeutic exercise, safety in home, and improve endurance and fatigue management with self care, and functional mobility with durable medical equipment as necessary

## 2023-10-16 ENCOUNTER — HOME CARE VISIT (OUTPATIENT)
Dept: HOME HEALTH SERVICES | Facility: HOME HEALTHCARE | Age: 68
End: 2023-10-16
Payer: MEDICARE

## 2023-10-16 VITALS
SYSTOLIC BLOOD PRESSURE: 136 MMHG | RESPIRATION RATE: 18 BRPM | HEART RATE: 58 BPM | DIASTOLIC BLOOD PRESSURE: 82 MMHG | OXYGEN SATURATION: 94 % | TEMPERATURE: 97.7 F

## 2023-10-16 PROCEDURE — G0152 HHCP-SERV OF OT,EA 15 MIN: HCPCS

## 2023-10-16 NOTE — Clinical Note
Dear Dr. Jimenez,   OT recertification performed this date, 10/16/23, with OT requesting 1w1 and 1w2 addressing lift chair I and BUE ROM/strengthening HEP for increased and maintained home safety secondary to B shoulder impingement with decline in function.   Thank you,  Coby Cuba OTR/L

## 2023-10-18 NOTE — HOME HEALTH
Routine Visit Note:    Skill/education provided: OT educated pt on proper s/u of living room prior to installation of lift chair in order to maximize patient safety and maintain clear pathways throughout home.    Patient/caregiver response: Pt agreeable, receptive to edu, and allowed OT to modify environment.    Plan for next visit: lift chair edu, BUE rom/strength.    Other pertinent info: N/A

## 2023-10-23 ENCOUNTER — HOME CARE VISIT (OUTPATIENT)
Dept: HOME HEALTH SERVICES | Facility: HOME HEALTHCARE | Age: 68
End: 2023-10-23
Payer: MEDICARE

## 2023-10-23 VITALS — OXYGEN SATURATION: 97 % | HEART RATE: 81 BPM | TEMPERATURE: 97.5 F

## 2023-10-23 PROCEDURE — G0152 HHCP-SERV OF OT,EA 15 MIN: HCPCS

## 2023-10-24 NOTE — HOME HEALTH
Routine Visit Note:    Skill/education provided: OT educated patient on AROM/AAROM exercises for B shoulder to progress in B shoulder flexion.    Patient/caregiver response: Patient able to return demo with 100% accuracy.    Plan for next visit: JUAN CARLOS HEP, home safety    Other pertinent info: N/A

## 2023-10-26 ENCOUNTER — HOME CARE VISIT (OUTPATIENT)
Dept: HOME HEALTH SERVICES | Facility: HOME HEALTHCARE | Age: 68
End: 2023-10-26
Payer: MEDICARE

## 2023-10-26 VITALS
DIASTOLIC BLOOD PRESSURE: 68 MMHG | SYSTOLIC BLOOD PRESSURE: 130 MMHG | HEART RATE: 89 BPM | OXYGEN SATURATION: 97 % | TEMPERATURE: 97.5 F

## 2023-10-26 PROCEDURE — G0152 HHCP-SERV OF OT,EA 15 MIN: HCPCS

## 2023-10-26 NOTE — HOME HEALTH
Routine Visit Note:    Skill/education provided: OT educated and progressed patient with BUE HEP in order to maximize patient maintain functional use of BUE for ADL routine.    Patient/caregiver response: Patient able to return demo with 100% I and reports she will complete daily.     Plan for next visit: home safety, BUE HEP    Other pertinent info: N/A

## 2023-10-30 ENCOUNTER — HOME CARE VISIT (OUTPATIENT)
Dept: HOME HEALTH SERVICES | Facility: HOME HEALTHCARE | Age: 68
End: 2023-10-30
Payer: MEDICARE

## 2023-10-30 VITALS
TEMPERATURE: 97.5 F | OXYGEN SATURATION: 98 % | HEART RATE: 60 BPM | DIASTOLIC BLOOD PRESSURE: 76 MMHG | SYSTOLIC BLOOD PRESSURE: 134 MMHG

## 2023-10-30 PROCEDURE — G0152 HHCP-SERV OF OT,EA 15 MIN: HCPCS

## 2023-11-01 ENCOUNTER — HOME CARE VISIT (OUTPATIENT)
Dept: HOME HEALTH SERVICES | Facility: HOME HEALTHCARE | Age: 68
End: 2023-11-01
Payer: MEDICARE

## 2023-11-01 VITALS
SYSTOLIC BLOOD PRESSURE: 134 MMHG | HEART RATE: 75 BPM | DIASTOLIC BLOOD PRESSURE: 82 MMHG | OXYGEN SATURATION: 98 % | TEMPERATURE: 97.3 F

## 2023-11-01 PROCEDURE — G0152 HHCP-SERV OF OT,EA 15 MIN: HCPCS

## 2023-11-01 NOTE — HOME HEALTH
Routine Visit Note:    Skill/education provided: OT educated patient on various options for public transportation assistance with phone numbers as a resource included and edu on potential insurance options to potentially assist with costs.    Patient/caregiver response: Pt receptive to edu, but despite OT best efforts, pt presented overwhelmed.    Plan for next visit: N/A    Other pertinent info: N/A

## 2024-12-10 NOTE — TELEPHONE ENCOUNTER
"Chief Complaint   Follow-up (Taking colestipol 2 times a day and it does help. Tried cutting back to 1 tablet a day and diarrhea started to come back so she has increased back to 1 tablet twice a day. )    History of Present Illness       Jana I Schoenbaechler is a 70 y.o. female who presents to [facility] for follow-up with a history of lymphocytic colitis and IBS diarrhea.  Patient continues colestipol 1 tablet in the morning and 1 at night with good control of her diarrhea.  Patient has previously used Imodium.  Patient continues to Protonix with good control of her reflux and Levsin as needed for abdominal cramps and spasms.  Overall patient is doing really well.  Patient denies fever, nausea, vomiting, weight loss, night sweats, melena, hematochezia, hematemesis.     Endoscopy: Review of the patient's most recent EGD performed by Dr. Maurer on 3/12/24 duque's repeat 3 years      Colonoscopy: Review of the patient's most recent colonoscopy performed by Dr. Maurer on 03.16.2023 normal colonoscopy lymphocytic colitis     EGD: Review of the patient's most recent EGD performed by Dr. Maurer on 03.04.2021 Small hiatal hernia, benign intrinsic stricture dilated to 18 mm, salmon-colored short segment Duque's esophagus.  Normal stomach and duodenum.  Repeat EGD in 3 years for surveillance of Duque's esophagus.  Reflux esophagitis noted on biopsy.    Results       Result Review :   The following data was reviewed by: KEILA Méndez on 12/13/2024:          Iron Profile No results found for: \"IRON\", \"TIBC\", \"LABIRON\", \"TRANSFERRIN\"  Ferritin No results found for: \"FERRITIN\"            Past Medical History       Past Medical History:   Diagnosis Date    Arthritis     Atrial tachycardia     Duque esophagus     Cardiac pacemaker     CHB (complete heart block)     Chronic HFrEF (heart failure with reduced ejection fraction)  10/25/2021    10/25/21 echo: Estimated right " Mrs. Avery has been instructed accordingly. Thank you!   ventricular systolic pressure from tricuspid regurgitation is markedly elevated (>55 mmHg). Mild LVH. Estimated left ventricular EF = 45% Left ventricular systolic function is mildly decreased.      GERD (gastroesophageal reflux disease)     Glaucoma     LEFT EYE    Hypertension     Osteoporosis        Past Surgical History:   Procedure Laterality Date    CARDIAC CATHETERIZATION N/A 10/26/2021    Procedure: Left Heart Cath;  Surgeon: Calixto Fiore MD;  Location: [facility] CATH INVASIVE LOCATION;  Service: Cardiovascular;  Laterality: N/A;    CARDIAC ELECTROPHYSIOLOGY PROCEDURE N/A 10/26/2021    Procedure: Pacemaker SC new;  Surgeon: Calixto Fiore MD;  Location: [facility] CATH INVASIVE LOCATION;  Service: Cardiovascular;  Laterality: N/A;    CHOLECYSTECTOMY      COLONOSCOPY N/A 03/16/2023    Procedure: COLONOSCOPY WITH RANDOM COLON BIOPSIES;  Surgeon: Vito Maurer MD;  Location: [facility] ENDOSCOPY;  Service: Gastroenterology;  Laterality: N/A;  NORMAL COLON, NORMAL TI    ENDOSCOPY N/A 3/12/2024    Procedure: ESOPHAGOGASTRODUODENOSCOPY WITH BIOPSIES;  Surgeon: Vito Maurer MD;  Location: [facility] ENDOSCOPY;  Service: Gastroenterology;  Laterality: N/A;  HIATAL HERNIA/ BARRETTS ESOPHAGUS    EYE SURGERY Left     INSERT / REPLACE / REMOVE PACEMAKER      10/26/2021    KNEE SURGERY Right     UPPER GASTROINTESTINAL ENDOSCOPY           Current Outpatient Medications:     brimonidine (ALPHAGAN) 0.2 % ophthalmic solution, Administer 1 drop to both eyes 2 (two) times a day., Disp: , Rfl:     cholecalciferol (VITAMIN D3) 25 MCG (1000 UT) tablet, Take 1.25 Units by mouth Take As Directed. Every 5 days, Disp: , Rfl:     colestipol (Colestid) 1 g tablet, Take 1 tablet by mouth 2 (Two) Times a Day for 30 days., Disp: 60 tablet, Rfl: 4    dorzolamide-timolol (COSOPT) 22.3-6.8 MG/ML ophthalmic solution, Administer 1 drop to both eyes 2 (Two) Times a Day., Disp: , Rfl:     hyoscyamine (LEVSIN) 0.125 MG SL tablet, Place 1  "tablet under the tongue Every 4 (Four) Hours As Needed for Cramping., Disp: 120 tablet, Rfl: 5    latanoprost (XALATAN) 0.005 % ophthalmic solution, Administer 1 drop to both eyes Every Night., Disp: , Rfl:     lisinopril (PRINIVIL,ZESTRIL) 20 MG tablet, Take 1 tablet by mouth Daily., Disp: 90 tablet, Rfl: 3    pantoprazole (PROTONIX) 40 MG EC tablet, Take 1 tablet by mouth once daily, Disp: 90 tablet, Rfl: 2     No Known Allergies    Family History   Problem Relation Age of Onset    Colon cancer Neg Hx         Social History     Social History Narrative    Not on file       Objective     Vital Signs:   /67 (BP Location: Left arm, Patient Position: Sitting, Cuff Size: Adult)   Pulse 69   Ht 167.6 cm (65.98\")   Wt 97.7 kg (215 lb 6.4 oz)   SpO2 100%   BMI 34.79 kg/m²       Physical Exam  Constitutional:       Appearance: Normal appearance.   Pulmonary:      Effort: Pulmonary effort is normal.   Neurological:      General: No focal deficit present.      Mental Status: She is alert and oriented to person, place, and time.   Psychiatric:         Mood and Affect: Mood normal.         Behavior: Behavior normal.           Assessment & Plan          Assessment and Plan    Diagnoses and all orders for this visit:    1. Lymphocytic colitis (Primary)    2. Chowdary's esophagus with dysplasia    3. Irritable bowel syndrome with diarrhea    4. Hiatal hernia    Other orders  -     colestipol (Colestid) 1 g tablet; Take 1 tablet by mouth 2 (Two) Times a Day for 30 days.  Dispense: 60 tablet; Refill: 4  -     hyoscyamine (LEVSIN) 0.125 MG SL tablet; Place 1 tablet under the tongue Every 4 (Four) Hours As Needed for Cramping.  Dispense: 120 tablet; Refill: 5      70-year-old female presenting the office today for follow-up with a history of lymphocytic colitis, Chowdary's esophagus, hiatal hernia and IBS diarrhea.  Patient continues colestipol 1 tablet in the morning and 1 at night with good control of her diarrhea.  " Patient will continue Protonix as this worked well for her reflux and history of Chowdary's esophagus.  Patient be due for repeat EGD in 2027.  Patient will continue Levsin as needed for abdominal cramps.  Patient agreeable to this plan will follow-up in 1 year.          Follow Up       Follow Up   Return in about 1 year (around 12/13/2025).  Patient was given instructions and counseling regarding her condition or for health maintenance advice. Please see specific information pulled into the AVS if appropriate.

## 2025-02-13 ENCOUNTER — APPOINTMENT (OUTPATIENT)
Dept: GENERAL RADIOLOGY | Facility: HOSPITAL | Age: 70
End: 2025-02-13
Payer: MEDICARE

## 2025-02-13 ENCOUNTER — HOSPITAL ENCOUNTER (INPATIENT)
Facility: HOSPITAL | Age: 70
LOS: 6 days | Discharge: NURSING, REHAB OR LONG-TERM CARE | End: 2025-02-20
Attending: EMERGENCY MEDICINE | Admitting: HOSPITALIST
Payer: MEDICARE

## 2025-02-13 DIAGNOSIS — I50.33 ACUTE ON CHRONIC DIASTOLIC (CONGESTIVE) HEART FAILURE: ICD-10-CM

## 2025-02-13 DIAGNOSIS — R60.0 BILATERAL LOWER EXTREMITY EDEMA: ICD-10-CM

## 2025-02-13 DIAGNOSIS — D64.9 ANEMIA, UNSPECIFIED TYPE: ICD-10-CM

## 2025-02-13 DIAGNOSIS — N17.9 AKI (ACUTE KIDNEY INJURY): Primary | ICD-10-CM

## 2025-02-13 DIAGNOSIS — R79.89 ELEVATED TROPONIN: ICD-10-CM

## 2025-02-13 LAB
ALBUMIN SERPL-MCNC: 3.2 G/DL (ref 3.5–5.2)
ALBUMIN/GLOB SERPL: 1.1 G/DL
ALP SERPL-CCNC: 150 U/L (ref 39–117)
ALT SERPL W P-5'-P-CCNC: 17 U/L (ref 1–33)
ANION GAP SERPL CALCULATED.3IONS-SCNC: 13 MMOL/L (ref 5–15)
APTT PPP: 29.4 SECONDS (ref 22.7–35.4)
AST SERPL-CCNC: 28 U/L (ref 1–32)
BASOPHILS # BLD AUTO: 0.02 10*3/MM3 (ref 0–0.2)
BASOPHILS NFR BLD AUTO: 0.5 % (ref 0–1.5)
BILIRUB SERPL-MCNC: 0.8 MG/DL (ref 0–1.2)
BUN SERPL-MCNC: 69 MG/DL (ref 8–23)
BUN/CREAT SERPL: 32.1 (ref 7–25)
CALCIUM SPEC-SCNC: 9.2 MG/DL (ref 8.6–10.5)
CHLORIDE SERPL-SCNC: 100 MMOL/L (ref 98–107)
CO2 SERPL-SCNC: 22 MMOL/L (ref 22–29)
CREAT SERPL-MCNC: 2.15 MG/DL (ref 0.57–1)
DEPRECATED RDW RBC AUTO: 52.5 FL (ref 37–54)
EGFRCR SERPLBLD CKD-EPI 2021: 24.5 ML/MIN/1.73
EOSINOPHIL # BLD AUTO: 0.04 10*3/MM3 (ref 0–0.4)
EOSINOPHIL NFR BLD AUTO: 1.1 % (ref 0.3–6.2)
ERYTHROCYTE [DISTWIDTH] IN BLOOD BY AUTOMATED COUNT: 14.1 % (ref 12.3–15.4)
GEN 5 1HR TROPONIN T REFLEX: 41 NG/L
GLOBULIN UR ELPH-MCNC: 2.8 GM/DL
GLUCOSE SERPL-MCNC: 94 MG/DL (ref 65–99)
HCT VFR BLD AUTO: 35.1 % (ref 34–46.6)
HGB BLD-MCNC: 11.4 G/DL (ref 12–15.9)
IMM GRANULOCYTES # BLD AUTO: 0.02 10*3/MM3 (ref 0–0.05)
IMM GRANULOCYTES NFR BLD AUTO: 0.5 % (ref 0–0.5)
INR PPP: 1.82 (ref 0.9–1.1)
LYMPHOCYTES # BLD AUTO: 0.68 10*3/MM3 (ref 0.7–3.1)
LYMPHOCYTES NFR BLD AUTO: 18.2 % (ref 19.6–45.3)
MAGNESIUM SERPL-MCNC: 1.7 MG/DL (ref 1.6–2.4)
MCH RBC QN AUTO: 33.6 PG (ref 26.6–33)
MCHC RBC AUTO-ENTMCNC: 32.5 G/DL (ref 31.5–35.7)
MCV RBC AUTO: 103.5 FL (ref 79–97)
MONOCYTES # BLD AUTO: 0.48 10*3/MM3 (ref 0.1–0.9)
MONOCYTES NFR BLD AUTO: 12.9 % (ref 5–12)
NEUTROPHILS NFR BLD AUTO: 2.49 10*3/MM3 (ref 1.7–7)
NEUTROPHILS NFR BLD AUTO: 66.8 % (ref 42.7–76)
NRBC BLD AUTO-RTO: 0 /100 WBC (ref 0–0.2)
NT-PROBNP SERPL-MCNC: 4034 PG/ML (ref 0–900)
NT-PROBNP SERPL-MCNC: 4044 PG/ML (ref 0–900)
PLATELET # BLD AUTO: 192 10*3/MM3 (ref 140–450)
PMV BLD AUTO: 10 FL (ref 6–12)
POTASSIUM SERPL-SCNC: 4 MMOL/L (ref 3.5–5.2)
PROT SERPL-MCNC: 6 G/DL (ref 6–8.5)
PROTHROMBIN TIME: 21.2 SECONDS (ref 11.7–14.2)
QT INTERVAL: 391 MS
QTC INTERVAL: 455 MS
RBC # BLD AUTO: 3.39 10*6/MM3 (ref 3.77–5.28)
SODIUM SERPL-SCNC: 135 MMOL/L (ref 136–145)
TROPONIN T % DELTA: 8
TROPONIN T NUMERIC DELTA: 3 NG/L
TROPONIN T SERPL HS-MCNC: 38 NG/L
WBC NRBC COR # BLD AUTO: 3.73 10*3/MM3 (ref 3.4–10.8)

## 2025-02-13 PROCEDURE — 71045 X-RAY EXAM CHEST 1 VIEW: CPT

## 2025-02-13 PROCEDURE — 85025 COMPLETE CBC W/AUTO DIFF WBC: CPT | Performed by: EMERGENCY MEDICINE

## 2025-02-13 PROCEDURE — 99285 EMERGENCY DEPT VISIT HI MDM: CPT

## 2025-02-13 PROCEDURE — 36415 COLL VENOUS BLD VENIPUNCTURE: CPT

## 2025-02-13 PROCEDURE — 85730 THROMBOPLASTIN TIME PARTIAL: CPT | Performed by: EMERGENCY MEDICINE

## 2025-02-13 PROCEDURE — 80053 COMPREHEN METABOLIC PANEL: CPT | Performed by: EMERGENCY MEDICINE

## 2025-02-13 PROCEDURE — 94799 UNLISTED PULMONARY SVC/PX: CPT

## 2025-02-13 PROCEDURE — 94640 AIRWAY INHALATION TREATMENT: CPT

## 2025-02-13 PROCEDURE — G0378 HOSPITAL OBSERVATION PER HR: HCPCS

## 2025-02-13 PROCEDURE — 93005 ELECTROCARDIOGRAM TRACING: CPT | Performed by: EMERGENCY MEDICINE

## 2025-02-13 PROCEDURE — 93010 ELECTROCARDIOGRAM REPORT: CPT | Performed by: INTERNAL MEDICINE

## 2025-02-13 PROCEDURE — 84484 ASSAY OF TROPONIN QUANT: CPT | Performed by: EMERGENCY MEDICINE

## 2025-02-13 PROCEDURE — 63710000001 REVEFENACIN 175 MCG/3ML SOLUTION: Performed by: HOSPITALIST

## 2025-02-13 PROCEDURE — 83880 ASSAY OF NATRIURETIC PEPTIDE: CPT | Performed by: EMERGENCY MEDICINE

## 2025-02-13 PROCEDURE — 85610 PROTHROMBIN TIME: CPT | Performed by: EMERGENCY MEDICINE

## 2025-02-13 PROCEDURE — 83880 ASSAY OF NATRIURETIC PEPTIDE: CPT | Performed by: HOSPITALIST

## 2025-02-13 PROCEDURE — 25010000002 FUROSEMIDE PER 20 MG: Performed by: HOSPITALIST

## 2025-02-13 PROCEDURE — 83735 ASSAY OF MAGNESIUM: CPT | Performed by: EMERGENCY MEDICINE

## 2025-02-13 RX ORDER — BUDESONIDE AND FORMOTEROL FUMARATE DIHYDRATE 160; 4.5 UG/1; UG/1
2 AEROSOL RESPIRATORY (INHALATION)
Status: DISCONTINUED | OUTPATIENT
Start: 2025-02-13 | End: 2025-02-13 | Stop reason: SDUPTHER

## 2025-02-13 RX ORDER — POLYETHYLENE GLYCOL 3350 17 G/17G
17 POWDER, FOR SOLUTION ORAL DAILY
Status: DISCONTINUED | OUTPATIENT
Start: 2025-02-13 | End: 2025-02-20 | Stop reason: HOSPADM

## 2025-02-13 RX ORDER — LEVOTHYROXINE SODIUM 75 UG/1
75 TABLET ORAL DAILY
Status: DISCONTINUED | OUTPATIENT
Start: 2025-02-13 | End: 2025-02-20 | Stop reason: HOSPADM

## 2025-02-13 RX ORDER — ALBUTEROL SULFATE 90 UG/1
2 INHALANT RESPIRATORY (INHALATION) EVERY 4 HOURS PRN
Status: DISCONTINUED | OUTPATIENT
Start: 2025-02-13 | End: 2025-02-13 | Stop reason: CLARIF

## 2025-02-13 RX ORDER — PANTOPRAZOLE SODIUM 40 MG/1
40 TABLET, DELAYED RELEASE ORAL
Status: DISCONTINUED | OUTPATIENT
Start: 2025-02-14 | End: 2025-02-13

## 2025-02-13 RX ORDER — ATORVASTATIN CALCIUM 20 MG/1
10 TABLET, FILM COATED ORAL NIGHTLY
Status: DISCONTINUED | OUTPATIENT
Start: 2025-02-13 | End: 2025-02-20 | Stop reason: HOSPADM

## 2025-02-13 RX ORDER — CHOLECALCIFEROL (VITAMIN D3) 25 MCG
1000 TABLET ORAL DAILY
Status: DISCONTINUED | OUTPATIENT
Start: 2025-02-13 | End: 2025-02-13

## 2025-02-13 RX ORDER — FAMOTIDINE 20 MG/1
20 TABLET, FILM COATED ORAL
Status: DISCONTINUED | OUTPATIENT
Start: 2025-02-13 | End: 2025-02-20 | Stop reason: HOSPADM

## 2025-02-13 RX ORDER — LOSARTAN POTASSIUM 50 MG/1
100 TABLET ORAL DAILY
Status: DISCONTINUED | OUTPATIENT
Start: 2025-02-13 | End: 2025-02-13

## 2025-02-13 RX ORDER — ACETAMINOPHEN 325 MG/1
650 TABLET ORAL EVERY 4 HOURS PRN
Status: DISCONTINUED | OUTPATIENT
Start: 2025-02-13 | End: 2025-02-20 | Stop reason: HOSPADM

## 2025-02-13 RX ORDER — ATORVASTATIN CALCIUM 80 MG/1
80 TABLET, FILM COATED ORAL DAILY
Status: DISCONTINUED | OUTPATIENT
Start: 2025-02-13 | End: 2025-02-13

## 2025-02-13 RX ORDER — FUROSEMIDE 20 MG/1
40 TABLET ORAL
Status: DISCONTINUED | OUTPATIENT
Start: 2025-02-13 | End: 2025-02-13

## 2025-02-13 RX ORDER — ARFORMOTEROL TARTRATE 15 UG/2ML
15 SOLUTION RESPIRATORY (INHALATION)
Status: DISCONTINUED | OUTPATIENT
Start: 2025-02-13 | End: 2025-02-20 | Stop reason: HOSPADM

## 2025-02-13 RX ORDER — FUROSEMIDE 10 MG/ML
80 INJECTION INTRAMUSCULAR; INTRAVENOUS ONCE
Status: DISCONTINUED | OUTPATIENT
Start: 2025-02-13 | End: 2025-02-13

## 2025-02-13 RX ORDER — FOLIC ACID 1 MG/1
1 TABLET ORAL DAILY
Status: DISCONTINUED | OUTPATIENT
Start: 2025-02-13 | End: 2025-02-20 | Stop reason: HOSPADM

## 2025-02-13 RX ORDER — FUROSEMIDE 10 MG/ML
40 INJECTION INTRAMUSCULAR; INTRAVENOUS EVERY 12 HOURS
Status: DISCONTINUED | OUTPATIENT
Start: 2025-02-13 | End: 2025-02-18

## 2025-02-13 RX ORDER — ALLOPURINOL 100 MG/1
100 TABLET ORAL DAILY
Status: DISCONTINUED | OUTPATIENT
Start: 2025-02-13 | End: 2025-02-20 | Stop reason: HOSPADM

## 2025-02-13 RX ORDER — ASPIRIN 81 MG/1
81 TABLET, CHEWABLE ORAL DAILY
Status: DISCONTINUED | OUTPATIENT
Start: 2025-02-13 | End: 2025-02-20 | Stop reason: HOSPADM

## 2025-02-13 RX ORDER — DIPHENOXYLATE HYDROCHLORIDE AND ATROPINE SULFATE 2.5; .025 MG/1; MG/1
1 TABLET ORAL DAILY
Status: DISCONTINUED | OUTPATIENT
Start: 2025-02-13 | End: 2025-02-18

## 2025-02-13 RX ORDER — METOPROLOL TARTRATE 50 MG
200 TABLET ORAL EVERY 12 HOURS SCHEDULED
Status: DISCONTINUED | OUTPATIENT
Start: 2025-02-13 | End: 2025-02-13

## 2025-02-13 RX ORDER — ACETAMINOPHEN 160 MG/5ML
650 SOLUTION ORAL EVERY 6 HOURS PRN
Status: DISCONTINUED | OUTPATIENT
Start: 2025-02-13 | End: 2025-02-17

## 2025-02-13 RX ORDER — METOPROLOL TARTRATE 25 MG/1
25 TABLET, FILM COATED ORAL EVERY 12 HOURS SCHEDULED
Status: DISCONTINUED | OUTPATIENT
Start: 2025-02-13 | End: 2025-02-17

## 2025-02-13 RX ORDER — MONTELUKAST SODIUM 10 MG/1
10 TABLET ORAL DAILY
Status: DISCONTINUED | OUTPATIENT
Start: 2025-02-13 | End: 2025-02-20 | Stop reason: HOSPADM

## 2025-02-13 RX ORDER — ALBUTEROL SULFATE 0.83 MG/ML
2.5 SOLUTION RESPIRATORY (INHALATION) EVERY 4 HOURS PRN
Status: DISCONTINUED | OUTPATIENT
Start: 2025-02-13 | End: 2025-02-20 | Stop reason: HOSPADM

## 2025-02-13 RX ORDER — AMLODIPINE BESYLATE 5 MG/1
5 TABLET ORAL DAILY
Status: DISCONTINUED | OUTPATIENT
Start: 2025-02-13 | End: 2025-02-13

## 2025-02-13 RX ORDER — BUDESONIDE 0.5 MG/2ML
0.5 INHALANT ORAL
Status: DISCONTINUED | OUTPATIENT
Start: 2025-02-13 | End: 2025-02-20 | Stop reason: HOSPADM

## 2025-02-13 RX ADMIN — FAMOTIDINE 20 MG: 20 TABLET, FILM COATED ORAL at 21:29

## 2025-02-13 RX ADMIN — FOLIC ACID 1 MG: 1 TABLET ORAL at 21:29

## 2025-02-13 RX ADMIN — Medication 200 MG: at 21:29

## 2025-02-13 RX ADMIN — Medication 1 TABLET: at 21:29

## 2025-02-13 RX ADMIN — BUDESONIDE 0.5 MG: 0.5 INHALANT RESPIRATORY (INHALATION) at 20:36

## 2025-02-13 RX ADMIN — REVEFENACIN 175 MCG: 175 SOLUTION RESPIRATORY (INHALATION) at 20:36

## 2025-02-13 RX ADMIN — FUROSEMIDE 40 MG: 10 INJECTION, SOLUTION INTRAMUSCULAR; INTRAVENOUS at 21:28

## 2025-02-13 RX ADMIN — ACETAMINOPHEN 650 MG: 325 TABLET, FILM COATED ORAL at 22:09

## 2025-02-13 RX ADMIN — POLYETHYLENE GLYCOL 3350 17 G: 17 POWDER, FOR SOLUTION ORAL at 21:28

## 2025-02-13 NOTE — ED NOTES
Patient reports sliding down to avoid fall today. She also c/o bilateral leg swelling for 1 month. NKI.

## 2025-02-13 NOTE — H&P
History and physical    Primary care physician  Dr. Melton    Chief complaint  Shortness of breath    History of present illness  69-year-old white female with history of COPD asthma atrial fibrillation hypertension of sleep apnea chronic kidney disease stage IIIb presented to [facility] emergency room with increased shortness of breath and leg swelling.  Patient denies any fever chest pain palpitation abdominal pain nausea vomiting diarrhea.  Patient stated that her diuretics was recently changed by her cardiologist.  Patient workup in ER revealed acute kidney injury and also have elevated troponin admitted for management.    PAST MEDICAL HISTORY   COPD      Asthma      Atrial fibrillation on anticoagulation      Chronic kidney disease stage IIIb 2015    Hyperlipidemia      Hypertension      Obstructive sleep apnea        PAST SURGICAL HISTORY              Procedure Laterality Date    BACK SURGERY N/A 2001    COLONOSCOPY N/A 10/22/2015     Rectal polyp-Dr. Elías Keating    HYSTERECTOMY N/A 2000    LAPAROSCOPIC CHOLECYSTECTOMY N/A 01/04/2010     Dr. Heath Whitlock    OOPHORECTOMY   2001    REPLACEMENT TOTAL KNEE Left 06/22/2015     Dr. Yo Aguayo    REPLACEMENT TOTAL KNEE Right 02/26/2015     Dr. Yo Aguayo    VENTRAL HERNIA REPAIR N/A 10/22/2015     Open reduction of incarcerated ventral hernia with layered closure-Dr. Elías Keating    VENTRAL/INCISIONAL HERNIA REPAIR N/A 6/14/2021     Procedure: OPEN VENTRAL/INCISIONAL HERNIA REPAIR WITH MESH AND APPENDECTOMY;  Surgeon: Arnulfo Leonard MD;  Location: [facility];  Service: General;  Laterality: N/A;         FAMILY HISTORY           Problem Relation Age of Onset    No Known Problems Mother      No Known Problems Father        SOCIAL HISTORY                 Socioeconomic History    Marital status:    Tobacco Use    Smoking status: Every Day       Current packs/day: 1.00       Types: Cigarettes    Smokeless tobacco: Never    Tobacco  comments:       admits to smoking since age 56    Vaping Use    Vaping status: Never Used   Substance and Sexual Activity    Alcohol use: Yes       Comment: 3 drinks daily Daily caffine    Drug use: Never    Sexual activity: Defer         ALLERGIES  Patient has no known allergies.  Home medications reviewed     REVIEW OF SYSTEMS  All systems reviewed and negative except for those discussed in HPI.      PHYSICAL EXAM  Blood pressure 106/71, pulse 86, temperature 97.8 °F (36.6 °C), temperature source Tympanic, resp. rate 20, SpO2 98%, not currently breastfeeding.    General: Awake and alert no acute distress.  HENT: NCAT, PERRL, Nares patent.  Eyes: no scleral icterus.  Neck: trachea midline, no ROM limitations.  CV: regular rhythm, regular rate.  Respiratory: normal effort, decreased breath sound at the bases  Abdomen: soft, nondistended, NTTP, no rebound tenderness, no guarding or rigidity.  Musculoskeletal: no deformity.  Neuro: alert, moves all extremities, follows commands.  Skin: warm, dry.  2+ pitting edema bilateral lower extremities.     LAB RESULTS  Lab Results (last 24 hours)       Procedure Component Value Units Date/Time    High Sensitivity Troponin T 1Hr [[id number]]  (Abnormal) Collected: 02/13/25 1443    Specimen: Blood Updated: 02/13/25 1521     HS Troponin T 41 ng/L      Troponin T Numeric Delta 3 ng/L      Troponin T % Delta 8    Narrative:      High Sensitive Troponin T Reference Range:  <14.0 ng/L- Negative Female for AMI  <22.0 ng/L- Negative Male for AMI  >=14 - Abnormal Female indicating possible myocardial injury.  >=22 - Abnormal Male indicating possible myocardial injury.   Clinicians would have to utilize clinical acumen, EKG, Troponin, and serial changes to determine if it is an Acute Myocardial Infarction or myocardial injury due to an underlying chronic condition.         Comprehensive Metabolic Panel [[id number]]  (Abnormal) Collected: 02/13/25 1341    Specimen: Blood Updated: 02/13/25  1412     Glucose 94 mg/dL      BUN 69 mg/dL      Creatinine 2.15 mg/dL      Sodium 135 mmol/L      Potassium 4.0 mmol/L      Chloride 100 mmol/L      CO2 22.0 mmol/L      Calcium 9.2 mg/dL      Total Protein 6.0 g/dL      Albumin 3.2 g/dL      ALT (SGPT) 17 U/L      AST (SGOT) 28 U/L      Alkaline Phosphatase 150 U/L      Total Bilirubin 0.8 mg/dL      Globulin 2.8 gm/dL      A/G Ratio 1.1 g/dL      BUN/Creatinine Ratio 32.1     Anion Gap 13.0 mmol/L      eGFR 24.5 mL/min/1.73     Narrative:      GFR Categories in Chronic Kidney Disease (CKD)      GFR Category          GFR (mL/min/1.73)    Interpretation  G1                     90 or greater         Normal or high (1)  G2                      60-89                Mild decrease (1)  G3a                   45-59                Mild to moderate decrease  G3b                   30-44                Moderate to severe decrease  G4                    15-29                Severe decrease  G5                    14 or less           Kidney failure          (1)In the absence of evidence of kidney disease, neither GFR category G1 or G2 fulfill the criteria for CKD.    eGFR calculation 2021 CKD-EPI creatinine equation, which does not include race as a factor    High Sensitivity Troponin T [[id number]]  (Abnormal) Collected: 02/13/25 1341    Specimen: Blood Updated: 02/13/25 1412     HS Troponin T 38 ng/L     Narrative:      High Sensitive Troponin T Reference Range:  <14.0 ng/L- Negative Female for AMI  <22.0 ng/L- Negative Male for AMI  >=14 - Abnormal Female indicating possible myocardial injury.  >=22 - Abnormal Male indicating possible myocardial injury.   Clinicians would have to utilize clinical acumen, EKG, Troponin, and serial changes to determine if it is an Acute Myocardial Infarction or myocardial injury due to an underlying chronic condition.         BNP [[id number]]  (Abnormal) Collected: 02/13/25 1341    Specimen: Blood Updated: 02/13/25 1412     proBNP 4,044.0 pg/mL      Narrative:      This assay is used as an aid in the diagnosis of individuals suspected of having heart failure. It can be used as an aid in the diagnosis of acute decompensated heart failure (ADHF) in patients presenting with signs and symptoms of ADHF to the emergency department (ED). In addition, NT-proBNP of <300 pg/mL indicates ADHF is not likely.    Age Range Result Interpretation  NT-proBNP Concentration (pg/mL:      <50             Positive            >450                   Gray                 300-450                    Negative             <300    50-75           Positive            >900                  Gray                300-900                  Negative            <300      >75             Positive            >1800                  Gray                300-1800                  Negative            <300    Magnesium [[id number]]  (Normal) Collected: 02/13/25 1341    Specimen: Blood Updated: 02/13/25 1412     Magnesium 1.7 mg/dL     aPTT [[id number]]  (Normal) Collected: 02/13/25 1341    Specimen: Blood Updated: 02/13/25 1404     PTT 29.4 seconds     Protime-INR [[id number]]  (Abnormal) Collected: 02/13/25 1341    Specimen: Blood Updated: 02/13/25 1403     Protime 21.2 Seconds      INR 1.82    CBC & Differential [[id number]]  (Abnormal) Collected: 02/13/25 1341    Specimen: Blood Updated: 02/13/25 1354    Narrative:      The following orders were created for panel order CBC & Differential.  Procedure                               Abnormality         Status                     ---------                               -----------         ------                     CBC Auto Differential[[id number]]        Abnormal            Final result                 Please view results for these tests on the individual orders.    CBC Auto Differential [[id number]]  (Abnormal) Collected: 02/13/25 1341    Specimen: Blood Updated: 02/13/25 1354     WBC 3.73 10*3/mm3      RBC 3.39 10*6/mm3      Hemoglobin 11.4 g/dL       Hematocrit 35.1 %      .5 fL      MCH 33.6 pg      MCHC 32.5 g/dL      RDW 14.1 %      RDW-SD 52.5 fl      MPV 10.0 fL      Platelets 192 10*3/mm3      Neutrophil % 66.8 %      Lymphocyte % 18.2 %      Monocyte % 12.9 %      Eosinophil % 1.1 %      Basophil % 0.5 %      Immature Grans % 0.5 %      Neutrophils, Absolute 2.49 10*3/mm3      Lymphocytes, Absolute 0.68 10*3/mm3      Monocytes, Absolute 0.48 10*3/mm3      Eosinophils, Absolute 0.04 10*3/mm3      Basophils, Absolute 0.02 10*3/mm3      Immature Grans, Absolute 0.02 10*3/mm3      nRBC 0.0 /100 WBC           Imaging Results (Last 24 Hours)       Procedure Component Value Units Date/Time    XR Chest 1 View [[id number]] Collected: 02/13/25 1406     Updated: 02/13/25 1412    Narrative:      XR CHEST 1 VW-     HISTORY: Female who is 69 years-old, lower extremity edema     TECHNIQUE: Frontal view of the chest     COMPARISON: 6/17/2021     FINDINGS: The heart is enlarged. Pulmonary vasculature is unremarkable.  Minimal likely atelectasis at the right base. No focal pulmonary  consolidation, pleural effusion, or pneumothorax. No acute osseous  process.       Impression:      Minimal likely atelectasis at the right base. Cardiomegaly.  Follow-up as clinical indications persist.     This report was finalized on 2/13/2025 2:09 PM by Dr. Herrera Mistry M.D on Workstation: [id number]             Scan on 2/13/2025 1450 by [facility] [place]: ECG 12-LEAD         Author: -- Service: -- Author Type: --   Filed: Date of Service: Creation Time:   Status: (Other)   HEART RATE=81  bpm  RR Bcxhvtqb=298  ms  HI Interval=  ms  P Horizontal Axis=  deg  P Front Axis=  deg  QRSD Interval=96  ms  QT Sjjrmham=577  ms  GKqW=496  ms  QRS Axis=27  deg  T Wave Axis=41  deg  - ABNORMAL ECG -  Atrial fibrillation  Ventricular bigeminy  Low voltage, extremity and precordial leads  Abnormal R-wave progression, early transition          Current Facility-Administered Medications:      albuterol sulfate HFA (PROVENTIL HFA;VENTOLIN HFA;PROAIR HFA) inhaler 2 puff, 2 puff, Inhalation, Q4H PRN, Nathan Hooper MD    allopurinol (ZYLOPRIM) tablet 100 mg, 100 mg, Oral, Daily, Nathan Hooper MD    apixaban (ELIQUIS) tablet 5 mg, 5 mg, Oral, BID, Nathan Hooper MD    arformoterol (BROVANA) nebulizer solution 15 mcg, 15 mcg, Nebulization, BID - RT **AND** budesonide (PULMICORT) nebulizer solution 0.5 mg, 0.5 mg, Nebulization, BID - RT **AND** revefenacin (YUPELRI) nebulizer solution 175 mcg, 175 mcg, Nebulization, Daily - RT, Nathan Hooper MD    atorvastatin (LIPITOR) tablet 10 mg, 10 mg, Oral, Nightly, Nathan Hooper MD    budesonide-formoterol (SYMBICORT) 160-4.5 MCG/ACT inhaler 2 puff, 2 puff, Inhalation, BID - RT, Nathan Hooper MD    famotidine (PEPCID) tablet 20 mg, 20 mg, Oral, BID AC, Nathan Hooper MD    folic acid (FOLVITE) tablet 1 mg, 1 mg, Oral, Daily, Nathan Hooper MD    furosemide (LASIX) injection 40 mg, 40 mg, Intravenous, Q12H, Nathan Hooper MD    levothyroxine (SYNTHROID, LEVOTHROID) tablet 75 mcg, 75 mcg, Oral, Daily, Nathan Hooper MD    metoprolol tartrate (LOPRESSOR) tablet 25 mg, 25 mg, Oral, Q12H, Nathan Hooper MD    montelukast (SINGULAIR) tablet 10 mg, 10 mg, Oral, Daily, Nathan Hooper MD    multivitamin (THERAGRAN) tablet 1 tablet, 1 tablet, Oral, Daily, Nathan Hooper MD    polyethylene glycol (MIRALAX) packet 17 g, 17 g, Oral, Daily, Nathan Hooepr MD    thiamine (VITAMIN B-1) tablet 200 mg, 200 mg, Oral, Daily, Nathan Hooper MD    Current Outpatient Medications:     allopurinol (ZYLOPRIM) 100 MG tablet, Take 1 tablet by mouth Daily. Indications: Gout, Disp: , Rfl:     albuterol sulfate  (90 Base) MCG/ACT inhaler, Inhale 2 puffs Every 4 (Four) Hours As Needed for Wheezing or Shortness of Air., Disp: , Rfl:     amLODIPine (NORVASC) 5 MG tablet, Take 5 mg by mouth Daily., Disp: , Rfl:     apixaban (ELIQUIS) 5 MG tablet tablet, Take 1 tablet by mouth 2 (Two) Times a Day., Disp: 180  tablet, Rfl: 3    atorvastatin (LIPITOR) 80 MG tablet, Take 80 mg by mouth every night at bedtime. Indications: High Amount of Fats in the Blood, Disp: , Rfl:     B Complex Vitamins (vitamin b complex) capsule capsule, Take 2 capsules by mouth Daily., Disp: , Rfl:     budesonide-formoterol (Symbicort) 160-4.5 MCG/ACT inhaler, Inhale 2 puffs 2 (Two) Times a Day., Disp: , Rfl: 12    cefdinir (OMNICEF) 300 MG capsule, Take 300 mg by mouth 2 (Two) Times a Day. Indications: Cellulitis, Disp: , Rfl:     Cholecalciferol (VITAMIN D PO), Take 1 tablet by mouth Daily., Disp: , Rfl:     famotidine (PEPCID) 20 MG tablet, Take 1 tablet by mouth 2 (Two) Times a Day Before Meals., Disp: , Rfl:     Fluticasone-Umeclidin-Vilant (Trelegy Ellipta) 100-62.5-25 MCG/ACT inhaler, INHALE 1 PUFF BY INHALATION ROUTE ONCE DAILY AT THE SAME TIME EACH DAY, Disp: , Rfl:     furosemide (Lasix) 40 MG tablet, Take 1 tablet by mouth 2 (two) times a day. Indications: Take 80 mg for the next 5 days (Patient taking differently: Take 1 tablet by mouth 2 (two) times a day. Indications: Edema), Disp: 120 tablet, Rfl: 3    levothyroxine (SYNTHROID, LEVOTHROID) 75 MCG tablet, Take 75 mcg by mouth Daily. Indications: Underactive Thyroid, Disp: , Rfl:     levothyroxine (SYNTHROID, LEVOTHROID) 75 MCG tablet, Take 1 tablet by mouth Daily., Disp: , Rfl:     losartan (COZAAR) 100 MG tablet, Take 100 mg by mouth Daily. Indications: High Blood Pressure Disorder, Disp: , Rfl:     metoprolol tartrate (LOPRESSOR) 100 MG tablet, Take 200 mg by mouth 2 (Two) Times a Day. Indications: High Blood Pressure Disorder, Disp: , Rfl:     montelukast (SINGULAIR) 10 MG tablet, Take 10 mg by mouth Daily. Indications: Hayfever, Disp: , Rfl:     omeprazole (priLOSEC) 40 MG capsule, Take 1 capsule by mouth Daily. Indications: Gastroesophageal Reflux Disease, Disp: , Rfl:     polyethylene glycol (MIRALAX) 17 g packet, Take 17 g by mouth Daily., Disp: , Rfl:     tiotropium (Spiriva  HandiHaler) 18 MCG per inhalation capsule, Place 1 capsule into inhaler and inhale Daily., Disp: , Rfl:     vitamin D3 125 MCG (5000 UT) capsule capsule, Take 2,000 Units by mouth Daily., Disp: , Rfl:      ASSESSMENT  Acute on chronic diastolic congestive heart failure  Acute kidney injury  Elevated troponin  Chronic atrial fibrillation  Hypertension  Hyperlipidemia  Hypothyroidism  Morbidly obese  Asthma/COPD/obstructive sleep apnea  Chronic kidney disease stage IIIb    PLAN  Admit  IV diuresis  Strict I's and O's and daily weight  Serial cardiac exam and EKG  Check 2D echo  Cardiology consult  Nephrology to follow patient  Adjust home medications  Stress ulcer DVT prophylaxis  Supportive care  PT OT   Patient is full code  Discussed with nursing staff  Follow closely further recommendation current hospital course    LEONOR CROCKETT MD

## 2025-02-13 NOTE — ED PROVIDER NOTES
EMERGENCY DEPARTMENT ENCOUNTER  Room Number:  44/44  Date of encounter:  2/13/2025  PCP: Esperanza Melton APRN  Patient Care Team:  Esperanza Melton APRN as PCP - General (Nurse Practitioner)  Anatoly Jimenez MD as PCP - Family Medicine     HPI:  Context: Marilu Avery is a 69 y.o. female who presents to the ED c/o chief complaint of lower extremity edema.  Patient reports that she has had bilateral lower extremity edema for the last month.  Patient reports he has not been worsening recently, just has been constant.  Patient reports that she was seen by her cardiologist 2 days ago, was changed from Lasix to Bumex and spironolactone.  Patient reports that she just started the new medications yesterday.  Patient does endorse shortness of breath with exertion, no dyspnea at rest.  Patient denies any orthopnea but reports that she chronically sleeps upright.  No paroxysmal nocturnal dyspnea.  Patient denies any chest pain, no cough, no fevers.  Patient reports that she had a fall today, reports that her legs felt weak, was able to ease herself down to the ground.  Patient denies any injury occurred, specifically no head injury, no neck or back pain.  When asked why patient presents today despite having swelling for the last month and just having increased her diuretic regimen yesterday, patient reports that she would feel better if the fluid was off of her legs.    MEDICAL HISTORY REVIEW  Reviewed in EPIC    PAST MEDICAL HISTORY  Active Ambulatory Problems     Diagnosis Date Noted    Incisional hernia, without obstruction or gangrene 10/12/2017    Incarcerated ventral hernia 06/13/2021    Atrial fibrillation, persistent 06/13/2021    HLD (hyperlipidemia) 06/13/2021    HTN (hypertension) 06/13/2021    LISA (obstructive sleep apnea) 06/13/2021    Stage 3b chronic kidney disease 06/13/2021    Chronic anticoagulation 06/13/2021    SBO (small bowel obstruction) 06/13/2021    Tobacco abuse 06/14/2021    Morbid  obesity with BMI of 40.0-44.9, adult 06/14/2021    COPD (chronic obstructive pulmonary disease) 06/14/2021    Hypopotassemia 06/16/2021    Gout 06/19/2021    Chronic anticoagulation 08/20/2021    Prolonged Q-T interval on ECG 02/27/2023     Resolved Ambulatory Problems     Diagnosis Date Noted    No Resolved Ambulatory Problems     Past Medical History:   Diagnosis Date    Arthritis     Asthma     Atrial fibrillation     Colon polyps 2015    Hyperlipidemia     Hypertension     Sleep apnea        PAST SURGICAL HISTORY  Past Surgical History:   Procedure Laterality Date    BACK SURGERY N/A 2001    COLONOSCOPY N/A 10/22/2015    Rectal polyp-Dr. Elías Keating    HYSTERECTOMY N/A 2000    LAPAROSCOPIC CHOLECYSTECTOMY N/A 01/04/2010    Dr. Heath Whitlock    OOPHORECTOMY  2001    REPLACEMENT TOTAL KNEE Left 06/22/2015    Dr. Yo Aguayo    REPLACEMENT TOTAL KNEE Right 02/26/2015    Dr. Yo Aguayo    VENTRAL HERNIA REPAIR N/A 10/22/2015    Open reduction of incarcerated ventral hernia with layered closure-Dr. Elías Keating    VENTRAL/INCISIONAL HERNIA REPAIR N/A 6/14/2021    Procedure: OPEN VENTRAL/INCISIONAL HERNIA REPAIR WITH MESH AND APPENDECTOMY;  Surgeon: Arnulfo Leonard MD;  Location: [facility];  Service: General;  Laterality: N/A;       FAMILY HISTORY  Family History   Problem Relation Age of Onset    No Known Problems Mother     No Known Problems Father        SOCIAL HISTORY  Social History     Socioeconomic History    Marital status:    Tobacco Use    Smoking status: Every Day     Current packs/day: 1.00     Types: Cigarettes    Smokeless tobacco: Never    Tobacco comments:     admits to smoking since age 56    Vaping Use    Vaping status: Never Used   Substance and Sexual Activity    Alcohol use: Yes     Comment: 3 drinks daily Daily caffine    Drug use: Never    Sexual activity: Defer       ALLERGIES  Patient has no known allergies.    The patient's allergies have been reviewed    REVIEW OF  SYSTEMS  All systems reviewed and negative except for those discussed in HPI.     PHYSICAL EXAM  I have reviewed the triage vital signs and nursing notes.  ED Triage Vitals [02/13/25 1314]   Temp Heart Rate Resp BP SpO2   97.8 °F (36.6 °C) 81 16 136/80 97 %      Temp src Heart Rate Source Patient Position BP Location FiO2 (%)   Tympanic Monitor -- -- --       General: No acute distress.  HENT: NCAT, PERRL, Nares patent.  Eyes: no scleral icterus.  Neck: trachea midline, no ROM limitations.  CV: regular rhythm, regular rate.  Respiratory: normal effort, CTAB.  Abdomen: soft, nondistended, NTTP, no rebound tenderness, no guarding or rigidity.  Musculoskeletal: no deformity.  Neuro: alert, moves all extremities, follows commands.  Skin: warm, dry.  2+ pitting edema bilateral lower extremities.    LAB RESULTS  Recent Results (from the past 24 hours)   Protime-INR    Collection Time: 02/13/25  1:41 PM    Specimen: Blood   Result Value Ref Range    Protime 21.2 (H) 11.7 - 14.2 Seconds    INR 1.82 (H) 0.90 - 1.10   aPTT    Collection Time: 02/13/25  1:41 PM    Specimen: Blood   Result Value Ref Range    PTT 29.4 22.7 - 35.4 seconds   Comprehensive Metabolic Panel    Collection Time: 02/13/25  1:41 PM    Specimen: Blood   Result Value Ref Range    Glucose 94 65 - 99 mg/dL    BUN 69 (H) 8 - 23 mg/dL    Creatinine 2.15 (H) 0.57 - 1.00 mg/dL    Sodium 135 (L) 136 - 145 mmol/L    Potassium 4.0 3.5 - 5.2 mmol/L    Chloride 100 98 - 107 mmol/L    CO2 22.0 22.0 - 29.0 mmol/L    Calcium 9.2 8.6 - 10.5 mg/dL    Total Protein 6.0 6.0 - 8.5 g/dL    Albumin 3.2 (L) 3.5 - 5.2 g/dL    ALT (SGPT) 17 1 - 33 U/L    AST (SGOT) 28 1 - 32 U/L    Alkaline Phosphatase 150 (H) 39 - 117 U/L    Total Bilirubin 0.8 0.0 - 1.2 mg/dL    Globulin 2.8 gm/dL    A/G Ratio 1.1 g/dL    BUN/Creatinine Ratio 32.1 (H) 7.0 - 25.0    Anion Gap 13.0 5.0 - 15.0 mmol/L    eGFR 24.5 (L) >60.0 mL/min/1.73   High Sensitivity Troponin T    Collection Time: 02/13/25   1:41 PM    Specimen: Blood   Result Value Ref Range    HS Troponin T 38 (H) <14 ng/L   BNP    Collection Time: 02/13/25  1:41 PM    Specimen: Blood   Result Value Ref Range    proBNP 4,044.0 (H) 0.0 - 900.0 pg/mL   Magnesium    Collection Time: 02/13/25  1:41 PM    Specimen: Blood   Result Value Ref Range    Magnesium 1.7 1.6 - 2.4 mg/dL   CBC Auto Differential    Collection Time: 02/13/25  1:41 PM    Specimen: Blood   Result Value Ref Range    WBC 3.73 3.40 - 10.80 10*3/mm3    RBC 3.39 (L) 3.77 - 5.28 10*6/mm3    Hemoglobin 11.4 (L) 12.0 - 15.9 g/dL    Hematocrit 35.1 34.0 - 46.6 %    .5 (H) 79.0 - 97.0 fL    MCH 33.6 (H) 26.6 - 33.0 pg    MCHC 32.5 31.5 - 35.7 g/dL    RDW 14.1 12.3 - 15.4 %    RDW-SD 52.5 37.0 - 54.0 fl    MPV 10.0 6.0 - 12.0 fL    Platelets 192 140 - 450 10*3/mm3    Neutrophil % 66.8 42.7 - 76.0 %    Lymphocyte % 18.2 (L) 19.6 - 45.3 %    Monocyte % 12.9 (H) 5.0 - 12.0 %    Eosinophil % 1.1 0.3 - 6.2 %    Basophil % 0.5 0.0 - 1.5 %    Immature Grans % 0.5 0.0 - 0.5 %    Neutrophils, Absolute 2.49 1.70 - 7.00 10*3/mm3    Lymphocytes, Absolute 0.68 (L) 0.70 - 3.10 10*3/mm3    Monocytes, Absolute 0.48 0.10 - 0.90 10*3/mm3    Eosinophils, Absolute 0.04 0.00 - 0.40 10*3/mm3    Basophils, Absolute 0.02 0.00 - 0.20 10*3/mm3    Immature Grans, Absolute 0.02 0.00 - 0.05 10*3/mm3    nRBC 0.0 0.0 - 0.2 /100 WBC   High Sensitivity Troponin T 1Hr    Collection Time: 02/13/25  2:43 PM    Specimen: Blood   Result Value Ref Range    HS Troponin T 41 (H) <14 ng/L    Troponin T Numeric Delta 3 ng/L    Troponin T % Delta 8 Abnormal if >/= 20%   ECG 12 Lead Other; LE edema    Collection Time: 02/13/25  2:48 PM   Result Value Ref Range    QT Interval 391 ms    QTC Interval 455 ms       I ordered the above labs and reviewed the results.    RADIOLOGY  XR Chest 1 View    Result Date: 2/13/2025  XR CHEST 1 VW-  HISTORY: Female who is 69 years-old, lower extremity edema  TECHNIQUE: Frontal view of the chest   COMPARISON: 6/17/2021  FINDINGS: The heart is enlarged. Pulmonary vasculature is unremarkable. Minimal likely atelectasis at the right base. No focal pulmonary consolidation, pleural effusion, or pneumothorax. No acute osseous process.      Minimal likely atelectasis at the right base. Cardiomegaly. Follow-up as clinical indications persist.  This report was finalized on 2/13/2025 2:09 PM by Dr. Herrera Mistry M.D on Workstation: [id number]       I ordered the above noted radiological studies. I reviewed the images and results. I agree with the radiologist interpretation.    PROCEDURES  Procedures    MEDICATIONS GIVEN IN ER  Medications - No data to display    PROGRESS, DATA ANALYSIS, CONSULTS, AND MEDICAL DECISION MAKING  A complete history and physical exam have been performed.  All available laboratory and imaging results have been reviewed by myself prior to disposition.    MDM    After the initial H&P, I discussed pertinent information from history and physical exam with patient/family.  Discussed differential diagnosis.  Discussed plan for ED evaluation/workup/treatment.  All questions answered.  Patient/family is agreeable with plan.  ED Course as of 02/13/25 1612   Thu Feb 13, 2025   1412 My differential diagnosis for dyspnea includes but is not limited to:  Asthma, COPD, pneumonia, pulmonary embolus, acute respiratory distress syndrome, pneumothorax, pleural effusion, pulmonary fibrosis, congestive heart failure, myocardial infarction, DKA, uremia, acidosis, sepsis, anemia, drug related, hyperventilation, CNS disease     [JG]   1412 Reviewed chest x-ray in PACS, no pulmonary edema per my read. [JG]   1432 Patient with REBECCA, creatinine elevated 2.15, BUN 69.  Last prior creatinine from 3 years ago was 1.05.  Lasix had been ordered but given severity of REBECCA as well as soft blood pressure with current systolic of 90, no diuretics at present.  Plan for admission for further evaluation and treatment.  Consulting  hospitalist for admission. [JG]   1435 Patient reassessed, discussed ED workup and results, discussed plan for admission.  Patient reports that her primary care is currently not Dr. Jimenez, PCP is Esperanza LEUNG.  Consulting Dr. Hooper for admission. [JG]   1453 EKG independently viewed and contemporaneously interpreted by ED physician. Time: 1448.  Rate 81.  Interpretation: Atrial fibrillation, normal axis, early R wave transition, no acute ST changes, occasional unifocal PVC present, no contiguous PVCs. [JG]   1610 Still waiting on hospitalist callback for admission. [JG]   1611 Phone call with Dr. Hooper.  Discussed the patient, relevant history, exam, diagnostics, ED findings/progress, and concerns. They agree to admit the patient to telemetry observation. Care assumed by the admitting physician at this time.     [JG]      ED Course User Index  [JG] Peng Murdock MD       AS OF 16:12 EST VITALS:    BP - 108/83  HR - 80  TEMP - 97.8 °F (36.6 °C) (Tympanic)  O2 SATS - 94%    DIAGNOSIS  Final diagnoses:   REBECCA (acute kidney injury)   Bilateral lower extremity edema   Anemia, unspecified type   Elevated troponin         DISPOSITION  ADMISSION    Discussed treatment plan and reason for admission with pt/family and admitting physician.  Pt/family voiced understanding of the plan for admission for further testing/treatment as needed.        Peng Murdock MD  02/13/25 0307

## 2025-02-13 NOTE — ED NOTES
Nursing report ED to floor  Marilu Avery  69 y.o.  female    HPI :  HPI  Stated Reason for Visit: Fall  History Obtained From: patient, EMS  Precipitating Event(s): fall    Chief Complaint  Chief Complaint   Patient presents with    Fall     Patient lowered self to floor       Admitting doctor:   Nathan Hooper MD    Admitting diagnosis:   The primary encounter diagnosis was REBECCA (acute kidney injury). Diagnoses of Bilateral lower extremity edema, Anemia, unspecified type, and Elevated troponin were also pertinent to this visit.    Code status:   Current Code Status       Date Active Code Status Order ID Comments User Context       Prior            Allergies:   Patient has no known allergies.    Isolation:   No active isolations    Intake and Output  No intake or output data in the 24 hours ending 02/13/25 1620    Weight:   There were no vitals filed for this visit.    Most recent vitals:   Vitals:    02/13/25 1501 02/13/25 1531 02/13/25 1534 02/13/25 1601   BP: 98/73 108/83  110/74   BP Location:       Patient Position:       Pulse:   80 78   Resp:       Temp:       TempSrc:       SpO2:   94% 98%       Active LDAs/IV Access:   Lines, Drains & Airways       Active LDAs       Name Placement date Placement time Site Days    Peripheral IV 02/13/25 1348 Left Antecubital 02/13/25  1348  Antecubital  less than 1                    Labs (abnormal labs have a star):   Labs Reviewed   PROTIME-INR - Abnormal; Notable for the following components:       Result Value    Protime 21.2 (*)     INR 1.82 (*)     All other components within normal limits   COMPREHENSIVE METABOLIC PANEL - Abnormal; Notable for the following components:    BUN 69 (*)     Creatinine 2.15 (*)     Sodium 135 (*)     Albumin 3.2 (*)     Alkaline Phosphatase 150 (*)     BUN/Creatinine Ratio 32.1 (*)     eGFR 24.5 (*)     All other components within normal limits    Narrative:     GFR Categories in Chronic Kidney Disease (CKD)      GFR Category          GFR  (mL/min/1.73)    Interpretation  G1                     90 or greater         Normal or high (1)  G2                      60-89                Mild decrease (1)  G3a                   45-59                Mild to moderate decrease  G3b                   30-44                Moderate to severe decrease  G4                    15-29                Severe decrease  G5                    14 or less           Kidney failure          (1)In the absence of evidence of kidney disease, neither GFR category G1 or G2 fulfill the criteria for CKD.    eGFR calculation 2021 CKD-EPI creatinine equation, which does not include race as a factor   TROPONIN - Abnormal; Notable for the following components:    HS Troponin T 38 (*)     All other components within normal limits    Narrative:     High Sensitive Troponin T Reference Range:  <14.0 ng/L- Negative Female for AMI  <22.0 ng/L- Negative Male for AMI  >=14 - Abnormal Female indicating possible myocardial injury.  >=22 - Abnormal Male indicating possible myocardial injury.   Clinicians would have to utilize clinical acumen, EKG, Troponin, and serial changes to determine if it is an Acute Myocardial Infarction or myocardial injury due to an underlying chronic condition.        BNP (IN-HOUSE) - Abnormal; Notable for the following components:    proBNP 4,044.0 (*)     All other components within normal limits    Narrative:     This assay is used as an aid in the diagnosis of individuals suspected of having heart failure. It can be used as an aid in the diagnosis of acute decompensated heart failure (ADHF) in patients presenting with signs and symptoms of ADHF to the emergency department (ED). In addition, NT-proBNP of <300 pg/mL indicates ADHF is not likely.    Age Range Result Interpretation  NT-proBNP Concentration (pg/mL:      <50             Positive            >450                   Gray                 300-450                    Negative             <300    50-75            Positive            >900                  Gray                300-900                  Negative            <300      >75             Positive            >1800                  Gray                300-1800                  Negative            <300   CBC WITH AUTO DIFFERENTIAL - Abnormal; Notable for the following components:    RBC 3.39 (*)     Hemoglobin 11.4 (*)     .5 (*)     MCH 33.6 (*)     Lymphocyte % 18.2 (*)     Monocyte % 12.9 (*)     Lymphocytes, Absolute 0.68 (*)     All other components within normal limits   HIGH SENSITIVITIY TROPONIN T 1HR - Abnormal; Notable for the following components:    HS Troponin T 41 (*)     All other components within normal limits    Narrative:     High Sensitive Troponin T Reference Range:  <14.0 ng/L- Negative Female for AMI  <22.0 ng/L- Negative Male for AMI  >=14 - Abnormal Female indicating possible myocardial injury.  >=22 - Abnormal Male indicating possible myocardial injury.   Clinicians would have to utilize clinical acumen, EKG, Troponin, and serial changes to determine if it is an Acute Myocardial Infarction or myocardial injury due to an underlying chronic condition.        APTT - Normal   MAGNESIUM - Normal   CBC AND DIFFERENTIAL    Narrative:     The following orders were created for panel order CBC & Differential.  Procedure                               Abnormality         Status                     ---------                               -----------         ------                     CBC Auto Differential[[id number]]        Abnormal            Final result                 Please view results for these tests on the individual orders.       EKG:   ECG 12 Lead Other; LE edema   Preliminary Result   HEART RATE=81  bpm   RR Qigxnnbp=558  ms   WY Interval=  ms   P Horizontal Axis=  deg   P Front Axis=  deg   QRSD Interval=96  ms   QT Dppdqhqh=956  ms   THqE=711  ms   QRS Axis=27  deg   T Wave Axis=41  deg   - ABNORMAL ECG -   Atrial fibrillation   Ventricular  bigeminy   Low voltage, extremity and precordial leads   Abnormal R-wave progression, early transition   Date and Time of Study:2025-02-13 14:48:16          Meds given in ED:   Medications - No data to display    Imaging results:  XR Chest 1 View    Result Date: 2/13/2025  Minimal likely atelectasis at the right base. Cardiomegaly. Follow-up as clinical indications persist.  This report was finalized on 2/13/2025 2:09 PM by Dr. Herrera Mistry M.D on Workstation: [id number]       Ambulatory status:   - assist x 2    Social issues:   Social History     Socioeconomic History    Marital status:    Tobacco Use    Smoking status: Every Day     Current packs/day: 1.00     Types: Cigarettes    Smokeless tobacco: Never    Tobacco comments:     admits to smoking since age 56    Vaping Use    Vaping status: Never Used   Substance and Sexual Activity    Alcohol use: Yes     Comment: 3 drinks daily Daily caffine    Drug use: Never    Sexual activity: Defer       Peripheral Neurovascular  Peripheral Neurovascular (Adult)  Peripheral Neurovascular WDL: .WDL except, pulse assessment  Pulse Assessment: dorsalis pedis  Additional Documentation: Edema (Group)  Edema  Edema: leg, left, leg, right  Leg, Left Edema: 4+ (Severe)  Leg, Right Edema: 4+ (Severe)    Neuro Cognitive  Neuro Cognitive (Adult)  Cognitive/Neuro/Behavioral WDL: orientation, WDL  Orientation: oriented x 4    Learning  Learning Assessment  Learning Readiness and Ability: no barriers identified  Education Provided  Person Taught: patient  Teaching Focus: symptom/problem overview  Education Outcome Evaluation: verbalizes understanding    Respiratory  Respiratory  Airway WDL: WDL  Respiratory WDL  Respiratory WDL: rhythm/pattern, .WDL except  Rhythm/Pattern, Respiratory: shortness of breath (with exertion)    Abdominal Pain       Pain Assessments  Pain (Adult)  (0-10) Pain Rating: Rest: 0    NIH Stroke Scale       Esperanza Villeda, RN  02/13/25 16:20 EST

## 2025-02-14 ENCOUNTER — APPOINTMENT (OUTPATIENT)
Dept: CARDIOLOGY | Facility: HOSPITAL | Age: 70
End: 2025-02-14
Payer: MEDICARE

## 2025-02-14 ENCOUNTER — APPOINTMENT (OUTPATIENT)
Dept: ULTRASOUND IMAGING | Facility: HOSPITAL | Age: 70
End: 2025-02-14
Payer: MEDICARE

## 2025-02-14 PROBLEM — I50.33 ACUTE ON CHRONIC DIASTOLIC (CONGESTIVE) HEART FAILURE: Status: ACTIVE | Noted: 2025-02-14

## 2025-02-14 LAB
ALBUMIN SERPL-MCNC: 2.9 G/DL (ref 3.5–5.2)
ALBUMIN/GLOB SERPL: 1.2 G/DL
ALP SERPL-CCNC: 131 U/L (ref 39–117)
ALT SERPL W P-5'-P-CCNC: 14 U/L (ref 1–33)
ANION GAP SERPL CALCULATED.3IONS-SCNC: 13 MMOL/L (ref 5–15)
AORTIC DIMENSIONLESS INDEX: 0.77 (DI)
ASCENDING AORTA: 3.3 CM
AST SERPL-CCNC: 27 U/L (ref 1–32)
AV MEAN PRESS GRAD SYS DOP V1V2: 3.4 MMHG
AV VMAX SYS DOP: 132.6 CM/SEC
BACTERIA UR QL AUTO: ABNORMAL /HPF
BASOPHILS # BLD AUTO: 0.02 10*3/MM3 (ref 0–0.2)
BASOPHILS NFR BLD AUTO: 0.6 % (ref 0–1.5)
BH CV ECHO MEAS - ACS: 2.17 CM
BH CV ECHO MEAS - AO MAX PG: 7 MMHG
BH CV ECHO MEAS - AO V2 VTI: 20.5 CM
BH CV ECHO MEAS - AVA(I,D): 2.49 CM2
BH CV ECHO MEAS - EDV(CUBED): 50.7 ML
BH CV ECHO MEAS - EDV(MOD-SP2): 118 ML
BH CV ECHO MEAS - EDV(MOD-SP4): 104 ML
BH CV ECHO MEAS - EF(MOD-SP2): 65.3 %
BH CV ECHO MEAS - EF(MOD-SP4): 65.4 %
BH CV ECHO MEAS - ESV(MOD-SP2): 41 ML
BH CV ECHO MEAS - ESV(MOD-SP4): 36 ML
BH CV ECHO MEAS - IVS/LVPW: 1 CM
BH CV ECHO MEAS - IVSD: 0.9 CM
BH CV ECHO MEAS - LV DIASTOLIC VOL/BSA (35-75): 44.4 CM2
BH CV ECHO MEAS - LV MASS(C)D: 96.9 GRAMS
BH CV ECHO MEAS - LV MAX PG: 2.8 MMHG
BH CV ECHO MEAS - LV MEAN PG: 1.26 MMHG
BH CV ECHO MEAS - LV SYSTOLIC VOL/BSA (12-30): 15.4 CM2
BH CV ECHO MEAS - LV V1 MAX: 83.8 CM/SEC
BH CV ECHO MEAS - LV V1 VTI: 16 CM
BH CV ECHO MEAS - LVIDD: 3.7 CM
BH CV ECHO MEAS - LVOT AREA: 3.2 CM2
BH CV ECHO MEAS - LVOT DIAM: 2.01 CM
BH CV ECHO MEAS - LVPWD: 0.9 CM
BH CV ECHO MEAS - MR MAX PG: 81.3 MMHG
BH CV ECHO MEAS - MR MAX VEL: 450.8 CM/SEC
BH CV ECHO MEAS - MV DEC SLOPE: 580 CM/SEC2
BH CV ECHO MEAS - MV DEC TIME: 0.18 SEC
BH CV ECHO MEAS - MV E MAX VEL: 91.7 CM/SEC
BH CV ECHO MEAS - MV P1/2T: 31.5 MSEC
BH CV ECHO MEAS - MVA(P1/2T): 7 CM2
BH CV ECHO MEAS - PA ACC TIME: 0.11 SEC
BH CV ECHO MEAS - PA V2 MAX: 67.8 CM/SEC
BH CV ECHO MEAS - RV MAX PG: 2.03 MMHG
BH CV ECHO MEAS - RV V1 MAX: 71.3 CM/SEC
BH CV ECHO MEAS - RV V1 VTI: 11.6 CM
BH CV ECHO MEAS - SV(LVOT): 51 ML
BH CV ECHO MEAS - SV(MOD-SP2): 77 ML
BH CV ECHO MEAS - SV(MOD-SP4): 68 ML
BH CV ECHO MEAS - SVI(LVOT): 21.8 ML/M2
BH CV ECHO MEAS - SVI(MOD-SP2): 32.9 ML/M2
BH CV ECHO MEAS - SVI(MOD-SP4): 29 ML/M2
BH CV XLRA - TDI S': 12.1 CM/SEC
BILIRUB SERPL-MCNC: 0.7 MG/DL (ref 0–1.2)
BILIRUB UR QL STRIP: NEGATIVE
BUN SERPL-MCNC: 67 MG/DL (ref 8–23)
BUN/CREAT SERPL: 36.2 (ref 7–25)
CALCIUM SPEC-SCNC: 8.9 MG/DL (ref 8.6–10.5)
CHLORIDE SERPL-SCNC: 101 MMOL/L (ref 98–107)
CHLORIDE UR-SCNC: 70 MMOL/L
CHOLEST SERPL-MCNC: 92 MG/DL (ref 0–200)
CK SERPL-CCNC: 27 U/L (ref 20–180)
CLARITY UR: CLEAR
CO2 SERPL-SCNC: 25 MMOL/L (ref 22–29)
COLOR UR: YELLOW
CREAT SERPL-MCNC: 1.85 MG/DL (ref 0.57–1)
CREAT UR-MCNC: 58.3 MG/DL
DEPRECATED RDW RBC AUTO: 54.1 FL (ref 37–54)
EGFRCR SERPLBLD CKD-EPI 2021: 29.4 ML/MIN/1.73
EOSINOPHIL # BLD AUTO: 0.04 10*3/MM3 (ref 0–0.4)
EOSINOPHIL NFR BLD AUTO: 1.2 % (ref 0.3–6.2)
EOSINOPHIL SPEC QL MICRO: 0 % EOS/100 CELLS (ref 0–0)
ERYTHROCYTE [DISTWIDTH] IN BLOOD BY AUTOMATED COUNT: 14.4 % (ref 12.3–15.4)
GLOBULIN UR ELPH-MCNC: 2.5 GM/DL
GLUCOSE SERPL-MCNC: 96 MG/DL (ref 65–99)
GLUCOSE UR STRIP-MCNC: NEGATIVE MG/DL
HBA1C MFR BLD: 5.3 % (ref 4.8–5.6)
HCT VFR BLD AUTO: 31.4 % (ref 34–46.6)
HDLC SERPL-MCNC: 57 MG/DL (ref 40–60)
HGB BLD-MCNC: 10.4 G/DL (ref 12–15.9)
HGB UR QL STRIP.AUTO: NEGATIVE
HYALINE CASTS UR QL AUTO: ABNORMAL /LPF
IMM GRANULOCYTES # BLD AUTO: 0.01 10*3/MM3 (ref 0–0.05)
IMM GRANULOCYTES NFR BLD AUTO: 0.3 % (ref 0–0.5)
KETONES UR QL STRIP: NEGATIVE
LDLC SERPL CALC-MCNC: 20 MG/DL (ref 0–100)
LDLC/HDLC SERPL: 0.37 {RATIO}
LEFT ATRIUM VOLUME INDEX: 44.9 ML/M2
LEUKOCYTE ESTERASE UR QL STRIP.AUTO: ABNORMAL
LV EF BIPLANE MOD: 65.1 %
LYMPHOCYTES # BLD AUTO: 0.86 10*3/MM3 (ref 0.7–3.1)
LYMPHOCYTES NFR BLD AUTO: 25.5 % (ref 19.6–45.3)
MCH RBC QN AUTO: 34.4 PG (ref 26.6–33)
MCHC RBC AUTO-ENTMCNC: 33.1 G/DL (ref 31.5–35.7)
MCV RBC AUTO: 104 FL (ref 79–97)
MONOCYTES # BLD AUTO: 0.51 10*3/MM3 (ref 0.1–0.9)
MONOCYTES NFR BLD AUTO: 15.1 % (ref 5–12)
NEUTROPHILS NFR BLD AUTO: 1.93 10*3/MM3 (ref 1.7–7)
NEUTROPHILS NFR BLD AUTO: 57.3 % (ref 42.7–76)
NITRITE UR QL STRIP: NEGATIVE
NRBC BLD AUTO-RTO: 0 /100 WBC (ref 0–0.2)
PH UR STRIP.AUTO: 5.5 [PH] (ref 5–8)
PLATELET # BLD AUTO: 162 10*3/MM3 (ref 140–450)
PMV BLD AUTO: 10.1 FL (ref 6–12)
POTASSIUM SERPL-SCNC: 3.8 MMOL/L (ref 3.5–5.2)
PROT ?TM UR-MCNC: 5.7 MG/DL
PROT SERPL-MCNC: 5.4 G/DL (ref 6–8.5)
PROT UR QL STRIP: NEGATIVE
RBC # BLD AUTO: 3.02 10*6/MM3 (ref 3.77–5.28)
RBC # UR STRIP: ABNORMAL /HPF
REF LAB TEST METHOD: ABNORMAL
SINUS: 2.7 CM
SODIUM SERPL-SCNC: 139 MMOL/L (ref 136–145)
SODIUM UR-SCNC: 74 MMOL/L
SP GR UR STRIP: 1.01 (ref 1–1.03)
SQUAMOUS #/AREA URNS HPF: ABNORMAL /HPF
STJ: 2.7 CM
TRIGL SERPL-MCNC: 70 MG/DL (ref 0–150)
TROPONIN T SERPL HS-MCNC: 42 NG/L
TSH SERPL DL<=0.05 MIU/L-ACNC: 0.49 UIU/ML (ref 0.27–4.2)
UROBILINOGEN UR QL STRIP: ABNORMAL
UUN 24H UR-MCNC: 464 MG/DL
VLDLC SERPL-MCNC: 15 MG/DL (ref 5–40)
WBC # UR STRIP: ABNORMAL /HPF
WBC NRBC COR # BLD AUTO: 3.37 10*3/MM3 (ref 3.4–10.8)

## 2025-02-14 PROCEDURE — 80053 COMPREHEN METABOLIC PANEL: CPT | Performed by: HOSPITALIST

## 2025-02-14 PROCEDURE — 82550 ASSAY OF CK (CPK): CPT | Performed by: INTERNAL MEDICINE

## 2025-02-14 PROCEDURE — 85025 COMPLETE CBC W/AUTO DIFF WBC: CPT | Performed by: HOSPITALIST

## 2025-02-14 PROCEDURE — 82570 ASSAY OF URINE CREATININE: CPT | Performed by: INTERNAL MEDICINE

## 2025-02-14 PROCEDURE — 84156 ASSAY OF PROTEIN URINE: CPT | Performed by: INTERNAL MEDICINE

## 2025-02-14 PROCEDURE — 94799 UNLISTED PULMONARY SVC/PX: CPT

## 2025-02-14 PROCEDURE — 80061 LIPID PANEL: CPT | Performed by: HOSPITALIST

## 2025-02-14 PROCEDURE — 93306 TTE W/DOPPLER COMPLETE: CPT

## 2025-02-14 PROCEDURE — 82436 ASSAY OF URINE CHLORIDE: CPT | Performed by: INTERNAL MEDICINE

## 2025-02-14 PROCEDURE — 97162 PT EVAL MOD COMPLEX 30 MIN: CPT

## 2025-02-14 PROCEDURE — 87205 SMEAR GRAM STAIN: CPT | Performed by: INTERNAL MEDICINE

## 2025-02-14 PROCEDURE — 25510000001 PERFLUTREN 6.52 MG/ML SUSPENSION 2 ML VIAL: Performed by: HOSPITALIST

## 2025-02-14 PROCEDURE — 93306 TTE W/DOPPLER COMPLETE: CPT | Performed by: STUDENT IN AN ORGANIZED HEALTH CARE EDUCATION/TRAINING PROGRAM

## 2025-02-14 PROCEDURE — 84443 ASSAY THYROID STIM HORMONE: CPT | Performed by: HOSPITALIST

## 2025-02-14 PROCEDURE — 81001 URINALYSIS AUTO W/SCOPE: CPT | Performed by: INTERNAL MEDICINE

## 2025-02-14 PROCEDURE — 84540 ASSAY OF URINE/UREA-N: CPT | Performed by: INTERNAL MEDICINE

## 2025-02-14 PROCEDURE — 25010000002 FUROSEMIDE PER 20 MG: Performed by: HOSPITALIST

## 2025-02-14 PROCEDURE — 83036 HEMOGLOBIN GLYCOSYLATED A1C: CPT | Performed by: HOSPITALIST

## 2025-02-14 PROCEDURE — 87186 SC STD MICRODIL/AGAR DIL: CPT | Performed by: INTERNAL MEDICINE

## 2025-02-14 PROCEDURE — 97166 OT EVAL MOD COMPLEX 45 MIN: CPT

## 2025-02-14 PROCEDURE — 99222 1ST HOSP IP/OBS MODERATE 55: CPT | Performed by: STUDENT IN AN ORGANIZED HEALTH CARE EDUCATION/TRAINING PROGRAM

## 2025-02-14 PROCEDURE — 76775 US EXAM ABDO BACK WALL LIM: CPT

## 2025-02-14 PROCEDURE — 84484 ASSAY OF TROPONIN QUANT: CPT | Performed by: HOSPITALIST

## 2025-02-14 PROCEDURE — 36415 COLL VENOUS BLD VENIPUNCTURE: CPT | Performed by: HOSPITALIST

## 2025-02-14 PROCEDURE — 87086 URINE CULTURE/COLONY COUNT: CPT | Performed by: INTERNAL MEDICINE

## 2025-02-14 PROCEDURE — 84300 ASSAY OF URINE SODIUM: CPT | Performed by: INTERNAL MEDICINE

## 2025-02-14 PROCEDURE — 87077 CULTURE AEROBIC IDENTIFY: CPT | Performed by: INTERNAL MEDICINE

## 2025-02-14 RX ORDER — IPRATROPIUM BROMIDE AND ALBUTEROL SULFATE 2.5; .5 MG/3ML; MG/3ML
3 SOLUTION RESPIRATORY (INHALATION) EVERY 4 HOURS PRN
COMMUNITY
End: 2025-02-20 | Stop reason: HOSPADM

## 2025-02-14 RX ORDER — HYDROCODONE BITARTRATE AND ACETAMINOPHEN 7.5; 325 MG/1; MG/1
1 TABLET ORAL 2 TIMES DAILY PRN
Status: ON HOLD | COMMUNITY
End: 2025-02-20

## 2025-02-14 RX ORDER — HYDROCODONE BITARTRATE AND ACETAMINOPHEN 7.5; 325 MG/1; MG/1
1 TABLET ORAL 2 TIMES DAILY PRN
Status: DISCONTINUED | OUTPATIENT
Start: 2025-02-14 | End: 2025-02-16

## 2025-02-14 RX ORDER — SPIRONOLACTONE 25 MG/1
25 TABLET ORAL DAILY
COMMUNITY
End: 2025-02-20 | Stop reason: HOSPADM

## 2025-02-14 RX ORDER — WARFARIN SODIUM 2.5 MG/1
2.5 TABLET ORAL EVERY OTHER DAY
COMMUNITY
End: 2025-02-20 | Stop reason: HOSPADM

## 2025-02-14 RX ORDER — WARFARIN SODIUM 2 MG/1
2 TABLET ORAL EVERY OTHER DAY
COMMUNITY
End: 2025-02-20 | Stop reason: HOSPADM

## 2025-02-14 RX ORDER — BUMETANIDE 1 MG/1
1 TABLET ORAL 2 TIMES DAILY
COMMUNITY
End: 2025-02-20 | Stop reason: HOSPADM

## 2025-02-14 RX ADMIN — Medication 1 TABLET: at 10:11

## 2025-02-14 RX ADMIN — ATORVASTATIN CALCIUM 10 MG: 20 TABLET, FILM COATED ORAL at 20:31

## 2025-02-14 RX ADMIN — METOPROLOL TARTRATE 25 MG: 25 TABLET, FILM COATED ORAL at 20:31

## 2025-02-14 RX ADMIN — FOLIC ACID 1 MG: 1 TABLET ORAL at 10:11

## 2025-02-14 RX ADMIN — HYDROCODONE BITARTRATE AND ACETAMINOPHEN 1 TABLET: 7.5; 325 TABLET ORAL at 11:43

## 2025-02-14 RX ADMIN — APIXABAN 2.5 MG: 2.5 TABLET, FILM COATED ORAL at 20:31

## 2025-02-14 RX ADMIN — ACETAMINOPHEN 650 MG: 325 TABLET, FILM COATED ORAL at 20:31

## 2025-02-14 RX ADMIN — ARFORMOTEROL TARTRATE 15 MCG: 15 SOLUTION RESPIRATORY (INHALATION) at 21:37

## 2025-02-14 RX ADMIN — ACETAMINOPHEN 650 MG: 325 TABLET, FILM COATED ORAL at 16:02

## 2025-02-14 RX ADMIN — FUROSEMIDE 40 MG: 10 INJECTION, SOLUTION INTRAMUSCULAR; INTRAVENOUS at 18:16

## 2025-02-14 RX ADMIN — FUROSEMIDE 40 MG: 10 INJECTION, SOLUTION INTRAMUSCULAR; INTRAVENOUS at 08:01

## 2025-02-14 RX ADMIN — ALLOPURINOL 100 MG: 100 TABLET ORAL at 10:09

## 2025-02-14 RX ADMIN — ACETAMINOPHEN 650 MG: 325 TABLET, FILM COATED ORAL at 08:02

## 2025-02-14 RX ADMIN — LEVOTHYROXINE SODIUM 75 MCG: 0.07 TABLET ORAL at 10:10

## 2025-02-14 RX ADMIN — FAMOTIDINE 20 MG: 20 TABLET, FILM COATED ORAL at 18:16

## 2025-02-14 RX ADMIN — PERFLUTREN 3 ML: 6.52 INJECTION, SUSPENSION INTRAVENOUS at 09:41

## 2025-02-14 RX ADMIN — ASPIRIN 81 MG CHEWABLE TABLET 81 MG: 81 TABLET CHEWABLE at 10:10

## 2025-02-14 RX ADMIN — ACETAMINOPHEN 650 MG: 325 TABLET, FILM COATED ORAL at 04:24

## 2025-02-14 RX ADMIN — BUDESONIDE 0.5 MG: 0.5 INHALANT RESPIRATORY (INHALATION) at 21:37

## 2025-02-14 RX ADMIN — Medication 200 MG: at 10:10

## 2025-02-14 RX ADMIN — FAMOTIDINE 20 MG: 20 TABLET, FILM COATED ORAL at 08:01

## 2025-02-14 RX ADMIN — APIXABAN 2.5 MG: 2.5 TABLET, FILM COATED ORAL at 11:43

## 2025-02-14 RX ADMIN — MONTELUKAST SODIUM 10 MG: 10 TABLET, FILM COATED ORAL at 10:11

## 2025-02-14 NOTE — CONSULTS
Cardiology Hospital Consult    Patient Name: Marilu Avery  Age/Sex: 69 y.o. female  : 1955  MRN: [id number]    Date of Admission: 2025  Date of Encounter Visit: 25  Encounter Provider: Oscar Womack MD  Referring Provider: Nathan Hooper MD  Place of Service: [facility] CARDIOLOGY  Patient Care Team:  Esperanza Melton APRN as PCP - General (Nurse Practitioner)  Anatoly Jimenez MD as PCP - Family Medicine    Subjective:     Consulted for: CHF, afib     Chief Complaint: Weakness, swelling    History of Present Illness:  Marilu Avery is a 69 y.o. female history of COPD, atrial fibrillation, CKD to be a presents to the emergency department with weakness and she also noted shortness of breath and lower extremity edema.  She reports for the last several weeks to months she has been more weak causing her to have 2 falls.  She denies any loss of consciousness, dizziness preceding the events.  States she has had progressive lower extremity edema for quite some time.  She was treated for lower extremity cellulitis.  She recently had a doctor come to her house and she was taken off her furosemide and started on Bumex and spironolactone.  She only took 1 dose of this at home.  She has had progressive weakness it sounds like for quite some time to the point that she is not able to go out to doctors appointments.  She denies any chest pain.    Heart rates been in the 80s, blood pressure ranging from 90/68 to 122/62.  Labs on arrival notable for a BNP of 4000, troponin of 38 that is flat.  Creatinine of 2.15.  EKG shows atrial fibrillation with ventricular bigeminy.  No acute ischemic changes.  Chest x-ray without significant pulmonary edema.  Cardiology consulted for heart failure and A-fib management.  Last night she got 40 mg of IV Lasix and is scheduled for twice daily dosing today.            Past Medical History:  Past Medical History:   Diagnosis Date     Arthritis     Asthma     Atrial fibrillation     Colon polyps 2015    hyperplastic rectal polyp    Hyperlipidemia     Hypertension     Sleep apnea        Past Surgical History:   Procedure Laterality Date    BACK SURGERY N/A 2001    COLONOSCOPY N/A 10/22/2015    Rectal polyp-Dr. Elías Keating    HYSTERECTOMY N/A 2000    LAPAROSCOPIC CHOLECYSTECTOMY N/A 01/04/2010    Dr. Heath Whitlock    OOPHORECTOMY  2001    REPLACEMENT TOTAL KNEE Left 06/22/2015    Dr. Yo Aguayo    REPLACEMENT TOTAL KNEE Right 02/26/2015    Dr. Yo Aguayo    VENTRAL HERNIA REPAIR N/A 10/22/2015    Open reduction of incarcerated ventral hernia with layered closure-Dr. Elías Keating    VENTRAL/INCISIONAL HERNIA REPAIR N/A 6/14/2021    Procedure: OPEN VENTRAL/INCISIONAL HERNIA REPAIR WITH MESH AND APPENDECTOMY;  Surgeon: Arnulfo Leonard MD;  Location: [facility] OR;  Service: General;  Laterality: N/A;       Home Medications:   Medications Prior to Admission   Medication Sig Dispense Refill Last Dose/Taking    albuterol sulfate  (90 Base) MCG/ACT inhaler Inhale 2 puffs Every 4 (Four) Hours As Needed for Wheezing or Shortness of Air.   2/12/2025    allopurinol (ZYLOPRIM) 100 MG tablet Take 1 tablet by mouth Daily. Indications: Gout   2/13/2025    metoprolol tartrate (LOPRESSOR) 100 MG tablet Take 2 tablets by mouth 2 (Two) Times a Day. Indications: High Blood Pressure   2/13/2025    montelukast (SINGULAIR) 10 MG tablet Take 1 tablet by mouth Daily. Indications: Hayfever   2/13/2025    omeprazole (priLOSEC) 40 MG capsule Take 1 capsule by mouth Daily. Indications: Gastroesophageal Reflux Disease   2/13/2025    apixaban (ELIQUIS) 5 MG tablet tablet Take 1 tablet by mouth 2 (Two) Times a Day. 180 tablet 3     atorvastatin (LIPITOR) 80 MG tablet Take 80 mg by mouth every night at bedtime. Indications: High Amount of Fats in the Blood       B Complex Vitamins (vitamin b complex) capsule capsule Take 2 capsules by mouth Daily.        budesonide-formoterol (Symbicort) 160-4.5 MCG/ACT inhaler Inhale 2 puffs 2 (Two) Times a Day.  12     cefdinir (OMNICEF) 300 MG capsule Take 300 mg by mouth 2 (Two) Times a Day. Indications: Cellulitis       Cholecalciferol (VITAMIN D PO) Take 1 tablet by mouth Daily.       famotidine (PEPCID) 20 MG tablet Take 1 tablet by mouth 2 (Two) Times a Day Before Meals.       Fluticasone-Umeclidin-Vilant (Trelegy Ellipta) 100-62.5-25 MCG/ACT inhaler INHALE 1 PUFF BY INHALATION ROUTE ONCE DAILY AT THE SAME TIME EACH DAY       furosemide (Lasix) 40 MG tablet Take 1 tablet by mouth 2 (two) times a day. Indications: Take 80 mg for the next 5 days (Patient taking differently: Take 1 tablet by mouth 2 (two) times a day. Indications: Edema) 120 tablet 3     levothyroxine (SYNTHROID, LEVOTHROID) 75 MCG tablet Take 75 mcg by mouth Daily. Indications: Underactive Thyroid       levothyroxine (SYNTHROID, LEVOTHROID) 75 MCG tablet Take 1 tablet by mouth Daily.       losartan (COZAAR) 100 MG tablet Take 100 mg by mouth Daily. Indications: High Blood Pressure Disorder       tiotropium (Spiriva HandiHaler) 18 MCG per inhalation capsule Place 1 capsule into inhaler and inhale Daily.       vitamin D3 125 MCG (5000 UT) capsule capsule Take 2,000 Units by mouth Daily.          Allergies:  No Known Allergies    Past Social History:  Social History     Socioeconomic History    Marital status:    Tobacco Use    Smoking status: Every Day     Current packs/day: 1.00     Types: Cigarettes    Smokeless tobacco: Never    Tobacco comments:     admits to smoking since age 56    Vaping Use    Vaping status: Never Used   Substance and Sexual Activity    Alcohol use: Yes     Comment: 3 drinks daily Daily caffine    Drug use: Never    Sexual activity: Defer       Past Family History:  Family History   Problem Relation Age of Onset    No Known Problems Mother     No Known Problems Father        Review of Systems:   All systems reviewed. Pertinent  "positives identified in HPI. All other systems are negative.    Objective:   Temp:  [97.5 °F (36.4 °C)-97.8 °F (36.6 °C)] 97.5 °F (36.4 °C)  Heart Rate:  [77-93] 82  Resp:  [16-20] 16  BP: ()/(47-83) 102/68     Intake/Output Summary (Last 24 hours) at 2/14/2025 0652  Last data filed at 2/14/2025 0603  Gross per 24 hour   Intake --   Output 450 ml   Net -450 ml     Body mass index is 37.71 kg/m².      02/13/25  1850   Weight: 119 kg (262 lb 12.6 oz)     Weight change:     Physical Exam:               Constitutional: Awake, alert              Eyes: PERRLA, sclerae anicteric              HENT: NCAT, mucous membranes moist              Neck: Supple,, trachea midline              Respiratory: Clear to auscultation bilaterally, nonlabored respirations               Cardiovascular: irr irr, normal rate, JVD difficult to assess. Pitting edema to the knee, feet erythematous .               Gastrointestinal: soft, nt, nd               Psychiatric: Appropriate affect, cooperative              Neurologic: Oriented x 3, no focal deficits    Lab Review:   Results from last 7 days   Lab Units 02/13/25  1341   SODIUM mmol/L 135*   POTASSIUM mmol/L 4.0   CHLORIDE mmol/L 100   CO2 mmol/L 22.0   BUN mg/dL 69*   CREATININE mg/dL 2.15*   GLUCOSE mg/dL 94   CALCIUM mg/dL 9.2   AST (SGOT) U/L 28   ALT (SGPT) U/L 17     Results from last 7 days   Lab Units 02/13/25  1443 02/13/25  1341   HSTROP T ng/L 41* 38*     Results from last 7 days   Lab Units 02/13/25  1341   WBC 10*3/mm3 3.73   HEMOGLOBIN g/dL 11.4*   HEMATOCRIT % 35.1   PLATELETS 10*3/mm3 192     Results from last 7 days   Lab Units 02/13/25  1341   INR  1.82*   APTT seconds 29.4     Results from last 7 days   Lab Units 02/13/25  1341   MAGNESIUM mg/dL 1.7           Invalid input(s): \"LDLCALC\"  Results from last 7 days   Lab Units 02/13/25  1443 02/13/25  1341   PROBNP pg/mL 4,034.0* 4,044.0*           Echo EF Estimated  No results found for: \"ECHOEFEST\"    EKG: "     Imaging:  Imaging Results (Most Recent)       Procedure Component Value Units Date/Time    XR Chest 1 View [[id number]] Collected: 02/13/25 1406     Updated: 02/13/25 1412    Narrative:      XR CHEST 1 VW-     HISTORY: Female who is 69 years-old, lower extremity edema     TECHNIQUE: Frontal view of the chest     COMPARISON: 6/17/2021     FINDINGS: The heart is enlarged. Pulmonary vasculature is unremarkable.  Minimal likely atelectasis at the right base. No focal pulmonary  consolidation, pleural effusion, or pneumothorax. No acute osseous  process.       Impression:      Minimal likely atelectasis at the right base. Cardiomegaly.  Follow-up as clinical indications persist.     This report was finalized on 2/13/2025 2:09 PM by Dr. Herrera Mistry M.D on Workstation: [employer]               I personally viewed and interpreted the patient's EKG    Assessment/Plan:     1.  Weakness  2.  Acute on chronic heart failure, likely diastolic  3.  Longstanding persistent atrial fibrillation: Previously failed cardioversion, though not on antiarrhythmic at that time.  4.  REBECCA on CKD  5.  Hypertension  6.  COPD  7.  Obesity with abdominal ventral hernia  8.  Hyperlipidemia: On aspirin/statin      -weakness longstanding progressive and likely multifactorial.    -fairly significant LE edema, no pulmonary edema. BNP 4k.  Mildly elevated troponin that is flat consistent with heart failure.  IV diuretics with lasix 40mg IV ordered, nephrology consulted. Will defer diuretics to them.  Echo is pending.    -holding losartan.  May be a good candidate for SGLT2 inhibitor once renal function stabilizes.    -agree with reduced dose of metoprolol now and we can uptitrate slowly. She was on a max dose as outpatient and maybe contributing to symptoms.     -She reports she was recently started on Coumadin as an outpatient because she could not afford Eliquis.  Perhaps pharmacy can help with this she is admitted.    We will continue to  follow.     Thank you for allowing me to participate in the care of Marilu Avery. Feel free to contact me directly with any further questions or concerns.    Oscar Womack MD  [facility]  02/14/25  06:52 EST    EMR Dragon/Transcription disclaimer:   Part of this note may be an electronic transcription/translation of spoken language to printed text using the Dragon dictation system.

## 2025-02-14 NOTE — PLAN OF CARE
Goal Outcome Evaluation:           Progress: improving  Outcome Evaluation: Pt AOx4. On RA. NSR on monitor. On IV lasix per cardiology/nephrology. Now on 1200 fluid restrict. Had a BM got to [facility] and walked a little with PT/OT/walker. C/o R hip/knee pain related to fall a couple weeks ago- norco 7.5 working well. Urine sent for kidney labs. ECHO completed this am. Pt not in acute distress. Plan of care ongoing

## 2025-02-14 NOTE — CASE MANAGEMENT/SOCIAL WORK
Continued Stay Note  [facility]     Patient Name: Marilu Avery  MRN: [id number]  Today's Date: 2/14/2025    Admit Date: 2/13/2025    Plan: Plan home.  HARSHA Rodriguez RN   Discharge Plan       Row Name 02/14/25 1007       Plan    Plan Plan home.  HARSHA Rodriguez RN    Patient/Family in Agreement with Plan yes    Plan Comments FACE SHEET VERIFIED/ LEBRON SIGNED.  Spoke with pt at bedside. Pt's PCP is DEBBI Pedersen.  Pt lives alone in a first floor condo.  Pt is independent with ADLs. Pt has a grab bar, nebulizer, rollator and shower chair for home use if needed.  Pt gets her prescriptions at [facility].  Pt denies any issues affording medications. Pt is not current with .  Pt has been in [facility] for skilled care. Pt denies any discharge needs. Pt may need assistance with transportation home. Plan home. HARSHA Rodriguez RN                   Discharge Codes    No documentation.                 Expected Discharge Date and Time       Expected Discharge Date Expected Discharge Time    Feb 17, 2025               Sapna Rodriguez RN

## 2025-02-14 NOTE — CONSULTS
[facility]                                                                                             Nephrology Initial Consult Note    Patient Identification:  Name: Marilu Avery MRN: [id number]  Age: 69 y.o. : 1955  Sex: female  Date:2025    Requesting Physician: As per consult order.  Reason for Consultation: Acute kidney injury  Information from:patient/ family/ chart      History of Present Illness: This is a 69 y.o. year old female   with no prior history of chronic kidney disease or nephrology evaluation  Admitted with shortness of breath, volume overload and CHF exacerbation.  She is feeling better today, less short of breath, not requiring oxygen  She denies any fevers chills cough or congestion, no ill contacts.  She denies any dietary indiscretions specifically no excessive fluid intake or high salt foods.  No recent weight in our system but at 1 time her weight was 305 pounds and she is now 262.  She does admit to some prior weight loss, denies any chest pain, does feel better with the diuretics.  Echo is pending    The following medical history and medications personally reviewed by me:    Problem List:     REBECCA (acute kidney injury)      Past Medical History:  Past Medical History:   Diagnosis Date    Arthritis     Asthma     Atrial fibrillation     Colon polyps     hyperplastic rectal polyp    Hyperlipidemia     Hypertension     Sleep apnea        Past Surgical History:  Past Surgical History:   Procedure Laterality Date    BACK SURGERY N/A     COLONOSCOPY N/A 10/22/2015    Rectal polyp-Dr. Elías Keating    HYSTERECTOMY N/A     LAPAROSCOPIC CHOLECYSTECTOMY N/A 2010    Dr. Heath Whitlock    OOPHORECTOMY  2001    REPLACEMENT TOTAL KNEE Left 2015    Dr. Yo Aguayo    REPLACEMENT TOTAL KNEE Right 2015    Dr. Yo Aguayo    VENTRAL HERNIA REPAIR N/A  10/22/2015    Open reduction of incarcerated ventral hernia with layered closure-Dr. Elías Keating    VENTRAL/INCISIONAL HERNIA REPAIR N/A 6/14/2021    Procedure: OPEN VENTRAL/INCISIONAL HERNIA REPAIR WITH MESH AND APPENDECTOMY;  Surgeon: Arnulfo Leonard MD;  Location: [facility] MAIN OR;  Service: General;  Laterality: N/A;        Home Meds:   Medications Prior to Admission   Medication Sig Dispense Refill Last Dose/Taking    albuterol sulfate  (90 Base) MCG/ACT inhaler Inhale 2 puffs Every 4 (Four) Hours As Needed for Wheezing or Shortness of Air.   2/12/2025    allopurinol (ZYLOPRIM) 100 MG tablet Take 1 tablet by mouth Daily. Indications: Gout   2/13/2025    atorvastatin (LIPITOR) 80 MG tablet Take 1 tablet by mouth every night at bedtime. Indications: High Amount of Fats in the Blood   2/13/2025    bumetanide (BUMEX) 1 MG tablet Take 1 tablet by mouth 2 (Two) Times a Day.   Past Week    famotidine (PEPCID) 20 MG tablet Take 1 tablet by mouth 2 (Two) Times a Day Before Meals.   Past Week    furosemide (Lasix) 40 MG tablet Take 1 tablet by mouth 2 (two) times a day. Indications: Take 80 mg for the next 5 days (Patient taking differently: Take 1 tablet by mouth 2 (Two) Times a Day. Indications: Edema) 120 tablet 3 Past Week    HYDROcodone-acetaminophen (NORCO) 7.5-325 MG per tablet Take 1 tablet by mouth 2 (Two) Times a Day As Needed for Moderate Pain.   Past Week    ipratropium-albuterol (DUO-NEB) 0.5-2.5 mg/3 ml nebulizer Take 3 mL by nebulization Every 4 (Four) Hours As Needed for Wheezing.   Past Week    levothyroxine (SYNTHROID, LEVOTHROID) 75 MCG tablet Take 1 tablet by mouth Daily. Indications: Underactive Thyroid   2/13/2025    losartan (COZAAR) 100 MG tablet Take 1 tablet by mouth Daily. Indications: High Blood Pressure   Past Week    metoprolol tartrate (LOPRESSOR) 100 MG tablet Take 2 tablets by mouth 2 (Two) Times a Day. Indications: High Blood Pressure   2/13/2025    montelukast (SINGULAIR) 10 MG  tablet Take 1 tablet by mouth Daily. Indications: Hayfever   2/13/2025    omeprazole (priLOSEC) 40 MG capsule Take 1 capsule by mouth Daily. Indications: Gastroesophageal Reflux Disease   2/13/2025    spironolactone (ALDACTONE) 25 MG tablet Take 1 tablet by mouth Daily.   Past Week    vitamin D3 125 MCG (5000 UT) capsule capsule Take 2,000 Units by mouth Daily.   Past Week    warfarin (COUMADIN) 2 MG tablet Take 1 tablet by mouth Every Other Day.   Past Week    warfarin (COUMADIN) 2.5 MG tablet Take 1 tablet by mouth Every Other Day.   Past Week    budesonide-formoterol (Symbicort) 160-4.5 MCG/ACT inhaler Inhale 2 puffs 2 (Two) Times a Day.  12     Fluticasone-Umeclidin-Vilant (Trelegy Ellipta) 100-62.5-25 MCG/ACT inhaler INHALE 1 PUFF BY INHALATION ROUTE ONCE DAILY AT THE SAME TIME EACH DAY       levothyroxine (SYNTHROID, LEVOTHROID) 75 MCG tablet Take 1 tablet by mouth Daily.       tiotropium (Spiriva HandiHaler) 18 MCG per inhalation capsule Place 1 capsule into inhaler and inhale Daily.          Current Meds:   Current Facility-Administered Medications   Medication Dose Route Frequency Provider Last Rate Last Admin    acetaminophen (TYLENOL) 160 MG/5ML oral solution 650 mg  650 mg Oral Q6H PRN Nathan Hooper MD        acetaminophen (TYLENOL) tablet 650 mg  650 mg Oral Q4H PRN Nathan Hooper MD   650 mg at 02/14/25 0802    albuterol (PROVENTIL) nebulizer solution 0.083% 2.5 mg/3mL  2.5 mg Nebulization Q4H PRN Nathan Hooper MD        allopurinol (ZYLOPRIM) tablet 100 mg  100 mg Oral Daily Nathan Hooper MD   100 mg at 02/14/25 1009    apixaban (ELIQUIS) tablet 2.5 mg  2.5 mg Oral BID Nathan Hooper MD        arformoterol (BROVANA) nebulizer solution 15 mcg  15 mcg Nebulization BID - RT Nathan Hooper MD        And    budesonide (PULMICORT) nebulizer solution 0.5 mg  0.5 mg Nebulization BID - RT Nathan Hooper MD   0.5 mg at 02/13/25 2036    And    revefenacin (YUPELRI) nebulizer solution 175 mcg  175 mcg Nebulization  Daily - RT Nathan Hooper MD   175 mcg at 02/13/25 2036    aspirin chewable tablet 81 mg  81 mg Oral Daily Nathan Hooper MD   81 mg at 02/14/25 1010    atorvastatin (LIPITOR) tablet 10 mg  10 mg Oral Nightly Nathan Hooper MD        famotidine (PEPCID) tablet 20 mg  20 mg Oral BID AC Nathan Hooper MD   20 mg at 02/14/25 0801    folic acid (FOLVITE) tablet 1 mg  1 mg Oral Daily Nathan Hooper MD   1 mg at 02/14/25 1011    furosemide (LASIX) injection 40 mg  40 mg Intravenous Q12H Nathan Hooper MD   40 mg at 02/14/25 0801    HYDROcodone-acetaminophen (NORCO) 7.5-325 MG per tablet 1 tablet  1 tablet Oral BID PRN Nathan Hooper MD        levothyroxine (SYNTHROID, LEVOTHROID) tablet 75 mcg  75 mcg Oral Daily Nathan Hooper MD   75 mcg at 02/14/25 1010    metoprolol tartrate (LOPRESSOR) tablet 25 mg  25 mg Oral Q12H Nathan Hooper MD        montelukast (SINGULAIR) tablet 10 mg  10 mg Oral Daily Nathan Hooper MD   10 mg at 02/14/25 1011    multivitamin (THERAGRAN) tablet 1 tablet  1 tablet Oral Daily Nathan Hooper MD   1 tablet at 02/14/25 1011    polyethylene glycol (MIRALAX) packet 17 g  17 g Oral Daily Nathan Hooper MD   17 g at 02/13/25 2128    thiamine (VITAMIN B-1) tablet 200 mg  200 mg Oral Daily Nathan Hooper MD   200 mg at 02/14/25 1010       Allergies:  No Known Allergies    Social History:   Social History     Socioeconomic History    Marital status:    Tobacco Use    Smoking status: Every Day     Current packs/day: 1.00     Types: Cigarettes    Smokeless tobacco: Never    Tobacco comments:     admits to smoking since age 56    Vaping Use    Vaping status: Never Used   Substance and Sexual Activity    Alcohol use: Yes     Comment: 3 drinks daily Daily caffine    Drug use: Never    Sexual activity: Defer        Family History:  Family History   Problem Relation Age of Onset    No Known Problems Mother     No Known Problems Father         Review of Systems: as per HPI, in addition:    General:      + Weakness /  "fatigue,                       No fevers / chills                       no weight loss  HEENT;       no dysphagia   Neck:           No swelling  Respiratory: no cough / congestion/wheezing                      Complains of shortness of air   CV:              No chest pain                       No palpitations  Abdomen/GI: no nausea / vomiting                      No diarrhea, no constipation                      No abdominal pain  :             no dysuria  Endocrine:   No heat or cold intolerance  Skin:           Denies rashes or skin lesions   Vascular:   + Worsening edema  Musculoskeletal: Denies joint pain or deformities      Physical Exam:  Vitals:   Temp (24hrs), Av.7 °F (36.5 °C), Min:97.5 °F (36.4 °C), Max:97.8 °F (36.6 °C)    BP 98/63   Pulse 82   Temp 97.7 °F (36.5 °C) (Oral)   Resp 16   Ht 177.8 cm (70\")   Wt 119 kg (262 lb)   SpO2 97%   BMI 37.59 kg/m²   Intake/Output:     Intake/Output Summary (Last 24 hours) at 2025 1137  Last data filed at 2025 1017  Gross per 24 hour   Intake 240 ml   Output 450 ml   Net -210 ml        Wt Readings from Last 1 Encounters:   25 0911 119 kg (262 lb)   25 1850 119 kg (262 lb 12.6 oz)       Exam:    General Appearance:  Awake, alert, no acute distress  Obese, chronically ill-appearing   Head and Face:  Normocephalic, atraumatic   Eyes:  No icterus   ENMT: Moist mucosa   Neck: Supple  no jugular venous distention   Pulmonary:  Respiratory effort: Normal  Auscultation of lungs: Clear bilaterally  No wheezes or rhonchi  Good air movement, good expansion   Chest wall:  No tenderness or deformity   Cardiovascular:  Auscultation of the heart: Normal rhythm, no murmurs  2+ edema of bilateral lower extremities   Abdomen:  Abdomen: soft, nontender, normal bowel sounds    Musculoskeletal: No joint swelling or gross deformities    Skin: Skin inspection and palpation: No rash   Lymphatic: No focal lymphadenopathy   Psychiatric: Judgement and insight: " normal  Orientation to person place and time: normal  Mood and affect: normal       DATA:  Radiology and Labs:  The following labs independently reviewed by me, additional AM labs ordered  Old records independently reviewed showing history of CHF, normal baseline creatinine  The following radiologic studies independently viewed by me, findings chest x-ray showing atelectasis cardiomegaly, echo pending  Interval notes, chart personally reviewed by me.  I have reviewed and summarized old records as detailed above  Plan of care discussed with patient herself at bedside  New problems include REBECCA, acute CHF exacerbation      Risk/ complexity of medical care/ medical decision making: High risk, need for IV diuretics, new REBECCA new CHF exacerbation  Chronic illness with severe exacerbation or progression: CHF      Labs:   Recent Results (from the past 24 hours)   Protime-INR    Collection Time: 02/13/25  1:41 PM    Specimen: Blood   Result Value Ref Range    Protime 21.2 (H) 11.7 - 14.2 Seconds    INR 1.82 (H) 0.90 - 1.10   aPTT    Collection Time: 02/13/25  1:41 PM    Specimen: Blood   Result Value Ref Range    PTT 29.4 22.7 - 35.4 seconds   Comprehensive Metabolic Panel    Collection Time: 02/13/25  1:41 PM    Specimen: Blood   Result Value Ref Range    Glucose 94 65 - 99 mg/dL    BUN 69 (H) 8 - 23 mg/dL    Creatinine 2.15 (H) 0.57 - 1.00 mg/dL    Sodium 135 (L) 136 - 145 mmol/L    Potassium 4.0 3.5 - 5.2 mmol/L    Chloride 100 98 - 107 mmol/L    CO2 22.0 22.0 - 29.0 mmol/L    Calcium 9.2 8.6 - 10.5 mg/dL    Total Protein 6.0 6.0 - 8.5 g/dL    Albumin 3.2 (L) 3.5 - 5.2 g/dL    ALT (SGPT) 17 1 - 33 U/L    AST (SGOT) 28 1 - 32 U/L    Alkaline Phosphatase 150 (H) 39 - 117 U/L    Total Bilirubin 0.8 0.0 - 1.2 mg/dL    Globulin 2.8 gm/dL    A/G Ratio 1.1 g/dL    BUN/Creatinine Ratio 32.1 (H) 7.0 - 25.0    Anion Gap 13.0 5.0 - 15.0 mmol/L    eGFR 24.5 (L) >60.0 mL/min/1.73   High Sensitivity Troponin T    Collection Time:  02/13/25  1:41 PM    Specimen: Blood   Result Value Ref Range    HS Troponin T 38 (H) <14 ng/L   BNP    Collection Time: 02/13/25  1:41 PM    Specimen: Blood   Result Value Ref Range    proBNP 4,044.0 (H) 0.0 - 900.0 pg/mL   Magnesium    Collection Time: 02/13/25  1:41 PM    Specimen: Blood   Result Value Ref Range    Magnesium 1.7 1.6 - 2.4 mg/dL   CBC Auto Differential    Collection Time: 02/13/25  1:41 PM    Specimen: Blood   Result Value Ref Range    WBC 3.73 3.40 - 10.80 10*3/mm3    RBC 3.39 (L) 3.77 - 5.28 10*6/mm3    Hemoglobin 11.4 (L) 12.0 - 15.9 g/dL    Hematocrit 35.1 34.0 - 46.6 %    .5 (H) 79.0 - 97.0 fL    MCH 33.6 (H) 26.6 - 33.0 pg    MCHC 32.5 31.5 - 35.7 g/dL    RDW 14.1 12.3 - 15.4 %    RDW-SD 52.5 37.0 - 54.0 fl    MPV 10.0 6.0 - 12.0 fL    Platelets 192 140 - 450 10*3/mm3    Neutrophil % 66.8 42.7 - 76.0 %    Lymphocyte % 18.2 (L) 19.6 - 45.3 %    Monocyte % 12.9 (H) 5.0 - 12.0 %    Eosinophil % 1.1 0.3 - 6.2 %    Basophil % 0.5 0.0 - 1.5 %    Immature Grans % 0.5 0.0 - 0.5 %    Neutrophils, Absolute 2.49 1.70 - 7.00 10*3/mm3    Lymphocytes, Absolute 0.68 (L) 0.70 - 3.10 10*3/mm3    Monocytes, Absolute 0.48 0.10 - 0.90 10*3/mm3    Eosinophils, Absolute 0.04 0.00 - 0.40 10*3/mm3    Basophils, Absolute 0.02 0.00 - 0.20 10*3/mm3    Immature Grans, Absolute 0.02 0.00 - 0.05 10*3/mm3    nRBC 0.0 0.0 - 0.2 /100 WBC   High Sensitivity Troponin T 1Hr    Collection Time: 02/13/25  2:43 PM    Specimen: Blood   Result Value Ref Range    HS Troponin T 41 (H) <14 ng/L    Troponin T Numeric Delta 3 ng/L    Troponin T % Delta 8 Abnormal if >/= 20%   BNP    Collection Time: 02/13/25  2:43 PM    Specimen: Blood   Result Value Ref Range    proBNP 4,034.0 (H) 0.0 - 900.0 pg/mL   ECG 12 Lead Other; LE edema    Collection Time: 02/13/25  2:48 PM   Result Value Ref Range    QT Interval 391 ms    QTC Interval 455 ms   Hemoglobin A1c    Collection Time: 02/14/25  6:47 AM    Specimen: Blood   Result Value Ref  Range    Hemoglobin A1C 5.30 4.80 - 5.60 %   CBC Auto Differential    Collection Time: 02/14/25  6:47 AM    Specimen: Blood   Result Value Ref Range    WBC 3.37 (L) 3.40 - 10.80 10*3/mm3    RBC 3.02 (L) 3.77 - 5.28 10*6/mm3    Hemoglobin 10.4 (L) 12.0 - 15.9 g/dL    Hematocrit 31.4 (L) 34.0 - 46.6 %    .0 (H) 79.0 - 97.0 fL    MCH 34.4 (H) 26.6 - 33.0 pg    MCHC 33.1 31.5 - 35.7 g/dL    RDW 14.4 12.3 - 15.4 %    RDW-SD 54.1 (H) 37.0 - 54.0 fl    MPV 10.1 6.0 - 12.0 fL    Platelets 162 140 - 450 10*3/mm3    Neutrophil % 57.3 42.7 - 76.0 %    Lymphocyte % 25.5 19.6 - 45.3 %    Monocyte % 15.1 (H) 5.0 - 12.0 %    Eosinophil % 1.2 0.3 - 6.2 %    Basophil % 0.6 0.0 - 1.5 %    Immature Grans % 0.3 0.0 - 0.5 %    Neutrophils, Absolute 1.93 1.70 - 7.00 10*3/mm3    Lymphocytes, Absolute 0.86 0.70 - 3.10 10*3/mm3    Monocytes, Absolute 0.51 0.10 - 0.90 10*3/mm3    Eosinophils, Absolute 0.04 0.00 - 0.40 10*3/mm3    Basophils, Absolute 0.02 0.00 - 0.20 10*3/mm3    Immature Grans, Absolute 0.01 0.00 - 0.05 10*3/mm3    nRBC 0.0 0.0 - 0.2 /100 WBC   Comprehensive Metabolic Panel    Collection Time: 02/14/25  6:48 AM    Specimen: Blood   Result Value Ref Range    Glucose 96 65 - 99 mg/dL    BUN 67 (H) 8 - 23 mg/dL    Creatinine 1.85 (H) 0.57 - 1.00 mg/dL    Sodium 139 136 - 145 mmol/L    Potassium 3.8 3.5 - 5.2 mmol/L    Chloride 101 98 - 107 mmol/L    CO2 25.0 22.0 - 29.0 mmol/L    Calcium 8.9 8.6 - 10.5 mg/dL    Total Protein 5.4 (L) 6.0 - 8.5 g/dL    Albumin 2.9 (L) 3.5 - 5.2 g/dL    ALT (SGPT) 14 1 - 33 U/L    AST (SGOT) 27 1 - 32 U/L    Alkaline Phosphatase 131 (H) 39 - 117 U/L    Total Bilirubin 0.7 0.0 - 1.2 mg/dL    Globulin 2.5 gm/dL    A/G Ratio 1.2 g/dL    BUN/Creatinine Ratio 36.2 (H) 7.0 - 25.0    Anion Gap 13.0 5.0 - 15.0 mmol/L    eGFR 29.4 (L) >60.0 mL/min/1.73   TSH    Collection Time: 02/14/25  6:48 AM    Specimen: Blood   Result Value Ref Range    TSH 0.494 0.270 - 4.200 uIU/mL   Lipid Panel    Collection  Time: 02/14/25  6:48 AM    Specimen: Blood   Result Value Ref Range    Total Cholesterol 92 0 - 200 mg/dL    Triglycerides 70 0 - 150 mg/dL    HDL Cholesterol 57 40 - 60 mg/dL    LDL Cholesterol  20 0 - 100 mg/dL    VLDL Cholesterol 15 5 - 40 mg/dL    LDL/HDL Ratio 0.37    High Sensitivity Troponin T    Collection Time: 02/14/25  6:48 AM    Specimen: Blood   Result Value Ref Range    HS Troponin T 42 (H) <14 ng/L   Adult Transthoracic Echo Complete W/ Cont if Necessary Per Protocol    Collection Time: 02/14/25  9:40 AM   Result Value Ref Range    EF(MOD-bp) 65.1 %    LVIDd 3.7 cm    IVSd 0.90 cm    LVPWd 0.90 cm    IVS/LVPW 1.00 cm    LV Sys Vol (BSA corrected) 15.4 cm2    EDV(cubed) 50.7 ml    LV Mcclellan Vol (BSA corrected) 44.4 cm2    LV mass(C)d 96.9 grams    LVOT area 3.2 cm2    LVOT diam 2.01 cm    EDV(MOD-sp2) 118.0 ml    EDV(MOD-sp4) 104.0 ml    ESV(MOD-sp2) 41.0 ml    ESV(MOD-sp4) 36.0 ml    SV(MOD-sp2) 77.0 ml    SV(MOD-sp4) 68.0 ml    SVi(MOD-SP2) 32.9 ml/m2    SVi(MOD-SP4) 29.0 ml/m2    SVi (LVOT) 21.8 ml/m2    EF(MOD-sp2) 65.3 %    EF(MOD-sp4) 65.4 %    MV E max trung 91.7 cm/sec    MV dec time 0.18 sec    LA ESV Index (BP) 44.9 ml/m2    SV(LVOT) 51.0 ml    RV S' 12.1 cm/sec    LV V1 max 83.8 cm/sec    LV V1 max PG 2.8 mmHg    LV V1 mean PG 1.26 mmHg    LV V1 VTI 16.0 cm    Ao pk trung 132.6 cm/sec    Ao max PG 7.0 mmHg    Ao mean PG 3.4 mmHg    Ao V2 VTI 20.5 cm    CAROLYN(I,D) 2.49 cm2    MV P1/2t 31.5 msec    MVA(P1/2t) 7.0 cm2    MV dec slope 580.0 cm/sec2    MR max trung 450.8 cm/sec    MR max PG 81.3 mmHg    RV V1 max PG 2.03 mmHg    RV V1 max 71.3 cm/sec    RV V1 VTI 11.6 cm    PA V2 max 67.8 cm/sec    PA acc time 0.11 sec    ACS 2.17 cm    Sinus 2.7 cm    STJ 2.7 cm    Dimensionless Index 0.77 (DI)    Ascending aorta 3.3 cm       Radiology:  Imaging Results (Last 24 Hours)       Procedure Component Value Units Date/Time    XR Chest 1 View [[id number]] Collected: 02/13/25 1406     Updated: 02/13/25 1412     Narrative:      XR CHEST 1 VW-     HISTORY: Female who is 69 years-old, lower extremity edema     TECHNIQUE: Frontal view of the chest     COMPARISON: 6/17/2021     FINDINGS: The heart is enlarged. Pulmonary vasculature is unremarkable.  Minimal likely atelectasis at the right base. No focal pulmonary  consolidation, pleural effusion, or pneumothorax. No acute osseous  process.       Impression:      Minimal likely atelectasis at the right base. Cardiomegaly.  Follow-up as clinical indications persist.     This report was finalized on 2/13/2025 2:09 PM by Dr. Herrera Mistry M.D on Workstation: [id number]                    ASSESSMENT:     REBECCA (acute kidney injury), likely cardiorenal syndrome, will rule out secondary causes.  Volume overloaded on exam  Acute CHF exacerbation, previous EF 57%.  Repeat echo pending, normal prior systolic function indeterminate diastolic function  Obesity  Volume overload  Atrial fibrillation  COPD  Chronic hypertension but BP soft since admission      DISCUSSION/PLAN:   Await repeat echocardiogram  Will check renal ultrasound, urine studies  Agree with IV diuretics  Will monitor renal function, volume, electrolytes closely while on diuretic therapy  Monitor daily weights, check strict I's and O's  Will place on sodium restricted diet and fluid restriction  Follow-up labs in a.m.    Likely cardiorenal syndrome her renal function is already a bit improved today with diuresis  Monitor renal function, volume and electrolytes closely with IV diuretic therapy   avoid IV contrast and nephrotoxic medications     I appreciate the consult request.  Please send me a secure chat message with any nonurgent questions regarding patient care.  For any urgent patient care issues please call my office number below.      Baltazar Alcantar MD  [facility]  Office phone number: [phone]  Answering service phone number: [phone]      2/14/2025        Dictation via Dragon dictation  software

## 2025-02-14 NOTE — THERAPY EVALUATION
Patient Name: Marilu Avery  : 1955    MRN: [id number]                              Today's Date: 2025       Admit Date: 2025    Visit Dx:     ICD-10-CM ICD-9-CM   1. REBECCA (acute kidney injury)  N17.9 584.9   2. Bilateral lower extremity edema  R60.0 782.3   3. Anemia, unspecified type  D64.9 285.9   4. Elevated troponin  R79.89 790.6     Patient Active Problem List   Diagnosis    Incisional hernia, without obstruction or gangrene    Incarcerated ventral hernia    Atrial fibrillation, persistent    HLD (hyperlipidemia)    HTN (hypertension)    LISA (obstructive sleep apnea)    Stage 3b chronic kidney disease    Chronic anticoagulation    SBO (small bowel obstruction)    Tobacco abuse    Morbid obesity with BMI of 40.0-44.9, adult    COPD (chronic obstructive pulmonary disease)    Hypopotassemia    Gout    Chronic anticoagulation    Prolonged Q-T interval on ECG    REBECCA (acute kidney injury)     Past Medical History:   Diagnosis Date    Arthritis     Asthma     Atrial fibrillation     Colon polyps     hyperplastic rectal polyp    Hyperlipidemia     Hypertension     Sleep apnea      Past Surgical History:   Procedure Laterality Date    BACK SURGERY N/A     COLONOSCOPY N/A 10/22/2015    Rectal polyp-Dr. Elías Keating    HYSTERECTOMY N/A     LAPAROSCOPIC CHOLECYSTECTOMY N/A 2010    Dr. Heath Whitlock    OOPHORECTOMY  2001    REPLACEMENT TOTAL KNEE Left 2015    Dr. Yo Aguayo    REPLACEMENT TOTAL KNEE Right 2015    Dr. Yo Aguayo    VENTRAL HERNIA REPAIR N/A 10/22/2015    Open reduction of incarcerated ventral hernia with layered closure-Dr. Elías Keating    VENTRAL/INCISIONAL HERNIA REPAIR N/A 2021    Procedure: OPEN VENTRAL/INCISIONAL HERNIA REPAIR WITH MESH AND APPENDECTOMY;  Surgeon: Arnulfo Leonard MD;  Location: [facility];  Service: General;  Laterality: N/A;      General Information       Row Name 25 0915          Physical Therapy Time and  Intention    Document Type evaluation  -MG     Mode of Treatment co-treatment;physical therapy;occupational therapy;other (see comments)  -MG       Row Name 02/14/25 0915          General Information    Patient Profile Reviewed yes  -MG     Prior Level of Function independent:  Uses rollator. Owns/uses shower chair, raised toilet seat, lift chair, shower chair and bedrails. 2 recent falls, then none for over a year. States having trouble standing from low surfaces.  -MG     Existing Precautions/Restrictions fall  -MG     Barriers to Rehab none identified  -MG       Row Name 02/14/25 0915          Living Environment    People in Home alone  -MG       Row Name 02/14/25 0915          Home Main Entrance    Number of Stairs, Main Entrance none  -MG       Row Name 02/14/25 0915          Stairs Within Home, Primary    Number of Stairs, Within Home, Primary none  -MG       Row Name 02/14/25 0915          Cognition    Orientation Status (Cognition) oriented x 4  -MG       Row Name 02/14/25 0915          Safety Issues/Impairments Affecting Functional Mobility    Safety Issues Affecting Function (Mobility) insight into deficits/self-awareness  -MG     Impairments Affecting Function (Mobility) strength;pain;endurance/activity tolerance  -MG     Comment, Safety Issues/Impairments (Mobility) Co treatment medically appropriate and necessary due to patient acuity level, activity tolerance and safety of patient and staff. Evaluation established to achieve all goals in POC. Gait belt and non-skid socks donned.  -MG               User Key  (r) = Recorded By, (t) = Taken By, (c) = Cosigned By      Initials Name Provider Type    MG Angi Amaral, PT Physical Therapist                   Mobility       Row Name 02/14/25 0916          Bed Mobility    Bed Mobility supine-sit;sit-supine  -MG     Supine-Sit [place] (Bed Mobility) standby assist;verbal cues  -MG     Sit-Supine [place] (Bed Mobility) moderate assist (50% patient  effort);verbal cues  -MG     Assistive Device (Bed Mobility) head of bed elevated  -MG     Comment, (Bed Mobility) Increased time to complete. Cues for hand placement and sequencing. Required assist at LEs to return to supine. Pt stating she typically uses a gait belt as a leg  to get back into bed.  -MG       Row Name 02/14/25 0916          Sit-Stand Transfer    Sit-Stand [place] (Transfers) minimum assist (75% patient effort);verbal cues  -MG     Assistive Device (Sit-Stand Transfers) walker, front-wheeled  -MG     Comment, (Sit-Stand Transfer) Cues for hand placement. From elevated bed height.  -MG       Row Name 02/14/25 0916          Gait/Stairs (Locomotion)    [place] Level (Gait) contact guard;verbal cues  -MG     Assistive Device (Gait) walker, front-wheeled  -MG     Distance in Feet (Gait) 8  -MG     Deviations/Abnormal Patterns (Gait) gait speed decreased;stride length decreased  -MG     Bilateral Gait Deviations forward flexed posture  -MG     Comment, (Gait/Stairs) Pt amb bed to stretcher. Cues for posture. Slow, but steady pace. No overt LOB, buckling, dizziness or SOB. Pt reports it was painful to put weight on her feet.  -MG               User Key  (r) = Recorded By, (t) = Taken By, (c) = Cosigned By      Initials Name Provider Type    MG Angi Amaral, PT Physical Therapist                   Obj/Interventions       Row Name 02/14/25 0919          Range of Motion Comprehensive    General Range of Motion bilateral lower extremity ROM WFL  -MG       Row Name 02/14/25 0919          Strength Comprehensive (MMT)    General Manual Muscle Testing (MMT) Assessment other (see comments)  -MG     Comment, General Manual Muscle Testing (MMT) Assessment Generalized weakness. Grossly 4/5.  -MG       Row Name 02/14/25 0919          Balance    Comment, Balance Sitting EOB with SBA/SV. Standing w/ RW and CG/SBA. No overt LOB or buckling. MaxA for donning socks w/ OT while seated EOB.  -MG       Row  Name 02/14/25 0919          Sensory Assessment (Somatosensory)    Sensory Assessment (Somatosensory) sensation intact  -MG               User Key  (r) = Recorded By, (t) = Taken By, (c) = Cosigned By      Initials Name Provider Type    MG Angi Amaral, PT Physical Therapist                   Goals/Plan       Row Name 02/14/25 0923          Bed Mobility Goal 1 (PT)    Activity/Assistive Device (Bed Mobility Goal 1, PT) bed mobility activities, all  -MG     [place] Level/Cues Needed (Bed Mobility Goal 1, PT) modified independence  -MG     Time Frame (Bed Mobility Goal 1, PT) 1 week  -MG       Row Name 02/14/25 0923          Transfer Goal 1 (PT)    Activity/Assistive Device (Transfer Goal 1, PT) transfers, all  -MG     [place] Level/Cues Needed (Transfer Goal 1, PT) modified independence  -MG     Time Frame (Transfer Goal 1, PT) 1 week  -MG       Row Name 02/14/25 0923          Gait Training Goal 1 (PT)    Activity/Assistive Device (Gait Training Goal 1, PT) gait (walking locomotion)  -MG     [place] Level (Gait Training Goal 1, PT) modified independence  -MG     Distance (Gait Training Goal 1, PT) 80  -MG     Time Frame (Gait Training Goal 1, PT) 1 week  -MG       Row Name 02/14/25 0923          Therapy Assessment/Plan (PT)    Planned Therapy Interventions (PT) balance training;bed mobility training;gait training;home exercise program;patient/family education;stretching;strengthening;neuromuscular re-education;ROM (range of motion);postural re-education;transfer training  -MG               User Key  (r) = Recorded By, (t) = Taken By, (c) = Cosigned By      Initials Name Provider Type    MG Angi Amaral, PT Physical Therapist                   Clinical Impression       Row Name 02/14/25 0919          Pain    Pretreatment Pain Rating 9/10  -MG     Posttreatment Pain Rating 9/10  -MG     Pain Location foot  -MG     Pain Side/Orientation bilateral  -MG     Pain Management Interventions nursing  notified;exercise or physical activity utilized;positioning techniques utilized  -MG     Response to Pain Interventions activity participation with tolerable pain  -MG       Row Name 02/14/25 0919          Plan of Care Review    Plan of Care Reviewed With patient  -MG     Progress improving  -MG     Outcome Evaluation Pt is a 69 y.o. female with h/o COPD, asthma, a-fib, HTN, and CKD3 who was admitted to [facility] on 2/13/2025 with c/o SOB, BLE edema and 2 falls. Imaging (-) for acute fx. Patient is (I) at baseline, with use of a rollator, lift chair, raised toilet seat, bedrails and shower chair, and lives alone in a home w/o steps. Pt reports 2 recent falls, no falls in over a year prior to these, and has difficulty standing from low surfaces. Today, pt performed bed mobility with SBA up to ModA, required [place] for transfers from an elevated bed height with a RW, and ambulated 8' with CGA and RW. No dizziness, SOB, buckling or LOB. Pt may benefit from skilled PT services acutely to address their impaired strength, balance and endurance, as well as, improve their level of independence prior to discharge. SBAR w/ RN. Anticipate pt to DC home w/ HH PT, however may require SNF pending her progress.  -MG       Row Name 02/14/25 0919          Therapy Assessment/Plan (PT)    Rehab Potential (PT) good  -MG     Criteria for Skilled Interventions Met (PT) yes;meets criteria  -MG     Therapy Frequency (PT) 5 times/wk  -MG       Row Name 02/14/25 0919          Positioning and Restraints    Pre-Treatment Position in bed  -MG     Post Treatment Position other  -MG     Other Position with other staff  Transport  -MG               User Key  (r) = Recorded By, (t) = Taken By, (c) = Cosigned By      Initials Name Provider Type    MG Angi Amaral, PT Physical Therapist                   Outcome Measures       Row Name 02/14/25 0926 02/14/25 0000       How much help from another person do you currently need...    Turning from your back to  your side while in flat bed without using bedrails? 4  -MG 4  -MS    Moving from lying on back to sitting on the side of a flat bed without bedrails? 3  -MG 4  -MS    Moving to and from a bed to a chair (including a wheelchair)? 3  -MG 3  -MS    Standing up from a chair using your arms (e.g., wheelchair, bedside chair)? 3  -MG 3  -MS    Climbing 3-5 steps with a railing? 2  -MG 2  -MS    To walk in hospital room? 3  -MG 3  -MS    AM-PAC 6 Clicks Score (PT) 18  -MG 19  -MS              User Key  (r) = Recorded By, (t) = Taken By, (c) = Cosigned By      Initials Name Provider Type     Angi Amaral, PT Physical Therapist    Khalida Leyva, RN Registered Nurse                                 Physical Therapy Education       Title: PT OT SLP Therapies (In Progress)       Topic: Physical Therapy (In Progress)       Point: Mobility training (Not Started)       Learner Progress:  Not documented in this visit.              Point: Home exercise program (Not Started)       Learner Progress:  Not documented in this visit.              Point: Body mechanics (Done)       Learning Progress Summary            Patient Acceptance, E,TB, VU by  at 2/14/2025 0927                      Point: Precautions (Done)       Learning Progress Summary            Patient Acceptance, E,TB, VU by  at 2/14/2025 0927                                      User Key       Initials Effective Dates Name Provider Type [facility] 05/24/22 -  Angi Amaral, PT Physical Therapist PT                  PT Recommendation and Plan  Planned Therapy Interventions (PT): balance training, bed mobility training, gait training, home exercise program, patient/family education, stretching, strengthening, neuromuscular re-education, ROM (range of motion), postural re-education, transfer training  Progress: improving  Outcome Evaluation: Pt is a 69 y.o. female with h/o COPD, asthma, a-fib, HTN, and CKD3 who was admitted to [facility] on 2/13/2025 with c/o SOB, BLE  edema and 2 falls. Imaging (-) for acute fx. Patient is (I) at baseline, with use of a rollator, lift chair, raised toilet seat, bedrails and shower chair, and lives alone in a home w/o steps. Pt reports 2 recent falls, no falls in over a year prior to these, and has difficulty standing from low surfaces. Today, pt performed bed mobility with SBA up to ModA, required [place] for transfers from an elevated bed height with a RW, and ambulated 8' with CGA and RW. No dizziness, SOB, buckling or LOB. Pt may benefit from skilled PT services acutely to address their impaired strength, balance and endurance, as well as, improve their level of independence prior to discharge. SBAR w/ RN. Anticipate pt to DC home w/ HH PT, however may require SNF pending her progress.     Time Calculation:         PT Charges       Row Name 02/14/25 0927             Time Calculation    Start Time 0843  -MG      Stop Time 0857  -MG      Time Calculation (min) 14 min  -MG      PT Received On 02/14/25  -MG      PT - Next Appointment 02/17/25  -MG      PT Goal Re-Cert Due Date 02/28/25  -MG                User Key  (r) = Recorded By, (t) = Taken By, (c) = Cosigned By      Initials Name Provider Type    MG Angi Amaral, PT Physical Therapist                  Therapy Charges for Today       Code Description Service Date Service Provider Modifiers Qty    [id number] HC PT EVAL MOD COMPLEXITY 3 2/14/2025 Angi Amaral, PT GP 1            PT G-Codes  AM-PAC 6 Clicks Score (PT): 18  PT Discharge Summary  Anticipated Discharge Disposition (PT): skilled nursing facility, home with home health (Pending progress)    Angi Amaral PT  2/14/2025

## 2025-02-14 NOTE — THERAPY EVALUATION
Patient Name: Marilu Avery  : 1955    MRN: [id number]                              Today's Date: 2025       Admit Date: 2025    Visit Dx:     ICD-10-CM ICD-9-CM   1. REBECCA (acute kidney injury)  N17.9 584.9   2. Bilateral lower extremity edema  R60.0 782.3   3. Anemia, unspecified type  D64.9 285.9   4. Elevated troponin  R79.89 790.6     Patient Active Problem List   Diagnosis    Incisional hernia, without obstruction or gangrene    Incarcerated ventral hernia    Atrial fibrillation, persistent    HLD (hyperlipidemia)    HTN (hypertension)    LISA (obstructive sleep apnea)    Stage 3b chronic kidney disease    Chronic anticoagulation    SBO (small bowel obstruction)    Tobacco abuse    Morbid obesity with BMI of 40.0-44.9, adult    COPD (chronic obstructive pulmonary disease)    Hypopotassemia    Gout    Chronic anticoagulation    Prolonged Q-T interval on ECG    REBECCA (acute kidney injury)     Past Medical History:   Diagnosis Date    Arthritis     Asthma     Atrial fibrillation     Colon polyps     hyperplastic rectal polyp    Hyperlipidemia     Hypertension     Sleep apnea      Past Surgical History:   Procedure Laterality Date    BACK SURGERY N/A     COLONOSCOPY N/A 10/22/2015    Rectal polyp-Dr. Elías Keating    HYSTERECTOMY N/A     LAPAROSCOPIC CHOLECYSTECTOMY N/A 2010    Dr. Heath Whitlock    OOPHORECTOMY  2001    REPLACEMENT TOTAL KNEE Left 2015    Dr. Yo Aguayo    REPLACEMENT TOTAL KNEE Right 2015    Dr. Yo Aguayo    VENTRAL HERNIA REPAIR N/A 10/22/2015    Open reduction of incarcerated ventral hernia with layered closure-Dr. Elías Keating    VENTRAL/INCISIONAL HERNIA REPAIR N/A 2021    Procedure: OPEN VENTRAL/INCISIONAL HERNIA REPAIR WITH MESH AND APPENDECTOMY;  Surgeon: Arnulfo Leonard MD;  Location: [facility] OR;  Service: General;  Laterality: N/A;      General Information       Row Name 25 0911          OT Time and Intention     Document Type evaluation  -     Mode of Treatment occupational therapy;co-treatment  -     Patient Effort adequate  -       Row Name 02/14/25 0911          General Information    Patient Profile Reviewed yes  -     Prior Level of Function independent:;ADL's;all household mobility  has groceries delievered  -     Existing Precautions/Restrictions fall  -       Row Name 02/14/25 0911          Occupational Profile    Environmental Supports and Barriers (Occupational Profile) Home DME: bed rails, raised commode, rollator, lift chair, shower chair  -       Row Name 02/14/25 0911          Living Environment    People in Home alone  -       Row Name 02/14/25 0911          Home Main Entrance    Number of Stairs, Main Entrance none  -       Row Name 02/14/25 0911          Stairs Within Home, Primary    Number of Stairs, Within Home, Primary none  -       Row Name 02/14/25 0911          Cognition    Orientation Status (Cognition) oriented x 4  -       Row Name 02/14/25 0911          Safety Issues/Impairments Affecting Functional Mobility    Impairments Affecting Function (Mobility) strength;pain;endurance/activity tolerance  -     Comment, Safety Issues/Impairments (Mobility) PT/OT cotreatment medically appropriate and necessary due to patient acuity level, to maximize therapeutic benefit due to impaired act tolerance, and for safety of patient and staff. Treatment focused on progression of care and goals established in POC.  -               User Key  (r) = Recorded By, (t) = Taken By, (c) = Cosigned By      Initials Name Provider Type     Mariann Juares OT Occupational Therapist                     Mobility/ADL's       Row Name 02/14/25 0914          Bed Mobility    Bed Mobility supine-sit;sit-supine  -     Supine-Sit Miner (Bed Mobility) standby assist  -     Sit-Supine Miner (Bed Mobility) moderate assist (50% patient effort)  -     Comment, (Bed Mobility) pt reports  recent difficulty getting feet back into bed at home, she has been using a gait belt as a leg .  -       Row Name 02/14/25 0914          Transfers    Transfers sit-stand transfer  -[facility] Name 02/14/25 0914          Sit-Stand Transfer    Sit-Stand [place] (Transfers) minimum assist (75% patient effort);1 person assist;verbal cues  -[facility] Name 02/14/25 0914          Functional Mobility    Functional Mobility- Ind. Level contact guard assist  -     Functional Mobility- Device walker, front-wheeled  -     Functional Mobility-Distance (Feet) --  ~5  -[facility] Name 02/14/25 0914          Activities of Daily Living    BADL Assessment/Intervention lower body dressing;toileting  -[facility] Name 02/14/25 0914          Lower Body Dressing Assessment/Training    [place] Level (Lower Body Dressing) don;socks;dependent (less than 25% patient effort)  -     Comment, (Lower Body Dressing) LE edema, weakness limiting  -[facility] Name 02/14/25 0914          Toileting Assessment/Training    [place] Level (Toileting) dependent (less than 25% patient effort)  -     Comment, (Toileting) purewick, brief. Encouraged pt to mobilize to BSC or bathroom but reports unsure if she has the strength at this time.  -               User Key  (r) = Recorded By, (t) = Taken By, (c) = Cosigned By      Initials Name Provider Type    Mariann King OT Occupational Therapist                   Obj/Interventions       [facility] Name 02/14/25 0915          Range of Motion Comprehensive    General Range of Motion bilateral upper extremity ROM WNL  -[facility] Name 02/14/25 0915          Strength Comprehensive (MMT)    Comment, General Manual Muscle Testing (MMT) Assessment generalized weakness  -[facility] Name 02/14/25 0915          Balance    Balance Assessment sitting static balance;standing static balance;standing dynamic balance  -     Static Sitting Balance independent  -     Position,  Sitting Balance sitting edge of bed  -SM     Static Standing Balance contact guard  -SM     Dynamic Standing Balance contact guard  -SM     Position/Device Used, Standing Balance supported;walker, rolling  -SM               User Key  (r) = Recorded By, (t) = Taken By, (c) = Cosigned By      Initials Name Provider Type    SM Mariann Juares, OT Occupational Therapist                   Goals/Plan       Row Name 02/14/25 0936          Transfer Goal 1 (OT)    Activity/Assistive Device (Transfer Goal 1, OT) toilet;shower chair  -SM     [place] Level/Cues Needed (Transfer Goal 1, OT) modified independence  -SM     Time Frame (Transfer Goal 1, OT) short term goal (STG);2 weeks  -SM     Progress/Outcome (Transfer Goal 1, OT) goal ongoing  -       Row Name 02/14/25 0936          Dressing Goal 1 (OT)    Activity/Device (Dressing Goal 1, OT) dressing skills, all  -SM     [place]/Cues Needed (Dressing Goal 1, OT) modified independence  -SM     Time Frame (Dressing Goal 1, OT) short term goal (STG);2 weeks  -SM     Progress/Outcome (Dressing Goal 1, OT) goal ongoing  -[facility] Name 02/14/25 0936          Toileting Goal 1 (OT)    Activity/Device (Toileting Goal 1, OT) toileting skills, all  -SM     [place] Level/Cues Needed (Toileting Goal 1, OT) modified independence  -SM     Time Frame (Toileting Goal 1, OT) short term goal (STG);2 weeks  -SM     Progress/Outcome (Toileting Goal 1, OT) goal ongoing  -       Row Name 02/14/25 0936          Grooming Goal 1 (OT)    Activity/Device (Grooming Goal 1, OT) grooming skills, all  -SM     [place] (Grooming Goal 1, OT) modified independence  -SM     Time Frame (Grooming Goal 1, OT) short term goal (STG);2 weeks  -SM     Strategies/Barriers (Grooming Goal 1, OT) in standing position at sink  -SM     Progress/Outcome (Grooming Goal 1, OT) goal ongoing  -       Row Name 02/14/25 0936          Therapy Assessment/Plan (OT)    Planned Therapy Interventions (OT)  "activity tolerance training;adaptive equipment training;BADL retraining;functional balance retraining;IADL retraining;occupation/activity based interventions;patient/caregiver education/training;ROM/therapeutic exercise;strengthening exercise;transfer/mobility retraining  -               User Key  (r) = Recorded By, (t) = Taken By, (c) = Cosigned By      Initials Name Provider Type     Mariann Juares, OT Occupational Therapist                   Clinical Impression       Row Name 02/14/25 0932          Pain Assessment    Pretreatment Pain Rating 9/10  -     Posttreatment Pain Rating 9/10  -     Pain Location extremity  -     Pain Side/Orientation bilateral;lower  -     Pain Management Interventions premedicated for activity;positioning techniques utilized  -       Row Name 02/14/25 0932          Plan of Care Review    Plan of Care Reviewed With patient  -     Outcome Evaluation Pt is a 69 y.o female who is admitted to [facility] on 2/13 with SOB and LE swelling, REBECCA, elevated troponin, acute on chronic HF. Pt lives alone. She reports \"no strength\" \"noodle legs\" and is fearful of attempting a stand or mobilizing. 2 therapists present for safety as pt reports she feels she needs multi person assist, however she does better than she expected and was able to stand with min A and mobilize short distance in the room. Transport present shortly after evaluation started so limited ability to engage in ADLs. She was not able to don her socks. I did encourage her to start using the BSC and transfer to the chair later today. Her LE weakness has caused 2 recent falls at home in the past 2 weeks. Other than this she reports she hasnt fallen in years. Pending progress with therapy while admitted anticipate dc to SNF vs home with HH. Continued OT recommended to progress activity tolerance and ADL independence.  -       Row Name 02/14/25 0932          Therapy Assessment/Plan (OT)    Rehab Potential (OT) good  -     " Criteria for Skilled Therapeutic Interventions Met (OT) yes;skilled treatment is necessary  -     Therapy Frequency (OT) 5 times/wk  -       Row Name 02/14/25 0932          Therapy Plan Review/Discharge Plan (OT)    Anticipated Discharge Disposition (OT) skilled nursing facility;home with home health  -       Row Name 02/14/25 0932          Vital Signs    O2 Delivery Pre Treatment room air  -SM     O2 Delivery Intra Treatment room air  -SM     O2 Delivery Post Treatment room air  -SM       Row Name 02/14/25 0932          Positioning and Restraints    Pre-Treatment Position in bed  -SM     Post Treatment Position other  -SM     Other Position with other staff  -               User Key  (r) = Recorded By, (t) = Taken By, (c) = Cosigned By      Initials Name Provider Type    Mariann King, DAVID Occupational Therapist                   Outcome Measures       Row Name 02/14/25 0937          How much help from another is currently needed...    Putting on and taking off regular lower body clothing? 1  -SM     Bathing (including washing, rinsing, and drying) 2  -SM     Toileting (which includes using toilet bed pan or urinal) 1  -SM     Putting on and taking off regular upper body clothing 3  -SM     Taking care of personal grooming (such as brushing teeth) 3  -SM     Eating meals 4  -SM     AM-PAC 6 Clicks Score (OT) 14  -SM       Row Name 02/14/25 0926 02/14/25 0000       How much help from another person do you currently need...    Turning from your back to your side while in flat bed without using bedrails? 4  -MG 4  -MS    Moving from lying on back to sitting on the side of a flat bed without bedrails? 3  -MG 4  -MS    Moving to and from a bed to a chair (including a wheelchair)? 3  -MG 3  -MS    Standing up from a chair using your arms (e.g., wheelchair, bedside chair)? 3  -MG 3  -MS    Climbing 3-5 steps with a railing? 2  -MG 2  -MS    To walk in hospital room? 3  -MG 3  -MS    AM-PAC 6 Clicks Score  (PT) 18  -MG 19  -MS      Row Name 02/14/25 0937          Functional Assessment    Outcome Measure Options AM-PAC 6 Clicks Daily Activity (OT)  -               User Key  (r) = Recorded By, (t) = Taken By, (c) = Cosigned By      Initials Name Provider Type     Angi Amaral, PT Physical Therapist    Khalida Leyva, RN Registered Nurse    Mariann King, OT Occupational Therapist                    Occupational Therapy Education       Title: PT OT SLP Therapies (In Progress)       Topic: Occupational Therapy (In Progress)       Point: ADL training (Done)       Description:   Instruct learner(s) on proper safety adaptation and remediation techniques during self care or transfers.   Instruct in proper use of assistive devices.                  Learning Progress Summary            Patient Acceptance, E, VU by  at 2/14/2025 0937    Comment: OT goals, POC, dc recommendations                      Point: Home exercise program (Not Started)       Description:   Instruct learner(s) on appropriate technique for monitoring, assisting and/or progressing therapeutic exercises/activities.                  Learner Progress:  Not documented in this visit.              Point: Precautions (Not Started)       Description:   Instruct learner(s) on prescribed precautions during self-care and functional transfers.                  Learner Progress:  Not documented in this visit.              Point: Body mechanics (Not Started)       Description:   Instruct learner(s) on proper positioning and spine alignment during self-care, functional mobility activities and/or exercises.                  Learner Progress:  Not documented in this visit.                              User Key       Initials Effective Dates Name Provider Type Discipline     04/02/20 -  Mariann Juares, OT Occupational Therapist OT                  OT Recommendation and Plan  Planned Therapy Interventions (OT): activity tolerance training, adaptive  "equipment training, BADL retraining, functional balance retraining, IADL retraining, occupation/activity based interventions, patient/caregiver education/training, ROM/therapeutic exercise, strengthening exercise, transfer/mobility retraining  Therapy Frequency (OT): 5 times/wk  Plan of Care Review  Plan of Care Reviewed With: patient  Outcome Evaluation: Pt is a 69 y.o female who is admitted to [facility] on 2/13 with SOB and LE swelling, REBECCA, elevated troponin, acute on chronic HF. Pt lives alone. She reports \"no strength\" \"noodle legs\" and is fearful of attempting a stand or mobilizing. 2 therapists present for safety as pt reports she feels she needs multi person assist, however she does better than she expected and was able to stand with min A and mobilize short distance in the room. Transport present shortly after evaluation started so limited ability to engage in ADLs. She was not able to don her socks. I did encourage her to start using the BSC and transfer to the chair later today. Her LE weakness has caused 2 recent falls at home in the past 2 weeks. Other than this she reports she hasnt fallen in years. Pending progress with therapy while admitted anticipate dc to SNF vs home with HH. Continued OT recommended to progress activity tolerance and ADL independence.     Time Calculation:   Evaluation Complexity (OT)  Review Occupational Profile/Medical/Therapy History Complexity: expanded/moderate complexity  Assessment, Occupational Performance/Identification of Deficit Complexity: 3-5 performance deficits  Clinical Decision Making Complexity (OT): detailed assessment/moderate complexity  Overall Complexity of Evaluation (OT): moderate complexity     Time Calculation- OT       Row Name 02/14/25 0938             Time Calculation- OT    OT Start Time 0844  -      OT Stop Time 0855  -      OT Time Calculation (min) 11 min  -      OT Received On 02/14/25  -      OT - Next Appointment 02/17/25  -      OT Goal " Re-Cert Due Date 02/28/25  -         Untimed Charges    OT Eval/Re-eval Minutes 11  -SM         Total Minutes    Untimed Charges Total Minutes 11  -SM       Total Minutes 11  -SM                User Key  (r) = Recorded By, (t) = Taken By, (c) = Cosigned By      Initials Name Provider Type     Mariann Juares OT Occupational Therapist                  Therapy Charges for Today       Code Description Service Date Service Provider Modifiers Qty    70521747681 HC OT EVAL MOD COMPLEXITY 3 2/14/2025 Mariann Juares OT GO 1                 Mariann Juares OT  2/14/2025

## 2025-02-14 NOTE — CASE MANAGEMENT/SOCIAL WORK
Discharge Planning Assessment  [facility]     Patient Name: Marilu Avery  MRN: [id number]  Today's Date: 2/14/2025    Admit Date: 2/13/2025    Plan: Plan home.  HARSHA Rodriguez RN   Discharge Needs Assessment       Row Name 02/14/25 1005       Living Environment    People in Home alone    Current Living Arrangements condominium    Potentially Unsafe Housing Conditions none    In the past 12 months has the electric, gas, oil, or water company threatened to shut off services in your home? No    Primary Care Provided by self    Provides Primary Care For no one    Family Caregiver if Needed none    Quality of Family Relationships unable to assess    Able to Return to Prior Arrangements yes    Living Arrangement Comments Pt lives alone in a single story condo.       Resource/Environmental Concerns    Resource/Environmental Concerns none    Transportation Concerns none       Transportation Needs    In the past 12 months, has lack of transportation kept you from medical appointments or from getting medications? yes    In the past 12 months, has lack of transportation kept you from meetings, work, or from getting things needed for daily living? No       Food Insecurity    Within the past 12 months, you worried that your food would run out before you got the money to buy more. Never true    Within the past 12 months, the food you bought just didn't last and you didn't have money to get more. Never true       Transition Planning    Patient/Family Anticipates Transition to home    Patient/Family Anticipated Services at Transition none    Transportation Anticipated family or friend will provide;health plan transportation       Discharge Needs Assessment    Readmission Within the Last 30 Days no previous admission in last 30 days    Equipment Currently Used at Home grab bar;nebulizer;rollator;shower chair    Concerns to be Addressed no discharge needs identified;denies needs/concerns at this time    Anticipated Changes  Related to Illness none    Equipment Needed After Discharge grab bar, tub/shower;nebulizer;rollator;shower chair                   Discharge Plan       Row Name 02/14/25 1007       Plan    Plan Plan home.  HARSHA Rodriguez RN    Patient/Family in Agreement with Plan yes    Plan Comments FACE SHEET VERIFIED/ LEBRON SIGNED.  Spoke with pt at bedside. Pt's PCP is DEBBI Pedersen.  Pt lives alone in a first floor condo.  Pt is independent with ADLs. Pt has a grab bar, nebulizer, rollator and shower chair for home use if needed.  Pt gets her prescriptions at [facility].  Pt denies any issues affording medications. Pt is not current with .  Pt has been in [facility] for skilled care. Pt denies any discharge needs. Pt may need assistance with transportation home. Plan home. HARSHA Rodriguez RN                  Continued Care and Services - Admitted Since 2/13/2025    No active coordination exists for this encounter.       Expected Discharge Date and Time       Expected Discharge Date Expected Discharge Time    Feb 17, 2025            Demographic Summary       Row Name 02/14/25 1004       General Information    Admission Type observation    Arrived From emergency department    Required Notices Provided Observation Status Notice    Referral Source admission list    Reason for Consult discharge planning    Preferred Language English                   Functional Status       Row Name 02/14/25 1005       Functional Status    Usual Activity Tolerance good    Current Activity Tolerance moderate       Functional Status, IADL    Medications independent    Meal Preparation independent    Housekeeping independent    Laundry independent    Shopping independent       Mental Status    General Appearance WDL WDL                   Psychosocial    No documentation.                  Abuse/Neglect    No documentation.                  Legal    No documentation.                  Substance Abuse    No documentation.                   Patient Forms    No documentation.                     Sapna Rodriguez RN

## 2025-02-14 NOTE — PLAN OF CARE
"Goal Outcome Evaluation:  Plan of Care Reviewed With: patient           Outcome Evaluation: Pt is a 69 y.o female who is admitted to [facility] on 2/13 with SOB and LE swelling, REBECCA, elevated troponin, acute on chronic HF. Pt lives alone. She reports \"no strength\" \"noodle legs\" and is fearful of attempting a stand or mobilizing. 2 therapists present for safety as pt reports she feels she needs multi person assist, however she does better than she expected and was able to stand with min A and mobilize short distance in the room. Transport present shortly after evaluation started so limited ability to engage in ADLs. She was not able to don her socks. I did encourage her to start using the BSC and transfer to the chair later today. Her LE weakness has caused 2 recent falls at home in the past 2 weeks. Other than this she reports she hasnt fallen in years. Pending progress with therapy while admitted anticipate dc to SNF vs home with HH. Continued OT recommended to progress activity tolerance and ADL independence.    Anticipated Discharge Disposition (OT): skilled nursing facility, home with home health                        "

## 2025-02-14 NOTE — PLAN OF CARE
Goal Outcome Evaluation:  Plan of Care Reviewed With: patient        Progress: improving     Pt is a 69 y.o. female with h/o COPD, asthma, a-fib, HTN, and CKD3 who was admitted to [facility] on 2/13/2025 with c/o SOB, BLE edema and 2 falls. Imaging (-) for acute fx. Patient is (I) at baseline, with use of a rollator, lift chair, raised toilet seat, bedrails and shower chair, and lives alone in a home w/o steps. Pt reports 2 recent falls, no falls in over a year prior to these, and has difficulty standing from low surfaces. Today, pt performed bed mobility with SBA up to ModA, required Charito for transfers from an elevated bed height with a RW, and ambulated 8' with CGA and RW. No dizziness, SOB, buckling or LOB. Pt may benefit from skilled PT services acutely to address their impaired strength, balance and endurance, as well as, improve their level of independence prior to discharge. SBAR w/ RN. Anticipate pt to DC home w/ HH PT, however may require SNF pending her progress.    Anticipated Discharge Disposition (PT): skilled nursing facility, home with home health (Pending progress)

## 2025-02-14 NOTE — CONSULTS
Chp met with Pt to go over an Advance Directive. RN confirmed Pt decisional. Chp facilitated AD with Pt and answered Pt questions. Pt named Son Jameel as Health Care Surrogate. The form was completed and notarized by bart CAIN. Pt got original copy, copy was placed in chart, and a copy was faxed to HIM.

## 2025-02-15 LAB
ALBUMIN SERPL-MCNC: 3.1 G/DL (ref 3.5–5.2)
ALBUMIN/GLOB SERPL: 1.2 G/DL
ALP SERPL-CCNC: 134 U/L (ref 39–117)
ALT SERPL W P-5'-P-CCNC: 13 U/L (ref 1–33)
ANION GAP SERPL CALCULATED.3IONS-SCNC: 10 MMOL/L (ref 5–15)
AST SERPL-CCNC: 24 U/L (ref 1–32)
BASOPHILS # BLD AUTO: 0.03 10*3/MM3 (ref 0–0.2)
BASOPHILS NFR BLD AUTO: 1 % (ref 0–1.5)
BILIRUB SERPL-MCNC: 0.5 MG/DL (ref 0–1.2)
BUN SERPL-MCNC: 60 MG/DL (ref 8–23)
BUN/CREAT SERPL: 42.3 (ref 7–25)
CALCIUM SPEC-SCNC: 9.1 MG/DL (ref 8.6–10.5)
CHLORIDE SERPL-SCNC: 101 MMOL/L (ref 98–107)
CO2 SERPL-SCNC: 28 MMOL/L (ref 22–29)
CREAT SERPL-MCNC: 1.42 MG/DL (ref 0.57–1)
DEPRECATED RDW RBC AUTO: 54.7 FL (ref 37–54)
EGFRCR SERPLBLD CKD-EPI 2021: 40.4 ML/MIN/1.73
EOSINOPHIL # BLD AUTO: 0.07 10*3/MM3 (ref 0–0.4)
EOSINOPHIL NFR BLD AUTO: 2.4 % (ref 0.3–6.2)
ERYTHROCYTE [DISTWIDTH] IN BLOOD BY AUTOMATED COUNT: 14.3 % (ref 12.3–15.4)
GLOBULIN UR ELPH-MCNC: 2.6 GM/DL
GLUCOSE SERPL-MCNC: 108 MG/DL (ref 65–99)
HCT VFR BLD AUTO: 33 % (ref 34–46.6)
HGB BLD-MCNC: 10.8 G/DL (ref 12–15.9)
IMM GRANULOCYTES # BLD AUTO: 0.01 10*3/MM3 (ref 0–0.05)
IMM GRANULOCYTES NFR BLD AUTO: 0.3 % (ref 0–0.5)
LYMPHOCYTES # BLD AUTO: 0.89 10*3/MM3 (ref 0.7–3.1)
LYMPHOCYTES NFR BLD AUTO: 30.7 % (ref 19.6–45.3)
MCH RBC QN AUTO: 34.6 PG (ref 26.6–33)
MCHC RBC AUTO-ENTMCNC: 32.7 G/DL (ref 31.5–35.7)
MCV RBC AUTO: 105.8 FL (ref 79–97)
MONOCYTES # BLD AUTO: 0.4 10*3/MM3 (ref 0.1–0.9)
MONOCYTES NFR BLD AUTO: 13.8 % (ref 5–12)
NEUTROPHILS NFR BLD AUTO: 1.5 10*3/MM3 (ref 1.7–7)
NEUTROPHILS NFR BLD AUTO: 51.8 % (ref 42.7–76)
PHOSPHATE SERPL-MCNC: 3.2 MG/DL (ref 2.5–4.5)
PLATELET # BLD AUTO: 164 10*3/MM3 (ref 140–450)
PMV BLD AUTO: 10.4 FL (ref 6–12)
POTASSIUM SERPL-SCNC: 3.6 MMOL/L (ref 3.5–5.2)
PROT SERPL-MCNC: 5.7 G/DL (ref 6–8.5)
RBC # BLD AUTO: 3.12 10*6/MM3 (ref 3.77–5.28)
SODIUM SERPL-SCNC: 139 MMOL/L (ref 136–145)
URATE SERPL-MCNC: 6.9 MG/DL (ref 2.4–5.7)
WBC NRBC COR # BLD AUTO: 2.9 10*3/MM3 (ref 3.4–10.8)

## 2025-02-15 PROCEDURE — 25010000002 FUROSEMIDE PER 20 MG: Performed by: HOSPITALIST

## 2025-02-15 PROCEDURE — 84100 ASSAY OF PHOSPHORUS: CPT | Performed by: INTERNAL MEDICINE

## 2025-02-15 PROCEDURE — 94761 N-INVAS EAR/PLS OXIMETRY MLT: CPT

## 2025-02-15 PROCEDURE — 94799 UNLISTED PULMONARY SVC/PX: CPT

## 2025-02-15 PROCEDURE — 80053 COMPREHEN METABOLIC PANEL: CPT | Performed by: HOSPITALIST

## 2025-02-15 PROCEDURE — 85025 COMPLETE CBC W/AUTO DIFF WBC: CPT | Performed by: HOSPITALIST

## 2025-02-15 PROCEDURE — 63710000001 REVEFENACIN 175 MCG/3ML SOLUTION: Performed by: HOSPITALIST

## 2025-02-15 PROCEDURE — 84550 ASSAY OF BLOOD/URIC ACID: CPT | Performed by: INTERNAL MEDICINE

## 2025-02-15 PROCEDURE — 94664 DEMO&/EVAL PT USE INHALER: CPT

## 2025-02-15 PROCEDURE — 99232 SBSQ HOSP IP/OBS MODERATE 35: CPT | Performed by: PHYSICIAN ASSISTANT

## 2025-02-15 RX ADMIN — FUROSEMIDE 40 MG: 10 INJECTION, SOLUTION INTRAMUSCULAR; INTRAVENOUS at 18:12

## 2025-02-15 RX ADMIN — BUDESONIDE 0.5 MG: 0.5 INHALANT RESPIRATORY (INHALATION) at 19:36

## 2025-02-15 RX ADMIN — ARFORMOTEROL TARTRATE 15 MCG: 15 SOLUTION RESPIRATORY (INHALATION) at 19:35

## 2025-02-15 RX ADMIN — LEVOTHYROXINE SODIUM 75 MCG: 0.07 TABLET ORAL at 09:29

## 2025-02-15 RX ADMIN — MONTELUKAST SODIUM 10 MG: 10 TABLET, FILM COATED ORAL at 09:29

## 2025-02-15 RX ADMIN — METOPROLOL TARTRATE 25 MG: 25 TABLET, FILM COATED ORAL at 21:07

## 2025-02-15 RX ADMIN — Medication 200 MG: at 09:29

## 2025-02-15 RX ADMIN — ACETAMINOPHEN 650 MG: 325 TABLET, FILM COATED ORAL at 03:37

## 2025-02-15 RX ADMIN — FOLIC ACID 1 MG: 1 TABLET ORAL at 09:29

## 2025-02-15 RX ADMIN — APIXABAN 2.5 MG: 2.5 TABLET, FILM COATED ORAL at 21:07

## 2025-02-15 RX ADMIN — ACETAMINOPHEN 650 MG: 325 TABLET, FILM COATED ORAL at 18:12

## 2025-02-15 RX ADMIN — ATORVASTATIN CALCIUM 10 MG: 20 TABLET, FILM COATED ORAL at 21:07

## 2025-02-15 RX ADMIN — ACETAMINOPHEN 650 MG: 325 TABLET, FILM COATED ORAL at 09:29

## 2025-02-15 RX ADMIN — METOPROLOL TARTRATE 25 MG: 25 TABLET, FILM COATED ORAL at 09:29

## 2025-02-15 RX ADMIN — HYDROCODONE BITARTRATE AND ACETAMINOPHEN 1 TABLET: 7.5; 325 TABLET ORAL at 12:57

## 2025-02-15 RX ADMIN — REVEFENACIN 175 MCG: 175 SOLUTION RESPIRATORY (INHALATION) at 08:10

## 2025-02-15 RX ADMIN — ACETAMINOPHEN 650 MG: 325 TABLET, FILM COATED ORAL at 22:53

## 2025-02-15 RX ADMIN — BUDESONIDE 0.5 MG: 0.5 INHALANT RESPIRATORY (INHALATION) at 08:09

## 2025-02-15 RX ADMIN — FAMOTIDINE 20 MG: 20 TABLET, FILM COATED ORAL at 18:12

## 2025-02-15 RX ADMIN — Medication 1 TABLET: at 09:29

## 2025-02-15 RX ADMIN — FUROSEMIDE 40 MG: 10 INJECTION, SOLUTION INTRAMUSCULAR; INTRAVENOUS at 03:37

## 2025-02-15 RX ADMIN — ASPIRIN 81 MG CHEWABLE TABLET 81 MG: 81 TABLET CHEWABLE at 09:29

## 2025-02-15 RX ADMIN — ALLOPURINOL 100 MG: 100 TABLET ORAL at 09:29

## 2025-02-15 RX ADMIN — HYDROCODONE BITARTRATE AND ACETAMINOPHEN 1 TABLET: 7.5; 325 TABLET ORAL at 00:18

## 2025-02-15 RX ADMIN — ARFORMOTEROL TARTRATE 15 MCG: 15 SOLUTION RESPIRATORY (INHALATION) at 08:07

## 2025-02-15 RX ADMIN — FAMOTIDINE 20 MG: 20 TABLET, FILM COATED ORAL at 06:34

## 2025-02-15 RX ADMIN — APIXABAN 2.5 MG: 2.5 TABLET, FILM COATED ORAL at 09:29

## 2025-02-15 NOTE — PLAN OF CARE
Goal Outcome Evaluation:           Progress: improving  Outcome Evaluation: Pt AOx4- mildly anxious at times. On RA. Afib on monitor. Remains on IV lasix- per caridology. Monitoring kidney function. Wrapped legs in ACE for compression per cardiology. Swelling better today. Pt c/o Rt leg pain. Prn norco per MAR/prn tylenol. Pt not in acute distress. Plan of care ongoing     Pt legs were uncomfortable wrapped- removed per pt request- w/ relief. Skin tear redressed per pt request

## 2025-02-15 NOTE — PROGRESS NOTES
"Daily progress note    Primary care physician  Dr. Melton    Subjective  Doing better with no new complaints    History of present illness  69-year-old white female with history of COPD asthma atrial fibrillation hypertension of sleep apnea chronic kidney disease stage IIIb presented to [facility] emergency room with increased shortness of breath and leg swelling.  Patient denies any fever chest pain palpitation abdominal pain nausea vomiting diarrhea.  Patient stated that her diuretics was recently changed by her cardiologist.  Patient workup in ER revealed acute kidney injury and also have elevated troponin admitted for management.     REVIEW OF SYSTEMS  Unremarkable except generalized weakness     PHYSICAL EXAM  Blood pressure 149/76, pulse 94, temperature 97.3 °F (36.3 °C), temperature source Oral, resp. rate 16, height 177.8 cm (70\"), weight 120 kg (264 lb 15.9 oz), SpO2 100%, not currently breastfeeding.    General: Awake and alert no acute distress.  HENT: NCAT, PERRL, Nares patent.  Eyes: no scleral icterus.  Neck: trachea midline, no ROM limitations.  CV: regular rhythm, regular rate.  Respiratory: normal effort, decreased breath sound at the bases  Abdomen: soft, nondistended, NTTP, no rebound tenderness, no guarding or rigidity.  Musculoskeletal: no deformity.  Neuro: alert, moves all extremities, follows commands.  Skin: warm, dry.  2+ pitting edema bilateral lower extremities.     LAB RESULTS  Lab Results (last 24 hours)       Procedure Component Value Units Date/Time    Urine Culture - Urine, Urine, Clean Catch [[id number]] Collected: 02/14/25 1148    Specimen: Urine, Clean Catch Updated: 02/15/25 0810    Uric Acid [[id number]]  (Abnormal) Collected: 02/15/25 0629    Specimen: Blood Updated: 02/15/25 0758     Uric Acid 6.9 mg/dL     Comprehensive Metabolic Panel [[id number]]  (Abnormal) Collected: 02/15/25 0629    Specimen: Blood Updated: 02/15/25 0758     Glucose 108 mg/dL      BUN 60 mg/dL "      Creatinine 1.42 mg/dL      Sodium 139 mmol/L      Potassium 3.6 mmol/L      Chloride 101 mmol/L      CO2 28.0 mmol/L      Calcium 9.1 mg/dL      Total Protein 5.7 g/dL      Albumin 3.1 g/dL      ALT (SGPT) 13 U/L      AST (SGOT) 24 U/L      Alkaline Phosphatase 134 U/L      Total Bilirubin 0.5 mg/dL      Globulin 2.6 gm/dL      A/G Ratio 1.2 g/dL      BUN/Creatinine Ratio 42.3     Anion Gap 10.0 mmol/L      eGFR 40.4 mL/min/1.73     Narrative:      GFR Categories in Chronic Kidney Disease (CKD)      GFR Category          GFR (mL/min/1.73)    Interpretation  G1                     90 or greater         Normal or high (1)  G2                      60-89                Mild decrease (1)  G3a                   45-59                Mild to moderate decrease  G3b                   30-44                Moderate to severe decrease  G4                    15-29                Severe decrease  G5                    14 or less           Kidney failure          (1)In the absence of evidence of kidney disease, neither GFR category G1 or G2 fulfill the criteria for CKD.    eGFR calculation 2021 CKD-EPI creatinine equation, which does not include race as a factor    Phosphorus [[id number]]  (Normal) Collected: 02/15/25 0629    Specimen: Blood Updated: 02/15/25 0758     Phosphorus 3.2 mg/dL     CBC & Differential [[id number]]  (Abnormal) Collected: 02/15/25 0629    Specimen: Blood Updated: 02/15/25 0746    Narrative:      The following orders were created for panel order CBC & Differential.  Procedure                               Abnormality         Status                     ---------                               -----------         ------                     CBC Auto Differential[[id number]]        Abnormal            Final result                 Please view results for these tests on the individual orders.    CBC Auto Differential [[id number]]  (Abnormal) Collected: 02/15/25 0629    Specimen: Blood Updated: 02/15/25 0746      WBC 2.90 10*3/mm3      RBC 3.12 10*6/mm3      Hemoglobin 10.8 g/dL      Hematocrit 33.0 %      .8 fL      MCH 34.6 pg      MCHC 32.7 g/dL      RDW 14.3 %      RDW-SD 54.7 fl      MPV 10.4 fL      Platelets 164 10*3/mm3      Neutrophil % 51.8 %      Lymphocyte % 30.7 %      Monocyte % 13.8 %      Eosinophil % 2.4 %      Basophil % 1.0 %      Immature Grans % 0.3 %      Neutrophils, Absolute 1.50 10*3/mm3      Lymphocytes, Absolute 0.89 10*3/mm3      Monocytes, Absolute 0.40 10*3/mm3      Eosinophils, Absolute 0.07 10*3/mm3      Basophils, Absolute 0.03 10*3/mm3      Immature Grans, Absolute 0.01 10*3/mm3           Imaging Results (Last 24 Hours)       Procedure Component Value Units Date/Time    US Renal Bilateral [[id number]] Collected: 02/14/25 1708     Updated: 02/14/25 1718    Narrative:      RENAL ULTRASOUND     HISTORY: Acute renal failure     COMPARISON: CT abdomen pelvis 6/13/2021     TECHNIQUE: Grayscale, color Doppler images of the kidneys and bladder  were obtained.     FINDINGS:     Grayscale and color Doppler images of the kidneys and bladder were  obtained.     The right kidney measures 8.7 cm in length.     The left kidney measures 9.7 cm in length.     A 2.8 x 2.8 x 2.6 cm hyperechoic lesion corresponding with a fat density  lesion on prior CT.     A 1.5 cm anechoic versus hypoechoic left renal lesion without internal  color Doppler flow.     A 1.6 cm anechoic right renal cyst. A 1.2 cm anechoic likely left renal  cyst with associated small echogenic focus posteriorly. Additional  subcentimeter left renal lesions too small to characterize.     Normal cortical echogenicity and thickness. No hydronephrosis.      The bladder is unremarkable.     Visualized portions of the aorta and IVC are normal in caliber and  appearance.             Impression:      1. No hydronephrosis.  2. Indeterminate 1.5 cm anechoic versus hypoechoic left renal lesion can  be further evaluated with a follow-up nonemergent  outpatient multiphase  abdominal CT or MRI with IV contrast.  3. A 2.8 cm right angiomyolipoma.        This report was finalized on 2/14/2025 5:15 PM by Dr. Sigifredo Swanson M.D on Workstation: [id number]             Scan on 2/13/2025 1450 by New Onbase, [place]: ECG 12-LEAD         Author: -- Service: -- Author Type: --   Filed: Date of Service: Creation Time:   Status: (Other)   HEART RATE=81  bpm  RR Jdzeiwkn=287  ms  OH Interval=  ms  P Horizontal Axis=  deg  P Front Axis=  deg  QRSD Interval=96  ms  QT Gbdzgzlm=459  ms  WDqA=974  ms  QRS Axis=27  deg  T Wave Axis=41  deg  - ABNORMAL ECG -  Atrial fibrillation  Ventricular bigeminy  Low voltage, extremity and precordial leads  Abnormal R-wave progression, early transition          Current Facility-Administered Medications:     acetaminophen (TYLENOL) 160 MG/5ML oral solution 650 mg, 650 mg, Oral, Q6H PRN, Nathan Hooper MD    acetaminophen (TYLENOL) tablet 650 mg, 650 mg, Oral, Q4H PRN, Nathan Hooper MD, 650 mg at 02/15/25 0929    albuterol (PROVENTIL) nebulizer solution 0.083% 2.5 mg/3mL, 2.5 mg, Nebulization, Q4H PRN, Ntahan Hooper MD    allopurinol (ZYLOPRIM) tablet 100 mg, 100 mg, Oral, Daily, Nathan Hooper MD, 100 mg at 02/15/25 0929    apixaban (ELIQUIS) tablet 2.5 mg, 2.5 mg, Oral, BID, Nathan Hooper MD, 2.5 mg at 02/15/25 0929    arformoterol (BROVANA) nebulizer solution 15 mcg, 15 mcg, Nebulization, BID - RT, 15 mcg at 02/15/25 0807 **AND** budesonide (PULMICORT) nebulizer solution 0.5 mg, 0.5 mg, Nebulization, BID - RT, 0.5 mg at 02/15/25 0809 **AND** revefenacin (YUPELRI) nebulizer solution 175 mcg, 175 mcg, Nebulization, Daily - RT, Nathan Hooper MD, 175 mcg at 02/15/25 0810    aspirin chewable tablet 81 mg, 81 mg, Oral, Daily, Nathan Hooper MD, 81 mg at 02/15/25 0929    atorvastatin (LIPITOR) tablet 10 mg, 10 mg, Oral, Nightly, Nathan Hooper MD, 10 mg at 02/14/25 2031    famotidine (PEPCID) tablet 20 mg, 20 mg, Oral, BID AC, Nathan Hooper MD, 20 mg at  02/15/25 0634    folic acid (FOLVITE) tablet 1 mg, 1 mg, Oral, Daily, Leonor Hooper MD, 1 mg at 02/15/25 0929    furosemide (LASIX) injection 40 mg, 40 mg, Intravenous, Q12H, Leonor Hooper MD, 40 mg at 02/15/25 0337    HYDROcodone-acetaminophen (NORCO) 7.5-325 MG per tablet 1 tablet, 1 tablet, Oral, BID PRN, Leonor Hooper MD, 1 tablet at 02/15/25 1257    levothyroxine (SYNTHROID, LEVOTHROID) tablet 75 mcg, 75 mcg, Oral, Daily, Leonor Hooper MD, 75 mcg at 02/15/25 0929    metoprolol tartrate (LOPRESSOR) tablet 25 mg, 25 mg, Oral, Q12H, Leonor Hooper MD, 25 mg at 02/15/25 0929    montelukast (SINGULAIR) tablet 10 mg, 10 mg, Oral, Daily, Leonor Hooper MD, 10 mg at 02/15/25 0929    multivitamin (THERAGRAN) tablet 1 tablet, 1 tablet, Oral, Daily, Leonor Hooper MD, 1 tablet at 02/15/25 0929    polyethylene glycol (MIRALAX) packet 17 g, 17 g, Oral, Daily, Leonor Hooper MD, 17 g at 02/13/25 2128    thiamine (VITAMIN B-1) tablet 200 mg, 200 mg, Oral, Daily, Leonor Hooper MD, 200 mg at 02/15/25 0929     ASSESSMENT  Acute on chronic diastolic congestive heart failure  Acute kidney injury  Elevated troponin  Asymptomatic bacteriuria  Chronic atrial fibrillation  Hypertension  Hyperlipidemia  Hypothyroidism  Morbidly obese  Asthma/COPD/obstructive sleep apnea  Chronic kidney disease stage IIIb    PLAN  CPM  Continue IV diuresis  Continue Eliquis   Strict I's and O's and daily weight  Cardiology consult appreciated  Nephrology to follow patient  Adjust home medications  Stress ulcer DVT prophylaxis  Supportive care  PT OT   Discussed with nursing staff  Follow closely further recommendation current hospital course    LEONOR HOOPER MD    Copied text in this note has been reviewed and is accurate as of 02/15/25

## 2025-02-15 NOTE — SIGNIFICANT NOTE
Ultrasound Inserted IV Site: LFA    Catheter Length: 1.75 inch    Diameter: 0.27 cm    Depth: 0.75 cm      Vascular Access Score= 5  1) Palpable / Visible / Dis  2) Palpable / Viasible / Not Distended  3) Easily Palpable / Not Visibile  4) Poorly Palpable / Visible  5) Poorly / Nonpalpable / NV

## 2025-02-15 NOTE — PROGRESS NOTES
Patient Name: Marilu Avery  Age/Sex: 69 y.o. female  : 1955  MRN: [id number]    Date of Admission: 2025  Date of Encounter Visit: 02/15/25  Encounter Provider: Case Castillo PA-C  Place of Service: [facility] CARDIOLOGY      Subjective:       Chief Complaint: weakness, CHF exacerbation/REBECCA    Follow up:   02/15/25 she has been in rate controlled atrial fibrillation overnight with some PVCs that have been asymptomatic.  Echocardiogram was completed showing normal EF with indeterminate diastolic dysfunction.  She had 1.8 L out overnight.  Creatinine has improved to 1.42 on prior 2.15 on admission.      >> She does admit that she has had multiple falls at home over the last few weeks.  She still feels a little unsteady on her feet.  Her legs are still very edematous but per her account they are better.  Her breathing is almost back to baseline.    Home Medications:   Medications Prior to Admission   Medication Sig Dispense Refill Last Dose/Taking    albuterol sulfate  (90 Base) MCG/ACT inhaler Inhale 2 puffs Every 4 (Four) Hours As Needed for Wheezing or Shortness of Air.   2025    allopurinol (ZYLOPRIM) 100 MG tablet Take 1 tablet by mouth Daily. Indications: Gout   2025    atorvastatin (LIPITOR) 80 MG tablet Take 1 tablet by mouth every night at bedtime. Indications: High Amount of Fats in the Blood   2025    bumetanide (BUMEX) 1 MG tablet Take 1 tablet by mouth 2 (Two) Times a Day.   Past Week    famotidine (PEPCID) 20 MG tablet Take 1 tablet by mouth 2 (Two) Times a Day Before Meals.   Past Week    furosemide (Lasix) 40 MG tablet Take 1 tablet by mouth 2 (two) times a day. Indications: Take 80 mg for the next 5 days (Patient taking differently: Take 1 tablet by mouth 2 (Two) Times a Day. Indications: Edema) 120 tablet 3 Past Week    HYDROcodone-acetaminophen (NORCO) 7.5-325 MG per tablet Take 1 tablet by mouth 2 (Two) Times a Day As  Needed for Moderate Pain.   Past Week    ipratropium-albuterol (DUO-NEB) 0.5-2.5 mg/3 ml nebulizer Take 3 mL by nebulization Every 4 (Four) Hours As Needed for Wheezing.   Past Week    levothyroxine (SYNTHROID, LEVOTHROID) 75 MCG tablet Take 1 tablet by mouth Daily. Indications: Underactive Thyroid   2/13/2025    losartan (COZAAR) 100 MG tablet Take 1 tablet by mouth Daily. Indications: High Blood Pressure   Past Week    metoprolol tartrate (LOPRESSOR) 100 MG tablet Take 2 tablets by mouth 2 (Two) Times a Day. Indications: High Blood Pressure   2/13/2025    montelukast (SINGULAIR) 10 MG tablet Take 1 tablet by mouth Daily. Indications: Hayfever   2/13/2025    omeprazole (priLOSEC) 40 MG capsule Take 1 capsule by mouth Daily. Indications: Gastroesophageal Reflux Disease   2/13/2025    spironolactone (ALDACTONE) 25 MG tablet Take 1 tablet by mouth Daily.   Past Week    vitamin D3 125 MCG (5000 UT) capsule capsule Take 2,000 Units by mouth Daily.   Past Week    warfarin (COUMADIN) 2 MG tablet Take 1 tablet by mouth Every Other Day.   Past Week    warfarin (COUMADIN) 2.5 MG tablet Take 1 tablet by mouth Every Other Day.   Past Week    budesonide-formoterol (Symbicort) 160-4.5 MCG/ACT inhaler Inhale 2 puffs 2 (Two) Times a Day.  12     Fluticasone-Umeclidin-Vilant (Trelegy Ellipta) 100-62.5-25 MCG/ACT inhaler INHALE 1 PUFF BY INHALATION ROUTE ONCE DAILY AT THE SAME TIME EACH DAY       levothyroxine (SYNTHROID, LEVOTHROID) 75 MCG tablet Take 1 tablet by mouth Daily.       tiotropium (Spiriva HandiHaler) 18 MCG per inhalation capsule Place 1 capsule into inhaler and inhale Daily.          Allergies:  No Known Allergies      Review of Systems:  All systems reviewed. Pertinent positives identified in HPI. All other systems are negative.       Objective:     Objective:  Temp:  [97.3 °F (36.3 °C)-97.5 °F (36.4 °C)] 97.3 °F (36.3 °C)  Heart Rate:  [82-92] 86  Resp:  [16] 16  BP: ()/(40-84) 136/84    Intake/Output Summary  (Last 24 hours) at 2/15/2025 1000  Last data filed at 2/15/2025 0741  Gross per 24 hour   Intake 840 ml   Output 2150 ml   Net -1310 ml     Body mass index is 38.02 kg/m².      02/13/25  1850 02/14/25  0911 02/15/25  0533   Weight: 119 kg (262 lb 12.6 oz) 119 kg (262 lb) 120 kg (264 lb 15.9 oz)         Physical Exam:   General: 69 y.o. female No acute distress, laying in bed. Alert and Oriented.   Neck: No JVD or carotid bruit  Lungs: Mildly diminished in the bases  Heart: Irregular rhythm with some skipped beats with no overt murmurs, rubs or gallops. Normal S1 and S2.   Abdomen: soft, non-tender  Extremities: +4 pitting edema in the tops of her feet and +3 pitting edema in the bilateral shins with redness  Neuo: no lateralizing defects.     Lab Review:     Results from last 7 days   Lab Units 02/15/25  0629 02/14/25  0648 02/13/25  1341   SODIUM mmol/L 139 139 135*   POTASSIUM mmol/L 3.6 3.8 4.0   CHLORIDE mmol/L 101 101 100   CO2 mmol/L 28.0 25.0 22.0   BUN mg/dL 60* 67* 69*   CREATININE mg/dL 1.42* 1.85* 2.15*   GLUCOSE mg/dL 108* 96 94   CALCIUM mg/dL 9.1 8.9 9.2       Results from last 7 days   Lab Units 02/14/25  0648 02/13/25  1443 02/13/25  1341   CK TOTAL U/L 27  --   --    HSTROP T ng/L 42* 41* 38*     Results from last 7 days   Lab Units 02/15/25  0629   WBC 10*3/mm3 2.90*   HEMOGLOBIN g/dL 10.8*   HEMATOCRIT % 33.0*   PLATELETS 10*3/mm3 164     Results from last 7 days   Lab Units 02/13/25  1341   INR  1.82*   APTT seconds 29.4     Results from last 7 days   Lab Units 02/14/25  0648   CHOLESTEROL mg/dL 92     Results from last 7 days   Lab Units 02/13/25  1341   MAGNESIUM mg/dL 1.7     Results from last 7 days   Lab Units 02/14/25  0648   CHOLESTEROL mg/dL 92   TRIGLYCERIDES mg/dL 70   HDL CHOL mg/dL 57   LDL CHOL mg/dL 20     Results from last 7 days   Lab Units 02/13/25  1443   PROBNP pg/mL 4,034.0*         Results from last 7 days   Lab Units 02/14/25  0648   TSH uIU/mL 0.494       Imaging:    chest  X-ray    Results for orders placed during the hospital encounter of 25    Adult Transthoracic Echo Complete W/ Cont if Necessary Per Protocol    Interpretation Summary    Left ventricular systolic function is normal. Calculated left ventricular EF = 65.1%    Left ventricular diastolic function was indeterminate.    The right ventricular cavity is mildly dilated. Normal right ventricular function.    The left atrial cavity is moderately dilated.    Insufficient TR jet to estimate RVSP.  RAP of 3 mmHg    Mild to moderate mitral regurgitation      Telemetry/EK/15/25: Atrial fibrillation with PVCs sometimes taking bigeminal form     I personally viewed and interpreted the patient's EKG/Telemetry data.    Assessment/ Plan:       1. Acute on chronic heart failure with preserved EF- echo reviewed with EF of 65% with indeterminate diastolic function. NYHA Class II  - BNP > 4000 labs, ongoing LE edema that is minimally improved. Nephrology is following and suspects cardiorenal syndrome.   -Per her account she is diuresing well but her legs still remain acutely edematous.  Apply Ace wraps as she is able to tolerate  -Would recommend continue IV diuresis today.  -She is on metoprolol 25 mg twice daily at a reduced dose.  Her losartan has been held but will reconsider an SGLT2 tomorrow  2. Persistent atrial fibirllation- rates controlled overnight  -We have transitioned her to apixaban 2.5 mg twice daily for anticoagulation.  I am going to reach out to case management for them to start working on patient assistance for her to take this long-term  -Continue low-dose beta-blocker as blood pressure can tolerate.  3.  History of hypertension with suspected recent positive orthostasis  -Has had a couple falls at home.  Her blood pressure medication has been cut back.  She has been taken off losartan 100 mg daily and her metoprolol is now 25 mg twice daily.  Additionally her SymTan walkers been held.    Thank you for  allowing me to participate in the care of Marilu Avery. Feel free to contact me directly with any further questions or concerns.    Case Castillo PA-C  [facility]  02/15/25  10:00 EST

## 2025-02-15 NOTE — PLAN OF CARE
Goal Outcome Evaluation:   Patient alert follows commands tylenol given. Norco given. No nausea noted. Eleazarck changed this shift. No acute distress noted will continue to monitor

## 2025-02-15 NOTE — PROGRESS NOTES
"RENAL/KCC:     LOS: 1 day    Patient Care Team:  Esperanza Melton APRN as PCP - General (Nurse Practitioner)  Anatoly Jimenez MD as PCP - Family Medicine    Chief Complaint:  REBECCA, CHF    Subjective     Interval History:   Chart reviewed  States good UOP  No new complaints      Objective     Vital Sign Min/Max for last 24 hours  Temp  Min: 97.5 °F (36.4 °C)  Max: 97.7 °F (36.5 °C)   BP  Min: 97/40  Max: 123/73   Pulse  Min: 82  Max: 87   Resp  Min: 16  Max: 16   SpO2  Min: 95 %  Max: 98 %   No data recorded   Weight  Min: 119 kg (262 lb)  Max: 120 kg (264 lb 15.9 oz)     Flowsheet Rows      Flowsheet Row First Filed Value   Admission Height 177.8 cm (70\") Documented at 02/13/2025 1850   Admission Weight 119 kg (262 lb 12.6 oz) Documented at 02/13/2025 1850            I/O this shift:  In: -   Out: 600 [Urine:600]  I/O last 3 completed shifts:  In: 840 [P.O.:840]  Out: 1650 [Urine:1650]    Physical Exam:  GEN: Awake, NAD  ENT: PERRL, EOMI, MMM  NECK: Supple, no JVD  CHEST: CTAB, no W/R/C  CV: RRR, no M/G/R, +edema  ABD: Soft, NT, +BS  SKIN: Warm and Dry  NEURO: CN's intact      WBC WBC   Date Value Ref Range Status   02/14/2025 3.37 (L) 3.40 - 10.80 10*3/mm3 Final   02/13/2025 3.73 3.40 - 10.80 10*3/mm3 Final      HGB Hemoglobin   Date Value Ref Range Status   02/14/2025 10.4 (L) 12.0 - 15.9 g/dL Final   02/13/2025 11.4 (L) 12.0 - 15.9 g/dL Final      HCT Hematocrit   Date Value Ref Range Status   02/14/2025 31.4 (L) 34.0 - 46.6 % Final   02/13/2025 35.1 34.0 - 46.6 % Final      Platlets No results found for: \"LABPLAT\"   MCV MCV   Date Value Ref Range Status   02/14/2025 104.0 (H) 79.0 - 97.0 fL Final   02/13/2025 103.5 (H) 79.0 - 97.0 fL Final          Sodium Sodium   Date Value Ref Range Status   02/14/2025 139 136 - 145 mmol/L Final   02/13/2025 135 (L) 136 - 145 mmol/L Final      Potassium Potassium   Date Value Ref Range Status   02/14/2025 3.8 3.5 - 5.2 mmol/L Final   02/13/2025 4.0 3.5 - 5.2 mmol/L Final " "     Chloride Chloride   Date Value Ref Range Status   02/14/2025 101 98 - 107 mmol/L Final   02/13/2025 100 98 - 107 mmol/L Final      CO2 CO2   Date Value Ref Range Status   02/14/2025 25.0 22.0 - 29.0 mmol/L Final   02/13/2025 22.0 22.0 - 29.0 mmol/L Final      BUN BUN   Date Value Ref Range Status   02/14/2025 67 (H) 8 - 23 mg/dL Final   02/13/2025 69 (H) 8 - 23 mg/dL Final      Creatinine Creatinine   Date Value Ref Range Status   02/14/2025 1.85 (H) 0.57 - 1.00 mg/dL Final   02/13/2025 2.15 (H) 0.57 - 1.00 mg/dL Final      Calcium Calcium   Date Value Ref Range Status   02/14/2025 8.9 8.6 - 10.5 mg/dL Final   02/13/2025 9.2 8.6 - 10.5 mg/dL Final      PO4 No results found for: \"CAPO4\"   Albumin Albumin   Date Value Ref Range Status   02/14/2025 2.9 (L) 3.5 - 5.2 g/dL Final   02/13/2025 3.2 (L) 3.5 - 5.2 g/dL Final      Magnesium Magnesium   Date Value Ref Range Status   02/13/2025 1.7 1.6 - 2.4 mg/dL Final      Uric Acid No results found for: \"URICACID\"        Results Review:     I reviewed the patient's new clinical results.    allopurinol, 100 mg, Oral, Daily  apixaban, 2.5 mg, Oral, BID  arformoterol, 15 mcg, Nebulization, BID - RT   And  budesonide, 0.5 mg, Nebulization, BID - RT   And  revefenacin, 175 mcg, Nebulization, Daily - RT  aspirin, 81 mg, Oral, Daily  atorvastatin, 10 mg, Oral, Nightly  famotidine, 20 mg, Oral, BID AC  folic acid, 1 mg, Oral, Daily  furosemide, 40 mg, Intravenous, Q12H  levothyroxine, 75 mcg, Oral, Daily  metoprolol tartrate, 25 mg, Oral, Q12H  montelukast, 10 mg, Oral, Daily  multivitamin, 1 tablet, Oral, Daily  polyethylene glycol, 17 g, Oral, Daily  thiamine, 200 mg, Oral, Daily           Medication Review: Reviewed    Assessment & Plan     REBECCA (acute kidney injury), likely cardiorenal syndrome  Fluid overload  1.5 cm L kidney lesion - possible cyst but needs CT when less acute  Acute CHF exacerbation  Obesity  Atrial fibrillation  COPD  Chronic hypertension but BP soft since " admission  ?UTI    Plan: Cr improved to 1.42.  Continue IV Lasix.  Check urine culture.  Avoid nephrotoxins.  Will follow.      Wei Flynn MD  [facility]  02/15/25  06:39 EST

## 2025-02-16 LAB
ANION GAP SERPL CALCULATED.3IONS-SCNC: 12 MMOL/L (ref 5–15)
BASOPHILS # BLD AUTO: 0.02 10*3/MM3 (ref 0–0.2)
BASOPHILS NFR BLD AUTO: 0.7 % (ref 0–1.5)
BUN SERPL-MCNC: 48 MG/DL (ref 8–23)
BUN/CREAT SERPL: 36.9 (ref 7–25)
CALCIUM SPEC-SCNC: 9 MG/DL (ref 8.6–10.5)
CHLORIDE SERPL-SCNC: 104 MMOL/L (ref 98–107)
CO2 SERPL-SCNC: 25 MMOL/L (ref 22–29)
CREAT SERPL-MCNC: 1.3 MG/DL (ref 0.57–1)
D-LACTATE SERPL-SCNC: 2.2 MMOL/L (ref 0.5–2)
D-LACTATE SERPL-SCNC: 2.9 MMOL/L (ref 0.5–2)
DEPRECATED RDW RBC AUTO: 54.7 FL (ref 37–54)
EGFRCR SERPLBLD CKD-EPI 2021: 44.9 ML/MIN/1.73
EOSINOPHIL # BLD AUTO: 0.06 10*3/MM3 (ref 0–0.4)
EOSINOPHIL NFR BLD AUTO: 2 % (ref 0.3–6.2)
ERYTHROCYTE [DISTWIDTH] IN BLOOD BY AUTOMATED COUNT: 14.2 % (ref 12.3–15.4)
GLUCOSE SERPL-MCNC: 102 MG/DL (ref 65–99)
HCT VFR BLD AUTO: 33.6 % (ref 34–46.6)
HGB BLD-MCNC: 11 G/DL (ref 12–15.9)
IMM GRANULOCYTES # BLD AUTO: 0.01 10*3/MM3 (ref 0–0.05)
IMM GRANULOCYTES NFR BLD AUTO: 0.3 % (ref 0–0.5)
LYMPHOCYTES # BLD AUTO: 0.86 10*3/MM3 (ref 0.7–3.1)
LYMPHOCYTES NFR BLD AUTO: 28.7 % (ref 19.6–45.3)
MCH RBC QN AUTO: 34 PG (ref 26.6–33)
MCHC RBC AUTO-ENTMCNC: 32.7 G/DL (ref 31.5–35.7)
MCV RBC AUTO: 103.7 FL (ref 79–97)
MONOCYTES # BLD AUTO: 0.45 10*3/MM3 (ref 0.1–0.9)
MONOCYTES NFR BLD AUTO: 15 % (ref 5–12)
NEUTROPHILS NFR BLD AUTO: 1.6 10*3/MM3 (ref 1.7–7)
NEUTROPHILS NFR BLD AUTO: 53.3 % (ref 42.7–76)
NRBC BLD AUTO-RTO: 0 /100 WBC (ref 0–0.2)
PLATELET # BLD AUTO: 151 10*3/MM3 (ref 140–450)
PMV BLD AUTO: 10.2 FL (ref 6–12)
POTASSIUM SERPL-SCNC: 3.2 MMOL/L (ref 3.5–5.2)
RBC # BLD AUTO: 3.24 10*6/MM3 (ref 3.77–5.28)
SODIUM SERPL-SCNC: 141 MMOL/L (ref 136–145)
WBC NRBC COR # BLD AUTO: 3 10*3/MM3 (ref 3.4–10.8)

## 2025-02-16 PROCEDURE — 83605 ASSAY OF LACTIC ACID: CPT | Performed by: HOSPITALIST

## 2025-02-16 PROCEDURE — 94799 UNLISTED PULMONARY SVC/PX: CPT

## 2025-02-16 PROCEDURE — 83615 LACTATE (LD) (LDH) ENZYME: CPT | Performed by: HOSPITALIST

## 2025-02-16 PROCEDURE — 94761 N-INVAS EAR/PLS OXIMETRY MLT: CPT

## 2025-02-16 PROCEDURE — 25010000002 CEFTRIAXONE PER 250 MG: Performed by: HOSPITALIST

## 2025-02-16 PROCEDURE — 94664 DEMO&/EVAL PT USE INHALER: CPT

## 2025-02-16 PROCEDURE — 25010000002 FUROSEMIDE PER 20 MG: Performed by: HOSPITALIST

## 2025-02-16 PROCEDURE — 63710000001 REVEFENACIN 175 MCG/3ML SOLUTION: Performed by: HOSPITALIST

## 2025-02-16 PROCEDURE — 80048 BASIC METABOLIC PNL TOTAL CA: CPT | Performed by: HOSPITALIST

## 2025-02-16 PROCEDURE — 99232 SBSQ HOSP IP/OBS MODERATE 35: CPT | Performed by: NURSE PRACTITIONER

## 2025-02-16 PROCEDURE — 85025 COMPLETE CBC W/AUTO DIFF WBC: CPT | Performed by: HOSPITALIST

## 2025-02-16 RX ORDER — AMOXICILLIN 250 MG
2 CAPSULE ORAL 2 TIMES DAILY PRN
Status: DISCONTINUED | OUTPATIENT
Start: 2025-02-16 | End: 2025-02-20 | Stop reason: HOSPADM

## 2025-02-16 RX ORDER — HYDROCODONE BITARTRATE AND ACETAMINOPHEN 7.5; 325 MG/1; MG/1
1 TABLET ORAL EVERY 8 HOURS PRN
Status: DISCONTINUED | OUTPATIENT
Start: 2025-02-16 | End: 2025-02-20 | Stop reason: HOSPADM

## 2025-02-16 RX ORDER — POTASSIUM CHLORIDE 750 MG/1
10 TABLET, FILM COATED, EXTENDED RELEASE ORAL 2 TIMES DAILY WITH MEALS
Status: DISCONTINUED | OUTPATIENT
Start: 2025-02-16 | End: 2025-02-20 | Stop reason: HOSPADM

## 2025-02-16 RX ADMIN — METOPROLOL TARTRATE 25 MG: 25 TABLET, FILM COATED ORAL at 08:35

## 2025-02-16 RX ADMIN — MONTELUKAST SODIUM 10 MG: 10 TABLET, FILM COATED ORAL at 08:35

## 2025-02-16 RX ADMIN — ATORVASTATIN CALCIUM 10 MG: 20 TABLET, FILM COATED ORAL at 21:19

## 2025-02-16 RX ADMIN — FOLIC ACID 1 MG: 1 TABLET ORAL at 08:35

## 2025-02-16 RX ADMIN — Medication 200 MG: at 08:35

## 2025-02-16 RX ADMIN — APIXABAN 5 MG: 5 TABLET, FILM COATED ORAL at 21:19

## 2025-02-16 RX ADMIN — ALLOPURINOL 100 MG: 100 TABLET ORAL at 08:35

## 2025-02-16 RX ADMIN — BUDESONIDE 0.5 MG: 0.5 INHALANT RESPIRATORY (INHALATION) at 20:54

## 2025-02-16 RX ADMIN — FUROSEMIDE 40 MG: 10 INJECTION, SOLUTION INTRAMUSCULAR; INTRAVENOUS at 18:35

## 2025-02-16 RX ADMIN — ACETAMINOPHEN 650 MG: 325 TABLET, FILM COATED ORAL at 13:11

## 2025-02-16 RX ADMIN — CEFTRIAXONE SODIUM 1000 MG: 1 INJECTION, POWDER, FOR SOLUTION INTRAMUSCULAR; INTRAVENOUS at 15:34

## 2025-02-16 RX ADMIN — FAMOTIDINE 20 MG: 20 TABLET, FILM COATED ORAL at 18:35

## 2025-02-16 RX ADMIN — Medication 1 TABLET: at 08:35

## 2025-02-16 RX ADMIN — ACETAMINOPHEN 650 MG: 325 TABLET, FILM COATED ORAL at 08:35

## 2025-02-16 RX ADMIN — APIXABAN 2.5 MG: 2.5 TABLET, FILM COATED ORAL at 08:35

## 2025-02-16 RX ADMIN — POTASSIUM CHLORIDE 10 MEQ: 750 TABLET, EXTENDED RELEASE ORAL at 18:35

## 2025-02-16 RX ADMIN — HYDROCODONE BITARTRATE AND ACETAMINOPHEN 1 TABLET: 7.5; 325 TABLET ORAL at 16:35

## 2025-02-16 RX ADMIN — ARFORMOTEROL TARTRATE 15 MCG: 15 SOLUTION RESPIRATORY (INHALATION) at 08:24

## 2025-02-16 RX ADMIN — BUDESONIDE 0.5 MG: 0.5 INHALANT RESPIRATORY (INHALATION) at 08:21

## 2025-02-16 RX ADMIN — ASPIRIN 81 MG CHEWABLE TABLET 81 MG: 81 TABLET CHEWABLE at 08:35

## 2025-02-16 RX ADMIN — LEVOTHYROXINE SODIUM 75 MCG: 0.07 TABLET ORAL at 08:35

## 2025-02-16 RX ADMIN — FUROSEMIDE 40 MG: 10 INJECTION, SOLUTION INTRAMUSCULAR; INTRAVENOUS at 04:20

## 2025-02-16 RX ADMIN — REVEFENACIN 175 MCG: 175 SOLUTION RESPIRATORY (INHALATION) at 08:25

## 2025-02-16 RX ADMIN — FAMOTIDINE 20 MG: 20 TABLET, FILM COATED ORAL at 04:21

## 2025-02-16 RX ADMIN — POTASSIUM CHLORIDE 10 MEQ: 750 TABLET, EXTENDED RELEASE ORAL at 11:20

## 2025-02-16 RX ADMIN — METOPROLOL TARTRATE 25 MG: 25 TABLET, FILM COATED ORAL at 21:19

## 2025-02-16 RX ADMIN — ARFORMOTEROL TARTRATE 15 MCG: 15 SOLUTION RESPIRATORY (INHALATION) at 20:54

## 2025-02-16 RX ADMIN — ACETAMINOPHEN 650 MG: 325 TABLET, FILM COATED ORAL at 21:19

## 2025-02-16 RX ADMIN — HYDROCODONE BITARTRATE AND ACETAMINOPHEN 1 TABLET: 7.5; 325 TABLET ORAL at 04:21

## 2025-02-16 NOTE — PROGRESS NOTES
"[facility]    Patient Name: Marilu Avery  :1955  69 y.o.  LOS: 2  Encounter Provider: DEBBI Jameson      Patient Care Team:  Esperanza Melton APRN as PCP - General (Nurse Practitioner)  Anatoly Jimenez MD as PCP - Family Medicine    Chief Complaint: Follow-up weakness, CHF exacerbation, REBECCA    Interval History: Patient reports that breathing is improved.  Lower extremity edema improved with compression wraps.       Objective   Vital Signs  Temp:  [97.3 °F (36.3 °C)-98.5 °F (36.9 °C)] 98.5 °F (36.9 °C)  Heart Rate:  [81-97] 97  Resp:  [16-18] 16  BP: (106-149)/(67-78) 121/78    Intake/Output Summary (Last 24 hours) at 2025 1007  Last data filed at 2025 0837  Gross per 24 hour   Intake 1170 ml   Output 1180 ml   Net -10 ml     Flowsheet Rows      Flowsheet Row First Filed Value   Admission Height 177.8 cm (70\") Documented at 2025 1850   Admission Weight 119 kg (262 lb 12.6 oz) Documented at 2025 1850              Vitals and nursing note reviewed.   Constitutional:       Appearance: Normal appearance. Well-developed.   Eyes:      Conjunctiva/sclera: Conjunctivae normal.   Neck:      Vascular: No carotid bruit.   Pulmonary:      Breath sounds: Normal breath sounds.   Cardiovascular:      Normal rate. Regular rhythm. Normal S1 with normal intensity. Normal S2 with normal intensity.       Murmurs: There is no murmur.      No gallop.  No click. No rub.      Comments: Bilateral lower extremity compression wraps  Edema:     Peripheral edema absent.   Musculoskeletal: Normal range of motion. Skin:     General: Skin is warm and dry.   Neurological:      Mental Status: Alert and oriented to person, place, and time.      GCS: GCS eye subscore is 4. GCS verbal subscore is 5. GCS motor subscore is 6.   Psychiatric:         Speech: Speech normal.         Behavior: Behavior normal.         Thought Content: Thought content normal.         Judgment: Judgment " normal.           Pertinent Test Results:  Results from last 7 days   Lab Units 02/16/25  0654 02/15/25  0629 02/14/25  0648 02/13/25  1341   SODIUM mmol/L 141 139 139 135*   POTASSIUM mmol/L 3.2* 3.6 3.8 4.0   CHLORIDE mmol/L 104 101 101 100   CO2 mmol/L 25.0 28.0 25.0 22.0   BUN mg/dL 48* 60* 67* 69*   CREATININE mg/dL 1.30* 1.42* 1.85* 2.15*   GLUCOSE mg/dL 102* 108* 96 94   CALCIUM mg/dL 9.0 9.1 8.9 9.2   AST (SGOT) U/L  --  24 27 28   ALT (SGPT) U/L  --  13 14 17     Results from last 7 days   Lab Units 02/14/25  0648 02/13/25  1443 02/13/25  1341   CK TOTAL U/L 27  --   --    HSTROP T ng/L 42* 41* 38*     Results from last 7 days   Lab Units 02/16/25  0654 02/15/25  0629 02/14/25  0647 02/13/25  1341   WBC 10*3/mm3 3.00* 2.90* 3.37* 3.73   HEMOGLOBIN g/dL 11.0* 10.8* 10.4* 11.4*   HEMATOCRIT % 33.6* 33.0* 31.4* 35.1   PLATELETS 10*3/mm3 151 164 162 192     Results from last 7 days   Lab Units 02/13/25  1341   INR  1.82*   APTT seconds 29.4     Results from last 7 days   Lab Units 02/13/25  1341   MAGNESIUM mg/dL 1.7     Results from last 7 days   Lab Units 02/14/25  0648   CHOLESTEROL mg/dL 92   TRIGLYCERIDES mg/dL 70   HDL CHOL mg/dL 57     Results from last 7 days   Lab Units 02/13/25  1443 02/13/25  1341   PROBNP pg/mL 4,034.0* 4,044.0*     Results from last 7 days   Lab Units 02/14/25  0648   TSH uIU/mL 0.494           Medication Review:   allopurinol, 100 mg, Oral, Daily  apixaban, 2.5 mg, Oral, BID  arformoterol, 15 mcg, Nebulization, BID - RT   And  budesonide, 0.5 mg, Nebulization, BID - RT   And  revefenacin, 175 mcg, Nebulization, Daily - RT  aspirin, 81 mg, Oral, Daily  atorvastatin, 10 mg, Oral, Nightly  famotidine, 20 mg, Oral, BID AC  folic acid, 1 mg, Oral, Daily  furosemide, 40 mg, Intravenous, Q12H  levothyroxine, 75 mcg, Oral, Daily  metoprolol tartrate, 25 mg, Oral, Q12H  montelukast, 10 mg, Oral, Daily  multivitamin, 1 tablet, Oral, Daily  polyethylene glycol, 17 g, Oral, Daily  potassium  chloride, 10 mEq, Oral, BID With Meals  thiamine, 200 mg, Oral, Daily              Assessment & Plan     Active Hospital Problems    Diagnosis  POA    **REBECCA (acute kidney injury) [N17.9]  Yes    Acute on chronic diastolic (congestive) heart failure [I50.33]  Yes      Resolved Hospital Problems   No resolved problems to display.        Acute on chronic HFpEF  Preserved LVEF of 65% with indeterminate diastolic function  NYHA class II  proBNP greater than 4000  Chronic lower extremity edema that is minimally improving  Nephrology following and suspects cardiorenal syndrome  Continue compression wraps  Persistent atrial fibrillation  Rates have been controlled with metoprolol tartrate  Patient was started on apixaban 2.5 mg twice daily, based on her age, weight, renal function this is not the appropriate dose and she should be on 5 mg twice daily.  Patient notes that she was previously on 5 mg twice daily and was receiving this through the foundation from the drug company however she was denied this benefit last year and states that she has had trouble affording this.  I discussed with the patient that she should go to Medicare extra help to apply for extra help for part D through the Medicare program.  If she is denied this then she can reapply for assistance through the pharmaceutical drug company.  The pharmaceutical drug company will not provide any assistance unless the patient has been denied Medicare extra help services.  I also think that patient should have case management reach out to do cost analysis.  She may benefit from having her initial deductible spread over a 12-month period to make this more affordable.  Given her frequent falls I do not think that Coumadin is a good choice for her.  She was educated on the increased risk of bleeding with falls on Coumadin compared to apixaban.  History of supine hypertension with suspected orthostatic hypotension  Patient has had multiple falls at home  BP meds have  been decreased  Now off losartan  Continue metoprolol tartrate 25 mg twice daily           DEBBI Jameson  [facility]   [facility]  [address] [facility] [[email or web address]] [address]  [place] [address]  Office: [phone]    02/16/25  10:07 EST

## 2025-02-16 NOTE — PROGRESS NOTES
"RENAL/KCC:     LOS: 2 days    Patient Care Team:  Esperanza Melton APRN as PCP - General (Nurse Practitioner)  Anatoly Jimenez MD as PCP - Family Medicine    Chief Complaint:  REBECCA, CHF    Subjective     Interval History:   Chart reviewed  States good UOP  No new complaints      Objective     Vital Sign Min/Max for last 24 hours  Temp  Min: 97.3 °F (36.3 °C)  Max: 97.7 °F (36.5 °C)   BP  Min: 106/67  Max: 149/76   Pulse  Min: 84  Max: 96   Resp  Min: 16  Max: 18   SpO2  Min: 96 %  Max: 100 %   No data recorded   Weight  Min: 119 kg (262 lb 5.6 oz)  Max: 119 kg (262 lb 5.6 oz)     Flowsheet Rows      Flowsheet Row First Filed Value   Admission Height 177.8 cm (70\") Documented at 02/13/2025 1850   Admission Weight 119 kg (262 lb 12.6 oz) Documented at 02/13/2025 1850            I/O this shift:  In: -   Out: 350 [Urine:350]  I/O last 3 completed shifts:  In: 2010 [P.O.:2010]  Out: 2980 [Urine:2980]    Physical Exam:  GEN: Awake, NAD  ENT: PERRL, EOMI, MMM  NECK: Supple, no JVD  CHEST: CTAB, no W/R/C  CV: RRR, no M/G/R, +edema  ABD: Soft, NT, +BS  SKIN: Warm and Dry  NEURO: CN's intact      WBC WBC   Date Value Ref Range Status   02/15/2025 2.90 (L) 3.40 - 10.80 10*3/mm3 Final   02/14/2025 3.37 (L) 3.40 - 10.80 10*3/mm3 Final   02/13/2025 3.73 3.40 - 10.80 10*3/mm3 Final      HGB Hemoglobin   Date Value Ref Range Status   02/15/2025 10.8 (L) 12.0 - 15.9 g/dL Final   02/14/2025 10.4 (L) 12.0 - 15.9 g/dL Final   02/13/2025 11.4 (L) 12.0 - 15.9 g/dL Final      HCT Hematocrit   Date Value Ref Range Status   02/15/2025 33.0 (L) 34.0 - 46.6 % Final   02/14/2025 31.4 (L) 34.0 - 46.6 % Final   02/13/2025 35.1 34.0 - 46.6 % Final      Platlets No results found for: \"LABPLAT\"   MCV MCV   Date Value Ref Range Status   02/15/2025 105.8 (H) 79.0 - 97.0 fL Final   02/14/2025 104.0 (H) 79.0 - 97.0 fL Final   02/13/2025 103.5 (H) 79.0 - 97.0 fL Final          Sodium Sodium   Date Value Ref Range Status   02/15/2025 139 136 - 145 " "mmol/L Final   02/14/2025 139 136 - 145 mmol/L Final   02/13/2025 135 (L) 136 - 145 mmol/L Final      Potassium Potassium   Date Value Ref Range Status   02/15/2025 3.6 3.5 - 5.2 mmol/L Final   02/14/2025 3.8 3.5 - 5.2 mmol/L Final   02/13/2025 4.0 3.5 - 5.2 mmol/L Final      Chloride Chloride   Date Value Ref Range Status   02/15/2025 101 98 - 107 mmol/L Final   02/14/2025 101 98 - 107 mmol/L Final   02/13/2025 100 98 - 107 mmol/L Final      CO2 CO2   Date Value Ref Range Status   02/15/2025 28.0 22.0 - 29.0 mmol/L Final   02/14/2025 25.0 22.0 - 29.0 mmol/L Final   02/13/2025 22.0 22.0 - 29.0 mmol/L Final      BUN BUN   Date Value Ref Range Status   02/15/2025 60 (H) 8 - 23 mg/dL Final   02/14/2025 67 (H) 8 - 23 mg/dL Final   02/13/2025 69 (H) 8 - 23 mg/dL Final      Creatinine Creatinine   Date Value Ref Range Status   02/15/2025 1.42 (H) 0.57 - 1.00 mg/dL Final   02/14/2025 1.85 (H) 0.57 - 1.00 mg/dL Final   02/13/2025 2.15 (H) 0.57 - 1.00 mg/dL Final      Calcium Calcium   Date Value Ref Range Status   02/15/2025 9.1 8.6 - 10.5 mg/dL Final   02/14/2025 8.9 8.6 - 10.5 mg/dL Final   02/13/2025 9.2 8.6 - 10.5 mg/dL Final      PO4 No results found for: \"CAPO4\"   Albumin Albumin   Date Value Ref Range Status   02/15/2025 3.1 (L) 3.5 - 5.2 g/dL Final   02/14/2025 2.9 (L) 3.5 - 5.2 g/dL Final   02/13/2025 3.2 (L) 3.5 - 5.2 g/dL Final      Magnesium Magnesium   Date Value Ref Range Status   02/13/2025 1.7 1.6 - 2.4 mg/dL Final      Uric Acid Uric Acid   Date Value Ref Range Status   02/15/2025 6.9 (H) 2.4 - 5.7 mg/dL Final           Results Review:     I reviewed the patient's new clinical results.    allopurinol, 100 mg, Oral, Daily  apixaban, 2.5 mg, Oral, BID  arformoterol, 15 mcg, Nebulization, BID - RT   And  budesonide, 0.5 mg, Nebulization, BID - RT   And  revefenacin, 175 mcg, Nebulization, Daily - RT  aspirin, 81 mg, Oral, Daily  atorvastatin, 10 mg, Oral, Nightly  famotidine, 20 mg, Oral, BID AC  folic acid, 1 " mg, Oral, Daily  furosemide, 40 mg, Intravenous, Q12H  levothyroxine, 75 mcg, Oral, Daily  metoprolol tartrate, 25 mg, Oral, Q12H  montelukast, 10 mg, Oral, Daily  multivitamin, 1 tablet, Oral, Daily  polyethylene glycol, 17 g, Oral, Daily  thiamine, 200 mg, Oral, Daily           Medication Review: Reviewed    Assessment & Plan     REBECCA (acute kidney injury), likely cardiorenal syndrome  Fluid overload  1.5 cm L kidney lesion - possible cyst but needs CT when less acute  Acute CHF exacerbation  Obesity  Atrial fibrillation  COPD  Chronic hypertension but BP soft since admission  ?UTI    Plan: Labs pending.  Continue IV Lasix.  F/U urine culture.  Avoid nephrotoxins.  Will follow.      Wei Flynn MD  [facility]  02/16/25  06:57 EST

## 2025-02-16 NOTE — PLAN OF CARE
Goal Outcome Evaluation:           Progress: improving  Outcome Evaluation: Pt AOx4. On RA. NSR on monitor. BLE swelling better today- rewrapped legs per cardiology. Good UO with IV lasix. Sat up in chair for a while today. K+ replaced. Gave senna per pt request. Rocephin started for UA growing GNB. Pt c/o pain being managed with norco/tylenol. Pt not in acute distress. Plan of care onging  Lactic back at 2.9- MD aware and IV abx started

## 2025-02-16 NOTE — PROGRESS NOTES
"Daily progress note    Primary care physician  Dr. Melton    Subjective  Doing better with no new complaints    History of present illness  69-year-old white female with history of COPD asthma atrial fibrillation hypertension of sleep apnea chronic kidney disease stage IIIb presented to [facility] emergency room with increased shortness of breath and leg swelling.  Patient denies any fever chest pain palpitation abdominal pain nausea vomiting diarrhea.  Patient stated that her diuretics was recently changed by her cardiologist.  Patient workup in ER revealed acute kidney injury and also have elevated troponin admitted for management.     REVIEW OF SYSTEMS  Unremarkable except generalized weakness     PHYSICAL EXAM  Blood pressure 130/72, pulse 90, temperature 97.3 °F (36.3 °C), temperature source Oral, resp. rate 18, height 177.8 cm (70\"), weight 119 kg (262 lb 5.6 oz), SpO2 99%, not currently breastfeeding.    General: Awake and alert no acute distress.  HENT: NCAT, PERRL, Nares patent.  Eyes: no scleral icterus.  Neck: trachea midline, no ROM limitations.  CV: regular rhythm, regular rate.  Respiratory: normal effort, decreased breath sound at the bases  Abdomen: soft, nondistended, NTTP, no rebound tenderness, no guarding or rigidity.  Musculoskeletal: no deformity.  Neuro: alert, moves all extremities, follows commands.  Skin: warm, dry.  2+ pitting edema bilateral lower extremities.     LAB RESULTS  Lab Results (last 24 hours)       Procedure Component Value Units Date/Time    Urine Culture - Urine, Urine, Clean Catch [[id number]]  (Abnormal) Collected: 02/14/25 1148    Specimen: Urine, Clean Catch Updated: 02/16/25 0922     Urine Culture >100,000 CFU/mL Gram Negative Bacilli    Narrative:      Colonization of the urinary tract without infection is common. Treatment is discouraged unless the patient is symptomatic, pregnant, or undergoing an invasive urologic procedure.    Basic Metabolic Panel " [[id number]]  (Abnormal) Collected: 02/16/25 0654    Specimen: Blood from Hand, Right Updated: 02/16/25 0828     Glucose 102 mg/dL      BUN 48 mg/dL      Creatinine 1.30 mg/dL      Sodium 141 mmol/L      Potassium 3.2 mmol/L      Chloride 104 mmol/L      CO2 25.0 mmol/L      Calcium 9.0 mg/dL      BUN/Creatinine Ratio 36.9     Anion Gap 12.0 mmol/L      eGFR 44.9 mL/min/1.73     Narrative:      GFR Categories in Chronic Kidney Disease (CKD)      GFR Category          GFR (mL/min/1.73)    Interpretation  G1                     90 or greater         Normal or high (1)  G2                      60-89                Mild decrease (1)  G3a                   45-59                Mild to moderate decrease  G3b                   30-44                Moderate to severe decrease  G4                    15-29                Severe decrease  G5                    14 or less           Kidney failure          (1)In the absence of evidence of kidney disease, neither GFR category G1 or G2 fulfill the criteria for CKD.    eGFR calculation 2021 CKD-EPI creatinine equation, which does not include race as a factor    CBC & Differential [[id number]]  (Abnormal) Collected: 02/16/25 0654    Specimen: Blood from Hand, Right Updated: 02/16/25 0809    Narrative:      The following orders were created for panel order CBC & Differential.  Procedure                               Abnormality         Status                     ---------                               -----------         ------                     CBC Auto Differential[[id number]]        Abnormal            Final result                 Please view results for these tests on the individual orders.    CBC Auto Differential [[id number]]  (Abnormal) Collected: 02/16/25 0654    Specimen: Blood from Hand, Right Updated: 02/16/25 0809     WBC 3.00 10*3/mm3      RBC 3.24 10*6/mm3      Hemoglobin 11.0 g/dL      Hematocrit 33.6 %      .7 fL      MCH 34.0 pg      MCHC 32.7 g/dL      RDW 14.2  %      RDW-SD 54.7 fl      MPV 10.2 fL      Platelets 151 10*3/mm3      Neutrophil % 53.3 %      Lymphocyte % 28.7 %      Monocyte % 15.0 %      Eosinophil % 2.0 %      Basophil % 0.7 %      Immature Grans % 0.3 %      Neutrophils, Absolute 1.60 10*3/mm3      Lymphocytes, Absolute 0.86 10*3/mm3      Monocytes, Absolute 0.45 10*3/mm3      Eosinophils, Absolute 0.06 10*3/mm3      Basophils, Absolute 0.02 10*3/mm3      Immature Grans, Absolute 0.01 10*3/mm3      nRBC 0.0 /100 WBC           Imaging Results (Last 24 Hours)       ** No results found for the last 24 hours. **          Scan on 2/13/2025 1450 by New Onbase, [place]: ECG 12-LEAD         Author: -- Service: -- Author Type: --   Filed: Date of Service: Creation Time:   Status: (Other)   HEART RATE=81  bpm  RR Kmnizkzd=338  ms  IN Interval=  ms  P Horizontal Axis=  deg  P Front Axis=  deg  QRSD Interval=96  ms  QT Nolebjll=085  ms  CPjG=501  ms  QRS Axis=27  deg  T Wave Axis=41  deg  - ABNORMAL ECG -  Atrial fibrillation  Ventricular bigeminy  Low voltage, extremity and precordial leads  Abnormal R-wave progression, early transition          Current Facility-Administered Medications:     acetaminophen (TYLENOL) 160 MG/5ML oral solution 650 mg, 650 mg, Oral, Q6H PRN, Nathan Hooper MD    acetaminophen (TYLENOL) tablet 650 mg, 650 mg, Oral, Q4H PRN, Nathan Hooper MD, 650 mg at 02/16/25 1311    albuterol (PROVENTIL) nebulizer solution 0.083% 2.5 mg/3mL, 2.5 mg, Nebulization, Q4H PRN, Nathan Hooper MD    allopurinol (ZYLOPRIM) tablet 100 mg, 100 mg, Oral, Daily, Nathan Hooper MD, 100 mg at 02/16/25 0835    apixaban (ELIQUIS) tablet 5 mg, 5 mg, Oral, BID, Chito, Maris A, APRN    arformoterol (BROVANA) nebulizer solution 15 mcg, 15 mcg, Nebulization, BID - RT, 15 mcg at 02/16/25 0824 **AND** budesonide (PULMICORT) nebulizer solution 0.5 mg, 0.5 mg, Nebulization, BID - RT, 0.5 mg at 02/16/25 0821 **AND** revefenacin (YUPELRI) nebulizer solution 175 mcg, 175 mcg,  Nebulization, Daily - RT, Nathan Hooper MD, 175 mcg at 02/16/25 0825    aspirin chewable tablet 81 mg, 81 mg, Oral, Daily, Nathan Hooper MD, 81 mg at 02/16/25 0835    atorvastatin (LIPITOR) tablet 10 mg, 10 mg, Oral, Nightly, Nathan Hooper MD, 10 mg at 02/15/25 2107    cefTRIAXone (ROCEPHIN) 1,000 mg in sodium chloride 0.9 % 100 mL MBP, 1,000 mg, Intravenous, Q24H, Nathan Hooper MD    famotidine (PEPCID) tablet 20 mg, 20 mg, Oral, BID AC, Nathan Hooper MD, 20 mg at 02/16/25 0421    folic acid (FOLVITE) tablet 1 mg, 1 mg, Oral, Daily, Nathan Hooper MD, 1 mg at 02/16/25 0835    furosemide (LASIX) injection 40 mg, 40 mg, Intravenous, Q12H, Nathan Hooper MD, 40 mg at 02/16/25 0420    HYDROcodone-acetaminophen (NORCO) 7.5-325 MG per tablet 1 tablet, 1 tablet, Oral, BID PRN, Nathan Hooper MD, 1 tablet at 02/16/25 0421    levothyroxine (SYNTHROID, LEVOTHROID) tablet 75 mcg, 75 mcg, Oral, Daily, Nathan Hooper MD, 75 mcg at 02/16/25 0835    metoprolol tartrate (LOPRESSOR) tablet 25 mg, 25 mg, Oral, Q12H, Nathan Hooper MD, 25 mg at 02/16/25 0835    montelukast (SINGULAIR) tablet 10 mg, 10 mg, Oral, Daily, Nathan Hooper MD, 10 mg at 02/16/25 0835    multivitamin (THERAGRAN) tablet 1 tablet, 1 tablet, Oral, Daily, Nathan Hooper MD, 1 tablet at 02/16/25 0835    polyethylene glycol (MIRALAX) packet 17 g, 17 g, Oral, Daily, Nathan Hooper MD, 17 g at 02/13/25 2128    potassium chloride (K-DUR,KLOR-CON) ER tablet 10 mEq, 10 mEq, Oral, BID With Meals, Nathan Hooper MD, 10 mEq at 02/16/25 1120    sennosides-docusate (PERICOLACE) 8.6-50 MG per tablet 2 tablet, 2 tablet, Oral, BID PRN, Nathan Hooper MD    thiamine (VITAMIN B-1) tablet 200 mg, 200 mg, Oral, Daily, Nathan Hooper MD, 200 mg at 02/16/25 0835      ASSESSMENT  Acute on chronic diastolic congestive heart failure  Acute kidney injury  Acute GNB UTI  Elevated troponin  Asymptomatic bacteriuria  Chronic atrial fibrillation  Hypertension  Hyperlipidemia  Hypothyroidism  Morbidly  obese  Asthma/COPD/obstructive sleep apnea  Chronic kidney disease stage IIIb    PLAN  CPM  Continue IV diuresis  Continue Eliquis   Antibiotics for 5 days  Strict I's and O's and daily weight  Cardiology consult appreciated  Nephrology to follow patient  Adjust home medications  Stress ulcer DVT prophylaxis  Supportive care  PT OT   Discussed with nursing staff  Follow closely further recommendation current hospital course    LEONOR CROCKETT MD    Copied text in this note has been reviewed and is accurate as of 02/16/25

## 2025-02-16 NOTE — PLAN OF CARE
"Goal Outcome Evaluation:   Patient alert follows commands. Prn pain meds given. No nausea noted. Pure wick changed this shift. Patient claims to be \"stiff\". Staff explained that if the patient would get up for meals and sit in the chair that her getting out of the bed would help with the stiffness. Patient stated that she is going home and does not want to go to a rehab at discharge. Will continue to monitor                                         "

## 2025-02-17 LAB
ANION GAP SERPL CALCULATED.3IONS-SCNC: 12 MMOL/L (ref 5–15)
BACTERIA SPEC AEROBE CULT: ABNORMAL
BASOPHILS # BLD AUTO: 0.02 10*3/MM3 (ref 0–0.2)
BASOPHILS NFR BLD AUTO: 0.8 % (ref 0–1.5)
BUN SERPL-MCNC: 45 MG/DL (ref 8–23)
BUN/CREAT SERPL: 32.1 (ref 7–25)
CALCIUM SPEC-SCNC: 8.9 MG/DL (ref 8.6–10.5)
CHLORIDE SERPL-SCNC: 103 MMOL/L (ref 98–107)
CO2 SERPL-SCNC: 27 MMOL/L (ref 22–29)
CREAT SERPL-MCNC: 1.4 MG/DL (ref 0.57–1)
D-LACTATE SERPL-SCNC: 1 MMOL/L (ref 0.5–2)
DEPRECATED RDW RBC AUTO: 52.6 FL (ref 37–54)
EGFRCR SERPLBLD CKD-EPI 2021: 41.1 ML/MIN/1.73
EOSINOPHIL # BLD AUTO: 0.07 10*3/MM3 (ref 0–0.4)
EOSINOPHIL NFR BLD AUTO: 2.7 % (ref 0.3–6.2)
ERYTHROCYTE [DISTWIDTH] IN BLOOD BY AUTOMATED COUNT: 14.1 % (ref 12.3–15.4)
GLUCOSE SERPL-MCNC: 105 MG/DL (ref 65–99)
HCT VFR BLD AUTO: 29.4 % (ref 34–46.6)
HGB BLD-MCNC: 9.9 G/DL (ref 12–15.9)
IMM GRANULOCYTES # BLD AUTO: 0.01 10*3/MM3 (ref 0–0.05)
IMM GRANULOCYTES NFR BLD AUTO: 0.4 % (ref 0–0.5)
LDH SERPL-CCNC: 211 U/L (ref 135–214)
LYMPHOCYTES # BLD AUTO: 0.74 10*3/MM3 (ref 0.7–3.1)
LYMPHOCYTES NFR BLD AUTO: 29 % (ref 19.6–45.3)
MCH RBC QN AUTO: 34.3 PG (ref 26.6–33)
MCHC RBC AUTO-ENTMCNC: 33.7 G/DL (ref 31.5–35.7)
MCV RBC AUTO: 101.7 FL (ref 79–97)
MONOCYTES # BLD AUTO: 0.37 10*3/MM3 (ref 0.1–0.9)
MONOCYTES NFR BLD AUTO: 14.5 % (ref 5–12)
NEUTROPHILS NFR BLD AUTO: 1.34 10*3/MM3 (ref 1.7–7)
NEUTROPHILS NFR BLD AUTO: 52.6 % (ref 42.7–76)
NRBC BLD AUTO-RTO: 0 /100 WBC (ref 0–0.2)
PLATELET # BLD AUTO: 148 10*3/MM3 (ref 140–450)
PMV BLD AUTO: 10.3 FL (ref 6–12)
POTASSIUM SERPL-SCNC: 3.8 MMOL/L (ref 3.5–5.2)
RBC # BLD AUTO: 2.89 10*6/MM3 (ref 3.77–5.28)
SODIUM SERPL-SCNC: 142 MMOL/L (ref 136–145)
WBC NRBC COR # BLD AUTO: 2.55 10*3/MM3 (ref 3.4–10.8)

## 2025-02-17 PROCEDURE — 97110 THERAPEUTIC EXERCISES: CPT

## 2025-02-17 PROCEDURE — 25010000002 CEFTRIAXONE PER 250 MG: Performed by: HOSPITALIST

## 2025-02-17 PROCEDURE — 94664 DEMO&/EVAL PT USE INHALER: CPT

## 2025-02-17 PROCEDURE — 85025 COMPLETE CBC W/AUTO DIFF WBC: CPT | Performed by: INTERNAL MEDICINE

## 2025-02-17 PROCEDURE — 94799 UNLISTED PULMONARY SVC/PX: CPT

## 2025-02-17 PROCEDURE — 94761 N-INVAS EAR/PLS OXIMETRY MLT: CPT

## 2025-02-17 PROCEDURE — 63710000001 REVEFENACIN 175 MCG/3ML SOLUTION: Performed by: HOSPITALIST

## 2025-02-17 PROCEDURE — 97530 THERAPEUTIC ACTIVITIES: CPT

## 2025-02-17 PROCEDURE — 99232 SBSQ HOSP IP/OBS MODERATE 35: CPT | Performed by: INTERNAL MEDICINE

## 2025-02-17 PROCEDURE — 25010000002 FUROSEMIDE PER 20 MG: Performed by: HOSPITALIST

## 2025-02-17 PROCEDURE — 83605 ASSAY OF LACTIC ACID: CPT | Performed by: HOSPITALIST

## 2025-02-17 PROCEDURE — 80048 BASIC METABOLIC PNL TOTAL CA: CPT | Performed by: INTERNAL MEDICINE

## 2025-02-17 RX ORDER — METOPROLOL TARTRATE 25 MG/1
25 TABLET, FILM COATED ORAL EVERY 8 HOURS
Status: DISCONTINUED | OUTPATIENT
Start: 2025-02-17 | End: 2025-02-18

## 2025-02-17 RX ORDER — AMMONIUM LACTATE 12 G/100G
1 CREAM TOPICAL DAILY
Status: DISCONTINUED | OUTPATIENT
Start: 2025-02-17 | End: 2025-02-20 | Stop reason: HOSPADM

## 2025-02-17 RX ADMIN — MONTELUKAST SODIUM 10 MG: 10 TABLET, FILM COATED ORAL at 08:26

## 2025-02-17 RX ADMIN — BUDESONIDE 0.5 MG: 0.5 INHALANT RESPIRATORY (INHALATION) at 09:31

## 2025-02-17 RX ADMIN — FUROSEMIDE 40 MG: 10 INJECTION, SOLUTION INTRAMUSCULAR; INTRAVENOUS at 05:00

## 2025-02-17 RX ADMIN — POLYETHYLENE GLYCOL 3350 17 G: 17 POWDER, FOR SOLUTION ORAL at 08:26

## 2025-02-17 RX ADMIN — HYDROCODONE BITARTRATE AND ACETAMINOPHEN 1 TABLET: 7.5; 325 TABLET ORAL at 16:35

## 2025-02-17 RX ADMIN — APIXABAN 5 MG: 5 TABLET, FILM COATED ORAL at 08:26

## 2025-02-17 RX ADMIN — FOLIC ACID 1 MG: 1 TABLET ORAL at 08:26

## 2025-02-17 RX ADMIN — Medication 1 APPLICATION: at 14:45

## 2025-02-17 RX ADMIN — REVEFENACIN 175 MCG: 175 SOLUTION RESPIRATORY (INHALATION) at 09:35

## 2025-02-17 RX ADMIN — POTASSIUM CHLORIDE 10 MEQ: 750 TABLET, EXTENDED RELEASE ORAL at 17:10

## 2025-02-17 RX ADMIN — FAMOTIDINE 20 MG: 20 TABLET, FILM COATED ORAL at 16:35

## 2025-02-17 RX ADMIN — APIXABAN 5 MG: 5 TABLET, FILM COATED ORAL at 20:54

## 2025-02-17 RX ADMIN — HYDROCODONE BITARTRATE AND ACETAMINOPHEN 1 TABLET: 7.5; 325 TABLET ORAL at 08:25

## 2025-02-17 RX ADMIN — POTASSIUM CHLORIDE 10 MEQ: 750 TABLET, EXTENDED RELEASE ORAL at 08:26

## 2025-02-17 RX ADMIN — LEVOTHYROXINE SODIUM 75 MCG: 0.07 TABLET ORAL at 08:26

## 2025-02-17 RX ADMIN — Medication 1 TABLET: at 08:26

## 2025-02-17 RX ADMIN — FUROSEMIDE 40 MG: 10 INJECTION, SOLUTION INTRAMUSCULAR; INTRAVENOUS at 17:10

## 2025-02-17 RX ADMIN — METOPROLOL TARTRATE 25 MG: 25 TABLET, FILM COATED ORAL at 16:35

## 2025-02-17 RX ADMIN — BUDESONIDE 0.5 MG: 0.5 INHALANT RESPIRATORY (INHALATION) at 23:25

## 2025-02-17 RX ADMIN — Medication 200 MG: at 08:26

## 2025-02-17 RX ADMIN — HYDROCODONE BITARTRATE AND ACETAMINOPHEN 1 TABLET: 7.5; 325 TABLET ORAL at 00:08

## 2025-02-17 RX ADMIN — CEFTRIAXONE SODIUM 1000 MG: 1 INJECTION, POWDER, FOR SOLUTION INTRAMUSCULAR; INTRAVENOUS at 14:44

## 2025-02-17 RX ADMIN — ARFORMOTEROL TARTRATE 15 MCG: 15 SOLUTION RESPIRATORY (INHALATION) at 09:30

## 2025-02-17 RX ADMIN — ARFORMOTEROL TARTRATE 15 MCG: 15 SOLUTION RESPIRATORY (INHALATION) at 23:25

## 2025-02-17 RX ADMIN — ATORVASTATIN CALCIUM 10 MG: 20 TABLET, FILM COATED ORAL at 20:54

## 2025-02-17 RX ADMIN — ALLOPURINOL 100 MG: 100 TABLET ORAL at 08:26

## 2025-02-17 RX ADMIN — METOPROLOL TARTRATE 25 MG: 25 TABLET, FILM COATED ORAL at 08:26

## 2025-02-17 RX ADMIN — ACETAMINOPHEN 650 MG: 325 TABLET, FILM COATED ORAL at 05:01

## 2025-02-17 RX ADMIN — FAMOTIDINE 20 MG: 20 TABLET, FILM COATED ORAL at 05:01

## 2025-02-17 RX ADMIN — ASPIRIN 81 MG CHEWABLE TABLET 81 MG: 81 TABLET CHEWABLE at 08:26

## 2025-02-17 NOTE — PLAN OF CARE
Goal Outcome Evaluation:  Plan of Care Reviewed With: patient        Progress: improving  Outcome Evaluation: Pt seen for PT tx this AM and tolerated the session well. Today, pt was SBA for bed mobility with increased time, MaxA for sock repositioning, CG/Charito for STS to RW from an elevated bed height and from recliner, and ambulated 14' w/ RW and CGA. Once to the chair pt performed 4 STS w/ CG/Charito to RW for LE generalized strengthening, and standing MIP x5 on each LE w/ RW and CGA. Pt declines further distance/mobilty due to fear of falling/knees buckling, and mild fatigue. Encouraged pt to sit UIC for each meal and ambulate w/ nsg to the bathroom/BSC and get off the Purewick; pt v/u. Pt is self-limiting at times due to her FOF. SBAR w/ RN and if they ambulate w/ her to the commode she will need the BSC placed overtop for an elevated surface to stand from. PT will cont to follow and rec rehab at [place], pt not feeling strong enough for home independently yet.    Anticipated Discharge Disposition (PT): skilled nursing facility, inpatient rehabilitation facility

## 2025-02-17 NOTE — THERAPY TREATMENT NOTE
Patient Name: Marilu Avery  : 1955    MRN: [id number]                              Today's Date: 2025       Admit Date: 2025    Visit Dx:     ICD-10-CM ICD-9-CM   1. REBECCA (acute kidney injury)  N17.9 584.9   2. Bilateral lower extremity edema  R60.0 782.3   3. Anemia, unspecified type  D64.9 285.9   4. Elevated troponin  R79.89 790.6     Patient Active Problem List   Diagnosis    Incisional hernia, without obstruction or gangrene    Incarcerated ventral hernia    Atrial fibrillation, persistent    HLD (hyperlipidemia)    HTN (hypertension)    LISA (obstructive sleep apnea)    Stage 3b chronic kidney disease    Chronic anticoagulation    SBO (small bowel obstruction)    Tobacco abuse    Morbid obesity with BMI of 40.0-44.9, adult    COPD (chronic obstructive pulmonary disease)    Hypopotassemia    Gout    Chronic anticoagulation    Prolonged Q-T interval on ECG    REBECCA (acute kidney injury)    Acute on chronic diastolic (congestive) heart failure     Past Medical History:   Diagnosis Date    Arthritis     Asthma     Atrial fibrillation     Colon polyps 2015    hyperplastic rectal polyp    Hyperlipidemia     Hypertension     LISA on CPAP     Sleep apnea      Past Surgical History:   Procedure Laterality Date    BACK SURGERY N/A     COLONOSCOPY N/A 10/22/2015    Rectal polyp-Dr. Elías Keating    HYSTERECTOMY N/A     LAPAROSCOPIC CHOLECYSTECTOMY N/A 2010    Dr. Heath Whitlock    OOPHORECTOMY  2001    REPLACEMENT TOTAL KNEE Left 2015    Dr. Yo Aguayo    REPLACEMENT TOTAL KNEE Right 2015    Dr. Yo Aguayo    VENTRAL HERNIA REPAIR N/A 10/22/2015    Open reduction of incarcerated ventral hernia with layered closure-Dr. Elías Keating    VENTRAL/INCISIONAL HERNIA REPAIR N/A 2021    Procedure: OPEN VENTRAL/INCISIONAL HERNIA REPAIR WITH MESH AND APPENDECTOMY;  Surgeon: Arnulfo Leonard MD;  Location: [facility] MAIN OR;  Service: General;  Laterality: N/A;      General  Information       Row Name 02/17/25 1346          Physical Therapy Time and Intention    Document Type therapy note (daily note)  -MG     Mode of Treatment individual therapy;physical therapy  -MG       Row Name 02/17/25 1346          General Information    Patient Profile Reviewed yes  -MG     Existing Precautions/Restrictions fall  -MG     Barriers to Rehab none identified  -MG       Row Name 02/17/25 1346          Cognition    Orientation Status (Cognition) oriented x 4  -MG       Row Name 02/17/25 1346          Safety Issues/Impairments Affecting Functional Mobility    Safety Issues Affecting Function (Mobility) insight into deficits/self-awareness  -MG     Impairments Affecting Function (Mobility) strength;pain;endurance/activity tolerance  -MG     Comment, Safety Issues/Impairments (Mobility) Gait belt and non-skid socks donned.  -MG               User Key  (r) = Recorded By, (t) = Taken By, (c) = Cosigned By      Initials Name Provider Type    MG Angi Amaral, PT Physical Therapist                   Mobility       Row Name 02/17/25 1347          Bed Mobility    Supine-Sit [place] (Bed Mobility) standby assist;verbal cues  -MG     Assistive Device (Bed Mobility) head of bed elevated  -MG     Comment, (Bed Mobility) Increased time to complete. Pt unable to reposition socks while seated EOB; this PT performed w/ MaxA.  -MG       Row Name 02/17/25 1347          Sit-Stand Transfer    Sit-Stand [place] (Transfers) contact guard;minimum assist (75% patient effort);verbal cues  -MG     Assistive Device (Sit-Stand Transfers) walker, front-wheeled  -MG     Comment, (Sit-Stand Transfer) x1 EOB. x4 from chair for generalized LE strengthening. Pt reports she has increased reliance on her UEs to tsf to standing. Has FOF and knees buckling.  -MG       Row Name 02/17/25 134          Gait/Stairs (Locomotion)    [place] Level (Gait) contact guard;verbal cues  -MG     Assistive Device (Gait) walker,  front-wheeled  -MG     Distance in Feet (Gait) 14  -MG     Deviations/Abnormal Patterns (Gait) gait speed decreased;stride length decreased  -MG     Bilateral Gait Deviations forward flexed posture  -MG     Comment, (Gait/Stairs) Amb around bed to recliner. Declines additional distance d/t FOF and knees buckling. No overt LOB, buckling, dizziness or SOB. Mild fatigue.  -MG               User Key  (r) = Recorded By, (t) = Taken By, (c) = Cosigned By      Initials Name Provider Type    Angi Ochoa PT Physical Therapist                   Obj/Interventions       Row Name 02/17/25 1349          Motor Skills    Therapeutic Exercise other (see comments)  STS x4 from chair w/ RW and CG/Charito, for general LE strengthening, cues to rely more on LEs than UEs. Standing MIP x5 on each LE w/ RW and CGA.  -MG       Row Name 02/17/25 1349          Balance    Static Sitting Balance supervision  -MG     Position, Sitting Balance sitting edge of bed  -MG     Static Standing Balance contact guard;standby assist  -MG     Dynamic Standing Balance contact guard;verbal cues  -MG     Position/Device Used, Standing Balance supported;walker, front-wheeled  -MG     Comment, Balance No LOB or buckling.  -MG               User Key  (r) = Recorded By, (t) = Taken By, (c) = Cosigned By      Initials Name Provider Type    Angi Ochoa PT Physical Therapist                   Goals/Plan    No documentation.                  Clinical Impression       Row Name 02/17/25 1350          Pain    Pretreatment Pain Rating 7/10  -MG     Posttreatment Pain Rating 7/10  -MG     Pain Location extremity  -MG     Pain Side/Orientation bilateral;lower  -MG     Pre/Posttreatment Pain Comment Reports more pain on RLE than LLE.  -MG       Row Name 02/17/25 1350          Plan of Care Review    Plan of Care Reviewed With patient  -MG     Progress improving  -MG     Outcome Evaluation Pt seen for PT tx this AM and tolerated the session well. Today, pt was MEENAKSHI  for bed mobility with increased time, MaxA for sock repositioning, CG/Charito for STS to RW from an elevated bed height and from recliner, and ambulated 14' w/ RW and CGA. Once to the chair pt performed 4 STS w/ CG/Charito to RW for LE generalized strengthening, and standing MIP x5 on each LE w/ RW and CGA. Pt declines further distance/mobilty due to fear of falling/knees buckling, and mild fatigue. Encouraged pt to sit UIC for each meal and ambulate w/ nsg to the bathroom/BSC and get off the Purewick; pt v/u. Pt is self-limiting at times due to her FOF. SBAR w/ RN and if they ambulate w/ her to the commode she will need the BSC placed overtop for an elevated surface to stand from. PT will cont to follow and rec rehab at [place], pt not feeling strong enough for home independently yet.  -MG       Row Name 02/17/25 4479          Therapy Assessment/Plan (PT)    Rehab Potential (PT) good  -MG     Criteria for Skilled Interventions Met (PT) yes;meets criteria  -MG     Therapy Frequency (PT) 5 times/wk  -MG       Row Name 02/17/25 6755          Positioning and Restraints    Pre-Treatment Position in bed  -MG     Post Treatment Position chair  -MG     In Chair notified nsg;call light within reach;encouraged to call for assist;exit alarm on;sitting  -MG               User Key  (r) = Recorded By, (t) = Taken By, (c) = Cosigned By      Initials Name Provider Type    MG Angi Amaral, PT Physical Therapist                   Outcome Measures       Row Name 02/17/25 6920          How much help from another person do you currently need...    Turning from your back to your side while in flat bed without using bedrails? 4  -MG     Moving from lying on back to sitting on the side of a flat bed without bedrails? 3  -MG     Moving to and from a bed to a chair (including a wheelchair)? 3  -MG     Standing up from a chair using your arms (e.g., wheelchair, bedside chair)? 3  -MG     Climbing 3-5 steps with a railing? 2  -MG     To walk in  hospital room? 3  -MG     AM-PAC 6 Clicks Score (PT) 18  -MG               User Key  (r) = Recorded By, (t) = Taken By, (c) = Cosigned By      Initials Name Provider Type    MG Angi Amaral PT Physical Therapist                                 Physical Therapy Education       Title: PT OT SLP Therapies (In Progress)       Topic: Physical Therapy (In Progress)       Point: Mobility training (Not Started)       Learner Progress:  Not documented in this visit.              Point: Home exercise program (Not Started)       Learner Progress:  Not documented in this visit.              Point: Body mechanics (Done)       Learning Progress Summary            Patient Acceptance, E,TB,D, VU,NR by  at 2/17/2025 1355    Acceptance, E,TB, VU by  at 2/14/2025 0927                      Point: Precautions (Done)       Learning Progress Summary            Patient Acceptance, E,TB,D, VU,NR by  at 2/17/2025 1355    Acceptance, E,TB, VU by  at 2/14/2025 0927                                      User Key       Initials Effective Dates Name Provider Type Discipline     05/24/22 -  Angi Amaral PT Physical Therapist PT                  PT Recommendation and Plan  Planned Therapy Interventions (PT): balance training, bed mobility training, gait training, home exercise program, patient/family education, stretching, strengthening, neuromuscular re-education, ROM (range of motion), postural re-education, transfer training  Progress: improving  Outcome Evaluation: Pt seen for PT tx this AM and tolerated the session well. Today, pt was SBA for bed mobility with increased time, MaxA for sock repositioning, CG/Charito for STS to RW from an elevated bed height and from recliner, and ambulated 14' w/ RW and CGA. Once to the chair pt performed 4 STS w/ CG/Charito to RW for LE generalized strengthening, and standing MIP x5 on each LE w/ RW and CGA. Pt declines further distance/mobilty due to fear of falling/knees buckling, and mild fatigue.  Encouraged pt to sit UIC for each meal and ambulate w/ nsg to the bathroom/BSC and get off the Purewick; pt v/u. Pt is self-limiting at times due to her FOF. SBAR w/ RN and if they ambulate w/ her to the commode she will need the BSC placed overtop for an elevated surface to stand from. PT will cont to follow and rec rehab at [place], pt not feeling strong enough for home independently yet.     Time Calculation:         PT Charges       Row Name 02/17/25 1355             Time Calculation    Start Time 1101  -MG      Stop Time 1126  -MG      Time Calculation (min) 25 min  -MG      PT Received On 02/17/25  -MG      PT - Next Appointment 02/18/25  -MG                User Key  (r) = Recorded By, (t) = Taken By, (c) = Cosigned By      Initials Name Provider Type    MG Angi Amaral, PT Physical Therapist                  Therapy Charges for Today       Code Description Service Date Service Provider Modifiers Qty    01107782049 HC PT THERAPEUTIC ACT EA 15 MIN 2/17/2025 Angi Amaral, PT GP 1    57305813835 HC PT THER PROC EA 15 MIN 2/17/2025 Angi Amaral, PT GP 1            PT G-Codes  Outcome Measure Options: AM-PAC 6 Clicks Daily Activity (OT)  AM-PAC 6 Clicks Score (PT): 18  AM-PAC 6 Clicks Score (OT): 14  PT Discharge Summary  Anticipated Discharge Disposition (PT): skilled nursing facility, inpatient rehabilitation facility    Angi Amaral PT  2/17/2025     room air

## 2025-02-17 NOTE — PLAN OF CARE
Goal Outcome Evaluation:   Patient alert follows commands anastacia locke changed this shift. Incont care and skin care provided. Prn pain meds given. No nasuea noted. No acute distress noted will continue to monitor

## 2025-02-17 NOTE — PROGRESS NOTES
[facility]     Progress Note    Patient Name: Marilu Avery  : 1955  MRN: [id number]  Primary Care Physician:  Esperanza Melton, APRN  Date of admission: 2025    Subjective   Subjective     Chief Complaint:      History of Present Illness  Patient rebecca secondary to chf    Review of Systems    Objective   Objective     Vitals:   Temp:  [97.3 °F (36.3 °C)-97.7 °F (36.5 °C)] 97.7 °F (36.5 °C)  Heart Rate:  [81-98] 98  Resp:  [16-20] 20  BP: (130-142)/(72-88) 138/82    Physical Exam   General Appearance:  In no acute distress.    HEENT: Normal HEENT exam.     Extremities: .  2+dependent edema.    Neurological: Patient is alert     Result Review    Result Review:  I have personally reviewed the results from the time of this admission to 2025 07:40 EST and agree with these findings:  []  Laboratory list / accordion  []  Microbiology  []  Radiology  []  EKG/Telemetry   []  Cardiology/Vascular   []  Pathology  []  Old records  []  Other:  Most notable findings include:       Assessment & Plan   Assessment / Plan     Brief Patient Summary:  Marilu Avery is a 69 y.o. female who has rebecca and chf    Active Hospital Problems:  Active Hospital Problems    Diagnosis     **REBECCA (acute kidney injury)     Acute on chronic diastolic (congestive) heart failure      Plan:   REBECCA (acute kidney injury), likely cardiorenal syndrome  Fluid overload  1.5 cm L kidney lesion - possible cyst but needs CT when less acute  Acute CHF exacerbation  Obesity  Atrial fibrillation  COPD  Chronic hypertension but BP soft since admission  UTI    Patient seen and examined. Awake,alert. No distress. Denies shortness of breath. Labs and chart reviewed. Cr up to 1.4.  on lasix q 12.  Edema noted. Will continue for now.  May need urology evaluation as outpatient for possible renal lesion    Ivelisse Quiros MD

## 2025-02-17 NOTE — PROGRESS NOTES
LOS: 3 days   Patient Care Team:  Esperanza Melton APRN as PCP - General (Nurse Practitioner)  Anatoly Jimenez MD as PCP - Family Medicine    Chief Complaint: Follow-up persistent atrial fibrillation, acute on chronic diastolic CHF.    Interval History: Her lower extremity edema is much better.  She has prominent skin wrinkling today.  She is less short of breath.  No significant chest pain.    Vital Signs:  Temp:  [97.3 °F (36.3 °C)-97.7 °F (36.5 °C)] 97.3 °F (36.3 °C)  Heart Rate:  [] 94  Resp:  [18-20] 20  BP: (129-142)/(82-88) 129/87    Intake/Output Summary (Last 24 hours) at 2/17/2025 1534  Last data filed at 2/17/2025 1129  Gross per 24 hour   Intake 100 ml   Output 1700 ml   Net -1600 ml       Physical Exam:   General Appearance:    No acute distress, alert and oriented x4   Lungs:     Decreased breath sounds at the bases.    Heart:    Irregularly irregular rhythm and normal rate.  No murmurs, gallops, or rubs.   Abdomen:     Soft, nontender, nondistended.    Extremities:   1+ edema of the lower extremities with prominent skin wrinkling.     Results Review:    Results from last 7 days   Lab Units 02/17/25  0537   SODIUM mmol/L 142   POTASSIUM mmol/L 3.8   CHLORIDE mmol/L 103   CO2 mmol/L 27.0   BUN mg/dL 45*   CREATININE mg/dL 1.40*   GLUCOSE mg/dL 105*   CALCIUM mg/dL 8.9     Results from last 7 days   Lab Units 02/14/25  0648 02/13/25  1443 02/13/25  1341   CK TOTAL U/L 27  --   --    HSTROP T ng/L 42* 41* 38*     Results from last 7 days   Lab Units 02/17/25  0537   WBC 10*3/mm3 2.55*   HEMOGLOBIN g/dL 9.9*   HEMATOCRIT % 29.4*   PLATELETS 10*3/mm3 148     Results from last 7 days   Lab Units 02/13/25  1341   INR  1.82*   APTT seconds 29.4     Results from last 7 days   Lab Units 02/14/25  0648   CHOLESTEROL mg/dL 92     Results from last 7 days   Lab Units 02/13/25  1341   MAGNESIUM mg/dL 1.7     Results from last 7 days   Lab Units 02/14/25  0648   CHOLESTEROL mg/dL 92   TRIGLYCERIDES  mg/dL 70   HDL CHOL mg/dL 57   LDL CHOL mg/dL 20       I reviewed the patient's new clinical results.        Assessment:  1.  Acute on chronic diastolic CHF, EF 65%  2.  Persistent atrial fibrillation  3.  Acute kidney injury with stage IIIb chronic kidney disease  4.  Obesity, complicating all aspects of care  5.  Baseline hypertension with relative hypotension early in the hospitalization  6.  COPD without acute exacerbation  7.  Acute urinary tract infection (E. coli)     Plan:  -She seems to be making progress.  I would keep her on the IV Lasix 40 mg twice daily for 1 more day.  Potentially transition over to oral diuretics tomorrow.    -Her rate is mainly in the 90s.  Her metoprolol was decreased from 200 mg twice daily to 25 mg twice daily during this hospitalization because of hypotension.  I do feel that her heart rate could be better controlled.  I am going to try and see if she can tolerate the metoprolol on a 3 times daily basis for now.    -She is now off the losartan because of hypotension.    -Continue the Eliquis at 5 mg twice daily.    Kelvin Yousif MD  02/17/25  15:34 EST

## 2025-02-17 NOTE — PLAN OF CARE
Goal Outcome Evaluation:  Patient alert and oriented. RA. PT up in chair for lunch. Medicated for pain per MAR. Plan of care ongoing.

## 2025-02-17 NOTE — NURSING NOTE
Wound/ostomy - consult received regarding redness to dorsal feet. Patient with CHF, chronic edema and venous stasis, has been admitted with increased edema and being addressed with diuresis, cardiology has ordered ace wraps for compression but patient has not wanted to keep them on due to being uncomfortable. Dorsal feet with redness and dry flaky skin consistent with venous stasis, can add amlactin lotion but diligent management of edema will help patient the most.

## 2025-02-17 NOTE — PROGRESS NOTES
"Daily progress note    Primary care physician  Dr. Melton    Subjective  Doing better with no new complaints    History of present illness  69-year-old white female with history of COPD asthma atrial fibrillation hypertension of sleep apnea chronic kidney disease stage IIIb presented to [facility] emergency room with increased shortness of breath and leg swelling.  Patient denies any fever chest pain palpitation abdominal pain nausea vomiting diarrhea.  Patient stated that her diuretics was recently changed by her cardiologist.  Patient workup in ER revealed acute kidney injury and also have elevated troponin admitted for management.     REVIEW OF SYSTEMS  Unremarkable except generalized weakness     PHYSICAL EXAM  Blood pressure 129/87, pulse 94, temperature 97.3 °F (36.3 °C), temperature source Oral, resp. rate 20, height 177.8 cm (70\"), weight 117 kg (257 lb 8 oz), SpO2 95%, not currently breastfeeding.    General: Awake and alert no acute distress.  HENT: NCAT, PERRL, Nares patent.  Eyes: no scleral icterus.  Neck: trachea midline, no ROM limitations.  CV: regular rhythm, regular rate.  Respiratory: normal effort, decreased breath sound at the bases  Abdomen: soft, nondistended, NTTP, no rebound tenderness, no guarding or rigidity.  Musculoskeletal: no deformity.  Neuro: alert, moves all extremities, follows commands.  Skin: warm, dry.  2+ pitting edema bilateral lower extremities.     LAB RESULTS  Lab Results (last 24 hours)       Procedure Component Value Units Date/Time    Urine Culture - Urine, Urine, Clean Catch [[id number]]  (Abnormal)  (Susceptibility) Collected: 02/14/25 1148    Specimen: Urine, Clean Catch Updated: 02/17/25 0957     Urine Culture >100,000 CFU/mL Escherichia coli    Narrative:      Colonization of the urinary tract without infection is common. Treatment is discouraged unless the patient is symptomatic, pregnant, or undergoing an invasive urologic procedure.    Susceptibility "        Escherichia coli      JESSICA      Amoxicillin + Clavulanate Susceptible      Ampicillin Resistant      Ampicillin + Sulbactam Resistant      Cefazolin (Urine) Susceptible      Cefepime Susceptible      Ceftazidime Susceptible      Ceftriaxone Susceptible      Ciprofloxacin Intermediate      Gentamicin Susceptible      Levofloxacin Intermediate      Nitrofurantoin Susceptible      Piperacillin + Tazobactam Susceptible      Trimethoprim + Sulfamethoxazole Susceptible                           Basic Metabolic Panel [[id number]]  (Abnormal) Collected: 02/17/25 0537    Specimen: Blood Updated: 02/17/25 0705     Glucose 105 mg/dL      BUN 45 mg/dL      Creatinine 1.40 mg/dL      Sodium 142 mmol/L      Potassium 3.8 mmol/L      Chloride 103 mmol/L      CO2 27.0 mmol/L      Calcium 8.9 mg/dL      BUN/Creatinine Ratio 32.1     Anion Gap 12.0 mmol/L      eGFR 41.1 mL/min/1.73     Narrative:      GFR Categories in Chronic Kidney Disease (CKD)      GFR Category          GFR (mL/min/1.73)    Interpretation  G1                     90 or greater         Normal or high (1)  G2                      60-89                Mild decrease (1)  G3a                   45-59                Mild to moderate decrease  G3b                   30-44                Moderate to severe decrease  G4                    15-29                Severe decrease  G5                    14 or less           Kidney failure          (1)In the absence of evidence of kidney disease, neither GFR category G1 or G2 fulfill the criteria for CKD.    eGFR calculation 2021 CKD-EPI creatinine equation, which does not include race as a factor    Lactic Acid, Plasma [[id number]]  (Normal) Collected: 02/17/25 0537    Specimen: Blood Updated: 02/17/25 0701     Lactate 1.0 mmol/L     CBC & Differential [[id number]]  (Abnormal) Collected: 02/17/25 0537    Specimen: Blood Updated: 02/17/25 0641    Narrative:      The following orders were created for panel order CBC &  Differential.  Procedure                               Abnormality         Status                     ---------                               -----------         ------                     CBC Auto Differential[[id number]]        Abnormal            Final result                 Please view results for these tests on the individual orders.    CBC Auto Differential [[id number]]  (Abnormal) Collected: 02/17/25 0537    Specimen: Blood Updated: 02/17/25 0641     WBC 2.55 10*3/mm3      RBC 2.89 10*6/mm3      Hemoglobin 9.9 g/dL      Hematocrit 29.4 %      .7 fL      MCH 34.3 pg      MCHC 33.7 g/dL      RDW 14.1 %      RDW-SD 52.6 fl      MPV 10.3 fL      Platelets 148 10*3/mm3      Neutrophil % 52.6 %      Lymphocyte % 29.0 %      Monocyte % 14.5 %      Eosinophil % 2.7 %      Basophil % 0.8 %      Immature Grans % 0.4 %      Neutrophils, Absolute 1.34 10*3/mm3      Lymphocytes, Absolute 0.74 10*3/mm3      Monocytes, Absolute 0.37 10*3/mm3      Eosinophils, Absolute 0.07 10*3/mm3      Basophils, Absolute 0.02 10*3/mm3      Immature Grans, Absolute 0.01 10*3/mm3      nRBC 0.0 /100 WBC     Lactate Dehydrogenase [[id number]]  (Normal) Collected: 02/16/25 2335    Specimen: Blood Updated: 02/17/25 0123      U/L     STAT Lactic Acid, Reflex [[id number]]  (Abnormal) Collected: 02/16/25 1719    Specimen: Blood Updated: 02/16/25 1748     Lactate 2.2 mmol/L     Lactic Acid, Plasma [[id number]]  (Abnormal) Collected: 02/16/25 1434    Specimen: Blood Updated: 02/16/25 1506     Lactate 2.9 mmol/L           Imaging Results (Last 24 Hours)       ** No results found for the last 24 hours. **          Scan on 2/13/2025 1450 by [facility], [place]: ECG 12-LEAD         Author: -- Service: -- Author Type: --   Filed: Date of Service: Creation Time:   Status: (Other)   HEART RATE=81  bpm  RR Jnstppko=463  ms  NM Interval=  ms  P Horizontal Axis=  deg  P Front Axis=  deg  QRSD Interval=96  ms  QT Fnpvdjlh=565  ms  QOjN=718  ms  QRS  Axis=27  deg  T Wave Axis=41  deg  - ABNORMAL ECG -  Atrial fibrillation  Ventricular bigeminy  Low voltage, extremity and precordial leads  Abnormal R-wave progression, early transition          Current Facility-Administered Medications:     acetaminophen (TYLENOL) 160 MG/5ML oral solution 650 mg, 650 mg, Oral, Q6H PRN, Nathan Hooper MD    acetaminophen (TYLENOL) tablet 650 mg, 650 mg, Oral, Q4H PRN, Nathan Hooper MD, 650 mg at 02/17/25 0501    albuterol (PROVENTIL) nebulizer solution 0.083% 2.5 mg/3mL, 2.5 mg, Nebulization, Q4H PRN, Nathan Hooper MD    allopurinol (ZYLOPRIM) tablet 100 mg, 100 mg, Oral, Daily, Nathan Hooper MD, 100 mg at 02/17/25 0826    ammonium lactate (AMLACTIN) 12 % cream 1 Application, 1 Application, Topical, Daily, Nathan Hooper MD    apixaban (ELIQUIS) tablet 5 mg, 5 mg, Oral, BID, [place], Maris A, APRN, 5 mg at 02/17/25 0826    arformoterol (BROVANA) nebulizer solution 15 mcg, 15 mcg, Nebulization, BID - RT, 15 mcg at 02/17/25 0930 **AND** budesonide (PULMICORT) nebulizer solution 0.5 mg, 0.5 mg, Nebulization, BID - RT, 0.5 mg at 02/17/25 0931 **AND** revefenacin (YUPELRI) nebulizer solution 175 mcg, 175 mcg, Nebulization, Daily - RT, Nathan Hooper MD, 175 mcg at 02/17/25 0935    aspirin chewable tablet 81 mg, 81 mg, Oral, Daily, Nathan Hooper MD, 81 mg at 02/17/25 0826    atorvastatin (LIPITOR) tablet 10 mg, 10 mg, Oral, Nightly, Nathan Hooper MD, 10 mg at 02/16/25 2119    cefTRIAXone (ROCEPHIN) 1,000 mg in sodium chloride 0.9 % 100 mL MBP, 1,000 mg, Intravenous, Q24H, Nathan Hooper MD, Last Rate: 200 mL/hr at 02/16/25 1534, 1,000 mg at 02/16/25 1534    famotidine (PEPCID) tablet 20 mg, 20 mg, Oral, BID AC, Nathan Hooper MD, 20 mg at 02/17/25 0501    folic acid (FOLVITE) tablet 1 mg, 1 mg, Oral, Daily, Nathan Hooper MD, 1 mg at 02/17/25 0826    furosemide (LASIX) injection 40 mg, 40 mg, Intravenous, Q12H, Nathan Hooper MD, 40 mg at 02/17/25 0500    HYDROcodone-acetaminophen (NORCO)  7.5-325 MG per tablet 1 tablet, 1 tablet, Oral, Q8H PRN, Leonor Hooper MD, 1 tablet at 02/17/25 0825    levothyroxine (SYNTHROID, LEVOTHROID) tablet 75 mcg, 75 mcg, Oral, Daily, Leonor Hooper MD, 75 mcg at 02/17/25 0826    metoprolol tartrate (LOPRESSOR) tablet 25 mg, 25 mg, Oral, Q12H, Leonor Hooper MD, 25 mg at 02/17/25 0826    montelukast (SINGULAIR) tablet 10 mg, 10 mg, Oral, Daily, Leonor Hooper MD, 10 mg at 02/17/25 0826    multivitamin (THERAGRAN) tablet 1 tablet, 1 tablet, Oral, Daily, Leonor Hooper MD, 1 tablet at 02/17/25 0826    polyethylene glycol (MIRALAX) packet 17 g, 17 g, Oral, Daily, Leonor Hooper MD, 17 g at 02/17/25 0826    potassium chloride (K-DUR,KLOR-CON) ER tablet 10 mEq, 10 mEq, Oral, BID With Meals, Leonor Hooper MD, 10 mEq at 02/17/25 0826    sennosides-docusate (PERICOLACE) 8.6-50 MG per tablet 2 tablet, 2 tablet, Oral, BID PRN, Leonor Hooper MD    thiamine (VITAMIN B-1) tablet 200 mg, 200 mg, Oral, Daily, Leonor Hooper MD, 200 mg at 02/17/25 0826      ASSESSMENT  Acute on chronic diastolic congestive heart failure  Acute kidney injury  Acute E. coli UTI  Elevated troponin  Asymptomatic bacteriuria  Chronic atrial fibrillation  Hypertension  Hyperlipidemia  Hypothyroidism  Morbidly obese  Asthma/COPD/obstructive sleep apnea  Chronic kidney disease stage IIIb    PLAN  CPM  Continue IV diuresis  Continue Eliquis   Continue antibiotics for 5 days  Strict I's and O's and daily weight  Cardiology consult appreciated  Nephrology to follow patient  Adjust home medications  Stress ulcer DVT prophylaxis  Supportive care  PT OT   Discussed with nursing staff  Discharge  planning    LEONOR HOOPER MD    Copied text in this note has been reviewed and is accurate as of 02/17/25

## 2025-02-17 NOTE — CASE MANAGEMENT/SOCIAL WORK
Continued Stay Note  [facility]     Patient Name: Marilu Avery  MRN: [id number]  Today's Date: 2/17/2025    Admit Date: 2/13/2025    Plan: Home   Discharge Plan       Row Name 02/17/25 1423       Plan    Plan Home    Plan Comments CCP followed up with patient about the cost of Eliquis. She said it is too expensive for her. Provided her with information for the [employer] and she says she used to be a part of the program but as of now she does not spend 3% of her annual income on medications. Discussed that after her next refill she will probably hit her deductible and then the cost will be 15% coinsurance or ~$87/month. The patient said she is agreeable and will figure out how to pay for the medication. CCP following.                   Discharge Codes    No documentation.                 Expected Discharge Date and Time       Expected Discharge Date Expected Discharge Time    Feb 19, 2025

## 2025-02-18 LAB
ANION GAP SERPL CALCULATED.3IONS-SCNC: 10 MMOL/L (ref 5–15)
BASOPHILS # BLD AUTO: 0.03 10*3/MM3 (ref 0–0.2)
BASOPHILS NFR BLD AUTO: 1.1 % (ref 0–1.5)
BUN SERPL-MCNC: 37 MG/DL (ref 8–23)
BUN/CREAT SERPL: 32.5 (ref 7–25)
CALCIUM SPEC-SCNC: 9.3 MG/DL (ref 8.6–10.5)
CHLORIDE SERPL-SCNC: 102 MMOL/L (ref 98–107)
CO2 SERPL-SCNC: 28 MMOL/L (ref 22–29)
CREAT SERPL-MCNC: 1.14 MG/DL (ref 0.57–1)
DEPRECATED RDW RBC AUTO: 53.7 FL (ref 37–54)
EGFRCR SERPLBLD CKD-EPI 2021: 52.5 ML/MIN/1.73
EOSINOPHIL # BLD AUTO: 0.08 10*3/MM3 (ref 0–0.4)
EOSINOPHIL NFR BLD AUTO: 2.8 % (ref 0.3–6.2)
ERYTHROCYTE [DISTWIDTH] IN BLOOD BY AUTOMATED COUNT: 14.3 % (ref 12.3–15.4)
GLUCOSE SERPL-MCNC: 103 MG/DL (ref 65–99)
HCT VFR BLD AUTO: 30.6 % (ref 34–46.6)
HGB BLD-MCNC: 10.2 G/DL (ref 12–15.9)
IMM GRANULOCYTES # BLD AUTO: 0.01 10*3/MM3 (ref 0–0.05)
IMM GRANULOCYTES NFR BLD AUTO: 0.4 % (ref 0–0.5)
LYMPHOCYTES # BLD AUTO: 0.83 10*3/MM3 (ref 0.7–3.1)
LYMPHOCYTES NFR BLD AUTO: 29.2 % (ref 19.6–45.3)
MCH RBC QN AUTO: 34.6 PG (ref 26.6–33)
MCHC RBC AUTO-ENTMCNC: 33.3 G/DL (ref 31.5–35.7)
MCV RBC AUTO: 103.7 FL (ref 79–97)
MONOCYTES # BLD AUTO: 0.38 10*3/MM3 (ref 0.1–0.9)
MONOCYTES NFR BLD AUTO: 13.4 % (ref 5–12)
NEUTROPHILS NFR BLD AUTO: 1.51 10*3/MM3 (ref 1.7–7)
NEUTROPHILS NFR BLD AUTO: 53.1 % (ref 42.7–76)
NRBC BLD AUTO-RTO: 0 /100 WBC (ref 0–0.2)
PLATELET # BLD AUTO: 170 10*3/MM3 (ref 140–450)
PMV BLD AUTO: 10 FL (ref 6–12)
POTASSIUM SERPL-SCNC: 4.2 MMOL/L (ref 3.5–5.2)
RBC # BLD AUTO: 2.95 10*6/MM3 (ref 3.77–5.28)
SODIUM SERPL-SCNC: 140 MMOL/L (ref 136–145)
WBC NRBC COR # BLD AUTO: 2.84 10*3/MM3 (ref 3.4–10.8)

## 2025-02-18 PROCEDURE — 63710000001 REVEFENACIN 175 MCG/3ML SOLUTION: Performed by: HOSPITALIST

## 2025-02-18 PROCEDURE — 99232 SBSQ HOSP IP/OBS MODERATE 35: CPT | Performed by: INTERNAL MEDICINE

## 2025-02-18 PROCEDURE — 94760 N-INVAS EAR/PLS OXIMETRY 1: CPT

## 2025-02-18 PROCEDURE — 94799 UNLISTED PULMONARY SVC/PX: CPT

## 2025-02-18 PROCEDURE — 25010000002 CEFTRIAXONE PER 250 MG: Performed by: HOSPITALIST

## 2025-02-18 PROCEDURE — 94664 DEMO&/EVAL PT USE INHALER: CPT

## 2025-02-18 PROCEDURE — 80048 BASIC METABOLIC PNL TOTAL CA: CPT | Performed by: HOSPITALIST

## 2025-02-18 PROCEDURE — 97530 THERAPEUTIC ACTIVITIES: CPT

## 2025-02-18 PROCEDURE — 25010000002 FUROSEMIDE PER 20 MG: Performed by: HOSPITALIST

## 2025-02-18 PROCEDURE — 97110 THERAPEUTIC EXERCISES: CPT

## 2025-02-18 PROCEDURE — 97535 SELF CARE MNGMENT TRAINING: CPT

## 2025-02-18 PROCEDURE — 85025 COMPLETE CBC W/AUTO DIFF WBC: CPT | Performed by: HOSPITALIST

## 2025-02-18 PROCEDURE — 94761 N-INVAS EAR/PLS OXIMETRY MLT: CPT

## 2025-02-18 RX ORDER — METOPROLOL TARTRATE 50 MG
50 TABLET ORAL EVERY 12 HOURS SCHEDULED
Status: DISCONTINUED | OUTPATIENT
Start: 2025-02-18 | End: 2025-02-20 | Stop reason: HOSPADM

## 2025-02-18 RX ORDER — TORSEMIDE 20 MG/1
20 TABLET ORAL DAILY
Status: DISCONTINUED | OUTPATIENT
Start: 2025-02-18 | End: 2025-02-20 | Stop reason: HOSPADM

## 2025-02-18 RX ADMIN — FAMOTIDINE 20 MG: 20 TABLET, FILM COATED ORAL at 17:05

## 2025-02-18 RX ADMIN — POTASSIUM CHLORIDE 10 MEQ: 750 TABLET, EXTENDED RELEASE ORAL at 17:05

## 2025-02-18 RX ADMIN — FAMOTIDINE 20 MG: 20 TABLET, FILM COATED ORAL at 05:31

## 2025-02-18 RX ADMIN — Medication 1 TABLET: at 08:00

## 2025-02-18 RX ADMIN — Medication 1 APPLICATION: at 08:00

## 2025-02-18 RX ADMIN — ARFORMOTEROL TARTRATE 15 MCG: 15 SOLUTION RESPIRATORY (INHALATION) at 10:23

## 2025-02-18 RX ADMIN — POTASSIUM CHLORIDE 10 MEQ: 750 TABLET, EXTENDED RELEASE ORAL at 08:00

## 2025-02-18 RX ADMIN — ALLOPURINOL 100 MG: 100 TABLET ORAL at 08:00

## 2025-02-18 RX ADMIN — MONTELUKAST SODIUM 10 MG: 10 TABLET, FILM COATED ORAL at 08:00

## 2025-02-18 RX ADMIN — HYDROCODONE BITARTRATE AND ACETAMINOPHEN 1 TABLET: 7.5; 325 TABLET ORAL at 08:00

## 2025-02-18 RX ADMIN — METOPROLOL TARTRATE 25 MG: 25 TABLET, FILM COATED ORAL at 00:26

## 2025-02-18 RX ADMIN — LEVOTHYROXINE SODIUM 75 MCG: 0.07 TABLET ORAL at 11:22

## 2025-02-18 RX ADMIN — CEFTRIAXONE SODIUM 1000 MG: 1 INJECTION, POWDER, FOR SOLUTION INTRAMUSCULAR; INTRAVENOUS at 14:12

## 2025-02-18 RX ADMIN — HYDROCODONE BITARTRATE AND ACETAMINOPHEN 1 TABLET: 7.5; 325 TABLET ORAL at 16:06

## 2025-02-18 RX ADMIN — HYDROCODONE BITARTRATE AND ACETAMINOPHEN 1 TABLET: 7.5; 325 TABLET ORAL at 00:26

## 2025-02-18 RX ADMIN — FUROSEMIDE 40 MG: 10 INJECTION, SOLUTION INTRAMUSCULAR; INTRAVENOUS at 05:30

## 2025-02-18 RX ADMIN — TORSEMIDE 20 MG: 20 TABLET ORAL at 11:22

## 2025-02-18 RX ADMIN — ACETAMINOPHEN 650 MG: 325 TABLET, FILM COATED ORAL at 20:36

## 2025-02-18 RX ADMIN — METOPROLOL TARTRATE 50 MG: 50 TABLET, FILM COATED ORAL at 20:36

## 2025-02-18 RX ADMIN — BUDESONIDE 0.5 MG: 0.5 INHALANT RESPIRATORY (INHALATION) at 10:27

## 2025-02-18 RX ADMIN — POLYETHYLENE GLYCOL 3350 17 G: 17 POWDER, FOR SOLUTION ORAL at 08:00

## 2025-02-18 RX ADMIN — APIXABAN 5 MG: 5 TABLET, FILM COATED ORAL at 08:00

## 2025-02-18 RX ADMIN — METOPROLOL TARTRATE 25 MG: 25 TABLET, FILM COATED ORAL at 08:00

## 2025-02-18 RX ADMIN — FOLIC ACID 1 MG: 1 TABLET ORAL at 08:00

## 2025-02-18 RX ADMIN — BUDESONIDE 0.5 MG: 0.5 INHALANT RESPIRATORY (INHALATION) at 19:27

## 2025-02-18 RX ADMIN — ASPIRIN 81 MG CHEWABLE TABLET 81 MG: 81 TABLET CHEWABLE at 08:00

## 2025-02-18 RX ADMIN — REVEFENACIN 175 MCG: 175 SOLUTION RESPIRATORY (INHALATION) at 10:22

## 2025-02-18 RX ADMIN — ACETAMINOPHEN 650 MG: 325 TABLET, FILM COATED ORAL at 11:37

## 2025-02-18 RX ADMIN — ARFORMOTEROL TARTRATE 15 MCG: 15 SOLUTION RESPIRATORY (INHALATION) at 19:26

## 2025-02-18 RX ADMIN — Medication 200 MG: at 08:00

## 2025-02-18 RX ADMIN — ATORVASTATIN CALCIUM 10 MG: 20 TABLET, FILM COATED ORAL at 20:36

## 2025-02-18 RX ADMIN — APIXABAN 5 MG: 5 TABLET, FILM COATED ORAL at 20:36

## 2025-02-18 NOTE — THERAPY TREATMENT NOTE
Patient Name: Marilu Avery  : 1955    MRN: [id number]                              Today's Date: 2025       Admit Date: 2025    Visit Dx:     ICD-10-CM ICD-9-CM   1. REBECCA (acute kidney injury)  N17.9 584.9   2. Bilateral lower extremity edema  R60.0 782.3   3. Anemia, unspecified type  D64.9 285.9   4. Elevated troponin  R79.89 790.6     Patient Active Problem List   Diagnosis    Incisional hernia, without obstruction or gangrene    Incarcerated ventral hernia    Atrial fibrillation, persistent    HLD (hyperlipidemia)    HTN (hypertension)    LISA (obstructive sleep apnea)    Stage 3b chronic kidney disease    Chronic anticoagulation    SBO (small bowel obstruction)    Tobacco abuse    Morbid obesity with BMI of 40.0-44.9, adult    COPD (chronic obstructive pulmonary disease)    Hypopotassemia    Gout    Chronic anticoagulation    Prolonged Q-T interval on ECG    REBECCA (acute kidney injury)    Acute on chronic diastolic (congestive) heart failure     Past Medical History:   Diagnosis Date    Arthritis     Asthma     Atrial fibrillation     Colon polyps 2015    hyperplastic rectal polyp    Hyperlipidemia     Hypertension     LISA on CPAP     Sleep apnea      Past Surgical History:   Procedure Laterality Date    BACK SURGERY N/A     COLONOSCOPY N/A 10/22/2015    Rectal polyp-Dr. Elías Keating    HYSTERECTOMY N/A     LAPAROSCOPIC CHOLECYSTECTOMY N/A 2010    Dr. Heath Whitlock    OOPHORECTOMY  2001    REPLACEMENT TOTAL KNEE Left 2015    Dr. Yo Aguayo    REPLACEMENT TOTAL KNEE Right 2015    Dr. Yo Aguayo    VENTRAL HERNIA REPAIR N/A 10/22/2015    Open reduction of incarcerated ventral hernia with layered closure-Dr. Elías Keating    VENTRAL/INCISIONAL HERNIA REPAIR N/A 2021    Procedure: OPEN VENTRAL/INCISIONAL HERNIA REPAIR WITH MESH AND APPENDECTOMY;  Surgeon: Arnulfo Leonard MD;  Location: [facility]IN OR;  Service: General;  Laterality: N/A;      General  Information       Row Name 02/18/25 1605          Physical Therapy Time and Intention    Document Type therapy note (daily note)  -MG     Mode of Treatment individual therapy;physical therapy  -MG       Row Name 02/18/25 1605          General Information    Patient Profile Reviewed yes  -MG     Existing Precautions/Restrictions fall  -MG     Barriers to Rehab none identified  -MG       Row Name 02/18/25 1605          Cognition    Orientation Status (Cognition) oriented x 4  -MG       Row Name 02/18/25 1605          Safety Issues/Impairments Affecting Functional Mobility    Safety Issues Affecting Function (Mobility) insight into deficits/self-awareness  -MG     Impairments Affecting Function (Mobility) strength;pain;endurance/activity tolerance  -MG     Comment, Safety Issues/Impairments (Mobility) Gait belt and non-skid socks donned. PT tech present for safe functional mobility.  -MG               User Key  (r) = Recorded By, (t) = Taken By, (c) = Cosigned By      Initials Name Provider Type    MG Angi Amaral, PT Physical Therapist                   Mobility       Row Name 02/18/25 1606          Bed Mobility    Supine-Sit [place] (Bed Mobility) standby assist;verbal cues  -MG     Assistive Device (Bed Mobility) bed rails;head of bed elevated  -MG     Comment, (Bed Mobility) Increased time to complete. Pt having more difficulty moving RLE today.  -MG       Row Name 02/18/25 1606          Sit-Stand Transfer    Sit-Stand [place] (Transfers) minimum assist (75% patient effort);verbal cues  -MG     Assistive Device (Sit-Stand Transfers) walker, front-wheeled  -MG     Comment, (Sit-Stand Transfer) x1 elevated EOB. Cues for hand placement.  -MG       Row Name 02/18/25 1606          Gait/Stairs (Locomotion)    [place] Level (Gait) contact guard;1 person assist;1 person to manage equipment;verbal cues  -MG     Assistive Device (Gait) walker, front-wheeled  -MG     Distance in Feet (Gait) 25  -MG      Deviations/Abnormal Patterns (Gait) gait speed decreased;stride length decreased  -MG     Bilateral Gait Deviations forward flexed posture;heel strike decreased  -MG     Comment, (Gait/Stairs) Pt amb into hallway w/ chair follow from tech. One standing rest break d/t RLE pain. No overt LOB or buckling. Denies dizziness or SOB.  -MG               User Key  (r) = Recorded By, (t) = Taken By, (c) = Cosigned By      Initials Name Provider Type    MG Angi Amaral PT Physical Therapist                   Obj/Interventions       Row Name 02/18/25 1608          Motor Skills    Therapeutic Exercise other (see comments)  AP, LAQ x10  -MG       Row Name 02/18/25 1608          Balance    Comment, Balance SBA while seated EOB. Static standing w/ RW and CGA. No overt LOB or buckling.  -MG               User Key  (r) = Recorded By, (t) = Taken By, (c) = Cosigned By      Initials Name Provider Type    MG Angi Amaral, PT Physical Therapist                   Goals/Plan    No documentation.                  Clinical Impression       Row Name 02/18/25 1608          Pain    Pretreatment Pain Rating 7/10  -MG     Posttreatment Pain Rating 7/10  -MG     Pain Location extremity  -MG     Pain Side/Orientation right;lower  -MG     Pain Management Interventions positioning techniques utilized;nursing notified  -MG     Response to Pain Interventions activity participation with tolerable pain  -MG       Row Name 02/18/25 1608          Plan of Care Review    Plan of Care Reviewed With patient  -MG     Progress improving  -MG     Outcome Evaluation Pt seen for PT tx this PM and tolerated the session well. Today, pt was SBA for bed mobility w/ increased time, [place] for STS to RW from elevated bed height and CGA for ambulation of 25' w/ RW and chair follow by PT tech. No overt LOB, buckling, dizziness or SOB. Pt remains limited by decreased activity tolerance, RLE pain w/ weight bearing, and having a fear of falling. Cont encouragement to sit  UI for meals, and ambulate w/ nsg to the bathroom or BSC; she v/u. SBAR w/ RN. PT will cont to follow and rec rehab at [place].  -MG       Row Name 02/18/25 1608          Therapy Assessment/Plan (PT)    Rehab Potential (PT) good  -MG     Criteria for Skilled Interventions Met (PT) yes;meets criteria  -MG     Therapy Frequency (PT) 5 times/wk  -MG       Row Name 02/18/25 1608          Positioning and Restraints    Pre-Treatment Position in bed  -MG     Post Treatment Position chair  -MG     In Chair notified nsg;reclined;call light within reach;exit alarm on;encouraged to call for assist;legs elevated;heels elevated  -MG               User Key  (r) = Recorded By, (t) = Taken By, (c) = Cosigned By      Initials Name Provider Type    Angi Ochoa, CHUCK Physical Therapist                   Outcome Measures       Row Name 02/18/25 1611 02/18/25 0753       How much help from another person do you currently need...    Turning from your back to your side while in flat bed without using bedrails? 4  -MG 4  -MS    Moving from lying on back to sitting on the side of a flat bed without bedrails? 3  -MG 3  -MS    Moving to and from a bed to a chair (including a wheelchair)? 3  -MG 3  -MS    Standing up from a chair using your arms (e.g., wheelchair, bedside chair)? 3  -MG 3  -MS    Climbing 3-5 steps with a railing? 2  -MG 2  -MS    To walk in hospital room? 3  -MG 3  -MS    AM-PAC 6 Clicks Score (PT) 18  -MG 18  -MS              User Key  (r) = Recorded By, (t) = Taken By, (c) = Cosigned By      Initials Name Provider Type    Mahin Ragland, RN Registered Nurse    Angi Ochoa, PT Physical Therapist                                 Physical Therapy Education       Title: PT OT SLP Therapies (Done)       Topic: Physical Therapy (Done)       Point: Mobility training (Done)       Learning Progress Summary            Patient Acceptance, E,D, VU,NR by MG at 2/18/2025 1611    Acceptance, E, VU by CW at 2/18/2025 1100     Acceptance, E,TB, VU by SS at 2/18/2025 0404    Acceptance, E, VU by JS at 2/17/2025 1851                      Point: Home exercise program (Done)       Learning Progress Summary            Patient Acceptance, E, VU by CW at 2/18/2025 1100    Acceptance, E,TB, VU by SS at 2/18/2025 0404    Acceptance, E, VU by JS at 2/17/2025 1851                      Point: Body mechanics (Done)       Learning Progress Summary            Patient Acceptance, E,D, VU,NR by MG at 2/18/2025 1611    Acceptance, E, VU by CW at 2/18/2025 1100    Acceptance, E,TB, VU by SS at 2/18/2025 0404    Acceptance, E, VU by JS at 2/17/2025 1851    Acceptance, E,TB,D, VU,NR by MG at 2/17/2025 1355    Acceptance, E,TB, VU by MG at 2/14/2025 0927                      Point: Precautions (Done)       Learning Progress Summary            Patient Acceptance, E,D, VU,NR by MG at 2/18/2025 1611    Acceptance, E, VU by CW at 2/18/2025 1100    Acceptance, E,TB, VU by SS at 2/18/2025 0404    Acceptance, E, VU by JS at 2/17/2025 1851    Acceptance, E,TB,D, VU,NR by MG at 2/17/2025 1355    Acceptance, E,TB, VU by MG at 2/14/2025 0927                                      User Key       Initials Effective Dates Name Provider Type Discipline    CW 06/16/21 -  Rachael Santos OTR Occupational Therapist OT    MG 05/24/22 -  Angi Amaral, PT Physical Therapist PT     02/26/24 -  Mercedes Heard, RN Registered Nurse Nurse     02/03/22 -  Abraham Flaherty, RN Registered Nurse Nurse                  PT Recommendation and Plan  Planned Therapy Interventions (PT): balance training, bed mobility training, gait training, home exercise program, patient/family education, stretching, strengthening, neuromuscular re-education, ROM (range of motion), postural re-education, transfer training  Progress: improving  Outcome Evaluation: Pt seen for PT tx this PM and tolerated the session well. Today, pt was SBA for bed mobility w/ increased time, [place] for STS to RW from elevated bed  height and CGA for ambulation of 25' w/ RW and chair follow by PT tech. No overt LOB, buckling, dizziness or SOB. Pt remains limited by decreased activity tolerance, RLE pain w/ weight bearing, and having a fear of falling. Cont encouragement to sit [employer] for meals, and ambulate w/ nsg to the bathroom or BSC; she v/u. SBAR w/ RN. PT will cont to follow and rec rehab at [place].     Time Calculation:         PT Charges       Row Name 02/18/25 1611             Time Calculation    Start Time 1428  -MG      Stop Time 1443  -MG      Time Calculation (min) 15 min  -MG      PT Received On 02/18/25  -MG      PT - Next Appointment 02/19/25  -MG                User Key  (r) = Recorded By, (t) = Taken By, (c) = Cosigned By      Initials Name Provider Type    Angi Ochoa, PT Physical Therapist                  Therapy Charges for Today       Code Description Service Date Service Provider Modifiers Qty    10469401537 HC PT THERAPEUTIC ACT EA 15 MIN 2/17/2025 Angi Amaral, PT GP 1    89352655548 HC PT THER PROC EA 15 MIN 2/17/2025 Angi Amaral, PT GP 1    97976396438 HC PT THERAPEUTIC ACT EA 15 MIN 2/18/2025 Angi Amaral, PT GP 1    26433549942 HC PT THER SUPP EA 15 MIN 2/18/2025 Angi Amaral, PT GP 1            PT G-Codes  Outcome Measure Options: AM-PAC 6 Clicks Daily Activity (OT)  AM-PAC 6 Clicks Score (PT): 18  AM-PAC 6 Clicks Score (OT): 13  PT Discharge Summary  Anticipated Discharge Disposition (PT): skilled nursing facility, inpatient rehabilitation facility    Angi Amaral PT  2/18/2025

## 2025-02-18 NOTE — THERAPY TREATMENT NOTE
Patient Name: Marilu Avery  : 1955    MRN: [id number]                              Today's Date: 2025       Admit Date: 2025    Visit Dx:     ICD-10-CM ICD-9-CM   1. REBECCA (acute kidney injury)  N17.9 584.9   2. Bilateral lower extremity edema  R60.0 782.3   3. Anemia, unspecified type  D64.9 285.9   4. Elevated troponin  R79.89 790.6     Patient Active Problem List   Diagnosis    Incisional hernia, without obstruction or gangrene    Incarcerated ventral hernia    Atrial fibrillation, persistent    HLD (hyperlipidemia)    HTN (hypertension)    LISA (obstructive sleep apnea)    Stage 3b chronic kidney disease    Chronic anticoagulation    SBO (small bowel obstruction)    Tobacco abuse    Morbid obesity with BMI of 40.0-44.9, adult    COPD (chronic obstructive pulmonary disease)    Hypopotassemia    Gout    Chronic anticoagulation    Prolonged Q-T interval on ECG    REBECCA (acute kidney injury)    Acute on chronic diastolic (congestive) heart failure     Past Medical History:   Diagnosis Date    Arthritis     Asthma     Atrial fibrillation     Colon polyps 2015    hyperplastic rectal polyp    Hyperlipidemia     Hypertension     LISA on CPAP     Sleep apnea      Past Surgical History:   Procedure Laterality Date    BACK SURGERY N/A     COLONOSCOPY N/A 10/22/2015    Rectal polyp-Dr. Elías Keating    HYSTERECTOMY N/A     LAPAROSCOPIC CHOLECYSTECTOMY N/A 2010    Dr. Heath Whitlock    OOPHORECTOMY  2001    REPLACEMENT TOTAL KNEE Left 2015    Dr. Yo Aguayo    REPLACEMENT TOTAL KNEE Right 2015    Dr. Yo Aguayo    VENTRAL HERNIA REPAIR N/A 10/22/2015    Open reduction of incarcerated ventral hernia with layered closure-Dr. Elías Keating    VENTRAL/INCISIONAL HERNIA REPAIR N/A 2021    Procedure: OPEN VENTRAL/INCISIONAL HERNIA REPAIR WITH MESH AND APPENDECTOMY;  Surgeon: Arnulfo Leonard MD;  Location: [facility] MAIN OR;  Service: General;  Laterality: N/A;      General  Information       Row Name 02/18/25 1044          OT Time and Intention    Document Type therapy note (daily note)  -CW     Mode of Treatment occupational therapy  -CW       Row Name 02/18/25 1044          General Information    Patient Profile Reviewed yes  -CW     Existing Precautions/Restrictions fall  -CW       Row Name 02/18/25 1044          Cognition    Orientation Status (Cognition) oriented x 4  -CW               User Key  (r) = Recorded By, (t) = Taken By, (c) = Cosigned By      Initials Name Provider Type    CW Rachael Santos OTR Occupational Therapist                     Mobility/ADL's       Row Name 02/18/25 1046          Bed Mobility    Supine-Sit [place] (Bed Mobility) standby assist;verbal cues  -CW     Sit-Supine [place] (Bed Mobility) moderate assist (50% patient effort);verbal cues  -CW     Assistive Device (Bed Mobility) bed rails;head of bed elevated  -CW       Row Name 02/18/25 1046          Sit-Stand Transfer    Sit-Stand [place] (Transfers) minimum assist (75% patient effort);verbal cues  -CW     Assistive Device (Sit-Stand Transfers) walker, front-wheeled  -CW       Row Name 02/18/25 1049 02/18/25 1046       Activities of Daily Living    BADL Assessment/Intervention grooming  -CW lower body dressing  -CW      Row Name 02/18/25 1046          Lower Body Dressing Assessment/Training    [place] Level (Lower Body Dressing) doff;don;socks;dependent (less than 25% patient effort)  -CW     Position (Lower Body Dressing) edge of bed sitting  -CW       Row Name 02/18/25 1049          Grooming Assessment/Training    [place] Level (Grooming) grooming skills;hair care, combing/brushing;set up  -CW     Position (Grooming) sitting up in bed  -CW               User Key  (r) = Recorded By, (t) = Taken By, (c) = Cosigned By      Initials Name Provider Type    CW Rachael Santos OTR Occupational Therapist                   Obj/Interventions       Row Name 02/18/25 1050          Shoulder  (Therapeutic Exercise)    Shoulder (Therapeutic Exercise) AROM (active range of motion)  -CW     Shoulder AROM (Therapeutic Exercise) bilateral;flexion;extension;horizontal aBduction/aDduction;10 repetitions;2 sets  shoulder flex 1/2  -CW       Row Name 02/18/25 1050          Elbow/Forearm (Therapeutic Exercise)    Elbow/Forearm (Therapeutic Exercise) AROM (active range of motion)  -CW     Elbow/Forearm AROM (Therapeutic Exercise) bilateral;flexion;extension;supination;pronation;10 repetitions;2 sets  -       Row Name 02/18/25 1050          Wrist (Therapeutic Exercise)    Wrist (Therapeutic Exercise) AROM (active range of motion)  -CW     Wrist AROM (Therapeutic Exercise) bilateral;flexion;extension;10 repetitions;2 sets  -       Row Name 02/18/25 1050          Hand (Therapeutic Exercise)    Hand (Therapeutic Exercise) AROM (active range of motion)  -     Hand AROM/AAROM (Therapeutic Exercise) bilateral;AROM (active range of motion);finger flexion;finger extension;10 repetitions;2 sets  -       Row Name 02/18/25 1050          Motor Skills    Motor Skills functional endurance  -CW     Functional Endurance fair  -CW     Therapeutic Exercise shoulder;elbow/forearm;wrist;hand  -CW               User Key  (r) = Recorded By, (t) = Taken By, (c) = Cosigned By      Initials Name Provider Type    CW Rachael Santos OTR Occupational Therapist                   Goals/Plan    No documentation.                  Clinical Impression       Row Name 02/18/25 1052          Pain Assessment    Pretreatment Pain Rating 7/10  -CW     Posttreatment Pain Rating 7/10  -CW     Pain Location extremity  -CW     Pain Side/Orientation right;lower  -CW     Pain Management Interventions positioning techniques utilized  -       Row Name 02/18/25 1052          Plan of Care Review    Plan of Care Reviewed With patient  -     Outcome Evaluation OT-Pt. reports RLE pain 7/10 generalized. BUE therap ex. completed to increase activity tolerance  for self care. [place] AROM limited 1/2.  Pt. required rest breaks and cues for follow through. Grooming indep. after set up. LE dressing dependent seated EOB. Sit to stand Jasvir with Rwx. and was able to stand for 1 min before fatigued. Overall functional endurance is fair. Pt. has a wx, elevated commode seat and shower chair at home. Reviewed safety during functional mobility/self care tasks. Anticipate D/c to SNF or rehab. Pt. prefers to go home with HH.  -CW       Row Name 02/18/25 1052          Positioning and Restraints    Pre-Treatment Position in bed  -CW     Post Treatment Position bed  -CW     In Bed fowlers;encouraged to call for assist;exit alarm on;call light within reach  -CW               User Key  (r) = Recorded By, (t) = Taken By, (c) = Cosigned By      Initials Name Provider Type    CW Rachael Santos OTR Occupational Therapist                   Outcome Measures       Row Name 02/18/25 1100          How much help from another is currently needed...    Putting on and taking off regular lower body clothing? 1  -CW     Bathing (including washing, rinsing, and drying) 2  -CW     Toileting (which includes using toilet bed pan or urinal) 1  -CW     Putting on and taking off regular upper body clothing 3  -CW     Taking care of personal grooming (such as brushing teeth) 3  -CW     Eating meals 3  -CW     AM-PAC 6 Clicks Score (OT) 13  -CW       Row Name 02/18/25 0753          How much help from another person do you currently need...    Turning from your back to your side while in flat bed without using bedrails? 4  -MS     Moving from lying on back to sitting on the side of a flat bed without bedrails? 3  -MS     Moving to and from a bed to a chair (including a wheelchair)? 3  -MS     Standing up from a chair using your arms (e.g., wheelchair, bedside chair)? 3  -MS     Climbing 3-5 steps with a railing? 2  -MS     To walk in hospital room? 3  -MS     AM-PAC 6 Clicks Score (PT) 18  -MS                User Key  (r) = Recorded By, (t) = Taken By, (c) = Cosigned By      Initials Name Provider Type    Rachael Vang OTR Occupational Therapist    Mahin Ragland, RN Registered Nurse                    Occupational Therapy Education       Title: PT OT SLP Therapies (Done)       Topic: Occupational Therapy (Done)       Point: ADL training (Done)       Description:   Instruct learner(s) on proper safety adaptation and remediation techniques during self care or transfers.   Instruct in proper use of assistive devices.                  Learning Progress Summary            Patient Acceptance, E, VU by CW at 2/18/2025 1100    Acceptance, E,TB, VU by SS at 2/18/2025 0404    Acceptance, E, VU by JS at 2/17/2025 1851    Acceptance, E, VU by  at 2/14/2025 0937    Comment: OT goals, POC, dc recommendations                      Point: Home exercise program (Done)       Description:   Instruct learner(s) on appropriate technique for monitoring, assisting and/or progressing therapeutic exercises/activities.                  Learning Progress Summary            Patient Acceptance, E, VU by CW at 2/18/2025 1100    Acceptance, E,TB, VU by SS at 2/18/2025 0404    Acceptance, E, VU by JS at 2/17/2025 1851                      Point: Precautions (Done)       Description:   Instruct learner(s) on prescribed precautions during self-care and functional transfers.                  Learning Progress Summary            Patient Acceptance, E, VU by CW at 2/18/2025 1100    Acceptance, E,TB, VU by SS at 2/18/2025 0404    Acceptance, E, VU by JS at 2/17/2025 1851                      Point: Body mechanics (Done)       Description:   Instruct learner(s) on proper positioning and spine alignment during self-care, functional mobility activities and/or exercises.                  Learning Progress Summary            Patient Acceptance, E, VU by CW at 2/18/2025 1100    Acceptance, E,TB, VU by SS at 2/18/2025 0404    Acceptance, E, VU by JS at  2/17/2025 1851                                      User Key       Initials Effective Dates Name Provider Type Discipline    CW 06/16/21 -  Rachael Santos OTR Occupational Therapist OT    SM 04/02/20 -  Mariann Juares OT Occupational Therapist OT    JS 02/26/24 -  Mercedes Heard, RN Registered Nurse Nurse    SS 02/03/22 -  Abraham Flaherty, RN Registered Nurse Nurse                  OT Recommendation and Plan     Plan of Care Review  Plan of Care Reviewed With: patient  Outcome Evaluation: OT-Pt. reports RLE pain 7/10 generalized. BUE therap ex. completed to increase activity tolerance for self care. RUE shoudler AROM limited 1/2.  Pt. required rest breaks and cues for follow through. Grooming indep. after set up. LE dressing dependent seated EOB. Sit to stand Jasvir with Rwx. and was able to stand for 1 min before fatigued. Overall functional endurance is fair. Pt. has a wx, elevated commode seat and shower chair at home. Reviewed safety during functional mobility/self care tasks. Anticipate D/c to SNF or rehab. Pt. prefers to go home with HH.     Time Calculation:         Time Calculation- OT       Row Name 02/18/25 1102 02/18/25 1101          Time Calculation- OT    OT Start Time -- 0758  -CW     OT Stop Time -- 0824  -CW     OT Time Calculation (min) -- 26 min  -CW     Total Timed Code Minutes- OT -- 26 minute(s)  -CW     OT - Next Appointment -- 02/19/25  -CW        Timed Charges    11187 - OT Therapeutic Exercise Minutes 10  -CW --     90578 - OT Self Care/Mgmt Minutes -- 16  -CW        Total Minutes    Timed Charges Total Minutes 10  -CW 16  -CW      Total Minutes 10  -CW 16  -CW               User Key  (r) = Recorded By, (t) = Taken By, (c) = Cosigned By      Initials Name Provider Type    CW Rachael Santos OTR Occupational Therapist                  Therapy Charges for Today       Code Description Service Date Service Provider Modifiers Qty    33381825391  OT THER PROC EA 15 MIN 2/18/2025 Rachael Santos  OTR GO 1    28593330122 HC OT SELF CARE/MGMT/TRAIN EA 15 MIN 2/18/2025 Rachael Santos OTR GO 1                 NAYELI Morales  2/18/2025   Home

## 2025-02-18 NOTE — DISCHARGE PLACEMENT REQUEST
"Randi Ford (69 y.o. Female)       Date of Birth   1955    Social Security Number       Address   [address] [address]    Home Phone   [phone]    MRN   [id number]       Buddhism   Hoahaoism    Marital Status                               Admission Date   2/13/25    Admission Type   Emergency    Admitting Provider   Nathan Hooper MD    Attending Provider   Nathan Hooper MD    Department, Room/Bed   [address], E[address]/1       Discharge Date       Discharge Disposition       Discharge Destination                                 Attending Provider: Nathan Hooper MD    Allergies: No Known Allergies    Isolation: None   Infection: None   Code Status: CPR    Ht: 177.8 cm (70\")   Wt: 116 kg (256 lb 4.8 oz)    Admission Cmt: None   Principal Problem: REBECCA (acute kidney injury) [N17.9]                   Active Insurance as of 2/13/2025       Primary Coverage       Payor Plan Insurance Group Employer/Plan Group    MEDICARE MEDICARE A & B        Payor Plan Address Payor Plan Phone Number Payor Plan Fax Number Effective Dates    [address] [phone]  7/1/2021 - None Entered    [facility] [id number]         Subscriber Name Subscriber Birth Date Member ID       RANDI FORD 1955 [id number]                     Emergency Contacts        (Rel.) Home Phone Work Phone Mobile Phone    Jameel Ford (Son) [phone] -- [phone]                "

## 2025-02-18 NOTE — PROGRESS NOTES
LOS: 4 days   Patient Care Team:  Esperanza Melton APRN as PCP - General (Nurse Practitioner)  Anatoly Jimenez MD as PCP - Family Medicine    Chief Complaint: Follow-up persistent atrial fibrillation, acute on chronic diastolic CHF.    Interval History: Lower extremity edema is stable from yesterday.  Much better than on admission.  No significant shortness of breath today.  No chest pain.  She is constipated.    Vital Signs:  Temp:  [97.6 °F (36.4 °C)-98.3 °F (36.8 °C)] 97.7 °F (36.5 °C)  Heart Rate:  [] 92  Resp:  [18] 18  BP: (129-132)/(83-91) 132/83    Intake/Output Summary (Last 24 hours) at 2/18/2025 1243  Last data filed at 2/18/2025 0600  Gross per 24 hour   Intake 880 ml   Output 1100 ml   Net -220 ml       Physical Exam:   General Appearance:    No acute distress, alert and oriented x4   Lungs:     Decreased breath sounds at the bases.    Heart:    Irregularly irregular rhythm and normal rate.  No murmurs, gallops, or rubs.   Abdomen:     Soft, nontender, nondistended.    Extremities:   1+ edema of the lower extremities with skin wrinkling.     Results Review:    Results from last 7 days   Lab Units 02/18/25  0554   SODIUM mmol/L 140   POTASSIUM mmol/L 4.2   CHLORIDE mmol/L 102   CO2 mmol/L 28.0   BUN mg/dL 37*   CREATININE mg/dL 1.14*   GLUCOSE mg/dL 103*   CALCIUM mg/dL 9.3     Results from last 7 days   Lab Units 02/14/25  0648 02/13/25  1443 02/13/25  1341   CK TOTAL U/L 27  --   --    HSTROP T ng/L 42* 41* 38*     Results from last 7 days   Lab Units 02/18/25  0554   WBC 10*3/mm3 2.84*   HEMOGLOBIN g/dL 10.2*   HEMATOCRIT % 30.6*   PLATELETS 10*3/mm3 170     Results from last 7 days   Lab Units 02/13/25  1341   INR  1.82*   APTT seconds 29.4     Results from last 7 days   Lab Units 02/14/25  0648   CHOLESTEROL mg/dL 92     Results from last 7 days   Lab Units 02/13/25  1341   MAGNESIUM mg/dL 1.7     Results from last 7 days   Lab Units 02/14/25  0648   CHOLESTEROL mg/dL 92    TRIGLYCERIDES mg/dL 70   HDL CHOL mg/dL 57   LDL CHOL mg/dL 20       I reviewed the patient's new clinical results.        Assessment:  1.  Acute on chronic diastolic CHF, EF 65%  2.  Persistent atrial fibrillation  3.  Acute kidney injury with stage IIIb chronic kidney disease  4.  Obesity, complicating all aspects of care  5.  Baseline hypertension with relative hypotension early in the hospitalization  6.  COPD without acute exacerbation  7.  Acute urinary tract infection (E. coli)     Plan:  -Her edema may never be perfect, although it is much improved.  I switched her from IV Lasix to torsemide 20 mg p.o. daily earlier today.  I would watch her 1 more day to see if she tolerates this and keeps the edema off.    -He is constipated, and states she has not had a bowel movement in 5 days.  She will likely feel better if this occurs as well.  I will defer this to Dr. Hooper.    -Her rate is mainly in the 90s.  Her metoprolol was decreased from 200 mg twice daily to 25 mg twice daily during this hospitalization because of hypotension.  I switched her to 3 times daily dosing yesterday, which she has tolerated.  I am going to see if she can tolerate 50 mg twice daily.    -She is now off the losartan because of hypotension.    -Continue the Eliquis at 5 mg twice daily.    -Potentially home tomorrow from a cardiac standpoint if renal function and volume status is stable.    Kelvin Yousif MD  02/18/25  12:43 EST

## 2025-02-18 NOTE — PLAN OF CARE
Goal Outcome Evaluation:      Pt alert and oriented. VSS. On RA. C/o pain x1. Afib on tele. Plan of care ongoing.

## 2025-02-18 NOTE — CASE MANAGEMENT/SOCIAL WORK
Continued Stay Note  [facility]     Patient Name: Marilu Avery  MRN: [id number]  Today's Date: 2/18/2025    Admit Date: 2/13/2025    Plan: Plan [facility] for skilled care.  HARSHA Rodriguez RN   Discharge Plan       Row Name 02/18/25 1359       Plan    Plan Plan [facility] for skilled care.  HARSHA Rodriguez RN    Patient/Family in Agreement with Plan yes    Plan Comments Per Perlita  [phone] [facility] currently has no bed availability.  per Angelia ( [phone]) [facility] will have a bed and can accept pt.  [facility] for skilled care.  HARSHA Rodriguez RN                   Discharge Codes    No documentation.                 Expected Discharge Date and Time       Expected Discharge Date Expected Discharge Time    Feb 19, 2025               Sapna Rodriguez RN

## 2025-02-18 NOTE — PLAN OF CARE
Problem: Adult Inpatient Plan of Care  Goal: Plan of Care Review  Outcome: Progressing  Flowsheets (Taken 2/18/2025 1419)  Progress: improving  Outcome Evaluation: Patient has been pleasant and cooperative during shift. No complaints of nausea or SOA. Pain treated. Plan discharge to [facility]. PO demadex started. Possible discharge home 2/19. AOx4, assist x1, room air, Afib/tachy. Will continue to monitor and assist patient as needed.  Plan of Care Reviewed With: patient  Goal: Patient-Specific Goal (Individualized)  Outcome: Progressing  Goal: Absence of Hospital-Acquired Illness or Injury  Outcome: Progressing  Intervention: Identify and Manage Fall Risk  Recent Flowsheet Documentation  Taken 2/18/2025 1415 by Mahin Workman RN  Safety Promotion/Fall Prevention:   assistive device/personal items within reach   fall prevention program maintained   nonskid shoes/slippers when out of bed   safety round/check completed  Taken 2/18/2025 1200 by Mahin Workman RN  Safety Promotion/Fall Prevention:   assistive device/personal items within reach   fall prevention program maintained   nonskid shoes/slippers when out of bed   safety round/check completed  Taken 2/18/2025 1000 by Mahin Workman RN  Safety Promotion/Fall Prevention:   assistive device/personal items within reach   fall prevention program maintained   nonskid shoes/slippers when out of bed   safety round/check completed  Taken 2/18/2025 0753 by Mahin Workman RN  Safety Promotion/Fall Prevention:   assistive device/personal items within reach   fall prevention program maintained   nonskid shoes/slippers when out of bed   safety round/check completed  Intervention: Prevent Skin Injury  Recent Flowsheet Documentation  Taken 2/18/2025 1415 by Mahin Workman RN  Body Position: supine  Skin Protection: incontinence pads utilized  Taken 2/18/2025 1200 by Mahin Workman RN  Body Position: supine  Taken 2/18/2025 1000 by Mahin Workman  RN  Body Position: supine  Taken 2/18/2025 0753 by Mahin Workman RN  Body Position: supine  Skin Protection: incontinence pads utilized  Goal: Optimal Comfort and Wellbeing  Outcome: Progressing  Goal: Readiness for Transition of Care  Outcome: Progressing     Problem: Fall Injury Risk  Goal: Absence of Fall and Fall-Related Injury  Outcome: Progressing  Intervention: Promote Injury-Free Environment  Recent Flowsheet Documentation  Taken 2/18/2025 1415 by Mahni Workman RN  Safety Promotion/Fall Prevention:   assistive device/personal items within reach   fall prevention program maintained   nonskid shoes/slippers when out of bed   safety round/check completed  Taken 2/18/2025 1200 by Mahin Workman RN  Safety Promotion/Fall Prevention:   assistive device/personal items within reach   fall prevention program maintained   nonskid shoes/slippers when out of bed   safety round/check completed  Taken 2/18/2025 1000 by Mahin Workman RN  Safety Promotion/Fall Prevention:   assistive device/personal items within reach   fall prevention program maintained   nonskid shoes/slippers when out of bed   safety round/check completed  Taken 2/18/2025 0753 by Mahin Workman RN  Safety Promotion/Fall Prevention:   assistive device/personal items within reach   fall prevention program maintained   nonskid shoes/slippers when out of bed   safety round/check completed     Problem: Comorbidity Management  Goal: Maintenance of Asthma Control  Outcome: Progressing  Goal: Maintenance of Behavioral Health Symptom Control  Outcome: Progressing  Goal: Maintenance of COPD Symptom Control  Outcome: Progressing  Goal: Blood Glucose Level Within Target Range  Outcome: Progressing  Goal: Maintenance of Heart Failure Symptom Control  Outcome: Progressing  Goal: Blood Pressure in Desired Range  Outcome: Progressing  Goal: Maintenance of Osteoarthritis Symptom Control  Outcome: Progressing  Goal: Bariatric Home Regimen  Maintained  Outcome: Progressing  Goal: Maintenance of Seizure Control  Outcome: Progressing     Problem: Heart Failure  Goal: Optimal Coping  Outcome: Progressing  Goal: Optimal Cardiac Output and Blood Flow  Outcome: Progressing  Goal: Stable Heart Rate and Rhythm  Outcome: Progressing  Goal: Fluid and Electrolyte Balance  Outcome: Progressing  Goal: Optimal Functional Ability  Outcome: Progressing  Goal: Improved Oral Intake  Outcome: Progressing  Goal: Effective Oxygenation and Ventilation  Outcome: Progressing  Intervention: Promote Airway Secretion Clearance  Recent Flowsheet Documentation  Taken 2/18/2025 0753 by Mahin Workman RN  Cough And Deep Breathing: done independently per patient  Intervention: Optimize Oxygenation and Ventilation  Recent Flowsheet Documentation  Taken 2/18/2025 1415 by Mahin Workman RN  Head of Bed (HOB) Positioning: HOB at 45 degrees  Taken 2/18/2025 1200 by Mahin Workman RN  Head of Bed (HOB) Positioning: HOB at 30-45 degrees  Taken 2/18/2025 1000 by Mahin Workman RN  Head of Bed (HOB) Positioning: HOB at 30 degrees  Taken 2/18/2025 0753 by Mahin Workman RN  Head of Bed (HOB) Positioning: HOB at 30-45 degrees     Problem: Fluid Volume Excess  Goal: Fluid Balance  Outcome: Progressing  Intervention: Monitor and Manage Hypervolemia  Recent Flowsheet Documentation  Taken 2/18/2025 1415 by Mahin Workman RN  Skin Protection: incontinence pads utilized  Taken 2/18/2025 0753 by Mahin Workman RN  Skin Protection: incontinence pads utilized     Problem: Skin Injury Risk Increased  Goal: Skin Health and Integrity  Outcome: Progressing  Intervention: Optimize Skin Protection  Recent Flowsheet Documentation  Taken 2/18/2025 1415 by Mahin Workman RN  Pressure Reduction Techniques:   frequent weight shift encouraged   weight shift assistance provided  Head of Bed (HOB) Positioning: HOB at 45 degrees  Pressure Reduction Devices: pressure-redistributing mattress  utilized  Skin Protection: incontinence pads utilized  Taken 2/18/2025 1200 by Mahin Workman RN  Head of Bed ([facility]) Positioning: HOB at 30-45 degrees  Taken 2/18/2025 1000 by Mahin Workman RN  Head of Bed ([facility]) Positioning: HOB at 30 degrees  Taken 2/18/2025 0753 by Mahin Workman, RN  Pressure Reduction Techniques:   frequent weight shift encouraged   weight shift assistance provided  Head of Bed ([facility]) Positioning: HOB at 30-45 degrees  Pressure Reduction Devices: pressure-redistributing mattress utilized  Skin Protection: incontinence pads utilized   Goal Outcome Evaluation:  Plan of Care Reviewed With: patient        Progress: improving  Outcome Evaluation: Patient has been pleasant and cooperative during shift. No complaints of nausea or SOA. Pain treated. Plan discharge to [facility]. PO demadex started. Possible discharge home 2/19. AOx4, assist x1, room air, Afib/tachy. Will continue to monitor and assist patient as needed.

## 2025-02-18 NOTE — PROGRESS NOTES
"Daily progress note    Primary care physician  Dr. Melton    Subjective  Doing better with no new complaints    History of present illness  69-year-old white female with history of COPD asthma atrial fibrillation hypertension of sleep apnea chronic kidney disease stage IIIb presented to [facility] [place] emergency room with increased shortness of breath and leg swelling.  Patient denies any fever chest pain palpitation abdominal pain nausea vomiting diarrhea.  Patient stated that her diuretics was recently changed by her cardiologist.  Patient workup in ER revealed acute kidney injury and also have elevated troponin admitted for management.     REVIEW OF SYSTEMS  Unremarkable except generalized weakness     PHYSICAL EXAM  Blood pressure 124/92, pulse 92, temperature 98.3 °F (36.8 °C), temperature source Oral, resp. rate 18, height 177.8 cm (70\"), weight 116 kg (256 lb 4.8 oz), SpO2 100%, not currently breastfeeding.    General: Awake and alert no acute distress.  HENT: NCAT, PERRL, Nares patent.  Eyes: no scleral icterus.  Neck: trachea midline, no ROM limitations.  CV: regular rhythm, regular rate.  Respiratory: normal effort, decreased breath sound at the bases  Abdomen: soft, nondistended, NTTP, no rebound tenderness, no guarding or rigidity.  Musculoskeletal: no deformity.  Neuro: alert, moves all extremities, follows commands.  Skin: warm, dry.  2+ pitting edema bilateral lower extremities.     LAB RESULTS  Lab Results (last 24 hours)       Procedure Component Value Units Date/Time    Basic Metabolic Panel [[id number]]  (Abnormal) Collected: 02/18/25 0554    Specimen: Blood Updated: 02/18/25 0636     Glucose 103 mg/dL      BUN 37 mg/dL      Creatinine 1.14 mg/dL      Sodium 140 mmol/L      Potassium 4.2 mmol/L      Comment: Slight hemolysis detected by analyzer. Result may be falsely elevated.        Chloride 102 mmol/L      CO2 28.0 mmol/L      Calcium 9.3 mg/dL      BUN/Creatinine Ratio 32.5     Anion " Gap 10.0 mmol/L      eGFR 52.5 mL/min/1.73     Narrative:      GFR Categories in Chronic Kidney Disease (CKD)      GFR Category          GFR (mL/min/1.73)    Interpretation  G1                     90 or greater         Normal or high (1)  G2                      60-89                Mild decrease (1)  G3a                   45-59                Mild to moderate decrease  G3b                   30-44                Moderate to severe decrease  G4                    15-29                Severe decrease  G5                    14 or less           Kidney failure          (1)In the absence of evidence of kidney disease, neither GFR category G1 or G2 fulfill the criteria for CKD.    eGFR calculation 2021 CKD-EPI creatinine equation, which does not include race as a factor    CBC & Differential [[id number]]  (Abnormal) Collected: 02/18/25 0554    Specimen: Blood Updated: 02/18/25 0612    Narrative:      The following orders were created for panel order CBC & Differential.  Procedure                               Abnormality         Status                     ---------                               -----------         ------                     CBC Auto Differential[[id number]]        Abnormal            Final result                 Please view results for these tests on the individual orders.    CBC Auto Differential [[id number]]  (Abnormal) Collected: 02/18/25 0554    Specimen: Blood Updated: 02/18/25 0612     WBC 2.84 10*3/mm3      RBC 2.95 10*6/mm3      Hemoglobin 10.2 g/dL      Hematocrit 30.6 %      .7 fL      MCH 34.6 pg      MCHC 33.3 g/dL      RDW 14.3 %      RDW-SD 53.7 fl      MPV 10.0 fL      Platelets 170 10*3/mm3      Neutrophil % 53.1 %      Lymphocyte % 29.2 %      Monocyte % 13.4 %      Eosinophil % 2.8 %      Basophil % 1.1 %      Immature Grans % 0.4 %      Neutrophils, Absolute 1.51 10*3/mm3      Lymphocytes, Absolute 0.83 10*3/mm3      Monocytes, Absolute 0.38 10*3/mm3      Eosinophils, Absolute 0.08  10*3/mm3      Basophils, Absolute 0.03 10*3/mm3      Immature Grans, Absolute 0.01 10*3/mm3      nRBC 0.0 /100 WBC           Imaging Results (Last 24 Hours)       ** No results found for the last 24 hours. **          Scan on 2/13/2025 1450 by [employer] Onbase, [place]: ECG 12-LEAD         Author: -- Service: -- Author Type: --   Filed: Date of Service: Creation Time:   Status: (Other)   HEART RATE=81  bpm  RR Jypniehf=174  ms  FL Interval=  ms  P Horizontal Axis=  deg  P Front Axis=  deg  QRSD Interval=96  ms  QT Miylmazl=545  ms  YTqU=687  ms  QRS Axis=27  deg  T Wave Axis=41  deg  - ABNORMAL ECG -  Atrial fibrillation  Ventricular bigeminy  Low voltage, extremity and precordial leads  Abnormal R-wave progression, early transition          Current Facility-Administered Medications:     acetaminophen (TYLENOL) tablet 650 mg, 650 mg, Oral, Q4H PRN, Nathan Hooper MD, 650 mg at 02/18/25 1137    albuterol (PROVENTIL) nebulizer solution 0.083% 2.5 mg/3mL, 2.5 mg, Nebulization, Q4H PRN, Nathan Hooper MD    allopurinol (ZYLOPRIM) tablet 100 mg, 100 mg, Oral, Daily, Nathan Hooper MD, 100 mg at 02/18/25 0800    ammonium lactate (AMLACTIN) 12 % cream 1 Application, 1 Application, Topical, Daily, Nathan Hooper MD, 1 Application at 02/18/25 0800    apixaban (ELIQUIS) tablet 5 mg, 5 mg, Oral, BID, Maris Dale, APRN, 5 mg at 02/18/25 0800    arformoterol (BROVANA) nebulizer solution 15 mcg, 15 mcg, Nebulization, BID - RT, 15 mcg at 02/18/25 1023 **AND** budesonide (PULMICORT) nebulizer solution 0.5 mg, 0.5 mg, Nebulization, BID - RT, 0.5 mg at 02/18/25 1027 **AND** revefenacin (YUPELRI) nebulizer solution 175 mcg, 175 mcg, Nebulization, Daily - RT, Nathan Hooper MD, 175 mcg at 02/18/25 1022    aspirin chewable tablet 81 mg, 81 mg, Oral, Daily, Nathan Hooper MD, 81 mg at 02/18/25 0800    atorvastatin (LIPITOR) tablet 10 mg, 10 mg, Oral, Nightly, Nathan Hooper MD, 10 mg at 02/17/25 2054    cefTRIAXone (ROCEPHIN) 1,000 mg in sodium  chloride 0.9 % 100 mL MBP, 1,000 mg, Intravenous, Q24H, Nathan Hooper MD, Last Rate: 200 mL/hr at 02/18/25 1412, 1,000 mg at 02/18/25 1412    famotidine (PEPCID) tablet 20 mg, 20 mg, Oral, BID AC, Nathan Hooper MD, 20 mg at 02/18/25 0531    folic acid (FOLVITE) tablet 1 mg, 1 mg, Oral, Daily, Nathan Hooper MD, 1 mg at 02/18/25 0800    HYDROcodone-acetaminophen (NORCO) 7.5-325 MG per tablet 1 tablet, 1 tablet, Oral, Q8H PRN, Nathan Hooper MD, 1 tablet at 02/18/25 0800    levothyroxine (SYNTHROID, LEVOTHROID) tablet 75 mcg, 75 mcg, Oral, Daily, Nathan Hooper MD, 75 mcg at 02/18/25 1122    metoprolol tartrate (LOPRESSOR) tablet 50 mg, 50 mg, Oral, Q12H, Kelvin Yousif MD    montelukast (SINGULAIR) tablet 10 mg, 10 mg, Oral, Daily, Nathan Hooper MD, 10 mg at 02/18/25 0800    multivitamin (THERAGRAN) tablet 1 tablet, 1 tablet, Oral, Daily, Nathan Hooper MD, 1 tablet at 02/18/25 0800    polyethylene glycol (MIRALAX) packet 17 g, 17 g, Oral, Daily, Nathan Hooper MD, 17 g at 02/18/25 0800    potassium chloride (K-DUR,KLOR-CON) ER tablet 10 mEq, 10 mEq, Oral, BID With Meals, Nathan Hooper MD, 10 mEq at 02/18/25 0800    sennosides-docusate (PERICOLACE) 8.6-50 MG per tablet 2 tablet, 2 tablet, Oral, BID PRN, Nathan Hooper MD    thiamine (VITAMIN B-1) tablet 200 mg, 200 mg, Oral, Daily, Nathan Hooper MD, 200 mg at 02/18/25 0800    torsemide (DEMADEX) tablet 20 mg, 20 mg, Oral, Daily, Kelvin Yousif MD, 20 mg at 02/18/25 1122      ASSESSMENT  Acute on chronic diastolic congestive heart failure  Acute kidney injury  Acute E. coli UTI  Elevated troponin  Asymptomatic bacteriuria  Chronic atrial fibrillation  Hypertension  Hyperlipidemia  Hypothyroidism  Morbidly obese  Asthma/COPD/obstructive sleep apnea  Chronic kidney disease stage IIIb    PLAN  CPM  Continue diuresis  Continue Eliquis   Continue antibiotics for 1 more day  Cardiology consult appreciated  Nephrology to follow patient  Adjust home medications  Stress  ulcer DVT prophylaxis  Supportive care  PT OT   Discussed with nursing staff  Discharge  planning    LEONOR CROCKETT MD    Copied text in this note has been reviewed and is accurate as of 02/18/25

## 2025-02-18 NOTE — CASE MANAGEMENT/SOCIAL WORK
Continued Stay Note  [facility]     Patient Name: Marilu Avery  MRN: [id number]  Today's Date: 2/18/2025    Admit Date: 2/13/2025    Plan: Plan skilled care at accepting facility.  HARSHA Rodriguez RN   Discharge Plan       Row Name 02/18/25 0840       Plan    Plan Plan skilled care at accepting facility.  HARSHA Rodriguez RN    Patient/Family in Agreement with Plan yes    Plan Comments Spoke with pt at bedside.  PT recommending skilled care at discharge.  Spoke with pt at bedside. Pt is agreeable to short term rehab at accepting facility.  Pt provided list of facilities close to her home. Pt's choice is 1) [facility] ( [phone]) called to follow and 2) [facility] ( [phone]) called to follow.  Plan skilled care at accepting facility.  HARSHA Rodriguez RN                   Discharge Codes    No documentation.                 Expected Discharge Date and Time       Expected Discharge Date Expected Discharge Time    Feb 19, 2025               Sapna Rodriguez RN

## 2025-02-18 NOTE — PROGRESS NOTES
[facility]     Progress Note    Patient Name: Marilu Avery  : 1955  MRN: [id number]  Primary Care Physician:  Esperanza Melton APRN  Date of admission: 2025    Subjective   Subjective     Chief Complaint:      History of Present Illness  Patient rebecca secondary to chf    Review of Systems  She is feeling better today.   Reports edema is improving.   Denies sob.     Objective   Objective     Vitals:   Temp:  [97.6 °F (36.4 °C)-98.3 °F (36.8 °C)] 97.7 °F (36.5 °C)  Heart Rate:  [] 91  Resp:  [18] 18  BP: (129-132)/(83-91) 132/83    Physical Exam   General Appearance:  In no acute distress.    HEENT: Normal HEENT exam.     Extremities: .  2+dependent edema. improving   Neurological: Patient is alert     Result Review    Result Review:  I have personally reviewed the results from the time of this admission to 2025 11:13 EST and agree with these findings:  [x]  Laboratory list / accordion  []  Microbiology  []  Radiology  []  EKG/Telemetry   []  Cardiology/Vascular   []  Pathology  []  Old records  []  Other:  Most notable findings include:       Assessment & Plan   Assessment / Plan     Brief Patient Summary:  Marilu Avery is a 69 y.o. female who has rebecca and chf    Active Hospital Problems:  Active Hospital Problems    Diagnosis     **REBECCA (acute kidney injury)     Acute on chronic diastolic (congestive) heart failure      Plan:   REBECCA (acute kidney injury), likely cardiorenal syndrome  Fluid overload  1.5 cm L kidney lesion - possible cyst but needs CT when less acute  Acute CHF exacerbation  Obesity  Atrial fibrillation  COPD  Chronic hypertension but BP soft since admission  UTI    Renal function improving. Still above her baseline.   Continue with current diuretic regimen, she has been negative fluid balance, but short of a liter, however, edema is improving. Will continue current regimen for now.   Will need urology evaluation as outpatient for possible renal lesion    Molly LANE  MD Mehran   [facility].

## 2025-02-18 NOTE — PLAN OF CARE
Goal Outcome Evaluation:  Plan of Care Reviewed With: patient           Outcome Evaluation: OT-Pt. reports RLE pain 7/10 generalized. BUE therap ex. completed to increase activity tolerance for self care. ZACARIASE shodeniceler AROM limited 1/2.  Pt. required rest breaks and cues for follow through. Grooming indep. after set up. LE dressing dependent seated EOB. Sit to stand Jasvir with Rwx. and was able to stand for 1 min before fatigued. Overall functional endurance is fair. Pt. has a wx, elevated commode seat and shower chair at home. Reviewed safety during functional mobility/self care tasks. Anticipate D/c to SNF or rehab. Pt. prefers to go home with HH.

## 2025-02-19 LAB
ANION GAP SERPL CALCULATED.3IONS-SCNC: 11.3 MMOL/L (ref 5–15)
BUN SERPL-MCNC: 31 MG/DL (ref 8–23)
BUN/CREAT SERPL: 22.8 (ref 7–25)
CALCIUM SPEC-SCNC: 8.7 MG/DL (ref 8.6–10.5)
CHLORIDE SERPL-SCNC: 102 MMOL/L (ref 98–107)
CO2 SERPL-SCNC: 25.7 MMOL/L (ref 22–29)
CREAT SERPL-MCNC: 1.36 MG/DL (ref 0.57–1)
DEPRECATED RDW RBC AUTO: 55.7 FL (ref 37–54)
EGFRCR SERPLBLD CKD-EPI 2021: 42.5 ML/MIN/1.73
ERYTHROCYTE [DISTWIDTH] IN BLOOD BY AUTOMATED COUNT: 14.2 % (ref 12.3–15.4)
GLUCOSE SERPL-MCNC: 104 MG/DL (ref 65–99)
HCT VFR BLD AUTO: 30.4 % (ref 34–46.6)
HGB BLD-MCNC: 9.5 G/DL (ref 12–15.9)
MCH RBC QN AUTO: 33.7 PG (ref 26.6–33)
MCHC RBC AUTO-ENTMCNC: 31.3 G/DL (ref 31.5–35.7)
MCV RBC AUTO: 107.8 FL (ref 79–97)
PLATELET # BLD AUTO: 164 10*3/MM3 (ref 140–450)
PMV BLD AUTO: 10 FL (ref 6–12)
POTASSIUM SERPL-SCNC: 4.1 MMOL/L (ref 3.5–5.2)
RBC # BLD AUTO: 2.82 10*6/MM3 (ref 3.77–5.28)
SODIUM SERPL-SCNC: 139 MMOL/L (ref 136–145)
WBC NRBC COR # BLD AUTO: 3.15 10*3/MM3 (ref 3.4–10.8)

## 2025-02-19 PROCEDURE — 94799 UNLISTED PULMONARY SVC/PX: CPT

## 2025-02-19 PROCEDURE — 99232 SBSQ HOSP IP/OBS MODERATE 35: CPT | Performed by: NURSE PRACTITIONER

## 2025-02-19 PROCEDURE — 94761 N-INVAS EAR/PLS OXIMETRY MLT: CPT

## 2025-02-19 PROCEDURE — 94664 DEMO&/EVAL PT USE INHALER: CPT

## 2025-02-19 PROCEDURE — 80048 BASIC METABOLIC PNL TOTAL CA: CPT | Performed by: INTERNAL MEDICINE

## 2025-02-19 PROCEDURE — 94760 N-INVAS EAR/PLS OXIMETRY 1: CPT

## 2025-02-19 PROCEDURE — 63710000001 REVEFENACIN 175 MCG/3ML SOLUTION: Performed by: HOSPITALIST

## 2025-02-19 PROCEDURE — 85027 COMPLETE CBC AUTOMATED: CPT | Performed by: INTERNAL MEDICINE

## 2025-02-19 RX ADMIN — ARFORMOTEROL TARTRATE 15 MCG: 15 SOLUTION RESPIRATORY (INHALATION) at 22:36

## 2025-02-19 RX ADMIN — ALLOPURINOL 100 MG: 100 TABLET ORAL at 08:01

## 2025-02-19 RX ADMIN — APIXABAN 5 MG: 5 TABLET, FILM COATED ORAL at 08:01

## 2025-02-19 RX ADMIN — POTASSIUM CHLORIDE 10 MEQ: 750 TABLET, EXTENDED RELEASE ORAL at 08:01

## 2025-02-19 RX ADMIN — METOPROLOL TARTRATE 50 MG: 50 TABLET, FILM COATED ORAL at 08:01

## 2025-02-19 RX ADMIN — POTASSIUM CHLORIDE 10 MEQ: 750 TABLET, EXTENDED RELEASE ORAL at 16:25

## 2025-02-19 RX ADMIN — MONTELUKAST SODIUM 10 MG: 10 TABLET, FILM COATED ORAL at 08:01

## 2025-02-19 RX ADMIN — ATORVASTATIN CALCIUM 10 MG: 20 TABLET, FILM COATED ORAL at 21:00

## 2025-02-19 RX ADMIN — FOLIC ACID 1 MG: 1 TABLET ORAL at 08:01

## 2025-02-19 RX ADMIN — TORSEMIDE 20 MG: 20 TABLET ORAL at 08:01

## 2025-02-19 RX ADMIN — LEVOTHYROXINE SODIUM 75 MCG: 0.07 TABLET ORAL at 08:01

## 2025-02-19 RX ADMIN — ASPIRIN 81 MG CHEWABLE TABLET 81 MG: 81 TABLET CHEWABLE at 08:01

## 2025-02-19 RX ADMIN — REVEFENACIN 175 MCG: 175 SOLUTION RESPIRATORY (INHALATION) at 11:05

## 2025-02-19 RX ADMIN — BUDESONIDE 0.5 MG: 0.5 INHALANT RESPIRATORY (INHALATION) at 22:36

## 2025-02-19 RX ADMIN — METOPROLOL TARTRATE 50 MG: 50 TABLET, FILM COATED ORAL at 21:00

## 2025-02-19 RX ADMIN — HYDROCODONE BITARTRATE AND ACETAMINOPHEN 1 TABLET: 7.5; 325 TABLET ORAL at 00:39

## 2025-02-19 RX ADMIN — ACETAMINOPHEN 650 MG: 325 TABLET, FILM COATED ORAL at 04:30

## 2025-02-19 RX ADMIN — HYDROCODONE BITARTRATE AND ACETAMINOPHEN 1 TABLET: 7.5; 325 TABLET ORAL at 08:01

## 2025-02-19 RX ADMIN — ACETAMINOPHEN 650 MG: 325 TABLET, FILM COATED ORAL at 21:01

## 2025-02-19 RX ADMIN — APIXABAN 5 MG: 5 TABLET, FILM COATED ORAL at 21:00

## 2025-02-19 RX ADMIN — ACETAMINOPHEN 650 MG: 325 TABLET, FILM COATED ORAL at 12:35

## 2025-02-19 RX ADMIN — ARFORMOTEROL TARTRATE 15 MCG: 15 SOLUTION RESPIRATORY (INHALATION) at 11:06

## 2025-02-19 RX ADMIN — FAMOTIDINE 20 MG: 20 TABLET, FILM COATED ORAL at 16:25

## 2025-02-19 RX ADMIN — Medication 1 APPLICATION: at 08:01

## 2025-02-19 RX ADMIN — BUDESONIDE 0.5 MG: 0.5 INHALANT RESPIRATORY (INHALATION) at 11:08

## 2025-02-19 RX ADMIN — HYDROCODONE BITARTRATE AND ACETAMINOPHEN 1 TABLET: 7.5; 325 TABLET ORAL at 16:25

## 2025-02-19 RX ADMIN — FAMOTIDINE 20 MG: 20 TABLET, FILM COATED ORAL at 05:25

## 2025-02-19 NOTE — PROGRESS NOTES
"    Patient Name: Marilu Avery  :1955  69 y.o.      Patient Care Team:  Esperanza Melton APRN as PCP - General (Nurse Practitioner)  Anatoly Jimenez MD as PCP - Family Medicine    Chief Complaint: follow up HFpEF    Interval History: resting in bed. She feels pretty good. Getting a breathing treatment.        Objective   Vital Signs  Temp:  [98 °F (36.7 °C)-98.4 °F (36.9 °C)] 98.2 °F (36.8 °C)  Heart Rate:  [] 86  Resp:  [16-20] 18  BP: (131-135)/(67-95) 131/67    Intake/Output Summary (Last 24 hours) at 2025 1442  Last data filed at 2025 0906  Gross per 24 hour   Intake 960 ml   Output 950 ml   Net 10 ml     Flowsheet Rows      Flowsheet Row First Filed Value   Admission Height 177.8 cm (70\") Documented at 2025   Admission Weight 119 kg (262 lb 12.6 oz) Documented at 2025 1850            Physical Exam:   General Appearance:    Alert, cooperative, in no acute distress   Lungs:     Clear to auscultation.  Normal respiratory effort and rate.      Heart:    Regular rhythm and normal rate, normal S1 and S2, no murmurs, gallops or rubs.     Chest Wall:    No abnormalities observed   Abdomen:     Soft, nontender, positive bowel sounds.     Extremities:   no cyanosis, clubbing or edema.  No marked joint deformities.  Adequate musculoskeletal strength.       Results Review:    Results from last 7 days   Lab Units 25  0522   SODIUM mmol/L 139   POTASSIUM mmol/L 4.1   CHLORIDE mmol/L 102   CO2 mmol/L 25.7   BUN mg/dL 31*   CREATININE mg/dL 1.36*   GLUCOSE mg/dL 104*   CALCIUM mg/dL 8.7     Results from last 7 days   Lab Units 25  0648 25  1443 25  1341   CK TOTAL U/L 27  --   --    HSTROP T ng/L 42* 41* 38*     Results from last 7 days   Lab Units 25  0522   WBC 10*3/mm3 3.15*   HEMOGLOBIN g/dL 9.5*   HEMATOCRIT % 30.4*   PLATELETS 10*3/mm3 164     Results from last 7 days   Lab Units 25  1341   INR  1.82*   APTT seconds 29.4     Results " from last 7 days   Lab Units 02/13/25  1341   MAGNESIUM mg/dL 1.7     Results from last 7 days   Lab Units 02/14/25  0648   CHOLESTEROL mg/dL 92   TRIGLYCERIDES mg/dL 70   HDL CHOL mg/dL 57   LDL CHOL mg/dL 20               Medication Review:   allopurinol, 100 mg, Oral, Daily  ammonium lactate, 1 Application, Topical, Daily  apixaban, 5 mg, Oral, BID  arformoterol, 15 mcg, Nebulization, BID - RT   And  budesonide, 0.5 mg, Nebulization, BID - RT   And  revefenacin, 175 mcg, Nebulization, Daily - RT  aspirin, 81 mg, Oral, Daily  atorvastatin, 10 mg, Oral, Nightly  cefTRIAXone, 1,000 mg, Intravenous, Q24H  famotidine, 20 mg, Oral, BID AC  folic acid, 1 mg, Oral, Daily  levothyroxine, 75 mcg, Oral, Daily  metoprolol tartrate, 50 mg, Oral, Q12H  montelukast, 10 mg, Oral, Daily  polyethylene glycol, 17 g, Oral, Daily  potassium chloride, 10 mEq, Oral, BID With Meals  torsemide, 20 mg, Oral, Daily              Assessment & Plan   1.  Acute on chronic diastolic CHF, EF 65% , LE edema. Transitioned to oral diuretics 2/18/25.   2.  Persistent atrial fibrillation  3.  Acute kidney injury with stage IIIb chronic kidney disease  4.  Obesity, complicating all aspects of care  5.  Baseline hypertension with relative hypotension early in the hospitalization  6.  COPD without acute exacerbation  7.  Acute urinary tract infection (E. coli)  8. Hypertension with hypotension this admission. Losartan stopped and beta blocker decreased. She is tolerating metoprolol 50 mg BID. Hr and BP looks good. Would keep this dosing at discharge.     May need to tolerate a higher degree of azotemia  to keep acceptable volume status. Likely new baseline creatinine 1.3-1.4.     No objection to discharge. Plans for rehab likely tomorrow. Will see as needed.     DEBBI Kay  [facility]  02/19/25  14:42 EST

## 2025-02-19 NOTE — PLAN OF CARE
Goal Outcome Evaluation:      Pt alert and oriented. VSS. On 2L O2 NC. C/o pain x2. Afib on tele. Plan of care ongoing.

## 2025-02-19 NOTE — PROGRESS NOTES
"    Patient Name: Marilu Avery  :1955  69 y.o.      Patient Care Team:  Esperanza Melton APRN as PCP - General (Nurse Practitioner)  Anatoly Jimenez MD as PCP - Family Medicine    Chief Complaint: follow up HFpEF    Interval History: resting in bed. She feels pretty good. Getting a breathing treatment.        Objective   Vital Signs  Temp:  [98 °F (36.7 °C)-98.4 °F (36.9 °C)] 98.4 °F (36.9 °C)  Heart Rate:  [] 86  Resp:  [16-20] 20  BP: (124-135)/(77-95) 135/95    Intake/Output Summary (Last 24 hours) at 2025 0922  Last data filed at 2025 0532  Gross per 24 hour   Intake 960 ml   Output 1550 ml   Net -590 ml     Flowsheet Rows      Flowsheet Row First Filed Value   Admission Height 177.8 cm (70\") Documented at 2025   Admission Weight 119 kg (262 lb 12.6 oz) Documented at 2025            Physical Exam:   General Appearance:    Alert, cooperative, in no acute distress   Lungs:     Clear to auscultation.  Normal respiratory effort and rate.      Heart:    Regular rhythm and normal rate, normal S1 and S2, no murmurs, gallops or rubs.     Chest Wall:    No abnormalities observed   Abdomen:     Soft, nontender, positive bowel sounds.     Extremities:   no cyanosis, clubbing or edema.  No marked joint deformities.  Adequate musculoskeletal strength.       Results Review:    Results from last 7 days   Lab Units 25  0522   SODIUM mmol/L 139   POTASSIUM mmol/L 4.1   CHLORIDE mmol/L 102   CO2 mmol/L 25.7   BUN mg/dL 31*   CREATININE mg/dL 1.36*   GLUCOSE mg/dL 104*   CALCIUM mg/dL 8.7     Results from last 7 days   Lab Units 25  0648 25  1443 25  1341   CK TOTAL U/L 27  --   --    HSTROP T ng/L 42* 41* 38*     Results from last 7 days   Lab Units 25  0522   WBC 10*3/mm3 3.15*   HEMOGLOBIN g/dL 9.5*   HEMATOCRIT % 30.4*   PLATELETS 10*3/mm3 164     Results from last 7 days   Lab Units 25  1341   INR  1.82*   APTT seconds 29.4     Results " from last 7 days   Lab Units 02/13/25  1341   MAGNESIUM mg/dL 1.7     Results from last 7 days   Lab Units 02/14/25  0648   CHOLESTEROL mg/dL 92   TRIGLYCERIDES mg/dL 70   HDL CHOL mg/dL 57   LDL CHOL mg/dL 20               Medication Review:   allopurinol, 100 mg, Oral, Daily  ammonium lactate, 1 Application, Topical, Daily  apixaban, 5 mg, Oral, BID  arformoterol, 15 mcg, Nebulization, BID - RT   And  budesonide, 0.5 mg, Nebulization, BID - RT   And  revefenacin, 175 mcg, Nebulization, Daily - RT  aspirin, 81 mg, Oral, Daily  atorvastatin, 10 mg, Oral, Nightly  cefTRIAXone, 1,000 mg, Intravenous, Q24H  famotidine, 20 mg, Oral, BID AC  folic acid, 1 mg, Oral, Daily  levothyroxine, 75 mcg, Oral, Daily  metoprolol tartrate, 50 mg, Oral, Q12H  montelukast, 10 mg, Oral, Daily  polyethylene glycol, 17 g, Oral, Daily  potassium chloride, 10 mEq, Oral, BID With Meals  torsemide, 20 mg, Oral, Daily              Assessment & Plan   1.  Acute on chronic diastolic CHF, EF 65% , LE edema. Transitioned to oral diuretics 2/18/25.   2.  Persistent atrial fibrillation  3.  Acute kidney injury with stage IIIb chronic kidney disease  4.  Obesity, complicating all aspects of care  5.  Baseline hypertension with relative hypotension early in the hospitalization  6.  COPD without acute exacerbation  7.  Acute urinary tract infection (E. coli)  8. Hypertension with hypotension this admission. Losartan stopped and beta blocker decreased. She is tolerating metoprolol 50 mg BID. Hr and BP looks good. Would keep this dosing at discharge.     May need to tolerate a higher degree of azotemia  to keep acceptable volume status. Likely new baseline creatinine 1.3-1.4.     No objection to discharge.     DEBBI Kay  [facility]  02/19/25  09:22 EST

## 2025-02-19 NOTE — PROGRESS NOTES
"Daily progress note    Primary care physician  Dr. Melton    Subjective  Doing better with no new complaints    History of present illness  69-year-old white female with history of COPD asthma atrial fibrillation hypertension of sleep apnea chronic kidney disease stage IIIb presented to [facility] emergency room with increased shortness of breath and leg swelling.  Patient denies any fever chest pain palpitation abdominal pain nausea vomiting diarrhea.  Patient stated that her diuretics was recently changed by her cardiologist.  Patient workup in ER revealed acute kidney injury and also have elevated troponin admitted for management.     REVIEW OF SYSTEMS  Unremarkable except generalized weakness     PHYSICAL EXAM  Blood pressure 131/67, pulse 86, temperature 98.2 °F (36.8 °C), temperature source Oral, resp. rate 18, height 177.8 cm (70\"), weight 117 kg (258 lb 6.4 oz), SpO2 98%, not currently breastfeeding.    General: Awake and alert no acute distress.  HENT: NCAT, PERRL, Nares patent.  Eyes: no scleral icterus.  Neck: trachea midline, no ROM limitations.  CV: regular rhythm, regular rate.  Respiratory: normal effort, decreased breath sound at the bases  Abdomen: soft, nondistended, NTTP, no rebound tenderness, no guarding or rigidity.  Musculoskeletal: no deformity.  Neuro: alert, moves all extremities, follows commands.  Skin: warm, dry.  2+ pitting edema bilateral lower extremities.     LAB RESULTS  Lab Results (last 24 hours)       Procedure Component Value Units Date/Time    Basic Metabolic Panel [[id number]]  (Abnormal) Collected: 02/19/25 0522    Specimen: Blood Updated: 02/19/25 0628     Glucose 104 mg/dL      BUN 31 mg/dL      Creatinine 1.36 mg/dL      Sodium 139 mmol/L      Potassium 4.1 mmol/L      Chloride 102 mmol/L      CO2 25.7 mmol/L      Calcium 8.7 mg/dL      BUN/Creatinine Ratio 22.8     Anion Gap 11.3 mmol/L      eGFR 42.5 mL/min/1.73     Narrative:      GFR Categories in Chronic " Kidney Disease (CKD)      GFR Category          GFR (mL/min/1.73)    Interpretation  G1                     90 or greater         Normal or high (1)  G2                      60-89                Mild decrease (1)  G3a                   45-59                Mild to moderate decrease  G3b                   30-44                Moderate to severe decrease  G4                    15-29                Severe decrease  G5                    14 or less           Kidney failure          (1)In the absence of evidence of kidney disease, neither GFR category G1 or G2 fulfill the criteria for CKD.    eGFR calculation 2021 CKD-EPI creatinine equation, which does not include race as a factor    CBC (No Diff) [[id number]]  (Abnormal) Collected: 02/19/25 0522    Specimen: Blood Updated: 02/19/25 0611     WBC 3.15 10*3/mm3      RBC 2.82 10*6/mm3      Hemoglobin 9.5 g/dL      Hematocrit 30.4 %      .8 fL      MCH 33.7 pg      MCHC 31.3 g/dL      RDW 14.2 %      RDW-SD 55.7 fl      MPV 10.0 fL      Platelets 164 10*3/mm3           Imaging Results (Last 24 Hours)       ** No results found for the last 24 hours. **          Scan on 2/13/2025 1450 by [employer] Onbase, [place]: ECG 12-LEAD         Author: -- Service: -- Author Type: --   Filed: Date of Service: Creation Time:   Status: (Other)   HEART RATE=81  bpm  RR Ralffxls=445  ms  ND Interval=  ms  P Horizontal Axis=  deg  P Front Axis=  deg  QRSD Interval=96  ms  QT Khhprjcl=100  ms  RHmU=485  ms  QRS Axis=27  deg  T Wave Axis=41  deg  - ABNORMAL ECG -  Atrial fibrillation  Ventricular bigeminy  Low voltage, extremity and precordial leads  Abnormal R-wave progression, early transition          Current Facility-Administered Medications:     acetaminophen (TYLENOL) tablet 650 mg, 650 mg, Oral, Q4H PRN, Nathan Hooper MD, 650 mg at 02/19/25 1235    albuterol (PROVENTIL) nebulizer solution 0.083% 2.5 mg/3mL, 2.5 mg, Nebulization, Q4H PRN, Nathan Hooper MD    allopurinol (ZYLOPRIM) tablet  100 mg, 100 mg, Oral, Daily, Nathan Hooper MD, 100 mg at 02/19/25 0801    ammonium lactate (AMLACTIN) 12 % cream 1 Application, 1 Application, Topical, Daily, Nathan Hooper MD, 1 Application at 02/19/25 0801    apixaban (ELIQUIS) tablet 5 mg, 5 mg, Oral, BID, [place], Maris A, APRN, 5 mg at 02/19/25 0801    arformoterol (BROVANA) nebulizer solution 15 mcg, 15 mcg, Nebulization, BID - RT, 15 mcg at 02/19/25 1106 **AND** budesonide (PULMICORT) nebulizer solution 0.5 mg, 0.5 mg, Nebulization, BID - RT, 0.5 mg at 02/19/25 1108 **AND** revefenacin (YUPELRI) nebulizer solution 175 mcg, 175 mcg, Nebulization, Daily - RT, Nathan Hooper MD, 175 mcg at 02/19/25 1105    aspirin chewable tablet 81 mg, 81 mg, Oral, Daily, Nathan Hooper MD, 81 mg at 02/19/25 0801    atorvastatin (LIPITOR) tablet 10 mg, 10 mg, Oral, Nightly, Nathan Hooper MD, 10 mg at 02/18/25 2036    cefTRIAXone (ROCEPHIN) 1,000 mg in sodium chloride 0.9 % 100 mL MBP, 1,000 mg, Intravenous, Q24H, Nathan Hooper MD, Last Rate: 200 mL/hr at 02/19/25 1235, Currently Infusing at 02/19/25 1235    famotidine (PEPCID) tablet 20 mg, 20 mg, Oral, BID AC, Nathan Hooper MD, 20 mg at 02/19/25 0525    folic acid (FOLVITE) tablet 1 mg, 1 mg, Oral, Daily, Nathan Hooper MD, 1 mg at 02/19/25 0801    HYDROcodone-acetaminophen (NORCO) 7.5-325 MG per tablet 1 tablet, 1 tablet, Oral, Q8H PRN, Nathan Hooper MD, 1 tablet at 02/19/25 0801    levothyroxine (SYNTHROID, LEVOTHROID) tablet 75 mcg, 75 mcg, Oral, Daily, Nathan Hooper MD, 75 mcg at 02/19/25 0801    metoprolol tartrate (LOPRESSOR) tablet 50 mg, 50 mg, Oral, Q12H, Kelvin Yousif MD, 50 mg at 02/19/25 0801    montelukast (SINGULAIR) tablet 10 mg, 10 mg, Oral, Daily, Nathan Hooper MD, 10 mg at 02/19/25 0801    polyethylene glycol (MIRALAX) packet 17 g, 17 g, Oral, Daily, Nathan Hooper MD, 17 g at 02/18/25 0800    potassium chloride (K-DUR,KLOR-CON) ER tablet 10 mEq, 10 mEq, Oral, BID With Meals, Nathan Hooper MD, 10 mEq at  02/19/25 0801    sennosides-docusate (PERICOLACE) 8.6-50 MG per tablet 2 tablet, 2 tablet, Oral, BID PRN, Leonor Hooper MD    torsemide (DEMADEX) tablet 20 mg, 20 mg, Oral, Daily, PascoKelvin odonnell MD, 20 mg at 02/19/25 0801      ASSESSMENT  Acute on chronic diastolic congestive heart failure  Acute kidney injury  Acute E. coli UTI  Elevated troponin  Asymptomatic bacteriuria  Chronic atrial fibrillation  Hypertension  Hyperlipidemia  Hypothyroidism  Morbidly obese  Asthma/COPD/obstructive sleep apnea  Chronic kidney disease stage IIIb    PLAN  CPM  Continue diuresis  Continue Eliquis   Nephrology to follow patient  Adjust home medications  Stress ulcer DVT prophylaxis  Supportive care  PT OT   Discussed with nursing staff  Awaiting skilled nursing facility.    LEONOR HOOPER MD    Copied text in this note has been reviewed and is accurate as of 02/19/25

## 2025-02-19 NOTE — PLAN OF CARE
Problem: Adult Inpatient Plan of Care  Goal: Plan of Care Review  Outcome: Progressing  Flowsheets (Taken 2/19/2025 1424)  Progress: improving  Outcome Evaluation: Patient has been pleasant and cooperative during shift. AOx4, assist x1, 2LO2, Afib. Plan to [facility] possible tomorrow 2/20/25. Patient treated for pain often. No nausea or SOA. Will continue to monitor and assist patient as needed.  Plan of Care Reviewed With: patient  Goal: Patient-Specific Goal (Individualized)  Outcome: Progressing  Goal: Absence of Hospital-Acquired Illness or Injury  Outcome: Progressing  Intervention: Identify and Manage Fall Risk  Recent Flowsheet Documentation  Taken 2/19/2025 1400 by Mahin Workman RN  Safety Promotion/Fall Prevention:   assistive device/personal items within reach   fall prevention program maintained   nonskid shoes/slippers when out of bed   safety round/check completed  Taken 2/19/2025 1200 by Mahin Workman RN  Safety Promotion/Fall Prevention:   assistive device/personal items within reach   fall prevention program maintained   nonskid shoes/slippers when out of bed   safety round/check completed  Taken 2/19/2025 1000 by Mahin Workman RN  Safety Promotion/Fall Prevention:   assistive device/personal items within reach   fall prevention program maintained   nonskid shoes/slippers when out of bed   safety round/check completed  Taken 2/19/2025 0755 by Mahin Workman RN  Safety Promotion/Fall Prevention:   assistive device/personal items within reach   fall prevention program maintained   nonskid shoes/slippers when out of bed   safety round/check completed  Intervention: Prevent Skin Injury  Recent Flowsheet Documentation  Taken 2/19/2025 1400 by Mahin Workman RN  Body Position: supine  Skin Protection: incontinence pads utilized  Taken 2/19/2025 1200 by Mahin Workman RN  Body Position: supine  Taken 2/19/2025 1000 by Mahin Workman RN  Body Position: supine  Taken 2/19/2025  0755 by Mahin Workman RN  Body Position: supine  Goal: Optimal Comfort and Wellbeing  Outcome: Progressing  Goal: Readiness for Transition of Care  Outcome: Progressing     Problem: Fall Injury Risk  Goal: Absence of Fall and Fall-Related Injury  Outcome: Progressing  Intervention: Promote Injury-Free Environment  Recent Flowsheet Documentation  Taken 2/19/2025 1400 by Mahin Workman RN  Safety Promotion/Fall Prevention:   assistive device/personal items within reach   fall prevention program maintained   nonskid shoes/slippers when out of bed   safety round/check completed  Taken 2/19/2025 1200 by Mahin Workman RN  Safety Promotion/Fall Prevention:   assistive device/personal items within reach   fall prevention program maintained   nonskid shoes/slippers when out of bed   safety round/check completed  Taken 2/19/2025 1000 by Mahin Workman RN  Safety Promotion/Fall Prevention:   assistive device/personal items within reach   fall prevention program maintained   nonskid shoes/slippers when out of bed   safety round/check completed  Taken 2/19/2025 0755 by Mahin Workman RN  Safety Promotion/Fall Prevention:   assistive device/personal items within reach   fall prevention program maintained   nonskid shoes/slippers when out of bed   safety round/check completed     Problem: Comorbidity Management  Goal: Maintenance of Asthma Control  Outcome: Progressing  Goal: Maintenance of Behavioral Health Symptom Control  Outcome: Progressing  Goal: Maintenance of COPD Symptom Control  Outcome: Progressing  Goal: Blood Glucose Level Within Target Range  Outcome: Progressing  Goal: Maintenance of Heart Failure Symptom Control  Outcome: Progressing  Goal: Blood Pressure in Desired Range  Outcome: Progressing  Goal: Maintenance of Osteoarthritis Symptom Control  Outcome: Progressing  Goal: Bariatric Home Regimen Maintained  Outcome: Progressing  Goal: Maintenance of Seizure Control  Outcome: Progressing      Problem: Heart Failure  Goal: Optimal Coping  Outcome: Progressing  Goal: Optimal Cardiac Output and Blood Flow  Outcome: Progressing  Goal: Stable Heart Rate and Rhythm  Outcome: Progressing  Goal: Fluid and Electrolyte Balance  Outcome: Progressing  Goal: Optimal Functional Ability  Outcome: Progressing  Goal: Improved Oral Intake  Outcome: Progressing  Goal: Effective Oxygenation and Ventilation  Outcome: Progressing  Intervention: Promote Airway Secretion Clearance  Recent Flowsheet Documentation  Taken 2/19/2025 0755 by Mahin Workman RN  Cough And Deep Breathing: done independently per patient  Intervention: Optimize Oxygenation and Ventilation  Recent Flowsheet Documentation  Taken 2/19/2025 1400 by Mahin Workman RN  Head of Bed ([facility]) Positioning: HOB at 30 degrees  Taken 2/19/2025 1200 by Mahin Workman RN  Head of Bed ([facility]) Positioning: HOB at 30 degrees  Taken 2/19/2025 1000 by Mahin Workman RN  Head of Bed ([facility]) Positioning: HOB at 45 degrees  Taken 2/19/2025 0755 by Mahin Workman RN  Head of Bed ([facility]) Positioning: HOB at 20-30 degrees     Problem: Fluid Volume Excess  Goal: Fluid Balance  Outcome: Progressing  Intervention: Monitor and Manage Hypervolemia  Recent Flowsheet Documentation  Taken 2/19/2025 1400 by Mahin Workman RN  Skin Protection: incontinence pads utilized     Problem: Skin Injury Risk Increased  Goal: Skin Health and Integrity  Outcome: Progressing  Intervention: Optimize Skin Protection  Recent Flowsheet Documentation  Taken 2/19/2025 1400 by Mahin Workman RN  Pressure Reduction Techniques: frequent weight shift encouraged  Head of Bed (HOB) Positioning: HOB at 30 degrees  Pressure Reduction Devices: pressure-redistributing mattress utilized  Skin Protection: incontinence pads utilized  Taken 2/19/2025 1200 by Mahin Workman RN  Head of Bed ([facility]) Positioning: HOB at 30 degrees  Taken 2/19/2025 1000 by Mahin Workman RN  Head of Bed ([facility]) Positioning:  HOB at 45 degrees  Taken 2/19/2025 0755 by Mahin Workman, RN  Pressure Reduction Techniques: frequent weight shift encouraged  Head of Bed (HOB) Positioning: HOB at 20-30 degrees  Pressure Reduction Devices: pressure-redistributing mattress utilized   Goal Outcome Evaluation:  Plan of Care Reviewed With: patient        Progress: improving  Outcome Evaluation: Patient has been pleasant and cooperative during shift. AOx4, assist x1, 2LO2, Afib. Plan to [facility] possible tomorrow 2/20/25. Patient treated for pain often. No nausea or SOA. Will continue to monitor and assist patient as needed.

## 2025-02-19 NOTE — PROGRESS NOTES
[facility]     Progress Note    Patient Name: Marilu Avery  : 1955  MRN: [id number]  Primary Care Physician:  Esperanza Melton APRN  Date of admission: 2025    Subjective   Subjective     Chief Complaint:      History of Present Illness  Patient rebecca secondary to chf    Review of Systems  She is feeling better today.   Reports edema is improving.   Denies sob.   Diuretics changed to PO   Good UOP     Objective   Objective     Vitals:   Temp:  [98 °F (36.7 °C)-98.4 °F (36.9 °C)] 98.4 °F (36.9 °C)  Heart Rate:  [] 86  Resp:  [16-20] 20  BP: (124-135)/(77-95) 135/95  Flow (L/min) (Oxygen Therapy):  [2] 2    Physical Exam   General Appearance:  In no acute distress.    HEENT: Normal HEENT exam.     Extremities: .  edema looks much better today   Neurological: Patient is alert     Result Review    Result Review:  I have personally reviewed the results from the time of this admission to 2025 08:31 EST and agree with these findings:  [x]  Laboratory list / accordion  []  Microbiology  []  Radiology  []  EKG/Telemetry   []  Cardiology/Vascular   []  Pathology  []  Old records  []  Other:  Most notable findings include:       Assessment & Plan   Assessment / Plan     Brief Patient Summary:  Marilu Avery is a 69 y.o. female who has rebecca and chf    Active Hospital Problems:  Active Hospital Problems    Diagnosis     **REBECCA (acute kidney injury)     Acute on chronic diastolic (congestive) heart failure      Plan:   REBECCA (acute kidney injury), likely cardiorenal syndrome  Fluid overload  1.5 cm L kidney lesion - possible cyst but needs CT when less acute  Acute CHF exacerbation  Obesity  Atrial fibrillation  COPD  Chronic hypertension but BP soft since admission  UTI    Renal function has been stable around 1.3-1.4 for a few days now.   Her Cr was normal in , but no labs since then that I can find.   This might be her baseline.   Lasix was changed for po torsemide yesterday. Good UOP and  overall renal function stable.   Edema improving.   Ok from renal standpoint to DC, but she would need to follow up with us in clinic soon.   Will need urology evaluation as outpatient for possible renal lesion    Molyl Laurent MD   [facility].

## 2025-02-20 VITALS
HEIGHT: 70 IN | SYSTOLIC BLOOD PRESSURE: 147 MMHG | OXYGEN SATURATION: 97 % | HEART RATE: 95 BPM | WEIGHT: 255.73 LBS | RESPIRATION RATE: 16 BRPM | DIASTOLIC BLOOD PRESSURE: 90 MMHG | TEMPERATURE: 98.4 F | BODY MASS INDEX: 36.61 KG/M2

## 2025-02-20 LAB
ANION GAP SERPL CALCULATED.3IONS-SCNC: 11 MMOL/L (ref 5–15)
BASOPHILS # BLD AUTO: 0.03 10*3/MM3 (ref 0–0.2)
BASOPHILS NFR BLD AUTO: 1 % (ref 0–1.5)
BUN SERPL-MCNC: 29 MG/DL (ref 8–23)
BUN/CREAT SERPL: 25.9 (ref 7–25)
CALCIUM SPEC-SCNC: 9.2 MG/DL (ref 8.6–10.5)
CHLORIDE SERPL-SCNC: 105 MMOL/L (ref 98–107)
CO2 SERPL-SCNC: 26 MMOL/L (ref 22–29)
CREAT SERPL-MCNC: 1.12 MG/DL (ref 0.57–1)
DEPRECATED RDW RBC AUTO: 52.7 FL (ref 37–54)
EGFRCR SERPLBLD CKD-EPI 2021: 53.7 ML/MIN/1.73
EOSINOPHIL # BLD AUTO: 0.1 10*3/MM3 (ref 0–0.4)
EOSINOPHIL NFR BLD AUTO: 3.4 % (ref 0.3–6.2)
ERYTHROCYTE [DISTWIDTH] IN BLOOD BY AUTOMATED COUNT: 14.2 % (ref 12.3–15.4)
GLUCOSE SERPL-MCNC: 100 MG/DL (ref 65–99)
HCT VFR BLD AUTO: 29.9 % (ref 34–46.6)
HGB BLD-MCNC: 9.9 G/DL (ref 12–15.9)
IMM GRANULOCYTES # BLD AUTO: 0.01 10*3/MM3 (ref 0–0.05)
IMM GRANULOCYTES NFR BLD AUTO: 0.3 % (ref 0–0.5)
LYMPHOCYTES # BLD AUTO: 0.78 10*3/MM3 (ref 0.7–3.1)
LYMPHOCYTES NFR BLD AUTO: 26.2 % (ref 19.6–45.3)
MCH RBC QN AUTO: 33.9 PG (ref 26.6–33)
MCHC RBC AUTO-ENTMCNC: 33.1 G/DL (ref 31.5–35.7)
MCV RBC AUTO: 102.4 FL (ref 79–97)
MONOCYTES # BLD AUTO: 0.41 10*3/MM3 (ref 0.1–0.9)
MONOCYTES NFR BLD AUTO: 13.8 % (ref 5–12)
NEUTROPHILS NFR BLD AUTO: 1.65 10*3/MM3 (ref 1.7–7)
NEUTROPHILS NFR BLD AUTO: 55.3 % (ref 42.7–76)
NRBC BLD AUTO-RTO: 1.3 /100 WBC (ref 0–0.2)
PLATELET # BLD AUTO: 182 10*3/MM3 (ref 140–450)
PMV BLD AUTO: 10 FL (ref 6–12)
POTASSIUM SERPL-SCNC: 4 MMOL/L (ref 3.5–5.2)
RBC # BLD AUTO: 2.92 10*6/MM3 (ref 3.77–5.28)
SODIUM SERPL-SCNC: 142 MMOL/L (ref 136–145)
WBC NRBC COR # BLD AUTO: 2.98 10*3/MM3 (ref 3.4–10.8)

## 2025-02-20 PROCEDURE — 80048 BASIC METABOLIC PNL TOTAL CA: CPT | Performed by: HOSPITALIST

## 2025-02-20 PROCEDURE — 94664 DEMO&/EVAL PT USE INHALER: CPT

## 2025-02-20 PROCEDURE — 94760 N-INVAS EAR/PLS OXIMETRY 1: CPT

## 2025-02-20 PROCEDURE — 63710000001 REVEFENACIN 175 MCG/3ML SOLUTION: Performed by: HOSPITALIST

## 2025-02-20 PROCEDURE — 85025 COMPLETE CBC W/AUTO DIFF WBC: CPT | Performed by: HOSPITALIST

## 2025-02-20 PROCEDURE — 94799 UNLISTED PULMONARY SVC/PX: CPT

## 2025-02-20 RX ORDER — FOLIC ACID 1 MG/1
1 TABLET ORAL DAILY
Qty: 30 TABLET | Refills: 0 | Status: SHIPPED | OUTPATIENT
Start: 2025-02-21 | End: 2025-03-23

## 2025-02-20 RX ORDER — TORSEMIDE 20 MG/1
20 TABLET ORAL DAILY
Qty: 30 TABLET | Refills: 0 | Status: SHIPPED | OUTPATIENT
Start: 2025-02-21 | End: 2025-03-23

## 2025-02-20 RX ORDER — ATORVASTATIN CALCIUM 10 MG/1
10 TABLET, FILM COATED ORAL NIGHTLY
Qty: 30 TABLET | Refills: 0 | Status: SHIPPED | OUTPATIENT
Start: 2025-02-20 | End: 2025-03-22

## 2025-02-20 RX ORDER — ASPIRIN 81 MG/1
81 TABLET, CHEWABLE ORAL DAILY
Qty: 30 TABLET | Refills: 0 | Status: SHIPPED | OUTPATIENT
Start: 2025-02-21 | End: 2025-03-23

## 2025-02-20 RX ORDER — POTASSIUM CHLORIDE 750 MG/1
10 TABLET, EXTENDED RELEASE ORAL 2 TIMES DAILY WITH MEALS
Qty: 60 TABLET | Refills: 0 | Status: SHIPPED | OUTPATIENT
Start: 2025-02-20 | End: 2025-03-22

## 2025-02-20 RX ORDER — POLYETHYLENE GLYCOL 3350 17 G/17G
17 POWDER, FOR SOLUTION ORAL DAILY
Start: 2025-02-20

## 2025-02-20 RX ORDER — METOPROLOL TARTRATE 50 MG
50 TABLET ORAL EVERY 12 HOURS SCHEDULED
Qty: 60 TABLET | Refills: 0 | Status: SHIPPED | OUTPATIENT
Start: 2025-02-20 | End: 2025-03-22

## 2025-02-20 RX ORDER — HYDROCODONE BITARTRATE AND ACETAMINOPHEN 7.5; 325 MG/1; MG/1
1 TABLET ORAL 2 TIMES DAILY PRN
Qty: 10 TABLET | Refills: 0 | Status: SHIPPED | OUTPATIENT
Start: 2025-02-20 | End: 2025-02-23

## 2025-02-20 RX ADMIN — HYDROCODONE BITARTRATE AND ACETAMINOPHEN 1 TABLET: 7.5; 325 TABLET ORAL at 00:43

## 2025-02-20 RX ADMIN — TORSEMIDE 20 MG: 20 TABLET ORAL at 08:33

## 2025-02-20 RX ADMIN — POTASSIUM CHLORIDE 10 MEQ: 750 TABLET, EXTENDED RELEASE ORAL at 08:33

## 2025-02-20 RX ADMIN — BUDESONIDE 0.5 MG: 0.5 INHALANT RESPIRATORY (INHALATION) at 07:58

## 2025-02-20 RX ADMIN — Medication 1 APPLICATION: at 11:36

## 2025-02-20 RX ADMIN — MONTELUKAST SODIUM 10 MG: 10 TABLET, FILM COATED ORAL at 08:33

## 2025-02-20 RX ADMIN — FOLIC ACID 1 MG: 1 TABLET ORAL at 08:33

## 2025-02-20 RX ADMIN — ASPIRIN 81 MG CHEWABLE TABLET 81 MG: 81 TABLET CHEWABLE at 08:33

## 2025-02-20 RX ADMIN — HYDROCODONE BITARTRATE AND ACETAMINOPHEN 1 TABLET: 7.5; 325 TABLET ORAL at 16:07

## 2025-02-20 RX ADMIN — METOPROLOL TARTRATE 50 MG: 50 TABLET, FILM COATED ORAL at 08:33

## 2025-02-20 RX ADMIN — APIXABAN 5 MG: 5 TABLET, FILM COATED ORAL at 08:33

## 2025-02-20 RX ADMIN — HYDROCODONE BITARTRATE AND ACETAMINOPHEN 1 TABLET: 7.5; 325 TABLET ORAL at 08:33

## 2025-02-20 RX ADMIN — ACETAMINOPHEN 650 MG: 325 TABLET, FILM COATED ORAL at 05:23

## 2025-02-20 RX ADMIN — ARFORMOTEROL TARTRATE 15 MCG: 15 SOLUTION RESPIRATORY (INHALATION) at 07:59

## 2025-02-20 RX ADMIN — LEVOTHYROXINE SODIUM 75 MCG: 0.07 TABLET ORAL at 08:33

## 2025-02-20 RX ADMIN — ALLOPURINOL 100 MG: 100 TABLET ORAL at 08:33

## 2025-02-20 RX ADMIN — POLYETHYLENE GLYCOL 3350 17 G: 17 POWDER, FOR SOLUTION ORAL at 08:33

## 2025-02-20 RX ADMIN — REVEFENACIN 175 MCG: 175 SOLUTION RESPIRATORY (INHALATION) at 08:00

## 2025-02-20 NOTE — CASE MANAGEMENT/SOCIAL WORK
Case Management Discharge Note      Final Note: Pt discharged to [facility] for skilled care.  HARSHA Rodriguez RN         Selected Continued Care - Admitted Since 2/13/2025       Destination Coordination complete.      Service Provider Services Address Phone Fax Patient Preferred    [facility] [address] [place] [address] [phone] --              Durable Medical Equipment    No services have been selected for the patient.                Dialysis/Infusion    No services have been selected for the patient.                Home Medical Care    No services have been selected for the patient.                Therapy    No services have been selected for the patient.                Community Resources    No services have been selected for the patient.                Community & DME    No services have been selected for the patient.                    Transportation Services  W/C Van: Skilled Nursing Facility Van    Final Discharge Disposition Code: 03 - skilled nursing facility (SNF)

## 2025-02-20 NOTE — SIGNIFICANT NOTE
02/20/25 1445   OTHER   Discipline physical therapist   Rehab Time/Intention   Session Not Performed other (see comments)  (Pt politely declined, wanting to rest until time to DC to rehab. Pt states she has been up to [facility] w/ nsg this date. PT will keep on caseload as precautionary if pt does not DC.)   Therapy Assessment/Plan (PT)   Criteria for Skilled Interventions Met (PT) yes;meets criteria   Recommendation   PT - Next Appointment 02/21/25

## 2025-02-20 NOTE — CASE MANAGEMENT/SOCIAL WORK
Continued Stay Note  [facility]     Patient Name: Marilu Avery  MRN: [id number]  Today's Date: 2/20/2025    Admit Date: 2/13/2025    Plan: [facility] for skilled care.  HARSHA Rodriguez RN   Discharge Plan       Row Name 02/20/25 1151       Plan    Plan Plan [facility] skilled care.  HARSHA Rodriguez RN    Patient/Family in Agreement with Plan yes    Plan Comments Pt with Discharge Order.  Per Angelia ( [phone]) [facility] has a bed and can accept pt today.  Discharge Summary in packet.  Angelia will print Discharge Summary for facility.  Prescriptions if needed sent to facility pharmacy.  Packet to RN.  Called pt's son ( Jameel Avery ( [phone]) and notified him of pt discharge today.  Per Aneglia [facility]'s W/C Van can pick pt up at 1630.  Plan [facility] for skilled care.   HARSHA Rodriguez RN                   Discharge Codes    No documentation.                 Expected Discharge Date and Time       Expected Discharge Date Expected Discharge Time    Feb 20, 2025               Sapna Rodriguez, RN

## 2025-02-20 NOTE — PROGRESS NOTES
"Daily progress note    Primary care physician  Dr. Melton    Subjective  Doing better with no new complaints and agreeable to go to subacute rehab    History of present illness  69-year-old white female with history of COPD asthma atrial fibrillation hypertension of sleep apnea chronic kidney disease stage IIIb presented to [facility] emergency room with increased shortness of breath and leg swelling.  Patient denies any fever chest pain palpitation abdominal pain nausea vomiting diarrhea.  Patient stated that her diuretics was recently changed by her cardiologist.  Patient workup in ER revealed acute kidney injury and also have elevated troponin admitted for management.     REVIEW OF SYSTEMS  Unremarkable except generalized weakness     PHYSICAL EXAM  Blood pressure 147/90, pulse 95, temperature 98.4 °F (36.9 °C), temperature source Oral, resp. rate 16, height 177.8 cm (70\"), weight 116 kg (255 lb 11.7 oz), SpO2 97%, not currently breastfeeding.    General: Awake and alert no acute distress.  HENT: NCAT, PERRL, Nares patent.  Eyes: no scleral icterus.  Neck: trachea midline, no ROM limitations.  CV: regular rhythm, regular rate.  Respiratory: normal effort, decreased breath sound at the bases  Abdomen: soft, nondistended, NTTP, no rebound tenderness, no guarding or rigidity.  Musculoskeletal: no deformity.  Neuro: alert, moves all extremities, follows commands.  Skin: warm, dry.  2+ pitting edema bilateral lower extremities.     LAB RESULTS  Lab Results (last 24 hours)       Procedure Component Value Units Date/Time    Basic Metabolic Panel [[id number]]  (Abnormal) Collected: 02/20/25 0618    Specimen: Blood from Arm, Right Updated: 02/20/25 0742     Glucose 100 mg/dL      BUN 29 mg/dL      Creatinine 1.12 mg/dL      Sodium 142 mmol/L      Potassium 4.0 mmol/L      Chloride 105 mmol/L      CO2 26.0 mmol/L      Calcium 9.2 mg/dL      BUN/Creatinine Ratio 25.9     Anion Gap 11.0 mmol/L      eGFR 53.7 " mL/min/1.73     Narrative:      GFR Categories in Chronic Kidney Disease (CKD)      GFR Category          GFR (mL/min/1.73)    Interpretation  G1                     90 or greater         Normal or high (1)  G2                      60-89                Mild decrease (1)  G3a                   45-59                Mild to moderate decrease  G3b                   30-44                Moderate to severe decrease  G4                    15-29                Severe decrease  G5                    14 or less           Kidney failure          (1)In the absence of evidence of kidney disease, neither GFR category G1 or G2 fulfill the criteria for CKD.    eGFR calculation 2021 CKD-EPI creatinine equation, which does not include race as a factor    CBC & Differential [[id number]]  (Abnormal) Collected: 02/20/25 0618    Specimen: Blood from Arm, Right Updated: 02/20/25 0736    Narrative:      The following orders were created for panel order CBC & Differential.  Procedure                               Abnormality         Status                     ---------                               -----------         ------                     CBC Auto Differential[[id number]]        Abnormal            Final result                 Please view results for these tests on the individual orders.    CBC Auto Differential [[id number]]  (Abnormal) Collected: 02/20/25 0618    Specimen: Blood from Arm, Right Updated: 02/20/25 0736     WBC 2.98 10*3/mm3      RBC 2.92 10*6/mm3      Hemoglobin 9.9 g/dL      Hematocrit 29.9 %      .4 fL      MCH 33.9 pg      MCHC 33.1 g/dL      RDW 14.2 %      RDW-SD 52.7 fl      MPV 10.0 fL      Platelets 182 10*3/mm3      Neutrophil % 55.3 %      Lymphocyte % 26.2 %      Monocyte % 13.8 %      Eosinophil % 3.4 %      Basophil % 1.0 %      Immature Grans % 0.3 %      Neutrophils, Absolute 1.65 10*3/mm3      Lymphocytes, Absolute 0.78 10*3/mm3      Monocytes, Absolute 0.41 10*3/mm3      Eosinophils, Absolute 0.10  10*3/mm3      Basophils, Absolute 0.03 10*3/mm3      Immature Grans, Absolute 0.01 10*3/mm3      nRBC 1.3 /100 WBC           Imaging Results (Last 24 Hours)       ** No results found for the last 24 hours. **          Scan on 2/13/2025 1450 by [employer], [place]: ECG 12-LEAD         Author: -- Service: -- Author Type: --   Filed: Date of Service: Creation Time:   Status: (Other)   HEART RATE=81  bpm  RR Gwgqbtbw=095  ms  HI Interval=  ms  P Horizontal Axis=  deg  P Front Axis=  deg  QRSD Interval=96  ms  QT Mrramncb=819  ms  HZiX=336  ms  QRS Axis=27  deg  T Wave Axis=41  deg  - ABNORMAL ECG -  Atrial fibrillation  Ventricular bigeminy  Low voltage, extremity and precordial leads  Abnormal R-wave progression, early transition          Current Facility-Administered Medications:     acetaminophen (TYLENOL) tablet 650 mg, 650 mg, Oral, Q4H PRN, Nathan Hooper MD, 650 mg at 02/20/25 0523    albuterol (PROVENTIL) nebulizer solution 0.083% 2.5 mg/3mL, 2.5 mg, Nebulization, Q4H PRN, Nathan Hooper MD    allopurinol (ZYLOPRIM) tablet 100 mg, 100 mg, Oral, Daily, Nathan Hooper MD, 100 mg at 02/20/25 0833    ammonium lactate (AMLACTIN) 12 % cream 1 Application, 1 Application, Topical, Daily, Nathan Hooper MD, 1 Application at 02/20/25 1136    apixaban (ELIQUIS) tablet 5 mg, 5 mg, Oral, BID, Maris Dale, APRN, 5 mg at 02/20/25 0833    arformoterol (BROVANA) nebulizer solution 15 mcg, 15 mcg, Nebulization, BID - RT, 15 mcg at 02/20/25 0759 **AND** budesonide (PULMICORT) nebulizer solution 0.5 mg, 0.5 mg, Nebulization, BID - RT, 0.5 mg at 02/20/25 0758 **AND** revefenacin (YUPELRI) nebulizer solution 175 mcg, 175 mcg, Nebulization, Daily - RT, Nathan Hooper MD, 175 mcg at 02/20/25 0800    aspirin chewable tablet 81 mg, 81 mg, Oral, Daily, Nathan Hooper MD, 81 mg at 02/20/25 0833    atorvastatin (LIPITOR) tablet 10 mg, 10 mg, Oral, Nightly, Nathan Hooper MD, 10 mg at 02/19/25 2100    cefTRIAXone (ROCEPHIN) 1,000 mg in sodium  chloride 0.9 % 100 mL MBP, 1,000 mg, Intravenous, Q24H, Leonor Hooper MD, Last Rate: 200 mL/hr at 02/19/25 1235, Currently Infusing at 02/19/25 1235    famotidine (PEPCID) tablet 20 mg, 20 mg, Oral, BID AC, Leonor Hooper MD, 20 mg at 02/19/25 1625    folic acid (FOLVITE) tablet 1 mg, 1 mg, Oral, Daily, Leonor Hooper MD, 1 mg at 02/20/25 0833    HYDROcodone-acetaminophen (NORCO) 7.5-325 MG per tablet 1 tablet, 1 tablet, Oral, Q8H PRN, Leonor Hooper MD, 1 tablet at 02/20/25 0833    levothyroxine (SYNTHROID, LEVOTHROID) tablet 75 mcg, 75 mcg, Oral, Daily, Leonor Hooper MD, 75 mcg at 02/20/25 0833    metoprolol tartrate (LOPRESSOR) tablet 50 mg, 50 mg, Oral, Q12H, Kelvin Yousif MD, 50 mg at 02/20/25 0833    montelukast (SINGULAIR) tablet 10 mg, 10 mg, Oral, Daily, Leonor Hooper MD, 10 mg at 02/19/25 0801    polyethylene glycol (MIRALAX) packet 17 g, 17 g, Oral, Daily, Leonor Hooper MD, 17 g at 02/20/25 0833    potassium chloride (K-DUR,KLOR-CON) ER tablet 10 mEq, 10 mEq, Oral, BID With Meals, Leonor Hooper MD, 10 mEq at 02/20/25 0833    sennosides-docusate (PERICOLACE) 8.6-50 MG per tablet 2 tablet, 2 tablet, Oral, BID PRN, Leonor Hooper MD    torsemide (DEMADEX) tablet 20 mg, 20 mg, Oral, Daily, Kelvin Yousif MD, 20 mg at 02/20/25 0833      ASSESSMENT  Acute on chronic diastolic congestive heart failure  Acute kidney injury resolved  Acute E. coli UTI completed antibiotics  Chronic atrial fibrillation  Hypertension  Hyperlipidemia  Hypothyroidism  Morbidly obese  History of alcohol abuse  Asthma/COPD/obstructive sleep apnea  Chronic kidney disease stage IIIb    PLAN  Discharge to [facility] nursing facility  Discharge summary dictated    LEONOR HOOPER MD    Copied text in this note has been reviewed and is accurate as of 02/20/25

## 2025-02-20 NOTE — PLAN OF CARE
Goal Outcome Evaluation:      AVS completed for discharge and in folder for facility. PIV x1 removed. Discharge pending transport at 1630.

## 2025-02-20 NOTE — PROGRESS NOTES
[facility]     Progress Note    Patient Name: Marilu Avery  : 1955  MRN: [id number]  Primary Care Physician:  Esperanza Melton APRN  Date of admission: 2025    Subjective   Subjective     Chief Complaint:      History of Present Illness  Patient maximino secondary to chf    Review of Systems  She is feeling better today.   Reports edema is improving.   Denies sob.   Diuretics changed to PO and she is tolerating these well  Good UOP   States good appetite, no nausea or vomiting    Objective   Objective     Vitals:   Temp:  [97.7 °F (36.5 °C)-98.4 °F (36.9 °C)] 98.4 °F (36.9 °C)  Heart Rate:  [] 95  Resp:  [16] 16  BP: (112-147)/(72-90) 147/90  Flow (L/min) (Oxygen Therapy):  [2] 2    Physical Exam   General Appearance:  In no acute distress.  Obese, chronically ill  HEENT: Normal HEENT exam.     Chest clear bilaterally no wheezes or rhonchi  CV regular rate and rhythm S1-S2  Abdomen soft nontender nondistended  Extremities: .  edema looks much better today   Neurological: Patient is alert     Result Review    Result Review:  I have personally reviewed the results from the time of this admission to 2025 17:47 EST and agree with these findings:  [x]  Laboratory list / accordion  []  Microbiology  []  Radiology  []  EKG/Telemetry   []  Cardiology/Vascular   []  Pathology  []  Old records  []  Other:  Most notable findings include:   Creatinine 1.12, electrolytes otherwise stable, hemoglobin 9.9, WBC 2.9    tudy Result    Narrative & Impression   RENAL ULTRASOUND     HISTORY: Acute renal failure     COMPARISON: CT abdomen pelvis 2021     TECHNIQUE: Grayscale, color Doppler images of the kidneys and bladder  were obtained.     FINDINGS:     Grayscale and color Doppler images of the kidneys and bladder were  obtained.     The right kidney measures 8.7 cm in length.     The left kidney measures 9.7 cm in length.     A 2.8 x 2.8 x 2.6 cm hyperechoic lesion corresponding with a fat  density  lesion on prior CT.     A 1.5 cm anechoic versus hypoechoic left renal lesion without internal  color Doppler flow.     A 1.6 cm anechoic right renal cyst. A 1.2 cm anechoic likely left renal  cyst with associated small echogenic focus posteriorly. Additional  subcentimeter left renal lesions too small to characterize.     Normal cortical echogenicity and thickness. No hydronephrosis.      The bladder is unremarkable.     Visualized portions of the aorta and IVC are normal in caliber and  appearance.           IMPRESSION:  1. No hydronephrosis.  2. Indeterminate 1.5 cm anechoic versus hypoechoic left renal lesion can  be further evaluated with a follow-up nonemergent outpatient multiphase  abdominal CT or MRI with IV contrast.  3. A 2.8 cm right angiomyolipoma.       Assessment & Plan   Assessment / Plan     Brief Patient Summary:  Marilu Avery is a 69 y.o. female who has rebecca and chf    Assessment:  REBECCA (acute kidney injury), likely cardiorenal syndrome, no obvious secondary causes.  Volume overloaded on exam  Acute CHF exacerbation, previous EF 57%.  Repeat echo pending, normal prior systolic function indeterminate diastolic function  Obesity  Volume overload  Atrial fibrillation  COPD  Chronic hypertension but BP soft since admissio      Plan:   Renal function stable, near baseline, euvolemic  Did have indeterminate lesion on renal ultrasound that will eventually require a CT with contrast to better evaluate  Could potentially do this today but looks like she is being discharged so we will arrange this after office follow-up  Continue current oral diuretic regimen  Electrolytes and volume at goal    Baltazar Alcantar MD  [facility].   [phone]

## 2025-02-20 NOTE — PLAN OF CARE
"Goal Outcome Evaluation:  Patient resting in bed during the night with VSS and on room air.  She continues to c/o both legs hurting especially her right leg \"from when she fell\". Patient is able to ambulate with standby assist, and edema has subsided to 1+ jessica bilateral lower extremities with pulses palpable and brisk capillary refill to toes.                                          "

## 2025-02-20 NOTE — DISCHARGE SUMMARY
Discharge summary    Date of admission 2/13/2025  Date of discharge 2/20/2025    Final diagnosis  Acute on chronic diastolic congestive heart failure  Acute kidney injury resolved  Acute E. coli UTI completed antibiotics  Chronic atrial fibrillation on Eliquis  Hypertension  Hyperlipidemia  Hypothyroidism  Morbidly obese and abdominal ventral hernia  History of alcohol abuse  Asthma/COPD/obstructive sleep apnea  Chronic kidney disease stage IIIb    Discharge medications    Current Facility-Administered Medications:     acetaminophen (TYLENOL) tablet 650 mg, 650 mg, Oral, Q4H PRN, Nathan Hooper MD, 650 mg at 02/20/25 0523    albuterol (PROVENTIL) nebulizer solution 0.083% 2.5 mg/3mL, 2.5 mg, Nebulization, Q4H PRN, Nathan Hooper MD    allopurinol (ZYLOPRIM) tablet 100 mg, 100 mg, Oral, Daily, Nathan Hooper MD, 100 mg at 02/20/25 0833    ammonium lactate (AMLACTIN) 12 % cream 1 Application, 1 Application, Topical, Daily, Nathan Hooper MD, 1 Application at 02/20/25 1136    apixaban (ELIQUIS) tablet 5 mg, 5 mg, Oral, BID, Chito, Maris A, APRN, 5 mg at 02/20/25 0833    arformoterol (BROVANA) nebulizer solution 15 mcg, 15 mcg, Nebulization, BID - RT, 15 mcg at 02/20/25 0759 **AND** budesonide (PULMICORT) nebulizer solution 0.5 mg, 0.5 mg, Nebulization, BID - RT, 0.5 mg at 02/20/25 0758 **AND** revefenacin (YUPELRI) nebulizer solution 175 mcg, 175 mcg, Nebulization, Daily - RT, Nathan Hooper MD, 175 mcg at 02/20/25 0800    aspirin chewable tablet 81 mg, 81 mg, Oral, Daily, Nathan Hooper MD, 81 mg at 02/20/25 0833    atorvastatin (LIPITOR) tablet 10 mg, 10 mg, Oral, Nightly, Nathan Hooper MD, 10 mg at 02/19/25 2100    cefTRIAXone (ROCEPHIN) 1,000 mg in sodium chloride 0.9 % 100 mL MBP, 1,000 mg, Intravenous, Q24H, Nathan Hooper MD, Last Rate: 200 mL/hr at 02/19/25 1235, Currently Infusing at 02/19/25 1235    famotidine (PEPCID) tablet 20 mg, 20 mg, Oral, BID AC, Nathan Hooper MD, 20 mg at 02/19/25 1625    folic acid  (FOLVITE) tablet 1 mg, 1 mg, Oral, Daily, Nathan Hooper MD, 1 mg at 02/20/25 0833    HYDROcodone-acetaminophen (NORCO) 7.5-325 MG per tablet 1 tablet, 1 tablet, Oral, Q8H PRN, Nathan Hooper MD, 1 tablet at 02/20/25 0833    levothyroxine (SYNTHROID, LEVOTHROID) tablet 75 mcg, 75 mcg, Oral, Daily, Nathan Hooper MD, 75 mcg at 02/20/25 0833    metoprolol tartrate (LOPRESSOR) tablet 50 mg, 50 mg, Oral, Q12H, Kelvin Yousif MD, 50 mg at 02/20/25 0833    montelukast (SINGULAIR) tablet 10 mg, 10 mg, Oral, Daily, Nathan Hooper MD, 10 mg at 02/19/25 08    polyethylene glycol (MIRALAX) packet 17 g, 17 g, Oral, Daily, Nathan Hooper MD, 17 g at 02/20/25 0833    potassium chloride (K-DUR,KLOR-CON) ER tablet 10 mEq, 10 mEq, Oral, BID With Meals, Nathan Hooper MD, 10 mEq at 02/20/25 0833    sennosides-docusate (PERICOLACE) 8.6-50 MG per tablet 2 tablet, 2 tablet, Oral, BID PRN, Nathan Hooper MD    torsemide (DEMADEX) tablet 20 mg, 20 mg, Oral, Daily, Kelvin Yousif MD, 20 mg at 02/20/25 0833     Consults obtained  Cardiology  Nephrology  Spiritual care     Procedures  2D echo showed ejection fraction 65%    Hospital course  69-year-old white female with history of COPD asthma atrial fibrillation hypertension obstruct sleep apnea chronic kidney disease stage III who also drinks alcohol every day admitted to emergency room with shortness of breath.  Patient workup in ER revealed acute on chronic diastolic congestive heart failure with acute kidney injury and also found to have elevated troponin.  Patient admitted treated with IV diuretics and serial cardiac enzymes and 2D echo obtained and followed by cardiology.  Patient also followed by nephrology.  Patient kidney function returned to baseline.  Patient also remained on detox medication and found to have acute E. coli UTI and finished IV antibiotics.  Patient diuretics changed to by mouth and as she lives by herself she is eligible for skilled nursing facility for PT  OT and agreeable to go.    Discharge diet regular     Activity per PT OT    Medication as above    Further care per accepting physician at skilled nursing facility and follow-up with cardiology nephrology per the instructions and take medication as directed.    LEONOR CROCKETT MD

## 2025-06-27 ENCOUNTER — HOSPITAL ENCOUNTER (INPATIENT)
Facility: HOSPITAL | Age: 70
LOS: 10 days | Discharge: HOME | DRG: 286 | End: 2025-07-07
Attending: EMERGENCY MEDICINE | Admitting: INTERNAL MEDICINE
Payer: MEDICARE

## 2025-06-27 ENCOUNTER — APPOINTMENT (OUTPATIENT)
Dept: CT IMAGING | Facility: HOSPITAL | Age: 70
DRG: 286 | End: 2025-06-27
Payer: MEDICARE

## 2025-06-27 ENCOUNTER — APPOINTMENT (OUTPATIENT)
Dept: ULTRASOUND IMAGING | Facility: HOSPITAL | Age: 70
DRG: 286 | End: 2025-06-27
Payer: MEDICARE

## 2025-06-27 ENCOUNTER — APPOINTMENT (OUTPATIENT)
Dept: GENERAL RADIOLOGY | Facility: HOSPITAL | Age: 70
DRG: 286 | End: 2025-06-27
Payer: MEDICARE

## 2025-06-27 ENCOUNTER — APPOINTMENT (OUTPATIENT)
Dept: CARDIOLOGY | Facility: HOSPITAL | Age: 70
DRG: 286 | End: 2025-06-27
Payer: MEDICARE

## 2025-06-27 DIAGNOSIS — I95.9 HYPOTENSION, UNSPECIFIED HYPOTENSION TYPE: Primary | ICD-10-CM

## 2025-06-27 DIAGNOSIS — E66.9 OBESITY (BMI 30-39.9): ICD-10-CM

## 2025-06-27 DIAGNOSIS — R79.89 ELEVATED TROPONIN: ICD-10-CM

## 2025-06-27 DIAGNOSIS — I48.11 LONGSTANDING PERSISTENT ATRIAL FIBRILLATION: ICD-10-CM

## 2025-06-27 DIAGNOSIS — R55 NEAR SYNCOPE: ICD-10-CM

## 2025-06-27 DIAGNOSIS — N17.9 ACUTE KIDNEY INJURY: ICD-10-CM

## 2025-06-27 DIAGNOSIS — Z78.9 DECREASED ACTIVITIES OF DAILY LIVING (ADL): ICD-10-CM

## 2025-06-27 DIAGNOSIS — D68.9 COAGULOPATHY: ICD-10-CM

## 2025-06-27 DIAGNOSIS — G47.33 OSA (OBSTRUCTIVE SLEEP APNEA): ICD-10-CM

## 2025-06-27 DIAGNOSIS — N28.89 LEFT KIDNEY MASS: ICD-10-CM

## 2025-06-27 LAB
ALBUMIN SERPL-MCNC: 4 G/DL (ref 3.5–5.2)
ALBUMIN/GLOB SERPL: 1.4 G/DL
ALP SERPL-CCNC: 86 U/L (ref 39–117)
ALT SERPL W P-5'-P-CCNC: 12 U/L (ref 1–33)
AMPHET+METHAMPHET UR QL: NEGATIVE
AMPHETAMINES UR QL: NEGATIVE
ANION GAP SERPL CALCULATED.3IONS-SCNC: 15.4 MMOL/L (ref 5–15)
ANION GAP SERPL CALCULATED.3IONS-SCNC: 16.2 MMOL/L (ref 5–15)
APTT PPP: 29 SECONDS (ref 22.7–35.4)
AST SERPL-CCNC: 21 U/L (ref 1–32)
ATMOSPHERIC PRESS: 750.5 MMHG
BARBITURATES UR QL SCN: NEGATIVE
BASE EXCESS BLDV CALC-SCNC: -3.4 MMOL/L (ref -2–2)
BASOPHILS # BLD AUTO: 0.02 10*3/MM3 (ref 0–0.2)
BASOPHILS NFR BLD AUTO: 0.3 % (ref 0–1.5)
BENZODIAZ UR QL SCN: NEGATIVE
BILIRUB SERPL-MCNC: 0.9 MG/DL (ref 0–1.2)
BILIRUB UR QL STRIP: NEGATIVE
BUN SERPL-MCNC: 48 MG/DL (ref 8–23)
BUN SERPL-MCNC: 50 MG/DL (ref 8–23)
BUN/CREAT SERPL: 21.6 (ref 7–25)
BUN/CREAT SERPL: 25.8 (ref 7–25)
BUPRENORPHINE SERPL-MCNC: NEGATIVE NG/ML
CALCIUM SPEC-SCNC: 8.8 MG/DL (ref 8.6–10.5)
CALCIUM SPEC-SCNC: 9.5 MG/DL (ref 8.6–10.5)
CANNABINOIDS SERPL QL: NEGATIVE
CHLORIDE SERPL-SCNC: 91 MMOL/L (ref 98–107)
CHLORIDE SERPL-SCNC: 94 MMOL/L (ref 98–107)
CHLORIDE UR-SCNC: 44 MMOL/L
CHOLEST SERPL-MCNC: 139 MG/DL (ref 0–200)
CK SERPL-CCNC: 72 U/L (ref 20–180)
CLARITY UR: ABNORMAL
CO2 SERPL-SCNC: 18.6 MMOL/L (ref 22–29)
CO2 SERPL-SCNC: 18.8 MMOL/L (ref 22–29)
COCAINE UR QL: NEGATIVE
COLOR UR: YELLOW
CORTIS SERPL-MCNC: 12.7 MCG/DL
CREAT SERPL-MCNC: 1.94 MG/DL (ref 0.57–1)
CREAT SERPL-MCNC: 2.22 MG/DL (ref 0.57–1)
CREAT UR-MCNC: 72.9 MG/DL
D-LACTATE SERPL-SCNC: 1.7 MMOL/L (ref 0.5–2)
D-LACTATE SERPL-SCNC: 2.5 MMOL/L (ref 0.5–2)
DEPRECATED RDW RBC AUTO: 53.3 FL (ref 37–54)
DEVICE COMMENT: ABNORMAL
EGFRCR SERPLBLD CKD-EPI 2021: 23.6 ML/MIN/1.73
EGFRCR SERPLBLD CKD-EPI 2021: 27.8 ML/MIN/1.73
EOSINOPHIL # BLD AUTO: 0.02 10*3/MM3 (ref 0–0.4)
EOSINOPHIL NFR BLD AUTO: 0.3 % (ref 0.3–6.2)
EOSINOPHIL SPEC QL MICRO: 0 % EOS/100 CELLS (ref 0–0)
ERYTHROCYTE [DISTWIDTH] IN BLOOD BY AUTOMATED COUNT: 14.1 % (ref 12.3–15.4)
ETHANOL BLD-MCNC: <10 MG/DL (ref 0–10)
ETHANOL UR QL: <0.01 %
FENTANYL UR-MCNC: NEGATIVE NG/ML
GEN 5 1HR TROPONIN T REFLEX: 729 NG/L
GLOBULIN UR ELPH-MCNC: 2.8 GM/DL
GLUCOSE BLDC GLUCOMTR-MCNC: 111 MG/DL (ref 70–130)
GLUCOSE SERPL-MCNC: 108 MG/DL (ref 65–99)
GLUCOSE SERPL-MCNC: 150 MG/DL (ref 65–99)
GLUCOSE UR STRIP-MCNC: NEGATIVE MG/DL
HCO3 BLDV-SCNC: 20.4 MMOL/L (ref 22–28)
HCT VFR BLD AUTO: 38.1 % (ref 34–46.6)
HDLC SERPL-MCNC: 101 MG/DL (ref 40–60)
HGB BLD-MCNC: 12.1 G/DL (ref 12–15.9)
HGB UR QL STRIP.AUTO: NEGATIVE
IMM GRANULOCYTES # BLD AUTO: 0.04 10*3/MM3 (ref 0–0.05)
IMM GRANULOCYTES NFR BLD AUTO: 0.6 % (ref 0–0.5)
INR PPP: 1.8 (ref 0.9–1.1)
KETONES UR QL STRIP: ABNORMAL
LDLC SERPL CALC-MCNC: 19 MG/DL (ref 0–100)
LDLC/HDLC SERPL: 0.16 {RATIO}
LEUKOCYTE ESTERASE UR QL STRIP.AUTO: NEGATIVE
LYMPHOCYTES # BLD AUTO: 0.52 10*3/MM3 (ref 0.7–3.1)
LYMPHOCYTES NFR BLD AUTO: 8.2 % (ref 19.6–45.3)
MAGNESIUM SERPL-MCNC: 1.8 MG/DL (ref 1.6–2.4)
MCH RBC QN AUTO: 32.8 PG (ref 26.6–33)
MCHC RBC AUTO-ENTMCNC: 31.8 G/DL (ref 31.5–35.7)
MCV RBC AUTO: 103.3 FL (ref 79–97)
METHADONE UR QL SCN: NEGATIVE
MODALITY: ABNORMAL
MONOCYTES # BLD AUTO: 0.57 10*3/MM3 (ref 0.1–0.9)
MONOCYTES NFR BLD AUTO: 9 % (ref 5–12)
NEUTROPHILS NFR BLD AUTO: 5.17 10*3/MM3 (ref 1.7–7)
NEUTROPHILS NFR BLD AUTO: 81.6 % (ref 42.7–76)
NITRITE UR QL STRIP: NEGATIVE
NRBC BLD AUTO-RTO: 0 /100 WBC (ref 0–0.2)
NT-PROBNP SERPL-MCNC: 2909 PG/ML (ref 0–900)
OPIATES UR QL: NEGATIVE
OSMOLALITY SERPL: 287 MOSM/KG (ref 280–301)
OSMOLALITY UR: 298 MOSM/KG
OXYCODONE UR QL SCN: NEGATIVE
PCO2 BLDV: 32 MM HG (ref 41–51)
PCP UR QL SCN: NEGATIVE
PH BLDV: 7.41 PH UNITS (ref 7.31–7.41)
PH UR STRIP.AUTO: 5.5 [PH] (ref 5–8)
PLATELET # BLD AUTO: 183 10*3/MM3 (ref 140–450)
PMV BLD AUTO: 9.9 FL (ref 6–12)
PO2 BLDV: 27.1 MM HG (ref 35–45)
POTASSIUM SERPL-SCNC: 4.3 MMOL/L (ref 3.5–5.2)
POTASSIUM SERPL-SCNC: 4.8 MMOL/L (ref 3.5–5.2)
PROCALCITONIN SERPL-MCNC: 0.32 NG/ML (ref 0–0.25)
PROT ?TM UR-MCNC: 12.3 MG/DL
PROT SERPL-MCNC: 6.8 G/DL (ref 6–8.5)
PROT UR QL STRIP: NEGATIVE
PROTHROMBIN TIME: 21 SECONDS (ref 11.7–14.2)
QT INTERVAL: 366 MS
QTC INTERVAL: 446 MS
RBC # BLD AUTO: 3.69 10*6/MM3 (ref 3.77–5.28)
SAO2 % BLDCOV: 52.3 % (ref 45–75)
SET MECH RESP RATE: 20
SODIUM SERPL-SCNC: 125 MMOL/L (ref 136–145)
SODIUM SERPL-SCNC: 129 MMOL/L (ref 136–145)
SODIUM UR-SCNC: 29 MMOL/L
SP GR UR STRIP: 1.01 (ref 1–1.03)
TRICYCLICS UR QL SCN: NEGATIVE
TRIGL SERPL-MCNC: 111 MG/DL (ref 0–150)
TROPONIN T % DELTA: 172
TROPONIN T NUMERIC DELTA: 461 NG/L
TROPONIN T SERPL HS-MCNC: 268 NG/L
TSH SERPL DL<=0.05 MIU/L-ACNC: 0.4 UIU/ML (ref 0.27–4.2)
URATE SERPL-MCNC: 5.9 MG/DL (ref 2.4–5.7)
UROBILINOGEN UR QL STRIP: ABNORMAL
VLDLC SERPL-MCNC: 19 MG/DL (ref 5–40)
WBC NRBC COR # BLD AUTO: 6.34 10*3/MM3 (ref 3.4–10.8)

## 2025-06-27 PROCEDURE — 71045 X-RAY EXAM CHEST 1 VIEW: CPT

## 2025-06-27 PROCEDURE — 93005 ELECTROCARDIOGRAM TRACING: CPT | Performed by: EMERGENCY MEDICINE

## 2025-06-27 PROCEDURE — 83605 ASSAY OF LACTIC ACID: CPT | Performed by: EMERGENCY MEDICINE

## 2025-06-27 PROCEDURE — 84300 ASSAY OF URINE SODIUM: CPT | Performed by: INTERNAL MEDICINE

## 2025-06-27 PROCEDURE — 84145 PROCALCITONIN (PCT): CPT | Performed by: EMERGENCY MEDICINE

## 2025-06-27 PROCEDURE — 85730 THROMBOPLASTIN TIME PARTIAL: CPT | Performed by: EMERGENCY MEDICINE

## 2025-06-27 PROCEDURE — 83735 ASSAY OF MAGNESIUM: CPT | Performed by: EMERGENCY MEDICINE

## 2025-06-27 PROCEDURE — 82436 ASSAY OF URINE CHLORIDE: CPT | Performed by: INTERNAL MEDICINE

## 2025-06-27 PROCEDURE — 94799 UNLISTED PULMONARY SVC/PX: CPT

## 2025-06-27 PROCEDURE — 82570 ASSAY OF URINE CREATININE: CPT | Performed by: INTERNAL MEDICINE

## 2025-06-27 PROCEDURE — 82948 REAGENT STRIP/BLOOD GLUCOSE: CPT

## 2025-06-27 PROCEDURE — 93010 ELECTROCARDIOGRAM REPORT: CPT | Performed by: INTERNAL MEDICINE

## 2025-06-27 PROCEDURE — 83880 ASSAY OF NATRIURETIC PEPTIDE: CPT | Performed by: EMERGENCY MEDICINE

## 2025-06-27 PROCEDURE — 84484 ASSAY OF TROPONIN QUANT: CPT | Performed by: EMERGENCY MEDICINE

## 2025-06-27 PROCEDURE — 85610 PROTHROMBIN TIME: CPT | Performed by: EMERGENCY MEDICINE

## 2025-06-27 PROCEDURE — 25810000003 SODIUM CHLORIDE 0.9 % SOLUTION: Performed by: EMERGENCY MEDICINE

## 2025-06-27 PROCEDURE — 36415 COLL VENOUS BLD VENIPUNCTURE: CPT | Performed by: EMERGENCY MEDICINE

## 2025-06-27 PROCEDURE — 84550 ASSAY OF BLOOD/URIC ACID: CPT | Performed by: INTERNAL MEDICINE

## 2025-06-27 PROCEDURE — 94640 AIRWAY INHALATION TREATMENT: CPT

## 2025-06-27 PROCEDURE — 82077 ASSAY SPEC XCP UR&BREATH IA: CPT | Performed by: EMERGENCY MEDICINE

## 2025-06-27 PROCEDURE — 99291 CRITICAL CARE FIRST HOUR: CPT

## 2025-06-27 PROCEDURE — 81003 URINALYSIS AUTO W/O SCOPE: CPT | Performed by: EMERGENCY MEDICINE

## 2025-06-27 PROCEDURE — 80307 DRUG TEST PRSMV CHEM ANLYZR: CPT | Performed by: EMERGENCY MEDICINE

## 2025-06-27 PROCEDURE — 80061 LIPID PANEL: CPT | Performed by: INTERNAL MEDICINE

## 2025-06-27 PROCEDURE — 84443 ASSAY THYROID STIM HORMONE: CPT | Performed by: INTERNAL MEDICINE

## 2025-06-27 PROCEDURE — 83935 ASSAY OF URINE OSMOLALITY: CPT | Performed by: INTERNAL MEDICINE

## 2025-06-27 PROCEDURE — 94664 DEMO&/EVAL PT USE INHALER: CPT

## 2025-06-27 PROCEDURE — 83930 ASSAY OF BLOOD OSMOLALITY: CPT | Performed by: INTERNAL MEDICINE

## 2025-06-27 PROCEDURE — 76775 US EXAM ABDO BACK WALL LIM: CPT

## 2025-06-27 PROCEDURE — 87040 BLOOD CULTURE FOR BACTERIA: CPT | Performed by: EMERGENCY MEDICINE

## 2025-06-27 PROCEDURE — 82803 BLOOD GASES ANY COMBINATION: CPT | Performed by: EMERGENCY MEDICINE

## 2025-06-27 PROCEDURE — 80053 COMPREHEN METABOLIC PANEL: CPT | Performed by: EMERGENCY MEDICINE

## 2025-06-27 PROCEDURE — 84156 ASSAY OF PROTEIN URINE: CPT | Performed by: INTERNAL MEDICINE

## 2025-06-27 PROCEDURE — 85025 COMPLETE CBC W/AUTO DIFF WBC: CPT | Performed by: EMERGENCY MEDICINE

## 2025-06-27 PROCEDURE — 82550 ASSAY OF CK (CPK): CPT | Performed by: INTERNAL MEDICINE

## 2025-06-27 PROCEDURE — 99222 1ST HOSP IP/OBS MODERATE 55: CPT | Performed by: INTERNAL MEDICINE

## 2025-06-27 PROCEDURE — 25810000003 SODIUM CHLORIDE 0.9 % SOLUTION: Performed by: INTERNAL MEDICINE

## 2025-06-27 PROCEDURE — 82533 TOTAL CORTISOL: CPT | Performed by: INTERNAL MEDICINE

## 2025-06-27 PROCEDURE — 70450 CT HEAD/BRAIN W/O DYE: CPT

## 2025-06-27 PROCEDURE — 87205 SMEAR GRAM STAIN: CPT | Performed by: INTERNAL MEDICINE

## 2025-06-27 RX ORDER — ACETAMINOPHEN 650 MG/1
650 SUPPOSITORY RECTAL EVERY 4 HOURS PRN
Status: DISCONTINUED | OUTPATIENT
Start: 2025-06-27 | End: 2025-07-07 | Stop reason: HOSPADM

## 2025-06-27 RX ORDER — SODIUM CHLORIDE 9 MG/ML
125 INJECTION, SOLUTION INTRAVENOUS CONTINUOUS
Status: ACTIVE | OUTPATIENT
Start: 2025-06-27 | End: 2025-06-28

## 2025-06-27 RX ORDER — SODIUM CHLORIDE 0.9 % (FLUSH) 0.9 %
10 SYRINGE (ML) INJECTION AS NEEDED
Status: DISCONTINUED | OUTPATIENT
Start: 2025-06-27 | End: 2025-07-07 | Stop reason: HOSPADM

## 2025-06-27 RX ORDER — ONDANSETRON 4 MG/1
4 TABLET, ORALLY DISINTEGRATING ORAL EVERY 6 HOURS PRN
Status: DISCONTINUED | OUTPATIENT
Start: 2025-06-27 | End: 2025-07-07 | Stop reason: HOSPADM

## 2025-06-27 RX ORDER — BUDESONIDE AND FORMOTEROL FUMARATE DIHYDRATE 160; 4.5 UG/1; UG/1
2 AEROSOL RESPIRATORY (INHALATION)
Status: DISCONTINUED | OUTPATIENT
Start: 2025-06-27 | End: 2025-06-30

## 2025-06-27 RX ORDER — HEPARIN SODIUM 5000 [USP'U]/ML
5000 INJECTION, SOLUTION INTRAVENOUS; SUBCUTANEOUS EVERY 8 HOURS SCHEDULED
Status: DISCONTINUED | OUTPATIENT
Start: 2025-06-27 | End: 2025-06-27

## 2025-06-27 RX ORDER — ACETAMINOPHEN 325 MG/1
650 TABLET ORAL EVERY 4 HOURS PRN
Status: DISCONTINUED | OUTPATIENT
Start: 2025-06-27 | End: 2025-07-07 | Stop reason: HOSPADM

## 2025-06-27 RX ORDER — SODIUM CHLORIDE 9 MG/ML
40 INJECTION, SOLUTION INTRAVENOUS AS NEEDED
Status: DISCONTINUED | OUTPATIENT
Start: 2025-06-27 | End: 2025-07-07 | Stop reason: HOSPADM

## 2025-06-27 RX ORDER — IPRATROPIUM BROMIDE AND ALBUTEROL SULFATE 2.5; .5 MG/3ML; MG/3ML
3 SOLUTION RESPIRATORY (INHALATION) ONCE
Status: COMPLETED | OUTPATIENT
Start: 2025-06-27 | End: 2025-06-27

## 2025-06-27 RX ORDER — ONDANSETRON 2 MG/ML
4 INJECTION INTRAMUSCULAR; INTRAVENOUS EVERY 6 HOURS PRN
Status: DISCONTINUED | OUTPATIENT
Start: 2025-06-27 | End: 2025-07-07 | Stop reason: HOSPADM

## 2025-06-27 RX ORDER — SPIRONOLACTONE 25 MG/1
25 TABLET ORAL 2 TIMES DAILY
Status: ON HOLD | COMMUNITY
End: 2025-07-07

## 2025-06-27 RX ORDER — BUMETANIDE 2 MG/1
2 TABLET ORAL 2 TIMES DAILY
COMMUNITY

## 2025-06-27 RX ORDER — SODIUM CHLORIDE 0.9 % (FLUSH) 0.9 %
10 SYRINGE (ML) INJECTION EVERY 12 HOURS SCHEDULED
Status: DISCONTINUED | OUTPATIENT
Start: 2025-06-27 | End: 2025-06-30

## 2025-06-27 RX ORDER — LEVOTHYROXINE SODIUM 75 UG/1
75 TABLET ORAL
Status: DISCONTINUED | OUTPATIENT
Start: 2025-06-28 | End: 2025-06-30

## 2025-06-27 RX ORDER — IPRATROPIUM BROMIDE AND ALBUTEROL SULFATE 2.5; .5 MG/3ML; MG/3ML
3 SOLUTION RESPIRATORY (INHALATION) EVERY 6 HOURS PRN
Status: DISCONTINUED | OUTPATIENT
Start: 2025-06-27 | End: 2025-07-03

## 2025-06-27 RX ORDER — NITROGLYCERIN 0.4 MG/1
0.4 TABLET SUBLINGUAL
Status: DISCONTINUED | OUTPATIENT
Start: 2025-06-27 | End: 2025-07-07 | Stop reason: HOSPADM

## 2025-06-27 RX ORDER — FAMOTIDINE 20 MG/1
20 TABLET, FILM COATED ORAL
Status: DISCONTINUED | OUTPATIENT
Start: 2025-06-27 | End: 2025-06-28

## 2025-06-27 RX ADMIN — IPRATROPIUM BROMIDE AND ALBUTEROL SULFATE 3 ML: .5; 3 SOLUTION RESPIRATORY (INHALATION) at 13:41

## 2025-06-27 RX ADMIN — SODIUM CHLORIDE 1000 ML: 9 INJECTION, SOLUTION INTRAVENOUS at 14:31

## 2025-06-27 RX ADMIN — MUPIROCIN 1 APPLICATION: 20 OINTMENT TOPICAL at 21:34

## 2025-06-27 RX ADMIN — MUPIROCIN 1 APPLICATION: 20 OINTMENT TOPICAL at 15:53

## 2025-06-27 RX ADMIN — APIXABAN 5 MG: 5 TABLET, FILM COATED ORAL at 21:34

## 2025-06-27 RX ADMIN — SODIUM CHLORIDE 500 ML: 9 INJECTION, SOLUTION INTRAVENOUS at 12:49

## 2025-06-27 RX ADMIN — BUDESONIDE AND FORMOTEROL FUMARATE DIHYDRATE 2 PUFF: 160; 4.5 AEROSOL RESPIRATORY (INHALATION) at 20:17

## 2025-06-27 RX ADMIN — Medication 10 ML: at 21:34

## 2025-06-27 RX ADMIN — SODIUM CHLORIDE 125 ML/HR: 9 INJECTION, SOLUTION INTRAVENOUS at 15:55

## 2025-06-27 NOTE — ED NOTES
Nursing report ED to floor  Marilu Avery  69 y.o.  female    HPI :  HPI  Stated Reason for Visit: weakness. had a fall this morning, no headstrike but is on eliquis. was unable to get up without assistance, was unable to ambulate even with assistance from fire department  History Obtained From: patient, EMS    Chief Complaint  Chief Complaint   Patient presents with    Weakness - Generalized       Admitting doctor:   Vahe Thomas MD    Admitting diagnosis:   The primary encounter diagnosis was Hypotension, unspecified hypotension type. Diagnoses of Near syncope, Acute kidney injury, Elevated troponin, Longstanding persistent atrial fibrillation, and Coagulopathy: Eliquis induced were also pertinent to this visit.    Code status:   Current Code Status       Date Active Code Status Order ID Comments User Context       6/27/2025 1517 CPR (Attempt to Resuscitate) [id number]  Vahe Thomas MD ED        Question Answer    Code Status (Patient has no pulse and is not breathing) CPR (Attempt to Resuscitate)    Medical Interventions (Patient has pulse or is breathing) Full Support                    Allergies:   Patient has no known allergies.    Isolation:   No active isolations    Intake and Output    Intake/Output Summary (Last 24 hours) at 6/27/2025 1643  Last data filed at 6/27/2025 1556  Gross per 24 hour   Intake 1500 ml   Output --   Net 1500 ml       Weight:       06/27/25  1236   Weight: 104 kg (229 lb)       Most recent vitals:   Vitals:    06/27/25 1403 06/27/25 1429 06/27/25 1515 06/27/25 1545   BP: (!) 84/60 (!) 88/59  (!) 84/75   BP Location:       Patient Position:       Pulse: 88 81 77 80   Resp: 20 18  22   Temp: 97.8 °F (36.6 °C) 97.9 °F (36.6 °C)  98.4 °F (36.9 °C)   TempSrc: Oral      SpO2: 96% 97%  96%   Weight:       Height:           Active LDAs/IV Access:   Lines, Drains & Airways       Active LDAs       Name Placement date Placement time Site Days    Peripheral IV 06/27/25 1243 20 G Right  Antecubital 06/27/25  1243  Antecubital  less than 1                    Labs (abnormal labs have a star):   Labs Reviewed   COMPREHENSIVE METABOLIC PANEL - Abnormal; Notable for the following components:       Result Value    Glucose 150 (*)     BUN 48.0 (*)     Creatinine 2.22 (*)     Sodium 125 (*)     Chloride 91 (*)     CO2 18.6 (*)     Anion Gap 15.4 (*)     eGFR 23.6 (*)     All other components within normal limits    Narrative:     GFR Categories in Chronic Kidney Disease (CKD)              GFR Category          GFR (mL/min/1.73)    Interpretation  G1                    90 or greater        Normal or high (1)  G2                    60-89                Mild decrease (1)  G3a                   45-59                Mild to moderate decrease  G3b                   30-44                Moderate to severe decrease  G4                    15-29                Severe decrease  G5                    14 or less           Kidney failure    (1)In the absence of evidence of kidney disease, neither GFR category G1 or G2 fulfill the criteria for CKD.    eGFR calculation 2021 CKD-EPI creatinine equation, which does not include race as a factor   PROTIME-INR - Abnormal; Notable for the following components:    Protime 21.0 (*)     INR 1.80 (*)     All other components within normal limits   URINALYSIS W/ MICROSCOPIC IF INDICATED (NO CULTURE) - Abnormal; Notable for the following components:    Appearance, UA Cloudy (*)     Ketones, UA Trace (*)     All other components within normal limits    Narrative:     Urine microscopic not indicated.   PROCALCITONIN - Abnormal; Notable for the following components:    Procalcitonin 0.32 (*)     All other components within normal limits    Narrative:     As a Marker for Sepsis (Non-Neonates):    1. <0.5 ng/mL represents a low risk of severe sepsis and/or septic shock.  2. >2 ng/mL represents a high risk of severe sepsis and/or septic shock.    As a Marker for Lower Respiratory Tract  "Infections that require antibiotic therapy:    PCT on Admission    Antibiotic Therapy       6-12 Hrs later    >0.5                Strongly Recommended  >0.25 - <0.5        Recommended   0.1 - 0.25          Discouraged              Remeasure/reassess PCT  <0.1                Strongly Discouraged     Remeasure/reassess PCT    As 28 day mortality risk marker: \"Change in Procalcitonin Result\" (>80% or <=80%) if Day 0 (or Day 1) and Day 4 values are available. Refer to http://[email or web address]-pct-calculator.[email or web address]    Change in PCT <=80%  A decrease of PCT levels below or equal to 80% defines a positive change in PCT test result representing a higher risk for 28-day all-cause mortality of patients diagnosed with severe sepsis for septic shock.    Change in PCT >80%  A decrease of PCT levels of more than 80% defines a negative change in PCT result representing a lower risk for 28-day all-cause mortality of patients diagnosed with severe sepsis or septic shock.      LACTIC ACID, PLASMA - Abnormal; Notable for the following components:    Lactate 2.5 (*)     All other components within normal limits   TROPONIN - Abnormal; Notable for the following components:    HS Troponin T 268 (*)     All other components within normal limits    Narrative:     High Sensitive Troponin T Reference Range:  <14.0 ng/L- Negative Female for AMI  <22.0 ng/L- Negative Male for AMI  >=14 - Abnormal Female indicating possible myocardial injury.  >=22 - Abnormal Male indicating possible myocardial injury.   Clinicians would have to utilize clinical acumen, EKG, Troponin, and serial changes to determine if it is an Acute Myocardial Infarction or myocardial injury due to an underlying chronic condition.        CBC WITH AUTO DIFFERENTIAL - Abnormal; Notable for the following components:    RBC 3.69 (*)     .3 (*)     Neutrophil % 81.6 (*)     Lymphocyte % 8.2 (*)     Immature Grans % 0.6 (*)     Lymphocytes, Absolute 0.52 (*)     All other components " within normal limits   BNP (IN-HOUSE) - Abnormal; Notable for the following components:    proBNP 2,909.0 (*)     All other components within normal limits    Narrative:     This assay is used as an aid in the diagnosis of individuals suspected of having heart failure. It can be used as an aid in the diagnosis of acute decompensated heart failure (ADHF) in patients presenting with signs and symptoms of ADHF to the emergency department (ED). In addition, NT-proBNP of <300 pg/mL indicates ADHF is not likely.    Age Range Result Interpretation  NT-proBNP Concentration (pg/mL:      <50             Positive            >450                   Gray                 300-450                    Negative             <300    50-75           Positive            >900                  Gray                300-900                  Negative            <300      >75             Positive            >1800                  Gray                300-1800                  Negative            <300   BLOOD GAS, VENOUS - Abnormal; Notable for the following components:    pH, Venous 7.412 (*)     pCO2, Venous 32.0 (*)     pO2, Venous 27.1 (*)     HCO3, Venous 20.4 (*)     Base Excess, Venous -3.4 (*)     All other components within normal limits   HIGH SENSITIVITIY TROPONIN T 1HR - Abnormal; Notable for the following components:    HS Troponin T 729 (*)     Troponin T Numeric Delta 461 (*)     Troponin T % Delta 172 (*)     All other components within normal limits    Narrative:     High Sensitive Troponin T Reference Range:  <14.0 ng/L- Negative Female for AMI  <22.0 ng/L- Negative Male for AMI  >=14 - Abnormal Female indicating possible myocardial injury.  >=22 - Abnormal Male indicating possible myocardial injury.   Clinicians would have to utilize clinical acumen, EKG, Troponin, and serial changes to determine if it is an Acute Myocardial Infarction or myocardial injury due to an underlying chronic condition.        BASIC METABOLIC PANEL -  Abnormal; Notable for the following components:    Glucose 108 (*)     BUN 50.0 (*)     Creatinine 1.94 (*)     Sodium 129 (*)     Chloride 94 (*)     CO2 18.8 (*)     BUN/Creatinine Ratio 25.8 (*)     Anion Gap 16.2 (*)     eGFR 27.8 (*)     All other components within normal limits    Narrative:     GFR Categories in Chronic Kidney Disease (CKD)              GFR Category          GFR (mL/min/1.73)    Interpretation  G1                    90 or greater        Normal or high (1)  G2                    60-89                Mild decrease (1)  G3a                   45-59                Mild to moderate decrease  G3b                   30-44                Moderate to severe decrease  G4                    15-29                Severe decrease  G5                    14 or less           Kidney failure    (1)In the absence of evidence of kidney disease, neither GFR category G1 or G2 fulfill the criteria for CKD.    eGFR calculation 2021 CKD-EPI creatinine equation, which does not include race as a factor   URIC ACID - Abnormal; Notable for the following components:    Uric Acid 5.9 (*)     All other components within normal limits   APTT - Normal   MAGNESIUM - Normal   URINE DRUG SCREEN - Normal    Narrative:     Cutoff For Drugs Screened:    Amphetamines               500 ng/ml  Barbiturates               200 ng/ml  Benzodiazepines            150 ng/ml  Cocaine                    150 ng/ml  Methadone                  200 ng/ml  Opiates                    100 ng/ml  Phencyclidine               25 ng/ml  THC                         50 ng/ml  Methamphetamine            500 ng/ml  Tricyclic Antidepressants  300 ng/ml  Oxycodone                  100 ng/ml  Buprenorphine               10 ng/ml    The normal value for all drugs tested is negative. This report includes unconfirmed screening results, with the cutoff values listed, to be used for medical treatment purposes only.  Unconfirmed results must not be used for non-medical  purposes such as employment or legal testing.  Clinical consideration should be applied to any drug of abuse test, particularly when unconfirmed results are used.     LACTIC ACID, REFLEX - Normal   OSMOLALITY, SERUM - Normal   TSH - Normal   CK - Normal   FENTANYL, URINE - Normal    Narrative:     Negative Threshold:      Fentanyl 5 ng/mL     The normal value for the drug tested is negative. This report includes final unconfirmed screening results to be used for medical treatment purposes only. Unconfirmed results must not be used for non-medical purposes such as employment or legal testing. Clinical consideration should be applied to any drug of abuse test, particularly when unconfirmed results are used.          BLOOD CULTURE   BLOOD CULTURE   EOSINOPHIL SMEAR   ETHANOL    Narrative:     Not for legal purposes.   CORTISOL    Narrative:     Cortisol Reference Ranges:    Cortisol 6AM - 10AM Range: 6.02-18.40 mcg/dl  Cortisol 4PM - 8PM Range: 2.68-10.50 mcg/dl      Results may be falsely increased if patient taking Biotin.     SODIUM, URINE, RANDOM    Narrative:     Reference intervals for random urine have not been established.  Clinical usage is dependent upon physician's interpretation in combination with other laboratory tests.      CREATININE URINE RANDOM (KIDNEY FUNCTION) GFR COMPONENT    Narrative:     Reference intervals for random urine have not been established.  Clinical usage is dependent upon physician's interpretation in combination with other laboratory tests.      OSMOLALITY, URINE    Narrative:     Osmo Normal Reference Ranges:    Random:  mOsm/kg H2O, depending on fluid intake.  Random: >850 mOsm/kg H20, after 12 hour fluid restriction.    24 Hour: 300-900 mOsm/kg H2O.   PROTEIN, URINE, RANDOM    Narrative:     Reference intervals for random urine have not been established.  Clinical usage is dependent upon physician's interpretation in combination with other laboratory tests.      CHLORIDE,  URINE, RANDOM    Narrative:     Reference intervals for random urine have not been established.  Clinical usage is dependent upon physician's interpretation in combination with other laboratory tests.      BASIC METABOLIC PANEL   POCT GLUCOSE FINGERSTICK   POCT GLUCOSE FINGERSTICK   POCT GLUCOSE FINGERSTICK   POCT GLUCOSE FINGERSTICK   POCT GLUCOSE FINGERSTICK   CBC AND DIFFERENTIAL    Narrative:     The following orders were created for panel order CBC & Differential.  Procedure                               Abnormality         Status                     ---------                               -----------         ------                     CBC Auto Differential[[id number]]        Abnormal            Final result                 Please view results for these tests on the individual orders.       EKG:   ECG 12 Lead Other; Weakness   Final Result   HEART RATE=89  bpm   RR Nbcfjvgw=159  ms   ND Interval=  ms   P Horizontal Axis=  deg   P Front Axis=  deg   QRSD Interval=99  ms   QT Deyyebko=828  ms   DGnD=435  ms   QRS Axis=200  deg   T Wave Axis=20  deg   - ABNORMAL ECG -   Atrial fibrillation   Low voltage, extremity and precordial leads   No change from prior tracing   Electronically Signed By: Jeanine Gamino ([facility]) 2025-06-27 16:25:33   Date and Time of Study:2025-06-27 14:21:29          Meds given in ED:   Medications   sodium chloride 0.9 % infusion (125 mL/hr Intravenous New Bag 6/27/25 7075)   nitroglycerin (NITROSTAT) SL tablet 0.4 mg (has no administration in time range)   sodium chloride 0.9 % flush 10 mL (has no administration in time range)   sodium chloride 0.9 % flush 10 mL (has no administration in time range)   sodium chloride 0.9 % infusion 40 mL (has no administration in time range)   mupirocin (BACTROBAN) 2 % nasal ointment 1 Application (1 Application Each Nare Given 6/27/25 0474)   acetaminophen (TYLENOL) tablet 650 mg (has no administration in time range)     Or   acetaminophen (TYLENOL) suppository  650 mg (has no administration in time range)   ondansetron ODT (ZOFRAN-ODT) disintegrating tablet 4 mg (has no administration in time range)     Or   ondansetron (ZOFRAN) injection 4 mg (has no administration in time range)   ipratropium-albuterol (DUO-NEB) nebulizer solution 3 mL (has no administration in time range)   budesonide-formoterol (SYMBICORT) 160-4.5 MCG/ACT inhaler 2 puff (has no administration in time range)   apixaban (ELIQUIS) tablet 5 mg (has no administration in time range)   levothyroxine (SYNTHROID, LEVOTHROID) tablet 75 mcg (has no administration in time range)   famotidine (PEPCID) tablet 20 mg (has no administration in time range)   sodium chloride 0.9 % bolus 500 mL (0 mL Intravenous Stopped 6/27/25 1351)   ipratropium-albuterol (DUO-NEB) nebulizer solution 3 mL (3 mL Nebulization Given 6/27/25 1341)   sodium chloride 0.9 % bolus 1,000 mL (0 mL Intravenous Stopped 6/27/25 1556)       Imaging results:  XR Chest 1 View  Result Date: 6/27/2025  As described.  This report was finalized on 6/27/2025 2:17 PM by Dr. Herrera Mistry M.D on Workstation: [id number]        Ambulatory status:   - x1-2 assist as tolerated     Social issues:   Social History     Socioeconomic History    Marital status:    Tobacco Use    Smoking status: Every Day     Current packs/day: 1.00     Types: Cigarettes    Smokeless tobacco: Never    Tobacco comments:     admits to smoking since age 56    Vaping Use    Vaping status: Never Used   Substance and Sexual Activity    Alcohol use: Yes     Comment: 3 drinks daily Daily caffine    Drug use: Never    Sexual activity: Defer       Peripheral Neurovascular  Peripheral Neurovascular (Adult)  Peripheral Neurovascular WDL: WDL    Neuro Cognitive  Neuro Cognitive (Adult)  Cognitive/Neuro/Behavioral WDL: WDL  Pupils  Pupil PERRLA: yes  Winslow Coma Scale  Best Eye Response: 4-->(E4) spontaneous  Best Motor Response: 6-->(M6) obeys commands  Best Verbal Response: 5-->(V5)  oriented  [place] Coma Scale Score: 15    Learning  Learning Assessment  Learning Readiness and Ability: no barriers identified    Respiratory  Respiratory  Airway WDL: WDL  Respiratory WDL  Respiratory WDL: rhythm/pattern, .WDL except  Rhythm/Pattern, Respiratory: pattern regular, deep  Breath Sounds  All Lung Fields Breath Sounds: All Fields  All Lung Fields Breath Sounds: Anterior:, diminished  Breath Sounds Post-Respiratory Treatment  Breath Sounds Posttreatment All Fields: All Fields  Breath Sounds Posttreatment All Fields: no change    Abdominal Pain       Pain Assessments  Pain (Adult)  (0-10) Pain Rating: Rest: 6  Pain Location: calf (bilateral)    NIH Stroke Scale       Maria Isabel Jacques RN  06/27/25 16:43 EDT

## 2025-06-27 NOTE — ED PROVIDER NOTES
EMERGENCY DEPARTMENT ENCOUNTER    Room Number:  32/32  Date of encounter:  6/27/2025  PCP: Esperanza Melton APRN  Historian: EMS and patient  Relevant information and history provided by sources other than the patient will be included below and in the ED Course.  Review of pertinent past medical records may also be included in record below and ED Course.    HPI:  Chief Complaint: Weakness, unable to get up  A complete HPI/ROS/PMH/PSH/SH/FH are unobtainable due to: Not applicable  Context: Marilu Avery is a 69 y.o. female who presents to the ED c/o this is a patient that presents with generalized weakness she was ambulating today with her walker.  She felt as if her legs gave out and slid to the floor.  She went to her buttocks.  She states she mildly hit the back of her head denies any headache.  Denies chest pain.  She does report longstanding shortness of breath worse with exertion.  This is not changed.  She reports no new swelling or weight gain she actually reports that she has been losing weight over the past several months.  She has been compliant with all her medicines and that includes Eliquis.  Her last dose of medicine and Eliquis was this morning.  She does have hemorrhoids and does occasionally have some bright red blood per rectum denies any black stool.  Her last bowel movement was this morning.  She has a chronic significant ventral hernia.  She has no new abdominal pain or any vomiting or diarrhea.  She does live independently.  According to EMS that she has not left her house for the past 3 to 4 months.  People have been bringing food in her and everything.  This weakness has been progressively getting worse.        Previous Episodes: Yes  Current Symptoms: See above    MEDICAL HISTORY REVIEWED  This is a patient that was discharged from [facility] under the care of Dr. Hooper on February 20, 2025 she has a history of chronic congestive heart failure history of UTI and did  have a E. coli UTI history of chronic atrial fibrillation on Eliquis history of hypertension hyperlipidemia hypothyroidism and morbid obesity with chronic ventral hernia.  Also mention history of alcohol abuse asthma and COPD.  Chronic kidney disease.  On this admission she presented with shortness of breath.  Multifactorial is contributing to that her COPD her atrial fibrillation congestive heart failure and obstructive sleep apnea she was treated with IV diuretics nephrology was consulted cardiology as well  Patient had an echo done February 13, 2025 I reviewed that report systolic ejection fraction was 65.1 diastolic function was indeterminant mild to moderate tricuspid regurg.      PAST MEDICAL HISTORY  Active Ambulatory Problems     Diagnosis Date Noted    Incisional hernia, without obstruction or gangrene 10/12/2017    Incarcerated ventral hernia 06/13/2021    Atrial fibrillation, persistent 06/13/2021    HLD (hyperlipidemia) 06/13/2021    HTN (hypertension) 06/13/2021    LISA (obstructive sleep apnea) 06/13/2021    Stage 3b chronic kidney disease 06/13/2021    Chronic anticoagulation 06/13/2021    SBO (small bowel obstruction) 06/13/2021    Tobacco abuse 06/14/2021    Morbid obesity with BMI of 40.0-44.9, adult 06/14/2021    COPD (chronic obstructive pulmonary disease) 06/14/2021    Hypopotassemia 06/16/2021    Gout 06/19/2021    Chronic anticoagulation 08/20/2021    Prolonged Q-T interval on ECG 02/27/2023    REBECCA (acute kidney injury) 02/13/2025    Acute on chronic diastolic (congestive) heart failure 02/14/2025     Resolved Ambulatory Problems     Diagnosis Date Noted    No Resolved Ambulatory Problems     Past Medical History:   Diagnosis Date    Arthritis     Asthma     Atrial fibrillation     Colon polyps 2015    Hyperlipidemia     Hypertension     LISA on CPAP     Sleep apnea          PAST SURGICAL HISTORY  Past Surgical History:   Procedure Laterality Date    BACK SURGERY N/A 2001    COLONOSCOPY N/A  10/22/2015    Rectal polyp-Dr. Elías Keating    HYSTERECTOMY N/A 2000    LAPAROSCOPIC CHOLECYSTECTOMY N/A 01/04/2010    Dr. Heath Whitlock    OOPHORECTOMY  2001    REPLACEMENT TOTAL KNEE Left 06/22/2015    Dr. Yo Aguayo    REPLACEMENT TOTAL KNEE Right 02/26/2015    Dr. Yo Aguayo    VENTRAL HERNIA REPAIR N/A 10/22/2015    Open reduction of incarcerated ventral hernia with layered closure-Dr. Elías Keating    VENTRAL/INCISIONAL HERNIA REPAIR N/A 6/14/2021    Procedure: OPEN VENTRAL/INCISIONAL HERNIA REPAIR WITH MESH AND APPENDECTOMY;  Surgeon: Arnulfo Leonard MD;  Location: [facility] MAIN OR;  Service: General;  Laterality: N/A;         FAMILY HISTORY  Family History   Problem Relation Age of Onset    No Known Problems Mother     No Known Problems Father          SOCIAL HISTORY  Social History     Socioeconomic History    Marital status:    Tobacco Use    Smoking status: Every Day     Current packs/day: 1.00     Types: Cigarettes    Smokeless tobacco: Never    Tobacco comments:     admits to smoking since age 56    Vaping Use    Vaping status: Never Used   Substance and Sexual Activity    Alcohol use: Yes     Comment: 3 drinks daily Daily caffine    Drug use: Never    Sexual activity: Defer         ALLERGIES  Patient has no known allergies.        REVIEW OF SYSTEMS  Review of Systems     All systems reviewed and negative except for those discussed in HPI.       PHYSICAL EXAM    I have reviewed the triage vital signs and nursing notes.    ED Triage Vitals   Temp Heart Rate Resp BP SpO2   06/27/25 1210 06/27/25 1210 06/27/25 1210 06/27/25 1210 06/27/25 1210   97.3 °F (36.3 °C) 65 18 92/66 97 %      Temp src Heart Rate Source Patient Position BP Location FiO2 (%)   06/27/25 1231 06/27/25 1231 06/27/25 1231 06/27/25 1231 --   Oral Monitor Sitting Right arm        GENERAL: This is a elderly female looks older than her stated age.  Appears weak and tired.  Vital signs on my initial evaluation have been  reviewed initial blood pressure was a little low 92/60 6 repeat have been in the low 100s.  Oxygen saturation 96% on room air afebrile.  Heart rate is A-fib on the monitor with a rate 80s to 90s.  HENT: nares patent  Head/neck/ face are symmetric without gross deformity, signs of trauma, or swelling  EYES: no scleral icterus, no conjunctival pallor.  NECK: Supple, no meningismus  CV: regular rhythm, regular rate with intact distal pulses.  Murmur of the soft 2 out of 6.  RESPIRATORY: Patient has some mild tachypnea.  Mild expiratory wheeze on exam.  Little bit of crackles in the bases.  ABDOMEN: soft and nontender.  Morbidly obese.  She has a very large ventral hernia.  There is no discoloration or temperature change.  No tenderness on diffuse exam.  MUSCULOSKELETAL: According to the patient has chronic color changes to her nose as well as to her distal extremities specifically especially her distal aspect of her feet and toes which is a hyperemic violaceous color.  She does have intact distal pulses.  No edema  NEURO: alert and appropriate, moves all extremities, follows commands.  She has generalized weakness.  There is no focal motor or sensory changes.  SKIN: warm, dry    Vital signs and nursing notes reviewed.        LAB RESULTS  Recent Results (from the past 24 hours)   Comprehensive Metabolic Panel    Collection Time: 06/27/25 12:44 PM    Specimen: Blood   Result Value Ref Range    Glucose 150 (H) 65 - 99 mg/dL    BUN 48.0 (H) 8.0 - 23.0 mg/dL    Creatinine 2.22 (H) 0.57 - 1.00 mg/dL    Sodium 125 (L) 136 - 145 mmol/L    Potassium 4.8 3.5 - 5.2 mmol/L    Chloride 91 (L) 98 - 107 mmol/L    CO2 18.6 (L) 22.0 - 29.0 mmol/L    Calcium 9.5 8.6 - 10.5 mg/dL    Total Protein 6.8 6.0 - 8.5 g/dL    Albumin 4.0 3.5 - 5.2 g/dL    ALT (SGPT) 12 1 - 33 U/L    AST (SGOT) 21 1 - 32 U/L    Alkaline Phosphatase 86 39 - 117 U/L    Total Bilirubin 0.9 0.0 - 1.2 mg/dL    Globulin 2.8 gm/dL    A/G Ratio 1.4 g/dL    BUN/Creatinine  Ratio 21.6 7.0 - 25.0    Anion Gap 15.4 (H) 5.0 - 15.0 mmol/L    eGFR 23.6 (L) >60.0 mL/min/1.73   Protime-INR    Collection Time: 06/27/25 12:44 PM    Specimen: Blood   Result Value Ref Range    Protime 21.0 (H) 11.7 - 14.2 Seconds    INR 1.80 (H) 0.90 - 1.10   aPTT    Collection Time: 06/27/25 12:44 PM    Specimen: Blood   Result Value Ref Range    PTT 29.0 22.7 - 35.4 seconds   Magnesium    Collection Time: 06/27/25 12:44 PM    Specimen: Blood   Result Value Ref Range    Magnesium 1.8 1.6 - 2.4 mg/dL   Procalcitonin    Collection Time: 06/27/25 12:44 PM    Specimen: Blood   Result Value Ref Range    Procalcitonin 0.32 (H) 0.00 - 0.25 ng/mL   Lactic Acid, Plasma    Collection Time: 06/27/25 12:44 PM    Specimen: Blood   Result Value Ref Range    Lactate 2.5 (C) 0.5 - 2.0 mmol/L   High Sensitivity Troponin T    Collection Time: 06/27/25 12:44 PM    Specimen: Blood   Result Value Ref Range    HS Troponin T 268 (C) <14 ng/L   CBC Auto Differential    Collection Time: 06/27/25 12:44 PM    Specimen: Blood   Result Value Ref Range    WBC 6.34 3.40 - 10.80 10*3/mm3    RBC 3.69 (L) 3.77 - 5.28 10*6/mm3    Hemoglobin 12.1 12.0 - 15.9 g/dL    Hematocrit 38.1 34.0 - 46.6 %    .3 (H) 79.0 - 97.0 fL    MCH 32.8 26.6 - 33.0 pg    MCHC 31.8 31.5 - 35.7 g/dL    RDW 14.1 12.3 - 15.4 %    RDW-SD 53.3 37.0 - 54.0 fl    MPV 9.9 6.0 - 12.0 fL    Platelets 183 140 - 450 10*3/mm3    Neutrophil % 81.6 (H) 42.7 - 76.0 %    Lymphocyte % 8.2 (L) 19.6 - 45.3 %    Monocyte % 9.0 5.0 - 12.0 %    Eosinophil % 0.3 0.3 - 6.2 %    Basophil % 0.3 0.0 - 1.5 %    Immature Grans % 0.6 (H) 0.0 - 0.5 %    Neutrophils, Absolute 5.17 1.70 - 7.00 10*3/mm3    Lymphocytes, Absolute 0.52 (L) 0.70 - 3.10 10*3/mm3    Monocytes, Absolute 0.57 0.10 - 0.90 10*3/mm3    Eosinophils, Absolute 0.02 0.00 - 0.40 10*3/mm3    Basophils, Absolute 0.02 0.00 - 0.20 10*3/mm3    Immature Grans, Absolute 0.04 0.00 - 0.05 10*3/mm3    nRBC 0.0 0.0 - 0.2 /100 WBC   BNP     Collection Time: 06/27/25 12:49 PM    Specimen: Blood   Result Value Ref Range    proBNP 2,909.0 (H) 0.0 - 900.0 pg/mL   Ethanol    Collection Time: 06/27/25 12:49 PM    Specimen: Blood   Result Value Ref Range    Ethanol <10 0 - 10 mg/dL    Ethanol % <0.010 %   Blood Gas, Venous -    Collection Time: 06/27/25  1:51 PM    Specimen: Venous Blood   Result Value Ref Range    pH, Venous 7.412 (H) 7.310 - 7.410 pH Units    pCO2, Venous 32.0 (L) 41.0 - 51.0 mm Hg    pO2, Venous 27.1 (L) 35.0 - 45.0 mm Hg    HCO3, Venous 20.4 (L) 22.0 - 28.0 mmol/L    Base Excess, Venous -3.4 (L) -2.0 - 2.0 mmol/L    O2 Saturation, Venous 52.3 45.0 - 75.0 %    Barometric Pressure for Blood Gas 750.5000 mmHg    Modality Room Air     Set Mech Resp Rate 20     Device Comment sat 98%    High Sensitivity Troponin T 1Hr    Collection Time: 06/27/25  2:03 PM    Specimen: Blood   Result Value Ref Range    HS Troponin T 729 (C) <14 ng/L    Troponin T Numeric Delta 461 (C) Abnormal if >/=3 ng/L    Troponin T % Delta 172 (C) Abnormal if >/= 20%   Cortisol    Collection Time: 06/27/25  2:03 PM    Specimen: Blood   Result Value Ref Range    Cortisol 12.70   mcg/dL   Uric Acid    Collection Time: 06/27/25  2:03 PM    Specimen: Blood   Result Value Ref Range    Uric Acid 5.9 (H) 2.4 - 5.7 mg/dL   TSH    Collection Time: 06/27/25  2:03 PM    Specimen: Blood   Result Value Ref Range    TSH 0.399 0.270 - 4.200 uIU/mL   CK    Collection Time: 06/27/25  2:03 PM    Specimen: Blood   Result Value Ref Range    Creatine Kinase 72 20 - 180 U/L   ECG 12 Lead Other; Weakness    Collection Time: 06/27/25  2:21 PM   Result Value Ref Range    QT Interval 366 ms    QTC Interval 446 ms   Urinalysis With Microscopic If Indicated (No Culture) - Urine, Clean Catch    Collection Time: 06/27/25  3:39 PM    Specimen: Urine, Clean Catch   Result Value Ref Range    Color, UA Yellow Yellow, Straw    Appearance, UA Cloudy (A) Clear    pH, UA 5.5 5.0 - 8.0    Specific Gravity, UA  1.013 1.005 - 1.030    Glucose, UA Negative Negative    Ketones, UA Trace (A) Negative    Bilirubin, UA Negative Negative    Blood, UA Negative Negative    Protein, UA Negative Negative    Leuk Esterase, UA Negative Negative    Nitrite, UA Negative Negative    Urobilinogen, UA 1.0 E.U./dL 0.2 - 1.0 E.U./dL   Urine Drug Screen - Urine, Clean Catch    Collection Time: 06/27/25  3:39 PM    Specimen: Urine, Clean Catch   Result Value Ref Range    THC, Screen, Urine Negative Negative    Phencyclidine (PCP), Urine Negative Negative    Cocaine Screen, Urine Negative Negative    Methamphetamine, Ur Negative Negative    Opiate Screen Negative Negative    Amphetamine Screen, Urine Negative Negative    Benzodiazepine Screen, Urine Negative Negative    Tricyclic Antidepressants Screen Negative Negative    Methadone Screen, Urine Negative Negative    Barbiturates Screen, Urine Negative Negative    Oxycodone Screen, Urine Negative Negative    Buprenorphine, Screen, Urine Negative Negative       Ordered the above labs and independently reviewed the results.        RADIOLOGY  CT Head Without Contrast  Result Date: 6/27/2025  EMERGENCY CT SCAN OF THE HEAD WITHOUT CONTRAST ON 06/27/2025  CLINICAL HISTORY: This is a 69-year-old female patient who slipped on floor today and is on blood thinners-Eliquis.  TECHNIQUE: Spiral CT images were obtained from the base of the skull to the vertex without intravenous contrast. The images were reformatted and are submitted in 2 mm thick axial, sagittal, and coronal CT sections with brain algorithm, 2 mm thick axial CT sections with high-resolution bone algorithm.  There are no prior head CTs from [facility] for comparison.  FINDINGS: There is some mild patchy low-density in the periventricular white matter, consistent with mild small vessel disease. The remainder of the brain parenchyma is normal in attenuation. The ventricles are normal in size. I see no focal mass effect. There is  no midline shift. No extra-axial fluid collections are identified. There is no evidence of acute intracranial hemorrhage. No acute skull fracture is identified. The calvarium and the skull base are normal in appearance. The paranasal sinuses and the mastoid air cells and middle ear cavities are clear.  Radiation dose reduction techniques were utilized, including automated exposure control and exposure modulation based on body size.        XR Chest 1 View  Result Date: 6/27/2025  XR CHEST 1 VW-  HISTORY: Female who is 69 years-old, short of breath  TECHNIQUE: Frontal view of the chest  COMPARISON: 2/13/2025  FINDINGS: Heart, mediastinum and pulmonary vasculature are unremarkable. Minimal left basilar atelectasis or infiltrate, follow-up as indications persist. No large pleural effusion, or pneumothorax. No acute osseous process.      As described.  This report was finalized on 6/27/2025 2:17 PM by Dr. Herrera Mistry M.D on Workstation: [id number]        I ordered the above noted radiological studies. Reviewed by me and discussed with radiologist.  See dictation for official radiology interpretation.      PROCEDURES    Critical Care    Performed by: Anatoly Heath MD  Authorized by: Vahe Thomas MD    Critical care provider statement:     Critical care time (minutes): 40.    Critical care time was exclusive of:  Separately billable procedures and treating other patients    Critical care was necessary to treat or prevent imminent or life-threatening deterioration of the following conditions:  Circulatory failure and renal failure    Critical care was time spent personally by me on the following activities:  Blood draw for specimens, development of treatment plan with patient or surrogate, discussions with consultants, evaluation of patient's response to treatment, examination of patient, obtaining history from patient or surrogate, re-evaluation of patient's condition, review of old charts, pulse oximetry,  ordering and review of radiographic studies, ordering and review of laboratory studies and ordering and performing treatments and interventions    I assumed direction of critical care for this patient from another provider in my specialty: no      Care discussed with: admitting provider          MEDICATIONS GIVEN IN ER    Medications   sodium chloride 0.9 % infusion (125 mL/hr Intravenous New Bag 6/27/25 1555)   nitroglycerin (NITROSTAT) SL tablet 0.4 mg (has no administration in time range)   sodium chloride 0.9 % flush 10 mL (has no administration in time range)   sodium chloride 0.9 % flush 10 mL (has no administration in time range)   sodium chloride 0.9 % infusion 40 mL (has no administration in time range)   mupirocin (BACTROBAN) 2 % nasal ointment 1 Application (1 Application Each Nare Given 6/27/25 1553)   acetaminophen (TYLENOL) tablet 650 mg (has no administration in time range)     Or   acetaminophen (TYLENOL) suppository 650 mg (has no administration in time range)   ondansetron ODT (ZOFRAN-ODT) disintegrating tablet 4 mg (has no administration in time range)     Or   ondansetron (ZOFRAN) injection 4 mg (has no administration in time range)   ipratropium-albuterol (DUO-NEB) nebulizer solution 3 mL (has no administration in time range)   budesonide-formoterol (SYMBICORT) 160-4.5 MCG/ACT inhaler 2 puff (has no administration in time range)   apixaban (ELIQUIS) tablet 5 mg (has no administration in time range)   levothyroxine (SYNTHROID, LEVOTHROID) tablet 75 mcg (has no administration in time range)   famotidine (PEPCID) tablet 20 mg (has no administration in time range)   sodium chloride 0.9 % bolus 500 mL (0 mL Intravenous Stopped 6/27/25 1351)   ipratropium-albuterol (DUO-NEB) nebulizer solution 3 mL (3 mL Nebulization Given 6/27/25 1341)   sodium chloride 0.9 % bolus 1,000 mL (0 mL Intravenous Stopped 6/27/25 1556)         All labs have been independently reviewed by me.  All radiology studies have  been reviewed by me and I discussed with radiologist dictating the report when indicated below.  All EKG's independently viewed and interpreted by me.  Discussion below represents my analysis of pertinent findings related to patient's condition, differential diagnosis, treatment plan and final disposition.        PROGRESS, DATA ANALYSIS, CONSULTS, AND MEDICAL DECISION MAKING    With EMS she was unable to stand and ambulate because of generalized diffuse weakness.      ED Course as of 06/27/25 1609   Fri Jun 27, 2025   1316 Hemoglobin: 12.1  This is up from baseline for this patient. [MM]   1412 pH, Venous(!): 7.412 [MM]   1412 pCO2, Venous(!): 32.0 [MM]   1412 proBNP(!): 2,909.0 [MM]   1412 HS Troponin T(!!): 268 [MM]   1412 Procalcitonin(!): 0.32 [MM]   1412 Patient's troponins are usually in the 40 range. [MM]   1412 Creatinine(!): 2.22 [MM]   1412 BUN(!): 48.0  Acute elevation of BUN and creatinine her hemoglobin is also up from baseline.  She is on diuretic for heart failure.  She has been losing weight and no obvious fluid retention on exam.  I do not see that she is in obvious florid congestive heart failure on chest x-ray as well. [MM]   1423 I reviewed the chest x-ray also independently looked at the chest x-ray I did not see any obvious signs of acute heart failure radiologist mentions heart mediastinum and pulmonary vasculature is unremarkable maybe minimal left basilar atelectasis or infiltrate.  No significant pleural effusion or pneumothorax.  Please see complete dictated report from [MM]   1423 proBNP(!): 2,909.0  BNP's are in the 4000 range. [MM]   1423 INR(!): 1.80  Patient is on Eliquis. [MM]   1424 My own independent interpretation of this EKG that was done at 2:21 PM reveals a rate of 89 it is atrial fibrillation mild intra-atrial duction delay low voltage in extremity leads I do not see or appreciate any definitive acute injury pattern some nonspecific ST and T wave changes in several leads  I  compared this to the previous EKG that was done on February 13, 2025 overall it looks fairly similar. [MM]   1433 I talked with Dr. Barreto who is on for [place] [facility].  Informed him of the patient presenting symptoms and results of test.  They will consult.  All questions answered [MM]   1444 HS Troponin T(!!): 729 [MM]   1445 I did discuss the case with Dr. Hammond who is on for the intensive care unit.  Informed him of the patient's presenting symptoms results of test my initial treatment plan.  Agrees admit to the intensive care unit.  All questions answered [MM]   1541 Did discuss with Dr. Sanches the CT scan of the head shows no acute abnormality.  Please see his complete dictated report. [MM]      ED Course User Index  [MM] Anatoly Heath MD       AS OF 16:09 EDT VITALS:    BP - (!) 84/75  HR - 80  TEMP - 98.4 °F (36.9 °C)  02 SATS - 96%    SOCIAL DETERMINANTS OF HEALTH THAT IMPACT OR LIMIT CARE (For example..Homelessness,safe discharge, inability to obtain care, follow up, or prescriptions):      DIAGNOSIS  Final diagnoses:   Hypotension, unspecified hypotension type   Near syncope   Acute kidney injury   Elevated troponin   Longstanding persistent atrial fibrillation   Coagulopathy: Eliquis induced         DISPOSITION  Patient admitted to the ICU.          DICTATED UTILIZING DRAGON DICTATION    Note Disclaimer: At [facility], we believe that sharing information builds trust and better relationships. You are receiving this note because you recently visited [facility]. It is possible you will see health information before a provider has talked with you about it. This kind of information can be easy to misunderstand. To help you fully understand what it means for your health, we urge you to discuss this note with your provider.       Anatoly Heath MD  06/27/25 [id number]

## 2025-06-27 NOTE — ED NOTES
Nursing report ED to floor  Marilu Avery  69 y.o.  female    HPI :  HPI  Stated Reason for Visit: weakness. had a fall this morning, no headstrike but is on eliquis. was unable to get up without assistance, was unable to ambulate even with assistance from fire department  History Obtained From: patient, EMS    Chief Complaint  Chief Complaint   Patient presents with    Weakness - Generalized       Admitting doctor:   Vahe Thomas MD    Admitting diagnosis:   The primary encounter diagnosis was Hypotension, unspecified hypotension type. Diagnoses of Near syncope, Acute kidney injury, Elevated troponin, Longstanding persistent atrial fibrillation, and Coagulopathy: Eliquis induced were also pertinent to this visit.    Code status:   Current Code Status       Date Active Code Status Order ID Comments User Context       6/27/2025 1517 CPR (Attempt to Resuscitate) [id number]  Vahe Thomas MD ED        Question Answer    Code Status (Patient has no pulse and is not breathing) CPR (Attempt to Resuscitate)    Medical Interventions (Patient has pulse or is breathing) Full Support                    Allergies:   Patient has no known allergies.    Isolation:   No active isolations    Intake and Output    Intake/Output Summary (Last 24 hours) at 6/27/2025 1527  Last data filed at 6/27/2025 1351  Gross per 24 hour   Intake 500 ml   Output --   Net 500 ml       Weight:       06/27/25  1236   Weight: 104 kg (229 lb)       Most recent vitals:   Vitals:    06/27/25 1345 06/27/25 1403 06/27/25 1429 06/27/25 1515   BP: (!) 86/75 (!) 84/60 (!) 88/59    BP Location:       Patient Position:       Pulse:  88 81 77   Resp:  20 18    Temp:  97.8 °F (36.6 °C) 97.9 °F (36.6 °C)    TempSrc:  Oral     SpO2:  96% 97%    Weight:       Height:           Active LDAs/IV Access:   Lines, Drains & Airways       Active LDAs       Name Placement date Placement time Site Days    Peripheral IV 06/27/25 1243 20 G Right Antecubital 06/27/25  1243   Antecubital  less than 1                    Labs (abnormal labs have a star):   Labs Reviewed   COMPREHENSIVE METABOLIC PANEL - Abnormal; Notable for the following components:       Result Value    Glucose 150 (*)     BUN 48.0 (*)     Creatinine 2.22 (*)     Sodium 125 (*)     Chloride 91 (*)     CO2 18.6 (*)     Anion Gap 15.4 (*)     eGFR 23.6 (*)     All other components within normal limits    Narrative:     GFR Categories in Chronic Kidney Disease (CKD)              GFR Category          GFR (mL/min/1.73)    Interpretation  G1                    90 or greater        Normal or high (1)  G2                    60-89                Mild decrease (1)  G3a                   45-59                Mild to moderate decrease  G3b                   30-44                Moderate to severe decrease  G4                    15-29                Severe decrease  G5                    14 or less           Kidney failure    (1)In the absence of evidence of kidney disease, neither GFR category G1 or G2 fulfill the criteria for CKD.    eGFR calculation 2021 CKD-EPI creatinine equation, which does not include race as a factor   PROTIME-INR - Abnormal; Notable for the following components:    Protime 21.0 (*)     INR 1.80 (*)     All other components within normal limits   PROCALCITONIN - Abnormal; Notable for the following components:    Procalcitonin 0.32 (*)     All other components within normal limits    Narrative:     As a Marker for Sepsis (Non-Neonates):    1. <0.5 ng/mL represents a low risk of severe sepsis and/or septic shock.  2. >2 ng/mL represents a high risk of severe sepsis and/or septic shock.    As a Marker for Lower Respiratory Tract Infections that require antibiotic therapy:    PCT on Admission    Antibiotic Therapy       6-12 Hrs later    >0.5                Strongly Recommended  >0.25 - <0.5        Recommended   0.1 - 0.25          Discouraged              Remeasure/reassess PCT  <0.1                Strongly  "Discouraged     Remeasure/reassess PCT    As 28 day mortality risk marker: \"Change in Procalcitonin Result\" (>80% or <=80%) if Day 0 (or Day 1) and Day 4 values are available. Refer to http://[email or web address]-pct-calculator.com    Change in PCT <=80%  A decrease of PCT levels below or equal to 80% defines a positive change in PCT test result representing a higher risk for 28-day all-cause mortality of patients diagnosed with severe sepsis for septic shock.    Change in PCT >80%  A decrease of PCT levels of more than 80% defines a negative change in PCT result representing a lower risk for 28-day all-cause mortality of patients diagnosed with severe sepsis or septic shock.      LACTIC ACID, PLASMA - Abnormal; Notable for the following components:    Lactate 2.5 (*)     All other components within normal limits   TROPONIN - Abnormal; Notable for the following components:    HS Troponin T 268 (*)     All other components within normal limits    Narrative:     High Sensitive Troponin T Reference Range:  <14.0 ng/L- Negative Female for AMI  <22.0 ng/L- Negative Male for AMI  >=14 - Abnormal Female indicating possible myocardial injury.  >=22 - Abnormal Male indicating possible myocardial injury.   Clinicians would have to utilize clinical acumen, EKG, Troponin, and serial changes to determine if it is an Acute Myocardial Infarction or myocardial injury due to an underlying chronic condition.        CBC WITH AUTO DIFFERENTIAL - Abnormal; Notable for the following components:    RBC 3.69 (*)     .3 (*)     Neutrophil % 81.6 (*)     Lymphocyte % 8.2 (*)     Immature Grans % 0.6 (*)     Lymphocytes, Absolute 0.52 (*)     All other components within normal limits   BNP (IN-HOUSE) - Abnormal; Notable for the following components:    proBNP 2,909.0 (*)     All other components within normal limits    Narrative:     This assay is used as an aid in the diagnosis of individuals suspected of having heart failure. It can be used as " an aid in the diagnosis of acute decompensated heart failure (ADHF) in patients presenting with signs and symptoms of ADHF to the emergency department (ED). In addition, NT-proBNP of <300 pg/mL indicates ADHF is not likely.    Age Range Result Interpretation  NT-proBNP Concentration (pg/mL:      <50             Positive            >450                   Gray                 300-450                    Negative             <300    50-75           Positive            >900                  Gray                300-900                  Negative            <300      >75             Positive            >1800                  Gray                300-1800                  Negative            <300   BLOOD GAS, VENOUS - Abnormal; Notable for the following components:    pH, Venous 7.412 (*)     pCO2, Venous 32.0 (*)     pO2, Venous 27.1 (*)     HCO3, Venous 20.4 (*)     Base Excess, Venous -3.4 (*)     All other components within normal limits   HIGH SENSITIVITIY TROPONIN T 1HR - Abnormal; Notable for the following components:    HS Troponin T 729 (*)     Troponin T Numeric Delta 461 (*)     Troponin T % Delta 172 (*)     All other components within normal limits    Narrative:     High Sensitive Troponin T Reference Range:  <14.0 ng/L- Negative Female for AMI  <22.0 ng/L- Negative Male for AMI  >=14 - Abnormal Female indicating possible myocardial injury.  >=22 - Abnormal Male indicating possible myocardial injury.   Clinicians would have to utilize clinical acumen, EKG, Troponin, and serial changes to determine if it is an Acute Myocardial Infarction or myocardial injury due to an underlying chronic condition.        URIC ACID - Abnormal; Notable for the following components:    Uric Acid 5.9 (*)     All other components within normal limits   APTT - Normal   MAGNESIUM - Normal   BLOOD CULTURE   BLOOD CULTURE   EOSINOPHIL SMEAR   ETHANOL    Narrative:     Not for legal purposes.   CORTISOL    Narrative:     Cortisol Reference  Ranges:    Cortisol 6AM - 10AM Range: 6.02-18.40 mcg/dl  Cortisol 4PM - 8PM Range: 2.68-10.50 mcg/dl      Results may be falsely increased if patient taking Biotin.     URINALYSIS W/ MICROSCOPIC IF INDICATED (NO CULTURE)   URINE DRUG SCREEN   LACTIC ACID, REFLEX   BASIC METABOLIC PANEL   SODIUM, URINE, RANDOM   CREATININE URINE RANDOM (KIDNEY FUNCTION) GFR COMPONENT   OSMOLALITY, URINE   OSMOLALITY, SERUM   TSH   PROTEIN, URINE, RANDOM   CHLORIDE, URINE, RANDOM   CK   POCT GLUCOSE FINGERSTICK   POCT GLUCOSE FINGERSTICK   POCT GLUCOSE FINGERSTICK   POCT GLUCOSE FINGERSTICK   POCT GLUCOSE FINGERSTICK   CBC AND DIFFERENTIAL    Narrative:     The following orders were created for panel order CBC & Differential.  Procedure                               Abnormality         Status                     ---------                               -----------         ------                     CBC Auto Differential[[id number]]        Abnormal            Final result                 Please view results for these tests on the individual orders.       EKG:   ECG 12 Lead Other; Weakness   Preliminary Result   HEART RATE=89  bpm   RR Pswmnatr=987  ms   WV Interval=  ms   P Horizontal Axis=  deg   P Front Axis=  deg   QRSD Interval=99  ms   QT Vlkmvqcj=132  ms   WVoK=401  ms   QRS Axis=200  deg   T Wave Axis=20  deg   - ABNORMAL ECG -   Atrial fibrillation   Low voltage, extremity and precordial leads   Consider right ventricular hypertrophy   Borderline repolarization abnormality   When compared with ECG of 13-Feb-2025 14:48:16,   Nonspecific significant change   Date and Time of Study:2025-06-27 14:21:29          Meds given in ED:   Medications   sodium chloride 0.9 % infusion (has no administration in time range)   nitroglycerin (NITROSTAT) SL tablet 0.4 mg (has no administration in time range)   sodium chloride 0.9 % flush 10 mL (has no administration in time range)   sodium chloride 0.9 % flush 10 mL (has no administration in time  range)   sodium chloride 0.9 % infusion 40 mL (has no administration in time range)   mupirocin (BACTROBAN) 2 % nasal ointment 1 Application (has no administration in time range)   acetaminophen (TYLENOL) tablet 650 mg (has no administration in time range)     Or   acetaminophen (TYLENOL) suppository 650 mg (has no administration in time range)   ondansetron ODT (ZOFRAN-ODT) disintegrating tablet 4 mg (has no administration in time range)     Or   ondansetron (ZOFRAN) injection 4 mg (has no administration in time range)   ipratropium-albuterol (DUO-NEB) nebulizer solution 3 mL (has no administration in time range)   budesonide-formoterol (SYMBICORT) 160-4.5 MCG/ACT inhaler 2 puff (has no administration in time range)   apixaban (ELIQUIS) tablet 5 mg (has no administration in time range)   levothyroxine (SYNTHROID, LEVOTHROID) tablet 75 mcg (has no administration in time range)   famotidine (PEPCID) tablet 20 mg (has no administration in time range)   sodium chloride 0.9 % bolus 500 mL (0 mL Intravenous Stopped 6/27/25 1351)   ipratropium-albuterol (DUO-NEB) nebulizer solution 3 mL (3 mL Nebulization Given 6/27/25 1341)   sodium chloride 0.9 % bolus 1,000 mL (1,000 mL Intravenous New Bag 6/27/25 1431)       Imaging results:  XR Chest 1 View  Result Date: 6/27/2025  As described.  This report was finalized on 6/27/2025 2:17 PM by Dr. Herrera Mistry M.D on Workstation: [id number]        Ambulatory status:   - x1-2 assist as tolerated    Social issues:   Social History     Socioeconomic History    Marital status:    Tobacco Use    Smoking status: Every Day     Current packs/day: 1.00     Types: Cigarettes    Smokeless tobacco: Never    Tobacco comments:     admits to smoking since age 56    Vaping Use    Vaping status: Never Used   Substance and Sexual Activity    Alcohol use: Yes     Comment: 3 drinks daily Daily caffine    Drug use: Never    Sexual activity: Defer       Peripheral Neurovascular  Peripheral  Neurovascular (Adult)  Peripheral Neurovascular WDL: WDL    Neuro Cognitive  Neuro Cognitive (Adult)  Cognitive/Neuro/Behavioral WDL: WDL  Pupils  Pupil PERRLA: yes  [place] Coma Scale  Best Eye Response: 4-->(E4) spontaneous  Best Motor Response: 6-->(M6) obeys commands  Best Verbal Response: 5-->(V5) oriented  [place] Coma Scale Score: 15    Learning  Learning Assessment  Learning Readiness and Ability: no barriers identified    Respiratory  Respiratory  Airway WDL: WDL  Respiratory WDL  Respiratory WDL: rhythm/pattern, .WDL except  Rhythm/Pattern, Respiratory: pattern regular, deep  Breath Sounds  All Lung Fields Breath Sounds: All Fields  All Lung Fields Breath Sounds: Anterior:, diminished  Breath Sounds Post-Respiratory Treatment  Breath Sounds Posttreatment All Fields: All Fields  Breath Sounds Posttreatment All Fields: no change    Abdominal Pain       Pain Assessments  Pain (Adult)  (0-10) Pain Rating: Rest: 6  Pain Location: calf (bilateral)    NIH Stroke Scale       Maria Isabel Jacques RN  06/27/25 15:27 EDT

## 2025-06-27 NOTE — Clinical Note
Hemostasis started on the right brachial vein. Manual pressure applied to vessel. Manual pressure was held by Lelia. Manual pressure was held for 5 min. Hemostasis achieved successfully.

## 2025-06-27 NOTE — CONSULTS
"                                                                                                [facility]                                                                                             Nephrology Initial Consult Note    Patient Identification:  Name: Marilu Avery MRN: [id number]  Age: 69 y.o. : 1955  Sex: female  Date:2025    Requesting Physician: As per consult order.  Reason for Consultation: Acute kidney injury  Information from:patient/ family/ chart      History of Present Illness: This is a 69 y.o. year old female who is familiar to me from a hospitalization in February of this year.  At that time she was noted to have a previously normal baseline creatinine, admitted at that time with shortness of breath volume overload and CHF exacerbation.  REBECCA was attributed to cardiorenal syndrome and did improve with IV diuresis.  She was eventually transition to oral diuretics and her creatinine was 1.1 at discharge on .  No interval new labs.  She presented to the ER this afternoon with weakness, inability to get up out of bed.  Reports chronic dyspnea that is not changed no worsening volume overload, has been losing weight slowly.  In the ER she was noted to be hypotensive, 90s over 60s with O2 sats 96% on room air.  She received a fluid bolus.  Her sodium was 125, creatinine 2.22 and chest x-ray showed nothing acute.  Her lactate was 2.5, procalcitonin also mildly elevated with elevated troponin.  Her home medications include Demadex 20 mg daily and Eliquis 5 mg.  Her weight is down about 30 pounds from February.  She states she has not been trying to lose weight and states \"it is all fluid\"    The following medical history and medications personally reviewed by me:    Problem List:     Hypotension      Past Medical History:  Past Medical History:   Diagnosis Date    Arthritis     Asthma     Atrial fibrillation     Colon polyps 2015    hyperplastic rectal polyp    " Hyperlipidemia     Hypertension     LISA on CPAP     Sleep apnea        Past Surgical History:  Past Surgical History:   Procedure Laterality Date    BACK SURGERY N/A 2001    COLONOSCOPY N/A 10/22/2015    Rectal polyp-Dr. Elías Keating    HYSTERECTOMY N/A 2000    LAPAROSCOPIC CHOLECYSTECTOMY N/A 01/04/2010    Dr. Heath Whitlock    OOPHORECTOMY  2001    REPLACEMENT TOTAL KNEE Left 06/22/2015    Dr. Yo Aguayo    REPLACEMENT TOTAL KNEE Right 02/26/2015    Dr. Yo Aguayo    VENTRAL HERNIA REPAIR N/A 10/22/2015    Open reduction of incarcerated ventral hernia with layered closure-Dr. Elías Keating    VENTRAL/INCISIONAL HERNIA REPAIR N/A 6/14/2021    Procedure: OPEN VENTRAL/INCISIONAL HERNIA REPAIR WITH MESH AND APPENDECTOMY;  Surgeon: Arnulfo Leonard MD;  Location: [facility];  Service: General;  Laterality: N/A;        Home Meds:   (Not in a hospital admission)      Current Meds:   Current Facility-Administered Medications   Medication Dose Route Frequency Provider Last Rate Last Admin    sodium chloride 0.9 % bolus 1,000 mL  1,000 mL Intravenous Once Anatoly Heath MD 2,000 mL/hr at 06/27/25 1431 1,000 mL at 06/27/25 1431    sodium chloride 0.9 % infusion  125 mL/hr Intravenous Continuous Vahe Thomas MD         Current Outpatient Medications   Medication Sig Dispense Refill    albuterol sulfate  (90 Base) MCG/ACT inhaler Inhale 2 puffs Every 4 (Four) Hours As Needed for Wheezing or Shortness of Air.      allopurinol (ZYLOPRIM) 100 MG tablet Take 1 tablet by mouth Daily. Indications: Gout      apixaban (ELIQUIS) 5 MG tablet tablet Take 1 tablet by mouth 2 (Two) Times a Day. Indications: Atrial Fibrillation 180 tablet 3    budesonide-formoterol (Symbicort) 160-4.5 MCG/ACT inhaler Inhale 2 puffs 2 (Two) Times a Day.  12    famotidine (PEPCID) 20 MG tablet Take 1 tablet by mouth 2 (Two) Times a Day Before Meals.      Fluticasone-Umeclidin-Vilant (Trelegy Ellipta) 100-62.5-25 MCG/ACT inhaler INHALE  1 PUFF BY INHALATION ROUTE ONCE DAILY AT THE SAME TIME EACH DAY      levothyroxine (SYNTHROID, LEVOTHROID) 75 MCG tablet Take 1 tablet by mouth Daily. Indications: Underactive Thyroid      metoprolol tartrate (LOPRESSOR) 50 MG tablet Take 1 tablet by mouth Every 12 (Twelve) Hours for 30 days. Indications: High Blood Pressure 60 tablet 0    montelukast (SINGULAIR) 10 MG tablet Take 1 tablet by mouth Daily. Indications: Hayfever      polyethylene glycol (MIRALAX) 17 g packet Take 17 g by mouth Daily.      tiotropium (Spiriva HandiHaler) 18 MCG per inhalation capsule Place 1 capsule into inhaler and inhale Daily.      torsemide (DEMADEX) 20 MG tablet Take 1 tablet by mouth Daily for 30 days. 30 tablet 0    vitamin D3 125 MCG (5000 UT) capsule capsule Take 2,000 Units by mouth Daily.         Allergies:  No Known Allergies    Social History:   Social History     Socioeconomic History    Marital status:    Tobacco Use    Smoking status: Every Day     Current packs/day: 1.00     Types: Cigarettes    Smokeless tobacco: Never    Tobacco comments:     admits to smoking since age 56    Vaping Use    Vaping status: Never Used   Substance and Sexual Activity    Alcohol use: Yes     Comment: 3 drinks daily Daily caffine    Drug use: Never    Sexual activity: Defer        Family History:  Family History   Problem Relation Age of Onset    No Known Problems Mother     No Known Problems Father         Review of Systems: as per HPI, in addition:    General:      + Weakness / fatigue,                       No fevers / chills                       no weight loss  HEENT;       no dysphagia   Neck:           No swelling  Respiratory: no cough / congestion/wheezing                      No shortness of air   CV:              No chest pain                       No palpitations  Abdomen/GI: no nausea / vomiting                      No diarrhea, no constipation                      No abdominal pain  :             no  "dysuria  Endocrine:   No heat or cold intolerance  Skin:           Denies rashes or skin lesions   Vascular:   No edema  Musculoskeletal: No joint pain or deformities      Physical Exam:  Vitals:   Temp (24hrs), Av.9 °F (36.6 °C), Min:97.3 °F (36.3 °C), Max:98.2 °F (36.8 °C)    BP (!) 88/59   Pulse 81   Temp 97.9 °F (36.6 °C)   Resp 18   Ht 177.8 cm (70\")   Wt 104 kg (229 lb)   SpO2 97%   BMI 32.86 kg/m²   Intake/Output:     Intake/Output Summary (Last 24 hours) at 2025 1456  Last data filed at 2025 1351  Gross per 24 hour   Intake 500 ml   Output --   Net 500 ml        Wt Readings from Last 1 Encounters:   25 1236 104 kg (229 lb)       Exam:    General Appearance:  Awake, alert, no acute distress  Obese, mildly ill-appearing   Head and Face:  Normocephalic, atraumatic   Eyes:  No icterus   ENMT: Moist mucosa   Neck: Supple  no jugular venous distention   Pulmonary:  Respiratory effort: Normal  Auscultation of lungs: Clear bilaterally  No wheezes or rhonchi  Good air movement, good expansion   Chest wall:  No tenderness or deformity   Cardiovascular:  Auscultation of the heart: Normal rhythm, no murmurs  No edema of bilateral lower extremities   Abdomen:  Abdomen: soft, nontender, normal bowel sounds    Musculoskeletal: No joint swelling or gross deformities    Skin: Skin inspection and palpation: No rash   Lymphatic: No focal lymphadenopathy   Psychiatric: Judgement and insight: normal  Orientation to person place and time: normal  Mood and affect: normal       DATA:  Radiology and Labs:  The following labs independently reviewed by me, additional AM labs ordered  Old records independently reviewed showing previous REBECCA from CHF  The following radiologic studies independently viewed by me, findings chest x-ray no active disease, no evidence of fluid overload or heart failure  Interval notes, chart personally reviewed by me.  I have reviewed and summarized old records as detailed " above  Plan of care discussed with Dr. Heath in the ER, patient   New problems include: REBECCA, hypotension      Risk/ complexity of medical care/ medical decision making: High risk, hypotension with renal failure need for ICU care fluid and electrolyte monitoring with history of CHF  Chronic illness with severe exacerbation or progression: Recurrent REBECCA      Labs:   Recent Results (from the past 24 hours)   Comprehensive Metabolic Panel    Collection Time: 06/27/25 12:44 PM    Specimen: Blood   Result Value Ref Range    Glucose 150 (H) 65 - 99 mg/dL    BUN 48.0 (H) 8.0 - 23.0 mg/dL    Creatinine 2.22 (H) 0.57 - 1.00 mg/dL    Sodium 125 (L) 136 - 145 mmol/L    Potassium 4.8 3.5 - 5.2 mmol/L    Chloride 91 (L) 98 - 107 mmol/L    CO2 18.6 (L) 22.0 - 29.0 mmol/L    Calcium 9.5 8.6 - 10.5 mg/dL    Total Protein 6.8 6.0 - 8.5 g/dL    Albumin 4.0 3.5 - 5.2 g/dL    ALT (SGPT) 12 1 - 33 U/L    AST (SGOT) 21 1 - 32 U/L    Alkaline Phosphatase 86 39 - 117 U/L    Total Bilirubin 0.9 0.0 - 1.2 mg/dL    Globulin 2.8 gm/dL    A/G Ratio 1.4 g/dL    BUN/Creatinine Ratio 21.6 7.0 - 25.0    Anion Gap 15.4 (H) 5.0 - 15.0 mmol/L    eGFR 23.6 (L) >60.0 mL/min/1.73   Protime-INR    Collection Time: 06/27/25 12:44 PM    Specimen: Blood   Result Value Ref Range    Protime 21.0 (H) 11.7 - 14.2 Seconds    INR 1.80 (H) 0.90 - 1.10   aPTT    Collection Time: 06/27/25 12:44 PM    Specimen: Blood   Result Value Ref Range    PTT 29.0 22.7 - 35.4 seconds   Magnesium    Collection Time: 06/27/25 12:44 PM    Specimen: Blood   Result Value Ref Range    Magnesium 1.8 1.6 - 2.4 mg/dL   Procalcitonin    Collection Time: 06/27/25 12:44 PM    Specimen: Blood   Result Value Ref Range    Procalcitonin 0.32 (H) 0.00 - 0.25 ng/mL   Lactic Acid, Plasma    Collection Time: 06/27/25 12:44 PM    Specimen: Blood   Result Value Ref Range    Lactate 2.5 (C) 0.5 - 2.0 mmol/L   High Sensitivity Troponin T    Collection Time: 06/27/25 12:44 PM    Specimen: Blood    Result Value Ref Range    HS Troponin T 268 (C) <14 ng/L   CBC Auto Differential    Collection Time: 06/27/25 12:44 PM    Specimen: Blood   Result Value Ref Range    WBC 6.34 3.40 - 10.80 10*3/mm3    RBC 3.69 (L) 3.77 - 5.28 10*6/mm3    Hemoglobin 12.1 12.0 - 15.9 g/dL    Hematocrit 38.1 34.0 - 46.6 %    .3 (H) 79.0 - 97.0 fL    MCH 32.8 26.6 - 33.0 pg    MCHC 31.8 31.5 - 35.7 g/dL    RDW 14.1 12.3 - 15.4 %    RDW-SD 53.3 37.0 - 54.0 fl    MPV 9.9 6.0 - 12.0 fL    Platelets 183 140 - 450 10*3/mm3    Neutrophil % 81.6 (H) 42.7 - 76.0 %    Lymphocyte % 8.2 (L) 19.6 - 45.3 %    Monocyte % 9.0 5.0 - 12.0 %    Eosinophil % 0.3 0.3 - 6.2 %    Basophil % 0.3 0.0 - 1.5 %    Immature Grans % 0.6 (H) 0.0 - 0.5 %    Neutrophils, Absolute 5.17 1.70 - 7.00 10*3/mm3    Lymphocytes, Absolute 0.52 (L) 0.70 - 3.10 10*3/mm3    Monocytes, Absolute 0.57 0.10 - 0.90 10*3/mm3    Eosinophils, Absolute 0.02 0.00 - 0.40 10*3/mm3    Basophils, Absolute 0.02 0.00 - 0.20 10*3/mm3    Immature Grans, Absolute 0.04 0.00 - 0.05 10*3/mm3    nRBC 0.0 0.0 - 0.2 /100 WBC   BNP    Collection Time: 06/27/25 12:49 PM    Specimen: Blood   Result Value Ref Range    proBNP 2,909.0 (H) 0.0 - 900.0 pg/mL   Ethanol    Collection Time: 06/27/25 12:49 PM    Specimen: Blood   Result Value Ref Range    Ethanol <10 0 - 10 mg/dL    Ethanol % <0.010 %   Blood Gas, Venous -    Collection Time: 06/27/25  1:51 PM    Specimen: Venous Blood   Result Value Ref Range    pH, Venous 7.412 (H) 7.310 - 7.410 pH Units    pCO2, Venous 32.0 (L) 41.0 - 51.0 mm Hg    pO2, Venous 27.1 (L) 35.0 - 45.0 mm Hg    HCO3, Venous 20.4 (L) 22.0 - 28.0 mmol/L    Base Excess, Venous -3.4 (L) -2.0 - 2.0 mmol/L    O2 Saturation, Venous 52.3 45.0 - 75.0 %    Barometric Pressure for Blood Gas 750.5000 mmHg    Modality Room Air     Set [facility] Resp Rate 20     Device Comment sat 98%    High Sensitivity Troponin T 1Hr    Collection Time: 06/27/25  2:03 PM    Specimen: Blood   Result Value Ref  Range    HS Troponin T 729 (C) <14 ng/L    Troponin T Numeric Delta 461 (C) Abnormal if >/=3 ng/L    Troponin T % Delta 172 (C) Abnormal if >/= 20%   ECG 12 Lead Other; Weakness    Collection Time: 06/27/25  2:21 PM   Result Value Ref Range    QT Interval 366 ms    QTC Interval 446 ms       Radiology:  Imaging Results (Last 24 Hours)       Procedure Component Value Units Date/Time    XR Chest 1 View [[id number]] Collected: 06/27/25 1404     Updated: 06/27/25 1420    Narrative:      XR CHEST 1 VW-     HISTORY: Female who is 69 years-old, short of breath     TECHNIQUE: Frontal view of the chest     COMPARISON: 2/13/2025     FINDINGS: Heart, mediastinum and pulmonary vasculature are unremarkable.  Minimal left basilar atelectasis or infiltrate, follow-up as indications  persist. No large pleural effusion, or pneumothorax. No acute osseous  process.       Impression:      As described.     This report was finalized on 6/27/2025 2:17 PM by Dr. Herrera Mistry M.D on Workstation: [id number]       CT Head Without Contrast - In process [[id number]] Resulted: 06/27/25 1408     Updated: 06/27/25 1409    This result has not been signed. Information might be incomplete.                   ASSESSMENT:   New REBECCA: Likely due to hypotension from hypovolemia.  She has lost about 30 pounds since her hospitalization in February, looks dry on exam.  She had a previous REBECCA in February due to cardiorenal syndrome [facility] no evidence of acute CHF today    Hypotension  Hypovolemia  Chronic diastolic heart failure  Elevated troponin  Elevated lactate, likely due to hypotension with organ hypoperfusion, rule out acute infection  Immobility  Atrial fibrillation on anticoagulation with Eliquis      DISCUSSION/PLAN:   Agree with IV fluid bolus  Clinically dry on exam, hypotensiv with about a 30 pound weight loss since February  Hold diuretics  Will start on continuous normal saline  Plan is to admit to the ICU but currently no need for pressors  as her blood pressure is responding to the fluid bolus  Check bladder scan and urine electrolytes  Follow-up a.m. labs    Continue to monitor electrolytes and volume closely, avoid IV contrast and nephrotoxic medications     I appreciate the consult request.  Please send me a secure chat message with any nonurgent questions regarding patient care.  For any urgent patient care issues please call my office number below.      Baltazar Alcantar MD  [facility]  Office phone number: [phone]  Answering service phone number: [phone]      6/27/2025        Dictation via Dragon dictation software

## 2025-06-27 NOTE — CONSULTS
"[facility]    Patient Name: Marilu Avery  :1955  69 y.o.  LOS: 0  Encounter Provider: Yao Chen MD      Patient Care Team:  Esperanza Melton APRN as PCP - General (Nurse Practitioner)  Anatoly Jimenez MD as PCP - Family Medicine    Chief Complaint/Consult question: Troponin elevation, A-fib    PMH/HPI: 68 yo F with known history of A-fib who presents with fall at home.  She states that she has been feeling unwell for the several weeks and has gotten progressively more weak.  This eventually resulted in the fall at home.  Patient does not recall having syncope.  Workup in the ED was notable for REBECCA with Cr 2.2 and high-sensitivity troponin 266 -> 729.  EKG showed A-fib with low QRS voltage but no acute ischemic changes and adequate rate control in the 80s.    At the time of interview, patient was in no acute distress.  She sees a cardiologist at outside facility for A-fib which has been known for several years.  She takes Eliquis at home without obvious bleeding and thinks she has been started on flecainide although this is not confirmed on chart review.  She is not aware of having had a heart attack or stroke.  She smoked for at least 20 years at 1 pack/day but did quit several years ago.  Denies alcohol or substance use.    Objective   Vital Signs  Temp:  [97.3 °F (36.3 °C)-98.4 °F (36.9 °C)] 97.9 °F (36.6 °C)  Heart Rate:  [65-96] 74  Resp:  [18-22] 18  BP: ()/(53-80) 90/65    Intake/Output Summary (Last 24 hours) at 2025 4719  Last data filed at 2025 1556  Gross per 24 hour   Intake 1500 ml   Output --   Net 1500 ml     Flowsheet Rows      Flowsheet Row First Filed Value   Admission Height 177.8 cm (70\") Documented at 2025 1236   Admission Weight 104 kg (229 lb) Documented at 2025 1236              Vitals and nursing note reviewed.   Constitutional:       Appearance: Not in distress. Frail. Chronically ill-appearing.   Neck:      Vascular: No " "JVR.   Pulmonary:      Effort: Pulmonary effort is normal.      Breath sounds: Normal breath sounds. No wheezing. No rhonchi. No rales.   Cardiovascular:      Normal rate. Regular rhythm.      Murmurs: There is no murmur.   Edema:     Peripheral edema absent.   Abdominal:      General: There is no distension.      Palpations: Abdomen is soft.      Tenderness: There is no abdominal tenderness.      Hernia: A hernia is present. Hernia is present in the umbilical area and ventral area.      Comments: Very large central hernia.   Musculoskeletal:      Cervical back: Neck supple. Skin:     General: Skin is cool.   Neurological:      Mental Status: Alert and oriented to person, place and time.           Pertinent Test Results:  Results from last 7 days   Lab Units 06/27/25  1541 06/27/25  1244   SODIUM mmol/L 129* 125*   POTASSIUM mmol/L 4.3 4.8   CHLORIDE mmol/L 94* 91*   CO2 mmol/L 18.8* 18.6*   BUN mg/dL 50.0* 48.0*   CREATININE mg/dL 1.94* 2.22*   GLUCOSE mg/dL 108* 150*   CALCIUM mg/dL 8.8 9.5   AST (SGOT) U/L  --  21   ALT (SGPT) U/L  --  12     Results from last 7 days   Lab Units 06/27/25  1403 06/27/25  1244   CK TOTAL U/L 72  --    HSTROP T ng/L 729* 268*     Results from last 7 days   Lab Units 06/27/25  1244   WBC 10*3/mm3 6.34   HEMOGLOBIN g/dL 12.1   HEMATOCRIT % 38.1   PLATELETS 10*3/mm3 183     Results from last 7 days   Lab Units 06/27/25  1244   INR  1.80*   APTT seconds 29.0     Results from last 7 days   Lab Units 06/27/25  1244   MAGNESIUM mg/dL 1.8           Invalid input(s): \"LDLCALC\"  Results from last 7 days   Lab Units 06/27/25  1249   PROBNP pg/mL 2,909.0*     Results from last 7 days   Lab Units 06/27/25  1403   TSH uIU/mL 0.399           Medication Review:   apixaban, 5 mg, Oral, Q12H  budesonide-formoterol, 2 puff, Inhalation, BID - RT  famotidine, 20 mg, Oral, BID AC  [START ON 6/28/2025] levothyroxine, 75 mcg, Oral, Q AM  mupirocin, 1 Application, Each Nare, BID  sodium chloride, 10 mL, " Intravenous, Q12H         sodium chloride, 125 mL/hr, Last Rate: 125 mL/hr (06/27/25 9115)        Assessment & Plan     Active Hospital Problems    Diagnosis  POA    **Hypotension [I95.9]  Yes      Resolved Hospital Problems   No resolved problems to display.        Troponin elevation:  High-sensitivity troponin 266 -> 729 on admission.  EKG showed A-fib with low QRS voltage but no acute ischemic changes and adequate rate control in the 80s.  She actually denies chest pain, strongly suspect significant demand ischemia with some underlying disease but overall not consistent with ACS/type I MI.  We will arrange for echocardiogram but defer invasive catheterization at this time given absence of symptoms and higher procedural risk especially in the setting of REBECCA.  This may be rediscussed depending on clinical course  Chronic atrial fibrillation: CHADS2 Vasc score 2-3, currently on Eliquis at home.  Hold home Lopressor 50 bid given hypotension, dehydration and REBECCA, may resume depending on vitals trend.  ? CHF: Patient takes torsemide 20 daily at home although unclear about history of cardiomyopathy or CHF.  See above regarding echo, holding diuretic at this time.  Weakness/mechanical fall  REBECCA: Agree with fluid resuscitation, appreciate nephrology input.  Tobacco abuse: Sensitive smoking history but patient is seen remission.      Yao Chen MD  [facility]  06/27/25  17:49 EDT

## 2025-06-27 NOTE — H&P
[facility]    CC: weakness    HPI: Mrs. Avery is a 68yo female with multiple medical problems. She says over the past few days she has gotten progressively more weak in her lower extermities and overall. She has been having some shortness of breath on exertion as well, no chest pain. She has for sometime been unable to do  much for herself at home, unable to stand/get aound long enough to cook.  She is not having any back pain.  She has not had diarrhea, she is not using NSAIDS and has not had any new medications recently.  Evaluation in ER shows hypotension and REBECCA with hyponatremia.  Admission to ICU has been requested.    Past Medical History:   Diagnosis Date    Arthritis     Asthma     Atrial fibrillation     Colon polyps 2015    hyperplastic rectal polyp    Hyperlipidemia     Hypertension     LIAS on CPAP     Sleep apnea      Social History     Socioeconomic History    Marital status:    Tobacco Use    Smoking status: Every Day     Current packs/day: 1.00     Types: Cigarettes    Smokeless tobacco: Never    Tobacco comments:     admits to smoking since age 56    Vaping Use    Vaping status: Never Used   Substance and Sexual Activity    Alcohol use: Yes     Comment: 3 drinks daily Daily caffine    Drug use: Never    Sexual activity: Defer     Family History   Problem Relation Age of Onset    No Known Problems Mother     No Known Problems Father      MEDS: reviewed as per chart  AlL: nkda  ROS: 12 point negative except as in HPI    Vital Sign Min/Max for last 24 hours  Temp  Min: 97.3 °F (36.3 °C)  Max: 98.2 °F (36.8 °C)   BP  Min: 76/53  Max: 109/68   Pulse  Min: 65  Max: 96   Resp  Min: 18  Max: 20   SpO2  Min: 95 %  Max: 98 %   No data recorded   Weight  Min: 104 kg (229 lb)  Max: 104 kg (229 lb)     GEN:   appears ill, obese, A&O x3  HEENT: PERRL, EOMI, no icterus, mm  dry, no JVD, trachea midline, neck supple  CHEST: CTA bilat, no wheezes, no crackles, no use of accessory muscles  CV:  irrg, rate ok, no m/g/r  ABD: soft, nt, distended, hernia +bs, no hepatosplenomegaly  EXT: no c/c/ some mild pretibial edema  SKIN: no rashes, no xanthomas, decreased turgor  LYMPH: no palpable cervical or supraclavicular lymphadenopathy  NEURO: CN 2-12 intact and symmetric bilaterally  PSYCH: nl affect, nl orientation, nl judgment, nl mood  Skin warm, dry no rashes    Labs; 6/27: reviewed  Trop 268-->729  7.41/32 (vbg)  Bnp 2909  Glucose 150  Bun 48  Cr 2.2  Na 125  Bicarb 18  Inr 1.8  Procl 0.32  Lactate 2.5  Wbc 6  Hgb 12  Plts 183    6/27: CXR: nad to my read  6/27: CT head pending -- no large bleed to my read    A/P:  Weakness -- suspect multifactorial --  patient my ultimately need placement, will need pt/ot eval when bp improved  REBECCA -- suspect pre-renal will have nephrology to see, avoid nephrotoxins, support BP, check urine studies  Hyponatremia -- monitor rate of correction, nephrology to see, check urine studies, iv fluids for now  NSTEMI -- cards to see, have been notified by er and Dr. Heath has discussed with Dr. Rochelle Harrison -- can continue eliquis, hold metoprolol for now given hypotension  Hypothyroidism -- check tsh, continue home replacement  Asthma vs. Copd or overlap -- continue bronchodilators  Tobacco abuse  9. Hypotension -- iv fluids, pressor if needed. Check cortisol, tsh

## 2025-06-28 ENCOUNTER — APPOINTMENT (OUTPATIENT)
Dept: CARDIOLOGY | Facility: HOSPITAL | Age: 70
DRG: 286 | End: 2025-06-28
Payer: MEDICARE

## 2025-06-28 LAB
ALBUMIN SERPL-MCNC: 3.2 G/DL (ref 3.5–5.2)
ANION GAP SERPL CALCULATED.3IONS-SCNC: 13.5 MMOL/L (ref 5–15)
ANION GAP SERPL CALCULATED.3IONS-SCNC: 13.5 MMOL/L (ref 5–15)
ANION GAP SERPL CALCULATED.3IONS-SCNC: 14.1 MMOL/L (ref 5–15)
ANION GAP SERPL CALCULATED.3IONS-SCNC: 14.1 MMOL/L (ref 5–15)
ANION GAP SERPL CALCULATED.3IONS-SCNC: 14.5 MMOL/L (ref 5–15)
BASOPHILS # BLD AUTO: 0.02 10*3/MM3 (ref 0–0.2)
BASOPHILS NFR BLD AUTO: 0.4 % (ref 0–1.5)
BH CV ECHO MEAS - EDV(MOD-SP2): 110 ML
BH CV ECHO MEAS - EDV(MOD-SP4): 100 ML
BH CV ECHO MEAS - EF(MOD-SP2): 40 %
BH CV ECHO MEAS - EF(MOD-SP4): 44 %
BH CV ECHO MEAS - ESV(MOD-SP2): 66 ML
BH CV ECHO MEAS - ESV(MOD-SP4): 56 ML
BH CV ECHO MEAS - LAT PEAK E' VEL: 10.3 CM/SEC
BH CV ECHO MEAS - LV DIASTOLIC VOL/BSA (35-75): 44.9 CM2
BH CV ECHO MEAS - LV SYSTOLIC VOL/BSA (12-30): 25.1 CM2
BH CV ECHO MEAS - MED PEAK E' VEL: 10.1 CM/SEC
BH CV ECHO MEAS - MV DEC SLOPE: 696.6 CM/SEC2
BH CV ECHO MEAS - MV DEC TIME: 0.2 SEC
BH CV ECHO MEAS - MV E MAX VEL: 73.5 CM/SEC
BH CV ECHO MEAS - MV MAX PG: 4.7 MMHG
BH CV ECHO MEAS - MV MEAN PG: 0.95 MMHG
BH CV ECHO MEAS - MV P1/2T: 47.2 MSEC
BH CV ECHO MEAS - MV V2 VTI: 23.3 CM
BH CV ECHO MEAS - MVA(P1/2T): 4.7 CM2
BH CV ECHO MEAS - RAP SYSTOLE: 15 MMHG
BH CV ECHO MEAS - RVSP: 59 MMHG
BH CV ECHO MEAS - SV(MOD-SP2): 44 ML
BH CV ECHO MEAS - SV(MOD-SP4): 44 ML
BH CV ECHO MEAS - SVI(MOD-SP2): 19.8 ML/M2
BH CV ECHO MEAS - SVI(MOD-SP4): 19.8 ML/M2
BH CV ECHO MEAS - TAPSE (>1.6): 1.41 CM
BH CV ECHO MEAS - TR MAX PG: 44 MMHG
BH CV ECHO MEAS - TR MAX VEL: 331.8 CM/SEC
BH CV ECHO MEASUREMENTS AVERAGE E/E' RATIO: 7.21
BH CV XLRA - TDI S': 6.5 CM/SEC
BUN SERPL-MCNC: 44 MG/DL (ref 8–23)
BUN SERPL-MCNC: 45 MG/DL (ref 8–23)
BUN SERPL-MCNC: 48 MG/DL (ref 8–23)
BUN SERPL-MCNC: 48 MG/DL (ref 8–23)
BUN SERPL-MCNC: 49 MG/DL (ref 8–23)
BUN/CREAT SERPL: 23.2 (ref 7–25)
BUN/CREAT SERPL: 25.5 (ref 7–25)
BUN/CREAT SERPL: 25.6 (ref 7–25)
BUN/CREAT SERPL: 26.8 (ref 7–25)
BUN/CREAT SERPL: 26.8 (ref 7–25)
CALCIUM SPEC-SCNC: 8.4 MG/DL (ref 8.6–10.5)
CALCIUM SPEC-SCNC: 8.5 MG/DL (ref 8.6–10.5)
CALCIUM SPEC-SCNC: 8.5 MG/DL (ref 8.6–10.5)
CALCIUM SPEC-SCNC: 8.9 MG/DL (ref 8.6–10.5)
CALCIUM SPEC-SCNC: 8.9 MG/DL (ref 8.6–10.5)
CHLORIDE SERPL-SCNC: 102 MMOL/L (ref 98–107)
CHLORIDE SERPL-SCNC: 102 MMOL/L (ref 98–107)
CHLORIDE SERPL-SCNC: 104 MMOL/L (ref 98–107)
CHLORIDE SERPL-SCNC: 105 MMOL/L (ref 98–107)
CHLORIDE SERPL-SCNC: 99 MMOL/L (ref 98–107)
CO2 SERPL-SCNC: 16.9 MMOL/L (ref 22–29)
CO2 SERPL-SCNC: 16.9 MMOL/L (ref 22–29)
CO2 SERPL-SCNC: 17.5 MMOL/L (ref 22–29)
CREAT SERPL-MCNC: 1.76 MG/DL (ref 0.57–1)
CREAT SERPL-MCNC: 1.79 MG/DL (ref 0.57–1)
CREAT SERPL-MCNC: 1.79 MG/DL (ref 0.57–1)
CREAT SERPL-MCNC: 1.9 MG/DL (ref 0.57–1)
CREAT SERPL-MCNC: 1.92 MG/DL (ref 0.57–1)
DEPRECATED RDW RBC AUTO: 53.4 FL (ref 37–54)
EGFRCR SERPLBLD CKD-EPI 2021: 28.1 ML/MIN/1.73
EGFRCR SERPLBLD CKD-EPI 2021: 28.5 ML/MIN/1.73
EGFRCR SERPLBLD CKD-EPI 2021: 30.6 ML/MIN/1.73
EGFRCR SERPLBLD CKD-EPI 2021: 30.6 ML/MIN/1.73
EGFRCR SERPLBLD CKD-EPI 2021: 31.2 ML/MIN/1.73
EOSINOPHIL # BLD AUTO: 0.02 10*3/MM3 (ref 0–0.4)
EOSINOPHIL NFR BLD AUTO: 0.4 % (ref 0.3–6.2)
ERYTHROCYTE [DISTWIDTH] IN BLOOD BY AUTOMATED COUNT: 14.6 % (ref 12.3–15.4)
GLUCOSE BLDC GLUCOMTR-MCNC: 113 MG/DL (ref 70–130)
GLUCOSE BLDC GLUCOMTR-MCNC: 129 MG/DL (ref 70–130)
GLUCOSE BLDC GLUCOMTR-MCNC: 161 MG/DL (ref 70–130)
GLUCOSE BLDC GLUCOMTR-MCNC: 168 MG/DL (ref 70–130)
GLUCOSE SERPL-MCNC: 119 MG/DL (ref 65–99)
GLUCOSE SERPL-MCNC: 119 MG/DL (ref 65–99)
GLUCOSE SERPL-MCNC: 131 MG/DL (ref 65–99)
GLUCOSE SERPL-MCNC: 135 MG/DL (ref 65–99)
GLUCOSE SERPL-MCNC: 164 MG/DL (ref 65–99)
HCT VFR BLD AUTO: 31.7 % (ref 34–46.6)
HGB BLD-MCNC: 10.6 G/DL (ref 12–15.9)
IMM GRANULOCYTES # BLD AUTO: 0.04 10*3/MM3 (ref 0–0.05)
IMM GRANULOCYTES NFR BLD AUTO: 0.7 % (ref 0–0.5)
LV EF BIPLANE MOD: 45 %
LYMPHOCYTES # BLD AUTO: 0.53 10*3/MM3 (ref 0.7–3.1)
LYMPHOCYTES NFR BLD AUTO: 9.3 % (ref 19.6–45.3)
MCH RBC QN AUTO: 33.3 PG (ref 26.6–33)
MCHC RBC AUTO-ENTMCNC: 33.4 G/DL (ref 31.5–35.7)
MCV RBC AUTO: 99.7 FL (ref 79–97)
MONOCYTES # BLD AUTO: 0.55 10*3/MM3 (ref 0.1–0.9)
MONOCYTES NFR BLD AUTO: 9.7 % (ref 5–12)
NEUTROPHILS NFR BLD AUTO: 4.52 10*3/MM3 (ref 1.7–7)
NEUTROPHILS NFR BLD AUTO: 79.5 % (ref 42.7–76)
NRBC BLD AUTO-RTO: 0 /100 WBC (ref 0–0.2)
PHOSPHATE SERPL-MCNC: 3.4 MG/DL (ref 2.5–4.5)
PLATELET # BLD AUTO: 132 10*3/MM3 (ref 140–450)
PMV BLD AUTO: 10.1 FL (ref 6–12)
POTASSIUM SERPL-SCNC: 4.1 MMOL/L (ref 3.5–5.2)
POTASSIUM SERPL-SCNC: 4.3 MMOL/L (ref 3.5–5.2)
RBC # BLD AUTO: 3.18 10*6/MM3 (ref 3.77–5.28)
SODIUM SERPL-SCNC: 131 MMOL/L (ref 136–145)
SODIUM SERPL-SCNC: 133 MMOL/L (ref 136–145)
SODIUM SERPL-SCNC: 133 MMOL/L (ref 136–145)
SODIUM SERPL-SCNC: 135 MMOL/L (ref 136–145)
SODIUM SERPL-SCNC: 136 MMOL/L (ref 136–145)
TROPONIN T SERPL HS-MCNC: 475 NG/L
WBC NRBC COR # BLD AUTO: 5.68 10*3/MM3 (ref 3.4–10.8)

## 2025-06-28 PROCEDURE — 93005 ELECTROCARDIOGRAM TRACING: CPT | Performed by: INTERNAL MEDICINE

## 2025-06-28 PROCEDURE — 93010 ELECTROCARDIOGRAM REPORT: CPT | Performed by: INTERNAL MEDICINE

## 2025-06-28 PROCEDURE — 80069 RENAL FUNCTION PANEL: CPT | Performed by: INTERNAL MEDICINE

## 2025-06-28 PROCEDURE — 25510000001 PERFLUTREN 6.52 MG/ML SUSPENSION 2 ML VIAL: Performed by: INTERNAL MEDICINE

## 2025-06-28 PROCEDURE — 25010000002 ENOXAPARIN PER 10 MG: Performed by: INTERNAL MEDICINE

## 2025-06-28 PROCEDURE — 93308 TTE F-UP OR LMTD: CPT | Performed by: INTERNAL MEDICINE

## 2025-06-28 PROCEDURE — 93325 DOPPLER ECHO COLOR FLOW MAPG: CPT | Performed by: INTERNAL MEDICINE

## 2025-06-28 PROCEDURE — 97162 PT EVAL MOD COMPLEX 30 MIN: CPT

## 2025-06-28 PROCEDURE — 94799 UNLISTED PULMONARY SVC/PX: CPT

## 2025-06-28 PROCEDURE — 82948 REAGENT STRIP/BLOOD GLUCOSE: CPT

## 2025-06-28 PROCEDURE — 97530 THERAPEUTIC ACTIVITIES: CPT

## 2025-06-28 PROCEDURE — 80048 BASIC METABOLIC PNL TOTAL CA: CPT | Performed by: INTERNAL MEDICINE

## 2025-06-28 PROCEDURE — 93321 DOPPLER ECHO F-UP/LMTD STD: CPT

## 2025-06-28 PROCEDURE — 84484 ASSAY OF TROPONIN QUANT: CPT | Performed by: INTERNAL MEDICINE

## 2025-06-28 PROCEDURE — 99232 SBSQ HOSP IP/OBS MODERATE 35: CPT | Performed by: INTERNAL MEDICINE

## 2025-06-28 PROCEDURE — 85025 COMPLETE CBC W/AUTO DIFF WBC: CPT | Performed by: INTERNAL MEDICINE

## 2025-06-28 PROCEDURE — 93321 DOPPLER ECHO F-UP/LMTD STD: CPT | Performed by: INTERNAL MEDICINE

## 2025-06-28 PROCEDURE — 93308 TTE F-UP OR LMTD: CPT

## 2025-06-28 PROCEDURE — 93325 DOPPLER ECHO COLOR FLOW MAPG: CPT

## 2025-06-28 PROCEDURE — 36415 COLL VENOUS BLD VENIPUNCTURE: CPT | Performed by: INTERNAL MEDICINE

## 2025-06-28 RX ORDER — METOPROLOL TARTRATE 25 MG/1
12.5 TABLET, FILM COATED ORAL EVERY 12 HOURS SCHEDULED
Status: DISCONTINUED | OUTPATIENT
Start: 2025-06-28 | End: 2025-06-29

## 2025-06-28 RX ORDER — ENOXAPARIN SODIUM 150 MG/ML
1 INJECTION SUBCUTANEOUS EVERY 12 HOURS
Status: DISCONTINUED | OUTPATIENT
Start: 2025-06-28 | End: 2025-06-30

## 2025-06-28 RX ORDER — FAMOTIDINE 20 MG/1
20 TABLET, FILM COATED ORAL
Status: DISCONTINUED | OUTPATIENT
Start: 2025-06-29 | End: 2025-06-30

## 2025-06-28 RX ORDER — HYDROXYZINE HYDROCHLORIDE 25 MG/1
25 TABLET, FILM COATED ORAL 3 TIMES DAILY PRN
Status: DISCONTINUED | OUTPATIENT
Start: 2025-06-28 | End: 2025-07-07 | Stop reason: HOSPADM

## 2025-06-28 RX ORDER — METOPROLOL TARTRATE 25 MG/1
25 TABLET, FILM COATED ORAL EVERY 12 HOURS SCHEDULED
Status: DISCONTINUED | OUTPATIENT
Start: 2025-06-28 | End: 2025-06-28

## 2025-06-28 RX ADMIN — ACETAMINOPHEN 650 MG: 325 TABLET, FILM COATED ORAL at 21:54

## 2025-06-28 RX ADMIN — MUPIROCIN 1 APPLICATION: 20 OINTMENT TOPICAL at 08:23

## 2025-06-28 RX ADMIN — MUPIROCIN 1 APPLICATION: 20 OINTMENT TOPICAL at 21:53

## 2025-06-28 RX ADMIN — ACETAMINOPHEN 650 MG: 325 TABLET, FILM COATED ORAL at 17:16

## 2025-06-28 RX ADMIN — APIXABAN 5 MG: 5 TABLET, FILM COATED ORAL at 08:23

## 2025-06-28 RX ADMIN — LEVOTHYROXINE SODIUM 75 MCG: 0.07 TABLET ORAL at 08:26

## 2025-06-28 RX ADMIN — BUDESONIDE AND FORMOTEROL FUMARATE DIHYDRATE 2 PUFF: 160; 4.5 AEROSOL RESPIRATORY (INHALATION) at 17:15

## 2025-06-28 RX ADMIN — PERFLUTREN 3 ML: 6.52 INJECTION, SUSPENSION INTRAVENOUS at 11:34

## 2025-06-28 RX ADMIN — METOPROLOL TARTRATE 12.5 MG: 25 TABLET, FILM COATED ORAL at 21:55

## 2025-06-28 RX ADMIN — Medication 10 ML: at 21:54

## 2025-06-28 RX ADMIN — ENOXAPARIN SODIUM 105 MG: 150 INJECTION SUBCUTANEOUS at 21:54

## 2025-06-28 RX ADMIN — Medication 10 ML: at 08:23

## 2025-06-28 RX ADMIN — FAMOTIDINE 20 MG: 20 TABLET, FILM COATED ORAL at 08:23

## 2025-06-28 RX ADMIN — HYDROXYZINE HYDROCHLORIDE 25 MG: 25 TABLET, FILM COATED ORAL at 21:53

## 2025-06-28 NOTE — PLAN OF CARE
Goal Outcome Evaluation:  Plan of Care Reviewed With: patient           Outcome Evaluation: Pt is a 68 yo female admitted for generalized weakness and a fall at home. Pt lives alone, has a stair lift and uses a rollator for household mobility. Today, pt demonstrates generalized weakness, impaired balance, decreased activity tolerance and general functional mobility deficits below her baseline. She completed bed mobility with min A, STS with min A and ambulated a few lateral steps along EOB with min A and walker. Pt limited by fatigue and SOA. PT recommending d/c to SNF as pt not safe to return home alone at her current functional status.    Anticipated Discharge Disposition (PT): skilled nursing facility

## 2025-06-28 NOTE — CONSULTS
Inpatient Hospitalist Consult  Consult performed by: Michael Ball MD  Consult ordered by: Anatoly Mcdaniel MD      Date of Admit: 6/27/2025  Date of Consult: 06/28/25    Subjective   69 y.o. female with past medical history significant for asthma, atrial fibrillation, tobacco abuse, seen in the hospital, with weakness and hypotension, which has improved with IV fluid resuscitation, patient did not require IV pressor support.  Patient was monitored in ICU, is now ready to be moved out of ICU.  Patient is also followed by pulmonary critical care team as well as cardiology team.    Review of Systems   Constitutional: Negative for activity change and appetite change.   HENT: Negative for congestion and dental problem.    Eyes: Negative for discharge and itching.   Respiratory: Negative for apnea and chest tightness.    Cardiovascular: Negative for chest pain and palpitations.   Gastrointestinal: Negative for abdominal distention and abdominal pain.   Endocrine: Negative for cold intolerance and heat intolerance.   Genitourinary: Negative for difficulty urinating and frequency.   Musculoskeletal: Negative for arthralgias and back pain.   Skin: Negative for color change and pallor.   Allergic/Immunologic: Negative for environmental allergies and food allergies.   Neurological: Negative for dizziness and facial asymmetry.   Hematological: Negative for adenopathy. Does not bruise/bleed easily.   Psychiatric/Behavioral: Negative for agitation and suicidal ideas.     Physical examination  General Appearance:    Alert, cooperative, no distress, appears stated age.  Head:    Normocephalic, without obvious abnormality, atraumatic  Eyes:    PERRL, conjunctiva/corneas clear, EOM's intact, both eyes  Ears:    Normal external ear canals, both ears  Nose:   Nares normal, septum midline, mucosa normal, no drainage    or sinus tenderness  Throat:   Lips, tongue, gums normal; oral mucosa pink and moist  Neck:   Supple,  symmetrical, trachea midline, no adenopathy;     thyroid:  no enlargement/tenderness/nodules; no carotid    bruit or JVD  Back:     Symmetric, no curvature, ROM normal, no CVA tenderness  Lungs:     Clear to auscultation bilaterally, respirations unlabored  Chest Wall:    No tenderness or deformity   Heart:    Regular rate and rhythm, S1 and S2 normal, no murmur, rub   or gallop  Abdomen:    Soft nontender no organomegaly detected  Extremities:   Extremities normal, atraumatic, no cyanosis or edema  Pulses:   Pulses palpable in all extremities; symmetric all extremities  Skin:   Skin color normal, Skin is warm and dry,  no rashes or palpable lesions  Neurologic:   CNII-XII intact, motor strength grossly intact, sensation grossly intact to light touch, no focal deficits noted         Past Medical History:   Diagnosis Date    Arthritis     Asthma     Atrial fibrillation     Colon polyps 2015    hyperplastic rectal polyp    Hyperlipidemia     Hypertension     LISA on CPAP     Sleep apnea      Past Surgical History:   Procedure Laterality Date    BACK SURGERY N/A 2001    COLONOSCOPY N/A 10/22/2015    Rectal polyp-Dr. Elías Keating    HYSTERECTOMY N/A 2000    LAPAROSCOPIC CHOLECYSTECTOMY N/A 01/04/2010    Dr. Heath Whitlock    OOPHORECTOMY  2001    REPLACEMENT TOTAL KNEE Left 06/22/2015    Dr. Yo Aguayo    REPLACEMENT TOTAL KNEE Right 02/26/2015    Dr. Yo Aguayo    VENTRAL HERNIA REPAIR N/A 10/22/2015    Open reduction of incarcerated ventral hernia with layered closure-Dr. Elías Keating    VENTRAL/INCISIONAL HERNIA REPAIR N/A 6/14/2021    Procedure: OPEN VENTRAL/INCISIONAL HERNIA REPAIR WITH MESH AND APPENDECTOMY;  Surgeon: Arnulfo Leonard MD;  Location: [facility];  Service: General;  Laterality: N/A;       Family History   Problem Relation Age of Onset    No Known Problems Mother     No Known Problems Father      Social History     Tobacco Use    Smoking status: Every Day     Current packs/day: 1.00      Types: Cigarettes    Smokeless tobacco: Never    Tobacco comments:     admits to smoking since age 56    Vaping Use    Vaping status: Never Used   Substance Use Topics    Alcohol use: Yes     Comment: 3 drinks daily Daily caffine    Drug use: Never     Objective      Vital Signs  Temp:  [97.4 °F (36.3 °C)-98 °F (36.7 °C)] 97.7 °F (36.5 °C)  Heart Rate:  [54-96] 96  Resp:  [18] 18  BP: ()/(44-87) 115/79        Assessment & Plan   Active Hospital Problems    Diagnosis  POA    **Hypotension [I95.9]  Yes      Resolved Hospital Problems   No resolved problems to display.     Patient is a 69 y.o. female    Assessment and plan  Symptomatic hypotension, resolved with IV fluids, after fluid resuscitation.  Patient did not require pressor support.  Asthma/COPD, chronic condition, currently not in exacerbation.  Atrial fibrillation, patient is currently rate controlled.  Cardiology on board and following.  Hypothyroidism, chronic condition.  CODE STATUS is full code.  Further plans will be based on hospital course.        Michael Ball MD  [facility]  06/28/25 17:20 EDT

## 2025-06-28 NOTE — THERAPY EVALUATION
Patient Name: Marilu Avery  : 1955    MRN: [id number]                              Today's Date: 2025       Admit Date: 2025    Visit Dx:     ICD-10-CM ICD-9-CM   1. Hypotension, unspecified hypotension type  I95.9 458.9   2. Near syncope  R55 780.2   3. Acute kidney injury  N17.9 584.9   4. Elevated troponin  R79.89 790.6   5. Longstanding persistent atrial fibrillation  I48.11 427.31   6. Coagulopathy: Eliquis induced  D68.9 286.9     Patient Active Problem List   Diagnosis    Incisional hernia, without obstruction or gangrene    Incarcerated ventral hernia    Atrial fibrillation, persistent    HLD (hyperlipidemia)    HTN (hypertension)    LSIA (obstructive sleep apnea)    Stage 3b chronic kidney disease    Chronic anticoagulation    SBO (small bowel obstruction)    Tobacco abuse    Morbid obesity with BMI of 40.0-44.9, adult    COPD (chronic obstructive pulmonary disease)    Hypopotassemia    Gout    Chronic anticoagulation    Prolonged Q-T interval on ECG    REBECCA (acute kidney injury)    Acute on chronic diastolic (congestive) heart failure    Hypotension     Past Medical History:   Diagnosis Date    Arthritis     Asthma     Atrial fibrillation     Colon polyps 2015    hyperplastic rectal polyp    Hyperlipidemia     Hypertension     LISA on CPAP     Sleep apnea      Past Surgical History:   Procedure Laterality Date    BACK SURGERY N/A     COLONOSCOPY N/A 10/22/2015    Rectal polyp-Dr. Elías Keating    HYSTERECTOMY N/A     LAPAROSCOPIC CHOLECYSTECTOMY N/A 2010    Dr. Heath Whitlock    OOPHORECTOMY  2001    REPLACEMENT TOTAL KNEE Left 2015    Dr. Yo Aguayo    REPLACEMENT TOTAL KNEE Right 2015    Dr. Yo Aguayo    VENTRAL HERNIA REPAIR N/A 10/22/2015    Open reduction of incarcerated ventral hernia with layered closure-Dr. Elías Keating    VENTRAL/INCISIONAL HERNIA REPAIR N/A 2021    Procedure: OPEN VENTRAL/INCISIONAL HERNIA REPAIR WITH MESH AND APPENDECTOMY;   Surgeon: Arnulfo Leonard MD;  Location[facility] MAIN OR;  Service: General;  Laterality: N/A;      General Information       Row Name 06/28/25 1237          Physical Therapy Time and Intention    Document Type evaluation  -CW     Mode of Treatment individual therapy;physical therapy  -CW       Row Name 06/28/25 1237          General Information    Patient Profile Reviewed yes  -CW     Prior Level of Function independent:;transfer;all household mobility;bed mobility  lift chair, walker, reports progressive weakness at home  -CW     Existing Precautions/Restrictions fall  -CW     Barriers to Rehab medically complex;previous functional deficit  -CW       Row Name 06/28/25 1237          Living Environment    Current Living Arrangements home  -CW     People in Home alone  -CW       Row Name 06/28/25 1237          Stairs Within Home, Primary    Number of Stairs, Within Home, Primary none  -CW       Row Name 06/28/25 1237          Cognition    Orientation Status (Cognition) oriented x 3  -CW       Row Name 06/28/25 1237          Safety Issues/Impairments Affecting Functional Mobility    Safety Issues Affecting Function (Mobility) insight into deficits/self-awareness  -CW     Impairments Affecting Function (Mobility) endurance/activity tolerance;strength;balance  -CW               User Key  (r) = Recorded By, (t) = Taken By, (c) = Cosigned By      Initials Name Provider Type    CW Rachael Guerrero PT Physical Therapist                   Mobility       Row Name 06/28/25 1240          Bed Mobility    Bed Mobility supine-sit;sit-supine  -CW     Supine-Sit [place] (Bed Mobility) minimum assist (75% patient effort);verbal cues;nonverbal cues (demo/gesture)  -CW     Sit-Supine [place] (Bed Mobility) minimum assist (75% patient effort);verbal cues;nonverbal cues (demo/gesture)  -CW     Assistive Device (Bed Mobility) head of bed elevated  -CW       Row Name 06/28/25 1240          Sit-Stand Transfer    Sit-Stand  [place] (Transfers) minimum assist (75% patient effort);1 person assist;verbal cues  -CW     Assistive Device (Sit-Stand Transfers) walker, front-wheeled  -CW     Comment, (Sit-Stand Transfer) EOB elevated per pt request  -CW       Row Name 06/28/25 1240          Gait/Stairs (Locomotion)    [place] Level (Gait) minimum assist (75% patient effort);1 person assist;verbal cues  -CW     Assistive Device (Gait) walker, front-wheeled  -CW     Distance in Feet (Gait) 3  -CW     Deviations/Abnormal Patterns (Gait) faith decreased;gait speed decreased;stride length decreased  -CW     Bilateral Gait Deviations forward flexed posture  -CW     Comment, (Gait/Stairs) lateral steps towards HOB, pt declined to ambulate further due to fatigue  -CW               User Key  (r) = Recorded By, (t) = Taken By, (c) = Cosigned By      Initials Name Provider Type    Rachael Harp PT Physical Therapist                   Obj/Interventions       Row Name 06/28/25 1240          Range of Motion Comprehensive    General Range of Motion bilateral lower extremity ROM WFL  -CW       Row Name 06/28/25 1240          Strength Comprehensive (MMT)    Comment, General Manual Muscle Testing (MMT) Assessment Generalized weakness BLE, no focal deficits  -CW       Row Name 06/28/25 1240          Balance    Balance Assessment standing static balance;standing dynamic balance;sitting static balance  -CW     Static Sitting Balance standby assist  -CW     Position, Sitting Balance sitting edge of bed  -CW     Static Standing Balance minimal assist  -CW     Dynamic Standing Balance minimal assist  -CW     Position/Device Used, Standing Balance supported;walker, front-wheeled  -CW               User Key  (r) = Recorded By, (t) = Taken By, (c) = Cosigned By      Initials Name Provider Type    Rachael Harp PT Physical Therapist                   Goals/Plan       Row Name 06/28/25 1242          Bed Mobility Goal 1 (PT)     Activity/Assistive Device (Bed Mobility Goal 1, PT) bed mobility activities, all  -CW     [place] Level/Cues Needed (Bed Mobility Goal 1, PT) modified independence  -CW     Time Frame (Bed Mobility Goal 1, PT) 2 weeks  -CW       Row Name 06/28/25 1242          Transfer Goal 1 (PT)    Activity/Assistive Device (Transfer Goal 1, PT) sit-to-stand/stand-to-sit;bed-to-chair/chair-to-bed  -CW     [place] Level/Cues Needed (Transfer Goal 1, PT) supervision required  -CW     Time Frame (Transfer Goal 1, PT) 2 weeks  -CW       Row Name 06/28/25 1242          Gait Training Goal 1 (PT)    Activity/Assistive Device (Gait Training Goal 1, PT) gait (walking locomotion)  -CW     [place] Level (Gait Training Goal 1, PT) supervision required  -CW     Distance (Gait Training Goal 1, PT) 100  -CW     Time Frame (Gait Training Goal 1, PT) 2 weeks  -CW       Row Name 06/28/25 1242          Therapy Assessment/Plan (PT)    Planned Therapy Interventions (PT) balance training;bed mobility training;gait training;strengthening;patient/family education;transfer training  -CW               User Key  (r) = Recorded By, (t) = Taken By, (c) = Cosigned By      Initials Name Provider Type    Rachael Harp, PT Physical Therapist                   Clinical Impression       Row Name 06/28/25 1244          Pain    Pretreatment Pain Rating 0/10 - no pain  -CW     Posttreatment Pain Rating 0/10 - no pain  -CW       Row Name 06/28/25 1244          Plan of Care Review    Plan of Care Reviewed With patient  -CW     Outcome Evaluation Pt is a 68 yo female admitted for generalized weakness and a fall at home. Pt lives alone, has a stair lift and uses a rollator for household mobility. Today, pt demonstrates generalized weakness, impaired balance, decreased activity tolerance and general functional mobility deficits below her baseline. She completed bed mobility with min A, STS with min A and ambulated a few lateral steps along EOB with  min A and walker. Pt limited by fatigue and SOA. PT recommending d/c to SNF as pt not safe to return home alone at her current functional status.  -CW       Row Name 06/28/25 1244          Therapy Assessment/Plan (PT)    Rehab Potential (PT) good  -CW     Criteria for Skilled Interventions Met (PT) yes  -CW     Therapy Frequency (PT) 5 times/wk  -CW       Row Name 06/28/25 1244          Vital Signs    O2 Delivery Pre Treatment room air  -CW       Row Name 06/28/25 1244          Positioning and Restraints    Pre-Treatment Position in bed  -CW     Post Treatment Position bed  -CW     In Bed notified nsg;call light within reach;encouraged to call for assist;exit alarm on;fowlers;side rails up x3  -CW               User Key  (r) = Recorded By, (t) = Taken By, (c) = Cosigned By      Initials Name Provider Type    CW Rachael Guerrero, PT Physical Therapist                   Outcome Measures       Row Name 06/28/25 1500 06/28/25 1400       How much help from another person do you currently need...    Turning from your back to your side while in flat bed without using bedrails? 4  -KB 4  -KB    Moving from lying on back to sitting on the side of a flat bed without bedrails? 4  -KB 4  -KB    Moving to and from a bed to a chair (including a wheelchair)? 3  -KB 3  -KB    Standing up from a chair using your arms (e.g., wheelchair, bedside chair)? 3  -KB 3  -KB    Climbing 3-5 steps with a railing? 2  -KB 2  -KB    To walk in hospital room? 2  -KB 2  -KB    AM-PAC 6 Clicks Score (PT) 18  -KB 18  -KB    Highest Level of Mobility Goal Walk 10 Steps or More-6  -KB Walk 10 Steps or More-6  -KB      Row Name 06/28/25 1300 06/28/25 1243       How much help from another person do you currently need...    Turning from your back to your side while in flat bed without using bedrails? 4  -KB 4  -CW    Moving from lying on back to sitting on the side of a flat bed without bedrails? 4  -KB 3  -CW    Moving to and from a bed to a chair  (including a wheelchair)? 3  -KB 3  -CW    Standing up from a chair using your arms (e.g., wheelchair, bedside chair)? 3  -KB 3  -CW    Climbing 3-5 steps with a railing? 2  -KB 2  -CW    To walk in hospital room? 2  -KB 3  -CW    AM-PAC 6 Clicks Score (PT) 18  -KB 18  -CW    Highest Level of Mobility Goal Walk 10 Steps or More-6  -KB Walk 10 Steps or More-6  -CW      Row Name 06/28/25 1200 06/28/25 1100       How much help from another person do you currently need...    Turning from your back to your side while in flat bed without using bedrails? 4  -KB 4  -KB    Moving from lying on back to sitting on the side of a flat bed without bedrails? 4  -KB 4  -KB    Moving to and from a bed to a chair (including a wheelchair)? 3  -KB 3  -KB    Standing up from a chair using your arms (e.g., wheelchair, bedside chair)? 3  -KB 3  -KB    Climbing 3-5 steps with a railing? 2  -KB 2  -KB    To walk in hospital room? 2  -KB 2  -KB    AM-PAC 6 Clicks Score (PT) 18  -KB 18  -KB    Highest Level of Mobility Goal Walk 10 Steps or More-6  -KB Walk 10 Steps or More-6  -KB      Row Name 06/28/25 1000 06/28/25 0900       How much help from another person do you currently need...    Turning from your back to your side while in flat bed without using bedrails? 4  -KB 4  -KB    Moving from lying on back to sitting on the side of a flat bed without bedrails? 4  -KB 4  -KB    Moving to and from a bed to a chair (including a wheelchair)? 3  -KB 3  -KB    Standing up from a chair using your arms (e.g., wheelchair, bedside chair)? 3  -KB 3  -KB    Climbing 3-5 steps with a railing? 2  -KB 2  -KB    To walk in hospital room? 2  -KB 2  -KB    AM-PAC 6 Clicks Score (PT) 18  -KB 18  -KB    Highest Level of Mobility Goal Walk 10 Steps or More-6  -KB Walk 10 Steps or More-6  -KB      Row Name 06/28/25 0800 06/28/25 0700       How much help from another person do you currently need...    Turning from your back to your side while in flat bed without  using bedrails? 4  -KB 4  -KB    Moving from lying on back to sitting on the side of a flat bed without bedrails? 4  -KB 4  -KB    Moving to and from a bed to a chair (including a wheelchair)? 3  -KB 3  -KB    Standing up from a chair using your arms (e.g., wheelchair, bedside chair)? 3  -KB 3  -KB    Climbing 3-5 steps with a railing? 2  -KB 2  -KB    To walk in hospital room? 2  -KB 2  -KB    AM-PAC 6 Clicks Score (PT) 18  -KB 18  -KB    Highest Level of Mobility Goal Walk 10 Steps or More-6  -KB Walk 10 Steps or More-6  -KB      Row Name 06/28/25 1243          Functional Assessment    Outcome Measure Options AM-PAC 6 Clicks Basic Mobility (PT)  -               User Key  (r) = Recorded By, (t) = Taken By, (c) = Cosigned By      Initials Name Provider Type    KB Patti Chau, RN Registered Nurse    Rachael Harp, CHUCK Physical Therapist                                 Physical Therapy Education       Title: PT OT SLP Therapies (In Progress)       Topic: Physical Therapy (In Progress)       Point: Mobility training (Done)       Learning Progress Summary            Patient Acceptance, E, NR,VU by  at 6/28/2025 1244                      Point: Home exercise program (Not Started)       Learner Progress:  Not documented in this visit.              Point: Body mechanics (Not Started)       Learner Progress:  Not documented in this visit.              Point: Precautions (Not Started)       Learner Progress:  Not documented in this visit.                              User Key       Initials Effective Dates Name Provider Type Discipline     12/13/22 -  Rachael Guerrero PT Physical Therapist PT                  PT Recommendation and Plan  Planned Therapy Interventions (PT): balance training, bed mobility training, gait training, strengthening, patient/family education, transfer training  Outcome Evaluation: Pt is a 68 yo female admitted for generalized weakness and a fall at home. Pt lives alone, has a stair  lift and uses a rollator for household mobility. Today, pt demonstrates generalized weakness, impaired balance, decreased activity tolerance and general functional mobility deficits below her baseline. She completed bed mobility with min A, STS with min A and ambulated a few lateral steps along EOB with min A and walker. Pt limited by fatigue and SOA. PT recommending d/c to SNF as pt not safe to return home alone at her current functional status.     Time Calculation:         PT Charges       Row Name 06/28/25 1236             Time Calculation    Start Time 1136  -CW      Stop Time 1149  -CW      Time Calculation (min) 13 min  -CW      PT Received On 06/28/25  -CW      PT - Next Appointment 06/30/25  -CW      PT Goal Re-Cert Due Date 07/12/25  -CW         Time Calculation- PT    Total Timed Code Minutes- PT 8 minute(s)  -CW         Timed Charges    23282 - PT Therapeutic Activity Minutes 8  -CW         Total Minutes    Timed Charges Total Minutes 8  -CW       Total Minutes 8  -CW                User Key  (r) = Recorded By, (t) = Taken By, (c) = Cosigned By      Initials Name Provider Type    CW Rachael Guerrero, PT Physical Therapist                  Therapy Charges for Today       Code Description Service Date Service Provider Modifiers Qty    [id number]  PT EVAL MOD COMPLEXITY 2 6/28/2025 Rachael Guerrero, PT GP 1    [id number]  PT THERAPEUTIC ACT EA 15 MIN 6/28/2025 Rachael Guerrero, PT GP 1            PT G-Codes  Outcome Measure Options: AM-PAC 6 Clicks Basic Mobility (PT)  AM-PAC 6 Clicks Score (PT): 18  PT Discharge Summary  Anticipated Discharge Disposition (PT): skilled nursing facility    Rachael Guerrero PT  6/28/2025

## 2025-06-28 NOTE — PROGRESS NOTES
[facility]    Patient Name: Marilu Avery  :1955  69 y.o.  LOS: 1  Encounter Provider: Yao Chen MD      Patient Care Team:  Esperanza Melton APRN as PCP - General (Nurse Practitioner)  Anatoly Jimenez MD as PCP - Family Medicine    Chief Complaint/Consult question:  Troponin elevation, A-fib, Takotsubo cardiomyopathy     PMH/HPI: 68 yo F with known history of A-fib who presents with fall at home.  She states that she has been feeling unwell for the several weeks and has gotten progressively more weak.  This eventually resulted in the fall at home.  Patient does not recall having syncope.  Workup in the ED was notable for REBECCA with Cr 2.2 and high-sensitivity troponin 266 -> 729.  EKG showed A-fib with low QRS voltage but no acute ischemic changes and adequate rate control in the 80s.     Interval History: Patient feels much better today with improved BP.  Remains in A-fib in the 70-80s.  Echocardiogram showed LVEF 30-35% with apical ballooning concerning for stress-induced (Takotsubo) cardiomyopathy.     Results for orders placed during the hospital encounter of 25    Adult Transthoracic Echo Complete W/ Cont if Necessary Per Protocol    Interpretation Summary    Left ventricular systolic function is normal. Calculated left ventricular EF = 65.1%    Left ventricular diastolic function was indeterminate.    The right ventricular cavity is mildly dilated. Normal right ventricular function.    The left atrial cavity is moderately dilated.    Insufficient TR jet to estimate RVSP.  RAP of 3 mmHg    Mild to moderate mitral regurgitation      Objective   Vital Signs  Temp:  [97.4 °F (36.3 °C)-98.4 °F (36.9 °C)] 97.9 °F (36.6 °C)  Heart Rate:  [54-96] 74  Resp:  [18-22] 18  BP: ()/(44-87) 160/84    Intake/Output Summary (Last 24 hours) at 2025 1221  Last data filed at 2025 0600  Gross per 24 hour   Intake 1980 ml   Output 400 ml   Net 1580 ml     Flowsheet Rows   "    Flowsheet Row First Filed Value   Admission Height 177.8 cm (70\") Documented at 06/27/2025 1236   Admission Weight 104 kg (229 lb) Documented at 06/27/2025 1236              Vitals and nursing note reviewed.   Constitutional:       Appearance: Not in distress. Frail. Chronically ill-appearing.   Pulmonary:      Effort: Pulmonary effort is normal.   Cardiovascular:      PMI at left midclavicular line. Normal rate. Irregular rhythm.      Murmurs: There is no murmur.   Edema:     Peripheral edema absent.   Abdominal:      General: Bowel sounds are normal. There is no distension.      Hernia: A hernia is present. Hernia is present in the umbilical area and ventral area.           Pertinent Test Results:  Results from last 7 days   Lab Units 06/28/25  0606 06/28/25  0006 06/27/25  1541 06/27/25  1244   SODIUM mmol/L 133*  133* 131* 129* 125*   POTASSIUM mmol/L 4.1  4.1 4.1 4.3 4.8   CHLORIDE mmol/L 102  102 99 94* 91*   CO2 mmol/L 16.9*  16.9* 17.5* 18.8* 18.6*   BUN mg/dL 48.0*  48.0* 49.0* 50.0* 48.0*   CREATININE mg/dL 1.79*  1.79* 1.92* 1.94* 2.22*   GLUCOSE mg/dL 119*  119* 135* 108* 150*   CALCIUM mg/dL 8.5*  8.5* 8.4* 8.8 9.5   AST (SGOT) U/L  --   --   --  21   ALT (SGPT) U/L  --   --   --  12     Results from last 7 days   Lab Units 06/28/25  0606 06/27/25  1403 06/27/25  1244   CK TOTAL U/L  --  72  --    HSTROP T ng/L 475* 729* 268*     Results from last 7 days   Lab Units 06/28/25  0606 06/27/25  1244   WBC 10*3/mm3 5.68 6.34   HEMOGLOBIN g/dL 10.6* 12.1   HEMATOCRIT % 31.7* 38.1   PLATELETS 10*3/mm3 132* 183     Results from last 7 days   Lab Units 06/27/25  1244   INR  1.80*   APTT seconds 29.0     Results from last 7 days   Lab Units 06/27/25  1244   MAGNESIUM mg/dL 1.8     Results from last 7 days   Lab Units 06/27/25  1541   CHOLESTEROL mg/dL 139   TRIGLYCERIDES mg/dL 111   HDL CHOL mg/dL 101*     Results from last 7 days   Lab Units 06/27/25  1249   PROBNP pg/mL 2,909.0*     Results from " last 7 days   Lab Units 06/27/25  1403   TSH uIU/mL 0.399           Medication Review:   [Held by provider] apixaban, 5 mg, Oral, Q12H  budesonide-formoterol, 2 puff, Inhalation, BID - RT  enoxaparin sodium, 1 mg/kg, Subcutaneous, Q12H  [START ON 6/29/2025] famotidine, 20 mg, Oral, QAM AC  levothyroxine, 75 mcg, Oral, Q AM  metoprolol tartrate, 25 mg, Oral, Q12H  mupirocin, 1 Application, Each Nare, BID  sodium chloride, 10 mL, Intravenous, Q12H         Pharmacy to Dose enoxaparin (LOVENOX),   sodium chloride, 125 mL/hr, Last Rate: 125 mL/hr (06/27/25 1555)        Assessment & Plan     Active Hospital Problems    Diagnosis  POA    **Hypotension [I95.9]  Yes      Resolved Hospital Problems   No resolved problems to display.        Troponin elevation/?  Takotsubo cardiomyopathy:  High-sensitivity troponin 266 -> 729 on admission, now downtrending.  EKG showed A-fib with low QRS voltage but no acute ischemic changes and adequate rate control in the 80s.  Denies chest pain, echo today showed newly reduced LVEF at 30-35% with extensive apical wall motion abnormality, strongly suggesting stress-induced (Takotsubo) cardiomyopathy.  Ideally we still need formal ischemic evaluation to rule out new coronary disease, may consider left heart catheterization next week depending on clinical course.  Chronic atrial fibrillation: CHADS2 Vasc score 2-3, will hold home Eliquis and switch to Lovenox in case cath is pursued.  Hold home Lopressor 50 bid given hypotension, dehydration and REBECCA, will resume at lower dose of 25 bid especially in the setting of new cardiomyopathy as above.  ? CHF: Patient takes torsemide 20 daily at home although unclear about history of cardiomyopathy or CHF.  Will coordinate with ICU team and nephrology regarding reinitiation of diuretics.  Weakness/mechanical fall  REBECCA: Agree with fluid resuscitation, appreciate nephrology input.  Tobacco abuse: Sensitive smoking history but patient is seen  remission.      Yao Chen MD  [facility]  06/28/25  12:21 EDT

## 2025-06-28 NOTE — PROGRESS NOTES
[facility] INPATIENT PROGRESS NOTE         [facility] CORONARY CARE    2025      PATIENT IDENTIFICATION:  Name: Marilu Avery ADMIT: 2025   : 1955  PCP: Esperanza Melton APRN    MRN: [id number] LOS: 1 days   AGE/SEX: 69 y.o. female  ROOM: [facility]                     LOS 1    Reason for visit: Weakness      SUBJECTIVE:      Resting comfortably.  Says that she is feeling much better.  Improved blood pressure.  No productive cough, chest pain or nausea.  Taking p.o. diet well.  Can transfer out of intensive care. I am seeing the patient for the first time today.  All patient problems are new to me.      Objective   OBJECTIVE:    Vital Sign Min/Max for last 24 hours  Temp  Min: 97.3 °F (36.3 °C)  Max: 98.4 °F (36.9 °C)   BP  Min: 76/53  Max: 109/68   Pulse  Min: 54  Max: 96   Resp  Min: 18  Max: 22   SpO2  Min: 90 %  Max: 100 %   No data recorded   Weight  Min: 104 kg (229 lb)  Max: 106 kg (233 lb 7.5 oz)    Vitals:    25 0700 25 0731 25 0800 25 0900   BP: 97/68  102/79 100/67   Pulse: 69  87 86   Resp:  18     Temp:  97.4 °F (36.3 °C)     TempSrc:  Oral     SpO2: 98%  98% 98%   Weight:       Height:                25  1236 25  1929 25  0548   Weight: 104 kg (229 lb) 106 kg (233 lb 7.5 oz) 106 kg (233 lb 7.5 oz)       Body mass index is 33.5 kg/m².                          Body mass index is 33.5 kg/m².    Intake/Output Summary (Last 24 hours) at 2025 1031  Last data filed at 2025 0600  Gross per 24 hour   Intake 1980 ml   Output 400 ml   Net 1580 ml         Exam:  GEN:  No distress, appears stated age  EYES:   PERRL, anicteric sclerae  ENT:    External ears/nose normal, OP clear  NECK:  No adenopathy, midline trachea  LUNGS: Normal chest on inspection, palpation and auscultation  CV:  Normal S1S2, without murmur  ABD:  Nontender, nondistended, no hepatosplenomegaly, +BS  EXT:  No edema.  No cyanosis or clubbing.  No mottling and  normal cap refill.    Assessment     Scheduled meds:  apixaban, 5 mg, Oral, Q12H  budesonide-formoterol, 2 puff, Inhalation, BID - RT  famotidine, 20 mg, Oral, BID AC  levothyroxine, 75 mcg, Oral, Q AM  mupirocin, 1 Application, Each Nare, BID  sodium chloride, 10 mL, Intravenous, Q12H      IV meds:                      sodium chloride, 125 mL/hr, Last Rate: 125 mL/hr (06/27/25 1555)      Data Review:  Results from last 7 days   Lab Units 06/28/25  0606 06/28/25  0006 06/27/25  1541 06/27/25  1244   SODIUM mmol/L 133*  133* 131* 129* 125*   POTASSIUM mmol/L 4.1  4.1 4.1 4.3 4.8   CHLORIDE mmol/L 102  102 99 94* 91*   CO2 mmol/L 16.9*  16.9* 17.5* 18.8* 18.6*   BUN mg/dL 48.0*  48.0* 49.0* 50.0* 48.0*   CREATININE mg/dL 1.79*  1.79* 1.92* 1.94* 2.22*   GLUCOSE mg/dL 119*  119* 135* 108* 150*   CALCIUM mg/dL 8.5*  8.5* 8.4* 8.8 9.5         Estimated Creatinine Clearance: 39.7 mL/min (A) (by C-G formula based on SCr of 1.79 mg/dL (H)).  Results from last 7 days   Lab Units 06/28/25  0606 06/27/25  1244   WBC 10*3/mm3 5.68 6.34   HEMOGLOBIN g/dL 10.6* 12.1   PLATELETS 10*3/mm3 132* 183     Results from last 7 days   Lab Units 06/27/25  1244   INR  1.80*     Results from last 7 days   Lab Units 06/27/25  1244   ALT (SGPT) U/L 12   AST (SGOT) U/L 21         Results from last 7 days   Lab Units 06/27/25  1544 06/27/25  1244   PROCALCITONIN ng/mL  --  0.32*   LACTATE mmol/L 1.7 2.5*         Glucose   Date/Time Value Ref Range Status   06/28/2025 0630 113 70 - 130 mg/dL Final   06/27/2025 1928 111 70 - 130 mg/dL Final         Imaging reviewed  Chest x-ray 6/27 reviewed    CT head 6/27 reviewed        Microbiology reviewed            Active Hospital Problems    Diagnosis  POA    **Hypotension [I95.9]  Yes      Resolved Hospital Problems   No resolved problems to display.         ASSESSMENT:  Weakness  Acute kidney injury  Hyponatremia: Improved  Non-ST elevation MI  Atrial  fibrillation  Hypothyroidism  Asthma/COPD  Tobacco use        PLAN:  Weakness and hypotension has improved with IV fluid resuscitation.  Not requiring pressor.  Can transfer out of intensive care.  Consult hospitalist service to take over medical management outside of ICU.  Cardiology and nephrology following.  Input appreciated.        Anatoly Mcdaniel MD  Pulmonary and Critical Care Medicine  [facility], [facility]  6/28/2025    10:31 EDT

## 2025-06-28 NOTE — PROGRESS NOTES
"[facility] Clinical Pharmacy Services: Enoxaparin Consult    Marilu Avery has a pharmacy consult to dose full-dose enoxaparin per Dr. Chen's request.   Planning heart cath on 6/30    Indication: A Fib - requiring full anticoagulation  Home Anticoagulation: apixaban     Relevant clinical data and objective history reviewed:  69 y.o. female 177.8 cm (70\") 106 kg (233 lb)   Body mass index is 33.43 kg/m².   Results from last 7 days   Lab Units 06/28/25  0606 06/28/25  0006 06/27/25  1541 06/27/25  1244   CREATININE mg/dL 1.79*  1.79* 1.92* 1.94* 2.22*   PLATELETS 10*3/mm3 132*  --   --  183   HEMOGLOBIN g/dL 10.6*  --   --  12.1   INR   --   --   --  1.80*     Estimated Creatinine Clearance: 39.7 mL/min (A) (by C-G formula based on SCr of 1.79 mg/dL (H)).    Assessment/Plan    Will start patient on Enoxaparin 105mg (1mg/kg) subcutaneous every 12 hours for therapeutic anticoagulation. Pt received dose of apixaban this AM, first enoxaparin scheduled for 2100 tonight. Note pt with REBECCA, Scr improving, monitor closely.  Pharmacy will continue to follow.     Mary Beth Maddox, PharmD  Clinical Pharmacist    "

## 2025-06-28 NOTE — PROGRESS NOTES
" LOS: 1 day   Patient Care Team:  Esperanza Melton APRN as PCP - General (Nurse Practitioner)  Anatoly Jimenez MD as PCP - Family Medicine    Chief Complaint: REBECCA/ hyponatremia       History of Present Illness  This is a 69 y.o. year old female who is familiar to me from a hospitalization in February of this year.  At that time she was noted to have a previously normal baseline creatinine, admitted at that time with shortness of breath volume overload and CHF exacerbation.  REBECCA was attributed to cardiorenal syndrome and did improve with IV diuresis.  She was eventually transition to oral diuretics and her creatinine was 1.1 at discharge on 2/20.  No interval new labs.  She presented to the ER this afternoon with weakness, inability to get up out of bed.  Reports chronic dyspnea that is not changed no worsening volume overload, has been losing weight slowly.  In the ER she was noted to be hypotensive, 90s over 60s with O2 sats 96% on room air.  She received a fluid bolus.  Her sodium was 125, creatinine 2.22 and chest x-ray showed nothing acute.  Her lactate was 2.5, procalcitonin also mildly elevated with elevated troponin.  Her home medications include Demadex 20 mg daily and Eliquis 5 mg.  Her weight is down about 30 pounds from February.  She states she has not been trying to lose weight and states \"it is all fluid\"      Subjective  Patient feeling better today but not quite at her baseline.   Has not eaten yet. Denies sob or chest pain.     History taken from: patient chart    Objective     Vital Sign Min/Max for last 24 hours  Temp  Min: 97.3 °F (36.3 °C)  Max: 98.4 °F (36.9 °C)   BP  Min: 76/53  Max: 109/68   Pulse  Min: 54  Max: 96   Resp  Min: 18  Max: 22   SpO2  Min: 90 %  Max: 99 %   No data recorded   Weight  Min: 104 kg (229 lb)  Max: 106 kg (233 lb 7.5 oz)     Flowsheet Rows      Flowsheet Row First Filed Value   Admission Height 177.8 cm (70\") Documented at 06/27/2025 1236   Admission Weight 104 " "kg (229 lb) Documented at 06/27/2025 1236            I/O this shift:  In: 480 [P.O.:480]  Out: -   I/O last 3 completed shifts:  In: 1500 [IV Piggyback:1500]  Out: -     Objective:  Vital signs: (most recent): Blood pressure 106/84, pulse 78, temperature 97.4 °F (36.3 °C), temperature source Oral, resp. rate 18, height 177.8 cm (70\"), weight 106 kg (233 lb 7.5 oz), SpO2 98%, not currently breastfeeding.            Physical Examination: General appearance - alert, well appearing, and in no distress  Mental status - alert, oriented to person, place, and time  Heart - normal rate, regular rhythm, normal S1, S2, no murmurs, rubs, clicks or gallops  Abdomen - + BS, protuberant   Neurological - alert, oriented, normal speech, no focal findings or movement disorder noted  Extremities - peripheral pulses normal, no pedal edema, no clubbing or cyanosis     Results Review:     I reviewed the patient's new clinical results.    WBC WBC   Date Value Ref Range Status   06/28/2025 5.68 3.40 - 10.80 10*3/mm3 Final   06/27/2025 6.34 3.40 - 10.80 10*3/mm3 Final      HGB Hemoglobin   Date Value Ref Range Status   06/28/2025 10.6 (L) 12.0 - 15.9 g/dL Final   06/27/2025 12.1 12.0 - 15.9 g/dL Final      HCT Hematocrit   Date Value Ref Range Status   06/28/2025 31.7 (L) 34.0 - 46.6 % Final   06/27/2025 38.1 34.0 - 46.6 % Final      Platlets No results found for: \"LABPLAT\"   MCV MCV   Date Value Ref Range Status   06/28/2025 99.7 (H) 79.0 - 97.0 fL Final   06/27/2025 103.3 (H) 79.0 - 97.0 fL Final          Sodium Sodium   Date Value Ref Range Status   06/28/2025 131 (L) 136 - 145 mmol/L Final   06/27/2025 129 (L) 136 - 145 mmol/L Final   06/27/2025 125 (L) 136 - 145 mmol/L Final      Potassium Potassium   Date Value Ref Range Status   06/28/2025 4.1 3.5 - 5.2 mmol/L Final   06/27/2025 4.3 3.5 - 5.2 mmol/L Final   06/27/2025 4.8 3.5 - 5.2 mmol/L Final      Chloride Chloride   Date Value Ref Range Status   06/28/2025 99 98 - 107 mmol/L Final " "  06/27/2025 94 (L) 98 - 107 mmol/L Final   06/27/2025 91 (L) 98 - 107 mmol/L Final      CO2 CO2   Date Value Ref Range Status   06/28/2025 17.5 (L) 22.0 - 29.0 mmol/L Final   06/27/2025 18.8 (L) 22.0 - 29.0 mmol/L Final   06/27/2025 18.6 (L) 22.0 - 29.0 mmol/L Final      BUN BUN   Date Value Ref Range Status   06/28/2025 49.0 (H) 8.0 - 23.0 mg/dL Final   06/27/2025 50.0 (H) 8.0 - 23.0 mg/dL Final   06/27/2025 48.0 (H) 8.0 - 23.0 mg/dL Final      Creatinine Creatinine   Date Value Ref Range Status   06/28/2025 1.92 (H) 0.57 - 1.00 mg/dL Final   06/27/2025 1.94 (H) 0.57 - 1.00 mg/dL Final   06/27/2025 2.22 (H) 0.57 - 1.00 mg/dL Final      Calcium Calcium   Date Value Ref Range Status   06/28/2025 8.4 (L) 8.6 - 10.5 mg/dL Final   06/27/2025 8.8 8.6 - 10.5 mg/dL Final   06/27/2025 9.5 8.6 - 10.5 mg/dL Final      PO4 No results found for: \"CAPO4\"   Albumin Albumin   Date Value Ref Range Status   06/27/2025 4.0 3.5 - 5.2 g/dL Final      Magnesium Magnesium   Date Value Ref Range Status   06/27/2025 1.8 1.6 - 2.4 mg/dL Final      Uric Acid Uric Acid   Date Value Ref Range Status   06/27/2025 5.9 (H) 2.4 - 5.7 mg/dL Final        Medication Review:     Current Facility-Administered Medications:     acetaminophen (TYLENOL) tablet 650 mg, 650 mg, Oral, Q4H PRN **OR** acetaminophen (TYLENOL) suppository 650 mg, 650 mg, Rectal, Q4H PRN, Vahe Thomas MD    apixaban (ELIQUIS) tablet 5 mg, 5 mg, Oral, Q12H, Vahe Thomas MD, 5 mg at 06/27/25 2134    budesonide-formoterol (SYMBICORT) 160-4.5 MCG/ACT inhaler 2 puff, 2 puff, Inhalation, BID - RT, Vahe Thomas MD, 2 puff at 06/27/25 2017    famotidine (PEPCID) tablet 20 mg, 20 mg, Oral, BID AC, Vahe Thomas MD    ipratropium-albuterol (DUO-NEB) nebulizer solution 3 mL, 3 mL, Nebulization, Q6H PRN, Vahe Thomas MD    levothyroxine (SYNTHROID, LEVOTHROID) tablet 75 mcg, 75 mcg, Oral, Q AM, Vahe Thomas MD    mupirocin (BACTROBAN) 2 % nasal ointment 1 Application, " 1 Application, Each Nare, BID, Vahe Thomas MD, 1 Application at 25    nitroglycerin (NITROSTAT) SL tablet 0.4 mg, 0.4 mg, Sublingual, Q5 Min PRN, Vahe Thomas MD    ondansetron ODT (ZOFRAN-ODT) disintegrating tablet 4 mg, 4 mg, Oral, Q6H PRN **OR** ondansetron (ZOFRAN) injection 4 mg, 4 mg, Intravenous, Q6H PRN, Vahe Thomas MD    sodium chloride 0.9 % flush 10 mL, 10 mL, Intravenous, Q12H, Vahe Thomas MD, 10 mL at 25    sodium chloride 0.9 % flush 10 mL, 10 mL, Intravenous, PRN, Vahe Thomas MD    sodium chloride 0.9 % infusion 40 mL, 40 mL, Intravenous, PRN, Vahe Thomas MD    sodium chloride 0.9 % infusion, 125 mL/hr, Intravenous, Continuous, Vahe Thomas MD, Last Rate: 125 mL/hr at 25 1555, 125 mL/hr at 25 1555     Assessment & Plan       Hypotension      Assessment & Plan  REBECCA  Hyponatremia   Hypotension   Volume depletion   Chronic diastolic heart failure   Lactic acidosis   A fib     Renal function overall stable with mild improvement in renal function.   Na improving.   BP also improving, but still on the low end.   Continue with IVF today, let it  and reassess need for more at that time. Monitor for overload.   Continue to hold diuretics and BP medications.   UOP not recorded.   Lactic acid normal   Metabolic acidosis now likely due to NS.         Molly Laurent MD  25  06:18 EDT

## 2025-06-28 NOTE — PLAN OF CARE
Problem: Adult Inpatient Plan of Care  Goal: Plan of Care Review  Outcome: Progressing  Goal: Patient-Specific Goal (Individualized)  Outcome: Progressing  Goal: Absence of Hospital-Acquired Illness or Injury  Outcome: Progressing  Intervention: Identify and Manage Fall Risk  Recent Flowsheet Documentation  Taken 6/28/2025 0800 by Patti Chau RN  Safety Promotion/Fall Prevention: safety round/check completed  Intervention: Prevent Skin Injury  Recent Flowsheet Documentation  Taken 6/28/2025 0900 by Patti Chau RN  Body Position:   position changed independently   weight shifting   neutral head position   legs elevated   heels elevated  Taken 6/28/2025 0800 by Patti Chau RN  Body Position:   position changed independently   weight shifting   neutral head position   legs elevated   heels elevated  Taken 6/28/2025 0700 by Patti Chau RN  Body Position:   position changed independently   weight shifting   neutral head position   legs elevated   heels elevated  Intervention: Prevent Infection  Recent Flowsheet Documentation  Taken 6/28/2025 0800 by Patti Chau RN  Infection Prevention: single patient room provided  Goal: Optimal Comfort and Wellbeing  Outcome: Progressing  Intervention: Provide Person-Centered Care  Recent Flowsheet Documentation  Taken 6/28/2025 0900 by Patti Chau RN  Trust Relationship/Rapport:   care explained   choices provided   emotional support provided   empathic listening provided   questions answered   questions encouraged   reassurance provided   thoughts/feelings acknowledged  Taken 6/28/2025 0800 by Patti Chau RN  Trust Relationship/Rapport:   care explained   choices provided   emotional support provided   empathic listening provided   questions answered   questions encouraged   reassurance provided   thoughts/feelings acknowledged  Taken 6/28/2025 0700 by Patti Chau RN  Trust Relationship/Rapport:   care explained   choices provided   emotional  support provided   empathic listening provided   questions answered   questions encouraged   reassurance provided   thoughts/feelings acknowledged  Goal: Readiness for Transition of Care  Outcome: Progressing     Problem: Fall Injury Risk  Goal: Absence of Fall and Fall-Related Injury  Outcome: Progressing  Intervention: Identify and Manage Contributors  Recent Flowsheet Documentation  Taken 6/28/2025 0900 by Patti Chau RN  Medication Review/Management: medications reviewed  Taken 6/28/2025 0800 by Patti Chau RN  Medication Review/Management: medications reviewed  Taken 6/28/2025 0700 by Patti Chau RN  Medication Review/Management: medications reviewed  Intervention: Promote Injury-Free Environment  Recent Flowsheet Documentation  Taken 6/28/2025 0800 by Patti Chau RN  Safety Promotion/Fall Prevention: safety round/check completed     Problem: Skin Injury Risk Increased  Goal: Skin Health and Integrity  Outcome: Progressing  Intervention: Optimize Skin Protection  Recent Flowsheet Documentation  Taken 6/28/2025 0900 by Patti Chau RN  Activity Management: bedrest  Head of Bed (HOB) Positioning: HOB at 30-45 degrees  Taken 6/28/2025 0800 by Patti Chau RN  Activity Management: bedrest  Head of Bed (HOB) Positioning: HOB at 30-45 degrees  Taken 6/28/2025 0700 by Patti Chau RN  Activity Management: bedrest  Head of Bed (HOB) Positioning: HOB at 30-45 degrees   Goal Outcome Evaluation:

## 2025-06-29 LAB
ALBUMIN SERPL-MCNC: 3.2 G/DL (ref 3.5–5.2)
ANION GAP SERPL CALCULATED.3IONS-SCNC: 12.8 MMOL/L (ref 5–15)
ANION GAP SERPL CALCULATED.3IONS-SCNC: 14 MMOL/L (ref 5–15)
ANION GAP SERPL CALCULATED.3IONS-SCNC: 14 MMOL/L (ref 5–15)
ANION GAP SERPL CALCULATED.3IONS-SCNC: 16 MMOL/L (ref 5–15)
ANION GAP SERPL CALCULATED.3IONS-SCNC: 16 MMOL/L (ref 5–15)
BASOPHILS # BLD AUTO: 0.02 10*3/MM3 (ref 0–0.2)
BASOPHILS NFR BLD AUTO: 0.5 % (ref 0–1.5)
BUN SERPL-MCNC: 40 MG/DL (ref 8–23)
BUN SERPL-MCNC: 40 MG/DL (ref 8–23)
BUN SERPL-MCNC: 42 MG/DL (ref 8–23)
BUN SERPL-MCNC: 42 MG/DL (ref 8–23)
BUN SERPL-MCNC: 45 MG/DL (ref 8–23)
BUN/CREAT SERPL: 24.7 (ref 7–25)
BUN/CREAT SERPL: 26.8 (ref 7–25)
BUN/CREAT SERPL: 27.2 (ref 7–25)
BUN/CREAT SERPL: 28.8 (ref 7–25)
BUN/CREAT SERPL: 28.8 (ref 7–25)
CALCIUM SPEC-SCNC: 8.7 MG/DL (ref 8.6–10.5)
CALCIUM SPEC-SCNC: 8.8 MG/DL (ref 8.6–10.5)
CALCIUM SPEC-SCNC: 8.9 MG/DL (ref 8.6–10.5)
CHLORIDE SERPL-SCNC: 105 MMOL/L (ref 98–107)
CHLORIDE SERPL-SCNC: 106 MMOL/L (ref 98–107)
CO2 SERPL-SCNC: 15 MMOL/L (ref 22–29)
CO2 SERPL-SCNC: 16.2 MMOL/L (ref 22–29)
CREAT SERPL-MCNC: 1.46 MG/DL (ref 0.57–1)
CREAT SERPL-MCNC: 1.46 MG/DL (ref 0.57–1)
CREAT SERPL-MCNC: 1.47 MG/DL (ref 0.57–1)
CREAT SERPL-MCNC: 1.62 MG/DL (ref 0.57–1)
CREAT SERPL-MCNC: 1.68 MG/DL (ref 0.57–1)
DEPRECATED RDW RBC AUTO: 55.1 FL (ref 37–54)
EGFRCR SERPLBLD CKD-EPI 2021: 33 ML/MIN/1.73
EGFRCR SERPLBLD CKD-EPI 2021: 34.5 ML/MIN/1.73
EGFRCR SERPLBLD CKD-EPI 2021: 38.7 ML/MIN/1.73
EGFRCR SERPLBLD CKD-EPI 2021: 39 ML/MIN/1.73
EGFRCR SERPLBLD CKD-EPI 2021: 39 ML/MIN/1.73
EOSINOPHIL # BLD AUTO: 0.04 10*3/MM3 (ref 0–0.4)
EOSINOPHIL NFR BLD AUTO: 1 % (ref 0.3–6.2)
ERYTHROCYTE [DISTWIDTH] IN BLOOD BY AUTOMATED COUNT: 15 % (ref 12.3–15.4)
GLUCOSE BLDC GLUCOMTR-MCNC: 102 MG/DL (ref 70–130)
GLUCOSE BLDC GLUCOMTR-MCNC: 129 MG/DL (ref 70–130)
GLUCOSE BLDC GLUCOMTR-MCNC: 141 MG/DL (ref 70–130)
GLUCOSE BLDC GLUCOMTR-MCNC: 170 MG/DL (ref 70–130)
GLUCOSE SERPL-MCNC: 114 MG/DL (ref 65–99)
GLUCOSE SERPL-MCNC: 114 MG/DL (ref 65–99)
GLUCOSE SERPL-MCNC: 121 MG/DL (ref 65–99)
GLUCOSE SERPL-MCNC: 130 MG/DL (ref 65–99)
GLUCOSE SERPL-MCNC: 145 MG/DL (ref 65–99)
HCT VFR BLD AUTO: 30 % (ref 34–46.6)
HGB BLD-MCNC: 9.8 G/DL (ref 12–15.9)
IMM GRANULOCYTES # BLD AUTO: 0.03 10*3/MM3 (ref 0–0.05)
IMM GRANULOCYTES NFR BLD AUTO: 0.7 % (ref 0–0.5)
LYMPHOCYTES # BLD AUTO: 0.45 10*3/MM3 (ref 0.7–3.1)
LYMPHOCYTES NFR BLD AUTO: 10.9 % (ref 19.6–45.3)
MCH RBC QN AUTO: 32.9 PG (ref 26.6–33)
MCHC RBC AUTO-ENTMCNC: 32.7 G/DL (ref 31.5–35.7)
MCV RBC AUTO: 100.7 FL (ref 79–97)
MONOCYTES # BLD AUTO: 0.36 10*3/MM3 (ref 0.1–0.9)
MONOCYTES NFR BLD AUTO: 8.8 % (ref 5–12)
NEUTROPHILS NFR BLD AUTO: 3.21 10*3/MM3 (ref 1.7–7)
NEUTROPHILS NFR BLD AUTO: 78.1 % (ref 42.7–76)
NRBC BLD AUTO-RTO: 0 /100 WBC (ref 0–0.2)
PHOSPHATE SERPL-MCNC: 2.6 MG/DL (ref 2.5–4.5)
PLATELET # BLD AUTO: 117 10*3/MM3 (ref 140–450)
PMV BLD AUTO: 10 FL (ref 6–12)
POTASSIUM SERPL-SCNC: 4.1 MMOL/L (ref 3.5–5.2)
POTASSIUM SERPL-SCNC: 4.2 MMOL/L (ref 3.5–5.2)
POTASSIUM SERPL-SCNC: 4.3 MMOL/L (ref 3.5–5.2)
RBC # BLD AUTO: 2.98 10*6/MM3 (ref 3.77–5.28)
SODIUM SERPL-SCNC: 135 MMOL/L (ref 136–145)
SODIUM SERPL-SCNC: 136 MMOL/L (ref 136–145)
SODIUM SERPL-SCNC: 137 MMOL/L (ref 136–145)
WBC NRBC COR # BLD AUTO: 4.11 10*3/MM3 (ref 3.4–10.8)

## 2025-06-29 PROCEDURE — 36415 COLL VENOUS BLD VENIPUNCTURE: CPT | Performed by: INTERNAL MEDICINE

## 2025-06-29 PROCEDURE — 94761 N-INVAS EAR/PLS OXIMETRY MLT: CPT

## 2025-06-29 PROCEDURE — 80069 RENAL FUNCTION PANEL: CPT | Performed by: INTERNAL MEDICINE

## 2025-06-29 PROCEDURE — 85025 COMPLETE CBC W/AUTO DIFF WBC: CPT | Performed by: INTERNAL MEDICINE

## 2025-06-29 PROCEDURE — 94799 UNLISTED PULMONARY SVC/PX: CPT

## 2025-06-29 PROCEDURE — 94664 DEMO&/EVAL PT USE INHALER: CPT

## 2025-06-29 PROCEDURE — 99232 SBSQ HOSP IP/OBS MODERATE 35: CPT | Performed by: INTERNAL MEDICINE

## 2025-06-29 PROCEDURE — 80048 BASIC METABOLIC PNL TOTAL CA: CPT | Performed by: INTERNAL MEDICINE

## 2025-06-29 PROCEDURE — 25010000002 ENOXAPARIN PER 10 MG: Performed by: INTERNAL MEDICINE

## 2025-06-29 PROCEDURE — 82948 REAGENT STRIP/BLOOD GLUCOSE: CPT

## 2025-06-29 RX ORDER — METOPROLOL TARTRATE 25 MG/1
12.5 TABLET, FILM COATED ORAL ONCE
Status: COMPLETED | OUTPATIENT
Start: 2025-06-29 | End: 2025-06-29

## 2025-06-29 RX ORDER — METOPROLOL TARTRATE 25 MG/1
25 TABLET, FILM COATED ORAL 2 TIMES DAILY
Status: DISCONTINUED | OUTPATIENT
Start: 2025-06-29 | End: 2025-06-30

## 2025-06-29 RX ADMIN — METOPROLOL TARTRATE 12.5 MG: 25 TABLET, FILM COATED ORAL at 10:44

## 2025-06-29 RX ADMIN — METOPROLOL TARTRATE 25 MG: 25 TABLET, FILM COATED ORAL at 21:46

## 2025-06-29 RX ADMIN — MUPIROCIN 1 APPLICATION: 20 OINTMENT TOPICAL at 21:46

## 2025-06-29 RX ADMIN — Medication 10 ML: at 08:46

## 2025-06-29 RX ADMIN — Medication 10 ML: at 21:46

## 2025-06-29 RX ADMIN — ACETAMINOPHEN 650 MG: 325 TABLET, FILM COATED ORAL at 04:14

## 2025-06-29 RX ADMIN — ENOXAPARIN SODIUM 105 MG: 150 INJECTION SUBCUTANEOUS at 08:45

## 2025-06-29 RX ADMIN — ENOXAPARIN SODIUM 105 MG: 150 INJECTION SUBCUTANEOUS at 21:45

## 2025-06-29 RX ADMIN — BUDESONIDE AND FORMOTEROL FUMARATE DIHYDRATE 2 PUFF: 160; 4.5 AEROSOL RESPIRATORY (INHALATION) at 20:02

## 2025-06-29 RX ADMIN — LEVOTHYROXINE SODIUM 75 MCG: 0.07 TABLET ORAL at 06:03

## 2025-06-29 RX ADMIN — BUDESONIDE AND FORMOTEROL FUMARATE DIHYDRATE 2 PUFF: 160; 4.5 AEROSOL RESPIRATORY (INHALATION) at 09:00

## 2025-06-29 RX ADMIN — METOPROLOL TARTRATE 12.5 MG: 25 TABLET, FILM COATED ORAL at 08:45

## 2025-06-29 RX ADMIN — MUPIROCIN 1 APPLICATION: 20 OINTMENT TOPICAL at 08:46

## 2025-06-29 RX ADMIN — ACETAMINOPHEN 650 MG: 325 TABLET, FILM COATED ORAL at 10:44

## 2025-06-29 RX ADMIN — FAMOTIDINE 20 MG: 20 TABLET, FILM COATED ORAL at 08:47

## 2025-06-29 NOTE — PLAN OF CARE
Goal Outcome Evaluation:  Plan of Care Reviewed With: patient        Progress: improving  Outcome Evaluation: Pt is AoX4, on room air, needs met, safety maintained, pain mananged with prn meds.

## 2025-06-29 NOTE — PROGRESS NOTES
[facility] INPATIENT PROGRESS NOTE         [facility] CORONARY CARE    2025      PATIENT IDENTIFICATION:  Name: Marilu Avery ADMIT: 2025   : 1955  PCP: Esperanza Melton APRN    MRN: [id number] LOS: 2 days   AGE/SEX: 69 y.o. female  ROOM: [facility]                     LOS 2    Reason for visit: Weakness      SUBJECTIVE:      Resting comfortably.  On room air.  Denies productive cough or chest pain.  Complains of weakness still.  Says that she only was able to work with physical therapy for about 5 minutes yesterday.  Downgraded to telemetry.      Objective   OBJECTIVE:    Vital Sign Min/Max for last 24 hours  Temp  Min: 97.4 °F (36.3 °C)  Max: 98.1 °F (36.7 °C)   BP  Min: 99/72  Max: 160/84   Pulse  Min: 44  Max: 118   Resp  Min: 16  Max: 20   SpO2  Min: 95 %  Max: 99 %   No data recorded   Weight  Min: 106 kg (233 lb)  Max: 106 kg (233 lb)    Vitals:    25 0740 25 0845 25 0900 25 0904   BP: 120/97 115/87     BP Location: Right arm      Patient Position: Lying      Pulse:  108 118 110   Resp: 20  20 20   Temp: 97.4 °F (36.3 °C)      TempSrc: Oral      SpO2:   98%    Weight:       Height:                25  1929 25  0548 25  1133   Weight: 106 kg (233 lb 7.5 oz) 106 kg (233 lb 7.5 oz) 106 kg (233 lb)       Body mass index is 33.43 kg/m².                          Body mass index is 33.43 kg/m².    Intake/Output Summary (Last 24 hours) at 2025 0951  Last data filed at 2025 0300  Gross per 24 hour   Intake --   Output 950 ml   Net -950 ml         Exam:  GEN:  No distress, appears stated age  EYES:   PERRL, anicteric sclerae  ENT:    External ears/nose normal, OP clear  NECK:  No adenopathy, midline trachea  LUNGS: Normal chest on inspection, palpation and auscultation  CV:  Normal S1S2, without murmur  ABD:  Nontender, nondistended, no hepatosplenomegaly, +BS  EXT:  No edema.  No cyanosis or clubbing.  No mottling and normal cap  refill.    Assessment     Scheduled meds:  [Held by provider] apixaban, 5 mg, Oral, Q12H  budesonide-formoterol, 2 puff, Inhalation, BID - RT  enoxaparin sodium, 1 mg/kg, Subcutaneous, Q12H  famotidine, 20 mg, Oral, QAM AC  levothyroxine, 75 mcg, Oral, Q AM  metoprolol tartrate, 12.5 mg, Oral, Once  metoprolol tartrate, 25 mg, Oral, BID  mupirocin, 1 Application, Each Nare, BID  sodium chloride, 10 mL, Intravenous, Q12H      IV meds:                      Pharmacy to Dose enoxaparin (LOVENOX),       Data Review:  Results from last 7 days   Lab Units 06/29/25  0549 06/29/25  0000 06/28/25  1811 06/28/25  1158 06/28/25  0606   SODIUM mmol/L 135*  135* 135* 136 135* 133*  133*   POTASSIUM mmol/L 4.2  4.2 4.3 4.3 4.1 4.1  4.1   CHLORIDE mmol/L 106  106 106 105 104 102  102   CO2 mmol/L 15.0*  15.0* 16.2* 17.5* 17.5* 16.9*  16.9*   BUN mg/dL 42.0*  42.0* 45.0* 44.0* 45.0* 48.0*  48.0*   CREATININE mg/dL 1.46*  1.46* 1.68* 1.90* 1.76* 1.79*  1.79*   GLUCOSE mg/dL 114*  114* 130* 164* 131* 119*  119*   CALCIUM mg/dL 8.9  8.9 8.8 8.9 8.9 8.5*  8.5*         Estimated Creatinine Clearance: 48.6 mL/min (A) (by C-G formula based on SCr of 1.46 mg/dL (H)).  Results from last 7 days   Lab Units 06/29/25  0549 06/28/25  0606 06/27/25  1244   WBC 10*3/mm3 4.11 5.68 6.34   HEMOGLOBIN g/dL 9.8* 10.6* 12.1   PLATELETS 10*3/mm3 117* 132* 183     Results from last 7 days   Lab Units 06/27/25  1244   INR  1.80*     Results from last 7 days   Lab Units 06/27/25  1244   ALT (SGPT) U/L 12   AST (SGOT) U/L 21         Results from last 7 days   Lab Units 06/27/25  1544 06/27/25  1244   PROCALCITONIN ng/mL  --  0.32*   LACTATE mmol/L 1.7 2.5*         Glucose   Date/Time Value Ref Range Status   06/29/2025 0607 102 70 - 130 mg/dL Final   06/28/2025 2018 129 70 - 130 mg/dL Final   06/28/2025 1800 168 (H) 70 - 130 mg/dL Final   06/28/2025 1234 161 (H) 70 - 130 mg/dL Final   06/28/2025 0630 113 70 - 130 mg/dL Final   06/27/2025 1928  111 70 - 130 mg/dL Final         Imaging reviewed  Chest x-ray 6/27 reviewed    CT head 6/27 reviewed        Microbiology reviewed            Active Hospital Problems    Diagnosis  POA    **Hypotension [I95.9]  Yes      Resolved Hospital Problems   No resolved problems to display.         ASSESSMENT:  Weakness  Acute kidney injury  Hyponatremia: Improved  Non-ST elevation MI  Atrial fibrillation  Hypothyroidism  Asthma/COPD  Tobacco use        PLAN:  Weakness and hypotension has improved with IV fluid resuscitation.  Not requiring pressor.  Downgraded to telemetry.  Defer nonpulmonary medical management to hospitalist service.  They have taken over as attending.        Anatoly Mcdaniel MD  Pulmonary and Critical Care Medicine  [facility], [facility]  6/29/2025    09:51 EDT

## 2025-06-29 NOTE — PROGRESS NOTES
" LOS: 2 days   Patient Care Team:  Esperanza Melton APRN as PCP - General (Nurse Practitioner)  Anatoly Jimenez MD as PCP - Family Medicine    Chief Complaint: REBECCA/ hyponatremia       History of Present Illness  This is a 69 y.o. year old female who is familiar to me from a hospitalization in February of this year.  At that time she was noted to have a previously normal baseline creatinine, admitted at that time with shortness of breath volume overload and CHF exacerbation.  REBECCA was attributed to cardiorenal syndrome and did improve with IV diuresis.  She was eventually transition to oral diuretics and her creatinine was 1.1 at discharge on 2/20.  No interval new labs.  She presented to the ER this afternoon with weakness, inability to get up out of bed.  Reports chronic dyspnea that is not changed no worsening volume overload, has been losing weight slowly.  In the ER she was noted to be hypotensive, 90s over 60s with O2 sats 96% on room air.  She received a fluid bolus.  Her sodium was 125, creatinine 2.22 and chest x-ray showed nothing acute.  Her lactate was 2.5, procalcitonin also mildly elevated with elevated troponin.  Her home medications include Demadex 20 mg daily and Eliquis 5 mg.  Her weight is down about 30 pounds from February.  She states she has not been trying to lose weight and states \"it is all fluid\"      Subjective  Feeling better today   Reports some HYDE, but about her baseline.   No chest pain    History taken from: patient chart    Objective     Vital Sign Min/Max for last 24 hours  Temp  Min: 97.4 °F (36.3 °C)  Max: 98.1 °F (36.7 °C)   BP  Min: 99/72  Max: 124/81   Pulse  Min: 44  Max: 121   Resp  Min: 16  Max: 20   SpO2  Min: 95 %  Max: 98 %   No data recorded   No data recorded     Flowsheet Rows      Flowsheet Row First Filed Value   Admission Height 177.8 cm (70\") Documented at 06/27/2025 1236   Admission Weight 104 kg (229 lb) Documented at 06/27/2025 1236            No " "intake/output data recorded.  I/O last 3 completed shifts:  In: 480 [P.O.:480]  Out: 1350 [Urine:1350]    Objective:  Vital signs: (most recent): Blood pressure 113/82, pulse 105, temperature 98 °F (36.7 °C), temperature source Oral, resp. rate 20, height 177.8 cm (70\"), weight 106 kg (233 lb), SpO2 96%, not currently breastfeeding.            Physical Examination: General appearance - alert, well appearing, and in no distress  Mental status - alert, oriented to person, place, and time  Heart - normal rate, regular rhythm, normal S1, S2, no murmurs, rubs, clicks or gallops  Abdomen - + BS, protuberant   Neurological - alert, oriented, normal speech, no focal findings or movement disorder noted  Extremities - peripheral pulses normal, no pedal edema, no clubbing or cyanosis     Results Review:     I reviewed the patient's new clinical results.    WBC WBC   Date Value Ref Range Status   06/29/2025 4.11 3.40 - 10.80 10*3/mm3 Final   06/28/2025 5.68 3.40 - 10.80 10*3/mm3 Final   06/27/2025 6.34 3.40 - 10.80 10*3/mm3 Final      HGB Hemoglobin   Date Value Ref Range Status   06/29/2025 9.8 (L) 12.0 - 15.9 g/dL Final   06/28/2025 10.6 (L) 12.0 - 15.9 g/dL Final   06/27/2025 12.1 12.0 - 15.9 g/dL Final      HCT Hematocrit   Date Value Ref Range Status   06/29/2025 30.0 (L) 34.0 - 46.6 % Final   06/28/2025 31.7 (L) 34.0 - 46.6 % Final   06/27/2025 38.1 34.0 - 46.6 % Final      Platlets No results found for: \"LABPLAT\"   MCV MCV   Date Value Ref Range Status   06/29/2025 100.7 (H) 79.0 - 97.0 fL Final   06/28/2025 99.7 (H) 79.0 - 97.0 fL Final   06/27/2025 103.3 (H) 79.0 - 97.0 fL Final          Sodium Sodium   Date Value Ref Range Status   06/29/2025 137 136 - 145 mmol/L Final   06/29/2025 135 (L) 136 - 145 mmol/L Final   06/29/2025 135 (L) 136 - 145 mmol/L Final   06/29/2025 135 (L) 136 - 145 mmol/L Final   06/28/2025 136 136 - 145 mmol/L Final   06/28/2025 135 (L) 136 - 145 mmol/L Final   06/28/2025 133 (L) 136 - 145 mmol/L " Final   06/28/2025 133 (L) 136 - 145 mmol/L Final   06/28/2025 131 (L) 136 - 145 mmol/L Final   06/27/2025 129 (L) 136 - 145 mmol/L Final   06/27/2025 125 (L) 136 - 145 mmol/L Final      Potassium Potassium   Date Value Ref Range Status   06/29/2025 4.1 3.5 - 5.2 mmol/L Final   06/29/2025 4.2 3.5 - 5.2 mmol/L Final   06/29/2025 4.2 3.5 - 5.2 mmol/L Final   06/29/2025 4.3 3.5 - 5.2 mmol/L Final   06/28/2025 4.3 3.5 - 5.2 mmol/L Final   06/28/2025 4.1 3.5 - 5.2 mmol/L Final   06/28/2025 4.1 3.5 - 5.2 mmol/L Final   06/28/2025 4.1 3.5 - 5.2 mmol/L Final   06/28/2025 4.1 3.5 - 5.2 mmol/L Final   06/27/2025 4.3 3.5 - 5.2 mmol/L Final   06/27/2025 4.8 3.5 - 5.2 mmol/L Final      Chloride Chloride   Date Value Ref Range Status   06/29/2025 106 98 - 107 mmol/L Final   06/29/2025 106 98 - 107 mmol/L Final   06/29/2025 106 98 - 107 mmol/L Final   06/29/2025 106 98 - 107 mmol/L Final   06/28/2025 105 98 - 107 mmol/L Final   06/28/2025 104 98 - 107 mmol/L Final   06/28/2025 102 98 - 107 mmol/L Final   06/28/2025 102 98 - 107 mmol/L Final   06/28/2025 99 98 - 107 mmol/L Final   06/27/2025 94 (L) 98 - 107 mmol/L Final   06/27/2025 91 (L) 98 - 107 mmol/L Final      CO2 CO2   Date Value Ref Range Status   06/29/2025 15.0 (L) 22.0 - 29.0 mmol/L Final   06/29/2025 15.0 (L) 22.0 - 29.0 mmol/L Final   06/29/2025 15.0 (L) 22.0 - 29.0 mmol/L Final   06/29/2025 16.2 (L) 22.0 - 29.0 mmol/L Final   06/28/2025 17.5 (L) 22.0 - 29.0 mmol/L Final   06/28/2025 17.5 (L) 22.0 - 29.0 mmol/L Final   06/28/2025 16.9 (L) 22.0 - 29.0 mmol/L Final   06/28/2025 16.9 (L) 22.0 - 29.0 mmol/L Final   06/28/2025 17.5 (L) 22.0 - 29.0 mmol/L Final   06/27/2025 18.8 (L) 22.0 - 29.0 mmol/L Final   06/27/2025 18.6 (L) 22.0 - 29.0 mmol/L Final      BUN BUN   Date Value Ref Range Status   06/29/2025 40.0 (H) 8.0 - 23.0 mg/dL Final   06/29/2025 42.0 (H) 8.0 - 23.0 mg/dL Final   06/29/2025 42.0 (H) 8.0 - 23.0 mg/dL Final   06/29/2025 45.0 (H) 8.0 - 23.0 mg/dL Final  "  06/28/2025 44.0 (H) 8.0 - 23.0 mg/dL Final   06/28/2025 45.0 (H) 8.0 - 23.0 mg/dL Final   06/28/2025 48.0 (H) 8.0 - 23.0 mg/dL Final   06/28/2025 48.0 (H) 8.0 - 23.0 mg/dL Final   06/28/2025 49.0 (H) 8.0 - 23.0 mg/dL Final   06/27/2025 50.0 (H) 8.0 - 23.0 mg/dL Final   06/27/2025 48.0 (H) 8.0 - 23.0 mg/dL Final      Creatinine Creatinine   Date Value Ref Range Status   06/29/2025 1.62 (H) 0.57 - 1.00 mg/dL Final   06/29/2025 1.46 (H) 0.57 - 1.00 mg/dL Final   06/29/2025 1.46 (H) 0.57 - 1.00 mg/dL Final   06/29/2025 1.68 (H) 0.57 - 1.00 mg/dL Final   06/28/2025 1.90 (H) 0.57 - 1.00 mg/dL Final   06/28/2025 1.76 (H) 0.57 - 1.00 mg/dL Final   06/28/2025 1.79 (H) 0.57 - 1.00 mg/dL Final   06/28/2025 1.79 (H) 0.57 - 1.00 mg/dL Final   06/28/2025 1.92 (H) 0.57 - 1.00 mg/dL Final   06/27/2025 1.94 (H) 0.57 - 1.00 mg/dL Final   06/27/2025 2.22 (H) 0.57 - 1.00 mg/dL Final      Calcium Calcium   Date Value Ref Range Status   06/29/2025 8.9 8.6 - 10.5 mg/dL Final   06/29/2025 8.9 8.6 - 10.5 mg/dL Final   06/29/2025 8.9 8.6 - 10.5 mg/dL Final   06/29/2025 8.8 8.6 - 10.5 mg/dL Final   06/28/2025 8.9 8.6 - 10.5 mg/dL Final   06/28/2025 8.9 8.6 - 10.5 mg/dL Final   06/28/2025 8.5 (L) 8.6 - 10.5 mg/dL Final   06/28/2025 8.5 (L) 8.6 - 10.5 mg/dL Final   06/28/2025 8.4 (L) 8.6 - 10.5 mg/dL Final   06/27/2025 8.8 8.6 - 10.5 mg/dL Final   06/27/2025 9.5 8.6 - 10.5 mg/dL Final      PO4 No results found for: \"CAPO4\"   Albumin Albumin   Date Value Ref Range Status   06/29/2025 3.2 (L) 3.5 - 5.2 g/dL Final   06/28/2025 3.2 (L) 3.5 - 5.2 g/dL Final   06/27/2025 4.0 3.5 - 5.2 g/dL Final      Magnesium Magnesium   Date Value Ref Range Status   06/27/2025 1.8 1.6 - 2.4 mg/dL Final      Uric Acid Uric Acid   Date Value Ref Range Status   06/27/2025 5.9 (H) 2.4 - 5.7 mg/dL Final        Medication Review:     Current Facility-Administered Medications:     acetaminophen (TYLENOL) tablet 650 mg, 650 mg, Oral, Q4H PRN, 650 mg at 06/29/25 1044 " **OR** acetaminophen (TYLENOL) suppository 650 mg, 650 mg, Rectal, Q4H PRN, Vahe Thomas MD    [Held by provider] apixaban (ELIQUIS) tablet 5 mg, 5 mg, Oral, Q12H, Vahe Thomas MD, 5 mg at 06/28/25 0823    budesonide-formoterol (SYMBICORT) 160-4.5 MCG/ACT inhaler 2 puff, 2 puff, Inhalation, BID - RT, Vahe Thomas MD, 2 puff at 06/29/25 0900    enoxaparin sodium (LOVENOX) syringe 105 mg, 1 mg/kg, Subcutaneous, Q12H, Yao Chen MD, 105 mg at 06/29/25 0845    famotidine (PEPCID) tablet 20 mg, 20 mg, Oral, QAM AC, Vahe Thomas MD, 20 mg at 06/29/25 0847    hydrOXYzine (ATARAX) tablet 25 mg, 25 mg, Oral, TID PRN, Michael Ball MD, 25 mg at 06/28/25 2153    ipratropium-albuterol (DUO-NEB) nebulizer solution 3 mL, 3 mL, Nebulization, Q6H PRN, Vahe Thomas MD    levothyroxine (SYNTHROID, LEVOTHROID) tablet 75 mcg, 75 mcg, Oral, Q AM, Vahe Thomas MD, 75 mcg at 06/29/25 0603    metoprolol tartrate (LOPRESSOR) tablet 25 mg, 25 mg, Oral, BID, Yao Chen MD    mupirocin (BACTROBAN) 2 % nasal ointment 1 Application, 1 Application, Each Nare, BID, Vahe Thomas MD, 1 Application at 06/29/25 0846    nitroglycerin (NITROSTAT) SL tablet 0.4 mg, 0.4 mg, Sublingual, Q5 Min PRN, Vahe Thomas MD    ondansetron ODT (ZOFRAN-ODT) disintegrating tablet 4 mg, 4 mg, Oral, Q6H PRN **OR** ondansetron (ZOFRAN) injection 4 mg, 4 mg, Intravenous, Q6H PRN, Vahe Thomas MD    Pharmacy to Dose enoxaparin (LOVENOX), , Not Applicable, Continuous PRN, Yao Chen MD    sodium chloride 0.9 % flush 10 mL, 10 mL, Intravenous, Q12H, Vahe Thomas MD, 10 mL at 06/29/25 0846    sodium chloride 0.9 % flush 10 mL, 10 mL, Intravenous, PRN, Vahe Thomas MD    sodium chloride 0.9 % infusion 40 mL, 40 mL, Intravenous, PRN, Vahe Thomas MD     Assessment & Plan       Hypotension      Assessment & Plan  REBECCA  Hyponatremia   Hypotension   Volume depletion   Chronic diastolic heart failure   Lactic acidosis   A fib      Cr a bit higher today  BP were low yesterday.   Na at goal   BP also improving, but still on the low end. She was started on lopressor for new reduced EF found in ECHO   IVf DC yesterday.   If renal function is stable tomorrow, will start diuretics.   UOP not recorded.   Metabolic acidosis stable, likely due to NS  Monitor.         Molly Laurent MD  06/29/25  13:28 EDT

## 2025-06-29 NOTE — PROGRESS NOTES
[facility]    Patient Name: Marilu Avery  :1955  69 y.o.  LOS: 2  Encounter Provider: Yao Chen MD      Patient Care Team:  Esperanza Melton APRN as PCP - General (Nurse Practitioner)  Anatoly Jimenez MD as PCP - Family Medicine    Chief Complaint/Consult question:  Troponin elevation, A-fib, Takotsubo cardiomyopathy     PMH/HPI: 68 yo F with known history of A-fib who presents with fall at home.  She states that she has been feeling unwell for the several weeks and has gotten progressively more weak.  This eventually resulted in the fall at home.  Patient does not recall having syncope.  Workup in the ED was notable for REBECCA with Cr 2.2 and high-sensitivity troponin 266 -> 729.  EKG showed A-fib with low QRS voltage but no acute ischemic changes and adequate rate control in the 80s.     Interval History: Patient still feels well although continues to have some degree of orthopnea in the setting of ventral hernia.  Remains in A-fib in the 110s.  Uptitrating Lopressor to 25 bid today.       Limited echo (2025):      Limited study.    Left ventricular systolic function is severely decreased. Left ventricular ejection fraction appears to be 31 - 35%.    Left ventricular diastolic function was indeterminate in the setting of A-fib.    There is global hypokinesis/akinesis with basal sparing.  Differential includes LAD ischemia versus stress-induced (Takotsubo) cardiomyopathy.    Moderate mitral and tricuspid valve regurgitation is present.    Estimated right ventricular systolic pressure from tricuspid regurgitation is markedly elevated (>55 mmHg).    The left atrial cavity is severely dilated.    The right atrial cavity and IVC are mildly dilated.     Compared to TTE from 2025, LVEF has significantly declined, and extensive apical akinesis/ballooning is now noted.      Objective   Vital Signs  Temp:  [97.4 °F (36.3 °C)-98.1 °F (36.7 °C)] 97.4 °F (36.3 °C)  Heart Rate:   "[] 110  Resp:  [16-20] 20  BP: ()/(70-97) 115/87    Intake/Output Summary (Last 24 hours) at 6/29/2025 0954  Last data filed at 6/29/2025 0300  Gross per 24 hour   Intake --   Output 950 ml   Net -950 ml     Flowsheet Rows      Flowsheet Row First Filed Value   Admission Height 177.8 cm (70\") Documented at 06/27/2025 1236   Admission Weight 104 kg (229 lb) Documented at 06/27/2025 1236              Vitals and nursing note reviewed.   Constitutional:       Appearance: Not in distress. Frail. Chronically ill-appearing.   Pulmonary:      Effort: Pulmonary effort is normal.   Cardiovascular:      PMI at left midclavicular line. Tachycardia present. Irregular rhythm.      Murmurs: There is no murmur.   Edema:     Peripheral edema absent.   Abdominal:      General: Bowel sounds are normal. There is no distension.      Hernia: A hernia is present. Hernia is present in the umbilical area and ventral area.           Pertinent Test Results:  Results from last 7 days   Lab Units 06/29/25  0549 06/29/25  0000 06/28/25  1811 06/28/25  1158 06/28/25  0606 06/28/25  0006 06/27/25  1541 06/27/25  1244   SODIUM mmol/L 135*  135* 135* 136 135* 133*  133* 131* 129* 125*   POTASSIUM mmol/L 4.2  4.2 4.3 4.3 4.1 4.1  4.1 4.1 4.3 4.8   CHLORIDE mmol/L 106  106 106 105 104 102  102 99 94* 91*   CO2 mmol/L 15.0*  15.0* 16.2* 17.5* 17.5* 16.9*  16.9* 17.5* 18.8* 18.6*   BUN mg/dL 42.0*  42.0* 45.0* 44.0* 45.0* 48.0*  48.0* 49.0* 50.0* 48.0*   CREATININE mg/dL 1.46*  1.46* 1.68* 1.90* 1.76* 1.79*  1.79* 1.92* 1.94* 2.22*   GLUCOSE mg/dL 114*  114* 130* 164* 131* 119*  119* 135* 108* 150*   CALCIUM mg/dL 8.9  8.9 8.8 8.9 8.9 8.5*  8.5* 8.4* 8.8 9.5   AST (SGOT) U/L  --   --   --   --   --   --   --  21   ALT (SGPT) U/L  --   --   --   --   --   --   --  12     Results from last 7 days   Lab Units 06/28/25  0606 06/27/25  1403 06/27/25  1244   CK TOTAL U/L  --  72  --    HSTROP T ng/L 475* 729* 268*     Results from " last 7 days   Lab Units 06/29/25  0549 06/28/25  0606 06/27/25  1244   WBC 10*3/mm3 4.11 5.68 6.34   HEMOGLOBIN g/dL 9.8* 10.6* 12.1   HEMATOCRIT % 30.0* 31.7* 38.1   PLATELETS 10*3/mm3 117* 132* 183     Results from last 7 days   Lab Units 06/27/25  1244   INR  1.80*   APTT seconds 29.0     Results from last 7 days   Lab Units 06/27/25  1244   MAGNESIUM mg/dL 1.8     Results from last 7 days   Lab Units 06/27/25  1541   CHOLESTEROL mg/dL 139   TRIGLYCERIDES mg/dL 111   HDL CHOL mg/dL 101*     Results from last 7 days   Lab Units 06/27/25  1249   PROBNP pg/mL 2,909.0*     Results from last 7 days   Lab Units 06/27/25  1403   TSH uIU/mL 0.399           Medication Review:   [Held by provider] apixaban, 5 mg, Oral, Q12H  budesonide-formoterol, 2 puff, Inhalation, BID - RT  enoxaparin sodium, 1 mg/kg, Subcutaneous, Q12H  famotidine, 20 mg, Oral, QAM AC  levothyroxine, 75 mcg, Oral, Q AM  metoprolol tartrate, 12.5 mg, Oral, Once  metoprolol tartrate, 25 mg, Oral, BID  mupirocin, 1 Application, Each Nare, BID  sodium chloride, 10 mL, Intravenous, Q12H         Pharmacy to Dose enoxaparin (LOVENOX),         Assessment & Plan     Active Hospital Problems    Diagnosis  POA    **Hypotension [I95.9]  Yes      Resolved Hospital Problems   No resolved problems to display.        Troponin elevation/? Takotsubo cardiomyopathy:  High-sensitivity troponin 266 -> 729 on admission, now downtrending.  EKG showed A-fib with low QRS voltage but no acute ischemic changes and adequate rate control in the 80s.  Denies chest pain, echo showed newly reduced LVEF at 30-35% with extensive apical wall motion abnormality, strongly suggesting stress-induced (Takotsubo) cardiomyopathy.  Ideally we still need formal ischemic evaluation to rule out new coronary disease, may consider left heart catheterization (initially planned for tomorrow, will keep patient n.p.o. at midnight but may need later date as patient still has some orthopnea and creatinine  is only starting to improve).  See below regarding beta-blocker initiation, hold off on other GDMT for now given renal dysfunction but may consider adding ARB +/- home spironolactone down the road.  Chronic atrial fibrillation: CHADS2 Vasc score 2-3, continue to hold home Eliquis for possible cath.  Per patient, she was on home Lopressor 100 bid which was held given hypotension, dehydration and REBECCA, it was resumed at 12.5 bid yesterday, will further uptitrate to 25 bid today.  ? CHF: Patient takes torsemide 20 daily at home although unclear about history of cardiomyopathy or CHF.  She was likely over diuresed on admission in the setting of severe weakness.  Likely resume in the next 1-2 days.  Weakness/mechanical fall  REBECCA: Agree with fluid resuscitation, appreciate nephrology input.  Tobacco abuse in remission      Yao Chen MD  [facility]  06/29/25  12:21 EDT

## 2025-06-29 NOTE — PROGRESS NOTES
Name: Marilu Avery ADMIT: 2025   : 1955  PCP: Esperanza Melton APRN    MRN: [id number] LOS: 2 days   AGE/SEX: 69 y.o. female  ROOM: [facility]     Subjective   Subjective   Patient seen at bedside today, lying comfortably in bed.       Objective   Objective   Vital Signs  Temp:  [97.4 °F (36.3 °C)-98.1 °F (36.7 °C)] 97.4 °F (36.3 °C)  Heart Rate:  [] 109  Resp:  [16-20] 20  BP: ()/(70-99) 121/99  SpO2:  [95 %-98 %] 98 %  on   ;   Device (Oxygen Therapy): room air  Body mass index is 33.43 kg/m².  Physical Exam  General Appearance:    Alert, cooperative, no distress, appears stated age.  Head:     Normocephalic, without obvious abnormality, atraumatic  Eyes:     PERRL, conjunctiva/corneas clear, EOM's intact, both eyes  Ears:      Normal external ear canals, both ears  Nose:     Nares normal, septum midline, mucosa normal, no drainage    or sinus tenderness  Throat:   Lips, tongue, gums normal; oral mucosa pink and moist  Neck:     Supple, symmetrical, trachea midline, no adenopathy;     thyroid:  no enlargement/tenderness/nodules; no carotid    bruit or JVD  Back:     Symmetric, no curvature, ROM normal, no CVA tenderness  Lungs:               Clear to auscultation bilaterally, respirations unlabored  Chest Wall:        No tenderness or deformity   Heart:               Regular rate and rhythm, S1 and S2 normal, no murmur, rub   or gallop  Abdomen:         Abdominal hernia noted  Extremities:       Extremities normal, atraumatic, no cyanosis or edema  Pulses:              Pulses palpable in all extremities; symmetric all extremities  Skin:      Skin color normal, Skin is warm and dry,  no rashes or palpable lesions  Neurologic:        CNII-XII intact, motor strength grossly intact, sensation grossly intact to light touch, no focal deficits noted    Copied text material from yesterday's note has been reviewed for appropriate changes and remains accurate as of 25.      Results  Review     I reviewed the patient's new clinical results.  Results from last 7 days   Lab Units 06/29/25  0549 06/28/25  0606 06/27/25  1244   WBC 10*3/mm3 4.11 5.68 6.34   HEMOGLOBIN g/dL 9.8* 10.6* 12.1   PLATELETS 10*3/mm3 117* 132* 183     Results from last 7 days   Lab Units 06/29/25  0549 06/29/25  0000 06/28/25 1811 06/28/25  1158   SODIUM mmol/L 135*  135* 135* 136 135*   POTASSIUM mmol/L 4.2  4.2 4.3 4.3 4.1   CHLORIDE mmol/L 106  106 106 105 104   CO2 mmol/L 15.0*  15.0* 16.2* 17.5* 17.5*   BUN mg/dL 42.0*  42.0* 45.0* 44.0* 45.0*   CREATININE mg/dL 1.46*  1.46* 1.68* 1.90* 1.76*   GLUCOSE mg/dL 114*  114* 130* 164* 131*   EGFR mL/min/1.73 39.0*  39.0* 33.0* 28.5* 31.2*     Results from last 7 days   Lab Units 06/29/25  0549 06/28/25  0606 06/27/25  1244   ALBUMIN g/dL 3.2* 3.2* 4.0   BILIRUBIN mg/dL  --   --  0.9   ALK PHOS U/L  --   --  86   AST (SGOT) U/L  --   --  21   ALT (SGPT) U/L  --   --  12     Results from last 7 days   Lab Units 06/29/25  0549 06/29/25  0000 06/28/25  1811 06/28/25  1158 06/28/25  0606 06/27/25  1541 06/27/25  1244   CALCIUM mg/dL 8.9  8.9 8.8 8.9 8.9 8.5*  8.5*   < > 9.5   ALBUMIN g/dL 3.2*  --   --   --  3.2*  --  4.0   MAGNESIUM mg/dL  --   --   --   --   --   --  1.8   PHOSPHORUS mg/dL 2.6  --   --   --  3.4  --   --     < > = values in this interval not displayed.     Results from last 7 days   Lab Units 06/27/25  1544 06/27/25  1244   PROCALCITONIN ng/mL  --  0.32*   LACTATE mmol/L 1.7 2.5*     Glucose   Date/Time Value Ref Range Status   06/29/2025 0607 102 70 - 130 mg/dL Final   06/28/2025 2018 129 70 - 130 mg/dL Final   06/28/2025 1800 168 (H) 70 - 130 mg/dL Final   06/28/2025 1234 161 (H) 70 - 130 mg/dL Final   06/28/2025 0630 113 70 - 130 mg/dL Final   06/27/2025 1928 111 70 - 130 mg/dL Final       US Renal Bilateral  Result Date: 6/28/2025  Possible new mass of the left upper kidney, as described, CT correlation advised.  No hydronephrosis or echogenic  nephrolithiasis.   This report was finalized on 6/28/2025 8:42 AM by Dr. Herrera Mistry M.D on Workstation: [id number]      CT Head Without Contrast  Result Date: 6/28/2025  1. No acute intracranial abnormality is identified.  2. There is very mild small vessel disease in the cerebral white matter. The remainder the head CT is within normal limits with no acute skull fracture or intracranial hemorrhage identified.  Radiation dose reduction techniques were utilized, including automated exposure control and exposure modulation based on body size.    This report was finalized on 6/28/2025 8:09 AM by Dr. John Sanches M.D on Workstation: [id number]      XR Chest 1 View  Result Date: 6/27/2025  As described.  This report was finalized on 6/27/2025 2:17 PM by Dr. Herrera Mistry M.D on Workstation: [id number]        I have personally reviewed all medications:  Scheduled Medications  [Held by provider] apixaban, 5 mg, Oral, Q12H  budesonide-formoterol, 2 puff, Inhalation, BID - RT  enoxaparin sodium, 1 mg/kg, Subcutaneous, Q12H  famotidine, 20 mg, Oral, QAM AC  levothyroxine, 75 mcg, Oral, Q AM  metoprolol tartrate, 25 mg, Oral, BID  mupirocin, 1 Application, Each Nare, BID  sodium chloride, 10 mL, Intravenous, Q12H    Infusions  Pharmacy to Dose enoxaparin (LOVENOX),     Diet  Diet: Regular/House; Fluid Consistency: Thin (IDDSI 0)    I have personally reviewed:  [x]  Laboratory   [x]  Microbiology   [x]  Radiology   [x]  EKG/Telemetry  [x]  Cardiology/Vascular   []  Pathology    []  Records       Assessment/Plan     Active Hospital Problems    Diagnosis  POA    **Hypotension [I95.9]  Yes      Resolved Hospital Problems   No resolved problems to display.       69 y.o. female admitted with Hypotension.    Assessment and plan  Symptomatic hypotension, resolved with IV fluids, after fluid resuscitation.  Patient did not require pressor support.  Asthma/COPD, chronic condition, currently not in exacerbation.  Atrial  fibrillation, patient is currently rate controlled.  Cardiology on board and following.  Hypothyroidism, chronic condition.  Chronic kidney disease and acidosis, patient will benefit from nephrology evaluation.  CODE STATUS is full code.  Further plans will be based on hospital course.      Michael Ball MD  [facility]  06/29/25  11:10 EDT

## 2025-06-29 NOTE — PLAN OF CARE
Problem: Adult Inpatient Plan of Care  Goal: Plan of Care Review  Outcome: Progressing  Goal: Patient-Specific Goal (Individualized)  Outcome: Progressing  Goal: Absence of Hospital-Acquired Illness or Injury  Outcome: Progressing  Intervention: Identify and Manage Fall Risk  Recent Flowsheet Documentation  Taken 6/29/2025 1200 by Patti Chau RN  Safety Promotion/Fall Prevention: safety round/check completed  Taken 6/29/2025 1100 by Patti Chau RN  Safety Promotion/Fall Prevention: safety round/check completed  Taken 6/29/2025 1000 by Patti Chau RN  Safety Promotion/Fall Prevention: safety round/check completed  Taken 6/29/2025 0900 by Patti Chau RN  Safety Promotion/Fall Prevention: safety round/check completed  Taken 6/29/2025 0800 by Patti Chau RN  Safety Promotion/Fall Prevention: safety round/check completed  Taken 6/29/2025 0700 by Patti Chau RN  Safety Promotion/Fall Prevention: safety round/check completed  Intervention: Prevent Skin Injury  Recent Flowsheet Documentation  Taken 6/29/2025 1200 by Patti Chau RN  Body Position:   position changed independently   weight shifting   neutral head position   legs elevated   heels elevated  Taken 6/29/2025 1100 by Patti Chau RN  Body Position:   position changed independently   weight shifting   neutral head position   legs elevated   heels elevated  Taken 6/29/2025 1000 by Patti Chau RN  Body Position:   position changed independently   weight shifting   neutral head position   legs elevated   heels elevated  Taken 6/29/2025 0900 by Patti Chau RN  Body Position:   position changed independently   weight shifting   neutral head position   legs elevated   heels elevated  Taken 6/29/2025 0800 by Patti Chau RN  Body Position:   position changed independently   weight shifting   neutral head position   legs elevated   heels elevated  Taken 6/29/2025 0700 by Patti Chau RN  Body Position:   position  changed independently   weight shifting   neutral head position   legs elevated   heels elevated  Intervention: Prevent and Manage VTE (Venous Thromboembolism) Risk  Recent Flowsheet Documentation  Taken 6/29/2025 0800 by Patti Chau RN  VTE Prevention/Management:   bilateral   SCDs (sequential compression devices) on  Intervention: Prevent Infection  Recent Flowsheet Documentation  Taken 6/29/2025 1200 by Patti Chau RN  Infection Prevention: single patient room provided  Taken 6/29/2025 1100 by Patti Chau RN  Infection Prevention: single patient room provided  Taken 6/29/2025 1000 by Patti Chau RN  Infection Prevention: single patient room provided  Taken 6/29/2025 0900 by Patti Chau RN  Infection Prevention: single patient room provided  Taken 6/29/2025 0800 by Patti Chau RN  Infection Prevention:   single patient room provided   environmental surveillance performed   hand hygiene promoted  Taken 6/29/2025 0700 by Patti Chau RN  Infection Prevention: single patient room provided  Goal: Optimal Comfort and Wellbeing  Outcome: Progressing  Intervention: Provide Person-Centered Care  Recent Flowsheet Documentation  Taken 6/29/2025 1200 by Patti Chau RN  Trust Relationship/Rapport:   care explained   choices provided   emotional support provided   empathic listening provided   questions answered   questions encouraged   reassurance provided   thoughts/feelings acknowledged  Taken 6/29/2025 1100 by Patti Chau RN  Trust Relationship/Rapport:   care explained   choices provided   emotional support provided   empathic listening provided   questions answered   questions encouraged   reassurance provided   thoughts/feelings acknowledged  Taken 6/29/2025 1000 by Patti Chau RN  Trust Relationship/Rapport:   care explained   choices provided   emotional support provided   empathic listening provided   questions answered   questions encouraged   reassurance provided    thoughts/feelings acknowledged  Taken 6/29/2025 0900 by Patti Chau RN  Trust Relationship/Rapport:   care explained   choices provided   emotional support provided   empathic listening provided   questions answered   questions encouraged   reassurance provided   thoughts/feelings acknowledged  Taken 6/29/2025 0800 by Patti Chau RN  Trust Relationship/Rapport:   care explained   choices provided   emotional support provided   empathic listening provided   questions answered   questions encouraged   reassurance provided   thoughts/feelings acknowledged  Taken 6/29/2025 0700 by Patti Chau RN  Trust Relationship/Rapport:   care explained   choices provided   emotional support provided   empathic listening provided   questions answered   questions encouraged   reassurance provided   thoughts/feelings acknowledged  Goal: Readiness for Transition of Care  Outcome: Progressing     Problem: Fall Injury Risk  Goal: Absence of Fall and Fall-Related Injury  Outcome: Progressing  Intervention: Identify and Manage Contributors  Recent Flowsheet Documentation  Taken 6/29/2025 1200 by Patti Chau RN  Medication Review/Management: medications reviewed  Taken 6/29/2025 1100 by Patti Chau RN  Medication Review/Management: medications reviewed  Taken 6/29/2025 1000 by Patti Chau RN  Medication Review/Management: medications reviewed  Taken 6/29/2025 0900 by Patti Chau RN  Medication Review/Management: medications reviewed  Taken 6/29/2025 0800 by Patti Chau RN  Medication Review/Management: medications reviewed  Taken 6/29/2025 0700 by Patti Chau RN  Medication Review/Management: medications reviewed  Intervention: Promote Injury-Free Environment  Recent Flowsheet Documentation  Taken 6/29/2025 1200 by Patti Chau RN  Safety Promotion/Fall Prevention: safety round/check completed  Taken 6/29/2025 1100 by Patti Chau RN  Safety Promotion/Fall Prevention: safety round/check  completed  Taken 6/29/2025 1000 by Patti Chau RN  Safety Promotion/Fall Prevention: safety round/check completed  Taken 6/29/2025 0900 by Patti Chau RN  Safety Promotion/Fall Prevention: safety round/check completed  Taken 6/29/2025 0800 by Patti Chau RN  Safety Promotion/Fall Prevention: safety round/check completed  Taken 6/29/2025 0700 by Patti Chau RN  Safety Promotion/Fall Prevention: safety round/check completed     Problem: Skin Injury Risk Increased  Goal: Skin Health and Integrity  Outcome: Progressing  Intervention: Optimize Skin Protection  Recent Flowsheet Documentation  Taken 6/29/2025 1200 by Patti Chau RN  Activity Management: bedrest  Head of Bed (HOB) Positioning: HOB at 30-45 degrees  Taken 6/29/2025 1100 by Patti Chau RN  Activity Management: bedrest  Head of Bed (HOB) Positioning: HOB at 30-45 degrees  Taken 6/29/2025 1000 by Patti Chau RN  Activity Management: bedrest  Head of Bed (HOB) Positioning: HOB at 30-45 degrees  Taken 6/29/2025 0900 by Patti Chau RN  Activity Management: bedrest  Head of Bed (HOB) Positioning: HOB at 30-45 degrees  Taken 6/29/2025 0800 by Patti Chau RN  Activity Management: bedrest  Head of Bed (HOB) Positioning: HOB at 30-45 degrees  Taken 6/29/2025 0700 by Patit Chau RN  Activity Management: bedrest  Head of Bed (HOB) Positioning: HOB at 30-45 degrees     Problem: Pain Acute  Goal: Optimal Pain Control and Function  Outcome: Progressing  Intervention: Optimize Psychosocial Wellbeing  Recent Flowsheet Documentation  Taken 6/29/2025 1200 by Patti Chau RN  Diversional Activities:   television   smartphone  Taken 6/29/2025 1100 by Patti Chau RN  Diversional Activities:   television   smartphone  Taken 6/29/2025 1000 by Patti Chau RN  Diversional Activities:   television   smartphone  Taken 6/29/2025 0900 by Patti Chau RN  Diversional Activities:   television   smartphone  Taken 6/29/2025  0800 by Patti Chau RN  Diversional Activities:   television   smartphone  Taken 6/29/2025 0700 by Patti Chau RN  Diversional Activities:   television   smartphone  Intervention: Prevent or Manage Pain  Recent Flowsheet Documentation  Taken 6/29/2025 1200 by Patti Chau RN  Medication Review/Management: medications reviewed  Taken 6/29/2025 1100 by Patti Chau RN  Medication Review/Management: medications reviewed  Taken 6/29/2025 1000 by Patti Chau RN  Medication Review/Management: medications reviewed  Taken 6/29/2025 0900 by Patti Chau RN  Medication Review/Management: medications reviewed  Taken 6/29/2025 0800 by Patti Chau RN  Medication Review/Management: medications reviewed  Taken 6/29/2025 0700 by Patti Chau RN  Medication Review/Management: medications reviewed   Goal Outcome Evaluation:

## 2025-06-30 PROBLEM — R79.89 ELEVATED TROPONIN: Status: ACTIVE | Noted: 2025-06-30

## 2025-06-30 PROBLEM — N17.9 AKI (ACUTE KIDNEY INJURY): Status: ACTIVE | Noted: 2021-06-13

## 2025-06-30 PROBLEM — F10.90 CHRONIC ALCOHOL USE: Status: ACTIVE | Noted: 2025-06-30

## 2025-06-30 PROBLEM — E66.9 OBESITY (BMI 30-39.9): Status: ACTIVE | Noted: 2025-06-30

## 2025-06-30 LAB
ALBUMIN SERPL-MCNC: 2.9 G/DL (ref 3.5–5.2)
ANION GAP SERPL CALCULATED.3IONS-SCNC: 11.3 MMOL/L (ref 5–15)
ANION GAP SERPL CALCULATED.3IONS-SCNC: 11.3 MMOL/L (ref 5–15)
ANION GAP SERPL CALCULATED.3IONS-SCNC: 14.2 MMOL/L (ref 5–15)
ANION GAP SERPL CALCULATED.3IONS-SCNC: 15 MMOL/L (ref 5–15)
BASOPHILS # BLD AUTO: 0.01 10*3/MM3 (ref 0–0.2)
BASOPHILS NFR BLD AUTO: 0.2 % (ref 0–1.5)
BUN SERPL-MCNC: 40 MG/DL (ref 8–23)
BUN SERPL-MCNC: 41 MG/DL (ref 8–23)
BUN/CREAT SERPL: 29.4 (ref 7–25)
BUN/CREAT SERPL: 30.6 (ref 7–25)
BUN/CREAT SERPL: 35 (ref 7–25)
BUN/CREAT SERPL: 35 (ref 7–25)
CALCIUM SPEC-SCNC: 8.7 MG/DL (ref 8.6–10.5)
CALCIUM SPEC-SCNC: 9 MG/DL (ref 8.6–10.5)
CALCIUM SPEC-SCNC: 9 MG/DL (ref 8.6–10.5)
CALCIUM SPEC-SCNC: 9.3 MG/DL (ref 8.6–10.5)
CHLORIDE SERPL-SCNC: 106 MMOL/L (ref 98–107)
CHLORIDE SERPL-SCNC: 107 MMOL/L (ref 98–107)
CHLORIDE SERPL-SCNC: 108 MMOL/L (ref 98–107)
CHLORIDE SERPL-SCNC: 108 MMOL/L (ref 98–107)
CO2 SERPL-SCNC: 15 MMOL/L (ref 22–29)
CO2 SERPL-SCNC: 17.7 MMOL/L (ref 22–29)
CO2 SERPL-SCNC: 17.7 MMOL/L (ref 22–29)
CO2 SERPL-SCNC: 17.8 MMOL/L (ref 22–29)
CREAT SERPL-MCNC: 1.17 MG/DL (ref 0.57–1)
CREAT SERPL-MCNC: 1.17 MG/DL (ref 0.57–1)
CREAT SERPL-MCNC: 1.34 MG/DL (ref 0.57–1)
CREAT SERPL-MCNC: 1.36 MG/DL (ref 0.57–1)
DEPRECATED RDW RBC AUTO: 51.3 FL (ref 37–54)
EGFRCR SERPLBLD CKD-EPI 2021: 42.5 ML/MIN/1.73
EGFRCR SERPLBLD CKD-EPI 2021: 43.3 ML/MIN/1.73
EGFRCR SERPLBLD CKD-EPI 2021: 50.9 ML/MIN/1.73
EGFRCR SERPLBLD CKD-EPI 2021: 50.9 ML/MIN/1.73
EOSINOPHIL # BLD AUTO: 0.06 10*3/MM3 (ref 0–0.4)
EOSINOPHIL NFR BLD AUTO: 1.5 % (ref 0.3–6.2)
ERYTHROCYTE [DISTWIDTH] IN BLOOD BY AUTOMATED COUNT: 14.4 % (ref 12.3–15.4)
GLUCOSE BLDC GLUCOMTR-MCNC: 112 MG/DL (ref 70–130)
GLUCOSE BLDC GLUCOMTR-MCNC: 125 MG/DL (ref 70–130)
GLUCOSE BLDC GLUCOMTR-MCNC: 125 MG/DL (ref 70–130)
GLUCOSE BLDC GLUCOMTR-MCNC: 130 MG/DL (ref 70–130)
GLUCOSE SERPL-MCNC: 111 MG/DL (ref 65–99)
GLUCOSE SERPL-MCNC: 111 MG/DL (ref 65–99)
GLUCOSE SERPL-MCNC: 123 MG/DL (ref 65–99)
GLUCOSE SERPL-MCNC: 135 MG/DL (ref 65–99)
HCO3 BLDA-SCNC: 17 MMOL/L (ref 21–28)
HCO3 BLDA-SCNC: 17.9 MMOL/L (ref 21–28)
HCT VFR BLD AUTO: 28.1 % (ref 34–46.6)
HCT VFR BLDA CALC: 29 % (ref 38–51)
HCT VFR BLDA CALC: 29 % (ref 38–51)
HGB BLD-MCNC: 9.4 G/DL (ref 12–15.9)
HGB BLDA-MCNC: 9.9 G/DL (ref 12–17)
HGB BLDA-MCNC: 9.9 G/DL (ref 12–17)
IMM GRANULOCYTES # BLD AUTO: 0.02 10*3/MM3 (ref 0–0.05)
IMM GRANULOCYTES NFR BLD AUTO: 0.5 % (ref 0–0.5)
LYMPHOCYTES # BLD AUTO: 0.81 10*3/MM3 (ref 0.7–3.1)
LYMPHOCYTES NFR BLD AUTO: 20 % (ref 19.6–45.3)
MCH RBC QN AUTO: 33.1 PG (ref 26.6–33)
MCHC RBC AUTO-ENTMCNC: 33.5 G/DL (ref 31.5–35.7)
MCV RBC AUTO: 98.9 FL (ref 79–97)
MONOCYTES # BLD AUTO: 0.53 10*3/MM3 (ref 0.1–0.9)
MONOCYTES NFR BLD AUTO: 13.1 % (ref 5–12)
NEUTROPHILS NFR BLD AUTO: 2.62 10*3/MM3 (ref 1.7–7)
NEUTROPHILS NFR BLD AUTO: 64.7 % (ref 42.7–76)
NRBC BLD AUTO-RTO: 0.5 /100 WBC (ref 0–0.2)
PCO2 BLDA: 26.8 MM HG (ref 35–45)
PCO2 BLDA: 33.2 MM HG (ref 35–45)
PH BLDA: 7.34 PH UNITS (ref 7.35–7.45)
PH BLDA: 7.41 PH UNITS (ref 7.35–7.45)
PHOSPHATE SERPL-MCNC: 3.2 MG/DL (ref 2.5–4.5)
PLATELET # BLD AUTO: 125 10*3/MM3 (ref 140–450)
PMV BLD AUTO: 10.2 FL (ref 6–12)
PO2 BLDA: 25 MMHG (ref 80–105)
PO2 BLDA: 89 MMHG (ref 80–105)
POTASSIUM BLDA-SCNC: 4 MMOL/L (ref 3.5–4.9)
POTASSIUM BLDA-SCNC: 4.1 MMOL/L (ref 3.5–4.9)
POTASSIUM SERPL-SCNC: 4 MMOL/L (ref 3.5–5.2)
POTASSIUM SERPL-SCNC: 4.4 MMOL/L (ref 3.5–5.2)
POTASSIUM SERPL-SCNC: 4.4 MMOL/L (ref 3.5–5.2)
POTASSIUM SERPL-SCNC: 4.6 MMOL/L (ref 3.5–5.2)
QT INTERVAL: 414 MS
QT INTERVAL: 426 MS
QTC INTERVAL: 527 MS
QTC INTERVAL: 531 MS
RBC # BLD AUTO: 2.84 10*6/MM3 (ref 3.77–5.28)
SAO2 % BLDA: 43 % (ref 95–98)
SAO2 % BLDA: 97 % (ref 95–98)
SODIUM SERPL-SCNC: 137 MMOL/L (ref 136–145)
SODIUM SERPL-SCNC: 138 MMOL/L (ref 136–145)
WBC NRBC COR # BLD AUTO: 4.05 10*3/MM3 (ref 3.4–10.8)

## 2025-06-30 PROCEDURE — 80048 BASIC METABOLIC PNL TOTAL CA: CPT | Performed by: INTERNAL MEDICINE

## 2025-06-30 PROCEDURE — 93460 R&L HRT ART/VENTRICLE ANGIO: CPT | Performed by: INTERNAL MEDICINE

## 2025-06-30 PROCEDURE — 25510000001 IOPAMIDOL PER 1 ML: Performed by: INTERNAL MEDICINE

## 2025-06-30 PROCEDURE — C1769 GUIDE WIRE: HCPCS | Performed by: INTERNAL MEDICINE

## 2025-06-30 PROCEDURE — 25010000002 LIDOCAINE 2% SOLUTION: Performed by: INTERNAL MEDICINE

## 2025-06-30 PROCEDURE — C1894 INTRO/SHEATH, NON-LASER: HCPCS | Performed by: INTERNAL MEDICINE

## 2025-06-30 PROCEDURE — 25010000002 FENTANYL CITRATE (PF) 50 MCG/ML SOLUTION: Performed by: INTERNAL MEDICINE

## 2025-06-30 PROCEDURE — 99232 SBSQ HOSP IP/OBS MODERATE 35: CPT | Performed by: STUDENT IN AN ORGANIZED HEALTH CARE EDUCATION/TRAINING PROGRAM

## 2025-06-30 PROCEDURE — 94761 N-INVAS EAR/PLS OXIMETRY MLT: CPT

## 2025-06-30 PROCEDURE — 85025 COMPLETE CBC W/AUTO DIFF WBC: CPT | Performed by: INTERNAL MEDICINE

## 2025-06-30 PROCEDURE — 25010000002 MIDAZOLAM PER 1 MG: Performed by: INTERNAL MEDICINE

## 2025-06-30 PROCEDURE — B2111ZZ FLUOROSCOPY OF MULTIPLE CORONARY ARTERIES USING LOW OSMOLAR CONTRAST: ICD-10-PCS | Performed by: INTERNAL MEDICINE

## 2025-06-30 PROCEDURE — 25810000003 SODIUM CHLORIDE 0.9 % SOLUTION: Performed by: INTERNAL MEDICINE

## 2025-06-30 PROCEDURE — 82948 REAGENT STRIP/BLOOD GLUCOSE: CPT

## 2025-06-30 PROCEDURE — 94664 DEMO&/EVAL PT USE INHALER: CPT

## 2025-06-30 PROCEDURE — 4A023N8 MEASUREMENT OF CARDIAC SAMPLING AND PRESSURE, BILATERAL, PERCUTANEOUS APPROACH: ICD-10-PCS | Performed by: INTERNAL MEDICINE

## 2025-06-30 PROCEDURE — 94799 UNLISTED PULMONARY SVC/PX: CPT

## 2025-06-30 PROCEDURE — 85018 HEMOGLOBIN: CPT

## 2025-06-30 PROCEDURE — 82803 BLOOD GASES ANY COMBINATION: CPT

## 2025-06-30 PROCEDURE — 25010000002 HEPARIN (PORCINE) PER 1000 UNITS: Performed by: INTERNAL MEDICINE

## 2025-06-30 PROCEDURE — 80069 RENAL FUNCTION PANEL: CPT | Performed by: INTERNAL MEDICINE

## 2025-06-30 PROCEDURE — B2151ZZ FLUOROSCOPY OF LEFT HEART USING LOW OSMOLAR CONTRAST: ICD-10-PCS | Performed by: INTERNAL MEDICINE

## 2025-06-30 PROCEDURE — 85014 HEMATOCRIT: CPT

## 2025-06-30 PROCEDURE — 82810 BLOOD GASES O2 SAT ONLY: CPT

## 2025-06-30 RX ORDER — FAMOTIDINE 20 MG/1
20 TABLET, FILM COATED ORAL 2 TIMES DAILY
Status: DISCONTINUED | OUTPATIENT
Start: 2025-06-30 | End: 2025-07-07 | Stop reason: HOSPADM

## 2025-06-30 RX ORDER — METOPROLOL TARTRATE 50 MG
50 TABLET ORAL 2 TIMES DAILY
Status: DISCONTINUED | OUTPATIENT
Start: 2025-06-30 | End: 2025-07-01

## 2025-06-30 RX ORDER — HEPARIN SODIUM 1000 [USP'U]/ML
INJECTION, SOLUTION INTRAVENOUS; SUBCUTANEOUS
Status: DISCONTINUED | OUTPATIENT
Start: 2025-06-30 | End: 2025-06-30 | Stop reason: HOSPADM

## 2025-06-30 RX ORDER — ALLOPURINOL 100 MG/1
100 TABLET ORAL DAILY
Status: DISCONTINUED | OUTPATIENT
Start: 2025-06-30 | End: 2025-07-07 | Stop reason: HOSPADM

## 2025-06-30 RX ORDER — FENTANYL CITRATE 50 UG/ML
INJECTION, SOLUTION INTRAMUSCULAR; INTRAVENOUS
Status: DISCONTINUED | OUTPATIENT
Start: 2025-06-30 | End: 2025-06-30 | Stop reason: HOSPADM

## 2025-06-30 RX ORDER — TORSEMIDE 20 MG/1
20 TABLET ORAL DAILY
Status: DISCONTINUED | OUTPATIENT
Start: 2025-07-01 | End: 2025-07-01

## 2025-06-30 RX ORDER — BUMETANIDE 2 MG/1
2 TABLET ORAL
Status: DISCONTINUED | OUTPATIENT
Start: 2025-06-30 | End: 2025-06-30

## 2025-06-30 RX ORDER — BUMETANIDE 1 MG/1
2 TABLET ORAL
Status: DISCONTINUED | OUTPATIENT
Start: 2025-07-01 | End: 2025-07-01

## 2025-06-30 RX ORDER — MIDAZOLAM HYDROCHLORIDE 1 MG/ML
INJECTION, SOLUTION INTRAMUSCULAR; INTRAVENOUS
Status: DISCONTINUED | OUTPATIENT
Start: 2025-06-30 | End: 2025-06-30 | Stop reason: HOSPADM

## 2025-06-30 RX ORDER — POLYETHYLENE GLYCOL 3350 17 G/17G
17 POWDER, FOR SOLUTION ORAL DAILY
Status: DISCONTINUED | OUTPATIENT
Start: 2025-06-30 | End: 2025-07-07 | Stop reason: HOSPADM

## 2025-06-30 RX ORDER — IOPAMIDOL 755 MG/ML
INJECTION, SOLUTION INTRAVASCULAR
Status: DISCONTINUED | OUTPATIENT
Start: 2025-06-30 | End: 2025-06-30 | Stop reason: HOSPADM

## 2025-06-30 RX ORDER — VERAPAMIL HYDROCHLORIDE 2.5 MG/ML
INJECTION INTRAVENOUS
Status: DISCONTINUED | OUTPATIENT
Start: 2025-06-30 | End: 2025-06-30 | Stop reason: HOSPADM

## 2025-06-30 RX ORDER — TORSEMIDE 20 MG/1
20 TABLET ORAL DAILY
Status: DISCONTINUED | OUTPATIENT
Start: 2025-07-01 | End: 2025-06-30

## 2025-06-30 RX ORDER — BUDESONIDE AND FORMOTEROL FUMARATE DIHYDRATE 160; 4.5 UG/1; UG/1
2 AEROSOL RESPIRATORY (INHALATION)
Status: DISCONTINUED | OUTPATIENT
Start: 2025-06-30 | End: 2025-07-07 | Stop reason: HOSPADM

## 2025-06-30 RX ORDER — LEVOTHYROXINE SODIUM 75 UG/1
75 TABLET ORAL
Status: DISCONTINUED | OUTPATIENT
Start: 2025-07-01 | End: 2025-07-07 | Stop reason: HOSPADM

## 2025-06-30 RX ORDER — SODIUM CHLORIDE 9 MG/ML
50 INJECTION, SOLUTION INTRAVENOUS CONTINUOUS
Status: DISCONTINUED | OUTPATIENT
Start: 2025-06-30 | End: 2025-07-01

## 2025-06-30 RX ORDER — NITROGLYCERIN 0.4 MG/1
0.4 TABLET SUBLINGUAL
Status: DISCONTINUED | OUTPATIENT
Start: 2025-06-30 | End: 2025-07-07 | Stop reason: HOSPADM

## 2025-06-30 RX ORDER — LIDOCAINE HYDROCHLORIDE 20 MG/ML
INJECTION, SOLUTION INFILTRATION; PERINEURAL
Status: DISCONTINUED | OUTPATIENT
Start: 2025-06-30 | End: 2025-06-30 | Stop reason: HOSPADM

## 2025-06-30 RX ORDER — MONTELUKAST SODIUM 10 MG/1
10 TABLET ORAL DAILY
Status: DISCONTINUED | OUTPATIENT
Start: 2025-06-30 | End: 2025-07-07 | Stop reason: HOSPADM

## 2025-06-30 RX ORDER — SPIRONOLACTONE 25 MG/1
25 TABLET ORAL 2 TIMES DAILY
Status: DISCONTINUED | OUTPATIENT
Start: 2025-06-30 | End: 2025-07-01

## 2025-06-30 RX ORDER — SODIUM CHLORIDE 9 MG/ML
INJECTION, SOLUTION INTRAVENOUS
Status: COMPLETED | OUTPATIENT
Start: 2025-06-30 | End: 2025-06-30

## 2025-06-30 RX ADMIN — METOPROLOL TARTRATE 50 MG: 50 TABLET, FILM COATED ORAL at 09:42

## 2025-06-30 RX ADMIN — ACETAMINOPHEN 650 MG: 325 TABLET, FILM COATED ORAL at 00:48

## 2025-06-30 RX ADMIN — LEVOTHYROXINE SODIUM 75 MCG: 0.07 TABLET ORAL at 06:36

## 2025-06-30 RX ADMIN — BUDESONIDE AND FORMOTEROL FUMARATE DIHYDRATE 2 PUFF: 160; 4.5 AEROSOL RESPIRATORY (INHALATION) at 08:26

## 2025-06-30 RX ADMIN — FAMOTIDINE 20 MG: 20 TABLET, FILM COATED ORAL at 06:36

## 2025-06-30 RX ADMIN — MONTELUKAST 10 MG: 10 TABLET, FILM COATED ORAL at 21:33

## 2025-06-30 RX ADMIN — SODIUM CHLORIDE 50 ML/HR: 9 INJECTION, SOLUTION INTRAVENOUS at 18:40

## 2025-06-30 RX ADMIN — FAMOTIDINE 20 MG: 20 TABLET, FILM COATED ORAL at 21:33

## 2025-06-30 RX ADMIN — MUPIROCIN 1 APPLICATION: 20 OINTMENT TOPICAL at 09:42

## 2025-06-30 RX ADMIN — Medication 10 ML: at 09:25

## 2025-06-30 RX ADMIN — APIXABAN 5 MG: 5 TABLET, FILM COATED ORAL at 21:33

## 2025-06-30 RX ADMIN — METOPROLOL TARTRATE 50 MG: 50 TABLET, FILM COATED ORAL at 21:33

## 2025-06-30 RX ADMIN — SPIRONOLACTONE 25 MG: 25 TABLET ORAL at 21:33

## 2025-06-30 RX ADMIN — BUDESONIDE AND FORMOTEROL FUMARATE DIHYDRATE 2 PUFF: 160; 4.5 AEROSOL RESPIRATORY (INHALATION) at 19:21

## 2025-06-30 NOTE — PROGRESS NOTES
[facility]    Patient Name: Marilu Avery  :1955  69 y.o.  LOS: 3  Encounter Provider: Oscar Womack MD      Patient Care Team:  Esperanza Melton APRN as PCP - General (Nurse Practitioner)  Anatoly Jimenez MD as PCP - Family Medicine    Chief Complaint/Consult question:  Troponin elevation, A-fib, Takotsubo cardiomyopathy     PMH/HPI: 70 yo F with known history of A-fib who presents with fall at home.  She states that she has been feeling unwell for the several weeks and has gotten progressively more weak.  This eventually resulted in the fall at home.  Patient does not recall having syncope.  Workup in the ED was notable for REBECCA with Cr 2.2 and high-sensitivity troponin 266 -> 729.  EKG showed A-fib with low QRS voltage but no acute ischemic changes and adequate rate control in the 80s.     Interval History:     Continues to have some weakness.  Also notes orthopnea from ventral hernia.  She said this is relatively unchanged for several years.  She is apprehensive about undergoing heart catheterization due to the orthopnea.  We have a long discussion about this.  In the room she is able to lay pretty flat.  She does not have any chest pain.  She still is in A-fib with RVR with rates in the low 100s.       Limited echo (2025):      Limited study.    Left ventricular systolic function is severely decreased. Left ventricular ejection fraction appears to be 31 - 35%.    Left ventricular diastolic function was indeterminate in the setting of A-fib.    There is global hypokinesis/akinesis with basal sparing.  Differential includes LAD ischemia versus stress-induced (Takotsubo) cardiomyopathy.    Moderate mitral and tricuspid valve regurgitation is present.    Estimated right ventricular systolic pressure from tricuspid regurgitation is markedly elevated (>55 mmHg).    The left atrial cavity is severely dilated.    The right atrial cavity and IVC are mildly dilated.    "  Compared to TTE from 2/14/2025, LVEF has significantly declined, and extensive apical akinesis/ballooning is now noted.      Objective   Vital Signs  Temp:  [97.4 °F (36.3 °C)-98 °F (36.7 °C)] 97.4 °F (36.3 °C)  Heart Rate:  [102-123] 117  Resp:  [18-24] 20  BP: (109-127)/(75-99) 109/93    Intake/Output Summary (Last 24 hours) at 6/30/2025 0810  Last data filed at 6/30/2025 0600  Gross per 24 hour   Intake --   Output 850 ml   Net -850 ml     Flowsheet Rows      Flowsheet Row First Filed Value   Admission Height 177.8 cm (70\") Documented at 06/27/2025 1236   Admission Weight 104 kg (229 lb) Documented at 06/27/2025 1236              Vitals and nursing note reviewed.   Constitutional:       Appearance: Not in distress. Frail. Chronically ill-appearing.   Pulmonary:      Effort: Pulmonary effort is normal.   Cardiovascular:      PMI at left midclavicular line. Tachycardia present. Irregular rhythm.      Murmurs: There is no murmur.   Edema:     Peripheral edema absent.   Abdominal:      General: Bowel sounds are normal. There is no distension.      Hernia: A hernia is present. Hernia is present in the umbilical area and ventral area.           Pertinent Test Results:  Results from last 7 days   Lab Units 06/30/25  0615 06/29/25  2356 06/29/25  1755 06/29/25  1153 06/29/25  0549 06/29/25  0000 06/28/25  1811 06/27/25  1541 06/27/25  1244   SODIUM mmol/L 137  137 137 136 137 135*  135* 135* 136   < > 125*   POTASSIUM mmol/L 4.4  4.4 4.0 4.2 4.1 4.2  4.2 4.3 4.3   < > 4.8   CHLORIDE mmol/L 108*  108* 107 105 106 106  106 106 105   < > 91*   CO2 mmol/L 17.7*  17.7* 15.0* 15.0* 15.0* 15.0*  15.0* 16.2* 17.5*   < > 18.6*   BUN mg/dL 41.0*  41.0* 41.0* 40.0* 40.0* 42.0*  42.0* 45.0* 44.0*   < > 48.0*   CREATININE mg/dL 1.17*  1.17* 1.34* 1.47* 1.62* 1.46*  1.46* 1.68* 1.90*   < > 2.22*   GLUCOSE mg/dL 111*  111* 135* 145* 121* 114*  114* 130* 164*   < > 150*   CALCIUM mg/dL 9.0  9.0 8.7 8.7 8.9 8.9  8.9 8.8 " 8.9   < > 9.5   AST (SGOT) U/L  --   --   --   --   --   --   --   --  21   ALT (SGPT) U/L  --   --   --   --   --   --   --   --  12    < > = values in this interval not displayed.     Results from last 7 days   Lab Units 06/28/25  0606 06/27/25  1403 06/27/25  1244   CK TOTAL U/L  --  72  --    HSTROP T ng/L 475* 729* 268*     Results from last 7 days   Lab Units 06/30/25  0615 06/29/25  0549 06/28/25  0606 06/27/25  1244   WBC 10*3/mm3 4.05 4.11 5.68 6.34   HEMOGLOBIN g/dL 9.4* 9.8* 10.6* 12.1   HEMATOCRIT % 28.1* 30.0* 31.7* 38.1   PLATELETS 10*3/mm3 125* 117* 132* 183     Results from last 7 days   Lab Units 06/27/25  1244   INR  1.80*   APTT seconds 29.0     Results from last 7 days   Lab Units 06/27/25  1244   MAGNESIUM mg/dL 1.8     Results from last 7 days   Lab Units 06/27/25  1541   CHOLESTEROL mg/dL 139   TRIGLYCERIDES mg/dL 111   HDL CHOL mg/dL 101*     Results from last 7 days   Lab Units 06/27/25  1249   PROBNP pg/mL 2,909.0*     Results from last 7 days   Lab Units 06/27/25  1403   TSH uIU/mL 0.399           Medication Review:   [Held by provider] apixaban, 5 mg, Oral, Q12H  budesonide-formoterol, 2 puff, Inhalation, BID - RT  enoxaparin sodium, 1 mg/kg, Subcutaneous, Q12H  famotidine, 20 mg, Oral, QAM AC  levothyroxine, 75 mcg, Oral, Q AM  metoprolol tartrate, 25 mg, Oral, BID  mupirocin, 1 Application, Each Nare, BID  sodium chloride, 10 mL, Intravenous, Q12H         Pharmacy to Dose enoxaparin (LOVENOX),         Assessment & Plan     Active Hospital Problems    Diagnosis  POA    **Hypotension [I95.9]  Yes      Resolved Hospital Problems   No resolved problems to display.        Troponin elevation/? Takotsubo cardiomyopathy:  High-sensitivity troponin 266 -> 729 on admission, now downtrending.  EKG showed A-fib with low QRS voltage but no acute ischemic changes.  Denies chest pain, echo showed newly reduced LVEF at 30-35% with extensive apical wall motion abnormality, strongly suggesting  stress-induced (Takotsubo) cardiomyopathy.  Kidney function improving, will attempt for R/LHC tomorrow. NPO at midnight.   BB as below. ARB pending BP/ renal function   Chronic atrial fibrillation: CHADS2 Vasc score 2-3, continue to hold home Eliquis for possible cath.  Per patient, she was on home Lopressor 100 bid which was held given hypotension, dehydration and REBECCA, it was resumed and uptitrated to 25mg BID. Will increase to 50mg BID.   Eliquis on hold, lovenox today for procedure tomorrow.   Acute systolic HF.  She was likely over diuresed on admission in the setting of severe weakness.  Continue to hold. RHC to clarify filling pressures. Elevated on echo but clinically appears euvolemic.   Weakness/mechanical fall  REBECCA: Agree with fluid resuscitation, appreciate nephrology input.  Tobacco abuse in remission      -NPO at midnight for r/lhc tomorrow.   -uptitrate BB to metoprolol 50mg for rate control, monitor response.       Addendum: discussed with IC. Will plan for heart catheterization this afternoon.       Oscar Womack MD  [facility]  06/30/25  12:21 EDT

## 2025-06-30 NOTE — PROGRESS NOTES
[facility] INPATIENT PROGRESS NOTE         [facility] CORONARY CARE    2025      PATIENT IDENTIFICATION:  Name: Marilu Avery ADMIT: 2025   : 1955  PCP: Esperanza Melton APRN    MRN: [id number] LOS: 3 days   AGE/SEX: 69 y.o. female  ROOM: [facility]                     LOS 3    Reason for visit: Weakness      SUBJECTIVE:      Resting comfortably.  Complains of persistent weakness.  Noted plan for heart cath tomorrow.      Objective   OBJECTIVE:    Vital Sign Min/Max for last 24 hours  Temp  Min: 97.4 °F (36.3 °C)  Max: 98 °F (36.7 °C)   BP  Min: 109/93  Max: 127/99   Pulse  Min: 102  Max: 123   Resp  Min: 18  Max: 24   SpO2  Min: 95 %  Max: 98 %   No data recorded   No data recorded    Vitals:    25 0401 25 0700 25 0827 25 0830   BP: 111/75 109/93     BP Location: Right arm Right arm     Patient Position: Sitting Lying     Pulse: 110 117 119 105   Resp: 24 20 20 20   Temp: 97.4 °F (36.3 °C) 97.4 °F (36.3 °C)     TempSrc: Oral Oral     SpO2: 96%  98%    Weight:       Height:                25  1929 25  0548 25  1133   Weight: 106 kg (233 lb 7.5 oz) 106 kg (233 lb 7.5 oz) 106 kg (233 lb)       Body mass index is 33.43 kg/m².                          Body mass index is 33.43 kg/m².    Intake/Output Summary (Last 24 hours) at 2025 0907  Last data filed at 2025 0600  Gross per 24 hour   Intake --   Output 850 ml   Net -850 ml         Exam:  GEN:  No distress, appears stated age  EYES:   PERRL, anicteric sclerae  ENT:    External ears/nose normal, OP clear  NECK:  No adenopathy, midline trachea  LUNGS: Normal chest on inspection, palpation and auscultation  CV:  Normal S1S2, without murmur  ABD:  Nontender, nondistended, no hepatosplenomegaly, +BS  EXT:  No edema.  No cyanosis or clubbing.  No mottling and normal cap refill.    Assessment     Scheduled meds:  [Held by provider] apixaban, 5 mg, Oral, Q12H  budesonide-formoterol, 2 puff,  Inhalation, BID - RT  enoxaparin sodium, 1 mg/kg, Subcutaneous, Q12H  famotidine, 20 mg, Oral, QAM AC  levothyroxine, 75 mcg, Oral, Q AM  metoprolol tartrate, 50 mg, Oral, BID  mupirocin, 1 Application, Each Nare, BID  sodium chloride, 10 mL, Intravenous, Q12H      IV meds:                      Pharmacy to Dose enoxaparin (LOVENOX),       Data Review:  Results from last 7 days   Lab Units 06/30/25  0615 06/29/25  2356 06/29/25  1755 06/29/25  1153 06/29/25  0549   SODIUM mmol/L 137  137 137 136 137 135*  135*   POTASSIUM mmol/L 4.4  4.4 4.0 4.2 4.1 4.2  4.2   CHLORIDE mmol/L 108*  108* 107 105 106 106  106   CO2 mmol/L 17.7*  17.7* 15.0* 15.0* 15.0* 15.0*  15.0*   BUN mg/dL 41.0*  41.0* 41.0* 40.0* 40.0* 42.0*  42.0*   CREATININE mg/dL 1.17*  1.17* 1.34* 1.47* 1.62* 1.46*  1.46*   GLUCOSE mg/dL 111*  111* 135* 145* 121* 114*  114*   CALCIUM mg/dL 9.0  9.0 8.7 8.7 8.9 8.9  8.9         Estimated Creatinine Clearance: 60.7 mL/min (A) (by C-G formula based on SCr of 1.17 mg/dL (H)).  Results from last 7 days   Lab Units 06/30/25  0615 06/29/25  0549 06/28/25  0606 06/27/25  1244   WBC 10*3/mm3 4.05 4.11 5.68 6.34   HEMOGLOBIN g/dL 9.4* 9.8* 10.6* 12.1   PLATELETS 10*3/mm3 125* 117* 132* 183     Results from last 7 days   Lab Units 06/27/25  1244   INR  1.80*     Results from last 7 days   Lab Units 06/27/25  1244   ALT (SGPT) U/L 12   AST (SGOT) U/L 21         Results from last 7 days   Lab Units 06/27/25  1544 06/27/25  1244   PROCALCITONIN ng/mL  --  0.32*   LACTATE mmol/L 1.7 2.5*         Glucose   Date/Time Value Ref Range Status   06/30/2025 0644 112 70 - 130 mg/dL Final   06/29/2025 2344 141 (H) 70 - 130 mg/dL Final   06/29/2025 1804 170 (H) 70 - 130 mg/dL Final   06/29/2025 1129 129 70 - 130 mg/dL Final   06/29/2025 0607 102 70 - 130 mg/dL Final   06/28/2025 2018 129 70 - 130 mg/dL Final   06/28/2025 1800 168 (H) 70 - 130 mg/dL Final         Imaging reviewed  Chest x-ray 6/27 reviewed    CT head  6/27 reviewed        Microbiology reviewed            Active Hospital Problems    Diagnosis  POA    **Hypotension [I95.9]  Yes      Resolved Hospital Problems   No resolved problems to display.         ASSESSMENT:  Weakness  Acute kidney injury  Hyponatremia: Improved  Non-ST elevation MI  Atrial fibrillation  Hypothyroidism  Asthma/COPD  Tobacco use        PLAN:  Weakness and hypotension has improved with IV fluid resuscitation.  Not requiring pressor.  Downgraded to telemetry.  Defer nonpulmonary medical management to hospitalist service.  They have taken over as attending.  Plan for heart cath tomorrow noted.        Anatoly Mcdaniel MD  Pulmonary and Critical Care Medicine  [facility], [facility]  6/30/2025    09:07 EDT

## 2025-06-30 NOTE — PROGRESS NOTES
"   LOS: 3 days     Chief Complaint/ Reason for encounter: CKD, REBECCA, CHF and diuretic management    Subjective   06/30/25 : She is feeling better denies complaints  Complaining of some orthopnea but currently not requiring any supplemental oxygen.  Right and left heart cath tomorrow      Medical history reviewed:  History of Present Illness    Subjective    History taken from: Chart and patient/family as able    Vital Signs  Temp:  [97.4 °F (36.3 °C)-97.7 °F (36.5 °C)] 97.4 °F (36.3 °C)  Heart Rate:  [105-123] 106  Resp:  [18-24] 22  BP: (101-127)/(75-99) 101/96       Wt Readings from Last 1 Encounters:   06/28/25 1133 106 kg (233 lb)   06/28/25 0548 106 kg (233 lb 7.5 oz)   06/27/25 1929 106 kg (233 lb 7.5 oz)   06/27/25 1236 104 kg (229 lb)       Objective:  Vital signs: (most recent): Blood pressure 101/96, pulse 106, temperature 97.4 °F (36.3 °C), temperature source Oral, resp. rate 22, height 177.8 cm (70\"), weight 106 kg (233 lb), SpO2 100%, not currently breastfeeding.                Objective:  General Appearance:  Comfortable, chronically ill, obese-appearing, no acute distress   HEENT: Mucous membranes moist, atraumatic  Lungs:  Normal effort and normal respiratory rate.  Breath sounds clear to auscultation: No rhonchi/Rales.  No  respiratory distress.   Heart:  S1, S2 normal.   Abdomen: Abdomen is soft, nontender/nondistended  Extremities: Trace edema of bilateral lower extremities  Skin:  Warm and dry       Results Review:    Intake/Output:     Intake/Output Summary (Last 24 hours) at 6/30/2025 1235  Last data filed at 6/30/2025 0600  Gross per 24 hour   Intake --   Output 850 ml   Net -850 ml         DATA:  Radiology and Labs:  The following labs independently reviewed by me. Additional labs ordered for tomorrow a.m.  Interval notes, chart personally reviewed by me.   Old records independently reviewed showing history of CKD, CHF  The following radiologic studies independently viewed by me, findings echo " showed EF 30 to 35% indeterminate diastolic function.  Renal ultrasound possible left kidney mass  New problems include REBECCA on CKD, metabolic acidosis  Discussed with patient herself at bedside    Risk/ complexity of medical care/ medical decision making high risk, REBECCA management, diuretic management with underlying CHF    Labs:   Recent Results (from the past 24 hours)   Basic Metabolic Panel    Collection Time: 06/29/25  5:55 PM    Specimen: Blood   Result Value Ref Range    Glucose 145 (H) 65 - 99 mg/dL    BUN 40.0 (H) 8.0 - 23.0 mg/dL    Creatinine 1.47 (H) 0.57 - 1.00 mg/dL    Sodium 136 136 - 145 mmol/L    Potassium 4.2 3.5 - 5.2 mmol/L    Chloride 105 98 - 107 mmol/L    CO2 15.0 (L) 22.0 - 29.0 mmol/L    Calcium 8.7 8.6 - 10.5 mg/dL    BUN/Creatinine Ratio 27.2 (H) 7.0 - 25.0    Anion Gap 16.0 (H) 5.0 - 15.0 mmol/L    eGFR 38.7 (L) >60.0 mL/min/1.73   POC Glucose Once    Collection Time: 06/29/25  6:04 PM    Specimen: Blood   Result Value Ref Range    Glucose 170 (H) 70 - 130 mg/dL   POC Glucose Once    Collection Time: 06/29/25 11:44 PM    Specimen: Blood   Result Value Ref Range    Glucose 141 (H) 70 - 130 mg/dL   Basic Metabolic Panel    Collection Time: 06/29/25 11:56 PM    Specimen: Blood   Result Value Ref Range    Glucose 135 (H) 65 - 99 mg/dL    BUN 41.0 (H) 8.0 - 23.0 mg/dL    Creatinine 1.34 (H) 0.57 - 1.00 mg/dL    Sodium 137 136 - 145 mmol/L    Potassium 4.0 3.5 - 5.2 mmol/L    Chloride 107 98 - 107 mmol/L    CO2 15.0 (L) 22.0 - 29.0 mmol/L    Calcium 8.7 8.6 - 10.5 mg/dL    BUN/Creatinine Ratio 30.6 (H) 7.0 - 25.0    Anion Gap 15.0 5.0 - 15.0 mmol/L    eGFR 43.3 (L) >60.0 mL/min/1.73   Basic Metabolic Panel    Collection Time: 06/30/25  6:15 AM    Specimen: Blood   Result Value Ref Range    Glucose 111 (H) 65 - 99 mg/dL    BUN 41.0 (H) 8.0 - 23.0 mg/dL    Creatinine 1.17 (H) 0.57 - 1.00 mg/dL    Sodium 137 136 - 145 mmol/L    Potassium 4.4 3.5 - 5.2 mmol/L    Chloride 108 (H) 98 - 107 mmol/L     CO2 17.7 (L) 22.0 - 29.0 mmol/L    Calcium 9.0 8.6 - 10.5 mg/dL    BUN/Creatinine Ratio 35.0 (H) 7.0 - 25.0    Anion Gap 11.3 5.0 - 15.0 mmol/L    eGFR 50.9 (L) >60.0 mL/min/1.73   Renal Function Panel    Collection Time: 06/30/25  6:15 AM    Specimen: Blood   Result Value Ref Range    Glucose 111 (H) 65 - 99 mg/dL    BUN 41.0 (H) 8.0 - 23.0 mg/dL    Creatinine 1.17 (H) 0.57 - 1.00 mg/dL    Sodium 137 136 - 145 mmol/L    Potassium 4.4 3.5 - 5.2 mmol/L    Chloride 108 (H) 98 - 107 mmol/L    CO2 17.7 (L) 22.0 - 29.0 mmol/L    Calcium 9.0 8.6 - 10.5 mg/dL    Albumin 2.9 (L) 3.5 - 5.2 g/dL    Phosphorus 3.2 2.5 - 4.5 mg/dL    Anion Gap 11.3 5.0 - 15.0 mmol/L    BUN/Creatinine Ratio 35.0 (H) 7.0 - 25.0    eGFR 50.9 (L) >60.0 mL/min/1.73   CBC Auto Differential    Collection Time: 06/30/25  6:15 AM    Specimen: Blood   Result Value Ref Range    WBC 4.05 3.40 - 10.80 10*3/mm3    RBC 2.84 (L) 3.77 - 5.28 10*6/mm3    Hemoglobin 9.4 (L) 12.0 - 15.9 g/dL    Hematocrit 28.1 (L) 34.0 - 46.6 %    MCV 98.9 (H) 79.0 - 97.0 fL    MCH 33.1 (H) 26.6 - 33.0 pg    MCHC 33.5 31.5 - 35.7 g/dL    RDW 14.4 12.3 - 15.4 %    RDW-SD 51.3 37.0 - 54.0 fl    MPV 10.2 6.0 - 12.0 fL    Platelets 125 (L) 140 - 450 10*3/mm3    Neutrophil % 64.7 42.7 - 76.0 %    Lymphocyte % 20.0 19.6 - 45.3 %    Monocyte % 13.1 (H) 5.0 - 12.0 %    Eosinophil % 1.5 0.3 - 6.2 %    Basophil % 0.2 0.0 - 1.5 %    Immature Grans % 0.5 0.0 - 0.5 %    Neutrophils, Absolute 2.62 1.70 - 7.00 10*3/mm3    Lymphocytes, Absolute 0.81 0.70 - 3.10 10*3/mm3    Monocytes, Absolute 0.53 0.10 - 0.90 10*3/mm3    Eosinophils, Absolute 0.06 0.00 - 0.40 10*3/mm3    Basophils, Absolute 0.01 0.00 - 0.20 10*3/mm3    Immature Grans, Absolute 0.02 0.00 - 0.05 10*3/mm3    nRBC 0.5 (H) 0.0 - 0.2 /100 WBC   POC Glucose Once    Collection Time: 06/30/25  6:44 AM    Specimen: Blood   Result Value Ref Range    Glucose 112 70 - 130 mg/dL   POC Glucose Once    Collection Time: 06/30/25 11:50 AM     Specimen: Blood   Result Value Ref Range    Glucose 125 70 - 130 mg/dL       Radiology:  Pertinent radiology studies were reviewed as described above      Medications have been reviewed separately in chart review medication tab      ASSESSMENT:  REBECCA: Much improved with IV fluids.  Likely due to hypotension from hypovolemia.      Hypotension, improved with fluids  Hypovolemia  Chronic diastolic heart failure, euvolemic today but with orthopnea  Elevated troponin  Elevated lactate, likely due to hypotension with organ hypoperfusion, rule out acute infection  Immobility  Atrial fibrillation on anticoagulation with Eliquis  New renal mass        DISCUSSION/PLAN:   Clinically she appears euvolemic on exam today  Renal function has continued to improve and creatinine is currently near baseline  Complaining of some orthopnea today  Noted plans for right and left heart catheterization in a.m. to determine diuretic needs  Hold diuretics today in preparation for cath tomorrow  Very gentle IV hydration tomorrow after left heart cath  Okay to give IV Lasix if any worsening dyspnea but she is currently 100% on room air  Patient will eventually need a CT, renal mass protocol to evaluate the new renal mass seen on ultrasound but will hold off for now since she is going to be getting IV contrast tomorrow with her heart cath    Follow-up a.m. labs       Baltazar Alcantar MD  [facility]   Office phone number: [phone]  Answering service phone number: [phone]    06/30/25  12:35 EDT    Dictation performed using Dragon dictation software

## 2025-06-30 NOTE — CASE MANAGEMENT/SOCIAL WORK
Continued Stay Note  [facility]     Patient Name: Mariul Avery  MRN: [id number]  Today's Date: 6/30/2025    Admit Date: 6/27/2025        Discharge Plan       Row Name 06/30/25 1503       Plan    Plan Comments Needs screen. In cath lab 6/30                      Ana Lawrence RN

## 2025-06-30 NOTE — PROGRESS NOTES
Name: Marilu Avery ADMIT: 2025   : 1955  PCP: Esperanza Melton APRN    MRN: [id number] LOS: 3 days   AGE/SEX: 69 y.o. female  ROOM: Pool/CATH     Subjective   Subjective   Patient appears generally weak, relatively comfortable, no apparent distress.  RN at bedside checking glucose.  Respiratory bedside for cardiac cath today.    Denies chest pain or trouble breathing at this time.       Objective   Objective   Vital Signs  Temp:  [97.4 °F (36.3 °C)-97.7 °F (36.5 °C)] 97.4 °F (36.3 °C)  Heart Rate:  [105-123] 106  Resp:  [12-24] 12  BP: (101-127)/(75-99) 101/96  SpO2:  [93 %-100 %] 93 %  on  Flow (L/min) (Oxygen Therapy):  [3] 3;   Device (Oxygen Therapy): nasal cannula with ETCO2  Body mass index is 33.43 kg/m².    Physical Exam  Constitutional:       General: She is not in acute distress.     Appearance: She is obese. She is not toxic-appearing.   Cardiovascular:      Rate and Rhythm: Tachycardia present. Rhythm irregular.      Heart sounds: Normal heart sounds.   Pulmonary:      Effort: Pulmonary effort is normal.      Comments: Diminished on expiration posteriorly  Abdominal:      General: There is distension.      Palpations: Abdomen is soft.      Tenderness: There is no abdominal tenderness. There is no guarding.   Musculoskeletal:      Right lower leg: Edema (Trace edema of BLE) present.      Left lower leg: Edema present.   Skin:     General: Skin is warm and dry.   Neurological:      Mental Status: She is alert and oriented to person, place, and time.   Psychiatric:         Behavior: Behavior normal.      Comments: Flat affect        Results Review     I reviewed the patient's new clinical results.  Results from last 7 days   Lab Units 25  1418 25  1413 25  0615 25  0549 25  0606 25  1244   WBC 10*3/mm3  --   --  4.05 4.11 5.68 6.34   HEMOGLOBIN g/dL  --   --  9.4* 9.8* 10.6* 12.1   HEMOGLOBIN, POC g/dL 9.9* 9.9*  --   --   --   --    PLATELETS  10*3/mm3  --   --  125* 117* 132* 183     Results from last 7 days   Lab Units 06/30/25 0615 06/29/25 2356 06/29/25  1755 06/29/25  1153   SODIUM mmol/L 137  137 137 136 137   POTASSIUM mmol/L 4.4  4.4 4.0 4.2 4.1   CHLORIDE mmol/L 108*  108* 107 105 106   CO2 mmol/L 17.7*  17.7* 15.0* 15.0* 15.0*   BUN mg/dL 41.0*  41.0* 41.0* 40.0* 40.0*   CREATININE mg/dL 1.17*  1.17* 1.34* 1.47* 1.62*   GLUCOSE mg/dL 111*  111* 135* 145* 121*   EGFR mL/min/1.73 50.9*  50.9* 43.3* 38.7* 34.5*     Results from last 7 days   Lab Units 06/30/25 0615 06/29/25  0549 06/28/25  0606 06/27/25  1244   ALBUMIN g/dL 2.9* 3.2* 3.2* 4.0   BILIRUBIN mg/dL  --   --   --  0.9   ALK PHOS U/L  --   --   --  86   AST (SGOT) U/L  --   --   --  21   ALT (SGPT) U/L  --   --   --  12     Results from last 7 days   Lab Units 06/30/25 0615 06/29/25 2356 06/29/25 1755 06/29/25  1153 06/29/25  0549 06/28/25  1158 06/28/25  0606 06/27/25  1541 06/27/25  1244   CALCIUM mg/dL 9.0  9.0 8.7 8.7 8.9 8.9  8.9   < > 8.5*  8.5*   < > 9.5   ALBUMIN g/dL 2.9*  --   --   --  3.2*  --  3.2*  --  4.0   MAGNESIUM mg/dL  --   --   --   --   --   --   --   --  1.8   PHOSPHORUS mg/dL 3.2  --   --   --  2.6  --  3.4  --   --     < > = values in this interval not displayed.     Results from last 7 days   Lab Units 06/27/25  1544 06/27/25  1244   PROCALCITONIN ng/mL  --  0.32*   LACTATE mmol/L 1.7 2.5*     Glucose   Date/Time Value Ref Range Status   06/30/2025 1150 125 70 - 130 mg/dL Final   06/30/2025 0644 112 70 - 130 mg/dL Final   06/29/2025 2344 141 (H) 70 - 130 mg/dL Final   06/29/2025 1804 170 (H) 70 - 130 mg/dL Final   06/29/2025 1129 129 70 - 130 mg/dL Final   06/29/2025 0607 102 70 - 130 mg/dL Final   06/28/2025 2018 129 70 - 130 mg/dL Final       No radiology results for the last day    I have personally reviewed all medications:  Scheduled Medications  allopurinol, 100 mg, Oral, Daily  [Held by provider] apixaban, 5 mg, Oral, Q12H  apixaban, 5 mg,  Oral, BID  [Transfer Hold] budesonide-formoterol, 2 puff, Inhalation, BID - RT  budesonide-formoterol, 2 puff, Inhalation, BID - RT  bumetanide, 2 mg, Oral, BID  famotidine, 20 mg, Oral, QAM AC  famotidine, 20 mg, Oral, BID AC  [Transfer Hold] levothyroxine, 75 mcg, Oral, Q AM  [START ON 7/1/2025] levothyroxine, 75 mcg, Oral, Daily  metoprolol tartrate, 50 mg, Oral, BID  montelukast, 10 mg, Oral, Daily  [Transfer Hold] mupirocin, 1 Application, Each Nare, BID  polyethylene glycol, 17 g, Oral, Daily  [Transfer Hold] sodium chloride, 10 mL, Intravenous, Q12H  spironolactone, 25 mg, Oral, BID  torsemide, 20 mg, Oral, Daily    Infusions  Pharmacy to Dose enoxaparin (LOVENOX),     Diet  Diet: Cardiac; Healthy Heart (2-3 Na+); Fluid Consistency: Thin (IDDSI 0)    I have personally reviewed:  [x]  Laboratory   [x]  Microbiology   [x]  Radiology   [x]  EKG/Telemetry  [x]  Cardiology/Vascular   []  Pathology    []  Records       Assessment/Plan     Active Hospital Problems    Diagnosis  POA    **Hypotension [I95.9]  Yes    Obesity (BMI 30-39.9) [E66.9]  Yes    Elevated troponin [R79.89]  Yes    Chronic alcohol use [F10.90]  Yes    Chronic anticoagulation [Z79.01]  Not Applicable    Tobacco abuse [Z72.0]  Yes    REBECCA (acute kidney injury) [N17.9]  Yes    Atrial fibrillation, persistent [I48.19]  Yes      Resolved Hospital Problems   No resolved problems to display.       69 y.o. female admitted with Hypotension.    Cardiology consulted for elevated troponin and questionable Takotsubo cardiomyopathy.  Troponin trending downward.  EKG confirmed A-fib without acute ischemic changes.  TTE showing newly reduced LVEF 30 to 35% with extensive apical wall motion abnormality.  R/LHC today.    Hypotension  -Blood pressures improved  -BB continued & increased per cards  -ARB pending BP / renal function    Atrial fibrillation  -Chronic Eliquis on hold for cath today    Nephrology consulted for REBECCA noted improvement with IV fluids.   Suspect due to hypotension from hypovolemia.  -Diuretics held today for cath  -Very gentle IV fluids following cath  -Will eventually need CT renal mass protocol to evaluate new renal mass noted on ultrasound / timing at a later date given cath today    Chronic alcohol use   -patient states she drinks a glass of alcohol daily  -possibly contributing to above changes in cardiac function    Tobacco abuse reportedly in remission.    Obesity  -BMI 33 & large reducible ventral hernia POA      Eliquis (home med)--held for cardiac cath today--adequate for DVT prophylaxis.  Full code.  Discussed with patient and nursing staff.  Anticipate discharge home with HH vs SNU facility in 1-2 days.      DEBBI Ball  [facility]  06/30/25  15:14 EDT

## 2025-06-30 NOTE — PLAN OF CARE
Goal Outcome Evaluation:  Plan of Care Reviewed With: patient        Progress: improving  Outcome Evaluation: Pt is AoX4, on room air, NPO post midnight, needs met, safety maintained, pain mananged with prn meds.

## 2025-07-01 PROBLEM — E87.1 HYPONATREMIA: Status: ACTIVE | Noted: 2025-07-01

## 2025-07-01 PROBLEM — I51.81 TAKOTSUBO CARDIOMYOPATHY: Status: ACTIVE | Noted: 2025-07-01

## 2025-07-01 PROBLEM — K43.9 VENTRAL HERNIA: Status: ACTIVE | Noted: 2021-06-13

## 2025-07-01 LAB
ALBUMIN SERPL-MCNC: 3.1 G/DL (ref 3.5–5.2)
ANION GAP SERPL CALCULATED.3IONS-SCNC: 13.1 MMOL/L (ref 5–15)
ANION GAP SERPL CALCULATED.3IONS-SCNC: 13.8 MMOL/L (ref 5–15)
ANION GAP SERPL CALCULATED.3IONS-SCNC: 14.6 MMOL/L (ref 5–15)
BASOPHILS # BLD AUTO: 0.02 10*3/MM3 (ref 0–0.2)
BASOPHILS NFR BLD AUTO: 0.5 % (ref 0–1.5)
BUN SERPL-MCNC: 40 MG/DL (ref 8–23)
BUN SERPL-MCNC: 41 MG/DL (ref 8–23)
BUN SERPL-MCNC: 42 MG/DL (ref 8–23)
BUN SERPL-MCNC: 42 MG/DL (ref 8–23)
BUN SERPL-MCNC: 46 MG/DL (ref 8–23)
BUN/CREAT SERPL: 28.5 (ref 7–25)
BUN/CREAT SERPL: 29.4 (ref 7–25)
BUN/CREAT SERPL: 29.9 (ref 7–25)
BUN/CREAT SERPL: 33.9 (ref 7–25)
BUN/CREAT SERPL: 33.9 (ref 7–25)
CALCIUM SPEC-SCNC: 9 MG/DL (ref 8.6–10.5)
CALCIUM SPEC-SCNC: 9.2 MG/DL (ref 8.6–10.5)
CALCIUM SPEC-SCNC: 9.4 MG/DL (ref 8.6–10.5)
CHLORIDE SERPL-SCNC: 106 MMOL/L (ref 98–107)
CHLORIDE SERPL-SCNC: 106 MMOL/L (ref 98–107)
CHLORIDE SERPL-SCNC: 107 MMOL/L (ref 98–107)
CHLORIDE SERPL-SCNC: 108 MMOL/L (ref 98–107)
CHLORIDE SERPL-SCNC: 108 MMOL/L (ref 98–107)
CO2 SERPL-SCNC: 14.9 MMOL/L (ref 22–29)
CO2 SERPL-SCNC: 16.4 MMOL/L (ref 22–29)
CO2 SERPL-SCNC: 17.2 MMOL/L (ref 22–29)
CREAT SERPL-MCNC: 1.24 MG/DL (ref 0.57–1)
CREAT SERPL-MCNC: 1.24 MG/DL (ref 0.57–1)
CREAT SERPL-MCNC: 1.36 MG/DL (ref 0.57–1)
CREAT SERPL-MCNC: 1.44 MG/DL (ref 0.57–1)
CREAT SERPL-MCNC: 1.54 MG/DL (ref 0.57–1)
DEPRECATED RDW RBC AUTO: 54.3 FL (ref 37–54)
EGFRCR SERPLBLD CKD-EPI 2021: 36.6 ML/MIN/1.73
EGFRCR SERPLBLD CKD-EPI 2021: 39.7 ML/MIN/1.73
EGFRCR SERPLBLD CKD-EPI 2021: 42.5 ML/MIN/1.73
EGFRCR SERPLBLD CKD-EPI 2021: 47.5 ML/MIN/1.73
EGFRCR SERPLBLD CKD-EPI 2021: 47.5 ML/MIN/1.73
EOSINOPHIL # BLD AUTO: 0.07 10*3/MM3 (ref 0–0.4)
EOSINOPHIL NFR BLD AUTO: 1.8 % (ref 0.3–6.2)
ERYTHROCYTE [DISTWIDTH] IN BLOOD BY AUTOMATED COUNT: 14.8 % (ref 12.3–15.4)
GLUCOSE BLDC GLUCOMTR-MCNC: 120 MG/DL (ref 70–130)
GLUCOSE BLDC GLUCOMTR-MCNC: 123 MG/DL (ref 70–130)
GLUCOSE BLDC GLUCOMTR-MCNC: 130 MG/DL (ref 70–130)
GLUCOSE SERPL-MCNC: 113 MG/DL (ref 65–99)
GLUCOSE SERPL-MCNC: 113 MG/DL (ref 65–99)
GLUCOSE SERPL-MCNC: 122 MG/DL (ref 65–99)
GLUCOSE SERPL-MCNC: 122 MG/DL (ref 65–99)
GLUCOSE SERPL-MCNC: 123 MG/DL (ref 65–99)
HCT VFR BLD AUTO: 30.3 % (ref 34–46.6)
HGB BLD-MCNC: 9.7 G/DL (ref 12–15.9)
IMM GRANULOCYTES # BLD AUTO: 0.02 10*3/MM3 (ref 0–0.05)
IMM GRANULOCYTES NFR BLD AUTO: 0.5 % (ref 0–0.5)
LYMPHOCYTES # BLD AUTO: 0.64 10*3/MM3 (ref 0.7–3.1)
LYMPHOCYTES NFR BLD AUTO: 16.7 % (ref 19.6–45.3)
MCH RBC QN AUTO: 32.7 PG (ref 26.6–33)
MCHC RBC AUTO-ENTMCNC: 32 G/DL (ref 31.5–35.7)
MCV RBC AUTO: 102 FL (ref 79–97)
MONOCYTES # BLD AUTO: 0.42 10*3/MM3 (ref 0.1–0.9)
MONOCYTES NFR BLD AUTO: 11 % (ref 5–12)
NEUTROPHILS NFR BLD AUTO: 2.66 10*3/MM3 (ref 1.7–7)
NEUTROPHILS NFR BLD AUTO: 69.5 % (ref 42.7–76)
NRBC BLD AUTO-RTO: 1 /100 WBC (ref 0–0.2)
PHOSPHATE SERPL-MCNC: 3.9 MG/DL (ref 2.5–4.5)
PLATELET # BLD AUTO: 154 10*3/MM3 (ref 140–450)
PMV BLD AUTO: 10.7 FL (ref 6–12)
POTASSIUM SERPL-SCNC: 4.6 MMOL/L (ref 3.5–5.2)
POTASSIUM SERPL-SCNC: 4.6 MMOL/L (ref 3.5–5.2)
POTASSIUM SERPL-SCNC: 4.7 MMOL/L (ref 3.5–5.2)
POTASSIUM SERPL-SCNC: 4.7 MMOL/L (ref 3.5–5.2)
POTASSIUM SERPL-SCNC: 4.8 MMOL/L (ref 3.5–5.2)
RBC # BLD AUTO: 2.97 10*6/MM3 (ref 3.77–5.28)
SODIUM SERPL-SCNC: 135 MMOL/L (ref 136–145)
SODIUM SERPL-SCNC: 136 MMOL/L (ref 136–145)
SODIUM SERPL-SCNC: 136 MMOL/L (ref 136–145)
SODIUM SERPL-SCNC: 137 MMOL/L (ref 136–145)
SODIUM SERPL-SCNC: 137 MMOL/L (ref 136–145)
WBC NRBC COR # BLD AUTO: 3.83 10*3/MM3 (ref 3.4–10.8)

## 2025-07-01 PROCEDURE — 36415 COLL VENOUS BLD VENIPUNCTURE: CPT | Performed by: INTERNAL MEDICINE

## 2025-07-01 PROCEDURE — 80048 BASIC METABOLIC PNL TOTAL CA: CPT | Performed by: INTERNAL MEDICINE

## 2025-07-01 PROCEDURE — 85025 COMPLETE CBC W/AUTO DIFF WBC: CPT | Performed by: INTERNAL MEDICINE

## 2025-07-01 PROCEDURE — 82948 REAGENT STRIP/BLOOD GLUCOSE: CPT

## 2025-07-01 PROCEDURE — 94761 N-INVAS EAR/PLS OXIMETRY MLT: CPT

## 2025-07-01 PROCEDURE — 94664 DEMO&/EVAL PT USE INHALER: CPT

## 2025-07-01 PROCEDURE — 97530 THERAPEUTIC ACTIVITIES: CPT

## 2025-07-01 PROCEDURE — 80069 RENAL FUNCTION PANEL: CPT | Performed by: INTERNAL MEDICINE

## 2025-07-01 PROCEDURE — 99232 SBSQ HOSP IP/OBS MODERATE 35: CPT | Performed by: STUDENT IN AN ORGANIZED HEALTH CARE EDUCATION/TRAINING PROGRAM

## 2025-07-01 PROCEDURE — 94799 UNLISTED PULMONARY SVC/PX: CPT

## 2025-07-01 RX ORDER — SPIRONOLACTONE 25 MG/1
25 TABLET ORAL DAILY
Status: DISCONTINUED | OUTPATIENT
Start: 2025-07-02 | End: 2025-07-01

## 2025-07-01 RX ORDER — METOPROLOL SUCCINATE 25 MG/1
50 TABLET, EXTENDED RELEASE ORAL ONCE
Status: DISCONTINUED | OUTPATIENT
Start: 2025-07-01 | End: 2025-07-06

## 2025-07-01 RX ORDER — SPIRONOLACTONE 25 MG/1
25 TABLET ORAL DAILY
Status: DISCONTINUED | OUTPATIENT
Start: 2025-07-02 | End: 2025-07-06

## 2025-07-01 RX ORDER — BUMETANIDE 1 MG/1
1 TABLET ORAL 2 TIMES DAILY
Status: DISCONTINUED | OUTPATIENT
Start: 2025-07-01 | End: 2025-07-06

## 2025-07-01 RX ORDER — METOPROLOL SUCCINATE 100 MG/1
100 TABLET, EXTENDED RELEASE ORAL NIGHTLY
Status: DISCONTINUED | OUTPATIENT
Start: 2025-07-01 | End: 2025-07-02

## 2025-07-01 RX ORDER — HYDROCODONE BITARTRATE AND ACETAMINOPHEN 5; 325 MG/1; MG/1
1 TABLET ORAL ONCE
Refills: 0 | Status: COMPLETED | OUTPATIENT
Start: 2025-07-01 | End: 2025-07-01

## 2025-07-01 RX ADMIN — BUMETANIDE 1 MG: 1 TABLET ORAL at 21:07

## 2025-07-01 RX ADMIN — IPRATROPIUM BROMIDE AND ALBUTEROL SULFATE 3 ML: .5; 3 SOLUTION RESPIRATORY (INHALATION) at 23:24

## 2025-07-01 RX ADMIN — BUDESONIDE AND FORMOTEROL FUMARATE DIHYDRATE 2 PUFF: 160; 4.5 AEROSOL RESPIRATORY (INHALATION) at 07:39

## 2025-07-01 RX ADMIN — IPRATROPIUM BROMIDE AND ALBUTEROL SULFATE 3 ML: .5; 3 SOLUTION RESPIRATORY (INHALATION) at 02:02

## 2025-07-01 RX ADMIN — ACETAMINOPHEN 650 MG: 325 TABLET, FILM COATED ORAL at 14:13

## 2025-07-01 RX ADMIN — POLYETHYLENE GLYCOL 3350 17 G: 17 POWDER, FOR SOLUTION ORAL at 09:13

## 2025-07-01 RX ADMIN — HYDROXYZINE HYDROCHLORIDE 25 MG: 25 TABLET, FILM COATED ORAL at 21:11

## 2025-07-01 RX ADMIN — APIXABAN 5 MG: 5 TABLET, FILM COATED ORAL at 21:07

## 2025-07-01 RX ADMIN — BUDESONIDE AND FORMOTEROL FUMARATE DIHYDRATE 2 PUFF: 160; 4.5 AEROSOL RESPIRATORY (INHALATION) at 19:32

## 2025-07-01 RX ADMIN — FAMOTIDINE 20 MG: 20 TABLET, FILM COATED ORAL at 09:13

## 2025-07-01 RX ADMIN — METOPROLOL SUCCINATE 100 MG: 100 TABLET, EXTENDED RELEASE ORAL at 21:08

## 2025-07-01 RX ADMIN — SPIRONOLACTONE 25 MG: 25 TABLET ORAL at 09:13

## 2025-07-01 RX ADMIN — METOPROLOL TARTRATE 50 MG: 50 TABLET, FILM COATED ORAL at 09:12

## 2025-07-01 RX ADMIN — MUPIROCIN 1 APPLICATION: 20 OINTMENT TOPICAL at 21:06

## 2025-07-01 RX ADMIN — BUMETANIDE 1 MG: 1 TABLET ORAL at 11:27

## 2025-07-01 RX ADMIN — HYDROCODONE BITARTRATE AND ACETAMINOPHEN 1 TABLET: 5; 325 TABLET ORAL at 03:22

## 2025-07-01 RX ADMIN — LEVOTHYROXINE SODIUM 75 MCG: 0.07 TABLET ORAL at 06:27

## 2025-07-01 RX ADMIN — ALLOPURINOL 100 MG: 100 TABLET ORAL at 09:13

## 2025-07-01 RX ADMIN — Medication 10 ML: at 21:07

## 2025-07-01 RX ADMIN — FAMOTIDINE 20 MG: 20 TABLET, FILM COATED ORAL at 21:06

## 2025-07-01 RX ADMIN — APIXABAN 5 MG: 5 TABLET, FILM COATED ORAL at 09:12

## 2025-07-01 RX ADMIN — MONTELUKAST 10 MG: 10 TABLET, FILM COATED ORAL at 09:12

## 2025-07-01 NOTE — PROGRESS NOTES
"   LOS: 4 days     Chief Complaint/ Reason for encounter: CKD, REBECCA, CHF and diuretic management    Subjective   06/30/25 : She is feeling better denies complaints  Complaining of some orthopnea but currently not requiring any supplemental oxygen.  Right and left heart cath tomorrow    7/1: She feels better today, she states she is less short of breath, currently on room air  Still with some orthopnea, no chest pain  Good appetite no nausea or vomiting.  No edema    Medical history reviewed:  History of Present Illness    Subjective    History taken from: Chart and patient/family as able    Vital Signs  Temp:  [97.4 °F (36.3 °C)-97.9 °F (36.6 °C)] 97.9 °F (36.6 °C)  Heart Rate:  [] 102  Resp:  [12-22] 22  BP: (107-150)/(67-86) 110/83       Wt Readings from Last 1 Encounters:   07/01/25 0100 109 kg (239 lb 6.7 oz)   06/28/25 1133 106 kg (233 lb)   06/28/25 0548 106 kg (233 lb 7.5 oz)   06/27/25 1929 106 kg (233 lb 7.5 oz)   06/27/25 1236 104 kg (229 lb)       Objective:  Vital signs: (most recent): Blood pressure 110/83, pulse 102, temperature 97.9 °F (36.6 °C), temperature source Oral, resp. rate 22, height 177.8 cm (70\"), weight 109 kg (239 lb 6.7 oz), SpO2 96%, not currently breastfeeding.              Objective:  General Appearance:  Comfortable, chronically ill, obese-appearing, no acute distress   HEENT: Mucous membranes moist, atraumatic  Lungs:  Normal effort and normal respiratory rate.  Breath sounds clear to auscultation: No rhonchi/Rales.  No  respiratory distress.   Heart:  S1, S2 normal.   Abdomen: Abdomen is soft, nontender/nondistended  Extremities: Trace edema of bilateral lower extremities  Skin:  Warm and dry       Results Review:    Intake/Output:     Intake/Output Summary (Last 24 hours) at 7/1/2025 1302  Last data filed at 7/1/2025 0323  Gross per 24 hour   Intake 360 ml   Output 475 ml   Net -115 ml         DATA:  Radiology and Labs:  The following labs independently reviewed by me. " Additional labs ordered for tomorrow a.m.  Interval notes, chart personally reviewed by me.   Old records independently reviewed showing history of CKD, CHF  Discussed with patient herself at bedside    Risk/ complexity of medical care/ medical decision making moderate risk of diuretic management      Labs:   Recent Results (from the past 24 hours)   POC Panel 9, ISTAT    Collection Time: 06/30/25  2:13 PM    Specimen: Arterial Blood   Result Value Ref Range    Hemoglobin 9.9 (L) 12.0 - 17.0 g/dL    Hematocrit 29 (L) 38 - 51 %    O2 Saturation, Arterial 97 95 - 98 %    HCO3 17.0 (L) 21.0 - 28.0 mmol/L    POC Potassium 4.1 3.5 - 4.9 mmol/L    pH, Arterial 7.410 7.350 - 7.450 pH units    pCO2, Arterial 26.8 (L) 35 - 45 mm Hg    pO2, Arterial 89 80 - 105 mmHg   POC Panel 9, ISTAT    Collection Time: 06/30/25  2:18 PM    Specimen: Arterial Blood   Result Value Ref Range    Hemoglobin 9.9 (L) 12.0 - 17.0 g/dL    Hematocrit 29 (L) 38 - 51 %    O2 Saturation, Arterial 43 (L) 95 - 98 %    HCO3 17.9 (L) 21.0 - 28.0 mmol/L    POC Potassium 4.0 3.5 - 4.9 mmol/L    pH, Arterial 7.340 (L) 7.350 - 7.450 pH units    pCO2, Arterial 33.2 (L) 35 - 45 mm Hg    pO2, Arterial 25 (L) 80 - 105 mmHg   Basic Metabolic Panel    Collection Time: 06/30/25  6:50 PM    Specimen: Blood   Result Value Ref Range    Glucose 123 (H) 65 - 99 mg/dL    BUN 40.0 (H) 8.0 - 23.0 mg/dL    Creatinine 1.36 (H) 0.57 - 1.00 mg/dL    Sodium 138 136 - 145 mmol/L    Potassium 4.6 3.5 - 5.2 mmol/L    Chloride 106 98 - 107 mmol/L    CO2 17.8 (L) 22.0 - 29.0 mmol/L    Calcium 9.3 8.6 - 10.5 mg/dL    BUN/Creatinine Ratio 29.4 (H) 7.0 - 25.0    Anion Gap 14.2 5.0 - 15.0 mmol/L    eGFR 42.5 (L) >60.0 mL/min/1.73   POC Glucose Once    Collection Time: 06/30/25  8:32 PM    Specimen: Blood   Result Value Ref Range    Glucose 125 70 - 130 mg/dL   POC Glucose Once    Collection Time: 06/30/25 11:36 PM    Specimen: Blood   Result Value Ref Range    Glucose 130 70 - 130 mg/dL    Basic Metabolic Panel    Collection Time: 06/30/25 11:51 PM    Specimen: Blood   Result Value Ref Range    Glucose 122 (H) 65 - 99 mg/dL    BUN 40.0 (H) 8.0 - 23.0 mg/dL    Creatinine 1.36 (H) 0.57 - 1.00 mg/dL    Sodium 135 (L) 136 - 145 mmol/L    Potassium 4.8 3.5 - 5.2 mmol/L    Chloride 107 98 - 107 mmol/L    CO2 14.9 (L) 22.0 - 29.0 mmol/L    Calcium 9.2 8.6 - 10.5 mg/dL    BUN/Creatinine Ratio 29.4 (H) 7.0 - 25.0    Anion Gap 13.1 5.0 - 15.0 mmol/L    eGFR 42.5 (L) >60.0 mL/min/1.73   POC Glucose Once    Collection Time: 07/01/25  5:30 AM    Specimen: Blood   Result Value Ref Range    Glucose 120 70 - 130 mg/dL   Basic Metabolic Panel    Collection Time: 07/01/25  5:52 AM    Specimen: Blood   Result Value Ref Range    Glucose 113 (H) 65 - 99 mg/dL    BUN 42.0 (H) 8.0 - 23.0 mg/dL    Creatinine 1.24 (H) 0.57 - 1.00 mg/dL    Sodium 136 136 - 145 mmol/L    Potassium 4.7 3.5 - 5.2 mmol/L    Chloride 108 (H) 98 - 107 mmol/L    CO2 14.9 (L) 22.0 - 29.0 mmol/L    Calcium 9.2 8.6 - 10.5 mg/dL    BUN/Creatinine Ratio 33.9 (H) 7.0 - 25.0    Anion Gap 13.1 5.0 - 15.0 mmol/L    eGFR 47.5 (L) >60.0 mL/min/1.73   Renal Function Panel    Collection Time: 07/01/25  5:52 AM    Specimen: Blood   Result Value Ref Range    Glucose 113 (H) 65 - 99 mg/dL    BUN 42.0 (H) 8.0 - 23.0 mg/dL    Creatinine 1.24 (H) 0.57 - 1.00 mg/dL    Sodium 136 136 - 145 mmol/L    Potassium 4.7 3.5 - 5.2 mmol/L    Chloride 108 (H) 98 - 107 mmol/L    CO2 14.9 (L) 22.0 - 29.0 mmol/L    Calcium 9.2 8.6 - 10.5 mg/dL    Albumin 3.1 (L) 3.5 - 5.2 g/dL    Phosphorus 3.9 2.5 - 4.5 mg/dL    Anion Gap 13.1 5.0 - 15.0 mmol/L    BUN/Creatinine Ratio 33.9 (H) 7.0 - 25.0    eGFR 47.5 (L) >60.0 mL/min/1.73   CBC Auto Differential    Collection Time: 07/01/25  5:52 AM    Specimen: Blood   Result Value Ref Range    WBC 3.83 3.40 - 10.80 10*3/mm3    RBC 2.97 (L) 3.77 - 5.28 10*6/mm3    Hemoglobin 9.7 (L) 12.0 - 15.9 g/dL    Hematocrit 30.3 (L) 34.0 - 46.6 %    MCV  102.0 (H) 79.0 - 97.0 fL    MCH 32.7 26.6 - 33.0 pg    MCHC 32.0 31.5 - 35.7 g/dL    RDW 14.8 12.3 - 15.4 %    RDW-SD 54.3 (H) 37.0 - 54.0 fl    MPV 10.7 6.0 - 12.0 fL    Platelets 154 140 - 450 10*3/mm3    Neutrophil % 69.5 42.7 - 76.0 %    Lymphocyte % 16.7 (L) 19.6 - 45.3 %    Monocyte % 11.0 5.0 - 12.0 %    Eosinophil % 1.8 0.3 - 6.2 %    Basophil % 0.5 0.0 - 1.5 %    Immature Grans % 0.5 0.0 - 0.5 %    Neutrophils, Absolute 2.66 1.70 - 7.00 10*3/mm3    Lymphocytes, Absolute 0.64 (L) 0.70 - 3.10 10*3/mm3    Monocytes, Absolute 0.42 0.10 - 0.90 10*3/mm3    Eosinophils, Absolute 0.07 0.00 - 0.40 10*3/mm3    Basophils, Absolute 0.02 0.00 - 0.20 10*3/mm3    Immature Grans, Absolute 0.02 0.00 - 0.05 10*3/mm3    nRBC 1.0 (H) 0.0 - 0.2 /100 WBC   POC Glucose Once    Collection Time: 07/01/25 11:50 AM    Specimen: Blood   Result Value Ref Range    Glucose 130 70 - 130 mg/dL   Basic Metabolic Panel    Collection Time: 07/01/25 12:19 PM    Specimen: Blood   Result Value Ref Range    Glucose 123 (H) 65 - 99 mg/dL    BUN 41.0 (H) 8.0 - 23.0 mg/dL    Creatinine 1.44 (H) 0.57 - 1.00 mg/dL    Sodium 137 136 - 145 mmol/L    Potassium 4.6 3.5 - 5.2 mmol/L    Chloride 106 98 - 107 mmol/L    CO2 16.4 (L) 22.0 - 29.0 mmol/L    Calcium 9.4 8.6 - 10.5 mg/dL    BUN/Creatinine Ratio 28.5 (H) 7.0 - 25.0    Anion Gap 14.6 5.0 - 15.0 mmol/L    eGFR 39.7 (L) >60.0 mL/min/1.73       Radiology:  Pertinent radiology studies were reviewed as described above    Medications have been reviewed separately in chart review medication tab    ASSESSMENT:  REBECCA: Fairly stable post cath, may be near her new baseline    Hypotension, improved with fluids  Hypovolemia, improved after hydration  Chronic diastolic heart failure, euvolemic today but with orthopnea  Elevated troponin  Elevated lactate, likely due to hypotension with organ hypoperfusion, rule out acute infection  Immobility  Atrial fibrillation on anticoagulation with Eliquis  New renal mass         DISCUSSION/PLAN:   Renal function was stable this morning, repeat labs at noon show slight increase in her creatinine  Normal coronaries on left heart cath, elevated filling pressures on right heart cath  She received torsemide yesterday  Will resume oral Bumex today, 1 mg twice daily  Should be okay to restart ARB in a.m. if labs are stable  Discussed with Dr. Womack    Will also plan CT renal mass protocol in a.m. if labs are stable to evaluate renal mass seen on ultrasound       Baltazar Alcantar MD  [facility]   Office phone number: [phone]  Answering service phone number: [phone]    07/01/25  13:02 EDT    Dictation performed using Dragon dictation software

## 2025-07-01 NOTE — PLAN OF CARE
Goal Outcome Evaluation:         Cath done today. Right radial and AC pressure dressing d/I.pharmacy asking about two diuretics Dr. Perez order. Paged cardio. Dr. Hudson says he can sort out diuretics tomorrow. Pharmacy made aware. To start eliquis tomorrow. Started ivf per dr. Alcantar order for ten hours. Stop at 0500. Patient did get up and take few steps to chair with assist of one to two. Had hhd ncs diet for dinner. Safety maintained. Stephany genao

## 2025-07-01 NOTE — PROGRESS NOTES
Name: Marilu Avery ADMIT: 2025   : 1955  PCP: Esperanza Melton APRN    MRN: [id number] LOS: 4 days   AGE/SEX: 69 y.o. female  ROOM: [facility]     Subjective   Subjective   Resting in bed. No family present. She reports chronic dyspnea and relates some of this to her known hernia d/t to pressure from this when lying down. She states she was to lose about 100 pounds in order for Dr. Aguilera to consider operating on her in the past. She denies any chest pain or nausea, vomiting or abdominal pain. She is eager to go home and states she does not want to go to rehab. She has a caregiver and PT/OT that come to her house as well as an in-home provider. She states she would get better care at home than at SNF.      Objective   Objective   Vital Signs  Temp:  [97.4 °F (36.3 °C)-97.9 °F (36.6 °C)] 97.9 °F (36.6 °C)  Heart Rate:  [] 102  Resp:  [12-22] 22  BP: (107-150)/(67-86) 110/83  SpO2:  [91 %-97 %] 96 %  on   ;   Device (Oxygen Therapy): room air  Body mass index is 34.35 kg/m².    Physical Exam  Vitals and nursing note reviewed.   Constitutional:       General: She is not in acute distress.     Appearance: She is ill-appearing. She is not toxic-appearing.   Cardiovascular:      Rate and Rhythm: Tachycardia present. Rhythm irregular.      Pulses: Normal pulses.   Pulmonary:      Effort: Pulmonary effort is normal. No respiratory distress.      Comments: Diminished, overall clear  Abdominal:      General: Bowel sounds are normal. There is no distension.      Palpations: Abdomen is soft.      Tenderness: There is no abdominal tenderness.      Hernia: A hernia is present.   Musculoskeletal:         General: Swelling (mild BLE) present. Normal range of motion.   Skin:     General: Skin is warm and dry.      Findings: No bruising.   Neurological:      General: No focal deficit present.      Mental Status: She is alert and oriented to person, place, and time.      Sensory: No sensory deficit.       Motor: Weakness present.      Coordination: Coordination normal.   Psychiatric:         Mood and Affect: Mood normal.         Behavior: Behavior normal.       Results Review:       I reviewed the patient's new clinical results.  Results from last 7 days   Lab Units 07/01/25  0552 06/30/25  1418 06/30/25  1413 06/30/25  0615 06/29/25  0549 06/28/25  0606   WBC 10*3/mm3 3.83  --   --  4.05 4.11 5.68   HEMOGLOBIN g/dL 9.7*  --   --  9.4* 9.8* 10.6*   HEMOGLOBIN, POC g/dL  --  9.9* 9.9*  --   --   --    PLATELETS 10*3/mm3 154  --   --  125* 117* 132*     Results from last 7 days   Lab Units 07/01/25  1219 07/01/25  0552 06/30/25  2351 06/30/25  1850   SODIUM mmol/L 137 136  136 135* 138   POTASSIUM mmol/L 4.6 4.7  4.7 4.8 4.6   CHLORIDE mmol/L 106 108*  108* 107 106   CO2 mmol/L 16.4* 14.9*  14.9* 14.9* 17.8*   BUN mg/dL 41.0* 42.0*  42.0* 40.0* 40.0*   CREATININE mg/dL 1.44* 1.24*  1.24* 1.36* 1.36*   GLUCOSE mg/dL 123* 113*  113* 122* 123*   Estimated Creatinine Clearance: 50 mL/min (A) (by C-G formula based on SCr of 1.44 mg/dL (H)).  Results from last 7 days   Lab Units 07/01/25  0552 06/30/25  0615 06/29/25  0549 06/28/25  0606 06/27/25  1244   ALBUMIN g/dL 3.1* 2.9* 3.2* 3.2* 4.0   BILIRUBIN mg/dL  --   --   --   --  0.9   ALK PHOS U/L  --   --   --   --  86   AST (SGOT) U/L  --   --   --   --  21   ALT (SGPT) U/L  --   --   --   --  12     Results from last 7 days   Lab Units 07/01/25  1219 07/01/25  0552 06/30/25  2351 06/30/25  1850 06/30/25  0615 06/29/25  1153 06/29/25  0549 06/28/25  1158 06/28/25  0606 06/27/25  1541 06/27/25  1244   CALCIUM mg/dL 9.4 9.2  9.2 9.2 9.3 9.0  9.0   < > 8.9  8.9   < > 8.5*  8.5*   < > 9.5   ALBUMIN g/dL  --  3.1*  --   --  2.9*  --  3.2*  --  3.2*  --  4.0   MAGNESIUM mg/dL  --   --   --   --   --   --   --   --   --   --  1.8   PHOSPHORUS mg/dL  --  3.9  --   --  3.2  --  2.6  --  3.4  --   --     < > = values in this interval not displayed.     Results from last 7  days   Lab Units 06/27/25  1544 06/27/25  1244   PROCALCITONIN ng/mL  --  0.32*   LACTATE mmol/L 1.7 2.5*     Glucose   Date/Time Value Ref Range Status   07/01/2025 1150 130 70 - 130 mg/dL Final   07/01/2025 0530 120 70 - 130 mg/dL Final   06/30/2025 2336 130 70 - 130 mg/dL Final   06/30/2025 2032 125 70 - 130 mg/dL Final   06/30/2025 1150 125 70 - 130 mg/dL Final   06/30/2025 0644 112 70 - 130 mg/dL Final   06/29/2025 2344 141 (H) 70 - 130 mg/dL Final       allopurinol, 100 mg, Oral, Daily  apixaban, 5 mg, Oral, BID  budesonide-formoterol, 2 puff, Inhalation, BID - RT  bumetanide, 1 mg, Oral, BID  famotidine, 20 mg, Oral, BID  levothyroxine, 75 mcg, Oral, Q AM  metoprolol succinate XL, 100 mg, Oral, Nightly  metoprolol succinate XL, 50 mg, Oral, Once  montelukast, 10 mg, Oral, Daily  polyethylene glycol, 17 g, Oral, Daily  [START ON 7/2/2025] spironolactone, 25 mg, Oral, Daily       Diet: Cardiac; Healthy Heart (2-3 Na+); Fluid Consistency: Thin (IDDSI 0)       Assessment/Plan     Active Hospital Problems    Diagnosis  POA    **Hypotension [I95.9]  Yes    Hyponatremia [E87.1]  Unknown    Takotsubo cardiomyopathy [I51.81]  Unknown    Obesity (BMI 30-39.9) [E66.9]  Yes    Elevated troponin [R79.89]  Yes    Chronic alcohol use [F10.90]  Yes    Chronic anticoagulation [Z79.01]  Not Applicable    Tobacco abuse [Z72.0]  Yes    REBECCA (acute kidney injury) [N17.9]  Yes    Atrial fibrillation, persistent [I48.19]  Yes    HTN (hypertension) [I10]  Yes    HLD (hyperlipidemia) [E78.5]  Yes    Ventral hernia [K43.9]  Yes      Resolved Hospital Problems   No resolved problems to display.     Ms. Avery is a 69 y.o. male with history of asthma, atrial fibrillation on chronic anticoagulation and tobacco abuse who presented to the hospital with complaints of weakness and a fall.  She has been primarily homebound for the last several months and found to have an elevated troponin and hypotension on arrival.  She had an REBECCA as well and  initially placed in the ICU.  She received a fluid bolus and her sodium was 125 with creatinine of 2.2.  Nephrology was consulted.  Her troponin was 266 with elevation to 729 and EKG revealed atrial fibrillation.  There were concerns for demand ischemia but no ACS/type I MI.  Cardiology was consulted and cardiac cath initially deferred.  Her blood pressure stabilized quickly and she was cleared to move out of the ICU after 24 hours.  She did not require IV pressors.    Hypotension  Elevated troponin  Takotsubo cardiomyopathy  - Cardiology managing.   - Hypotension resolved with fluids.   - Status post heart catheterization on 6/30 with normal coronaries with elevated wedge with associated pulmonary hypertension.  - Echocardiogram with newly reduced LVEF at 30 to 35% with extensive apical wall motion abnormality strongly suggestive of stress-induced cardiomyopathy.  - Plans for medical management, beta-blocker increased and plans to add ARB if blood pressure tolerates increased dose.   - Spironolactone resumed    REBECCA  Hyponatremia  - Status post IV fluids, REBECCA felt secondary to hypotension from hypovolemia  - Nephrology managing and now receiving diuresis.   - Creatinine has been stable in the 1.2-1.4 range for a few days  - Sodium normalized  - Renal ultrasound with possible new mass of left upper kidney, nephrology planning CT for further evaluation now that renal function improved    Atrial fibrillation  Chronic anticoagulation  - Beta-blocker increased as above.  - Continues on home Eliquis    Chronic alcohol use   - Patient states she drinks a glass of alcohol daily  - No signs of withdrawal this admission     COPD- home Symbicort, on RA  Tobacco abuse- reportedly in remission.     Obesity  Ventral hernia  -BMI 33 & large reducible ventral hernia POA, has been evaluated by Dr. Aguilera in years past    Discussed with patient and nursing staff.     Home when cleared by nephrology/cards. She is refusing SNF- last  PT note 6/28 recommended SNF.    VTE Prophylaxis - Eliquis (home med)  Code Status - Full code  Disposition - Anticipate discharge next 1-2 days pending clearance from all.       DEBBI Hagan  [facility]  07/01/25  14:12 EDT

## 2025-07-01 NOTE — PROGRESS NOTES
[facility] INPATIENT PROGRESS NOTE         [facility] CORONARY CARE    2025      PATIENT IDENTIFICATION:  Name: Marilu Avery ADMIT: 2025   : 1955  PCP: Esperanza Melton APRN    MRN: [id number] LOS: 4 days   AGE/SEX: 69 y.o. female  ROOM: [facility]                     LOS 4    Reason for visit: Weakness      SUBJECTIVE:      Resting comfortably.  Says that she feels that her strength has improved.  No chest pain or productive cough and is hoping to be able to go home.  No objection from my  standpoint.      Objective   OBJECTIVE:    Vital Sign Min/Max for last 24 hours  Temp  Min: 97.4 °F (36.3 °C)  Max: 97.6 °F (36.4 °C)   BP  Min: 101/96  Max: 150/86   Pulse  Min: 97  Max: 114   Resp  Min: 12  Max: 22   SpO2  Min: 91 %  Max: 100 %   No data recorded   Weight  Min: 109 kg (239 lb 6.7 oz)  Max: 109 kg (239 lb 6.7 oz)    Vitals:    25 0100 25 0202 25 0347 25 0739   BP:   150/86    BP Location:   Left arm    Patient Position:   Lying    Pulse:    105   Resp:  20 14 16   Temp:   97.4 °F (36.3 °C) 97.6 °F (36.4 °C)   TempSrc:   Oral Oral   SpO2:    97%   Weight: 109 kg (239 lb 6.7 oz)      Height:                25  0548 25  1133 25  0100   Weight: 106 kg (233 lb 7.5 oz) 106 kg (233 lb) 109 kg (239 lb 6.7 oz)       Body mass index is 34.35 kg/m².                          Body mass index is 34.35 kg/m².    Intake/Output Summary (Last 24 hours) at 2025 0906  Last data filed at 2025 0323  Gross per 24 hour   Intake 360 ml   Output 475 ml   Net -115 ml         Exam:  GEN:  No distress, appears stated age  EYES:   PERRL, anicteric sclerae  ENT:    External ears/nose normal, OP clear  NECK:  No adenopathy, midline trachea  LUNGS: Normal chest on inspection, palpation and auscultation  CV:  Normal S1S2, without murmur  ABD:  Nontender, nondistended, no hepatosplenomegaly, +BS  EXT:  No edema.  No cyanosis or clubbing.  No mottling and normal cap  refill.    Assessment     Scheduled meds:  allopurinol, 100 mg, Oral, Daily  apixaban, 5 mg, Oral, BID  budesonide-formoterol, 2 puff, Inhalation, BID - RT  bumetanide, 2 mg, Oral, BID Diuretics  famotidine, 20 mg, Oral, BID  levothyroxine, 75 mcg, Oral, Q AM  metoprolol tartrate, 50 mg, Oral, BID  montelukast, 10 mg, Oral, Daily  polyethylene glycol, 17 g, Oral, Daily  spironolactone, 25 mg, Oral, BID  torsemide, 20 mg, Oral, Daily      IV meds:                           Data Review:  Results from last 7 days   Lab Units 07/01/25  0552 06/30/25  2351 06/30/25  1850 06/30/25  0615 06/29/25  2356   SODIUM mmol/L 136  136 135* 138 137  137 137   POTASSIUM mmol/L 4.7  4.7 4.8 4.6 4.4  4.4 4.0   CHLORIDE mmol/L 108*  108* 107 106 108*  108* 107   CO2 mmol/L 14.9*  14.9* 14.9* 17.8* 17.7*  17.7* 15.0*   BUN mg/dL 42.0*  42.0* 40.0* 40.0* 41.0*  41.0* 41.0*   CREATININE mg/dL 1.24*  1.24* 1.36* 1.36* 1.17*  1.17* 1.34*   GLUCOSE mg/dL 113*  113* 122* 123* 111*  111* 135*   CALCIUM mg/dL 9.2  9.2 9.2 9.3 9.0  9.0 8.7         Estimated Creatinine Clearance: 58.1 mL/min (A) (by C-G formula based on SCr of 1.24 mg/dL (H)).  Results from last 7 days   Lab Units 07/01/25  0552 06/30/25  1418 06/30/25  1413 06/30/25  0615 06/29/25  0549 06/28/25  0606 06/27/25  1244   WBC 10*3/mm3 3.83  --   --  4.05 4.11 5.68 6.34   HEMOGLOBIN g/dL 9.7*  --   --  9.4* 9.8* 10.6* 12.1   HEMOGLOBIN, POC g/dL  --  9.9* 9.9*  --   --   --   --    PLATELETS 10*3/mm3 154  --   --  125* 117* 132* 183     Results from last 7 days   Lab Units 06/27/25  1244   INR  1.80*     Results from last 7 days   Lab Units 06/27/25  1244   ALT (SGPT) U/L 12   AST (SGOT) U/L 21     Results from last 7 days   Lab Units 06/30/25  1418 06/30/25  1413   PH, ARTERIAL pH units 7.340* 7.410     Results from last 7 days   Lab Units 06/27/25  1544 06/27/25  1244   PROCALCITONIN ng/mL  --  0.32*   LACTATE mmol/L 1.7 2.5*         Glucose   Date/Time Value Ref  Range Status   07/01/2025 0530 120 70 - 130 mg/dL Final   06/30/2025 2336 130 70 - 130 mg/dL Final   06/30/2025 2032 125 70 - 130 mg/dL Final   06/30/2025 1150 125 70 - 130 mg/dL Final   06/30/2025 0644 112 70 - 130 mg/dL Final   06/29/2025 2344 141 (H) 70 - 130 mg/dL Final   06/29/2025 1804 170 (H) 70 - 130 mg/dL Final         Imaging reviewed  Chest x-ray 6/27 reviewed    CT head 6/27 reviewed    Heart cath 6/30 reviewed: Wedge pressure elevated at 26.  Normal coronaries.  Severely depressed cardiac output.      Microbiology reviewed            Active Hospital Problems    Diagnosis  POA    **Hypotension [I95.9]  Yes    Obesity (BMI 30-39.9) [E66.9]  Yes    Elevated troponin [R79.89]  Yes    Chronic alcohol use [F10.90]  Yes    Chronic anticoagulation [Z79.01]  Not Applicable    Tobacco abuse [Z72.0]  Yes    REBECCA (acute kidney injury) [N17.9]  Yes    Atrial fibrillation, persistent [I48.19]  Yes      Resolved Hospital Problems   No resolved problems to display.         ASSESSMENT:  Weakness  Acute kidney injury  Hyponatremia: Improved  Non-ST elevation MI  Atrial fibrillation  Hypothyroidism  Asthma/COPD  Tobacco use  Pulm hypertension: WHO group II      PLAN:  Weakness and hypotension has improved.  Downgraded to telemetry.  Defer nonpulmonary medical management to hospitalist service.  They have taken over as attending.  Cardiac cath results reviewed.        Anatoly Mcdaniel MD  Pulmonary and Critical Care Medicine  [facility], [facility]  7/1/2025    09:06 EDT

## 2025-07-01 NOTE — PROGRESS NOTES
[facility]    Patient Name: Marilu Avery  :1955  69 y.o.  LOS: 4  Encounter Provider: Oscar Womack MD      Patient Care Team:  Esperanza Melton APRN as PCP - General (Nurse Practitioner)  Anatoly Jimenez MD as PCP - Family Medicine    Chief Complaint/Consult question:  Troponin elevation, A-fib, Takotsubo cardiomyopathy     PMH/HPI: 70 yo F with known history of A-fib who presents with fall at home.  She states that she has been feeling unwell for the several weeks and has gotten progressively more weak.  This eventually resulted in the fall at home.  Patient does not recall having syncope.  Workup in the ED was notable for REBECCA with Cr 2.2 and high-sensitivity troponin 266 -> 729.  EKG showed A-fib with low QRS voltage but no acute ischemic changes and adequate rate control in the 80s.     Interval History:   Feeling a little better today.  Continues to have A-fib with normal 100s.  Right heart catheterization showed elevated wedge with associated pulmonary hypertension.  She is anxious to go home.      Limited echo (2025):      Limited study.    Left ventricular systolic function is severely decreased. Left ventricular ejection fraction appears to be 31 - 35%.    Left ventricular diastolic function was indeterminate in the setting of A-fib.    There is global hypokinesis/akinesis with basal sparing.  Differential includes LAD ischemia versus stress-induced (Takotsubo) cardiomyopathy.    Moderate mitral and tricuspid valve regurgitation is present.    Estimated right ventricular systolic pressure from tricuspid regurgitation is markedly elevated (>55 mmHg).    The left atrial cavity is severely dilated.    The right atrial cavity and IVC are mildly dilated.     Compared to TTE from 2025, LVEF has significantly declined, and extensive apical akinesis/ballooning is now noted.      Objective   Vital Signs  Temp:  [97.4 °F (36.3 °C)-97.6 °F (36.4 °C)] 97.6 °F (36.4  "°C)  Heart Rate:  [] 123  Resp:  [12-22] 16  BP: (101-150)/(67-96) 148/86    Intake/Output Summary (Last 24 hours) at 7/1/2025 1012  Last data filed at 7/1/2025 0323  Gross per 24 hour   Intake 360 ml   Output 475 ml   Net -115 ml     Flowsheet Rows      Flowsheet Row First Filed Value   Admission Height 177.8 cm (70\") Documented at 06/27/2025 1236   Admission Weight 104 kg (229 lb) Documented at 06/27/2025 1236              Vitals and nursing note reviewed.   Constitutional:       Appearance: Not in distress. Frail. Chronically ill-appearing.   Pulmonary:      Effort: Pulmonary effort is normal.   Cardiovascular:      PMI at left midclavicular line. Tachycardia present. Irregular rhythm.      Murmurs: There is no murmur.   Edema:     Peripheral edema absent.   Abdominal:      General: Bowel sounds are normal. There is no distension.      Hernia: A hernia is present. Hernia is present in the umbilical area and ventral area.           Pertinent Test Results:  Results from last 7 days   Lab Units 07/01/25  0552 06/30/25  2351 06/30/25  1850 06/30/25  0615 06/29/25  2356 06/29/25  1755 06/29/25  1153 06/27/25  1541 06/27/25  1244   SODIUM mmol/L 136  136 135* 138 137  137 137 136 137   < > 125*   POTASSIUM mmol/L 4.7  4.7 4.8 4.6 4.4  4.4 4.0 4.2 4.1   < > 4.8   CHLORIDE mmol/L 108*  108* 107 106 108*  108* 107 105 106   < > 91*   CO2 mmol/L 14.9*  14.9* 14.9* 17.8* 17.7*  17.7* 15.0* 15.0* 15.0*   < > 18.6*   BUN mg/dL 42.0*  42.0* 40.0* 40.0* 41.0*  41.0* 41.0* 40.0* 40.0*   < > 48.0*   CREATININE mg/dL 1.24*  1.24* 1.36* 1.36* 1.17*  1.17* 1.34* 1.47* 1.62*   < > 2.22*   GLUCOSE mg/dL 113*  113* 122* 123* 111*  111* 135* 145* 121*   < > 150*   CALCIUM mg/dL 9.2  9.2 9.2 9.3 9.0  9.0 8.7 8.7 8.9   < > 9.5   AST (SGOT) U/L  --   --   --   --   --   --   --   --  21   ALT (SGPT) U/L  --   --   --   --   --   --   --   --  12    < > = values in this interval not displayed.     Results from last 7 " days   Lab Units 06/28/25  0606 06/27/25  1403 06/27/25  1244   CK TOTAL U/L  --  72  --    HSTROP T ng/L 475* 729* 268*     Results from last 7 days   Lab Units 07/01/25  0552 06/30/25  1418 06/30/25  1413 06/30/25  0615 06/29/25  0549 06/28/25  0606 06/27/25  1244   WBC 10*3/mm3 3.83  --   --  4.05 4.11 5.68 6.34   HEMOGLOBIN g/dL 9.7*  --   --  9.4* 9.8* 10.6* 12.1   HEMOGLOBIN, POC g/dL  --  9.9* 9.9*  --   --   --   --    HEMATOCRIT % 30.3*  --   --  28.1* 30.0* 31.7* 38.1   HEMATOCRIT POC %  --  29* 29*  --   --   --   --    PLATELETS 10*3/mm3 154  --   --  125* 117* 132* 183     Results from last 7 days   Lab Units 06/27/25  1244   INR  1.80*   APTT seconds 29.0     Results from last 7 days   Lab Units 06/27/25  1244   MAGNESIUM mg/dL 1.8     Results from last 7 days   Lab Units 06/27/25  1541   CHOLESTEROL mg/dL 139   TRIGLYCERIDES mg/dL 111   HDL CHOL mg/dL 101*     Results from last 7 days   Lab Units 06/27/25  1249   PROBNP pg/mL 2,909.0*     Results from last 7 days   Lab Units 06/27/25  1403   TSH uIU/mL 0.399           Medication Review:   allopurinol, 100 mg, Oral, Daily  apixaban, 5 mg, Oral, BID  budesonide-formoterol, 2 puff, Inhalation, BID - RT  famotidine, 20 mg, Oral, BID  levothyroxine, 75 mcg, Oral, Q AM  metoprolol succinate XL, 100 mg, Oral, Nightly  metoprolol succinate XL, 50 mg, Oral, Once  montelukast, 10 mg, Oral, Daily  polyethylene glycol, 17 g, Oral, Daily                Assessment & Plan     Active Hospital Problems    Diagnosis  POA    **Hypotension [I95.9]  Yes    Obesity (BMI 30-39.9) [E66.9]  Yes    Elevated troponin [R79.89]  Yes    Chronic alcohol use [F10.90]  Yes    Chronic anticoagulation [Z79.01]  Not Applicable    Tobacco abuse [Z72.0]  Yes    REBECCA (acute kidney injury) [N17.9]  Yes    Atrial fibrillation, persistent [I48.19]  Yes      Resolved Hospital Problems   No resolved problems to display.        Troponin elevation/? Takotsubo cardiomyopathy:  High-sensitivity  troponin 266 -> 729 on admission, now downtrending.  EKG showed A-fib with low QRS voltage but no acute ischemic changes.  Denies chest pain, echo showed newly reduced LVEF at 30-35% with extensive apical wall motion abnormality, strongly suggesting stress-induced (Takotsubo) cardiomyopathy.  Coronaries obstructive disease.   BB as below.  Restarted on spironolactone.  Needs ARB but will see how she tolerates the increased dose of beta-blocker first.  Chronic atrial fibrillation: CHADS2 Vasc score 2-3, continue to hold home Eliquis for possible cath.  Per patient, she was on home Lopressor 100 bid which was held given hypotension, her BP has improved and her REBECCA is essentially resolved but she remains mildly tachycardic.  I will increase her beta-blocker dose and transition her over to Toprol 100 mg tonight and reassess in the morning.  on Eliquis  Acute systolic HF.  Concern for overdiuresis in the setting of acute renal failure on arrival.  Creatinine has normalized.  Right heart catheterization 6/30 showed elevated biventricular filling pressures with reduced cardiac index.  Likely worse in the setting of A-fib with RVR.  Weakness/mechanical fall  REBECCA: Agree with fluid resuscitation, appreciate nephrology input.  Tobacco abuse in remission    -started on spironolactone and torsemide by IC postcath. Will continue spironolactone at daily dosing. Will have nephrology weigh in on diuretic dosing.   -Ultimately needs ARB. Given we added back spironolactone, uptitrated BB and are diuresing I will hold off on adding today. If renal function and BP stable can reassess tomorrow.       We will continue to follow         Oscar Womack MD  [facility]  07/01/25  12:21 EDT

## 2025-07-01 NOTE — PLAN OF CARE
Goal Outcome Evaluation:             Patient alert and oriented X4, on room air, no bleeding from arterial site from previous procedure. She was carried along with plan of care. Safety ensured, no complain made this moment. I will continue to monitor and render care during the shift.

## 2025-07-01 NOTE — THERAPY TREATMENT NOTE
Patient Name: Marilu Avery  : 1955    MRN: [id number]                              Today's Date: 2025       Admit Date: 2025    Visit Dx:     ICD-10-CM ICD-9-CM   1. Hypotension, unspecified hypotension type  I95.9 458.9   2. Near syncope  R55 780.2   3. Acute kidney injury  N17.9 584.9   4. Elevated troponin  R79.89 790.6   5. Longstanding persistent atrial fibrillation  I48.11 427.31   6. Coagulopathy: Eliquis induced  D68.9 286.9     Patient Active Problem List   Diagnosis    Incisional hernia, without obstruction or gangrene    Ventral hernia    Atrial fibrillation, persistent    HLD (hyperlipidemia)    HTN (hypertension)    LISA (obstructive sleep apnea)    REBECCA (acute kidney injury)    Chronic anticoagulation    SBO (small bowel obstruction)    Tobacco abuse    Morbid obesity with BMI of 40.0-44.9, adult    COPD (chronic obstructive pulmonary disease)    Hypopotassemia    Gout    Chronic anticoagulation    Prolonged Q-T interval on ECG    REBECCA (acute kidney injury)    Acute on chronic diastolic (congestive) heart failure    Hypotension    Obesity (BMI 30-39.9)    Elevated troponin    Chronic alcohol use    Hyponatremia    Takotsubo cardiomyopathy     Past Medical History:   Diagnosis Date    Arthritis     Asthma     Atrial fibrillation     Colon polyps     hyperplastic rectal polyp    Hyperlipidemia     Hypertension     LISA on CPAP     Sleep apnea      Past Surgical History:   Procedure Laterality Date    BACK SURGERY N/A     CARDIAC CATHETERIZATION N/A 2025    Procedure: Right and Left Heart Cath;  Surgeon: Julien Perez MD;  Location: [facility] INVASIVE LOCATION;  Service: Cardiovascular;  Laterality: N/A;    COLONOSCOPY N/A 10/22/2015    Rectal polyp-Dr. Elías Keating    HYSTERECTOMY N/A     LAPAROSCOPIC CHOLECYSTECTOMY N/A 2010    Dr. Heath Whitlock    OOPHORECTOMY  2001    REPLACEMENT TOTAL KNEE Left 2015    Dr. Yo Aguayo    REPLACEMENT TOTAL KNEE  Right 02/26/2015    Dr. Yo Aguayo    VENTRAL HERNIA REPAIR N/A 10/22/2015    Open reduction of incarcerated ventral hernia with layered closure-Dr. Elías Keating    VENTRAL/INCISIONAL HERNIA REPAIR N/A 6/14/2021    Procedure: OPEN VENTRAL/INCISIONAL HERNIA REPAIR WITH MESH AND APPENDECTOMY;  Surgeon: Arnulfo Leonard MD;  Location: [facility] OR;  Service: General;  Laterality: N/A;      General Information       Row Name 07/01/25 1553          Physical Therapy Time and Intention    Document Type therapy note (daily note)  -PC (r) AG (t) PC (c)     Mode of Treatment physical therapy  -PC (r) AG (t) PC (c)       Row Name 07/01/25 1553          General Information    Existing Precautions/Restrictions fall  -PC (r) AG (t) PC (c)       Row Name 07/01/25 1553          Cognition    Orientation Status (Cognition) oriented x 3  -PC (r) AG (t) PC (c)       Row Name 07/01/25 1553          Safety Issues/Impairments Affecting Functional Mobility    Impairments Affecting Function (Mobility) endurance/activity tolerance;strength;balance;pain  -PC (r) AG (t) PC (c)               User Key  (r) = Recorded By, (t) = Taken By, (c) = Cosigned By      Initials Name Provider Type    PC Sapphire Desai, PT Physical Therapist    AG Kerline Rios, PT Student PT Student                   Mobility       Row Name 07/01/25 9380          Bed Mobility    Bed Mobility supine-sit;sit-supine  -PC (r) AG (t) PC (c)     Supine-Sit [place] (Bed Mobility) contact guard  -PC (r) AG (t) PC (c)     Sit-Supine [place] (Bed Mobility) minimum assist (75% patient effort)  -PC (r) AG (t) PC (c)     Assistive Device (Bed Mobility) head of bed elevated  -PC (r) AG (t) PC (c)       Row Name 07/01/25 3083          Sit-Stand Transfer    Sit-Stand [place] (Transfers) minimum assist (75% patient effort)  -PC (r) AG (t) PC (c)     Assistive Device (Sit-Stand Transfers) walker, front-wheeled  -PC (r) AG (t) PC (c)       Row Name 07/01/25 2916           Gait/Stairs (Locomotion)    [place] Level (Gait) minimum assist (75% patient effort)  -PC (r) AG (t) PC (c)     Assistive Device (Gait) walker, front-wheeled  -PC (r) AG (t) PC (c)     Distance in Feet (Gait) 3  -PC (r) AG (t) PC (c)     Deviations/Abnormal Patterns (Gait) gait speed decreased;stride length decreased  -PC (r) AG (t) PC (c)     Bilateral Gait Deviations forward flexed posture  -PC (r) AG (t) PC (c)     Comment, (Gait/Stairs) transfered to bedside commode with Min A and RW. pt declined further ambulation due to fatigue.  -PC (r) AG (t) PC (c)               User Key  (r) = Recorded By, (t) = Taken By, (c) = Cosigned By      Initials Name Provider Type    PC Sapphire Desai, PT Physical Therapist    Kerline Pacheco, PT Student PT Student                   Obj/Interventions       Row Name 07/01/25 1556          Balance    Balance Assessment sitting static balance;standing static balance;standing dynamic balance  -PC (r) AG (t) PC (c)     Static Sitting Balance standby assist  -PC (r) AG (t) PC (c)     Position, Sitting Balance sitting edge of bed  -PC (r) AG (t) PC (c)     Static Standing Balance minimal assist  -PC (r) AG (t) PC (c)     Dynamic Standing Balance minimal assist  -PC (r) AG (t) PC (c)     Position/Device Used, Standing Balance supported;walker, front-wheeled  -PC (r) AG (t) PC (c)     Balance Interventions sitting;standing;sit to stand;supported;static;dynamic  -PC (r) AG (t) PC (c)               User Key  (r) = Recorded By, (t) = Taken By, (c) = Cosigned By      Initials Name Provider Type    PC Sapphire Desai PT Physical Therapist    Kerline Pacheco, PT Student PT Student                   Goals/Plan    No documentation.                  Clinical Impression       Row Name 07/01/25 1556          Pain    Pretreatment Pain Rating 7/10  -PC (r) AG (t) PC (c)     Posttreatment Pain Rating 7/10  -PC (r) AG (t) PC (c)     Pain Location other (see comments)  neck and LBP  -PC  (r) AG (t) PC (c)     Pain Management Interventions exercise or physical activity utilized  -PC (r) AG (t) PC (c)     Response to Pain Interventions activity participation with tolerable pain  -PC (r) AG (t) PC (c)     Pre/Posttreatment Pain Comment pt reported pain in neck and low back.  -PC (r) AG (t) PC (c)       Row Name 07/01/25 7180          Plan of Care Review    Plan of Care Reviewed With patient  -PC (r) AG (t) PC (c)     Progress improving  -PC (r) AG (t) PC (c)     Outcome Evaluation Ao x3. pt request to sit on bedside commode to complete a BM. pt was able to sit EOB SBA. STS min A. take lateral steps to bedside commode with min A. pt had BM. pt declined further ambulation due to increased fatigue. pt sat in bed after treatment session. PT will cont to follow and encourage SNF.  -PC (r) AG (t) PC (c)       Row Name 07/01/25 7801          Positioning and Restraints    Pre-Treatment Position in bed  -PC (r) AG (t) PC (c)     Post Treatment Position bed  -PC (r) AG (t) PC (c)     In Bed notified nsg;call light within reach;encouraged to call for assist;exit alarm on  -PC (r) AG (t) PC (c)               User Key  (r) = Recorded By, (t) = Taken By, (c) = Cosigned By      Initials Name Provider Type    Sapphire Solis, PT Physical Therapist    Kerline Pacheco, PT Student PT Student                   Outcome Measures       Row Name 07/01/25 0469          How much help from another person do you currently need...    Turning from your back to your side while in flat bed without using bedrails? 4  -PC (r) AG (t) PC (c)     Moving from lying on back to sitting on the side of a flat bed without bedrails? 4  -PC (r) AG (t) PC (c)     Moving to and from a bed to a chair (including a wheelchair)? 3  -PC (r) AG (t) PC (c)     Standing up from a chair using your arms (e.g., wheelchair, bedside chair)? 3  -PC (r) AG (t) PC (c)     Climbing 3-5 steps with a railing? 2  -PC (r) AG (t) PC (c)     To walk in hospital  room? 3  -PC (r) AG (t) PC (c)     AM-PAC 6 Clicks Score (PT) 19  -PC (r) AG (t)     Highest Level of Mobility Goal Walk 10 Steps or More-6  -PC (r) AG (t)               User Key  (r) = Recorded By, (t) = Taken By, (c) = Cosigned By      Initials Name Provider Type    PC Sapphire Desai, PT Physical Therapist    AG Kerline Rios, PT Student PT Student                                 Physical Therapy Education       Title: PT OT SLP Therapies (In Progress)       Topic: Physical Therapy (In Progress)       Point: Mobility training (Done)       Learning Progress Summary            Patient Acceptance, E, VU,DU by  at 7/1/2025 1600    Acceptance, E, NR,VU by  at 6/28/2025 1244                      Point: Home exercise program (Not Started)       Learner Progress:  Not documented in this visit.              Point: Body mechanics (Not Started)       Learner Progress:  Not documented in this visit.              Point: Precautions (Not Started)       Learner Progress:  Not documented in this visit.                              User Key       Initials Effective Dates Name Provider Type Discipline    CW 12/13/22 -  Rachael Guerrero, PT Physical Therapist PT     06/03/25 -  Kerline Rios, PT Student PT Student PT                  PT Recommendation and Plan     Progress: improving  Outcome Evaluation: Ao x3. pt request to sit on bedside commode to complete a BM. pt was able to sit EOB SBA. STS min A. take lateral steps to bedside commode with min A. pt had BM. pt declined further ambulation due to increased fatigue. pt sat in bed after treatment session. PT will cont to follow and encourage SNF.     Time Calculation:         PT Charges       Row Name 07/01/25 1600             Time Calculation    Start Time 1520  -PC (r) AG (t) PC (c)      Stop Time 1535  -PC (r) AG (t) PC (c)      Time Calculation (min) 15 min  -PC (r) AG (t)      PT Received On 07/01/25  -PC (r) AG (t) PC (c)      PT - Next Appointment 07/02/25  -PC  (r) AG (t) PC (c)         Timed Charges    95620 - PT Therapeutic Activity Minutes 15  -PC (r) AG (t) PC (c)         Total Minutes    Timed Charges Total Minutes 15  -PC (r) AG (t)       Total Minutes 15  -PC (r) AG (t)                User Key  (r) = Recorded By, (t) = Taken By, (c) = Cosigned By      Initials Name Provider Type    Sapphire Solis, PT Physical Therapist    Kerline Pacheco, PT Student PT Student                      PT G-Codes  Outcome Measure Options: AM-PAC 6 Clicks Basic Mobility (PT)  AM-PAC 6 Clicks Score (PT): 19  PT Discharge Summary  Anticipated Discharge Disposition (PT): skilled nursing facility    Kerline Rios PT Student  7/1/2025

## 2025-07-01 NOTE — PLAN OF CARE
Goal Outcome Evaluation:  Plan of Care Reviewed With: patient        Progress: improving  Outcome Evaluation: Ao x3. pt request to sit on bedside commode to complete a BM. pt was able to sit EOB SBA. STS min A. take lateral steps to bedside commode with min A. pt had BM. pt declined further ambulation due to increased fatigue. pt sat in bed after treatment session. PT will cont to follow and encourage SNF.    Anticipated Discharge Disposition (PT): skilled nursing facility

## 2025-07-01 NOTE — CASE MANAGEMENT/SOCIAL WORK
Discharge Planning Assessment  [facility]     Patient Name: Marilu Avery  MRN: [id number]  Today's Date: 7/1/2025    Admit Date: 6/27/2025    Plan: Pending progress with PT/OT ordered 7/1   Discharge Needs Assessment       Row Name 07/01/25 1623       Living Environment    People in Home alone    Current Living Arrangements home    Potentially Unsafe Housing Conditions none    In the past 12 months has the electric, gas, oil, or water company threatened to shut off services in your home? No    Primary Care Provided by self    Provides Primary Care For no one    Family Caregiver if Needed none    Quality of Family Relationships helpful    Able to Return to Prior Arrangements yes       Resource/Environmental Concerns    Resource/Environmental Concerns none    Transportation Concerns none       Transportation Needs    In the past 12 months, has lack of transportation kept you from medical appointments or from getting medications? yes    In the past 12 months, has lack of transportation kept you from meetings, work, or from getting things needed for daily living? No       Food Insecurity    Within the past 12 months, you worried that your food would run out before you got the money to buy more. Never true    Within the past 12 months, the food you bought just didn't last and you didn't have money to get more. Never true       Transition Planning    Patient/Family Anticipates Transition to home    Patient/Family Anticipated Services at Transition skilled nursing    Transportation Anticipated health plan transportation       Discharge Needs Assessment    Readmission Within the Last 30 Days no previous admission in last 30 days    Equipment Currently Used at Home walker, rolling;commode;grab bar;nebulizer;lift device    Concerns to be Addressed discharge planning    Anticipated Changes Related to Illness none    Equipment Needed After Discharge other (see comments)  TBD    Discharge Facility/Level of Care Needs other  (see comments)  TBD    Provided Post Acute Provider List? Yes    Provided Post Acute Provider Quality & Resource List? Yes    Post Acute Provider Quality and Resource List Home Health;Inpatient Rehab;Nursing Home    Delivered To Patient;Support Person    Method of Delivery In person    Offered/Gave Vendor List yes    Current Discharge Risk chronically ill;lives alone                   Discharge Plan       Row Name 07/01/25 1626       Plan    Plan Pending progress with PT/OT ordered 7/1    Roadmap to Recovery Yes    Patient/Family in Agreement with Plan yes    Plan Comments Spoke with ateint at bedside. Facesheet, PCP and pahrmacy verified. Patient states she is IAL in her home with a walker. he also has a elevated toilet seat, shower nena, grab barsr and a lift chair . PT/OT ordered on 7/1 and pending recommendations.                  Continued Care and Services - Admitted Since 6/27/2025    No active coordination exists.          Demographic Summary       Row Name 07/01/25 1622       General Information    Admission Type inpatient    Arrived From emergency department    Required Notices Provided Important Message from Medicare    Referral Source admission list    Reason for Consult discharge planning    Preferred Language English                   Functional Status       Row Name 07/01/25 1623       Functional Status    Usual Activity Tolerance moderate    Current Activity Tolerance moderate       Functional Status, IADL    Medications independent    Meal Preparation assistive equipment    Housekeeping assistive equipment    Laundry assistive equipment    Shopping assistive equipment    IADL Comments IADL with a walker in the home and community       Mental Status    General Appearance WDL WDL       Mental Status Summary    Recent Changes in Mental Status/Cognitive Functioning no changes                               Ana Lawrence, RN

## 2025-07-02 ENCOUNTER — APPOINTMENT (OUTPATIENT)
Dept: CT IMAGING | Facility: HOSPITAL | Age: 70
DRG: 286 | End: 2025-07-02
Payer: MEDICARE

## 2025-07-02 ENCOUNTER — APPOINTMENT (OUTPATIENT)
Dept: GENERAL RADIOLOGY | Facility: HOSPITAL | Age: 70
DRG: 286 | End: 2025-07-02
Payer: MEDICARE

## 2025-07-02 LAB
ALBUMIN SERPL-MCNC: 3.3 G/DL (ref 3.5–5.2)
ANION GAP SERPL CALCULATED.3IONS-SCNC: 14 MMOL/L (ref 5–15)
ANION GAP SERPL CALCULATED.3IONS-SCNC: 14 MMOL/L (ref 5–15)
ANION GAP SERPL CALCULATED.3IONS-SCNC: 15.5 MMOL/L (ref 5–15)
BACTERIA SPEC AEROBE CULT: NORMAL
BACTERIA SPEC AEROBE CULT: NORMAL
BASOPHILS # BLD AUTO: 0.01 10*3/MM3 (ref 0–0.2)
BASOPHILS NFR BLD AUTO: 0.3 % (ref 0–1.5)
BUN SERPL-MCNC: 46 MG/DL (ref 8–23)
BUN SERPL-MCNC: 46 MG/DL (ref 8–23)
BUN SERPL-MCNC: 47 MG/DL (ref 8–23)
BUN/CREAT SERPL: 33.6 (ref 7–25)
BUN/CREAT SERPL: 34.6 (ref 7–25)
BUN/CREAT SERPL: 34.6 (ref 7–25)
CALCIUM SPEC-SCNC: 9 MG/DL (ref 8.6–10.5)
CALCIUM SPEC-SCNC: 9.2 MG/DL (ref 8.6–10.5)
CALCIUM SPEC-SCNC: 9.2 MG/DL (ref 8.6–10.5)
CHLORIDE SERPL-SCNC: 104 MMOL/L (ref 98–107)
CHLORIDE SERPL-SCNC: 105 MMOL/L (ref 98–107)
CHLORIDE SERPL-SCNC: 105 MMOL/L (ref 98–107)
CO2 SERPL-SCNC: 14.5 MMOL/L (ref 22–29)
CO2 SERPL-SCNC: 15 MMOL/L (ref 22–29)
CO2 SERPL-SCNC: 15 MMOL/L (ref 22–29)
CREAT SERPL-MCNC: 1.33 MG/DL (ref 0.57–1)
CREAT SERPL-MCNC: 1.33 MG/DL (ref 0.57–1)
CREAT SERPL-MCNC: 1.4 MG/DL (ref 0.57–1)
DEPRECATED RDW RBC AUTO: 52.1 FL (ref 37–54)
EGFRCR SERPLBLD CKD-EPI 2021: 41.1 ML/MIN/1.73
EGFRCR SERPLBLD CKD-EPI 2021: 43.7 ML/MIN/1.73
EGFRCR SERPLBLD CKD-EPI 2021: 43.7 ML/MIN/1.73
EOSINOPHIL # BLD AUTO: 0.07 10*3/MM3 (ref 0–0.4)
EOSINOPHIL NFR BLD AUTO: 1.9 % (ref 0.3–6.2)
ERYTHROCYTE [DISTWIDTH] IN BLOOD BY AUTOMATED COUNT: 14.4 % (ref 12.3–15.4)
GLUCOSE BLDC GLUCOMTR-MCNC: 112 MG/DL (ref 70–130)
GLUCOSE BLDC GLUCOMTR-MCNC: 131 MG/DL (ref 70–130)
GLUCOSE BLDC GLUCOMTR-MCNC: 132 MG/DL (ref 70–130)
GLUCOSE BLDC GLUCOMTR-MCNC: 136 MG/DL (ref 70–130)
GLUCOSE SERPL-MCNC: 113 MG/DL (ref 65–99)
GLUCOSE SERPL-MCNC: 113 MG/DL (ref 65–99)
GLUCOSE SERPL-MCNC: 115 MG/DL (ref 65–99)
HCT VFR BLD AUTO: 28.5 % (ref 34–46.6)
HGB BLD-MCNC: 9.6 G/DL (ref 12–15.9)
IMM GRANULOCYTES # BLD AUTO: 0.01 10*3/MM3 (ref 0–0.05)
IMM GRANULOCYTES NFR BLD AUTO: 0.3 % (ref 0–0.5)
LYMPHOCYTES # BLD AUTO: 0.55 10*3/MM3 (ref 0.7–3.1)
LYMPHOCYTES NFR BLD AUTO: 14.9 % (ref 19.6–45.3)
MCH RBC QN AUTO: 33.3 PG (ref 26.6–33)
MCHC RBC AUTO-ENTMCNC: 33.7 G/DL (ref 31.5–35.7)
MCV RBC AUTO: 99 FL (ref 79–97)
MONOCYTES # BLD AUTO: 0.42 10*3/MM3 (ref 0.1–0.9)
MONOCYTES NFR BLD AUTO: 11.4 % (ref 5–12)
NEUTROPHILS NFR BLD AUTO: 2.64 10*3/MM3 (ref 1.7–7)
NEUTROPHILS NFR BLD AUTO: 71.2 % (ref 42.7–76)
NRBC BLD AUTO-RTO: 1.4 /100 WBC (ref 0–0.2)
PHOSPHATE SERPL-MCNC: 3.9 MG/DL (ref 2.5–4.5)
PLATELET # BLD AUTO: 155 10*3/MM3 (ref 140–450)
PMV BLD AUTO: 10 FL (ref 6–12)
POTASSIUM SERPL-SCNC: 4.2 MMOL/L (ref 3.5–5.2)
RBC # BLD AUTO: 2.88 10*6/MM3 (ref 3.77–5.28)
SODIUM SERPL-SCNC: 134 MMOL/L (ref 136–145)
WBC NRBC COR # BLD AUTO: 3.7 10*3/MM3 (ref 3.4–10.8)

## 2025-07-02 PROCEDURE — 99232 SBSQ HOSP IP/OBS MODERATE 35: CPT | Performed by: NURSE PRACTITIONER

## 2025-07-02 PROCEDURE — 82948 REAGENT STRIP/BLOOD GLUCOSE: CPT

## 2025-07-02 PROCEDURE — 97530 THERAPEUTIC ACTIVITIES: CPT

## 2025-07-02 PROCEDURE — 36415 COLL VENOUS BLD VENIPUNCTURE: CPT | Performed by: INTERNAL MEDICINE

## 2025-07-02 PROCEDURE — 25010000002 DIGOXIN PER 500 MCG: Performed by: NURSE PRACTITIONER

## 2025-07-02 PROCEDURE — 94799 UNLISTED PULMONARY SVC/PX: CPT

## 2025-07-02 PROCEDURE — 85025 COMPLETE CBC W/AUTO DIFF WBC: CPT | Performed by: INTERNAL MEDICINE

## 2025-07-02 PROCEDURE — 80069 RENAL FUNCTION PANEL: CPT | Performed by: INTERNAL MEDICINE

## 2025-07-02 PROCEDURE — 97166 OT EVAL MOD COMPLEX 45 MIN: CPT

## 2025-07-02 PROCEDURE — 71045 X-RAY EXAM CHEST 1 VIEW: CPT

## 2025-07-02 PROCEDURE — 94664 DEMO&/EVAL PT USE INHALER: CPT

## 2025-07-02 PROCEDURE — 80048 BASIC METABOLIC PNL TOTAL CA: CPT | Performed by: INTERNAL MEDICINE

## 2025-07-02 PROCEDURE — 94761 N-INVAS EAR/PLS OXIMETRY MLT: CPT

## 2025-07-02 PROCEDURE — 94760 N-INVAS EAR/PLS OXIMETRY 1: CPT

## 2025-07-02 RX ORDER — DIGOXIN 0.25 MG/ML
250 INJECTION INTRAMUSCULAR; INTRAVENOUS ONCE
Status: COMPLETED | OUTPATIENT
Start: 2025-07-02 | End: 2025-07-02

## 2025-07-02 RX ORDER — METOPROLOL SUCCINATE 100 MG/1
100 TABLET, EXTENDED RELEASE ORAL EVERY 12 HOURS SCHEDULED
Status: DISCONTINUED | OUTPATIENT
Start: 2025-07-02 | End: 2025-07-06

## 2025-07-02 RX ADMIN — BUMETANIDE 1 MG: 1 TABLET ORAL at 20:18

## 2025-07-02 RX ADMIN — BUDESONIDE AND FORMOTEROL FUMARATE DIHYDRATE 2 PUFF: 160; 4.5 AEROSOL RESPIRATORY (INHALATION) at 06:54

## 2025-07-02 RX ADMIN — BUDESONIDE AND FORMOTEROL FUMARATE DIHYDRATE 2 PUFF: 160; 4.5 AEROSOL RESPIRATORY (INHALATION) at 21:07

## 2025-07-02 RX ADMIN — FAMOTIDINE 20 MG: 20 TABLET, FILM COATED ORAL at 20:18

## 2025-07-02 RX ADMIN — METOPROLOL SUCCINATE 100 MG: 100 TABLET, EXTENDED RELEASE ORAL at 20:18

## 2025-07-02 RX ADMIN — MUPIROCIN 1 APPLICATION: 20 OINTMENT TOPICAL at 20:18

## 2025-07-02 RX ADMIN — ACETAMINOPHEN 650 MG: 325 TABLET, FILM COATED ORAL at 02:13

## 2025-07-02 RX ADMIN — ACETAMINOPHEN 650 MG: 325 TABLET, FILM COATED ORAL at 20:23

## 2025-07-02 RX ADMIN — APIXABAN 5 MG: 5 TABLET, FILM COATED ORAL at 08:49

## 2025-07-02 RX ADMIN — SPIRONOLACTONE 25 MG: 25 TABLET ORAL at 08:50

## 2025-07-02 RX ADMIN — MONTELUKAST 10 MG: 10 TABLET, FILM COATED ORAL at 08:50

## 2025-07-02 RX ADMIN — MUPIROCIN 1 APPLICATION: 20 OINTMENT TOPICAL at 08:50

## 2025-07-02 RX ADMIN — DIGOXIN 250 MCG: 0.25 INJECTION INTRAMUSCULAR; INTRAVENOUS at 17:12

## 2025-07-02 RX ADMIN — LEVOTHYROXINE SODIUM 75 MCG: 0.07 TABLET ORAL at 05:33

## 2025-07-02 RX ADMIN — BUMETANIDE 1 MG: 1 TABLET ORAL at 08:50

## 2025-07-02 RX ADMIN — ALLOPURINOL 100 MG: 100 TABLET ORAL at 08:50

## 2025-07-02 RX ADMIN — APIXABAN 5 MG: 5 TABLET, FILM COATED ORAL at 20:18

## 2025-07-02 RX ADMIN — FAMOTIDINE 20 MG: 20 TABLET, FILM COATED ORAL at 08:50

## 2025-07-02 NOTE — PLAN OF CARE
Goal Outcome Evaluation:  Plan of Care Reviewed With: patient           Outcome Evaluation: Patient is 69 year old female presenting to [facility] on 6/27/2025 with reports of progressive weakness overall. Workup reveals hypotension and REBECCA with hyponatremia, admitted to ICU for management. At baseline, patient is independent with ADLs, uses a rollator for functional mobility, and lives alone. Following a comprehensive Occupational Therapy assessment conducted today, the patient demonstrates a decline from their baseline functional status, exhibiting deficits related to balance, strength, tolerance, transfers and mobility that significantly impact independence in activities of daily living. The patient would benefit from skilled Occupational Therapy services to enhance safety and facilitate a return to prior level of independence. J LUIS is recommended upon discharge from the hospital setting as continued rehabilitation is necessary to address functional deficits and support optimal recovery.    Anticipated Discharge Disposition (OT): sub acute care setting

## 2025-07-02 NOTE — CASE MANAGEMENT/SOCIAL WORK
"Continued Stay Note  [facility]     Patient Name: Marilu Avery  MRN: [id number]  Today's Date: 7/2/2025    Admit Date: 6/27/2025    Plan: Refuses snf referral.   Discharge Plan       Row Name 07/02/25 1437       Plan    Plan Refuses snf referral.    Plan Comments Reviewed PT recommendations with patient at bedside. PT recommends snf. Patient refuses snf referral. States, \" I have home therapy coming and I will not go back to rehab\". Asked patient for name of home health and she states she doesn't know. Informed patient of need for name of home health company to resume care. Verbalizes understanding.States, \"well I dont know it so that will have to be it.\" Patient refuses to allow ccp to call son for information at this time.                        Ana Lawrence RN    "

## 2025-07-02 NOTE — PROGRESS NOTES
"   LOS: 5 days     Chief Complaint/ Reason for encounter: CKD, REBECCA, CHF and diuretic management    Subjective   06/30/25 : She is feeling better denies complaints  Complaining of some orthopnea but currently not requiring any supplemental oxygen.  Right and left heart cath tomorrow    7/1: She feels better today, she states she is less short of breath, currently on room air  Still with some orthopnea, no chest pain  Good appetite no nausea or vomiting.  No edema    7/2 no acute events. Patient states she cannot do the CT scan since she is not able to do lay flat.     Medical history reviewed:  History of Present Illness    Subjective    History taken from: Chart and patient/family as able    Vital Signs  Temp:  [97.3 °F (36.3 °C)-97.9 °F (36.6 °C)] 97.3 °F (36.3 °C)  Heart Rate:  [] 78  Resp:  [19-22] 19  BP: (110-148)/(83-88) 121/88       Wt Readings from Last 1 Encounters:   07/01/25 0100 109 kg (239 lb 6.7 oz)   06/28/25 1133 106 kg (233 lb)   06/28/25 0548 106 kg (233 lb 7.5 oz)   06/27/25 1929 106 kg (233 lb 7.5 oz)   06/27/25 1236 104 kg (229 lb)       Objective:  Vital signs: (most recent): Blood pressure 121/88, pulse 78, temperature 97.3 °F (36.3 °C), temperature source Oral, resp. rate 19, height 177.8 cm (70\"), weight 109 kg (239 lb 6.7 oz), SpO2 98%, not currently breastfeeding.              Objective:  General Appearance:  Comfortable, chronically ill, obese-appearing, no acute distress   HEENT: Mucous membranes moist, atraumatic  Lungs:  Normal effort and normal respiratory rate.  Breath sounds clear to auscultation: No rhonchi/Rales.  No  respiratory distress.   Heart:  S1, S2 normal.   Abdomen: Abdomen is soft, nontender/nondistended  Extremities: Trace edema of bilateral lower extremities  Skin:  Warm and dry       Results Review:    Intake/Output:     Intake/Output Summary (Last 24 hours) at 7/2/2025 0816  Last data filed at 7/2/2025 0547  Gross per 24 hour   Intake --   Output 1300 ml   Net " -1300 ml         DATA:  Radiology and Labs:  The following labs independently reviewed by me. Additional labs ordered for tomorrow a.m.  Interval notes, chart personally reviewed by me.   Old records independently reviewed showing history of CKD, CHF  Discussed with patient herself at bedside    Risk/ complexity of medical care/ medical decision making moderate risk of diuretic management      Labs:   Recent Results (from the past 24 hours)   POC Glucose Once    Collection Time: 07/01/25 11:50 AM    Specimen: Blood   Result Value Ref Range    Glucose 130 70 - 130 mg/dL   Basic Metabolic Panel    Collection Time: 07/01/25 12:19 PM    Specimen: Blood   Result Value Ref Range    Glucose 123 (H) 65 - 99 mg/dL    BUN 41.0 (H) 8.0 - 23.0 mg/dL    Creatinine 1.44 (H) 0.57 - 1.00 mg/dL    Sodium 137 136 - 145 mmol/L    Potassium 4.6 3.5 - 5.2 mmol/L    Chloride 106 98 - 107 mmol/L    CO2 16.4 (L) 22.0 - 29.0 mmol/L    Calcium 9.4 8.6 - 10.5 mg/dL    BUN/Creatinine Ratio 28.5 (H) 7.0 - 25.0    Anion Gap 14.6 5.0 - 15.0 mmol/L    eGFR 39.7 (L) >60.0 mL/min/1.73   POC Glucose Once    Collection Time: 07/01/25  4:30 PM    Specimen: Blood   Result Value Ref Range    Glucose 123 70 - 130 mg/dL   Basic Metabolic Panel    Collection Time: 07/01/25  6:08 PM    Specimen: Blood   Result Value Ref Range    Glucose 122 (H) 65 - 99 mg/dL    BUN 46.0 (H) 8.0 - 23.0 mg/dL    Creatinine 1.54 (H) 0.57 - 1.00 mg/dL    Sodium 137 136 - 145 mmol/L    Potassium 4.6 3.5 - 5.2 mmol/L    Chloride 106 98 - 107 mmol/L    CO2 17.2 (L) 22.0 - 29.0 mmol/L    Calcium 9.0 8.6 - 10.5 mg/dL    BUN/Creatinine Ratio 29.9 (H) 7.0 - 25.0    Anion Gap 13.8 5.0 - 15.0 mmol/L    eGFR 36.6 (L) >60.0 mL/min/1.73   POC Glucose Once    Collection Time: 07/02/25 12:42 AM    Specimen: Blood   Result Value Ref Range    Glucose 112 70 - 130 mg/dL   POC Glucose Once    Collection Time: 07/02/25  6:14 AM    Specimen: Blood   Result Value Ref Range    Glucose 132 (H) 70 -  130 mg/dL       Radiology:  Pertinent radiology studies were reviewed as described above    Medications have been reviewed separately in chart review medication tab    ASSESSMENT:  REBECCA: Fairly stable post cath, may be near her new baseline    Hypotension, improved with fluids  Hypovolemia, improved after hydration  Chronic diastolic heart failure, euvolemic today but with orthopnea  Elevated troponin  Elevated lactate, likely due to hypotension with organ hypoperfusion, rule out acute infection  Immobility  Atrial fibrillation on anticoagulation with Eliquis  New renal mass        DISCUSSION/PLAN:   Cr stable today around 1.3-1.4. possibly this is her new baseline.   Normal coronaries on left heart cath, elevated filling pressures on right heart cath  Has been started on po bumex. UOP 1.3 liters but intake not recorded, so hard to day if she is diuresing well. Weight is up from a couple of days ago, but his is bed scale.   Strict ins/outs. Daily weights, standing scale if possible.     Will plan for CT with and without  evaluate renal mass. But I guess will need to wait until she is no longer orthopneic, she states its due to her hernia.   Ok to restart low dose ARB.   Will follow        Molly Laurent MD  [facility]   Office phone number: [phone]  Answering service phone number: [phone]    07/02/25  08:16 EDT

## 2025-07-02 NOTE — PROGRESS NOTES
Name: Marilu Avery ADMIT: 2025   : 1955  PCP: Esperanza Melton APRN    MRN: [id number] LOS: 5 days   AGE/SEX: 69 y.o. female  ROOM: [facility]     Subjective   Subjective   Resting in bed. No family at present. She states she is feeling okay. Denies any chest pain. Breathing about the same. States she has chronic orthopnea from the pressure from her ventral hernia. Could not tolerate CT scan ordered by nephrology. She denies any nausea or vomiting. Eating okay. BM this AM. She states she did get up in the chair earlier but had an accident as her pure wick was not set up.      Objective   Objective   Vital Signs  Temp:  [97.3 °F (36.3 °C)-97.9 °F (36.6 °C)] 97.3 °F (36.3 °C)  Heart Rate:  [] 78  Resp:  [19-22] 19  BP: (121-134)/(88-90) 134/90  SpO2:  [96 %-98 %] 98 %  on   ;   Device (Oxygen Therapy): room air  Body mass index is 34.35 kg/m².    Physical Exam  Vitals and nursing note reviewed.   Constitutional:       General: She is not in acute distress.     Appearance: She is ill-appearing. She is not toxic-appearing.   Cardiovascular:      Rate and Rhythm: Tachycardia present. Rhythm irregular.      Pulses: Normal pulses.   Pulmonary:      Effort: Pulmonary effort is normal. No respiratory distress.      Comments: Diminished, overall clear  Abdominal:      General: Bowel sounds are normal. There is no distension.      Palpations: Abdomen is soft.      Tenderness: There is no abdominal tenderness.      Hernia: A hernia is present.   Musculoskeletal:         General: Swelling (mild 2+ BLE) present. Normal range of motion.   Skin:     General: Skin is warm and dry.      Findings: No bruising.   Neurological:      General: No focal deficit present.      Mental Status: She is alert and oriented to person, place, and time.      Sensory: No sensory deficit.      Motor: Weakness present.      Coordination: Coordination normal.   Psychiatric:         Mood and Affect: Mood normal.         Behavior:  Behavior normal.     Results Review:       I reviewed the patient's new clinical results.  Results from last 7 days   Lab Units 07/02/25  0752 07/01/25  0552 06/30/25  1418 06/30/25  1413 06/30/25  0615 06/29/25  0549   WBC 10*3/mm3 3.70 3.83  --   --  4.05 4.11   HEMOGLOBIN g/dL 9.6* 9.7*  --   --  9.4* 9.8*   HEMOGLOBIN, POC g/dL  --   --  9.9* 9.9*  --   --    PLATELETS 10*3/mm3 155 154  --   --  125* 117*     Results from last 7 days   Lab Units 07/02/25  0752 07/01/25  1808 07/01/25  1219 07/01/25  0552   SODIUM mmol/L 134*  134*  134* 137 137 136  136   POTASSIUM mmol/L 4.2  4.2  4.2 4.6 4.6 4.7  4.7   CHLORIDE mmol/L 104  105  105 106 106 108*  108*   CO2 mmol/L 14.5*  15.0*  15.0* 17.2* 16.4* 14.9*  14.9*   BUN mg/dL 47.0*  46.0*  46.0* 46.0* 41.0* 42.0*  42.0*   CREATININE mg/dL 1.40*  1.33*  1.33* 1.54* 1.44* 1.24*  1.24*   GLUCOSE mg/dL 115*  113*  113* 122* 123* 113*  113*   Estimated Creatinine Clearance: 54.1 mL/min (A) (by C-G formula based on SCr of 1.33 mg/dL (H)).  Results from last 7 days   Lab Units 07/02/25  0752 07/01/25  0552 06/30/25  0615 06/29/25  0549 06/28/25  0606 06/27/25  1244   ALBUMIN g/dL 3.3* 3.1* 2.9* 3.2*   < > 4.0   BILIRUBIN mg/dL  --   --   --   --   --  0.9   ALK PHOS U/L  --   --   --   --   --  86   AST (SGOT) U/L  --   --   --   --   --  21   ALT (SGPT) U/L  --   --   --   --   --  12    < > = values in this interval not displayed.     Results from last 7 days   Lab Units 07/02/25  0752 07/01/25  1808 07/01/25  1219 07/01/25  0552 06/30/25  1850 06/30/25  0615 06/29/25  1153 06/29/25  0549 06/27/25  1541 06/27/25  1244   CALCIUM mg/dL 9.0  9.2  9.2 9.0 9.4 9.2  9.2   < > 9.0  9.0   < > 8.9  8.9   < > 9.5   ALBUMIN g/dL 3.3*  --   --  3.1*  --  2.9*  --  3.2*   < > 4.0   MAGNESIUM mg/dL  --   --   --   --   --   --   --   --   --  1.8   PHOSPHORUS mg/dL 3.9  --   --  3.9  --  3.2  --  2.6   < >  --     < > = values in this interval not displayed.      Results from last 7 days   Lab Units 06/27/25  1544 06/27/25  1244   PROCALCITONIN ng/mL  --  0.32*   LACTATE mmol/L 1.7 2.5*     Glucose   Date/Time Value Ref Range Status   07/02/2025 1217 131 (H) 70 - 130 mg/dL Final   07/02/2025 0614 132 (H) 70 - 130 mg/dL Final   07/02/2025 0042 112 70 - 130 mg/dL Final   07/01/2025 1630 123 70 - 130 mg/dL Final   07/01/2025 1150 130 70 - 130 mg/dL Final   07/01/2025 0530 120 70 - 130 mg/dL Final   06/30/2025 2336 130 70 - 130 mg/dL Final       allopurinol, 100 mg, Oral, Daily  apixaban, 5 mg, Oral, BID  budesonide-formoterol, 2 puff, Inhalation, BID - RT  bumetanide, 1 mg, Oral, BID  famotidine, 20 mg, Oral, BID  levothyroxine, 75 mcg, Oral, Q AM  metoprolol succinate XL, 100 mg, Oral, Nightly  metoprolol succinate XL, 50 mg, Oral, Once  montelukast, 10 mg, Oral, Daily  mupirocin, 1 Application, Each Nare, BID  polyethylene glycol, 17 g, Oral, Daily  spironolactone, 25 mg, Oral, Daily       Diet: Cardiac; Healthy Heart (2-3 Na+); Fluid Consistency: Thin (IDDSI 0)       Assessment/Plan     Active Hospital Problems    Diagnosis  POA    **Hypotension [I95.9]  Yes    Hyponatremia [E87.1]  Unknown    Takotsubo cardiomyopathy [I51.81]  Unknown    Obesity (BMI 30-39.9) [E66.9]  Yes    Elevated troponin [R79.89]  Yes    Chronic alcohol use [F10.90]  Yes    Chronic anticoagulation [Z79.01]  Not Applicable    Tobacco abuse [Z72.0]  Yes    COPD (chronic obstructive pulmonary disease) [J44.9]  Yes    REBECCA (acute kidney injury) [N17.9]  Yes    Atrial fibrillation, persistent [I48.19]  Yes    HTN (hypertension) [I10]  Yes    HLD (hyperlipidemia) [E78.5]  Yes    Ventral hernia [K43.9]  Yes      Resolved Hospital Problems   No resolved problems to display.     Ms. Avery is a 69 y.o. male with history of asthma, atrial fibrillation on chronic anticoagulation and tobacco abuse who presented to the hospital with complaints of weakness and a fall.  She has been primarily homebound for the last  several months and found to have an elevated troponin and hypotension on arrival.  She had an REBECCA as well and initially placed in the ICU.  She received a fluid bolus and her sodium was 125 with creatinine of 2.2.  Nephrology was consulted.  Her troponin was 266 with elevation to 729 and EKG revealed atrial fibrillation.  There were concerns for demand ischemia but no ACS/type I MI.  Cardiology was consulted and cardiac cath initially deferred.  Her blood pressure stabilized quickly and she was cleared to move out of the ICU after 24 hours.  She did not require IV pressors.     Hypotension  Elevated troponin  Takotsubo cardiomyopathy  - Cardiology managing.   - Hypotension resolved with fluids.   - Status post heart catheterization on 6/30 with normal coronaries with elevated wedge with associated pulmonary hypertension.  - Echocardiogram with newly reduced LVEF at 30 to 35% with extensive apical wall motion abnormality strongly suggestive of stress-induced cardiomyopathy.  - Plans for medical management, beta-blocker increased, ARB added today.   - Spironolactone resumed     REBECCA  Hyponatremia  - Status post IV fluids, REBECCA felt secondary to hypotension from hypovolemia  - Nephrology managing and now receiving diuresis.   - Creatinine has been stable in the 1.2-1.4 range for a few days  - Sodium better, CO2 remains low around 15  - Renal ultrasound with possible new mass of left upper kidney, nephrology planned CT for further evaluation now that renal function improved. However, patient cannot tolerate laying flat for this d/t her chronic hernia.     Atrial fibrillation  Chronic anticoagulation  - Beta-blocker increased as above.  - Continues on home Eliquis     Chronic alcohol use   - Patient states she drinks a glass of alcohol daily  - No signs of withdrawal this admission     COPD- home Symbicort, on RA  Tobacco abuse- reportedly in remission.     Obesity  Ventral hernia  -BMI 33 & large reducible ventral hernia  POA, has been evaluated by Dr. Aguilera in years past     Discussed with patient.     She is refusing SNF- last PT note 7/1 recommended SNF. Possibly home in 1-2 days I would think if cleared by renal/cards.     VTE Prophylaxis - Eliquis (home med)  Code Status - Full code  Disposition - Anticipate discharge as above    DEBBI Hagan  [facility]  07/02/25  13:51 EDT    Addendum: Notified by nursing via chat that patient feeling anxious and like she cannot catch her breath. Oxygen remains normal on RA. She has been intermittently tachycardic in afib and nursing is going to notify cardiology about rate control. She has been on her home Eliquis and recent cath with normal coronaries. I repeated CXR which was negative for vascular congestion with only small atelectasis or infiltrate at the bases. Add IS. She does have some history of alcohol consumption of 1 glass of wine daily (but she has been here since 6/27 and ethanol negative on arrival). No other withdrawal symptoms besides anxiety reported at this time. Will ask ACCESS to see her and monitor. Has PRN hydroxyzine for anxiety but I am hesitant to initiate benzos at this time. Will continue to monitor.

## 2025-07-02 NOTE — THERAPY EVALUATION
Patient Name: Marilu Avery  : 1955    MRN: [id number]                              Today's Date: 2025       Admit Date: 2025    Visit Dx:     ICD-10-CM ICD-9-CM   1. Hypotension, unspecified hypotension type  I95.9 458.9   2. Near syncope  R55 780.2   3. Acute kidney injury  N17.9 584.9   4. Elevated troponin  R79.89 790.6   5. Longstanding persistent atrial fibrillation  I48.11 427.31   6. Coagulopathy: Eliquis induced  D68.9 286.9   7. Decreased activities of daily living (ADL)  Z78.9 V49.89     Patient Active Problem List   Diagnosis    Incisional hernia, without obstruction or gangrene    Ventral hernia    Atrial fibrillation, persistent    HLD (hyperlipidemia)    HTN (hypertension)    LISA (obstructive sleep apnea)    REBECCA (acute kidney injury)    Chronic anticoagulation    SBO (small bowel obstruction)    Tobacco abuse    Morbid obesity with BMI of 40.0-44.9, adult    COPD (chronic obstructive pulmonary disease)    Hypopotassemia    Gout    Chronic anticoagulation    Prolonged Q-T interval on ECG    REBECCA (acute kidney injury)    Acute on chronic diastolic (congestive) heart failure    Hypotension    Obesity (BMI 30-39.9)    Elevated troponin    Chronic alcohol use    Hyponatremia    Takotsubo cardiomyopathy     Past Medical History:   Diagnosis Date    Arthritis     Asthma     Atrial fibrillation     Colon polyps     hyperplastic rectal polyp    Hyperlipidemia     Hypertension     LISA on CPAP     Sleep apnea      Past Surgical History:   Procedure Laterality Date    BACK SURGERY N/A     CARDIAC CATHETERIZATION N/A 2025    Procedure: Right and Left Heart Cath;  Surgeon: Julien Perez MD;  Location: [facility] INVASIVE LOCATION;  Service: Cardiovascular;  Laterality: N/A;    COLONOSCOPY N/A 10/22/2015    Rectal polyp-Dr. Elías Keating    HYSTERECTOMY N/A     LAPAROSCOPIC CHOLECYSTECTOMY N/A 2010    Dr. Heath Whitlock    OOPHORECTOMY  2001    REPLACEMENT TOTAL KNEE Left  06/22/2015    Dr. Yo Aguayo    REPLACEMENT TOTAL KNEE Right 02/26/2015    Dr. Yo Aguayo    VENTRAL HERNIA REPAIR N/A 10/22/2015    Open reduction of incarcerated ventral hernia with layered closure-Dr. Elías Keating    VENTRAL/INCISIONAL HERNIA REPAIR N/A 6/14/2021    Procedure: OPEN VENTRAL/INCISIONAL HERNIA REPAIR WITH MESH AND APPENDECTOMY;  Surgeon: Arnulfo Leonard MD;  Location: [facility] OR;  Service: General;  Laterality: N/A;      General Information       Row Name 07/02/25 1515          OT Time and Intention    Document Type evaluation  -ES     Mode of Treatment occupational therapy  -ES     Patient Effort good  -ES       Row Name 07/02/25 1515          General Information    Patient Profile Reviewed yes  -ES     Prior Level of Function independent:;ADL's;all household mobility  Patient reports she is independent with ADLs at baseline. Rollator for mobility. Walk in shower with chair. No home O2 use. 2-3 falls in last 3 months. Patient reports she is current with HH OT/PT.  -ES     Existing Precautions/Restrictions fall  -ES     Barriers to Rehab none identified  -ES       Row Name 07/02/25 1515          Living Environment    Current Living Arrangements home  -ES     People in Home alone  -ES       Row Name 07/02/25 1515          Home Main Entrance    Number of Stairs, Main Entrance none  -ES       Row Name 07/02/25 1515          Stairs Within Home, Primary    Number of Stairs, Within Home, Primary none  -ES       Row Name 07/02/25 1515          Cognition    Orientation Status (Cognition) oriented x 3  patient pleasant and cooperative, agreeable to therapy evaluation  -ES       Row Name 07/02/25 1515          Safety Issues/Impairments Affecting Functional Mobility    Impairments Affecting Function (Mobility) endurance/activity tolerance;strength;balance  -ES               User Key  (r) = Recorded By, (t) = Taken By, (c) = Cosigned By      Initials Name Provider Type    ES Jeaneth Mckeon, NAYELI/LATRICIA,  CSRS Occupational Therapist                     Mobility/ADL's       Row Name 07/02/25 1517          Bed Mobility    Bed Mobility supine-sit  -ES     Supine-Sit [place] (Bed Mobility) minimum assist (75% patient effort);1 person assist  -ES       Row Name 07/02/25 1517          Transfers    Transfers sit-stand transfer  -ES       Row Name 07/02/25 1517          Sit-Stand Transfer    Sit-Stand [place] (Transfers) minimum assist (75% patient effort)  -ES     Assistive Device (Sit-Stand Transfers) walker, front-wheeled  -ES       Row Name 07/02/25 1517          Functional Mobility    Functional Mobility- Ind. Level minimum assist (75% patient effort)  -ES     Functional Mobility- Device walker, front-wheeled  -ES     Functional Mobility- Comment Patient performs short distance mobility from edge of bed, taking 3 steps to recliner. OT encouraged further mobilization, however patient declined secondary to fatigue  -ES       Row Name 07/02/25 1517          Activities of Daily Living    BADL Assessment/Intervention lower body dressing;grooming  -ES       Row Name 07/02/25 1517          Lower Body Dressing Assessment/Training    [place] Level (Lower Body Dressing) lower body dressing skills;don;socks;maximum assist (25% patient effort)  -ES     Position (Lower Body Dressing) edge of bed sitting  -ES       Row Name 07/02/25 1517          Grooming Assessment/Training    [place] Level (Grooming) grooming skills;wash face, hands;set up  -ES     Position (Grooming) supported sitting  -ES               User Key  (r) = Recorded By, (t) = Taken By, (c) = Cosigned By      Initials Name Provider Type    ES Jeaneth Mckeon, OTR/L, CSRS Occupational Therapist                   Obj/Interventions       Row Name 07/02/25 1526          Range of Motion Comprehensive    General Range of Motion bilateral upper extremity ROM WNL  -ES       Row Name 07/02/25 1526          Strength Comprehensive (MMT)    General Manual Muscle  Testing (MMT) Assessment upper extremity strength deficits identified;lower extremity strength deficits identified  -ES     Comment, General Manual Muscle Testing (MMT) Assessment generalized weakness  -ES       Row Name 07/02/25 1526          Upper Extremity (Manual Muscle Testing)    Comment, MMT: Upper Extremity BUEs 4-/5  -ES       Row Name 07/02/25 1526          Motor Skills    Motor Skills functional endurance  -ES     Functional Endurance poor  -ES       Row Name 07/02/25 1526          Balance    Balance Assessment sitting dynamic balance;standing dynamic balance  -ES     Dynamic Sitting Balance standby assist  -ES     Position, Sitting Balance unsupported;sitting edge of bed  -ES     Dynamic Standing Balance minimal assist  -ES     Position/Device Used, Standing Balance supported;walker, front-wheeled  -ES               User Key  (r) = Recorded By, (t) = Taken By, (c) = Cosigned By      Initials Name Provider Type    ES Jeaneth Mckeon, OTR/L, CSRS Occupational Therapist                   Goals/Plan       Row Name 07/02/25 1530          Bed Mobility Goal 1 (OT)    Activity/Assistive Device (Bed Mobility Goal 1, OT) bed mobility activities, all  -ES     [place] Level/Cues Needed (Bed Mobility Goal 1, OT) independent  -ES     Time Frame (Bed Mobility Goal 1, OT) short term goal (STG);2 weeks  -ES     Progress/Outcomes (Bed Mobility Goal 1, OT) new goal  -ES       Row Name 07/02/25 1530          Transfer Goal 1 (OT)    Activity/Assistive Device (Transfer Goal 1, OT) transfers, all  -ES     [place] Level/Cues Needed (Transfer Goal 1, OT) modified independence  -ES     Time Frame (Transfer Goal 1, OT) short term goal (STG);2 weeks  -ES     Progress/Outcome (Transfer Goal 1, OT) new goal  -ES       Row Name 07/02/25 1530          Toileting Goal 1 (OT)    Activity/Device (Toileting Goal 1, OT) toileting skills, all  -ES     [place] Level/Cues Needed (Toileting Goal 1, OT) independent  -ES     Time  Frame (Toileting Goal 1, OT) short term goal (STG);2 weeks  -ES     Progress/Outcome (Toileting Goal 1, OT) new goal  -ES       Row Name 07/02/25 1530          Grooming Goal 1 (OT)    Activity/Device (Grooming Goal 1, OT) grooming skills, all  -ES     [place] (Grooming Goal 1, OT) independent  -ES     Time Frame (Grooming Goal 1, OT) short term goal (STG);2 weeks  -ES     Progress/Outcome (Grooming Goal 1, OT) new goal  -ES       Row Name 07/02/25 1530          Problem Specific Goal 1 (OT)    Problem Specific Goal 1 (OT) Patient will demonstrate fair plus graded dynamic balance in preperation for independent ADL routine completion at time of discharge  -ES     Time Frame (Problem Specific Goal 1, OT) short term goal (STG);2 weeks  -ES     Progress/Outcome (Problem Specific Goal 1, OT) new goal  -ES       Row Name 07/02/25 1539          Therapy Assessment/Plan (OT)    Planned Therapy Interventions (OT) activity tolerance training;BADL retraining;functional balance retraining;occupation/activity based interventions;patient/caregiver education/training;ROM/therapeutic exercise;strengthening exercise;transfer/mobility retraining  -ES               User Key  (r) = Recorded By, (t) = Taken By, (c) = Cosigned By      Initials Name Provider Type    Jeaneth Sanchez OTR/L, CSRS Occupational Therapist                   Clinical Impression       Row Name 07/02/25 1524          Pain Assessment    Pretreatment Pain Rating 0/10 - no pain  -ES     Posttreatment Pain Rating 0/10 - no pain  -ES     Pre/Posttreatment Pain Comment patient with no complaints of pain during evaluation  -ES       Row Name 07/02/25 1520          Plan of Care Review    Plan of Care Reviewed With patient  -ES     Outcome Evaluation Patient is 69 year old female presenting [facility] with reports of progressive weakness overall. Workup reveals hypotension and REBECCA with hyponatremia, admitted to ICU for management. At  baseline, patient is independent with ADLs, uses a rollator for functional mobility, and lives alone. Following a comprehensive Occupational Therapy assessment conducted today, the patient demonstrates a decline from their baseline functional status, exhibiting deficits related to balance, strength, tolerance, transfers and mobility that significantly impact independence in activities of daily living. The patient would benefit from skilled Occupational Therapy services to enhance safety and facilitate a return to prior level of independence. J LUIS is recommended upon discharge from the hospital setting as continued rehabilitation is necessary to address functional deficits and support optimal recovery.  -ES       Row Name 07/02/25 1526          Therapy Assessment/Plan (OT)    Rehab Potential (OT) good  -ES     Criteria for Skilled Therapeutic Interventions Met (OT) yes;meets criteria;skilled treatment is necessary  -ES     Therapy Frequency (OT) 5 times/wk  -ES       Row Name 07/02/25 1526          Therapy Plan Review/Discharge Plan (OT)    Anticipated Discharge Disposition (OT) sub acute care setting  -ES       Row Name 07/02/25 1526          Positioning and Restraints    Pre-Treatment Position in bed  -ES     Post Treatment Position chair  -ES     In Chair reclined;call light within reach;encouraged to call for assist;exit alarm on  -ES               User Key  (r) = Recorded By, (t) = Taken By, (c) = Cosigned By      Initials Name Provider Type    ES Jeaneth Mckeon, OTR/L, CSRS Occupational Therapist                   Outcome Measures       Row Name 07/02/25 1532          How much help from another is currently needed...    Putting on and taking off regular lower body clothing? 2  -ES     Bathing (including washing, rinsing, and drying) 2  -ES     Toileting (which includes using toilet bed pan or urinal) 2  -ES     Putting on and taking off regular upper body clothing 3  -ES     Taking care of personal grooming (such  as brushing teeth) 3  -ES     Eating meals 4  -ES     AM-PAC 6 Clicks Score (OT) 16  -ES       Row Name 07/02/25 1532          Modified [place] Scale    Modified [place] Scale 0 - No Symptoms at all.  -ES       Row Name 07/02/25 1532          Functional Assessment    Outcome Measure Options AM-PAC 6 Clicks Daily Activity (OT);Modified [place]  -ES               User Key  (r) = Recorded By, (t) = Taken By, (c) = Cosigned By      Initials Name Provider Type    Jeaneth Sanchez, OTR/L, CSRS Occupational Therapist                      OT Recommendation and Plan  Planned Therapy Interventions (OT): activity tolerance training, BADL retraining, functional balance retraining, occupation/activity based interventions, patient/caregiver education/training, ROM/therapeutic exercise, strengthening exercise, transfer/mobility retraining  Therapy Frequency (OT): 5 times/wk  Plan of Care Review  Plan of Care Reviewed With: patient  Outcome Evaluation: Patient is 69 year old female presenting to [facility] on 6/27/2025 with reports of progressive weakness overall. Workup reveals hypotension and REBECCA with hyponatremia, admitted to ICU for management. At baseline, patient is independent with ADLs, uses a rollator for functional mobility, and lives alone. Following a comprehensive Occupational Therapy assessment conducted today, the patient demonstrates a decline from their baseline functional status, exhibiting deficits related to balance, strength, tolerance, transfers and mobility that significantly impact independence in activities of daily living. The patient would benefit from skilled Occupational Therapy services to enhance safety and facilitate a return to prior level of independence. J LUIS is recommended upon discharge from the hospital setting as continued rehabilitation is necessary to address functional deficits and support optimal recovery.     Time Calculation:         Time Calculation- OT       Row Name  07/02/25 1533             Time Calculation- OT    OT Start Time 1009  -ES      OT Stop Time 1026  -ES      OT Time Calculation (min) 17 min  -ES      Total Timed Code Minutes- OT 10 minute(s)  -ES      OT Received On 07/02/25  -ES      OT - Next Appointment 07/03/25  -ES      OT Goal Re-Cert Due Date 07/16/25  -ES         Timed Charges    00580 - OT Therapeutic Activity Minutes 10  -ES         Untimed Charges    OT Eval/Re-eval Minutes 7  -ES         Total Minutes    Timed Charges Total Minutes 10  -ES      Untimed Charges Total Minutes 7  -ES       Total Minutes 17  -ES                User Key  (r) = Recorded By, (t) = Taken By, (c) = Cosigned By      Initials Name Provider Type    ES Jeaneth Mckeon OTR/L, CSRS Occupational Therapist                  Therapy Charges for Today       Code Description Service Date Service Provider Modifiers Qty    95874693852 HC OT THERAPEUTIC ACT EA 15 MIN 7/2/2025 Jeaneth Mckeon OTR/L, CSRS GO 1    [id number] HC OT EVAL MOD COMPLEXITY 3 7/2/2025 Jeaneth Mckeon OTR/L, CSRS GO 1                 Jeaneth Mckeon OTR/L, CSRS  7/2/2025

## 2025-07-02 NOTE — PLAN OF CARE
Problem: Adult Inpatient Plan of Care  Goal: Plan of Care Review  Outcome: Progressing  Goal: Patient-Specific Goal (Individualized)  Outcome: Progressing  Goal: Absence of Hospital-Acquired Illness or Injury  Outcome: Progressing  Intervention: Identify and Manage Fall Risk  Description: Perform standard risk assessment on admission using a validated tool or comprehensive approach appropriate to the patient; reassess fall risk frequently, with change in status or transfer to another level of care.Communicate risk to interprofessional healthcare team; ensure fall risk visible cue.Determine need for increased observation, equipment and environmental modification, as well as use of supportive, nonskid footwear.Adjust safety measures to individual needs and identified risk factors.Reinforce the importance of active participation with fall risk prevention, safety, and physical activity with the patient and family.Perform regular intentional rounding to assess need for position change, pain assessment and personal needs, including assistance with toileting.  Recent Flowsheet Documentation  Taken 7/2/2025 0600 by Johanne Gonzalez RN  Safety Promotion/Fall Prevention: safety round/check completed  Taken 7/2/2025 0400 by Johanne Gonzalez RN  Safety Promotion/Fall Prevention: safety round/check completed  Taken 7/2/2025 0213 by Johanne Gonzalez RN  Safety Promotion/Fall Prevention: safety round/check completed  Taken 7/2/2025 0000 by Johanne Gonzalez RN  Safety Promotion/Fall Prevention: safety round/check completed  Taken 7/1/2025 2200 by Johanne Gonzalez RN  Safety Promotion/Fall Prevention: safety round/check completed  Taken 7/1/2025 2000 by Johanne Gonzalez RN  Safety Promotion/Fall Prevention: safety round/check completed  Intervention: Prevent Skin Injury  Description: Perform a screening for skin injury risk, such as pressure or moisture-associated skin damage on admission and at regular intervals  throughout hospital stay.Keep all areas of skin (especially folds) clean and dry.Maintain adequate skin hydration.Relieve and redistribute pressure and protect bony prominences and skin at risk for injury; implement measures based on patient-specific risk factors.Match turning and repositioning schedule to clinical condition.Encourage weight shift frequently; assist with reposition if unable to complete independently.Float heels off bed; avoid pressure on the Achilles tendon.Keep skin free from extended contact with medical devices.Optimize nutrition and hydration.Encourage functional activity and mobility, as early as tolerated.Use aids (e.g., slide boards, mechanical lift) during transfer.  Recent Flowsheet Documentation  Taken 7/1/2025 2000 by Johanne Gonzalez RN  Body Position:   turned   log-rolled   supine   weight shifting  Skin Protection: incontinence pads utilized  Intervention: Prevent Infection  Description: Maintain skin and mucous membrane integrity; promote hand, oral and pulmonary hygiene.Optimize fluid balance, nutrition, sleep and glycemic control to maximize infection resistance.Identify potential sources of infection early to prevent or mitigate progression of infection (e.g., wound, lines, devices).Evaluate ongoing need for invasive devices; remove promptly when no longer indicated.Review vaccination status.  Recent Flowsheet Documentation  Taken 7/2/2025 0600 by Johanne Gonzalez RN  Infection Prevention: environmental surveillance performed  Taken 7/2/2025 0400 by Johanne Gonzalez RN  Infection Prevention: environmental surveillance performed  Taken 7/2/2025 0213 by Johanne Gonzalez RN  Infection Prevention: environmental surveillance performed  Taken 7/2/2025 0000 by Johanne Goznalez RN  Infection Prevention: environmental surveillance performed  Taken 7/1/2025 2200 by Johanne Gonzalez RN  Infection Prevention: environmental surveillance performed  Taken 7/1/2025 2000 by Carlos  ANDRADE Whiting  Infection Prevention: environmental surveillance performed  Goal: Optimal Comfort and Wellbeing  Outcome: Progressing  Intervention: Monitor Pain and Promote Comfort  Description: Assess pain level, treatment efficacy and patient response at regular intervals using a consistent pain scale.Consider the presence and impact of preexisting chronic pain.Encourage patient and caregiver involvement in pain assessment, interventions and safety measures.Promote activity; balance with sleep and rest to enhance healing.  Recent Flowsheet Documentation  Taken 7/2/2025 0600 by Johanne Gonzalez RN  Pain Management Interventions: care clustered  Taken 7/2/2025 0400 by Johanne Gonzalez RN  Pain Management Interventions: care clustered  Taken 7/2/2025 0213 by Johanne Gonzalez RN  Pain Management Interventions: care clustered  Taken 7/2/2025 0000 by Johanne Gonzalez RN  Pain Management Interventions: care clustered  Taken 7/1/2025 2200 by Johanne Gonzalez RN  Pain Management Interventions: care clustered  Taken 7/1/2025 2000 by Johanne Gonzalez RN  Pain Management Interventions: care clustered  Intervention: Provide Person-Centered Care  Description: Use a family-focused approach to care; encourage support system presence and participation.Develop trust and rapport by proactively providing information, encouraging questions, addressing concerns and offering reassurance.Acknowledge emotional response to hospitalization.Recognize and utilize personal coping strategies and strengths; develop goals via shared decision-making.Honor spiritual and cultural preferences.  Recent Flowsheet Documentation  Taken 7/1/2025 2000 by Johanne Gonzalez RN  Trust Relationship/Rapport:   care explained   choices provided   questions answered   questions encouraged   reassurance provided  Goal: Readiness for Transition of Care  Outcome: Progressing     Problem: Fall Injury Risk  Goal: Absence of Fall and Fall-Related  Injury  Outcome: Progressing  Intervention: Identify and Manage Contributors  Description: Develop a fall prevention plan, considering patient-centered interventions and family/caregiver involvement; identify and address patient's facilitators and barriers.Provide reorientation, appropriate sensory stimulation and routines with changes in mental status to decrease risk of fall.Promote use of personal vision and auditory aids.Assess assistance level required for safe and effective self-care; provide support as needed, such as toileting and mobilization. For age 65 and older, implement timed toileting with assistance.Encourage physical activity, such as performance of mobility and self-care at highest level of patient ability, multicomponent exercise program and provision of appropriate assistive devices.If fall occurs, assess the severity of injury; implement fall injury protocol. Determine the cause and revise fall injury prevention plan.Regularly review and advocate for medication adjustment to decrease fall risk; consider administration times, polypharmacy and age.Balance adequate pain management with potential for oversedation.  Recent Flowsheet Documentation  Taken 7/1/2025 2000 by Johanne Gonzalez RN  Self-Care Promotion: independence encouraged  Intervention: Promote Injury-Free Environment  Description: Provide a safe, barrier-free environment that encourages independent activity.Keep care area uncluttered and well-lighted.Determine need for increased observation or monitoring.Avoid use of devices that minimize mobility, such as restraints or indwelling urinary catheter.  Recent Flowsheet Documentation  Taken 7/2/2025 0600 by Johanne Gonzalez RN  Safety Promotion/Fall Prevention: safety round/check completed  Taken 7/2/2025 0400 by Johanne Gonzalez RN  Safety Promotion/Fall Prevention: safety round/check completed  Taken 7/2/2025 0213 by Johanne Gonzalez RN  Safety Promotion/Fall Prevention: safety  round/check completed  Taken 7/2/2025 0000 by Johanne Gonzalez RN  Safety Promotion/Fall Prevention: safety round/check completed  Taken 7/1/2025 2200 by Johanne Gonzalez RN  Safety Promotion/Fall Prevention: safety round/check completed  Taken 7/1/2025 2000 by Johanne Gonzalez RN  Safety Promotion/Fall Prevention: safety round/check completed     Problem: Skin Injury Risk Increased  Goal: Skin Health and Integrity  Outcome: Progressing  Intervention: Optimize Skin Protection  Description: Perform a full pressure injury risk assessment, as indicated by screening, upon admission to care unit.Reassess skin (full inspection and injury risk, including skin temperature, consistency and color) frequently (e.g., scheduled interval, with change in condition) to provide optimal early detection and prevention.Maintain adequate tissue perfusion (e.g., encourage fluid balance; avoid crossing legs, constrictive clothing or devices) to promote tissue oxygenation.Maintain head of bed at lowest degree of elevation tolerated, considering medical condition and other restrictions. Use positioning supports to prevent sliding and friction. Consider low friction textiles.Avoid positioning onto an area that remains reddened or on bony prominences.Minimize incontinence and moisture (e.g., toileting schedule; moisture-wicking pad, diaper or incontinence collection device; skin moisture barrier).Cleanse skin promptly and gently, when soiled, utilizing a pH-balanced cleanser.Relieve and redistribute pressure (e.g., scheduled position changes, weight shifts, use of support surface, medical device repositioning, protective dressing application, use of positioning device, microclimate control, use of pressure-injury-monitorEncourage increased activity, such as sitting in a chair at the bedside or early mobilization, when able to tolerate. Avoid prolonged sitting.  Recent Flowsheet Documentation  Taken 7/1/2025 2000 by Johanne Gonzalez  RN  Activity Management: activity encouraged  Pressure Reduction Techniques:   frequent weight shift encouraged   pressure points protected   weight shift assistance provided  Head of Bed (HOB) Positioning: HOB at 30 degrees  Pressure Reduction Devices: specialty bed utilized  Skin Protection: incontinence pads utilized     Problem: Pain Acute  Goal: Optimal Pain Control and Function  Outcome: Progressing  Intervention: Optimize Psychosocial Wellbeing  Description: Facilitate patient's self-control over pain by providing pain information and allowing choices in treatment.Consider and address emotional response to pain; express compassion and reassurance.Explore and promote use of coping strategies; address barriers to successful coping.Evaluate and assist with psychosocial, cultural and spiritual factors impacting pain.Assess for risk factors for developing chronic pain, such as depression, fear, pain avoidance and pain catastrophizing.Modify pain perception using techniques, such as distraction, mindfulness, guided imagery, meditation or music.Consider referral for ongoing coping support, such as education, relaxation training and role of thoughts.  Recent Flowsheet Documentation  Taken 7/1/2025 2000 by Johanne Gonzalez RN  Supportive Measures:   active listening utilized   mindfulness techniques promoted   positive reinforcement provided   relaxation techniques promoted   self-care encouraged  Diversional Activities: television  Intervention: Develop Pain Management Plan  Description: Acknowledge patient as the expert in pain self-management.Use a consistent, validated tool for pain assessment; include function and quality of life.Evaluate risk for opioid use and dependence.Set pain management goals; determine acceptable level of discomfort to allow for maximal functioning.Determine mutually agreed-upon pain management plan, including both pharmacologic and nonpharmacologic measures; integrate management of  chronic (persistent) pain.Identify and integrate past successful treatment measures, if able.Reevaluate plan regularly.  Recent Flowsheet Documentation  Taken 7/2/2025 0600 by Johanne Gonzalez RN  Pain Management Interventions: care clustered  Taken 7/2/2025 0400 by Johanne Gonzalez RN  Pain Management Interventions: care clustered  Taken 7/2/2025 0213 by Johanne Gonzalez RN  Pain Management Interventions: care clustered  Taken 7/2/2025 0000 by Johanne Gonzalez RN  Pain Management Interventions: care clustered  Taken 7/1/2025 2200 by Johanne Gonzalez RN  Pain Management Interventions: care clustered  Taken 7/1/2025 2000 by Johanne Gonzalez RN  Pain Management Interventions: care clustered  Intervention: Prevent or Manage Pain  Description: Evaluate pain level, effect of treatment and patient response at regular intervals.Minimize painful stimuli; coordinate care and adjust environment (e.g., light, noise, unnecessary movement); promote sleep/rest.Match pharmacologic analgesia to severity and type of pain mechanism (e.g., neuropathic, muscle, inflammatory); consider multimodal approach.Provide medication at regular intervals; titrate to patient response; premedicate for painful procedures.Manage breakthrough pain with additional doses; consider rotation or switching medication.Monitor for signs of substance tolerance (increased dose to reach desired effect, decreased effect with same dose).Manage medication-induced adverse events and side effects.Provide multimodal interventions, such as physical activity, therapeutic exercise, yoga, TENS (transcutaneous electrical nerve stimulation) and manual therapy.Train in functional activity modifications, such as body mechanics, posture, ergonomics, energy conservation and activity pacing.Consider addition of complementary or alternative therapy, such as acupuncture, hypnosis or therapeutic touch.  Recent Flowsheet Documentation  Taken 7/1/2025 2000 by Carlos  ANDRADE Whiting  Sensory Stimulation Regulation: care clustered     Problem: Cardiac Catheterization (Diagnostic/Interventional)  Goal: Absence of Bleeding  Outcome: Progressing  Goal: Absence of Contrast-Induced Injury  Outcome: Progressing  Goal: Stable Heart Rate and Rhythm  Outcome: Progressing  Goal: Absence of Embolism Signs and Symptoms  Outcome: Progressing  Goal: Anesthesia/Sedation Recovery  Outcome: Progressing  Intervention: Optimize Anesthesia Recovery  Description: Assess and monitor airway, breathing and circulation; maintain close surveillance for deterioration.Implement continuous monitoring, such as cardiorespiratory, blood pressure, temperature, pulse oximetry and capnography.Elevate head of bed, if able; facilitate regular position changes.Assess neurocognitive function and risks that may lead to postoperative delirium, such as decreased level of consciousness, pain and agitation; offer reassurance; answer questions.Assess and monitor neurovascular and neuromuscular function, such as motor strength, tone, posture, peripheral pulses and extremity sensation; protect areas of decreased sensation from heat, cold, medical devices or objects.Individualize frequency and intensity of monitoring based on sedation or anesthesia administered, identified risk factors, ongoing assessment and organizational protocol.Prepare for administration of pharmacologic therapy, such as reversal agent, antiemetic or antipruritic medication, to manage sedation or anesthesia effects.Adjust environment to maintain safety (e.g., fall precautions, safety equipment).  Recent Flowsheet Documentation  Taken 7/2/2025 0600 by Johanne Gonzalez RN  Safety Promotion/Fall Prevention: safety round/check completed  Taken 7/2/2025 0400 by Johanne Gonzalez RN  Safety Promotion/Fall Prevention: safety round/check completed  Taken 7/2/2025 0213 by Johanne Gonzalez RN  Safety Promotion/Fall Prevention: safety round/check completed  Taken  7/2/2025 0000 by Gonzalez, Johanne, RN  Safety Promotion/Fall Prevention: safety round/check completed  Taken 7/1/2025 2200 by Johanne Gonzalez RN  Safety Promotion/Fall Prevention: safety round/check completed  Taken 7/1/2025 2000 by Johanne Gonzalez RN  Safety Promotion/Fall Prevention: safety round/check completed  Goal: Optimal Pain Control and Function  Outcome: Progressing  Intervention: Prevent or Manage Pain  Description: Set pain management goals; mutually determine pain management plan and review plan regularly.Use a consistent, validated tool for pain assessment, including function and quality of life; evaluate pain level, effect of treatment and patient's response at regular intervals.Match pharmacologic analgesia to severity and type of pain mechanism; evaluate risk for opioid use and dependence; consider multimodal approach and titrate to patient response.Provide around-the-clock analgesic agents and nonpharmacologic therapies to keep pain levels in control.Manage medication-induced effects, such as constipation, pruritus, nausea, urinary retention, somnolence and dizziness.Provide nonpharmacologic interventions, such as cold application, positioning, or massage; consider addition of complementary or alternative therapy.Consider and address emotional response to pain.Modify pain perception using techniques, such as distraction, virtual reality, mindfulness, guided imagery, meditation or music.Premedicate for anticipated procedure and activity (e.g., precatheterization, prior to sheath removal).Minimize bedrest following femoral sheath removal (e.g., 2 to 4 hours if no bleeding).Prevent back pain/discomfort during bedrest (e.g., log roll repositioning, head elevation to 30 degrees, analgesic medication).  Recent Flowsheet Documentation  Taken 7/2/2025 0600 by Johanne Gonzalez RN  Pain Management Interventions: care clustered  Taken 7/2/2025 0400 by Johanne Gonzalez RN  Pain Management  Interventions: care clustered  Taken 7/2/2025 0213 by Johanne Gonzalez RN  Pain Management Interventions: care clustered  Taken 7/2/2025 0000 by Johanne Gonzalez RN  Pain Management Interventions: care clustered  Taken 7/1/2025 2200 by Johanne Gonzalez RN  Pain Management Interventions: care clustered  Taken 7/1/2025 2000 by Johanne Gonzalez RN  Pain Management Interventions: care clustered  Diversional Activities: television  Goal: Absence of Vascular Access Complication  Outcome: Progressing  Intervention: Prevent and Manage Access Complications  Description: Anatoly pulses on appropriate extremity prior to procedure.Assess for adequate collateral blood flow to hand prior to and following procedure if indicated.Position and adjust activity to minimize insertion site complications.Avoid head of bed elevation greater than 30 degrees if femoral sheath present.Anticipate need for procedural intervention (e.g., vascular repair, pericardiocentesis).Discontinue prophylactic antimicrobial agent within 24 hours after procedure, as directed; promote antimicrobial stewardship.Identify early signs of sepsis, such as increased heart rate and decreased blood pressure, as well as changes in mental state, respiratory pattern or peripheral perfusion.Prepare for rapid sepsis management, including lactate level, intravenous access, fluid administration and oxygen therapy.  Recent Flowsheet Documentation  Taken 7/1/2025 2000 by Johanne Gonzalez RN  Activity Management: activity encouraged  Head of Bed (HOB) Positioning: HOB at 30 degrees

## 2025-07-02 NOTE — PROGRESS NOTES
"    Patient Name: Marilu Avery  :1955  69 y.o.      Patient Care Team:  Esperanza Melton APRN as PCP - General (Nurse Practitioner)  Anatoly Jimenez MD as PCP - Family Medicine    Chief Complaint: follow up atrial fibrillation, Takotsubo cardiomyopathy     Interval History: she feels short of breath. Just had a CXR. Hr is 120s.right arm is sore. No hematoma. Pulses intact. +orthopnea        Objective   Vital Signs  Temp:  [97.3 °F (36.3 °C)-97.9 °F (36.6 °C)] 97.5 °F (36.4 °C)  Heart Rate:  [] 78  Resp:  [17-22] 17  BP: (119-134)/(88-90) 119/88    Intake/Output Summary (Last 24 hours) at 2025 1640  Last data filed at 2025 1520  Gross per 24 hour   Intake --   Output 1850 ml   Net -1850 ml     Flowsheet Rows      Flowsheet Row First Filed Value   Admission Height 177.8 cm (70\") Documented at 2025 1236   Admission Weight 104 kg (229 lb) Documented at 2025 1236            Physical Exam:   General Appearance:    Alert, cooperative, in no acute distress   Lungs:     Clear to auscultation.  Normal respiratory effort and rate.      Heart:    Regular rhythm and normal rate, normal S1 and S2, no murmurs, gallops or rubs.     Chest Wall:    No abnormalities observed   Abdomen:     Soft, nontender, positive bowel sounds.     Extremities:   no cyanosis, clubbing or edema.  No marked joint deformities.  Adequate musculoskeletal strength.       Results Review:    Results from last 7 days   Lab Units 25  0752   SODIUM mmol/L 134*  134*  134*   POTASSIUM mmol/L 4.2  4.2  4.2   CHLORIDE mmol/L 104  105  105   CO2 mmol/L 14.5*  15.0*  15.0*   BUN mg/dL 47.0*  46.0*  46.0*   CREATININE mg/dL 1.40*  1.33*  1.33*   GLUCOSE mg/dL 115*  113*  113*   CALCIUM mg/dL 9.0  9.2  9.2     Results from last 7 days   Lab Units 25  0606 25  1403 25  1244   CK TOTAL U/L  --  72  --    HSTROP T ng/L 475* 729* 268*     Results from last 7 days   Lab Units 25  0752 "   WBC 10*3/mm3 3.70   HEMOGLOBIN g/dL 9.6*   HEMATOCRIT % 28.5*   PLATELETS 10*3/mm3 155     Results from last 7 days   Lab Units 06/27/25  1244   INR  1.80*   APTT seconds 29.0     Results from last 7 days   Lab Units 06/27/25  1244   MAGNESIUM mg/dL 1.8     Results from last 7 days   Lab Units 06/27/25  1541   CHOLESTEROL mg/dL 139   TRIGLYCERIDES mg/dL 111   HDL CHOL mg/dL 101*   LDL CHOL mg/dL 19               Medication Review:   allopurinol, 100 mg, Oral, Daily  apixaban, 5 mg, Oral, BID  budesonide-formoterol, 2 puff, Inhalation, BID - RT  bumetanide, 1 mg, Oral, BID  famotidine, 20 mg, Oral, BID  levothyroxine, 75 mcg, Oral, Q AM  metoprolol succinate XL, 100 mg, Oral, Nightly  metoprolol succinate XL, 50 mg, Oral, Once  montelukast, 10 mg, Oral, Daily  mupirocin, 1 Application, Each Nare, BID  polyethylene glycol, 17 g, Oral, Daily  spironolactone, 25 mg, Oral, Daily              Assessment & Plan   Acute heart failure with reduced LVEF due to reduced LVEF, suspected stress induced cardiomyopathy. Cardiac cath 6/30/25 with normal coronary arteries and pulmonary hypertension in setting of elevated wedge and elevated LVEDP.   Chronic atrial fibrillation, on apixaban 5 mg BID. She is on metoprolol succinate 100 mg nightly and 50 gm in AM. Rate elevated.   Weakness/mechanical fall  REBECCA , nephrology following.   Renal mass     Increase beta blocker. Consider ARB tomorrow if BP tolerates increased beta blocker (nephrology ok'd ARB).     Oral bumex per nephro.     Follow HR.     Needs CT for renal mass but can't lie flat.     Will follow.     DEBBI Kay  [facility]  07/02/25  16:40 EDT

## 2025-07-03 LAB
ALBUMIN SERPL-MCNC: 3.5 G/DL (ref 3.5–5.2)
ANION GAP SERPL CALCULATED.3IONS-SCNC: 14.7 MMOL/L (ref 5–15)
BASOPHILS # BLD AUTO: 0.02 10*3/MM3 (ref 0–0.2)
BASOPHILS NFR BLD AUTO: 0.5 % (ref 0–1.5)
BUN SERPL-MCNC: 46 MG/DL (ref 8–23)
BUN/CREAT SERPL: 32.2 (ref 7–25)
CALCIUM SPEC-SCNC: 9.2 MG/DL (ref 8.6–10.5)
CHLORIDE SERPL-SCNC: 105 MMOL/L (ref 98–107)
CO2 SERPL-SCNC: 17.3 MMOL/L (ref 22–29)
CREAT SERPL-MCNC: 1.43 MG/DL (ref 0.57–1)
DEPRECATED RDW RBC AUTO: 55.4 FL (ref 37–54)
EGFRCR SERPLBLD CKD-EPI 2021: 40 ML/MIN/1.73
EOSINOPHIL # BLD AUTO: 0.07 10*3/MM3 (ref 0–0.4)
EOSINOPHIL NFR BLD AUTO: 1.8 % (ref 0.3–6.2)
ERYTHROCYTE [DISTWIDTH] IN BLOOD BY AUTOMATED COUNT: 14.7 % (ref 12.3–15.4)
GLUCOSE BLDC GLUCOMTR-MCNC: 113 MG/DL (ref 70–130)
GLUCOSE BLDC GLUCOMTR-MCNC: 119 MG/DL (ref 70–130)
GLUCOSE BLDC GLUCOMTR-MCNC: 123 MG/DL (ref 70–130)
GLUCOSE BLDC GLUCOMTR-MCNC: 129 MG/DL (ref 70–130)
GLUCOSE BLDC GLUCOMTR-MCNC: 186 MG/DL (ref 70–130)
GLUCOSE SERPL-MCNC: 133 MG/DL (ref 65–99)
HCT VFR BLD AUTO: 32 % (ref 34–46.6)
HGB BLD-MCNC: 10.4 G/DL (ref 12–15.9)
IMM GRANULOCYTES # BLD AUTO: 0.03 10*3/MM3 (ref 0–0.05)
IMM GRANULOCYTES NFR BLD AUTO: 0.8 % (ref 0–0.5)
LYMPHOCYTES # BLD AUTO: 0.48 10*3/MM3 (ref 0.7–3.1)
LYMPHOCYTES NFR BLD AUTO: 12.5 % (ref 19.6–45.3)
MCH RBC QN AUTO: 33.5 PG (ref 26.6–33)
MCHC RBC AUTO-ENTMCNC: 32.5 G/DL (ref 31.5–35.7)
MCV RBC AUTO: 103.2 FL (ref 79–97)
MONOCYTES # BLD AUTO: 0.27 10*3/MM3 (ref 0.1–0.9)
MONOCYTES NFR BLD AUTO: 7 % (ref 5–12)
NEUTROPHILS NFR BLD AUTO: 2.96 10*3/MM3 (ref 1.7–7)
NEUTROPHILS NFR BLD AUTO: 77.4 % (ref 42.7–76)
NRBC BLD AUTO-RTO: 0.8 /100 WBC (ref 0–0.2)
PHOSPHATE SERPL-MCNC: 4.3 MG/DL (ref 2.5–4.5)
PLATELET # BLD AUTO: 182 10*3/MM3 (ref 140–450)
PMV BLD AUTO: 10.4 FL (ref 6–12)
POTASSIUM SERPL-SCNC: 4.2 MMOL/L (ref 3.5–5.2)
RBC # BLD AUTO: 3.1 10*6/MM3 (ref 3.77–5.28)
SODIUM SERPL-SCNC: 137 MMOL/L (ref 136–145)
WBC NRBC COR # BLD AUTO: 3.83 10*3/MM3 (ref 3.4–10.8)

## 2025-07-03 PROCEDURE — 36415 COLL VENOUS BLD VENIPUNCTURE: CPT | Performed by: INTERNAL MEDICINE

## 2025-07-03 PROCEDURE — 82948 REAGENT STRIP/BLOOD GLUCOSE: CPT

## 2025-07-03 PROCEDURE — 99232 SBSQ HOSP IP/OBS MODERATE 35: CPT | Performed by: NURSE PRACTITIONER

## 2025-07-03 PROCEDURE — 94799 UNLISTED PULMONARY SVC/PX: CPT

## 2025-07-03 PROCEDURE — 80069 RENAL FUNCTION PANEL: CPT | Performed by: INTERNAL MEDICINE

## 2025-07-03 PROCEDURE — 90791 PSYCH DIAGNOSTIC EVALUATION: CPT

## 2025-07-03 PROCEDURE — 94761 N-INVAS EAR/PLS OXIMETRY MLT: CPT

## 2025-07-03 PROCEDURE — 85025 COMPLETE CBC W/AUTO DIFF WBC: CPT | Performed by: INTERNAL MEDICINE

## 2025-07-03 PROCEDURE — 97530 THERAPEUTIC ACTIVITIES: CPT

## 2025-07-03 RX ORDER — LIDOCAINE 4 G/G
1 PATCH TOPICAL
Status: DISCONTINUED | OUTPATIENT
Start: 2025-07-03 | End: 2025-07-07 | Stop reason: HOSPADM

## 2025-07-03 RX ORDER — LOSARTAN POTASSIUM 25 MG/1
12.5 TABLET ORAL
Status: DISCONTINUED | OUTPATIENT
Start: 2025-07-03 | End: 2025-07-06

## 2025-07-03 RX ORDER — ALBUTEROL SULFATE 90 UG/1
2 INHALANT RESPIRATORY (INHALATION) EVERY 4 HOURS PRN
Status: DISCONTINUED | OUTPATIENT
Start: 2025-07-03 | End: 2025-07-07 | Stop reason: HOSPADM

## 2025-07-03 RX ADMIN — SPIRONOLACTONE 25 MG: 25 TABLET ORAL at 08:04

## 2025-07-03 RX ADMIN — BUMETANIDE 1 MG: 1 TABLET ORAL at 21:35

## 2025-07-03 RX ADMIN — ACETAMINOPHEN 650 MG: 325 TABLET, FILM COATED ORAL at 14:01

## 2025-07-03 RX ADMIN — BUDESONIDE AND FORMOTEROL FUMARATE DIHYDRATE 2 PUFF: 160; 4.5 AEROSOL RESPIRATORY (INHALATION) at 07:44

## 2025-07-03 RX ADMIN — MONTELUKAST 10 MG: 10 TABLET, FILM COATED ORAL at 08:04

## 2025-07-03 RX ADMIN — ALLOPURINOL 100 MG: 100 TABLET ORAL at 08:03

## 2025-07-03 RX ADMIN — ACETAMINOPHEN 650 MG: 325 TABLET, FILM COATED ORAL at 21:35

## 2025-07-03 RX ADMIN — HYDROXYZINE HYDROCHLORIDE 25 MG: 25 TABLET, FILM COATED ORAL at 21:37

## 2025-07-03 RX ADMIN — MUPIROCIN 1 APPLICATION: 20 OINTMENT TOPICAL at 08:04

## 2025-07-03 RX ADMIN — LEVOTHYROXINE SODIUM 75 MCG: 0.07 TABLET ORAL at 06:12

## 2025-07-03 RX ADMIN — MUPIROCIN 1 APPLICATION: 20 OINTMENT TOPICAL at 21:37

## 2025-07-03 RX ADMIN — APIXABAN 5 MG: 5 TABLET, FILM COATED ORAL at 08:04

## 2025-07-03 RX ADMIN — FAMOTIDINE 20 MG: 20 TABLET, FILM COATED ORAL at 08:04

## 2025-07-03 RX ADMIN — FAMOTIDINE 20 MG: 20 TABLET, FILM COATED ORAL at 21:37

## 2025-07-03 RX ADMIN — METOPROLOL SUCCINATE 100 MG: 100 TABLET, EXTENDED RELEASE ORAL at 08:04

## 2025-07-03 RX ADMIN — APIXABAN 5 MG: 5 TABLET, FILM COATED ORAL at 21:37

## 2025-07-03 RX ADMIN — BUMETANIDE 1 MG: 1 TABLET ORAL at 08:04

## 2025-07-03 RX ADMIN — LOSARTAN POTASSIUM 12.5 MG: 25 TABLET, FILM COATED ORAL at 15:28

## 2025-07-03 RX ADMIN — BUDESONIDE AND FORMOTEROL FUMARATE DIHYDRATE 2 PUFF: 160; 4.5 AEROSOL RESPIRATORY (INHALATION) at 21:08

## 2025-07-03 RX ADMIN — LIDOCAINE 1 PATCH: 4 PATCH TOPICAL at 16:58

## 2025-07-03 NOTE — PLAN OF CARE
Goal Outcome Evaluation:  Plan of Care Reviewed With: patient        Progress: improving  Outcome Evaluation: Pt showed significant  improvement today wtih mobility, able to get out of bed and stand and walk with stand by assist with a walker, bed height elevated, pt reports this mimics her home setting. pt only walks short distances at home with a walker, pt strongly desires home, today she was steady with ambulation with a walker and would be appropriate for home, rec home health at discharge    Anticipated Discharge Disposition (PT): home with home health

## 2025-07-03 NOTE — THERAPY TREATMENT NOTE
Patient Name: Marilu Avery  : 1955    MRN: [id number]                              Today's Date: 7/3/2025       Admit Date: 2025    Visit Dx:     ICD-10-CM ICD-9-CM   1. Hypotension, unspecified hypotension type  I95.9 458.9   2. Near syncope  R55 780.2   3. Acute kidney injury  N17.9 584.9   4. Elevated troponin  R79.89 790.6   5. Longstanding persistent atrial fibrillation  I48.11 427.31   6. Coagulopathy: Eliquis induced  D68.9 286.9   7. Decreased activities of daily living (ADL)  Z78.9 V49.89     Patient Active Problem List   Diagnosis    Incisional hernia, without obstruction or gangrene    Ventral hernia    Atrial fibrillation, persistent    HLD (hyperlipidemia)    HTN (hypertension)    LISA (obstructive sleep apnea)    REBECCA (acute kidney injury)    Chronic anticoagulation    SBO (small bowel obstruction)    Tobacco abuse    Morbid obesity with BMI of 40.0-44.9, adult    COPD (chronic obstructive pulmonary disease)    Hypopotassemia    Gout    Chronic anticoagulation    Prolonged Q-T interval on ECG    REBECCA (acute kidney injury)    Acute on chronic diastolic (congestive) heart failure    Hypotension    Obesity (BMI 30-39.9)    Elevated troponin    Chronic alcohol use    Hyponatremia    Takotsubo cardiomyopathy     Past Medical History:   Diagnosis Date    Arthritis     Asthma     Atrial fibrillation     Colon polyps     hyperplastic rectal polyp    Hyperlipidemia     Hypertension     LISA on CPAP     Sleep apnea      Past Surgical History:   Procedure Laterality Date    BACK SURGERY N/A     CARDIAC CATHETERIZATION N/A 2025    Procedure: Right and Left Heart Cath;  Surgeon: Julien Perez MD;  Location: [facility] INVASIVE LOCATION;  Service: Cardiovascular;  Laterality: N/A;    COLONOSCOPY N/A 10/22/2015    Rectal polyp-Dr. Elías Keating    HYSTERECTOMY N/A     LAPAROSCOPIC CHOLECYSTECTOMY N/A 2010    Dr. Heath Whitlock    OOPHORECTOMY  2001    REPLACEMENT TOTAL KNEE Left  06/22/2015    Dr. Yo Aguayo    REPLACEMENT TOTAL KNEE Right 02/26/2015    Dr. Yo Aguayo    VENTRAL HERNIA REPAIR N/A 10/22/2015    Open reduction of incarcerated ventral hernia with layered closure-Dr. Elías Keating    VENTRAL/INCISIONAL HERNIA REPAIR N/A 6/14/2021    Procedure: OPEN VENTRAL/INCISIONAL HERNIA REPAIR WITH MESH AND APPENDECTOMY;  Surgeon: Arnulfo Leonard MD;  Location: [facility] OR;  Service: General;  Laterality: N/A;      General Information       Row Name 07/03/25 0910          Physical Therapy Time and Intention    Document Type therapy note (daily note)  -PC     Mode of Treatment physical therapy  -PC       Row Name 07/03/25 0910          General Information    Existing Precautions/Restrictions fall  -PC       Row Name 07/03/25 0910          Cognition    Orientation Status (Cognition) oriented x 4  -PC               User Key  (r) = Recorded By, (t) = Taken By, (c) = Cosigned By      Initials Name Provider Type    PC Sapphire Desai PT Physical Therapist                   Mobility       Row Name 07/03/25 0910          Bed Mobility    Supine-Sit [place] (Bed Mobility) standby assist  -PC       Row Name 07/03/25 0910          Sit-Stand Transfer    Sit-Stand [place] (Transfers) standby assist  -PC     Assistive Device (Sit-Stand Transfers) walker, front-wheeled  -PC     Comment, (Sit-Stand Transfer) elevated bed height  -PC       Row Name 07/03/25 0910          Gait/Stairs (Locomotion)    [place] Level (Gait) standby assist  -PC     Assistive Device (Gait) walker, front-wheeled  -PC     Distance in Feet (Gait) 12  -PC     Deviations/Abnormal Patterns (Gait) gait speed decreased;stride length decreased  -PC     Bilateral Gait Deviations forward flexed posture  -PC               User Key  (r) = Recorded By, (t) = Taken By, (c) = Cosigned By      Initials Name Provider Type    PC Sapphire Desai PT Physical Therapist                   Obj/Interventions       Row Name  07/03/25 0912          Balance    Static Sitting Balance independent  -PC     Dynamic Sitting Balance independent  -PC     Position, Sitting Balance sitting edge of bed  -PC     Static Standing Balance standby assist  -PC     Dynamic Standing Balance standby assist  -PC     Position/Device Used, Standing Balance walker, rolling  -PC               User Key  (r) = Recorded By, (t) = Taken By, (c) = Cosigned By      Initials Name Provider Type    PC Sapphire Desai, PT Physical Therapist                   Goals/Plan    No documentation.                  Clinical Impression       Row Name 07/03/25 0912          Pain    Pretreatment Pain Rating 0/10 - no pain  -PC     Posttreatment Pain Rating 0/10 - no pain  -PC       Row Name 07/03/25 0912          Plan of Care Review    Plan of Care Reviewed With patient  -PC     Progress improving  -PC     Outcome Evaluation Pt showed significant  improvement today wtih mobility, able to get out of bed and stand and walk with stand by assist with a walker, bed height elevated, pt reports this mimics her home setting. pt only walks short distances at home with a walker, pt strongly desires home, today she was steady with ambulation with a walker and would be appropriate for home, [facility] home health at discharge  -PC       Row Name 07/03/25 0912          Positioning and Restraints    Pre-Treatment Position in bed  -PC     Post Treatment Position chair  -PC     In Chair sitting;call light within reach;encouraged to call for assist  chair alarm pad in place, no chair alarm box available, pt verbalizes understanding of not getting up without calling  -PC               User Key  (r) = Recorded By, (t) = Taken By, (c) = Cosigned By      Initials Name Provider Type    PC Sapphire Desai, PT Physical Therapist                   Outcome Measures       Row Name 07/03/25 0921          How much help from another person do you currently need...    Turning from your back to your side while in flat bed  without using bedrails? 4  -PC     Moving from lying on back to sitting on the side of a flat bed without bedrails? 4  -PC     Moving to and from a bed to a chair (including a wheelchair)? 4  -PC     Standing up from a chair using your arms (e.g., wheelchair, bedside chair)? 4  -PC     Climbing 3-5 steps with a railing? 3  -PC     To walk in hospital room? 3  -PC     AM-PAC 6 Clicks Score (PT) 22  -PC     Highest Level of Mobility Goal Walk 25 Feet or More-7  -PC               User Key  (r) = Recorded By, (t) = Taken By, (c) = Cosigned By      Initials Name Provider Type    PC Sapphire Desai, PT Physical Therapist                                 Physical Therapy Education       Title: PT OT SLP Therapies (In Progress)       Topic: Physical Therapy (In Progress)       Point: Mobility training (Done)       Learning Progress Summary            Patient Acceptance, E, VU,DU by  at 7/1/2025 1600    Acceptance, E, NR,VU by  at 6/28/2025 1244                      Point: Home exercise program (Not Started)       Learner Progress:  Not documented in this visit.              Point: Body mechanics (Not Started)       Learner Progress:  Not documented in this visit.              Point: Precautions (Not Started)       Learner Progress:  Not documented in this visit.                              User Key       Initials Effective Dates Name Provider Type Discipline     12/13/22 -  Rachael Guerrero, PT Physical Therapist PT     06/03/25 -  Kerline Rios, PT Student PT Student PT                  PT Recommendation and Plan     Progress: improving  Outcome Evaluation: Pt showed significant  improvement today wtih mobility, able to get out of bed and stand and walk with stand by assist with a walker, bed height elevated, pt reports this mimics her home setting. pt only walks short distances at home with a walker, pt strongly desires home, today she was steady with ambulation with a walker and would be appropriate for home,  [facility] home health at discharge     Time Calculation:         PT Charges       Row Name 07/03/25 0921             Time Calculation    Start Time 0845  -PC      Stop Time 0900  -PC      Time Calculation (min) 15 min  -PC      PT Received On 07/03/25  -PC      PT - Next Appointment 07/05/25  -PC                User Key  (r) = Recorded By, (t) = Taken By, (c) = Cosigned By      Initials Name Provider Type    PC Sapphire Desai, PT Physical Therapist                  Therapy Charges for Today       Code Description Service Date Service Provider Modifiers Qty    [id number]  PT THERAPEUTIC ACT EA 15 MIN 7/3/2025 Sapphire Desai, PT GP 1            PT G-Codes  Outcome Measure Options: AM-PAC 6 Clicks Daily Activity (OT), Modified [place]  AM-PAC 6 Clicks Score (PT): 22  AM-PAC 6 Clicks Score (OT): 16  Modified [place] Scale: 0 - No Symptoms at all.  PT Discharge Summary  Anticipated Discharge Disposition (PT): home with home health    Sapphire Desai, CHUCK  7/3/2025

## 2025-07-03 NOTE — CONSULTS
"Patient seen by [facility] d/t anxiety. Patient interviewed alone in room 341 (3N). Introduced self and role. Patient agreed to participate in evaluation. Patient is A/OX4. Patient was pleasant and calm during evaluation.     The patient is a 69 y.o.  female living alone in a condo. The patient voiced she has a group of friends at the condo and they check in on each other.  Congregation/Spiritual: [employer]. The patient denied any current Mormon or spiritual concerns.   Children: one son, lives in [place]. The patient voiced she talks with her son daily. The patient voiced she had one son pass away 18 years ago.   Occupation: Retired. 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   Hobbies: TV, Facebook/internet. The patient voiced she used to like to do crafts and might try to get back into this.   Education: college  : No  Support System: Friends at condo, Son.   Hx of Violence: Denies  Hx of Abuse/Trauma: Denies  Feel Safe at Home: Yes    The patient presented to the ED on 6/27/25 for generalized weakness and a fall where she slid to the floor. The patient was admitted with hypotension, near syncope, REBECCA, and elevated troponin.     The patient denied any mental health history of treatment. The patient is not on any mental health medications and does not have any outpatient mental health providers. The patient voiced when her son passed away 18 years ago it was \"a dark time\" for her but did not seek any mental health treatment.     The patient voiced anxiety r/t laying down flat. The patient voiced she has trouble laying flat d/t her hernia and difficulty breathing when laying flat. The patient voiced her anxiety is fine when she does not try to lay flat. The patient voiced she does not like PRN hydroxyzine for anxiety as it made her feel weird and disoriented. The patient denied depression, SI, wish to be dead, HI, or hallucinations. The patient voiced poor sleep in the hospital but " reported good sleep otherwise. Patient reported good appetite.     The patient voiced she typically drink ETOH 4 days/week and will have one shot of vodka. The patient voiced she has been drinking like this for the past 30 years. The patient denied ever having more than one drink/night. The patient denied any blackouts/memory loss from ETOH use. The patient denied any history of ETOH withdrawal. The patient voiced she did not see her ETOH use as problematic. The patient could not remember when her last drink was but has been hospitalized since 6/27. The patient denied any history of DRE treatment. The patient denied any other substance use. UDS and BAL negative upon ED arrival.     The patient voiced she is not interested in outpatient mental health or DRE treatment and denied need for resources. The patient declined any [facility] follow-up visits. The patient voiced her anxiety is fine as long as she is not laying flat. Patient's nurse updated and recommended a psychiatrist consult to review anxiety medication if anxiety continues to be a problem for the patient. [facility] will sign off.

## 2025-07-03 NOTE — PROGRESS NOTES
"    Patient Name: Marilu Avery  :1955  69 y.o.      Patient Care Team:  Esperanza Melton APRN as PCP - General (Nurse Practitioner)  Anatoly Jimenez MD as PCP - Family Medicine    Chief Complaint: follow up atrial fibrillation, Takotsubo cardiomyopathy     Interval History: in AF. Occasional PVCs. HR better today. She is sitting up in the chair. Feeling better.        Objective   Vital Signs  Temp:  [97.3 °F (36.3 °C)-98 °F (36.7 °C)] 97.5 °F (36.4 °C)  Heart Rate:  [] 96  Resp:  [17-22] 18  BP: (116-134)/(88-97) 119/97    Intake/Output Summary (Last 24 hours) at 7/3/2025 0719  Last data filed at 7/3/2025 0527  Gross per 24 hour   Intake --   Output 1400 ml   Net -1400 ml     Flowsheet Rows      Flowsheet Row First Filed Value   Admission Height 177.8 cm (70\") Documented at 2025 1236   Admission Weight 104 kg (229 lb) Documented at 2025 1236            Physical Exam:   General Appearance:    Alert, cooperative, in no acute distress   Lungs:     Clear to auscultation.  Normal respiratory effort and rate.      Heart:    Regular rhythm and normal rate, normal S1 and S2, no murmurs, gallops or rubs.     Chest Wall:    No abnormalities observed   Abdomen:     Soft, nontender, positive bowel sounds.     Extremities:   no cyanosis, clubbing or edema.  No marked joint deformities.  Adequate musculoskeletal strength.       Results Review:    Results from last 7 days   Lab Units 25  0752   SODIUM mmol/L 134*  134*  134*   POTASSIUM mmol/L 4.2  4.2  4.2   CHLORIDE mmol/L 104  105  105   CO2 mmol/L 14.5*  15.0*  15.0*   BUN mg/dL 47.0*  46.0*  46.0*   CREATININE mg/dL 1.40*  1.33*  1.33*   GLUCOSE mg/dL 115*  113*  113*   CALCIUM mg/dL 9.0  9.2  9.2     Results from last 7 days   Lab Units 25  0606 25  1403 25  1244   CK TOTAL U/L  --  72  --    HSTROP T ng/L 475* 729* 268*     Results from last 7 days   Lab Units 25  0752   WBC 10*3/mm3 3.70 "   HEMOGLOBIN g/dL 9.6*   HEMATOCRIT % 28.5*   PLATELETS 10*3/mm3 155     Results from last 7 days   Lab Units 06/27/25  1244   INR  1.80*   APTT seconds 29.0     Results from last 7 days   Lab Units 06/27/25  1244   MAGNESIUM mg/dL 1.8     Results from last 7 days   Lab Units 06/27/25  1541   CHOLESTEROL mg/dL 139   TRIGLYCERIDES mg/dL 111   HDL CHOL mg/dL 101*   LDL CHOL mg/dL 19               Medication Review:   allopurinol, 100 mg, Oral, Daily  apixaban, 5 mg, Oral, BID  budesonide-formoterol, 2 puff, Inhalation, BID - RT  bumetanide, 1 mg, Oral, BID  famotidine, 20 mg, Oral, BID  levothyroxine, 75 mcg, Oral, Q AM  metoprolol succinate XL, 100 mg, Oral, Q12H  metoprolol succinate XL, 50 mg, Oral, Once  montelukast, 10 mg, Oral, Daily  mupirocin, 1 Application, Each Nare, BID  polyethylene glycol, 17 g, Oral, Daily  spironolactone, 25 mg, Oral, Daily              Assessment & Plan   Acute heart failure with reduced LVEF due to reduced LVEF, suspected stress induced cardiomyopathy. Cardiac cath 6/30/25 with normal coronary arteries and pulmonary hypertension in setting of elevated wedge and elevated LVEDP.   Chronic atrial fibrillation, on apixaban 5 mg BID. Beta blocker has been titrated to 100 mg BID. She got 250 mcg IV digoxin 7/2/25. HR stable today.   Weakness/mechanical fall  REBECCA , nephrology following.   Renal mass , needs CT when can lie flat.     Consider ARB today if labs ok . RFP pending.     Oral bumex per nephro.     Follow HR. Stable today.     Will follow.     DEBBI Kay  [facility]  07/03/25  07:19 EDT

## 2025-07-03 NOTE — PROGRESS NOTES
Name: Marilu Avery ADMIT: 2025   : 1955  PCP: Esperanza Melton APRN    MRN: [id number] LOS: 6 days   AGE/SEX: 69 y.o. female  ROOM: [facility]     Subjective   Subjective     No events overnight. She's reporting some back pain       Objective   Objective   Vital Signs  Temp:  [97.5 °F (36.4 °C)-98 °F (36.7 °C)] 97.5 °F (36.4 °C)  Heart Rate:  [] 99  Resp:  [17-22] 20  BP: (116-127)/(69-97) 118/69  SpO2:  [91 %-100 %] 100 %  on  Flow (L/min) (Oxygen Therapy):  [2] 2;   Device (Oxygen Therapy): nasal cannula  Body mass index is 34.48 kg/m².  Physical Exam  Constitutional:       General: She is not in acute distress.     Appearance: She is not toxic-appearing.   Cardiovascular:      Rate and Rhythm: Normal rate and regular rhythm.      Heart sounds: Normal heart sounds.   Pulmonary:      Effort: Pulmonary effort is normal.      Breath sounds: Normal breath sounds.   Abdominal:      General: Bowel sounds are normal.      Palpations: Abdomen is soft.   Musculoskeletal:      Right lower leg: Edema present.      Left lower leg: Edema present.   Neurological:      Mental Status: She is alert.   Psychiatric:         Mood and Affect: Mood normal.         Behavior: Behavior normal.         Results Review     I reviewed the patient's new clinical results.  Results from last 7 days   Lab Units 25  0908 25  0752 25  0552 25  1418 25  1413 25  0615   WBC 10*3/mm3 3.83 3.70 3.83  --   --  4.05   HEMOGLOBIN g/dL 10.4* 9.6* 9.7*  --   --  9.4*   HEMOGLOBIN, POC g/dL  --   --   --  9.9*   < >  --    PLATELETS 10*3/mm3 182 155 154  --   --  125*    < > = values in this interval not displayed.     Results from last 7 days   Lab Units 25  0908 25  0752 25  1808 25  1219   SODIUM mmol/L 137 134*  134*  134* 137 137   POTASSIUM mmol/L 4.2 4.2  4.2  4.2 4.6 4.6   CHLORIDE mmol/L 105 104  105  105 106 106   CO2 mmol/L 17.3* 14.5*  15.0*  15.0* 17.2*  16.4*   BUN mg/dL 46.0* 47.0*  46.0*  46.0* 46.0* 41.0*   CREATININE mg/dL 1.43* 1.40*  1.33*  1.33* 1.54* 1.44*   GLUCOSE mg/dL 133* 115*  113*  113* 122* 123*   Estimated Creatinine Clearance: 50.3 mL/min (A) (by C-G formula based on SCr of 1.43 mg/dL (H)).  Results from last 7 days   Lab Units 07/03/25  0908 07/02/25  0752 07/01/25  0552 06/30/25  0615 06/28/25  0606 06/27/25  1244   ALBUMIN g/dL 3.5 3.3* 3.1* 2.9*   < > 4.0   BILIRUBIN mg/dL  --   --   --   --   --  0.9   ALK PHOS U/L  --   --   --   --   --  86   AST (SGOT) U/L  --   --   --   --   --  21   ALT (SGPT) U/L  --   --   --   --   --  12    < > = values in this interval not displayed.     Results from last 7 days   Lab Units 07/03/25  0908 07/02/25  0752 07/01/25  1808 07/01/25  1219 07/01/25  0552 06/30/25  1850 06/30/25  0615 06/27/25  1541 06/27/25  1244   CALCIUM mg/dL 9.2 9.0  9.2  9.2 9.0 9.4 9.2  9.2   < > 9.0  9.0   < > 9.5   ALBUMIN g/dL 3.5 3.3*  --   --  3.1*  --  2.9*   < > 4.0   MAGNESIUM mg/dL  --   --   --   --   --   --   --   --  1.8   PHOSPHORUS mg/dL 4.3 3.9  --   --  3.9  --  3.2   < >  --     < > = values in this interval not displayed.     Results from last 7 days   Lab Units 06/27/25  1544 06/27/25  1244   PROCALCITONIN ng/mL  --  0.32*   LACTATE mmol/L 1.7 2.5*     COVID19   Date Value Ref Range Status   08/24/2021 Not Detected Not Detected - Ref. Range Final   06/13/2021 Not Detected Not Detected - Ref. Range Final     Glucose   Date/Time Value Ref Range Status   07/03/2025 1139 119 70 - 130 mg/dL Final   07/03/2025 0524 123 70 - 130 mg/dL Final   07/03/2025 0013 129 70 - 130 mg/dL Final   07/02/2025 1807 136 (H) 70 - 130 mg/dL Final   07/02/2025 1217 131 (H) 70 - 130 mg/dL Final   07/02/2025 0614 132 (H) 70 - 130 mg/dL Final   07/02/2025 0042 112 70 - 130 mg/dL Final       XR Chest 1 View  Narrative: XR CHEST 1 VW-     HISTORY: Female who is 69 years-old, dyspnea     TECHNIQUE: Frontal views of the chest      COMPARISON: 6/27/2025     FINDINGS: The heart size is borderline. Pulmonary vasculature is  unremarkable. Small atelectasis or infiltrate at the bases, follow-up  advised. No large pleural effusion, or pneumothorax. No acute osseous  process.     Impression: As described.     This report was finalized on 7/2/2025 3:40 PM by Dr. Herrera Mistry M.D on Workstation: [id number]       Scheduled Medications  allopurinol, 100 mg, Oral, Daily  apixaban, 5 mg, Oral, BID  budesonide-formoterol, 2 puff, Inhalation, BID - RT  bumetanide, 1 mg, Oral, BID  famotidine, 20 mg, Oral, BID  levothyroxine, 75 mcg, Oral, Q AM  Lidocaine, 1 patch, Transdermal, Q24H  losartan, 12.5 mg, Oral, Q24H  metoprolol succinate XL, 100 mg, Oral, Q12H  metoprolol succinate XL, 50 mg, Oral, Once  montelukast, 10 mg, Oral, Daily  mupirocin, 1 Application, Each Nare, BID  polyethylene glycol, 17 g, Oral, Daily  revefenacin, 175 mcg, Nebulization, Daily - RT  spironolactone, 25 mg, Oral, Daily    Infusions   Diet  Diet: Cardiac; Healthy Heart (2-3 Na+); Fluid Consistency: Thin (IDDSI 0)       Assessment/Plan     Active Hospital Problems    Diagnosis  POA    **Hypotension [I95.9]  Yes    Hyponatremia [E87.1]  Unknown    Takotsubo cardiomyopathy [I51.81]  Unknown    Obesity (BMI 30-39.9) [E66.9]  Yes    Elevated troponin [R79.89]  Yes    Chronic alcohol use [F10.90]  Yes    Chronic anticoagulation [Z79.01]  Not Applicable    Tobacco abuse [Z72.0]  Yes    COPD (chronic obstructive pulmonary disease) [J44.9]  Yes    REBECCA (acute kidney injury) [N17.9]  Yes    Atrial fibrillation, persistent [I48.19]  Yes    HTN (hypertension) [I10]  Yes    HLD (hyperlipidemia) [E78.5]  Yes    Ventral hernia [K43.9]  Yes      Resolved Hospital Problems   No resolved problems to display.       69 y.o. female admitted with Hypotension.    69 year old woman with CKD 3 who presented initially with shortness of breath on exertion and lower extremity swelling and was found to  have stress cardiomyopathy, an REBECCA on CKD 3, and hyponatremia     Acute systolic heart failure due to stress cardiomyopathy-initially placed in the ICU due to hypotension, but did not require pressors. Cardiac catheterization showed normal coronaries. Now on bumex, metoprolol, spironolactone, losartan   REBECCA on CKD 3-improved with fluid resuscitation   Hyponatremia-resolved with fluid resuscitation. Now back on diuretics  Chronic afib-eliquis, metoprolol  Hypothyroidism-synthroid  COPD/asthma-lama/laba/ics, montelukast   Gout-allopurinol  LISA-pap if available  Back pain-voltaren gel, lidocaine patch, tylenol  Left renal mass-she's unable to lie flat currently for a CT scan to better characterize the mass. She'll need to be referred to urology at discharge.  Eliquis (home med) for DVT prophylaxis.  Full code.  Discussed with patient and nursing staff.  Anticipate discharge home with home health when cleared by consultants.      Rogerio Devlin MD  [facility]  07/03/25  14:48 EDT

## 2025-07-03 NOTE — PROGRESS NOTES
[facility] INPATIENT PROGRESS NOTE         [facility] CORONARY CARE    7/3/2025      PATIENT IDENTIFICATION:  Name: Marilu Avery ADMIT: 2025   : 1955  PCP: Esperanza Melton APRN    MRN: [id number] LOS: 6 days   AGE/SEX: 69 y.o. female  ROOM: [facility]                     LOS 6    Reason for visit: Weakness      SUBJECTIVE:      Resting comfortably.  On 2 L supplemental oxygen.  No productive cough or chest pain.  Not requiring hemodynamic drips.  She had a history of sleep apnea but has not used a sleep machine for over 5 years.  Would probably benefit from repeat sleep study as outpatient.  We discussed untreated sleep apnea and its effects on heart issues.      Objective   OBJECTIVE:    Vital Sign Min/Max for last 24 hours  Temp  Min: 97.3 °F (36.3 °C)  Max: 98 °F (36.7 °C)   BP  Min: 116/92  Max: 134/90   Pulse  Min: 96  Max: 115   Resp  Min: 17  Max: 22   SpO2  Min: 91 %  Max: 100 %   No data recorded   Weight  Min: 109 kg (240 lb 4.8 oz)  Max: 109 kg (240 lb 4.8 oz)    Vitals:    25 0326 25 0536 25 0744 25 0804   BP: 119/97   120/84   BP Location: Left arm   Left arm   Patient Position: Lying   Lying   Pulse:   112 97   Resp: 18  17 20   Temp: 97.5 °F (36.4 °C)   97.5 °F (36.4 °C)   TempSrc: Oral   Oral   SpO2:   100% 100%   Weight:  109 kg (240 lb 4.8 oz)     Height:                25  1133 25  0100 25  0536   Weight: 106 kg (233 lb) 109 kg (239 lb 6.7 oz) 109 kg (240 lb 4.8 oz)       Body mass index is 34.48 kg/m².                          Body mass index is 34.48 kg/m².    Intake/Output Summary (Last 24 hours) at 7/3/2025 0912  Last data filed at 7/3/2025 0804  Gross per 24 hour   Intake 120 ml   Output 1400 ml   Net -1280 ml         Exam:  GEN:  No distress, appears stated age  EYES:   PERRL, anicteric sclerae  ENT:    External ears/nose normal, OP clear  NECK:  No adenopathy, midline trachea  LUNGS: Normal chest on inspection, palpation  and auscultation  CV:  Normal S1S2, without murmur  ABD:  Nontender, nondistended, no hepatosplenomegaly, +BS  EXT:  No edema.  No cyanosis or clubbing.  No mottling and normal cap refill.    Assessment     Scheduled meds:  allopurinol, 100 mg, Oral, Daily  apixaban, 5 mg, Oral, BID  budesonide-formoterol, 2 puff, Inhalation, BID - RT  bumetanide, 1 mg, Oral, BID  famotidine, 20 mg, Oral, BID  levothyroxine, 75 mcg, Oral, Q AM  metoprolol succinate XL, 100 mg, Oral, Q12H  metoprolol succinate XL, 50 mg, Oral, Once  montelukast, 10 mg, Oral, Daily  mupirocin, 1 Application, Each Nare, BID  polyethylene glycol, 17 g, Oral, Daily  spironolactone, 25 mg, Oral, Daily      IV meds:                           Data Review:  Results from last 7 days   Lab Units 07/02/25  0752 07/01/25  1808 07/01/25  1219 07/01/25  0552 06/30/25  2351   SODIUM mmol/L 134*  134*  134* 137 137 136  136 135*   POTASSIUM mmol/L 4.2  4.2  4.2 4.6 4.6 4.7  4.7 4.8   CHLORIDE mmol/L 104  105  105 106 106 108*  108* 107   CO2 mmol/L 14.5*  15.0*  15.0* 17.2* 16.4* 14.9*  14.9* 14.9*   BUN mg/dL 47.0*  46.0*  46.0* 46.0* 41.0* 42.0*  42.0* 40.0*   CREATININE mg/dL 1.40*  1.33*  1.33* 1.54* 1.44* 1.24*  1.24* 1.36*   GLUCOSE mg/dL 115*  113*  113* 122* 123* 113*  113* 122*   CALCIUM mg/dL 9.0  9.2  9.2 9.0 9.4 9.2  9.2 9.2         Estimated Creatinine Clearance: 54.1 mL/min (A) (by C-G formula based on SCr of 1.33 mg/dL (H)).  Results from last 7 days   Lab Units 07/02/25  0752 07/01/25  0552 06/30/25  1418 06/30/25  1413 06/30/25  0615 06/29/25  0549 06/28/25  0606   WBC 10*3/mm3 3.70 3.83  --   --  4.05 4.11 5.68   HEMOGLOBIN g/dL 9.6* 9.7*  --   --  9.4* 9.8* 10.6*   HEMOGLOBIN, POC g/dL  --   --  9.9* 9.9*  --   --   --    PLATELETS 10*3/mm3 155 154  --   --  125* 117* 132*     Results from last 7 days   Lab Units 06/27/25  1244   INR  1.80*     Results from last 7 days   Lab Units 06/27/25  1244   ALT (SGPT) U/L 12   AST  (SGOT) U/L 21     Results from last 7 days   Lab Units 06/30/25  1418 06/30/25  1413   PH, ARTERIAL pH units 7.340* 7.410     Results from last 7 days   Lab Units 06/27/25  1544 06/27/25  1244   PROCALCITONIN ng/mL  --  0.32*   LACTATE mmol/L 1.7 2.5*         Glucose   Date/Time Value Ref Range Status   07/03/2025 0524 123 70 - 130 mg/dL Final   07/03/2025 0013 129 70 - 130 mg/dL Final   07/02/2025 1807 136 (H) 70 - 130 mg/dL Final   07/02/2025 1217 131 (H) 70 - 130 mg/dL Final   07/02/2025 0614 132 (H) 70 - 130 mg/dL Final   07/02/2025 0042 112 70 - 130 mg/dL Final   07/01/2025 1630 123 70 - 130 mg/dL Final         Imaging reviewed  Chest x-ray 6/27 reviewed    CT head 6/27 reviewed    Heart cath 6/30 reviewed: Wedge pressure elevated at 26.  Normal coronaries.  Severely depressed cardiac output.      Microbiology reviewed            Active Hospital Problems    Diagnosis  POA    **Hypotension [I95.9]  Yes    Hyponatremia [E87.1]  Unknown    Takotsubo cardiomyopathy [I51.81]  Unknown    Obesity (BMI 30-39.9) [E66.9]  Yes    Elevated troponin [R79.89]  Yes    Chronic alcohol use [F10.90]  Yes    Chronic anticoagulation [Z79.01]  Not Applicable    Tobacco abuse [Z72.0]  Yes    COPD (chronic obstructive pulmonary disease) [J44.9]  Yes    REBECCA (acute kidney injury) [N17.9]  Yes    Atrial fibrillation, persistent [I48.19]  Yes    HTN (hypertension) [I10]  Yes    HLD (hyperlipidemia) [E78.5]  Yes    Ventral hernia [K43.9]  Yes      Resolved Hospital Problems   No resolved problems to display.         ASSESSMENT:  Weakness  Acute kidney injury  Hyponatremia: Improved  Non-ST elevation MI  Atrial fibrillation  Hypothyroidism  Asthma/COPD  Tobacco use  Pulm hypertension: WHO group II  LISA      PLAN:  Weakness and hypotension has improved.  Downgraded to telemetry.  Defer nonpulmonary medical management to hospitalist service.  They have taken over as attending.  Cardiac cath results reviewed.  Likely home soon.  Sleep study  as out patient.       Anatoly Mcdaniel MD  Pulmonary and Critical Care Medicine  [facility], [facility]  7/3/2025    09:12 EDT

## 2025-07-03 NOTE — PLAN OF CARE
Problem: Adult Inpatient Plan of Care  Goal: Plan of Care Review  Outcome: Progressing   Goal Outcome Evaluation:  VSS. RA-2L prn. Afib. A&Ox4. PRN tylenol given, see mar. Pt rested throughout the night with no other issues or complaints.

## 2025-07-03 NOTE — CONSULTS
Chp visited Pt. Chp and Pt discussed her time in the hospital and how she is feeling. Pt shared she is hoping to go home soon. Chp offered supportive presence, active listening, and prayer. Chp remains available.

## 2025-07-03 NOTE — PROGRESS NOTES
[facility]     Progress Note    Patient Name: Marilu Avery  : 1955  MRN: [id number]  Primary Care Physician:  Esperanza Melton APRN  Date of admission: 2025    Subjective   Subjective     Chief Complaint: rebecca on ckd    History of Present Illness  Patient with rebecca on ckd admitted with nstemi s/p cath.    Review of Systems    Objective   Objective     Vitals:   Temp:  [97.3 °F (36.3 °C)-98 °F (36.7 °C)] 97.5 °F (36.4 °C)  Heart Rate:  [] 97  Resp:  [17-22] 20  BP: (116-134)/(84-97) 120/84  Flow (L/min) (Oxygen Therapy):  [2] 2    Physical Exam   General Appearance:  In no acute distress.    HEENT: Normal HEENT exam.     Extremities: .  There is no deformity, effusion or dependent edema.    Neurological: Patient is alert     Result Review    Result Review:  I have personally reviewed the results from the time of this admission to 7/3/2025 10:19 EDT and agree with these findings:  []  Laboratory list / accordion  []  Microbiology  []  Radiology  []  EKG/Telemetry   []  Cardiology/Vascular   []  Pathology  []  Old records  []  Other:  Most notable findings include:       Assessment & Plan   Assessment / Plan     Brief Patient Summary:  Marilu Avery is a 69 y.o. female who has rebecca on ckd with nstemi s/p cath. With metabolic acidosis and renal mass    Active Hospital Problems:  Active Hospital Problems    Diagnosis     **Hypotension     Hyponatremia     Takotsubo cardiomyopathy     Obesity (BMI 30-39.9)     Elevated troponin     Chronic alcohol use     Chronic anticoagulation     Tobacco abuse     COPD (chronic obstructive pulmonary disease)     REBECCA (acute kidney injury)     Atrial fibrillation, persistent     HTN (hypertension)     HLD (hyperlipidemia)     Ventral hernia      Plan:   REBECCA on ckd III  Hypovolemia, improved after hydration  Chronic diastolic heart failure, euvolemic today but with orthopnea  NSTEMI  Elevated lactate, likely due to hypotension with organ hypoperfusion, rule  out acute infection  Immobility  Atrial fibrillation on anticoagulation with Eliquis  New renal mass  Metabolic acidosis    Patient seen and examined. No complaints today. Labs reviewed. Cr improved to 1.3.  still with metabolic acidosis which has been stable. She refused CT due to inability to lie flat.  May need urology follow up as outpatient.     Ivelisse Quiros MD

## 2025-07-04 LAB
ALBUMIN SERPL-MCNC: 3.1 G/DL (ref 3.5–5.2)
ANION GAP SERPL CALCULATED.3IONS-SCNC: 14.1 MMOL/L (ref 5–15)
BASOPHILS # BLD AUTO: 0.02 10*3/MM3 (ref 0–0.2)
BASOPHILS NFR BLD AUTO: 0.7 % (ref 0–1.5)
BUN SERPL-MCNC: 45 MG/DL (ref 8–23)
BUN/CREAT SERPL: 33.8 (ref 7–25)
CALCIUM SPEC-SCNC: 8.9 MG/DL (ref 8.6–10.5)
CHLORIDE SERPL-SCNC: 103 MMOL/L (ref 98–107)
CO2 SERPL-SCNC: 19.9 MMOL/L (ref 22–29)
CREAT SERPL-MCNC: 1.33 MG/DL (ref 0.57–1)
DEPRECATED RDW RBC AUTO: 53.6 FL (ref 37–54)
EGFRCR SERPLBLD CKD-EPI 2021: 43.7 ML/MIN/1.73
EOSINOPHIL # BLD AUTO: 0.11 10*3/MM3 (ref 0–0.4)
EOSINOPHIL NFR BLD AUTO: 4 % (ref 0.3–6.2)
ERYTHROCYTE [DISTWIDTH] IN BLOOD BY AUTOMATED COUNT: 14.8 % (ref 12.3–15.4)
GLUCOSE BLDC GLUCOMTR-MCNC: 105 MG/DL (ref 70–130)
GLUCOSE BLDC GLUCOMTR-MCNC: 107 MG/DL (ref 70–130)
GLUCOSE BLDC GLUCOMTR-MCNC: 129 MG/DL (ref 70–130)
GLUCOSE SERPL-MCNC: 125 MG/DL (ref 65–99)
HCT VFR BLD AUTO: 30.4 % (ref 34–46.6)
HGB BLD-MCNC: 9.8 G/DL (ref 12–15.9)
IMM GRANULOCYTES # BLD AUTO: 0.02 10*3/MM3 (ref 0–0.05)
IMM GRANULOCYTES NFR BLD AUTO: 0.7 % (ref 0–0.5)
LYMPHOCYTES # BLD AUTO: 0.35 10*3/MM3 (ref 0.7–3.1)
LYMPHOCYTES NFR BLD AUTO: 12.8 % (ref 19.6–45.3)
MCH RBC QN AUTO: 32.6 PG (ref 26.6–33)
MCHC RBC AUTO-ENTMCNC: 32.2 G/DL (ref 31.5–35.7)
MCV RBC AUTO: 101 FL (ref 79–97)
MONOCYTES # BLD AUTO: 0.2 10*3/MM3 (ref 0.1–0.9)
MONOCYTES NFR BLD AUTO: 7.3 % (ref 5–12)
NEUTROPHILS NFR BLD AUTO: 2.04 10*3/MM3 (ref 1.7–7)
NEUTROPHILS NFR BLD AUTO: 74.5 % (ref 42.7–76)
NRBC BLD AUTO-RTO: 0.7 /100 WBC (ref 0–0.2)
PHOSPHATE SERPL-MCNC: 4.1 MG/DL (ref 2.5–4.5)
PLATELET # BLD AUTO: 191 10*3/MM3 (ref 140–450)
PMV BLD AUTO: 10 FL (ref 6–12)
POTASSIUM SERPL-SCNC: 3.9 MMOL/L (ref 3.5–5.2)
RBC # BLD AUTO: 3.01 10*6/MM3 (ref 3.77–5.28)
SODIUM SERPL-SCNC: 137 MMOL/L (ref 136–145)
WBC NRBC COR # BLD AUTO: 2.74 10*3/MM3 (ref 3.4–10.8)

## 2025-07-04 PROCEDURE — 94761 N-INVAS EAR/PLS OXIMETRY MLT: CPT

## 2025-07-04 PROCEDURE — 94799 UNLISTED PULMONARY SVC/PX: CPT

## 2025-07-04 PROCEDURE — 85025 COMPLETE CBC W/AUTO DIFF WBC: CPT | Performed by: INTERNAL MEDICINE

## 2025-07-04 PROCEDURE — 82948 REAGENT STRIP/BLOOD GLUCOSE: CPT

## 2025-07-04 PROCEDURE — 80069 RENAL FUNCTION PANEL: CPT | Performed by: INTERNAL MEDICINE

## 2025-07-04 PROCEDURE — 99232 SBSQ HOSP IP/OBS MODERATE 35: CPT | Performed by: STUDENT IN AN ORGANIZED HEALTH CARE EDUCATION/TRAINING PROGRAM

## 2025-07-04 RX ORDER — METOLAZONE 2.5 MG/1
2.5 TABLET ORAL ONCE
Status: DISCONTINUED | OUTPATIENT
Start: 2025-07-04 | End: 2025-07-07

## 2025-07-04 RX ADMIN — BUDESONIDE AND FORMOTEROL FUMARATE DIHYDRATE 2 PUFF: 160; 4.5 AEROSOL RESPIRATORY (INHALATION) at 20:48

## 2025-07-04 RX ADMIN — LEVOTHYROXINE SODIUM 75 MCG: 0.07 TABLET ORAL at 05:42

## 2025-07-04 RX ADMIN — FAMOTIDINE 20 MG: 20 TABLET, FILM COATED ORAL at 20:31

## 2025-07-04 RX ADMIN — APIXABAN 5 MG: 5 TABLET, FILM COATED ORAL at 08:17

## 2025-07-04 RX ADMIN — LIDOCAINE 1 PATCH: 4 PATCH TOPICAL at 08:15

## 2025-07-04 RX ADMIN — ACETAMINOPHEN 650 MG: 325 TABLET, FILM COATED ORAL at 13:54

## 2025-07-04 RX ADMIN — METOPROLOL SUCCINATE 100 MG: 100 TABLET, EXTENDED RELEASE ORAL at 20:31

## 2025-07-04 RX ADMIN — FAMOTIDINE 20 MG: 20 TABLET, FILM COATED ORAL at 08:17

## 2025-07-04 RX ADMIN — BUMETANIDE 1 MG: 1 TABLET ORAL at 20:32

## 2025-07-04 RX ADMIN — APIXABAN 5 MG: 5 TABLET, FILM COATED ORAL at 20:31

## 2025-07-04 RX ADMIN — BUMETANIDE 1 MG: 1 TABLET ORAL at 08:18

## 2025-07-04 RX ADMIN — ACETAMINOPHEN 650 MG: 325 TABLET, FILM COATED ORAL at 21:35

## 2025-07-04 RX ADMIN — ALLOPURINOL 100 MG: 100 TABLET ORAL at 08:17

## 2025-07-04 RX ADMIN — MONTELUKAST 10 MG: 10 TABLET, FILM COATED ORAL at 08:17

## 2025-07-04 RX ADMIN — METOPROLOL SUCCINATE 100 MG: 100 TABLET, EXTENDED RELEASE ORAL at 08:23

## 2025-07-04 RX ADMIN — MUPIROCIN 1 APPLICATION: 20 OINTMENT TOPICAL at 08:17

## 2025-07-04 RX ADMIN — Medication 10 ML: at 20:32

## 2025-07-04 NOTE — PROGRESS NOTES
"   LOS: 7 days     Chief Complaint/ Reason for encounter: CKD, REBECCA, CHF and diuretic management    Subjective   06/30/25 : She is feeling better denies complaints  Complaining of some orthopnea but currently not requiring any supplemental oxygen.  Right and left heart cath tomorrow    7/1: She feels better today, she states she is less short of breath, currently on room air  Still with some orthopnea, no chest pain  Good appetite no nausea or vomiting.  No edema    7/2 no acute events. Patient states she cannot do the CT scan since she is not able to do lay flat.     7/4 overall she is feeling better. SOB improving with supplemental O2.     Medical history reviewed:  History of Present Illness    Subjective    History taken from: Chart and patient/family as able    Vital Signs  Temp:  [97.3 °F (36.3 °C)-97.7 °F (36.5 °C)] 97.7 °F (36.5 °C)  Heart Rate:  [] 92  Resp:  [17-20] 17  BP: ()/(67-86) 101/68       Wt Readings from Last 1 Encounters:   07/04/25 0545 109 kg (239 lb 13.8 oz)   07/03/25 0536 109 kg (240 lb 4.8 oz)   07/01/25 0100 109 kg (239 lb 6.7 oz)   06/28/25 1133 106 kg (233 lb)   06/28/25 0548 106 kg (233 lb 7.5 oz)   06/27/25 1929 106 kg (233 lb 7.5 oz)   06/27/25 1236 104 kg (229 lb)       Objective:  Vital signs: (most recent): Blood pressure 100/62, pulse 92, temperature 97.4 °F (36.3 °C), temperature source Oral, resp. rate 16, height 177.8 cm (70\"), weight 109 kg (239 lb 13.8 oz), SpO2 91%, not currently breastfeeding.              Objective:  General Appearance:  Comfortable, chronically ill, obese-appearing, no acute distress   HEENT: Mucous membranes moist, atraumatic  Lungs:  Normal effort and normal respiratory rate.  Breath sounds clear to auscultation: No rhonchi/Rales.  No  respiratory distress.   Heart:  S1, S2 normal.   Abdomen: Abdomen is soft, nontender/nondistended  Extremities: Trace edema of bilateral lower extremities  Skin:  Warm and dry       Results Review:  "   Intake/Output:     Intake/Output Summary (Last 24 hours) at 7/4/2025 0743  Last data filed at 7/4/2025 0503  Gross per 24 hour   Intake 360 ml   Output 1650 ml   Net -1290 ml         DATA:  Radiology and Labs:  The following labs independently reviewed by me. Additional labs ordered for tomorrow a.m.  Interval notes, chart personally reviewed by me.   Old records independently reviewed showing history of CKD, CHF  Discussed with patient herself at bedside    Risk/ complexity of medical care/ medical decision making moderate risk of diuretic management      Labs:   Recent Results (from the past 24 hours)   Renal Function Panel    Collection Time: 07/03/25  9:08 AM    Specimen: Blood   Result Value Ref Range    Glucose 133 (H) 65 - 99 mg/dL    BUN 46.0 (H) 8.0 - 23.0 mg/dL    Creatinine 1.43 (H) 0.57 - 1.00 mg/dL    Sodium 137 136 - 145 mmol/L    Potassium 4.2 3.5 - 5.2 mmol/L    Chloride 105 98 - 107 mmol/L    CO2 17.3 (L) 22.0 - 29.0 mmol/L    Calcium 9.2 8.6 - 10.5 mg/dL    Albumin 3.5 3.5 - 5.2 g/dL    Phosphorus 4.3 2.5 - 4.5 mg/dL    Anion Gap 14.7 5.0 - 15.0 mmol/L    BUN/Creatinine Ratio 32.2 (H) 7.0 - 25.0    eGFR 40.0 (L) >60.0 mL/min/1.73   CBC Auto Differential    Collection Time: 07/03/25  9:08 AM    Specimen: Blood   Result Value Ref Range    WBC 3.83 3.40 - 10.80 10*3/mm3    RBC 3.10 (L) 3.77 - 5.28 10*6/mm3    Hemoglobin 10.4 (L) 12.0 - 15.9 g/dL    Hematocrit 32.0 (L) 34.0 - 46.6 %    .2 (H) 79.0 - 97.0 fL    MCH 33.5 (H) 26.6 - 33.0 pg    MCHC 32.5 31.5 - 35.7 g/dL    RDW 14.7 12.3 - 15.4 %    RDW-SD 55.4 (H) 37.0 - 54.0 fl    MPV 10.4 6.0 - 12.0 fL    Platelets 182 140 - 450 10*3/mm3    Neutrophil % 77.4 (H) 42.7 - 76.0 %    Lymphocyte % 12.5 (L) 19.6 - 45.3 %    Monocyte % 7.0 5.0 - 12.0 %    Eosinophil % 1.8 0.3 - 6.2 %    Basophil % 0.5 0.0 - 1.5 %    Immature Grans % 0.8 (H) 0.0 - 0.5 %    Neutrophils, Absolute 2.96 1.70 - 7.00 10*3/mm3    Lymphocytes, Absolute 0.48 (L) 0.70 - 3.10  10*3/mm3    Monocytes, Absolute 0.27 0.10 - 0.90 10*3/mm3    Eosinophils, Absolute 0.07 0.00 - 0.40 10*3/mm3    Basophils, Absolute 0.02 0.00 - 0.20 10*3/mm3    Immature Grans, Absolute 0.03 0.00 - 0.05 10*3/mm3    nRBC 0.8 (H) 0.0 - 0.2 /100 WBC   POC Glucose Once    Collection Time: 07/03/25 11:39 AM    Specimen: Blood   Result Value Ref Range    Glucose 119 70 - 130 mg/dL   POC Glucose Once    Collection Time: 07/03/25  6:13 PM    Specimen: Blood   Result Value Ref Range    Glucose 186 (H) 70 - 130 mg/dL   POC Glucose Once    Collection Time: 07/03/25 11:47 PM    Specimen: Blood   Result Value Ref Range    Glucose 113 70 - 130 mg/dL   POC Glucose Once    Collection Time: 07/04/25  5:24 AM    Specimen: Blood   Result Value Ref Range    Glucose 105 70 - 130 mg/dL       Radiology:  Pertinent radiology studies were reviewed as described above    Medications have been reviewed separately in chart review medication tab    ASSESSMENT:  REBECCA: Fairly stable post cath, may be near her new baseline    Hypotension, improved with fluids  Hypovolemia, improved after hydration  Chronic diastolic heart failure, euvolemic today but with orthopnea  Elevated troponin  Elevated lactate, likely due to hypotension with organ hypoperfusion, rule out acute infection  Immobility  Atrial fibrillation on anticoagulation with Eliquis  New renal mass        DISCUSSION/PLAN:   Cr stable today around 1.3-1.4. possibly this is her new baseline.     Normal coronaries on left heart cath, elevated filling pressures on right heart cath  Continue with oral bumex, she seems to be diuresing well, negative 1.2 liters, yet weight has remain the same.   Will give a dose of metolazone today, hopefully this will help orthopnea   Strict ins/outs. Daily weights, standing scale if possible.   On losartan. Monitor renal function and BP.     Unable to do CT scan to evaluate renal mass due to orthopnea that she states its due to her hernia.     Will follow         Molly Laurent MD  [facility]   Office phone number: [phone]  Answering service phone number: [phone]    07/04/25  07:43 EDT

## 2025-07-04 NOTE — PROGRESS NOTES
"    Patient Name: Marilu Avery  :1955  69 y.o.      Patient Care Team:  Esperanza Melton APRN as PCP - General (Nurse Practitioner)  Anatoly Jimenez MD as PCP - Family Medicine    Chief Complaint: follow up atrial fibrillation, Takotsubo cardiomyopathy     Interval History: in AF.  Heart rate 90-100s.  She feels about the same.       Objective   Vital Signs  Temp:  [97.3 °F (36.3 °C)-97.7 °F (36.5 °C)] 97.5 °F (36.4 °C)  Heart Rate:  [84-92] 90  Resp:  [16-20] 16  BP: ()/(62-86) 100/71    Intake/Output Summary (Last 24 hours) at 2025 1526  Last data filed at 2025 1320  Gross per 24 hour   Intake 360 ml   Output 1950 ml   Net -1590 ml     Flowsheet Rows      Flowsheet Row First Filed Value   Admission Height 177.8 cm (70\") Documented at 2025 1236   Admission Weight 104 kg (229 lb) Documented at 2025 1236            Physical Exam:   General Appearance:    Alert, cooperative, in no acute distress   Lungs:     Clear to auscultation.  Normal respiratory effort and rate.      Heart:    Regular rhythm and normal rate, normal S1 and S2, no murmurs, gallops or rubs.     Chest Wall:    No abnormalities observed   Abdomen:     Soft, nontender, positive bowel sounds.     Extremities:   no cyanosis, clubbing or edema.  No marked joint deformities.  Adequate musculoskeletal strength.       Results Review:    Results from last 7 days   Lab Units 25  0837   SODIUM mmol/L 137   POTASSIUM mmol/L 3.9   CHLORIDE mmol/L 103   CO2 mmol/L 19.9*   BUN mg/dL 45.0*   CREATININE mg/dL 1.33*   GLUCOSE mg/dL 125*   CALCIUM mg/dL 8.9     Results from last 7 days   Lab Units 25  0606   HSTROP T ng/L 475*     Results from last 7 days   Lab Units 25  0837   WBC 10*3/mm3 2.74*   HEMOGLOBIN g/dL 9.8*   HEMATOCRIT % 30.4*   PLATELETS 10*3/mm3 191                 Results from last 7 days   Lab Units 25  1541   CHOLESTEROL mg/dL 139   TRIGLYCERIDES mg/dL 111   HDL CHOL mg/dL 101*   LDL " CHOL mg/dL 19               Medication Review:   allopurinol, 100 mg, Oral, Daily  apixaban, 5 mg, Oral, BID  budesonide-formoterol, 2 puff, Inhalation, BID - RT  bumetanide, 1 mg, Oral, BID  famotidine, 20 mg, Oral, BID  levothyroxine, 75 mcg, Oral, Q AM  Lidocaine, 1 patch, Transdermal, Q24H  losartan, 12.5 mg, Oral, Q24H  metOLazone, 2.5 mg, Oral, Once  metoprolol succinate XL, 100 mg, Oral, Q12H  metoprolol succinate XL, 50 mg, Oral, Once  montelukast, 10 mg, Oral, Daily  mupirocin, 1 Application, Each Nare, BID  polyethylene glycol, 17 g, Oral, Daily  revefenacin, 175 mcg, Nebulization, Daily - RT  spironolactone, 25 mg, Oral, Daily              Assessment & Plan   Acute heart failure with reduced LVEF due to reduced LVEF, suspected stress induced cardiomyopathy. Cardiac cath 6/30/25 with normal coronary arteries and   pulmonary hypertension (group 2)  Chronic atrial fibrillation, on apixaban 5 mg BID. Beta blocker has been titrated to 100 mg BID. She got 250 mcg IV digoxin 7/2/25. HR stable today on the upper limit.  Continue current therapy with metoprolol  Weakness/mechanical fall  REBECCA , nephrology following.   Renal mass , needs CT when can lie flat.     Had to hold ARB due to low BP.  Continue beta-blocker.  Heart rates upper end of normal.    Nephrology managing diuretics.  Dose of metolazone today.    Will follow.     Oscar Womack MD  [facility]  07/04/25  15:26 EDT

## 2025-07-04 NOTE — PLAN OF CARE
Goal Outcome Evaluation:  Plan of Care Reviewed With: patient        Progress: no change     BP soft this shift, held PM metoprolol.   Problem: Adult Inpatient Plan of Care  Goal: Plan of Care Review  Outcome: Progressing  Flowsheets (Taken 7/4/2025 0546)  Progress: no change  Plan of Care Reviewed With: patient  Goal: Patient-Specific Goal (Individualized)  Outcome: Progressing  Goal: Absence of Hospital-Acquired Illness or Injury  Outcome: Progressing  Intervention: Identify and Manage Fall Risk  Description: Perform standard risk assessment on admission using a validated tool or comprehensive approach appropriate to the patient; reassess fall risk frequently, with change in status or transfer to another level of care.Communicate risk to interprofessional healthcare team; ensure fall risk visible cue.Determine need for increased observation, equipment and environmental modification, as well as use of supportive, nonskid footwear.Adjust safety measures to individual needs and identified risk factors.Reinforce the importance of active participation with fall risk prevention, safety, and physical activity with the patient and family.Perform regular intentional rounding to assess need for position change, pain assessment and personal needs, including assistance with toileting.  Recent Flowsheet Documentation  Taken 7/4/2025 0400 by Rhianna Meier, ANDRADE  Safety Promotion/Fall Prevention:   safety round/check completed   activity supervised   assistive device/personal items within reach   clutter free environment maintained   fall prevention program maintained   gait belt   lighting adjusted   mobility aid in reach   nonskid shoes/slippers when out of bed   room organization consistent   toileting scheduled  Taken 7/4/2025 0200 by Rhianna Meier RN  Safety Promotion/Fall Prevention:   safety round/check completed   activity supervised   assistive device/personal items within reach   clutter free environment maintained    fall prevention program maintained   gait belt   lighting adjusted   mobility aid in reach   nonskid shoes/slippers when out of bed   room organization consistent   toileting scheduled  Taken 7/4/2025 0000 by Rhianna Meier RN  Safety Promotion/Fall Prevention:   safety round/check completed   activity supervised   assistive device/personal items within reach   clutter free environment maintained   fall prevention program maintained   gait belt   lighting adjusted   mobility aid in reach   nonskid shoes/slippers when out of bed   room organization consistent   toileting scheduled  Taken 7/3/2025 2200 by Rhianna Meier RN  Safety Promotion/Fall Prevention:   safety round/check completed   activity supervised   assistive device/personal items within reach   clutter free environment maintained   fall prevention program maintained   gait belt   lighting adjusted   mobility aid in reach   nonskid shoes/slippers when out of bed   room organization consistent   toileting scheduled  Taken 7/3/2025 2000 by Rhianna Meier RN  Safety Promotion/Fall Prevention:   safety round/check completed   activity supervised   assistive device/personal items within reach   clutter free environment maintained   fall prevention program maintained   gait belt   lighting adjusted   mobility aid in reach   nonskid shoes/slippers when out of bed   room organization consistent   toileting scheduled  Intervention: Prevent Skin Injury  Description: Perform a screening for skin injury risk, such as pressure or moisture-associated skin damage on admission and at regular intervals throughout hospital stay.Keep all areas of skin (especially folds) clean and dry.Maintain adequate skin hydration.Relieve and redistribute pressure and protect bony prominences and skin at risk for injury; implement measures based on patient-specific risk factors.Match turning and repositioning schedule to clinical condition.Encourage weight shift frequently; assist with  reposition if unable to complete independently.Float heels off bed; avoid pressure on the Achilles tendon.Keep skin free from extended contact with medical devices.Optimize nutrition and hydration.Encourage functional activity and mobility, as early as tolerated.Use aids (e.g., slide boards, mechanical lift) during transfer.  Recent Flowsheet Documentation  Taken 7/4/2025 0400 by Rhianna Meier RN  Body Position: position changed independently  Taken 7/4/2025 0200 by Rhianna Meier RN  Body Position: position changed independently  Taken 7/4/2025 0000 by Rhianna Meier RN  Body Position: position changed independently  Taken 7/3/2025 2200 by Rhianna Meier RN  Body Position: position changed independently  Taken 7/3/2025 2000 by Rhianna Meier RN  Body Position: position changed independently  Intervention: Prevent Infection  Description: Maintain skin and mucous membrane integrity; promote hand, oral and pulmonary hygiene.Optimize fluid balance, nutrition, sleep and glycemic control to maximize infection resistance.Identify potential sources of infection early to prevent or mitigate progression of infection (e.g., wound, lines, devices).Evaluate ongoing need for invasive devices; remove promptly when no longer indicated.Review vaccination status.  Recent Flowsheet Documentation  Taken 7/4/2025 0400 by Rhianna Meier RN  Infection Prevention:   environmental surveillance performed   hand hygiene promoted   equipment surfaces disinfected   personal protective equipment utilized   rest/sleep promoted   single patient room provided  Taken 7/4/2025 0200 by Rhianna Meier RN  Infection Prevention:   environmental surveillance performed   hand hygiene promoted   equipment surfaces disinfected   personal protective equipment utilized   rest/sleep promoted   single patient room provided  Taken 7/4/2025 0000 by Rhianna Meier RN  Infection Prevention:   environmental surveillance performed   hand hygiene promoted   equipment  surfaces disinfected   personal protective equipment utilized   rest/sleep promoted   single patient room provided  Taken 7/3/2025 2200 by Rhianna Meier RN  Infection Prevention:   environmental surveillance performed   hand hygiene promoted   equipment surfaces disinfected   personal protective equipment utilized   rest/sleep promoted   single patient room provided  Taken 7/3/2025 2000 by Rhianna Meier RN  Infection Prevention:   environmental surveillance performed   hand hygiene promoted   equipment surfaces disinfected   personal protective equipment utilized   rest/sleep promoted   single patient room provided  Goal: Optimal Comfort and Wellbeing  Outcome: Progressing  Intervention: Provide Person-Centered Care  Description: Use a family-focused approach to care; encourage support system presence and participation.Develop trust and rapport by proactively providing information, encouraging questions, addressing concerns and offering reassurance.Acknowledge emotional response to hospitalization.Recognize and utilize personal coping strategies and strengths; develop goals via shared decision-making.Honor spiritual and cultural preferences.  Recent Flowsheet Documentation  Taken 7/4/2025 0000 by Rhianna Meier RN  Trust Relationship/Rapport:   care explained   choices provided   emotional support provided   empathic listening provided   questions answered   reassurance provided   questions encouraged   thoughts/feelings acknowledged  Taken 7/3/2025 2000 by Rhianna Meier RN  Trust Relationship/Rapport:   care explained   choices provided   emotional support provided   empathic listening provided   questions answered   questions encouraged   reassurance provided   thoughts/feelings acknowledged  Goal: Readiness for Transition of Care  Outcome: Progressing     Problem: Fall Injury Risk  Goal: Absence of Fall and Fall-Related Injury  Outcome: Progressing  Intervention: Identify and Manage Contributors  Description:  Develop a fall prevention plan, considering patient-centered interventions and family/caregiver involvement; identify and address patient's facilitators and barriers.Provide reorientation, appropriate sensory stimulation and routines with changes in mental status to decrease risk of fall.Promote use of personal vision and auditory aids.Assess assistance level required for safe and effective self-care; provide support as needed, such as toileting and mobilization. For age 65 and older, implement timed toileting with assistance.Encourage physical activity, such as performance of mobility and self-care at highest level of patient ability, multicomponent exercise program and provision of appropriate assistive devices.If fall occurs, assess the severity of injury; implement fall injury protocol. Determine the cause and revise fall injury prevention plan.Regularly review and advocate for medication adjustment to decrease fall risk; consider administration times, polypharmacy and age.Balance adequate pain management with potential for oversedation.  Recent Flowsheet Documentation  Taken 7/4/2025 0400 by Rhianna Meier RN  Medication Review/Management: medications reviewed  Taken 7/4/2025 0200 by Rhianna Meier RN  Medication Review/Management: medications reviewed  Taken 7/4/2025 0000 by Rhianna Meier RN  Medication Review/Management: medications reviewed  Taken 7/3/2025 2200 by Rhianna Meier RN  Medication Review/Management: medications reviewed  Taken 7/3/2025 2000 by Rhianna Meier RN  Medication Review/Management: medications reviewed  Intervention: Promote Injury-Free Environment  Description: Provide a safe, barrier-free environment that encourages independent activity.Keep care area uncluttered and well-lighted.Determine need for increased observation or monitoring.Avoid use of devices that minimize mobility, such as restraints or indwelling urinary catheter.  Recent Flowsheet Documentation  Taken 7/4/2025 0400 by  Meier, Rhianna, RN  Safety Promotion/Fall Prevention:   safety round/check completed   activity supervised   assistive device/personal items within reach   clutter free environment maintained   fall prevention program maintained   gait belt   lighting adjusted   mobility aid in reach   nonskid shoes/slippers when out of bed   room organization consistent   toileting scheduled  Taken 7/4/2025 0200 by Rhianna Meier RN  Safety Promotion/Fall Prevention:   safety round/check completed   activity supervised   assistive device/personal items within reach   clutter free environment maintained   fall prevention program maintained   gait belt   lighting adjusted   mobility aid in reach   nonskid shoes/slippers when out of bed   room organization consistent   toileting scheduled  Taken 7/4/2025 0000 by Rhianna Meier RN  Safety Promotion/Fall Prevention:   safety round/check completed   activity supervised   assistive device/personal items within reach   clutter free environment maintained   fall prevention program maintained   gait belt   lighting adjusted   mobility aid in reach   nonskid shoes/slippers when out of bed   room organization consistent   toileting scheduled  Taken 7/3/2025 2200 by Rhianna Meier RN  Safety Promotion/Fall Prevention:   safety round/check completed   activity supervised   assistive device/personal items within reach   clutter free environment maintained   fall prevention program maintained   gait belt   lighting adjusted   mobility aid in reach   nonskid shoes/slippers when out of bed   room organization consistent   toileting scheduled  Taken 7/3/2025 2000 by Rhianna Meier RN  Safety Promotion/Fall Prevention:   safety round/check completed   activity supervised   assistive device/personal items within reach   clutter free environment maintained   fall prevention program maintained   gait belt   lighting adjusted   mobility aid in reach   nonskid shoes/slippers when out of bed   room  organization consistent   toileting scheduled     Problem: Skin Injury Risk Increased  Goal: Skin Health and Integrity  Outcome: Progressing  Intervention: Optimize Skin Protection  Description: Perform a full pressure injury risk assessment, as indicated by screening, upon admission to care unit.Reassess skin (full inspection and injury risk, including skin temperature, consistency and color) frequently (e.g., scheduled interval, with change in condition) to provide optimal early detection and prevention.Maintain adequate tissue perfusion (e.g., encourage fluid balance; avoid crossing legs, constrictive clothing or devices) to promote tissue oxygenation.Maintain head of bed at lowest degree of elevation tolerated, considering medical condition and other restrictions. Use positioning supports to prevent sliding and friction. Consider low friction textiles.Avoid positioning onto an area that remains reddened or on bony prominences.Minimize incontinence and moisture (e.g., toileting schedule; moisture-wicking pad, diaper or incontinence collection device; skin moisture barrier).Cleanse skin promptly and gently, when soiled, utilizing a pH-balanced cleanser.Relieve and redistribute pressure (e.g., scheduled position changes, weight shifts, use of support surface, medical device repositioning, protective dressing application, use of positioning device, microclimate control, use of pressure-injury-monitorEncourage increased activity, such as sitting in a chair at the bedside or early mobilization, when able to tolerate. Avoid prolonged sitting.  Recent Flowsheet Documentation  Taken 7/4/2025 0400 by Rhianna Meier RN  Head of Bed (HOB) Positioning: HOB elevated  Taken 7/4/2025 0200 by Rhianna Meier RN  Head of Bed (HOB) Positioning: HOB elevated  Taken 7/4/2025 0000 by Rhianna Meier RN  Head of Bed (HOB) Positioning: HOB elevated  Taken 7/3/2025 2200 by Rhianna Meier RN  Head of Bed (HOB) Positioning: HOB  elevated  Taken 7/3/2025 2000 by Rhianna Meier RN  Head of Bed (HOB) Positioning: HOB elevated     Problem: Pain Acute  Goal: Optimal Pain Control and Function  Outcome: Progressing  Intervention: Optimize Psychosocial Wellbeing  Description: Facilitate patient's self-control over pain by providing pain information and allowing choices in treatment.Consider and address emotional response to pain; express compassion and reassurance.Explore and promote use of coping strategies; address barriers to successful coping.Evaluate and assist with psychosocial, cultural and spiritual factors impacting pain.Assess for risk factors for developing chronic pain, such as depression, fear, pain avoidance and pain catastrophizing.Modify pain perception using techniques, such as distraction, mindfulness, guided imagery, meditation or music.Consider referral for ongoing coping support, such as education, relaxation training and role of thoughts.  Recent Flowsheet Documentation  Taken 7/4/2025 0000 by Rhianna Meier RN  Diversional Activities: television  Taken 7/3/2025 2000 by Rhianna Meier RN  Diversional Activities: television  Intervention: Prevent or Manage Pain  Description: Evaluate pain level, effect of treatment and patient response at regular intervals.Minimize painful stimuli; coordinate care and adjust environment (e.g., light, noise, unnecessary movement); promote sleep/rest.Match pharmacologic analgesia to severity and type of pain mechanism (e.g., neuropathic, muscle, inflammatory); consider multimodal approach.Provide medication at regular intervals; titrate to patient response; premedicate for painful procedures.Manage breakthrough pain with additional doses; consider rotation or switching medication.Monitor for signs of substance tolerance (increased dose to reach desired effect, decreased effect with same dose).Manage medication-induced adverse events and side effects.Provide multimodal interventions, such as  physical activity, therapeutic exercise, yoga, TENS (transcutaneous electrical nerve stimulation) and manual therapy.Train in functional activity modifications, such as body mechanics, posture, ergonomics, energy conservation and activity pacing.Consider addition of complementary or alternative therapy, such as acupuncture, hypnosis or therapeutic touch.  Recent Flowsheet Documentation  Taken 7/4/2025 0400 by Rhianna Meier RN  Medication Review/Management: medications reviewed  Taken 7/4/2025 0200 by Rhianna Meier RN  Medication Review/Management: medications reviewed  Taken 7/4/2025 0000 by Rhianna Meier RN  Medication Review/Management: medications reviewed  Taken 7/3/2025 2200 by Rhianna Meier RN  Medication Review/Management: medications reviewed  Taken 7/3/2025 2000 by Rhianna Meier RN  Medication Review/Management: medications reviewed     Problem: Cardiac Catheterization (Diagnostic/Interventional)  Goal: Absence of Bleeding  Outcome: Progressing  Goal: Absence of Contrast-Induced Injury  Outcome: Progressing  Goal: Stable Heart Rate and Rhythm  Outcome: Progressing  Goal: Absence of Embolism Signs and Symptoms  Outcome: Progressing  Goal: Anesthesia/Sedation Recovery  Outcome: Progressing  Intervention: Optimize Anesthesia Recovery  Description: Assess and monitor airway, breathing and circulation; maintain close surveillance for deterioration.Implement continuous monitoring, such as cardiorespiratory, blood pressure, temperature, pulse oximetry and capnography.Elevate head of bed, if able; facilitate regular position changes.Assess neurocognitive function and risks that may lead to postoperative delirium, such as decreased level of consciousness, pain and agitation; offer reassurance; answer questions.Assess and monitor neurovascular and neuromuscular function, such as motor strength, tone, posture, peripheral pulses and extremity sensation; protect areas of decreased sensation from heat, cold, medical  devices or objects.Individualize frequency and intensity of monitoring based on sedation or anesthesia administered, identified risk factors, ongoing assessment and organizational protocol.Prepare for administration of pharmacologic therapy, such as reversal agent, antiemetic or antipruritic medication, to manage sedation or anesthesia effects.Adjust environment to maintain safety (e.g., fall precautions, safety equipment).  Recent Flowsheet Documentation  Taken 7/4/2025 0400 by Rhianna Meier RN  Safety Promotion/Fall Prevention:   safety round/check completed   activity supervised   assistive device/personal items within reach   clutter free environment maintained   fall prevention program maintained   gait belt   lighting adjusted   mobility aid in reach   nonskid shoes/slippers when out of bed   room organization consistent   toileting scheduled  Taken 7/4/2025 0200 by Rhianna Meier RN  Safety Promotion/Fall Prevention:   safety round/check completed   activity supervised   assistive device/personal items within reach   clutter free environment maintained   fall prevention program maintained   gait belt   lighting adjusted   mobility aid in reach   nonskid shoes/slippers when out of bed   room organization consistent   toileting scheduled  Taken 7/4/2025 0000 by Rhianna Meier RN  Safety Promotion/Fall Prevention:   safety round/check completed   activity supervised   assistive device/personal items within reach   clutter free environment maintained   fall prevention program maintained   gait belt   lighting adjusted   mobility aid in reach   nonskid shoes/slippers when out of bed   room organization consistent   toileting scheduled  Taken 7/3/2025 2200 by Rhianna Meier RN  Safety Promotion/Fall Prevention:   safety round/check completed   activity supervised   assistive device/personal items within reach   clutter free environment maintained   fall prevention program maintained   gait belt   lighting  adjusted   mobility aid in reach   nonskid shoes/slippers when out of bed   room organization consistent   toileting scheduled  Taken 7/3/2025 2000 by Rhianna Meier RN  Safety Promotion/Fall Prevention:   safety round/check completed   activity supervised   assistive device/personal items within reach   clutter free environment maintained   fall prevention program maintained   gait belt   lighting adjusted   mobility aid in reach   nonskid shoes/slippers when out of bed   room organization consistent   toileting scheduled  Goal: Optimal Pain Control and Function  Outcome: Progressing  Intervention: Prevent or Manage Pain  Description: Set pain management goals; mutually determine pain management plan and review plan regularly.Use a consistent, validated tool for pain assessment, including function and quality of life; evaluate pain level, effect of treatment and patient's response at regular intervals.Match pharmacologic analgesia to severity and type of pain mechanism; evaluate risk for opioid use and dependence; consider multimodal approach and titrate to patient response.Provide around-the-clock analgesic agents and nonpharmacologic therapies to keep pain levels in control.Manage medication-induced effects, such as constipation, pruritus, nausea, urinary retention, somnolence and dizziness.Provide nonpharmacologic interventions, such as cold application, positioning, or massage; consider addition of complementary or alternative therapy.Consider and address emotional response to pain.Modify pain perception using techniques, such as distraction, virtual reality, mindfulness, guided imagery, meditation or music.Premedicate for anticipated procedure and activity (e.g., precatheterization, prior to sheath removal).Minimize bedrest following femoral sheath removal (e.g., 2 to 4 hours if no bleeding).Prevent back pain/discomfort during bedrest (e.g., log roll repositioning, head elevation to 30 degrees, analgesic  medication).  Recent Flowsheet Documentation  Taken 7/4/2025 0000 by Rhianna Meier RN  Diversional Activities: television  Taken 7/3/2025 2000 by Rhianna Meier RN  Diversional Activities: television  Goal: Absence of Vascular Access Complication  Outcome: Progressing  Intervention: Prevent and Manage Access Complications  Description: Anatoly pulses on appropriate extremity prior to procedure.Assess for adequate collateral blood flow to hand prior to and following procedure if indicated.Position and adjust activity to minimize insertion site complications.Avoid head of bed elevation greater than 30 degrees if femoral sheath present.Anticipate need for procedural intervention (e.g., vascular repair, pericardiocentesis).Discontinue prophylactic antimicrobial agent within 24 hours after procedure, as directed; promote antimicrobial stewardship.Identify early signs of sepsis, such as increased heart rate and decreased blood pressure, as well as changes in mental state, respiratory pattern or peripheral perfusion.Prepare for rapid sepsis management, including lactate level, intravenous access, fluid administration and oxygen therapy.  Recent Flowsheet Documentation  Taken 7/4/2025 0400 by Rhianna Meier RN  Head of Bed (HOB) Positioning: HOB elevated  Taken 7/4/2025 0200 by Rhianna Meier RN  Head of Bed (HOB) Positioning: HOB elevated  Taken 7/4/2025 0000 by Rhianna Meier RN  Head of Bed (HOB) Positioning: HOB elevated  Taken 7/3/2025 2200 by Rhianna Meier RN  Head of Bed (HOB) Positioning: HOB elevated  Taken 7/3/2025 2000 by Rhianna Meier RN  Head of Bed (HOB) Positioning: HOB elevated

## 2025-07-04 NOTE — PROGRESS NOTES
Name: Marilu Avery ADMIT: 2025   : 1955  PCP: Esperanza Melton APRN    MRN: [id number] LOS: 7 days   AGE/SEX: 69 y.o. female  ROOM: [facility]     Subjective   Subjective     No events overnight. Having some neck discomfort. Her volume status is somewhat improved       Objective   Objective   Vital Signs  Temp:  [97.3 °F (36.3 °C)-97.7 °F (36.5 °C)] 97.4 °F (36.3 °C)  Heart Rate:  [92] 92  Resp:  [16-20] 16  BP: ()/(62-86) 100/62  SpO2:  [91 %-100 %] 91 %  on  Flow (L/min) (Oxygen Therapy):  [2] 2;   Device (Oxygen Therapy): room air  Body mass index is 34.42 kg/m².  Physical Exam  Constitutional:       General: She is not in acute distress.     Appearance: She is not toxic-appearing.   Cardiovascular:      Rate and Rhythm: Normal rate and regular rhythm.      Heart sounds: Normal heart sounds.   Pulmonary:      Effort: Pulmonary effort is normal.      Breath sounds: Normal breath sounds.   Abdominal:      General: Bowel sounds are normal.      Palpations: Abdomen is soft.      Hernia: A hernia is present.   Musculoskeletal:      Right lower leg: Edema present.      Left lower leg: Edema present.   Neurological:      Mental Status: She is alert.   Psychiatric:         Mood and Affect: Mood normal.         Behavior: Behavior normal.         Results Review     I reviewed the patient's new clinical results.  Results from last 7 days   Lab Units 25  0825  0925  0552   WBC 10*3/mm3 2.74* 3.83 3.70 3.83   HEMOGLOBIN g/dL 9.8* 10.4* 9.6* 9.7*   PLATELETS 10*3/mm3 191 182 155 154     Results from last 7 days   Lab Units 25  0837 25  0908 25  1808   SODIUM mmol/L 137 137 134*  134*  134* 137   POTASSIUM mmol/L 3.9 4.2 4.2  4.2  4.2 4.6   CHLORIDE mmol/L 103 105 104  105  105 106   CO2 mmol/L 19.9* 17.3* 14.5*  15.0*  15.0* 17.2*   BUN mg/dL 45.0* 46.0* 47.0*  46.0*  46.0* 46.0*   CREATININE mg/dL 1.33* 1.43* 1.40*   1.33*  1.33* 1.54*   GLUCOSE mg/dL 125* 133* 115*  113*  113* 122*   Estimated Creatinine Clearance: 54.1 mL/min (A) (by C-G formula based on SCr of 1.33 mg/dL (H)).  Results from last 7 days   Lab Units 07/04/25  0837 07/03/25  0908 07/02/25  0752 07/01/25  0552   ALBUMIN g/dL 3.1* 3.5 3.3* 3.1*     Results from last 7 days   Lab Units 07/04/25  0837 07/03/25  0908 07/02/25  0752 07/01/25  1808 07/01/25  1219 07/01/25  0552   CALCIUM mg/dL 8.9 9.2 9.0  9.2  9.2 9.0   < > 9.2  9.2   ALBUMIN g/dL 3.1* 3.5 3.3*  --   --  3.1*   PHOSPHORUS mg/dL 4.1 4.3 3.9  --   --  3.9    < > = values in this interval not displayed.     Results from last 7 days   Lab Units 06/27/25  1544   LACTATE mmol/L 1.7     COVID19   Date Value Ref Range Status   08/24/2021 Not Detected Not Detected - Ref. Range Final   06/13/2021 Not Detected Not Detected - Ref. Range Final     Glucose   Date/Time Value Ref Range Status   07/04/2025 1122 107 70 - 130 mg/dL Final   07/04/2025 0524 105 70 - 130 mg/dL Final   07/03/2025 2347 113 70 - 130 mg/dL Final   07/03/2025 1813 186 (H) 70 - 130 mg/dL Final   07/03/2025 1139 119 70 - 130 mg/dL Final   07/03/2025 0524 123 70 - 130 mg/dL Final   07/03/2025 0013 129 70 - 130 mg/dL Final       XR Chest 1 View  Narrative: XR CHEST 1 VW-     HISTORY: Female who is 69 years-old, dyspnea     TECHNIQUE: Frontal views of the chest     COMPARISON: 6/27/2025     FINDINGS: The heart size is borderline. Pulmonary vasculature is  unremarkable. Small atelectasis or infiltrate at the bases, follow-up  advised. No large pleural effusion, or pneumothorax. No acute osseous  process.     Impression: As described.     This report was finalized on 7/2/2025 3:40 PM by Dr. Herrera Mistry M.D on Workstation: [id number]       Scheduled Medications  allopurinol, 100 mg, Oral, Daily  apixaban, 5 mg, Oral, BID  budesonide-formoterol, 2 puff, Inhalation, BID - RT  bumetanide, 1 mg, Oral, BID  famotidine, 20 mg, Oral,  BID  levothyroxine, 75 mcg, Oral, Q AM  Lidocaine, 1 patch, Transdermal, Q24H  losartan, 12.5 mg, Oral, Q24H  metOLazone, 2.5 mg, Oral, Once  metoprolol succinate XL, 100 mg, Oral, Q12H  metoprolol succinate XL, 50 mg, Oral, Once  montelukast, 10 mg, Oral, Daily  mupirocin, 1 Application, Each Nare, BID  polyethylene glycol, 17 g, Oral, Daily  revefenacin, 175 mcg, Nebulization, Daily - RT  spironolactone, 25 mg, Oral, Daily    Infusions   Diet  Diet: Cardiac; Healthy Heart (2-3 Na+); Fluid Consistency: Thin (IDDSI 0)       Assessment/Plan     Active Hospital Problems    Diagnosis  POA    **Hypotension [I95.9]  Yes    Hyponatremia [E87.1]  Unknown    Takotsubo cardiomyopathy [I51.81]  Unknown    Obesity (BMI 30-39.9) [E66.9]  Yes    Elevated troponin [R79.89]  Yes    Chronic alcohol use [F10.90]  Yes    Chronic anticoagulation [Z79.01]  Not Applicable    Tobacco abuse [Z72.0]  Yes    COPD (chronic obstructive pulmonary disease) [J44.9]  Yes    REBECCA (acute kidney injury) [N17.9]  Yes    Atrial fibrillation, persistent [I48.19]  Yes    HTN (hypertension) [I10]  Yes    HLD (hyperlipidemia) [E78.5]  Yes    Ventral hernia [K43.9]  Yes      Resolved Hospital Problems   No resolved problems to display.       69 y.o. female admitted with Hypotension.    69 year old woman with CKD 3 who presented initially with shortness of breath on exertion and lower extremity swelling and was found to have stress cardiomyopathy, an REBECCA on CKD 3, and hyponatremia     Acute systolic heart failure due to stress cardiomyopathy-initially placed in the ICU due to hypotension, but did not require pressors. Cardiac catheterization showed normal coronaries. Now on bumex, metoprolol, spironolactone, losartan. Nephrology is giving a 1x dose of metolazone   REBECCA on CKD 3-improved with fluid resuscitation   Hyponatremia-resolved with fluid resuscitation. Now back on diuretics  Chronic afib-eliquis,  metoprolol  Hypothyroidism-synthroid  COPD/asthma-lama/laba/ics, montelukast   Gout-allopurinol  LISA-pap if available  Back pain-voltaren gel, lidocaine patch, tylenol  Left renal mass-she's unable to lie flat currently for a CT scan to better characterize the mass. She'll need to be referred to urology at discharge.  Eliquis (home med) for DVT prophylaxis.  Full code.  Discussed with patient and nursing staff.  Anticipate discharge home with home health when cleared by consultants.      Rogerio Devlin MD  [facility]  07/04/25  13:14 EDT

## 2025-07-04 NOTE — PROGRESS NOTES
[facility] INPATIENT PROGRESS NOTE         [facility] CORONARY CARE    2025      PATIENT IDENTIFICATION:  Name: Marilu Avery ADMIT: 2025   : 1955  PCP: Esperanza eMlton APRN    MRN: [id number] LOS: 7 days   AGE/SEX: 69 y.o. female  ROOM: [facility]                     LOS 7    Reason for visit: Weakness      SUBJECTIVE:      Resting comfortably.  Working on stabilizing blood pressure.  Not requiring hemodynamic drips.  Denies productive cough, chest pain or shortness of breath at rest.      Objective   OBJECTIVE:    Vital Sign Min/Max for last 24 hours  Temp  Min: 97.3 °F (36.3 °C)  Max: 97.7 °F (36.5 °C)   BP  Min: 96/72  Max: 123/86   Pulse  Min: 92  Max: 99   Resp  Min: 16  Max: 20   SpO2  Min: 91 %  Max: 100 %   No data recorded   Weight  Min: 109 kg (239 lb 13.8 oz)  Max: 109 kg (239 lb 13.8 oz)    Vitals:    25 2301 25 0328 25 0545 25 0816   BP: 96/72 101/68  100/62   BP Location: Left arm Left arm  Left arm   Patient Position: Lying Lying  Sitting   Pulse:    92   Resp: 19 17  16   Temp: 97.5 °F (36.4 °C) 97.7 °F (36.5 °C)  97.4 °F (36.3 °C)   TempSrc: Oral Oral  Oral   SpO2:  91%     Weight:   109 kg (239 lb 13.8 oz)    Height:                25  0100 25  0536 25  0545   Weight: 109 kg (239 lb 6.7 oz) 109 kg (240 lb 4.8 oz) 109 kg (239 lb 13.8 oz)       Body mass index is 34.42 kg/m².                          Body mass index is 34.42 kg/m².    Intake/Output Summary (Last 24 hours) at 2025 1005  Last data filed at 2025 0900  Gross per 24 hour   Intake 360 ml   Output 1650 ml   Net -1290 ml         Exam:  GEN:  No distress, appears stated age  EYES:   PERRL, anicteric sclerae  ENT:    External ears/nose normal, OP clear  NECK:  No adenopathy, midline trachea  LUNGS: Normal chest on inspection, palpation and auscultation  CV:  Normal S1S2, without murmur  ABD:  Nontender, nondistended, no hepatosplenomegaly, +BS  EXT:  No edema.   No cyanosis or clubbing.  No mottling and normal cap refill.    Assessment     Scheduled meds:  allopurinol, 100 mg, Oral, Daily  apixaban, 5 mg, Oral, BID  budesonide-formoterol, 2 puff, Inhalation, BID - RT  bumetanide, 1 mg, Oral, BID  famotidine, 20 mg, Oral, BID  levothyroxine, 75 mcg, Oral, Q AM  Lidocaine, 1 patch, Transdermal, Q24H  losartan, 12.5 mg, Oral, Q24H  metoprolol succinate XL, 100 mg, Oral, Q12H  metoprolol succinate XL, 50 mg, Oral, Once  montelukast, 10 mg, Oral, Daily  mupirocin, 1 Application, Each Nare, BID  polyethylene glycol, 17 g, Oral, Daily  revefenacin, 175 mcg, Nebulization, Daily - RT  spironolactone, 25 mg, Oral, Daily      IV meds:                           Data Review:  Results from last 7 days   Lab Units 07/04/25  0837 07/03/25  0908 07/02/25  0752 07/01/25  1808 07/01/25  1219   SODIUM mmol/L 137 137 134*  134*  134* 137 137   POTASSIUM mmol/L 3.9 4.2 4.2  4.2  4.2 4.6 4.6   CHLORIDE mmol/L 103 105 104  105  105 106 106   CO2 mmol/L 19.9* 17.3* 14.5*  15.0*  15.0* 17.2* 16.4*   BUN mg/dL 45.0* 46.0* 47.0*  46.0*  46.0* 46.0* 41.0*   CREATININE mg/dL 1.33* 1.43* 1.40*  1.33*  1.33* 1.54* 1.44*   GLUCOSE mg/dL 125* 133* 115*  113*  113* 122* 123*   CALCIUM mg/dL 8.9 9.2 9.0  9.2  9.2 9.0 9.4         Estimated Creatinine Clearance: 54.1 mL/min (A) (by C-G formula based on SCr of 1.33 mg/dL (H)).  Results from last 7 days   Lab Units 07/04/25  0837 07/03/25  0908 07/02/25  0752 07/01/25  0552 06/30/25  1418 06/30/25  1413 06/30/25  0615   WBC 10*3/mm3 2.74* 3.83 3.70 3.83  --   --  4.05   HEMOGLOBIN g/dL 9.8* 10.4* 9.6* 9.7*  --   --  9.4*   HEMOGLOBIN, POC g/dL  --   --   --   --  9.9*   < >  --    PLATELETS 10*3/mm3 191 182 155 154  --   --  125*    < > = values in this interval not displayed.     Results from last 7 days   Lab Units 06/27/25  1244   INR  1.80*     Results from last 7 days   Lab Units 06/27/25  1244   ALT (SGPT) U/L 12   AST (SGOT) U/L 21      Results from last 7 days   Lab Units 06/30/25  1418 06/30/25  1413   PH, ARTERIAL pH units 7.340* 7.410     Results from last 7 days   Lab Units 06/27/25  1544 06/27/25  1244   PROCALCITONIN ng/mL  --  0.32*   LACTATE mmol/L 1.7 2.5*         Glucose   Date/Time Value Ref Range Status   07/04/2025 0524 105 70 - 130 mg/dL Final   07/03/2025 2347 113 70 - 130 mg/dL Final   07/03/2025 1813 186 (H) 70 - 130 mg/dL Final   07/03/2025 1139 119 70 - 130 mg/dL Final   07/03/2025 0524 123 70 - 130 mg/dL Final   07/03/2025 0013 129 70 - 130 mg/dL Final   07/02/2025 1807 136 (H) 70 - 130 mg/dL Final         Imaging reviewed  Chest x-ray 6/27 reviewed    CT head 6/27 reviewed    Heart cath 6/30 reviewed: Wedge pressure elevated at 26.  Normal coronaries.  Severely depressed cardiac output.      Microbiology reviewed            Active Hospital Problems    Diagnosis  POA    **Hypotension [I95.9]  Yes    Hyponatremia [E87.1]  Unknown    Takotsubo cardiomyopathy [I51.81]  Unknown    Obesity (BMI 30-39.9) [E66.9]  Yes    Elevated troponin [R79.89]  Yes    Chronic alcohol use [F10.90]  Yes    Chronic anticoagulation [Z79.01]  Not Applicable    Tobacco abuse [Z72.0]  Yes    COPD (chronic obstructive pulmonary disease) [J44.9]  Yes    REBECCA (acute kidney injury) [N17.9]  Yes    Atrial fibrillation, persistent [I48.19]  Yes    HTN (hypertension) [I10]  Yes    HLD (hyperlipidemia) [E78.5]  Yes    Ventral hernia [K43.9]  Yes      Resolved Hospital Problems   No resolved problems to display.         ASSESSMENT:  Weakness  Acute kidney injury  Hyponatremia: Improved  Non-ST elevation MI  Atrial fibrillation  Hypothyroidism  Asthma/COPD  Tobacco use  Pulm hypertension: WHO group II  LISA      PLAN:  Weakness and hypotension has improved.  Downgraded to telemetry.  Defer nonpulmonary medical management to hospitalist service.  They have taken over as attending.  Cardiac cath results reviewed.  Likely home soon.  Sleep study as out patient.        Anatoly Mcdaniel MD  Pulmonary and Critical Care Medicine  [facility], [facility]  7/4/2025    10:05 EDT

## 2025-07-05 LAB
ALBUMIN SERPL-MCNC: 3 G/DL (ref 3.5–5.2)
ANION GAP SERPL CALCULATED.3IONS-SCNC: 13.8 MMOL/L (ref 5–15)
BASOPHILS # BLD AUTO: 0.01 10*3/MM3 (ref 0–0.2)
BASOPHILS NFR BLD AUTO: 0.4 % (ref 0–1.5)
BUN SERPL-MCNC: 43 MG/DL (ref 8–23)
BUN/CREAT SERPL: 31.9 (ref 7–25)
CALCIUM SPEC-SCNC: 9 MG/DL (ref 8.6–10.5)
CHLORIDE SERPL-SCNC: 103 MMOL/L (ref 98–107)
CO2 SERPL-SCNC: 19.2 MMOL/L (ref 22–29)
CORTIS SERPL-MCNC: 2.52 MCG/DL
CREAT SERPL-MCNC: 1.35 MG/DL (ref 0.57–1)
DEPRECATED RDW RBC AUTO: 54 FL (ref 37–54)
EGFRCR SERPLBLD CKD-EPI 2021: 42.9 ML/MIN/1.73
EOSINOPHIL # BLD AUTO: 0.06 10*3/MM3 (ref 0–0.4)
EOSINOPHIL NFR BLD AUTO: 2.3 % (ref 0.3–6.2)
ERYTHROCYTE [DISTWIDTH] IN BLOOD BY AUTOMATED COUNT: 14.1 % (ref 12.3–15.4)
GLUCOSE BLDC GLUCOMTR-MCNC: 103 MG/DL (ref 70–130)
GLUCOSE BLDC GLUCOMTR-MCNC: 107 MG/DL (ref 70–130)
GLUCOSE BLDC GLUCOMTR-MCNC: 116 MG/DL (ref 70–130)
GLUCOSE BLDC GLUCOMTR-MCNC: 125 MG/DL (ref 70–130)
GLUCOSE BLDC GLUCOMTR-MCNC: 150 MG/DL (ref 70–130)
GLUCOSE SERPL-MCNC: 112 MG/DL (ref 65–99)
HCT VFR BLD AUTO: 32 % (ref 34–46.6)
HGB BLD-MCNC: 10 G/DL (ref 12–15.9)
IMM GRANULOCYTES # BLD AUTO: 0.01 10*3/MM3 (ref 0–0.05)
IMM GRANULOCYTES NFR BLD AUTO: 0.4 % (ref 0–0.5)
LYMPHOCYTES # BLD AUTO: 0.37 10*3/MM3 (ref 0.7–3.1)
LYMPHOCYTES NFR BLD AUTO: 14.3 % (ref 19.6–45.3)
MCH RBC QN AUTO: 32.8 PG (ref 26.6–33)
MCHC RBC AUTO-ENTMCNC: 31.3 G/DL (ref 31.5–35.7)
MCV RBC AUTO: 104.9 FL (ref 79–97)
MONOCYTES # BLD AUTO: 0.2 10*3/MM3 (ref 0.1–0.9)
MONOCYTES NFR BLD AUTO: 7.8 % (ref 5–12)
NEUTROPHILS NFR BLD AUTO: 1.93 10*3/MM3 (ref 1.7–7)
NEUTROPHILS NFR BLD AUTO: 74.8 % (ref 42.7–76)
NRBC BLD AUTO-RTO: 0 /100 WBC (ref 0–0.2)
PHOSPHATE SERPL-MCNC: 3.5 MG/DL (ref 2.5–4.5)
PLATELET # BLD AUTO: 191 10*3/MM3 (ref 140–450)
PMV BLD AUTO: 10.1 FL (ref 6–12)
POTASSIUM SERPL-SCNC: 4 MMOL/L (ref 3.5–5.2)
RBC # BLD AUTO: 3.05 10*6/MM3 (ref 3.77–5.28)
SODIUM SERPL-SCNC: 136 MMOL/L (ref 136–145)
WBC NRBC COR # BLD AUTO: 2.58 10*3/MM3 (ref 3.4–10.8)

## 2025-07-05 PROCEDURE — 85025 COMPLETE CBC W/AUTO DIFF WBC: CPT | Performed by: INTERNAL MEDICINE

## 2025-07-05 PROCEDURE — 94664 DEMO&/EVAL PT USE INHALER: CPT

## 2025-07-05 PROCEDURE — 97530 THERAPEUTIC ACTIVITIES: CPT

## 2025-07-05 PROCEDURE — 80069 RENAL FUNCTION PANEL: CPT | Performed by: INTERNAL MEDICINE

## 2025-07-05 PROCEDURE — 97110 THERAPEUTIC EXERCISES: CPT

## 2025-07-05 PROCEDURE — 36415 COLL VENOUS BLD VENIPUNCTURE: CPT | Performed by: INTERNAL MEDICINE

## 2025-07-05 PROCEDURE — 82533 TOTAL CORTISOL: CPT | Performed by: INTERNAL MEDICINE

## 2025-07-05 PROCEDURE — 93010 ELECTROCARDIOGRAM REPORT: CPT | Performed by: INTERNAL MEDICINE

## 2025-07-05 PROCEDURE — 94799 UNLISTED PULMONARY SVC/PX: CPT

## 2025-07-05 PROCEDURE — 93005 ELECTROCARDIOGRAM TRACING: CPT | Performed by: INTERNAL MEDICINE

## 2025-07-05 PROCEDURE — 99232 SBSQ HOSP IP/OBS MODERATE 35: CPT | Performed by: NURSE PRACTITIONER

## 2025-07-05 PROCEDURE — 82948 REAGENT STRIP/BLOOD GLUCOSE: CPT

## 2025-07-05 PROCEDURE — 63710000001 REVEFENACIN 175 MCG/3ML SOLUTION: Performed by: STUDENT IN AN ORGANIZED HEALTH CARE EDUCATION/TRAINING PROGRAM

## 2025-07-05 PROCEDURE — 94761 N-INVAS EAR/PLS OXIMETRY MLT: CPT

## 2025-07-05 RX ADMIN — FAMOTIDINE 20 MG: 20 TABLET, FILM COATED ORAL at 20:31

## 2025-07-05 RX ADMIN — FAMOTIDINE 20 MG: 20 TABLET, FILM COATED ORAL at 08:51

## 2025-07-05 RX ADMIN — APIXABAN 5 MG: 5 TABLET, FILM COATED ORAL at 08:51

## 2025-07-05 RX ADMIN — LEVOTHYROXINE SODIUM 75 MCG: 0.07 TABLET ORAL at 05:13

## 2025-07-05 RX ADMIN — REVEFENACIN 175 MCG: 175 SOLUTION RESPIRATORY (INHALATION) at 07:50

## 2025-07-05 RX ADMIN — ACETAMINOPHEN 650 MG: 325 TABLET, FILM COATED ORAL at 22:19

## 2025-07-05 RX ADMIN — METOPROLOL SUCCINATE 100 MG: 100 TABLET, EXTENDED RELEASE ORAL at 08:53

## 2025-07-05 RX ADMIN — MONTELUKAST 10 MG: 10 TABLET, FILM COATED ORAL at 08:53

## 2025-07-05 RX ADMIN — BUDESONIDE AND FORMOTEROL FUMARATE DIHYDRATE 2 PUFF: 160; 4.5 AEROSOL RESPIRATORY (INHALATION) at 07:51

## 2025-07-05 RX ADMIN — BUDESONIDE AND FORMOTEROL FUMARATE DIHYDRATE 2 PUFF: 160; 4.5 AEROSOL RESPIRATORY (INHALATION) at 20:20

## 2025-07-05 RX ADMIN — ACETAMINOPHEN 650 MG: 325 TABLET, FILM COATED ORAL at 14:22

## 2025-07-05 RX ADMIN — SPIRONOLACTONE 25 MG: 25 TABLET ORAL at 08:53

## 2025-07-05 RX ADMIN — MUPIROCIN 1 APPLICATION: 20 OINTMENT TOPICAL at 08:51

## 2025-07-05 RX ADMIN — BUMETANIDE 1 MG: 1 TABLET ORAL at 08:53

## 2025-07-05 RX ADMIN — APIXABAN 5 MG: 5 TABLET, FILM COATED ORAL at 20:31

## 2025-07-05 RX ADMIN — LIDOCAINE 1 PATCH: 4 PATCH TOPICAL at 08:48

## 2025-07-05 RX ADMIN — POLYETHYLENE GLYCOL 3350 17 G: 17 POWDER, FOR SOLUTION ORAL at 08:48

## 2025-07-05 RX ADMIN — ALLOPURINOL 100 MG: 100 TABLET ORAL at 08:51

## 2025-07-05 RX ADMIN — LOSARTAN POTASSIUM 12.5 MG: 25 TABLET, FILM COATED ORAL at 08:51

## 2025-07-05 RX ADMIN — METOPROLOL SUCCINATE 100 MG: 100 TABLET, EXTENDED RELEASE ORAL at 20:31

## 2025-07-05 RX ADMIN — BUMETANIDE 1 MG: 1 TABLET ORAL at 20:31

## 2025-07-05 NOTE — PLAN OF CARE
Goal Outcome Evaluation:  Plan of Care Reviewed With: patient           Outcome Evaluation: Pt in supine, awake and alert, agreeable to therapy. patient performed supine to sit with SBA, sit to stand with SBA with elevated bed surface as pt uses lift chair at home, ambulated around room 3x total with seated rest break with rwx and SBA. Patient progressing well with mobility, encouraged patient to ambulate with nsg staff also. dc plan is home with assist from friends and HH to f/u.    Anticipated Discharge Disposition (PT): home with assist, home with home health

## 2025-07-05 NOTE — PLAN OF CARE
Problem: Adult Inpatient Plan of Care  Goal: Absence of Hospital-Acquired Illness or Injury  Outcome: Progressing  Intervention: Identify and Manage Fall Risk  Recent Flowsheet Documentation  Taken 7/5/2025 0214 by Chelita Donovan RN  Safety Promotion/Fall Prevention:   safety round/check completed   fall prevention program maintained   clutter free environment maintained  Taken 7/5/2025 0000 by Chelita Donovan RN  Safety Promotion/Fall Prevention:   safety round/check completed   fall prevention program maintained   clutter free environment maintained  Taken 7/4/2025 2200 by Chelita Donovan RN  Safety Promotion/Fall Prevention:   safety round/check completed   fall prevention program maintained   clutter free environment maintained  Taken 7/4/2025 2015 by Chelita Donovan RN  Safety Promotion/Fall Prevention:   safety round/check completed   fall prevention program maintained   clutter free environment maintained  Intervention: Prevent Skin Injury  Recent Flowsheet Documentation  Taken 7/5/2025 0214 by Chelita Donovan RN  Body Position: position changed independently  Taken 7/5/2025 0000 by Chelita Donovan RN  Body Position: position changed independently  Intervention: Prevent Infection  Recent Flowsheet Documentation  Taken 7/5/2025 0214 by Chelita Donovan RN  Infection Prevention:   hand hygiene promoted   single patient room provided  Taken 7/5/2025 0000 by Chelita Donovan RN  Infection Prevention:   hand hygiene promoted   single patient room provided  Taken 7/4/2025 2200 by Chelita Donovan RN  Infection Prevention:   hand hygiene promoted   single patient room provided  Taken 7/4/2025 2015 by Chelita Donovan RN  Infection Prevention:   hand hygiene promoted   single patient room provided   Goal Outcome Evaluation:  Plan of Care Reviewed With: patient        Progress: improving  Outcome Evaluation: VSS, AOx4, purewick in place, pt resting comfortably overnight

## 2025-07-05 NOTE — PROGRESS NOTES
"CC: Follow-up atrial fibrillation, Takotsubo cardiomyopathy    Interval History: Blood pressure was too low for spironolactone, losartan and metoprolol yesterday.        Vital Signs  Temp:  [97.4 °F (36.3 °C)-98.2 °F (36.8 °C)] 98.2 °F (36.8 °C)  Heart Rate:  [84-98] 93  Resp:  [16-22] 20  BP: (100-112)/(62-82) 110/82    Intake/Output Summary (Last 24 hours) at 7/5/2025 0812  Last data filed at 7/5/2025 0600  Gross per 24 hour   Intake 360 ml   Output 3300 ml   Net -2940 ml     Flowsheet Rows      Flowsheet Row First Filed Value   Admission Height 177.8 cm (70\") Documented at 06/27/2025 1236   Admission Weight 104 kg (229 lb) Documented at 06/27/2025 1236            PHYSICAL EXAM:  General: No acute distress  Resp:NL Rate, unlabored, clear  CV:NL rate and irregularly irregular rhythm, NL PMI, Nl S1 and S2, no Murmur, no gallop, no rub, No JVD. Normal pedal pulses  ABD:Nl sounds, no masses or tenderness, nondistended, no guarding or rebound  Neuro: alert,cooperative and oriented  Extr: No edema or cyanosis, moves all extremities      Results Review:    Results from last 7 days   Lab Units 07/04/25  0837   SODIUM mmol/L 137   POTASSIUM mmol/L 3.9   CHLORIDE mmol/L 103   CO2 mmol/L 19.9*   BUN mg/dL 45.0*   CREATININE mg/dL 1.33*   GLUCOSE mg/dL 125*   CALCIUM mg/dL 8.9         Results from last 7 days   Lab Units 07/04/25  0837   WBC 10*3/mm3 2.74*   HEMOGLOBIN g/dL 9.8*   HEMATOCRIT % 30.4*   PLATELETS 10*3/mm3 191                     I reviewed the patient's new clinical results.  I personally viewed and interpreted the patient's EKG/Telemetry data-        Medication Review:   Meds reviewed         Assessment/Plan  1.  69-year-old female with Takotsubo cardiomyopathy.  Medical therapy includes Toprol  mg twice daily and losartan 12.5 mg daily and spironolactone 25 mg daily  -Her blood pressure has been too low for all of her medication to be given.  - Coronary arteries on cardiac cath 6/30/25  2.  Heart " failure reduced ejection fraction.  She had elevated wedge and elevated LVEDP on cardiac cath 6/30/2025  3.  Chronic A-fib.  On Eliquis and metoprolol.  Status post IV dig 7/2/2025  4.  Acute kidney injury-nephrology managing diuretics status post metolazone 2.5 mg x 1 on 7/4/25.  Currently on bumetanide 1 mg twice daily  5.  Pulmonary hypertension   6.  Left renal mass-awaiting CT when patient can lay flat more comfortably after diureses.  7.hypotension .  Fluid resuscitation helped to improve.  She did not require pressors.  Now back on diuretics and tolerating goal-directed medical therapy  8.anemia-stable  9.  COPD  10.  Obstructive sleep apnea  11.  Hypothyroidism replacement therapy  12.  Gout    I am aware that she did not receive spironolactone, metolazone or losartan yesterday due to low blood pressure.    I did not change any medication.  Will continue to follow  DEBBI Barber  07/05/25  08:12 EDT

## 2025-07-05 NOTE — PLAN OF CARE
Problem: Adult Inpatient Plan of Care  Goal: Absence of Hospital-Acquired Illness or Injury  Intervention: Identify and Manage Fall Risk  Recent Flowsheet Documentation  Taken 7/5/2025 1800 by Gema Crandall RN  Safety Promotion/Fall Prevention:   activity supervised   fall prevention program maintained   nonskid shoes/slippers when out of bed   room organization consistent   safety round/check completed   clutter free environment maintained  Taken 7/5/2025 1600 by Gema Crandall RN  Safety Promotion/Fall Prevention:   activity supervised   fall prevention program maintained   nonskid shoes/slippers when out of bed   room organization consistent   safety round/check completed   clutter free environment maintained  Taken 7/5/2025 1400 by Gema Crandall RN  Safety Promotion/Fall Prevention:   activity supervised   fall prevention program maintained   nonskid shoes/slippers when out of bed   room organization consistent   safety round/check completed   clutter free environment maintained  Taken 7/5/2025 1200 by Gema Crandall RN  Safety Promotion/Fall Prevention:   activity supervised   fall prevention program maintained   nonskid shoes/slippers when out of bed   room organization consistent   safety round/check completed   clutter free environment maintained  Taken 7/5/2025 1000 by Gema Crandall RN  Safety Promotion/Fall Prevention:   activity supervised   fall prevention program maintained   nonskid shoes/slippers when out of bed   room organization consistent   safety round/check completed   clutter free environment maintained  Taken 7/5/2025 0800 by Gema Crandall RN  Safety Promotion/Fall Prevention:   activity supervised   fall prevention program maintained   nonskid shoes/slippers when out of bed   room organization consistent   safety round/check completed   clutter free environment maintained  Intervention: Prevent Skin Injury  Recent Flowsheet Documentation  Taken 7/5/2025  1800 by Gema Crandall RN  Body Position: position changed independently  Taken 7/5/2025 1600 by Gema Crandall RN  Body Position: position changed independently  Taken 7/5/2025 1400 by Gema Crandall RN  Body Position: position changed independently  Skin Protection: incontinence pads utilized  Taken 7/5/2025 1200 by Gema Crandall RN  Body Position: position changed independently  Taken 7/5/2025 1000 by Gema Crandall RN  Body Position: position changed independently  Taken 7/5/2025 0800 by Gema Crandall RN  Body Position: position changed independently  Skin Protection: incontinence pads utilized  Intervention: Prevent and Manage VTE (Venous Thromboembolism) Risk  Recent Flowsheet Documentation  Taken 7/5/2025 1400 by Gema Crandall RN  VTE Prevention/Management:   bilateral   SCDs (sequential compression devices) off   other (see comments)  Taken 7/5/2025 0800 by Gema Crandall RN  VTE Prevention/Management: (Patient on Eliquis)   bilateral   SCDs (sequential compression devices) off   other (see comments)  Intervention: Prevent Infection  Recent Flowsheet Documentation  Taken 7/5/2025 1800 by Gema Crandall RN  Infection Prevention: single patient room provided  Taken 7/5/2025 1600 by Gema Crandall RN  Infection Prevention: single patient room provided  Taken 7/5/2025 1400 by Gema Crandall RN  Infection Prevention: single patient room provided  Taken 7/5/2025 1200 by Gema Crandall RN  Infection Prevention: single patient room provided  Taken 7/5/2025 1000 by Gema Crandall RN  Infection Prevention: single patient room provided  Taken 7/5/2025 0800 by Gema Crandall RN  Infection Prevention: single patient room provided  Goal: Optimal Comfort and Wellbeing  Intervention: Monitor Pain and Promote Comfort  Recent Flowsheet Documentation  Taken 7/5/2025 1400 by Gema Crandall RN  Pain Management Interventions: other (see comments)  Taken  7/5/2025 0800 by Gema Crandall RN  Pain Management Interventions: (see MAR) other (see comments)  Intervention: Provide Person-Centered Care  Recent Flowsheet Documentation  Taken 7/5/2025 1400 by Gema Crandall RN  Trust Relationship/Rapport:   care explained   choices provided   emotional support provided   empathic listening provided   questions answered   questions encouraged   reassurance provided   thoughts/feelings acknowledged  Taken 7/5/2025 0800 by Gema Crandall RN  Trust Relationship/Rapport:   care explained   choices provided   emotional support provided   empathic listening provided   questions answered   questions encouraged   reassurance provided   thoughts/feelings acknowledged     Problem: Fall Injury Risk  Goal: Absence of Fall and Fall-Related Injury  Intervention: Identify and Manage Contributors  Recent Flowsheet Documentation  Taken 7/5/2025 1800 by Gema Crandall RN  Medication Review/Management: medications reviewed  Self-Care Promotion: independence encouraged  Taken 7/5/2025 1600 by Gema Crandall RN  Medication Review/Management: medications reviewed  Self-Care Promotion: independence encouraged  Taken 7/5/2025 1400 by Gema Crandall RN  Medication Review/Management: medications reviewed  Self-Care Promotion: independence encouraged  Taken 7/5/2025 1200 by Gema Crandall RN  Medication Review/Management: medications reviewed  Self-Care Promotion: independence encouraged  Taken 7/5/2025 1000 by Gema Crandall RN  Medication Review/Management: medications reviewed  Self-Care Promotion: independence encouraged  Taken 7/5/2025 0800 by Gema Crandall RN  Medication Review/Management: medications reviewed  Self-Care Promotion: independence encouraged  Intervention: Promote Injury-Free Environment  Recent Flowsheet Documentation  Taken 7/5/2025 1800 by Gema Crandall RN  Safety Promotion/Fall Prevention:   activity supervised   fall prevention  program maintained   nonskid shoes/slippers when out of bed   room organization consistent   safety round/check completed   clutter free environment maintained  Taken 7/5/2025 1600 by Gema Crandall RN  Safety Promotion/Fall Prevention:   activity supervised   fall prevention program maintained   nonskid shoes/slippers when out of bed   room organization consistent   safety round/check completed   clutter free environment maintained  Taken 7/5/2025 1400 by Gema Crandall RN  Safety Promotion/Fall Prevention:   activity supervised   fall prevention program maintained   nonskid shoes/slippers when out of bed   room organization consistent   safety round/check completed   clutter free environment maintained  Taken 7/5/2025 1200 by Gema Crandall RN  Safety Promotion/Fall Prevention:   activity supervised   fall prevention program maintained   nonskid shoes/slippers when out of bed   room organization consistent   safety round/check completed   clutter free environment maintained  Taken 7/5/2025 1000 by Gema Crandall RN  Safety Promotion/Fall Prevention:   activity supervised   fall prevention program maintained   nonskid shoes/slippers when out of bed   room organization consistent   safety round/check completed   clutter free environment maintained  Taken 7/5/2025 0800 by Gema Crandall RN  Safety Promotion/Fall Prevention:   activity supervised   fall prevention program maintained   nonskid shoes/slippers when out of bed   room organization consistent   safety round/check completed   clutter free environment maintained     Problem: Skin Injury Risk Increased  Goal: Skin Health and Integrity  Intervention: Optimize Skin Protection  Recent Flowsheet Documentation  Taken 7/5/2025 1800 by Gema Crandall RN  Activity Management: activity encouraged  Head of Bed (HOB) Positioning: HOB elevated  Taken 7/5/2025 1600 by Gema Crandall RN  Activity Management: activity encouraged  Head of  Bed (HOB) Positioning: HOB elevated  Taken 7/5/2025 1400 by Gema Crandall RN  Activity Management: activity encouraged  Pressure Reduction Techniques:   frequent weight shift encouraged   weight shift assistance provided  Head of Bed (HOB) Positioning: HOB elevated  Pressure Reduction Devices: specialty bed utilized  Skin Protection: incontinence pads utilized  Taken 7/5/2025 1200 by Gema Crandall RN  Activity Management: activity encouraged  Head of Bed (HOB) Positioning: HOB elevated  Taken 7/5/2025 1000 by Gema Crandall RN  Activity Management: activity encouraged  Head of Bed (HOB) Positioning: HOB elevated  Taken 7/5/2025 0800 by Gema Crandall RN  Activity Management: activity encouraged  Pressure Reduction Techniques:   frequent weight shift encouraged   weight shift assistance provided  Head of Bed (HOB) Positioning: HOB elevated  Pressure Reduction Devices: specialty bed utilized  Skin Protection: incontinence pads utilized  Intervention: Promote and Optimize Oral Intake  Recent Flowsheet Documentation  Taken 7/5/2025 1400 by Gema Crandall RN  Nutrition Interventions: meal set-up provided     Problem: Pain Acute  Goal: Optimal Pain Control and Function  Intervention: Optimize Psychosocial Wellbeing  Recent Flowsheet Documentation  Taken 7/5/2025 1400 by Gema Crandall RN  Supportive Measures:   active listening utilized   self-care encouraged  Diversional Activities: television  Taken 7/5/2025 0800 by Gema Crandall RN  Supportive Measures:   active listening utilized   self-care encouraged  Diversional Activities: television  Intervention: Develop Pain Management Plan  Recent Flowsheet Documentation  Taken 7/5/2025 1400 by Gema Crandall RN  Pain Management Interventions: other (see comments)  Taken 7/5/2025 0800 by Gema Crandall RN  Pain Management Interventions: (see MAR) other (see comments)  Intervention: Prevent or Manage Pain  Recent Flowsheet  Documentation  Taken 7/5/2025 1800 by Gema Crandall RN  Medication Review/Management: medications reviewed  Taken 7/5/2025 1600 by Gema Crandall RN  Medication Review/Management: medications reviewed  Taken 7/5/2025 1400 by Gema Crandall RN  Sensory Stimulation Regulation: care clustered  Bowel Elimination Promotion: adequate fluid intake promoted  Medication Review/Management: medications reviewed  Taken 7/5/2025 1200 by Gema Crandall RN  Medication Review/Management: medications reviewed  Taken 7/5/2025 1000 by Gema Crandall RN  Medication Review/Management: medications reviewed  Taken 7/5/2025 0800 by Gema Crandall RN  Sensory Stimulation Regulation: care clustered  Bowel Elimination Promotion: adequate fluid intake promoted  Medication Review/Management: medications reviewed     Problem: Cardiac Catheterization (Diagnostic/Interventional)  Goal: Stable Heart Rate and Rhythm  Intervention: Monitor and Manage Cardiac Rhythm Effect  Recent Flowsheet Documentation  Taken 7/5/2025 1400 by Gema Crandall RN  Fluid/Electrolyte Management: fluids provided  Taken 7/5/2025 0800 by Gema Crandall RN  Fluid/Electrolyte Management: fluids provided  Goal: Absence of Embolism Signs and Symptoms  Intervention: Prevent or Manage Embolism  Recent Flowsheet Documentation  Taken 7/5/2025 1400 by Gema Crandall RN  VTE Prevention/Management:   bilateral   SCDs (sequential compression devices) off   other (see comments)  Taken 7/5/2025 0800 by Gema Crandall RN  VTE Prevention/Management: (Patient on Eliquis)   bilateral   SCDs (sequential compression devices) off   other (see comments)  Goal: Anesthesia/Sedation Recovery  Intervention: Optimize Anesthesia Recovery  Recent Flowsheet Documentation  Taken 7/5/2025 1800 by Gema Crandall RN  Safety Promotion/Fall Prevention:   activity supervised   fall prevention program maintained   nonskid shoes/slippers when out of bed    room organization consistent   safety round/check completed   clutter free environment maintained  Taken 7/5/2025 1600 by Gema Crandall RN  Safety Promotion/Fall Prevention:   activity supervised   fall prevention program maintained   nonskid shoes/slippers when out of bed   room organization consistent   safety round/check completed   clutter free environment maintained  Taken 7/5/2025 1400 by Gema Crandall RN  Safety Promotion/Fall Prevention:   activity supervised   fall prevention program maintained   nonskid shoes/slippers when out of bed   room organization consistent   safety round/check completed   clutter free environment maintained  Reorientation Measures: reorientation provided  Taken 7/5/2025 1200 by Gema Crandall RN  Safety Promotion/Fall Prevention:   activity supervised   fall prevention program maintained   nonskid shoes/slippers when out of bed   room organization consistent   safety round/check completed   clutter free environment maintained  Taken 7/5/2025 1000 by Gema Crandall RN  Safety Promotion/Fall Prevention:   activity supervised   fall prevention program maintained   nonskid shoes/slippers when out of bed   room organization consistent   safety round/check completed   clutter free environment maintained  Taken 7/5/2025 0800 by Gema Crandall RN  Safety Promotion/Fall Prevention:   activity supervised   fall prevention program maintained   nonskid shoes/slippers when out of bed   room organization consistent   safety round/check completed   clutter free environment maintained  Reorientation Measures: reorientation provided  Goal: Optimal Pain Control and Function  Intervention: Prevent or Manage Pain  Recent Flowsheet Documentation  Taken 7/5/2025 1400 by Gema Crandall RN  Pain Management Interventions: other (see comments)  Diversional Activities: television  Taken 7/5/2025 0800 by Gema Crandall RN  Pain Management Interventions: (see MAR) other  (see comments)  Diversional Activities: television  Goal: Absence of Vascular Access Complication  Intervention: Prevent and Manage Access Complications  Recent Flowsheet Documentation  Taken 7/5/2025 1800 by Gema Crandall RN  Activity Management: activity encouraged  Head of Bed (HOB) Positioning: HOB elevated  Bleeding Precautions: blood pressure closely monitored  Taken 7/5/2025 1600 by Gema Crandall RN  Activity Management: activity encouraged  Head of Bed (HOB) Positioning: HOB elevated  Bleeding Precautions: blood pressure closely monitored  Taken 7/5/2025 1400 by Gema Crandall RN  Activity Management: activity encouraged  Head of Bed (HOB) Positioning: HOB elevated  Bleeding Precautions: blood pressure closely monitored  Taken 7/5/2025 1200 by Gema Crandall RN  Activity Management: activity encouraged  Head of Bed (HOB) Positioning: HOB elevated  Bleeding Precautions: blood pressure closely monitored  Taken 7/5/2025 1000 by Gema Crandall RN  Activity Management: activity encouraged  Head of Bed (HOB) Positioning: HOB elevated  Bleeding Precautions: blood pressure closely monitored  Taken 7/5/2025 0800 by Gema Crandall RN  Activity Management: activity encouraged  Head of Bed (HOB) Positioning: HOB elevated  Bleeding Precautions: blood pressure closely monitored   Goal Outcome Evaluation:No change.

## 2025-07-05 NOTE — PROGRESS NOTES
Name: Marilu Avery ADMIT: 2025   : 1955  PCP: Esperanza Melton APRN    MRN: [id number] LOS: 8 days   AGE/SEX: 69 y.o. female  ROOM: [facility]     Subjective   Subjective     No events overnight. Doing fairly well today. She didn't get her diuretics yesterday because her bp was soft. It actually looks pretty ok to me based on the recordings in the chart. Regardless, she had really good UOP yesterday, so they weren't really needed.       Objective   Objective   Vital Signs  Temp:  [97.5 °F (36.4 °C)-98.2 °F (36.8 °C)] 97.5 °F (36.4 °C)  Heart Rate:  [90-98] 97  Resp:  [16-22] 18  BP: (102-112)/(39-82) 105/39  SpO2:  [96 %-99 %] 96 %  on  Flow (L/min) (Oxygen Therapy):  [2] 2;   Device (Oxygen Therapy): nasal cannula  Body mass index is 34.42 kg/m².  Physical Exam  Constitutional:       General: She is not in acute distress.     Appearance: She is not toxic-appearing.   Cardiovascular:      Rate and Rhythm: Normal rate and regular rhythm.      Heart sounds: Normal heart sounds.   Pulmonary:      Effort: Pulmonary effort is normal.      Breath sounds: Normal breath sounds.   Abdominal:      General: Bowel sounds are normal.      Palpations: Abdomen is soft.      Hernia: A hernia is present.   Musculoskeletal:      Right lower leg: Edema present.      Left lower leg: Edema present.   Neurological:      Mental Status: She is alert.   Psychiatric:         Mood and Affect: Mood normal.         Behavior: Behavior normal.         Results Review     I reviewed the patient's new clinical results.  Results from last 7 days   Lab Units 25  0824 25  0837 25  0908 25  0752   WBC 10*3/mm3 2.58* 2.74* 3.83 3.70   HEMOGLOBIN g/dL 10.0* 9.8* 10.4* 9.6*   PLATELETS 10*3/mm3 191 191 182 155     Results from last 7 days   Lab Units 25  0824 25  0837 25  0908 25  0752   SODIUM mmol/L 136 137 137 134*  134*  134*   POTASSIUM mmol/L 4.0 3.9 4.2 4.2  4.2  4.2   CHLORIDE  mmol/L 103 103 105 104  105  105   CO2 mmol/L 19.2* 19.9* 17.3* 14.5*  15.0*  15.0*   BUN mg/dL 43.0* 45.0* 46.0* 47.0*  46.0*  46.0*   CREATININE mg/dL 1.35* 1.33* 1.43* 1.40*  1.33*  1.33*   GLUCOSE mg/dL 112* 125* 133* 115*  113*  113*   Estimated Creatinine Clearance: 53.3 mL/min (A) (by C-G formula based on SCr of 1.35 mg/dL (H)).  Results from last 7 days   Lab Units 07/05/25  0824 07/04/25  0837 07/03/25  0908 07/02/25  0752   ALBUMIN g/dL 3.0* 3.1* 3.5 3.3*     Results from last 7 days   Lab Units 07/05/25  0824 07/04/25  0837 07/03/25  0908 07/02/25  0752   CALCIUM mg/dL 9.0 8.9 9.2 9.0  9.2  9.2   ALBUMIN g/dL 3.0* 3.1* 3.5 3.3*   PHOSPHORUS mg/dL 3.5 4.1 4.3 3.9           COVID19   Date Value Ref Range Status   08/24/2021 Not Detected Not Detected - Ref. Range Final   06/13/2021 Not Detected Not Detected - Ref. Range Final     Glucose   Date/Time Value Ref Range Status   07/05/2025 1037 125 70 - 130 mg/dL Final   07/05/2025 0521 103 70 - 130 mg/dL Final   07/05/2025 0004 150 (H) 70 - 130 mg/dL Final   07/04/2025 1824 129 70 - 130 mg/dL Final   07/04/2025 1122 107 70 - 130 mg/dL Final   07/04/2025 0524 105 70 - 130 mg/dL Final   07/03/2025 2347 113 70 - 130 mg/dL Final       XR Chest 1 View  Narrative: XR CHEST 1 VW-     HISTORY: Female who is 69 years-old, dyspnea     TECHNIQUE: Frontal views of the chest     COMPARISON: 6/27/2025     FINDINGS: The heart size is borderline. Pulmonary vasculature is  unremarkable. Small atelectasis or infiltrate at the bases, follow-up  advised. No large pleural effusion, or pneumothorax. No acute osseous  process.     Impression: As described.     This report was finalized on 7/2/2025 3:40 PM by Dr. Herrera Mistry M.D on Workstation: [id number]       Scheduled Medications  allopurinol, 100 mg, Oral, Daily  apixaban, 5 mg, Oral, BID  budesonide-formoterol, 2 puff, Inhalation, BID - RT  bumetanide, 1 mg, Oral, BID  famotidine, 20 mg, Oral, BID  levothyroxine,  75 mcg, Oral, Q AM  Lidocaine, 1 patch, Transdermal, Q24H  losartan, 12.5 mg, Oral, Q24H  metOLazone, 2.5 mg, Oral, Once  metoprolol succinate XL, 100 mg, Oral, Q12H  metoprolol succinate XL, 50 mg, Oral, Once  montelukast, 10 mg, Oral, Daily  mupirocin, 1 Application, Each Nare, BID  polyethylene glycol, 17 g, Oral, Daily  revefenacin, 175 mcg, Nebulization, Daily - RT  spironolactone, 25 mg, Oral, Daily    Infusions   Diet  Diet: Cardiac; Healthy Heart (2-3 Na+); Fluid Consistency: Thin (IDDSI 0)       Assessment/Plan     Active Hospital Problems    Diagnosis  POA    **Hypotension [I95.9]  Yes    Hyponatremia [E87.1]  Unknown    Takotsubo cardiomyopathy [I51.81]  Unknown    Obesity (BMI 30-39.9) [E66.9]  Yes    Elevated troponin [R79.89]  Yes    Chronic alcohol use [F10.90]  Yes    Chronic anticoagulation [Z79.01]  Not Applicable    Tobacco abuse [Z72.0]  Yes    COPD (chronic obstructive pulmonary disease) [J44.9]  Yes    REBECCA (acute kidney injury) [N17.9]  Yes    Atrial fibrillation, persistent [I48.19]  Yes    HTN (hypertension) [I10]  Yes    HLD (hyperlipidemia) [E78.5]  Yes    Ventral hernia [K43.9]  Yes      Resolved Hospital Problems   No resolved problems to display.       69 y.o. female admitted with Hypotension.    69 year old woman with CKD 3 who presented initially with shortness of breath on exertion and lower extremity swelling and was found to have stress cardiomyopathy, an REBECCA on CKD 3, and hyponatremia     Acute systolic heart failure due to stress cardiomyopathy-initially placed in the ICU due to hypotension, but did not require pressors. Cardiac catheterization showed normal coronaries. Now on bumex, metoprolol, spironolactone, losartan. Good UOP yesterday  REBECCA on CKD 3-improved with fluid resuscitation   Hyponatremia-resolved with fluid resuscitation. Now back on diuretics  Chronic afib-eliquis, metoprolol  Hypothyroidism-synthroid  COPD/asthma-lama/laba/ics, montelukast    Gout-allopurinol  LISA-pap if available  Back pain-voltaren gel, lidocaine patch, tylenol  Left renal mass-she's unable to lie flat currently for a CT scan to better characterize the mass. She'll need to be referred to urology at discharge.  Eliquis (home med) for DVT prophylaxis.  Full code.  Discussed with patient and nursing staff.  Anticipate discharge home with home health when cleared by consultants.      Rogerio Devlin MD  [facility]  07/05/25  14:38 EDT

## 2025-07-05 NOTE — PROGRESS NOTES
"RENAL/KCC:     LOS: 8 days     Chief Complaint/ Reason for encounter: CKD, REBECCA, CHF and diuretic management    Subjective   Chart reviewed  Feeling well  Denies any worsening edema  No CP or SOA    Vital Signs  Temp:  [97.4 °F (36.3 °C)-98.1 °F (36.7 °C)] 97.9 °F (36.6 °C)  Heart Rate:  [84-97] 91  Resp:  [16-22] 22  BP: (100-112)/(62-79) 105/63       Wt Readings from Last 1 Encounters:   07/05/25 0514 109 kg (239 lb 13.8 oz)   07/04/25 0545 109 kg (239 lb 13.8 oz)   07/03/25 0536 109 kg (240 lb 4.8 oz)   07/01/25 0100 109 kg (239 lb 6.7 oz)   06/28/25 1133 106 kg (233 lb)   06/28/25 0548 106 kg (233 lb 7.5 oz)   06/27/25 1929 106 kg (233 lb 7.5 oz)   06/27/25 1236 104 kg (229 lb)       Objective:  Vital signs: (most recent): Blood pressure 105/63, pulse 91, temperature 97.9 °F (36.6 °C), temperature source Oral, resp. rate 22, height 177.8 cm (70\"), weight 109 kg (239 lb 13.8 oz), SpO2 97%, not currently breastfeeding.              Objective:  General Appearance:  Comfortable, chronically ill, obese-appearing, no acute distress   HEENT: Mucous membranes moist, atraumatic  Lungs:  Normal effort and normal respiratory rate.  Breath sounds clear to auscultation: No rhonchi/Rales.  No  respiratory distress.   Heart:  S1, S2 normal.   Abdomen: Abdomen is soft, nontender/nondistended  Extremities: Trace edema of bilateral lower extremities  Skin:  Warm and dry       Results Review:    Intake/Output:     Intake/Output Summary (Last 24 hours) at 7/5/2025 0625  Last data filed at 7/5/2025 0600  Gross per 24 hour   Intake 360 ml   Output 3300 ml   Net -2940 ml       Labs:   Recent Results (from the past 24 hours)   Renal Function Panel    Collection Time: 07/04/25  8:37 AM    Specimen: Blood   Result Value Ref Range    Glucose 125 (H) 65 - 99 mg/dL    BUN 45.0 (H) 8.0 - 23.0 mg/dL    Creatinine 1.33 (H) 0.57 - 1.00 mg/dL    Sodium 137 136 - 145 mmol/L    Potassium 3.9 3.5 - 5.2 mmol/L    Chloride 103 98 - 107 mmol/L    CO2 " 19.9 (L) 22.0 - 29.0 mmol/L    Calcium 8.9 8.6 - 10.5 mg/dL    Albumin 3.1 (L) 3.5 - 5.2 g/dL    Phosphorus 4.1 2.5 - 4.5 mg/dL    Anion Gap 14.1 5.0 - 15.0 mmol/L    BUN/Creatinine Ratio 33.8 (H) 7.0 - 25.0    eGFR 43.7 (L) >60.0 mL/min/1.73   CBC Auto Differential    Collection Time: 07/04/25  8:37 AM    Specimen: Blood   Result Value Ref Range    WBC 2.74 (L) 3.40 - 10.80 10*3/mm3    RBC 3.01 (L) 3.77 - 5.28 10*6/mm3    Hemoglobin 9.8 (L) 12.0 - 15.9 g/dL    Hematocrit 30.4 (L) 34.0 - 46.6 %    .0 (H) 79.0 - 97.0 fL    MCH 32.6 26.6 - 33.0 pg    MCHC 32.2 31.5 - 35.7 g/dL    RDW 14.8 12.3 - 15.4 %    RDW-SD 53.6 37.0 - 54.0 fl    MPV 10.0 6.0 - 12.0 fL    Platelets 191 140 - 450 10*3/mm3    Neutrophil % 74.5 42.7 - 76.0 %    Lymphocyte % 12.8 (L) 19.6 - 45.3 %    Monocyte % 7.3 5.0 - 12.0 %    Eosinophil % 4.0 0.3 - 6.2 %    Basophil % 0.7 0.0 - 1.5 %    Immature Grans % 0.7 (H) 0.0 - 0.5 %    Neutrophils, Absolute 2.04 1.70 - 7.00 10*3/mm3    Lymphocytes, Absolute 0.35 (L) 0.70 - 3.10 10*3/mm3    Monocytes, Absolute 0.20 0.10 - 0.90 10*3/mm3    Eosinophils, Absolute 0.11 0.00 - 0.40 10*3/mm3    Basophils, Absolute 0.02 0.00 - 0.20 10*3/mm3    Immature Grans, Absolute 0.02 0.00 - 0.05 10*3/mm3    nRBC 0.7 (H) 0.0 - 0.2 /100 WBC   POC Glucose Once    Collection Time: 07/04/25 11:22 AM    Specimen: Blood   Result Value Ref Range    Glucose 107 70 - 130 mg/dL   POC Glucose Once    Collection Time: 07/04/25  6:24 PM    Specimen: Blood   Result Value Ref Range    Glucose 129 70 - 130 mg/dL   POC Glucose Once    Collection Time: 07/05/25 12:04 AM    Specimen: Blood   Result Value Ref Range    Glucose 150 (H) 70 - 130 mg/dL   POC Glucose Once    Collection Time: 07/05/25  5:21 AM    Specimen: Blood   Result Value Ref Range    Glucose 103 70 - 130 mg/dL       Radiology:  Pertinent radiology studies were reviewed as described above    Medications have been reviewed separately in chart review medication  tab    ASSESSMENT:  REBECCA: Fairly stable post cath, may be near her new baseline    Hypotension, improved with fluids  Chronic diastolic heart failure, euvolemic today but with orthopnea  Elevated troponin  Elevated lactate, likely due to hypotension with organ hypoperfusion, rule out acute infection  Immobility  Atrial fibrillation on anticoagulation with Eliquis  New renal mass        DISCUSSION/PLAN:   BMP pending - Cr has been stable around 1.3-1.4 - possibly this is her new baseline.     Normal coronaries on left heart cath, elevated filling pressures on right heart cath  Continue with oral bumex, she seems to be diuresing well - 3.3 liters UOP charted  Strict ins/outs, daily weights, standing scale if possible.   On losartan. Monitor renal function and BP.          Wei Flynn MD  [facility]   Office phone number: [phone]  Answering service phone number: [phone]    07/05/25  06:25 EDT

## 2025-07-05 NOTE — NURSING NOTE
"RN went to introduce self to pt at shift change. Pt had skin tear on upper right arm that had slight bleeding. She stated \"I pulled off that EKG sticker and it pulled my skin off.\" RN dressed wound with vaseline guaze and kerlix.   "

## 2025-07-05 NOTE — PROGRESS NOTES
"  PROGRESS NOTE  Patient Name: Marilu Avery  Age/Sex: 69 y.o. female  : 1955  MRN: [id number]    Date of Admission: 2025  Date of Encounter Visit: 25   LOS: 8 days   Patient Care Team:  Esperanza Melton APRN as PCP - General (Nurse Practitioner)  Anatoly Jimenez MD as PCP - Family Medicine    Chief Complaint: Shortness of breath and weakness with hypotension    Hospital course: Patient is doing better, stronger, almost back to her baseline, she is on 2 L with sats in the high 90s, she is afebrile.  Tolerating p.o.         REVIEW OF SYSTEMS:   CONSTITUTIONAL: no fever or chills  CARDIOVASCULAR: No chest pain, chest pressure or chest discomfort. No palpitations or edema.   RESPIRATORY: No shortness of breath, cough or sputum.   GASTROINTESTINAL: No anorexia, nausea, vomiting or diarrhea. No abdominal pain or blood.   HEMATOLOGIC: No bleeding or bruising.     Ventilator/Non-Invasive Ventilation Settings (From admission, onward)      None              Vital Signs  Temp:  [97.5 °F (36.4 °C)-98.2 °F (36.8 °C)] 97.5 °F (36.4 °C)  Heart Rate:  [90-98] 97  Resp:  [17-22] 18  BP: (103-112)/(39-82) 105/39  SpO2:  [96 %-99 %] 96 %  on  Flow (L/min) (Oxygen Therapy):  [2] 2 Device (Oxygen Therapy): nasal cannula    Intake/Output Summary (Last 24 hours) at 2025 1612  Last data filed at 2025 0800  Gross per 24 hour   Intake 360 ml   Output 1800 ml   Net -1440 ml     Flowsheet Rows      Flowsheet Row First Filed Value   Admission Height 177.8 cm (70\") Documented at 2025 1236   Admission Weight 104 kg (229 lb) Documented at 2025 1236          Body mass index is 34.42 kg/m².      25  0536 25  0545 25  0514   Weight: 109 kg (240 lb 4.8 oz) 109 kg (239 lb 13.8 oz) 109 kg (239 lb 13.8 oz)       Physical Exam:  GEN:  No acute distress, alert, cooperative, well developed   EYES:   Sclerae clear. No icterus. PERRL. Normal EOM  ENT:   External ears/nose normal, no oral " lesions, no thrush, mucous membranes moist  NECK:  Supple, midline trachea, no JVD  LUNGS: Normal chest on inspection, CTAB, no wheezes. No rhonchi. No crackles. Respirations regular, even and unlabored.   CV:  Regular rhythm and rate. Normal S1/S2. No murmurs, gallops, or rubs noted.  ABD:  Soft, nontender and nondistended. Normal bowel sounds. No guarding.  Ventral hernia  EXT:  Moves all extremities well. No cyanosis. No redness. No edema.   Skin: Dry, intact, no bleeding    Results Review:    Results From Last 14 Days   Lab Units 06/27/25  1544 06/27/25  1541 06/27/25  1244   LACTATE mmol/L 1.7  --  2.5*   TRIGLYCERIDES mg/dL  --  111  --      Results from last 7 days   Lab Units 07/05/25  0824 07/04/25  0837 07/03/25  0908 07/02/25  0752 07/01/25  1808 07/01/25  1219 07/01/25  0552 06/30/25  1850 06/30/25  0615 06/29/25  1153 06/29/25  0549   SODIUM mmol/L 136 137 137 134*  134*  134* 137 137 136  136   < > 137  137   < > 135*  135*   POTASSIUM mmol/L 4.0 3.9 4.2 4.2  4.2  4.2 4.6 4.6 4.7  4.7   < > 4.4  4.4   < > 4.2  4.2   CHLORIDE mmol/L 103 103 105 104  105  105 106 106 108*  108*   < > 108*  108*   < > 106  106   CO2 mmol/L 19.2* 19.9* 17.3* 14.5*  15.0*  15.0* 17.2* 16.4* 14.9*  14.9*   < > 17.7*  17.7*   < > 15.0*  15.0*   BUN mg/dL 43.0* 45.0* 46.0* 47.0*  46.0*  46.0* 46.0* 41.0* 42.0*  42.0*   < > 41.0*  41.0*   < > 42.0*  42.0*   CREATININE mg/dL 1.35* 1.33* 1.43* 1.40*  1.33*  1.33* 1.54* 1.44* 1.24*  1.24*   < > 1.17*  1.17*   < > 1.46*  1.46*   CALCIUM mg/dL 9.0 8.9 9.2 9.0  9.2  9.2 9.0 9.4 9.2  9.2   < > 9.0  9.0   < > 8.9  8.9   ANION GAP mmol/L 13.8 14.1 14.7 15.5*  14.0  14.0 13.8 14.6 13.1  13.1   < > 11.3  11.3   < > 14.0  14.0   ALBUMIN g/dL 3.0* 3.1* 3.5 3.3*  --   --  3.1*  --  2.9*  --  3.2*    < > = values in this interval not displayed.                 Results from last 7 days   Lab Units 07/05/25  0824 07/04/25  0837 07/03/25  0908  "07/02/25  0752 07/01/25  0552 06/30/25  1418 06/30/25  1413 06/30/25  0615 06/29/25  0549   WBC 10*3/mm3 2.58* 2.74* 3.83 3.70 3.83  --   --  4.05 4.11   HEMOGLOBIN g/dL 10.0* 9.8* 10.4* 9.6* 9.7*  --   --  9.4* 9.8*   HEMOGLOBIN, POC g/dL  --   --   --   --   --  9.9* 9.9*  --   --    HEMATOCRIT % 32.0* 30.4* 32.0* 28.5* 30.3*  --   --  28.1* 30.0*   HEMATOCRIT POC %  --   --   --   --   --  29* 29*  --   --    PLATELETS 10*3/mm3 191 191 182 155 154  --   --  125* 117*   MCV fL 104.9* 101.0* 103.2* 99.0* 102.0*  --   --  98.9* 100.7*   NEUTROPHIL % % 74.8 74.5 77.4* 71.2 69.5  --   --  64.7 78.1*   LYMPHOCYTE % % 14.3* 12.8* 12.5* 14.9* 16.7*  --   --  20.0 10.9*   MONOCYTES % % 7.8 7.3 7.0 11.4 11.0  --   --  13.1* 8.8   EOSINOPHIL % % 2.3 4.0 1.8 1.9 1.8  --   --  1.5 1.0   BASOPHIL % % 0.4 0.7 0.5 0.3 0.5  --   --  0.2 0.5   IMM GRAN % % 0.4 0.7* 0.8* 0.3 0.5  --   --  0.5 0.7*                   Invalid input(s): \"LDLCALC\"  Results from last 7 days   Lab Units 06/30/25  1418 06/30/25  1413   PH, ARTERIAL pH units 7.340* 7.410         Glucose   Date/Time Value Ref Range Status   07/05/2025 1037 125 70 - 130 mg/dL Final   07/05/2025 0521 103 70 - 130 mg/dL Final   07/05/2025 0004 150 (H) 70 - 130 mg/dL Final   07/04/2025 1824 129 70 - 130 mg/dL Final   07/04/2025 1122 107 70 - 130 mg/dL Final   07/04/2025 0524 105 70 - 130 mg/dL Final   07/03/2025 2347 113 70 - 130 mg/dL Final   07/03/2025 1813 186 (H) 70 - 130 mg/dL Final                               Imaging:   Imaging Results (All)               I reviewed the patient's new clinical results.  I personally viewed and interpreted the patient's imaging results:        Medication Review:   allopurinol, 100 mg, Oral, Daily  apixaban, 5 mg, Oral, BID  budesonide-formoterol, 2 puff, Inhalation, BID - RT  bumetanide, 1 mg, Oral, BID  famotidine, 20 mg, Oral, BID  levothyroxine, 75 mcg, Oral, Q AM  Lidocaine, 1 patch, Transdermal, Q24H  losartan, 12.5 mg, Oral, " Q24H  metOLazone, 2.5 mg, Oral, Once  metoprolol succinate XL, 100 mg, Oral, Q12H  metoprolol succinate XL, 50 mg, Oral, Once  montelukast, 10 mg, Oral, Daily  mupirocin, 1 Application, Each Nare, BID  polyethylene glycol, 17 g, Oral, Daily  revefenacin, 175 mcg, Nebulization, Daily - RT  spironolactone, 25 mg, Oral, Daily             ASSESSMENT:   Weakness  Acute kidney injury  Hyponatremia: Improved  Non-ST elevation MI  Atrial fibrillation  Hypothyroidism  Asthma/COPD  Tobacco use  Pulm hypertension: WHO group II  LISA    PLAN:  Will check a cortisol level, her initial cortisol on presentation was borderline low for somebody with decompensation and hypotension and we will repeat the level and follow-up.  Start weaning and hopefully discontinue the oxygen    Discussed with patient  Labs/Notes/films were independently reviewed and pertinent results are summarized above  The copied texts in this note were reviewed and they are accurate as of 07/05/25    Disposition: Per primary team    Tara Tomlin MD  07/05/25  16:12 EDT         Dictated utilizing Dragon dictation

## 2025-07-05 NOTE — THERAPY TREATMENT NOTE
Patient Name: Marilu Avery  : 1955    MRN: [id number]                              Today's Date: 2025       Admit Date: 2025    Visit Dx:     ICD-10-CM ICD-9-CM   1. Hypotension, unspecified hypotension type  I95.9 458.9   2. Near syncope  R55 780.2   3. Acute kidney injury  N17.9 584.9   4. Elevated troponin  R79.89 790.6   5. Longstanding persistent atrial fibrillation  I48.11 427.31   6. Coagulopathy: Eliquis induced  D68.9 286.9   7. Decreased activities of daily living (ADL)  Z78.9 V49.89   8. Left kidney mass  N28.89 593.9   9. Obesity (BMI 30-39.9)  E66.9 278.00   10. LISA (obstructive sleep apnea)  G47.33 327.23     Patient Active Problem List   Diagnosis    Incisional hernia, without obstruction or gangrene    Ventral hernia    Atrial fibrillation, persistent    HLD (hyperlipidemia)    HTN (hypertension)    LISA (obstructive sleep apnea)    REBECCA (acute kidney injury)    Chronic anticoagulation    SBO (small bowel obstruction)    Tobacco abuse    Morbid obesity with BMI of 40.0-44.9, adult    COPD (chronic obstructive pulmonary disease)    Hypopotassemia    Gout    Chronic anticoagulation    Prolonged Q-T interval on ECG    REBECCA (acute kidney injury)    Acute on chronic diastolic (congestive) heart failure    Hypotension    Obesity (BMI 30-39.9)    Elevated troponin    Chronic alcohol use    Hyponatremia    Takotsubo cardiomyopathy     Past Medical History:   Diagnosis Date    Arthritis     Asthma     Atrial fibrillation     Colon polyps     hyperplastic rectal polyp    Hyperlipidemia     Hypertension     LISA on CPAP     Sleep apnea      Past Surgical History:   Procedure Laterality Date    BACK SURGERY N/A     CARDIAC CATHETERIZATION N/A 2025    Procedure: Right and Left Heart Cath;  Surgeon: Julien Perez MD;  Location: [facility] CATH INVASIVE LOCATION;  Service: Cardiovascular;  Laterality: N/A;    COLONOSCOPY N/A 10/22/2015    Rectal polyp-Dr. Elías Keating    HYSTERECTOMY N/A  2000    LAPAROSCOPIC CHOLECYSTECTOMY N/A 01/04/2010    Dr. Heath Whitlock    OOPHORECTOMY  2001    REPLACEMENT TOTAL KNEE Left 06/22/2015    Dr. Yo Aguayo    REPLACEMENT TOTAL KNEE Right 02/26/2015    Dr. Yo Aguayo    VENTRAL HERNIA REPAIR N/A 10/22/2015    Open reduction of incarcerated ventral hernia with layered closure-Dr. Elías Keating    VENTRAL/INCISIONAL HERNIA REPAIR N/A 6/14/2021    Procedure: OPEN VENTRAL/INCISIONAL HERNIA REPAIR WITH MESH AND APPENDECTOMY;  Surgeon: Arnulfo Leonard MD;  Location: [facility] MAIN OR;  Service: General;  Laterality: N/A;      General Information       Row Name 07/05/25 1111          Physical Therapy Time and Intention    Document Type therapy note (daily note)  -EM     Mode of Treatment individual therapy;physical therapy  -EM       Row Name 07/05/25 1111          General Information    Existing Precautions/Restrictions fall;oxygen therapy device and L/min  -EM               User Key  (r) = Recorded By, (t) = Taken By, (c) = Cosigned By      Initials Name Provider Type    EM Tamra Vu, PT Physical Therapist                   Mobility       Row Name 07/05/25 1111          Bed Mobility    Supine-Sit Wheeler (Bed Mobility) standby assist  -EM     Sit-Supine Wheeler (Bed Mobility) minimum assist (75% patient effort)  -EM     Assistive Device (Bed Mobility) head of bed elevated  -EM       Row Name 07/05/25 1111          Sit-Stand Transfer    Sit-Stand Wheeler (Transfers) standby assist  elevated bed surface  -EM     Assistive Device (Sit-Stand Transfers) walker, front-wheeled  -EM       Row Name 07/05/25 1111          Gait/Stairs (Locomotion)    Wheeler Level (Gait) standby assist  -EM     Assistive Device (Gait) walker, front-wheeled  -EM     Distance in Feet (Gait) 40  -EM     Deviations/Abnormal Patterns (Gait) gait speed decreased  -EM     Comment, (Gait/Stairs) pt ambulated 2 laps around in room, seated rest break and then ambulated  again, no unsteadiness noted  -EM               User Key  (r) = Recorded By, (t) = Taken By, (c) = Cosigned By      Initials Name Provider Type    Tamra Watson PT Physical Therapist                   Obj/Interventions       Row Name 07/05/25 1112          Motor Skills    Therapeutic Exercise other (see comments)  AP, LAQ x 10 reps  -EM               User Key  (r) = Recorded By, (t) = Taken By, (c) = Cosigned By      Initials Name Provider Type    Tamra Watson PT Physical Therapist                   Goals/Plan    No documentation.                  Clinical Impression       Row Name 07/05/25 1112          Pain    Pretreatment Pain Rating 0/10 - no pain  -EM       Row Name 07/05/25 1112          Plan of Care Review    Plan of Care Reviewed With patient  -EM     Outcome Evaluation Pt in supine, awake and alert, agreeable to therapy. patient performed supine to sit with SBA, sit to stand with SBA with elevated bed surface as pt uses lift chair at home, ambulated around room 3x total with seated rest break with rwx and SBA. Patient progressing well with mobility, encouraged patient to ambulate with [facility] staff also. dc plan is home with assist from friends and HH to f/u.  -EM       Row Name 07/05/25 1112          Positioning and Restraints    Pre-Treatment Position in bed  -EM     Post Treatment Position bed  -EM     In Bed supine;call light within reach;exit alarm on;notified nsg  -EM               User Key  (r) = Recorded By, (t) = Taken By, (c) = Cosigned By      Initials Name Provider Type    Tamra Watson PT Physical Therapist                   Outcome Measures       Row Name 07/05/25 1114 07/05/25 0800       How much help from another person do you currently need...    Turning from your back to your side while in flat bed without using bedrails? 4  -EM 4  -EE    Moving from lying on back to sitting on the side of a flat bed without bedrails? 3  -EM 4  -EE    Moving to and from a bed  to a chair (including a wheelchair)? 3  -EM 3  -EE    Standing up from a chair using your arms (e.g., wheelchair, bedside chair)? 3  -EM 3  -EE    Climbing 3-5 steps with a railing? 3  -EM 2  -EE    To walk in hospital room? 3  -EM 3  -EE    AM-PAC 6 Clicks Score (PT) 19  -EM 19  -EE    Highest Level of Mobility Goal Walk 10 Steps or More-6  -EM Walk 10 Steps or More-6  -EE              User Key  (r) = Recorded By, (t) = Taken By, (c) = Cosigned By      Initials Name Provider Type     Tamra Vu, PT Physical Therapist    EE Gema Crandall RN Registered Nurse                                 Physical Therapy Education       Title: PT OT SLP Therapies (In Progress)       Topic: Physical Therapy (In Progress)       Point: Mobility training (Done)       Learning Progress Summary            Patient Acceptance, E, VU by  at 7/5/2025 1115    Acceptance, E, VU,DU by  at 7/1/2025 1600    Acceptance, E, NR,VU by  at 6/28/2025 1244                      Point: Home exercise program (Done)       Learning Progress Summary            Patient Acceptance, E, VU by  at 7/5/2025 1115                      Point: Body mechanics (Not Started)       Learner Progress:  Not documented in this visit.              Point: Precautions (Not Started)       Learner Progress:  Not documented in this visit.                              User Key       Initials Effective Dates Name Provider Type Discipline     06/16/21 -  Tamra Vu, PT Physical Therapist PT     12/13/22 -  Rachael Guerrero PT Physical Therapist PT     06/03/25 -  Kerline Rios, PT Student PT Student PT                  PT Recommendation and Plan     Outcome Evaluation: Pt in supine, awake and alert, agreeable to therapy. patient performed supine to sit with SBA, sit to stand with SBA with elevated bed surface as pt uses lift chair at home, ambulated around room 3x total with seated rest break with rwx and SBA. Patient progressing well with  mobility, encouraged patient to ambulate with [facility] staff also. dc plan is home with assist from friends and HH to f/u.     Time Calculation:         PT Charges       Row Name 07/05/25 1115             Time Calculation    Start Time 0952  -EM      Stop Time 1015  -EM      Time Calculation (min) 23 min  -EM      PT Received On 07/05/25  -EM      PT - Next Appointment 07/07/25  -EM         Time Calculation- PT    Total Timed Code Minutes- PT 23 minute(s)  -EM         Timed Charges    54874 - PT Therapeutic Exercise Minutes 8  -EM      82866 - PT Therapeutic Activity Minutes 15  -EM         Total Minutes    Timed Charges Total Minutes 23  -EM       Total Minutes 23  -EM                User Key  (r) = Recorded By, (t) = Taken By, (c) = Cosigned By      Initials Name Provider Type    EM Tamra Vu PT Physical Therapist                  Therapy Charges for Today       Code Description Service Date Service Provider Modifiers Qty    96027540181  PT THER PROC EA 15 MIN 7/5/2025 Tamra Vu, PT GP 1    [id number]  PT THERAPEUTIC ACT EA 15 MIN 7/5/2025 Tamra Vu, PT GP 1            PT G-Codes  Outcome Measure Options: AM-PAC 6 Clicks Daily Activity (OT), Modified [place]  AM-PAC 6 Clicks Score (PT): 19  AM-PAC 6 Clicks Score (OT): 16  Modified [place] Scale: 0 - No Symptoms at all.  PT Discharge Summary  Anticipated Discharge Disposition (PT): home with assist, home with home health    Tamra Vu PT  7/5/2025

## 2025-07-06 LAB
ALBUMIN SERPL-MCNC: 3 G/DL (ref 3.5–5.2)
ANION GAP SERPL CALCULATED.3IONS-SCNC: 12 MMOL/L (ref 5–15)
BASOPHILS # BLD AUTO: 0.01 10*3/MM3 (ref 0–0.2)
BASOPHILS NFR BLD AUTO: 0.4 % (ref 0–1.5)
BUN SERPL-MCNC: 39 MG/DL (ref 8–23)
BUN/CREAT SERPL: 30.2 (ref 7–25)
CALCIUM SPEC-SCNC: 8.7 MG/DL (ref 8.6–10.5)
CHLORIDE SERPL-SCNC: 102 MMOL/L (ref 98–107)
CO2 SERPL-SCNC: 20 MMOL/L (ref 22–29)
CORTIS SERPL-MCNC: 16 MCG/DL
CORTIS SERPL-MCNC: 18.3 MCG/DL
CORTIS SERPL-MCNC: 2.39 MCG/DL
CREAT SERPL-MCNC: 1.29 MG/DL (ref 0.57–1)
DEPRECATED RDW RBC AUTO: 53.4 FL (ref 37–54)
EGFRCR SERPLBLD CKD-EPI 2021: 45.3 ML/MIN/1.73
EOSINOPHIL # BLD AUTO: 0.07 10*3/MM3 (ref 0–0.4)
EOSINOPHIL NFR BLD AUTO: 2.9 % (ref 0.3–6.2)
ERYTHROCYTE [DISTWIDTH] IN BLOOD BY AUTOMATED COUNT: 14.7 % (ref 12.3–15.4)
GLUCOSE BLDC GLUCOMTR-MCNC: 109 MG/DL (ref 70–130)
GLUCOSE BLDC GLUCOMTR-MCNC: 123 MG/DL (ref 70–130)
GLUCOSE BLDC GLUCOMTR-MCNC: 130 MG/DL (ref 70–130)
GLUCOSE SERPL-MCNC: 101 MG/DL (ref 65–99)
HCT VFR BLD AUTO: 28.4 % (ref 34–46.6)
HGB BLD-MCNC: 9.3 G/DL (ref 12–15.9)
IMM GRANULOCYTES # BLD AUTO: 0.02 10*3/MM3 (ref 0–0.05)
IMM GRANULOCYTES NFR BLD AUTO: 0.8 % (ref 0–0.5)
LYMPHOCYTES # BLD AUTO: 0.38 10*3/MM3 (ref 0.7–3.1)
LYMPHOCYTES NFR BLD AUTO: 15.8 % (ref 19.6–45.3)
MCH RBC QN AUTO: 33.2 PG (ref 26.6–33)
MCHC RBC AUTO-ENTMCNC: 32.7 G/DL (ref 31.5–35.7)
MCV RBC AUTO: 101.4 FL (ref 79–97)
MONOCYTES # BLD AUTO: 0.28 10*3/MM3 (ref 0.1–0.9)
MONOCYTES NFR BLD AUTO: 11.6 % (ref 5–12)
NEUTROPHILS NFR BLD AUTO: 1.65 10*3/MM3 (ref 1.7–7)
NEUTROPHILS NFR BLD AUTO: 68.5 % (ref 42.7–76)
NRBC BLD AUTO-RTO: 0 /100 WBC (ref 0–0.2)
PHOSPHATE SERPL-MCNC: 3.3 MG/DL (ref 2.5–4.5)
PLATELET # BLD AUTO: 217 10*3/MM3 (ref 140–450)
PMV BLD AUTO: 9.7 FL (ref 6–12)
POTASSIUM SERPL-SCNC: 3.8 MMOL/L (ref 3.5–5.2)
QT INTERVAL: 410 MS
QTC INTERVAL: 499 MS
RBC # BLD AUTO: 2.8 10*6/MM3 (ref 3.77–5.28)
SODIUM SERPL-SCNC: 134 MMOL/L (ref 136–145)
WBC NRBC COR # BLD AUTO: 2.41 10*3/MM3 (ref 3.4–10.8)

## 2025-07-06 PROCEDURE — 99232 SBSQ HOSP IP/OBS MODERATE 35: CPT | Performed by: NURSE PRACTITIONER

## 2025-07-06 PROCEDURE — 94761 N-INVAS EAR/PLS OXIMETRY MLT: CPT

## 2025-07-06 PROCEDURE — 94664 DEMO&/EVAL PT USE INHALER: CPT

## 2025-07-06 PROCEDURE — 82948 REAGENT STRIP/BLOOD GLUCOSE: CPT

## 2025-07-06 PROCEDURE — 82024 ASSAY OF ACTH: CPT | Performed by: STUDENT IN AN ORGANIZED HEALTH CARE EDUCATION/TRAINING PROGRAM

## 2025-07-06 PROCEDURE — 85025 COMPLETE CBC W/AUTO DIFF WBC: CPT | Performed by: INTERNAL MEDICINE

## 2025-07-06 PROCEDURE — 80069 RENAL FUNCTION PANEL: CPT | Performed by: INTERNAL MEDICINE

## 2025-07-06 PROCEDURE — 94799 UNLISTED PULMONARY SVC/PX: CPT

## 2025-07-06 PROCEDURE — 82533 TOTAL CORTISOL: CPT | Performed by: STUDENT IN AN ORGANIZED HEALTH CARE EDUCATION/TRAINING PROGRAM

## 2025-07-06 PROCEDURE — 25010000002 COSYNTROPIN PER 0.25 MG: Performed by: STUDENT IN AN ORGANIZED HEALTH CARE EDUCATION/TRAINING PROGRAM

## 2025-07-06 PROCEDURE — 63710000001 REVEFENACIN 175 MCG/3ML SOLUTION: Performed by: STUDENT IN AN ORGANIZED HEALTH CARE EDUCATION/TRAINING PROGRAM

## 2025-07-06 RX ORDER — COSYNTROPIN 0.25 MG/ML
0.25 INJECTION, POWDER, FOR SOLUTION INTRAMUSCULAR; INTRAVENOUS ONCE
Status: COMPLETED | OUTPATIENT
Start: 2025-07-06 | End: 2025-07-06

## 2025-07-06 RX ORDER — METOPROLOL SUCCINATE 25 MG/1
12.5 TABLET, EXTENDED RELEASE ORAL ONCE
Status: COMPLETED | OUTPATIENT
Start: 2025-07-06 | End: 2025-07-06

## 2025-07-06 RX ORDER — SPIRONOLACTONE 25 MG/1
12.5 TABLET ORAL DAILY
Status: DISCONTINUED | OUTPATIENT
Start: 2025-07-07 | End: 2025-07-07 | Stop reason: HOSPADM

## 2025-07-06 RX ORDER — SPIRONOLACTONE 25 MG/1
12.5 TABLET ORAL ONCE
Status: COMPLETED | OUTPATIENT
Start: 2025-07-06 | End: 2025-07-06

## 2025-07-06 RX ORDER — METOPROLOL SUCCINATE 25 MG/1
12.5 TABLET, EXTENDED RELEASE ORAL
Status: DISCONTINUED | OUTPATIENT
Start: 2025-07-07 | End: 2025-07-07

## 2025-07-06 RX ORDER — BUMETANIDE 1 MG/1
1 TABLET ORAL ONCE
Status: COMPLETED | OUTPATIENT
Start: 2025-07-06 | End: 2025-07-06

## 2025-07-06 RX ORDER — BUMETANIDE 1 MG/1
1 TABLET ORAL DAILY
Status: DISCONTINUED | OUTPATIENT
Start: 2025-07-07 | End: 2025-07-07

## 2025-07-06 RX ADMIN — ALLOPURINOL 100 MG: 100 TABLET ORAL at 08:08

## 2025-07-06 RX ADMIN — METOPROLOL SUCCINATE 12.5 MG: 25 TABLET, EXTENDED RELEASE ORAL at 12:25

## 2025-07-06 RX ADMIN — BUMETANIDE 1 MG: 1 TABLET ORAL at 18:30

## 2025-07-06 RX ADMIN — APIXABAN 5 MG: 5 TABLET, FILM COATED ORAL at 08:08

## 2025-07-06 RX ADMIN — LIDOCAINE 1 PATCH: 4 PATCH TOPICAL at 08:07

## 2025-07-06 RX ADMIN — REVEFENACIN 175 MCG: 175 SOLUTION RESPIRATORY (INHALATION) at 08:12

## 2025-07-06 RX ADMIN — APIXABAN 5 MG: 5 TABLET, FILM COATED ORAL at 20:32

## 2025-07-06 RX ADMIN — ACETAMINOPHEN 650 MG: 325 TABLET, FILM COATED ORAL at 10:59

## 2025-07-06 RX ADMIN — BUDESONIDE AND FORMOTEROL FUMARATE DIHYDRATE 2 PUFF: 160; 4.5 AEROSOL RESPIRATORY (INHALATION) at 08:17

## 2025-07-06 RX ADMIN — MONTELUKAST 10 MG: 10 TABLET, FILM COATED ORAL at 08:08

## 2025-07-06 RX ADMIN — SPIRONOLACTONE 12.5 MG: 25 TABLET ORAL at 18:30

## 2025-07-06 RX ADMIN — LEVOTHYROXINE SODIUM 75 MCG: 0.07 TABLET ORAL at 05:20

## 2025-07-06 RX ADMIN — BUDESONIDE AND FORMOTEROL FUMARATE DIHYDRATE 2 PUFF: 160; 4.5 AEROSOL RESPIRATORY (INHALATION) at 20:50

## 2025-07-06 RX ADMIN — FAMOTIDINE 20 MG: 20 TABLET, FILM COATED ORAL at 08:08

## 2025-07-06 RX ADMIN — MUPIROCIN 1 APPLICATION: 20 OINTMENT TOPICAL at 08:09

## 2025-07-06 RX ADMIN — ACETAMINOPHEN 650 MG: 325 TABLET, FILM COATED ORAL at 05:22

## 2025-07-06 RX ADMIN — COSYNTROPIN 0.25 MG: 0.25 INJECTION, POWDER, LYOPHILIZED, FOR SOLUTION INTRAMUSCULAR; INTRAVENOUS at 10:32

## 2025-07-06 RX ADMIN — FAMOTIDINE 20 MG: 20 TABLET, FILM COATED ORAL at 20:32

## 2025-07-06 RX ADMIN — ACETAMINOPHEN 650 MG: 325 TABLET, FILM COATED ORAL at 18:35

## 2025-07-06 NOTE — PROGRESS NOTES
"CC:  Follow-up atrial fibrillation, Takotsubo cardiomyopathy     Interval History: bp remains low, no pain or shortness of breath       Vital Signs  Temp:  [97 °F (36.1 °C)-98.2 °F (36.8 °C)] 97 °F (36.1 °C)  Heart Rate:  [82-97] 82  Resp:  [16-23] 18  BP: ()/(39-82) 100/69    Intake/Output Summary (Last 24 hours) at 7/6/2025 1022  Last data filed at 7/6/2025 0807  Gross per 24 hour   Intake 730 ml   Output 900 ml   Net -170 ml     Flowsheet Rows      Flowsheet Row First Filed Value   Admission Height 177.8 cm (70\") Documented at 06/27/2025 1236   Admission Weight 104 kg (229 lb) Documented at 06/27/2025 1236            PHYSICAL EXAM:  General: No acute distress  Resp:NL Rate, unlabored, clear  CV:NL rate and irregularly irregular rhythm, NL PMI, Nl S1 and S2, no Murmur, no gallop, no rub, No JVD. Normal pedal pulses  ABD:Nl sounds, no  tenderness, nondistended, no guarding or rebound  Neuro: alert,cooperative and oriented  Extr: No edema or cyanosis, moves all extremities      Results Review:    Results from last 7 days   Lab Units 07/06/25  0733   SODIUM mmol/L 134*   POTASSIUM mmol/L 3.8   CHLORIDE mmol/L 102   CO2 mmol/L 20.0*   BUN mg/dL 39.0*   CREATININE mg/dL 1.29*   GLUCOSE mg/dL 101*   CALCIUM mg/dL 8.7         Results from last 7 days   Lab Units 07/06/25  0733   WBC 10*3/mm3 2.41*   HEMOGLOBIN g/dL 9.3*   HEMATOCRIT % 28.4*   PLATELETS 10*3/mm3 217                     I reviewed the patient's new clinical results.  I personally viewed and interpreted the patient's EKG/Telemetry data- a fib        Medication Review:   Meds reviewed         Assessment/Plan  1.  69-year-old female with Takotsubo cardiomyopathy.  Medical therapy limited by low bp  -Her blood pressure has been too low for all of her medication to be given.  - Coronary arteries on cardiac cath 6/30/25  2.  Heart failure reduced ejection fraction.  She had elevated wedge and elevated LVEDP on cardiac cath 6/30/2025  3.  Chronic A-fib.  On " Eliquis and metoprolol.  Status post IV dig 7/2/2025  4.  Acute kidney injury-nephrology helping to manage diuretics  5.  Pulmonary hypertension   6.  Left renal mass-awaiting CT when patient can lay flat more comfortably after diureses.  7.hypotension .  Fluid resuscitation helped to improve.  She did not require pressors.    8.anemia-stable  9.  COPD  10.  Obstructive sleep apnea  11.  Hypothyroidism replacement therapy  12.  Gout     She again did not get spironolactone, toprol or bumetanide this am. I am going to lower toprol down to 25 mg along with lowering spironolactone to see if bp will allow these to be given tomorrow. DC losartan for now due to low bp.   May need to consider midodrine to allow for some GDMT  Will continue to follow        DEBBI Barber  07/06/25  10:22 EDT

## 2025-07-06 NOTE — PLAN OF CARE
Goal Outcome Evaluation:         Cortisol checked today with injection. Bp on low side. Meds adjusted per cardio. On RA all day. Maybe add midodrine tomorrow. HAZEL skin tear redressed. Prn tylenol given. Home soon with HH. Safety maintained. Stephany ZAFAR

## 2025-07-06 NOTE — PROGRESS NOTES
Name: Marilu Avery ADMIT: 2025   : 1955  PCP: Esperanza Melton APRN    MRN: [id number] LOS: 9 days   AGE/SEX: 69 y.o. female  ROOM: [facility]     Subjective   Subjective     No events overnight. Cortisol was low this morning. I ordered a stim test and it was normal. She didn't get her diuretics this morning due to low bp       Objective   Objective   Vital Signs  Temp:  [97 °F (36.1 °C)-98.2 °F (36.8 °C)] 97.3 °F (36.3 °C)  Heart Rate:  [79-93] 79  Resp:  [16-23] 21  BP: ()/(58-83) 110/83  SpO2:  [97 %-98 %] 97 %  on  Flow (L/min) (Oxygen Therapy):  [0] 0;   Device (Oxygen Therapy): room air  Body mass index is 34.42 kg/m².  Physical Exam  Constitutional:       General: She is not in acute distress.     Appearance: She is not toxic-appearing.   Cardiovascular:      Rate and Rhythm: Normal rate and regular rhythm.      Heart sounds: Normal heart sounds.   Pulmonary:      Effort: Pulmonary effort is normal.      Breath sounds: Normal breath sounds.   Abdominal:      General: Bowel sounds are normal.      Palpations: Abdomen is soft.      Hernia: A hernia is present.   Musculoskeletal:      Right lower leg: Edema present.      Left lower leg: Edema present.   Neurological:      Mental Status: She is alert.   Psychiatric:         Mood and Affect: Mood normal.         Behavior: Behavior normal.         Results Review     I reviewed the patient's new clinical results.  Results from last 7 days   Lab Units 25  0725  0825  0837 25  0908   WBC 10*3/mm3 2.41* 2.58* 2.74* 3.83   HEMOGLOBIN g/dL 9.3* 10.0* 9.8* 10.4*   PLATELETS 10*3/mm3 217 191 191 182     Results from last 7 days   Lab Units 25  0733 25  0824 25  0837 25  0908   SODIUM mmol/L 134* 136 137 137   POTASSIUM mmol/L 3.8 4.0 3.9 4.2   CHLORIDE mmol/L 102 103 103 105   CO2 mmol/L 20.0* 19.2* 19.9* 17.3*   BUN mg/dL 39.0* 43.0* 45.0* 46.0*   CREATININE mg/dL 1.29* 1.35* 1.33* 1.43*    GLUCOSE mg/dL 101* 112* 125* 133*   Estimated Creatinine Clearance: 55.8 mL/min (A) (by C-G formula based on SCr of 1.29 mg/dL (H)).  Results from last 7 days   Lab Units 07/06/25  0733 07/05/25  0824 07/04/25  0837 07/03/25  0908   ALBUMIN g/dL 3.0* 3.0* 3.1* 3.5     Results from last 7 days   Lab Units 07/06/25  0733 07/05/25  0824 07/04/25  0837 07/03/25  0908   CALCIUM mg/dL 8.7 9.0 8.9 9.2   ALBUMIN g/dL 3.0* 3.0* 3.1* 3.5   PHOSPHORUS mg/dL 3.3 3.5 4.1 4.3           COVID19   Date Value Ref Range Status   08/24/2021 Not Detected Not Detected - Ref. Range Final   06/13/2021 Not Detected Not Detected - Ref. Range Final     Glucose   Date/Time Value Ref Range Status   07/06/2025 1138 109 70 - 130 mg/dL Final   07/05/2025 2352 107 70 - 130 mg/dL Final   07/05/2025 1613 116 70 - 130 mg/dL Final   07/05/2025 1037 125 70 - 130 mg/dL Final   07/05/2025 0521 103 70 - 130 mg/dL Final   07/05/2025 0004 150 (H) 70 - 130 mg/dL Final   07/04/2025 1824 129 70 - 130 mg/dL Final       XR Chest 1 View  Narrative: XR CHEST 1 VW-     HISTORY: Female who is 69 years-old, dyspnea     TECHNIQUE: Frontal views of the chest     COMPARISON: 6/27/2025     FINDINGS: The heart size is borderline. Pulmonary vasculature is  unremarkable. Small atelectasis or infiltrate at the bases, follow-up  advised. No large pleural effusion, or pneumothorax. No acute osseous  process.     Impression: As described.     This report was finalized on 7/2/2025 3:40 PM by Dr. Herrera Mistry M.D on Workstation: [id number]       Scheduled Medications  allopurinol, 100 mg, Oral, Daily  apixaban, 5 mg, Oral, BID  budesonide-formoterol, 2 puff, Inhalation, BID - RT  [START ON 7/7/2025] bumetanide, 1 mg, Oral, Daily  bumetanide, 1 mg, Oral, Once  famotidine, 20 mg, Oral, BID  levothyroxine, 75 mcg, Oral, Q AM  Lidocaine, 1 patch, Transdermal, Q24H  metOLazone, 2.5 mg, Oral, Once  [START ON 7/7/2025] metoprolol succinate XL, 12.5 mg, Oral, Q24H  montelukast, 10  mg, Oral, Daily  mupirocin, 1 Application, Each Nare, BID  polyethylene glycol, 17 g, Oral, Daily  revefenacin, 175 mcg, Nebulization, Daily - RT  [START ON 7/7/2025] spironolactone, 12.5 mg, Oral, Daily  spironolactone, 12.5 mg, Oral, Once    Infusions   Diet  Diet: Cardiac; Healthy Heart (2-3 Na+); Fluid Consistency: Thin (IDDSI 0)       Assessment/Plan     Active Hospital Problems    Diagnosis  POA    **Hypotension [I95.9]  Yes    Hyponatremia [E87.1]  Unknown    Takotsubo cardiomyopathy [I51.81]  Unknown    Obesity (BMI 30-39.9) [E66.9]  Yes    Elevated troponin [R79.89]  Yes    Chronic alcohol use [F10.90]  Yes    Chronic anticoagulation [Z79.01]  Not Applicable    Tobacco abuse [Z72.0]  Yes    COPD (chronic obstructive pulmonary disease) [J44.9]  Yes    REBECCA (acute kidney injury) [N17.9]  Yes    Atrial fibrillation, persistent [I48.19]  Yes    HTN (hypertension) [I10]  Yes    HLD (hyperlipidemia) [E78.5]  Yes    Ventral hernia [K43.9]  Yes      Resolved Hospital Problems   No resolved problems to display.       69 y.o. female admitted with Hypotension.    69 year old woman with CKD 3 who presented initially with shortness of breath on exertion and lower extremity swelling and was found to have stress cardiomyopathy, an REBECCA on CKD 3, and hyponatremia     Acute systolic heart failure due to stress cardiomyopathy-initially placed in the ICU due to hypotension, but did not require pressors. Cardiac catheterization showed normal coronaries. Now on bumex, metoprolol, spironolactone. Lower bp is limiting GDMT  Low am cortisol-stim test responded appropriately ruling out adrenal insufficiency  REBECCA on CKD 3-improved with fluid resuscitation   Hyponatremia-resolved with fluid resuscitation. Now back on diuretics  Chronic afib-eliquis, metoprolol  Hypothyroidism-synthroid  COPD/asthma-lama/laba/ics, montelukast   Gout-allopurinol  LISA-pap if available  Back pain-voltaren gel, lidocaine patch, tylenol  Left renal  mass-she's unable to lie flat currently for a CT scan to better characterize the mass. She'll need to be referred to urology at discharge.  Eliquis (home med) for DVT prophylaxis.  Full code.  Discussed with patient and nursing staff.  Anticipate discharge home with home health when cleared by consultants.      Rogerio Devlin MD  [facility]  07/06/25  15:06 EDT

## 2025-07-06 NOTE — PROGRESS NOTES
"RENAL/KCC:     LOS: 9 days     Chief Complaint/ Reason for encounter: CKD, REBECCA, CHF and diuretic management    Subjective   Chart reviewed  Feeling well  Denies any worsening edema  No CP or SOA    Vital Signs  Temp:  [97.5 °F (36.4 °C)-98.2 °F (36.8 °C)] 98.2 °F (36.8 °C)  Heart Rate:  [85-98] 85  Resp:  [18-23] 22  BP: ()/(39-82) 126/82       Wt Readings from Last 1 Encounters:   07/05/25 0514 109 kg (239 lb 13.8 oz)   07/04/25 0545 109 kg (239 lb 13.8 oz)   07/03/25 0536 109 kg (240 lb 4.8 oz)   07/01/25 0100 109 kg (239 lb 6.7 oz)   06/28/25 1133 106 kg (233 lb)   06/28/25 0548 106 kg (233 lb 7.5 oz)   06/27/25 1929 106 kg (233 lb 7.5 oz)   06/27/25 1236 104 kg (229 lb)       Objective:  Vital signs: (most recent): Blood pressure 100/69, pulse 82, temperature 97 °F (36.1 °C), temperature source Oral, resp. rate 18, height 177.8 cm (70\"), weight 109 kg (239 lb 13.8 oz), SpO2 97%, not currently breastfeeding.              Objective:  General Appearance:  Comfortable, chronically ill, obese-appearing, no acute distress   HEENT: Mucous membranes moist, atraumatic  Lungs:  Normal effort and normal respiratory rate.  Breath sounds clear to auscultation: No rhonchi/Rales.  No  respiratory distress.   Heart:  S1, S2 normal.   Abdomen: Abdomen is soft, nontender/nondistended  Extremities: Trace edema of bilateral lower extremities  Skin:  Warm and dry       Results Review:    Intake/Output:     Intake/Output Summary (Last 24 hours) at 7/6/2025 0710  Last data filed at 7/5/2025 1721  Gross per 24 hour   Intake 540 ml   Output 900 ml   Net -360 ml       Labs:   Recent Results (from the past 24 hours)   Renal Function Panel    Collection Time: 07/05/25  8:24 AM    Specimen: Blood   Result Value Ref Range    Glucose 112 (H) 65 - 99 mg/dL    BUN 43.0 (H) 8.0 - 23.0 mg/dL    Creatinine 1.35 (H) 0.57 - 1.00 mg/dL    Sodium 136 136 - 145 mmol/L    Potassium 4.0 3.5 - 5.2 mmol/L    Chloride 103 98 - 107 mmol/L    CO2 19.2 " (L) 22.0 - 29.0 mmol/L    Calcium 9.0 8.6 - 10.5 mg/dL    Albumin 3.0 (L) 3.5 - 5.2 g/dL    Phosphorus 3.5 2.5 - 4.5 mg/dL    Anion Gap 13.8 5.0 - 15.0 mmol/L    BUN/Creatinine Ratio 31.9 (H) 7.0 - 25.0    eGFR 42.9 (L) >60.0 mL/min/1.73   CBC Auto Differential    Collection Time: 07/05/25  8:24 AM    Specimen: Blood   Result Value Ref Range    WBC 2.58 (L) 3.40 - 10.80 10*3/mm3    RBC 3.05 (L) 3.77 - 5.28 10*6/mm3    Hemoglobin 10.0 (L) 12.0 - 15.9 g/dL    Hematocrit 32.0 (L) 34.0 - 46.6 %    .9 (H) 79.0 - 97.0 fL    MCH 32.8 26.6 - 33.0 pg    MCHC 31.3 (L) 31.5 - 35.7 g/dL    RDW 14.1 12.3 - 15.4 %    RDW-SD 54.0 37.0 - 54.0 fl    MPV 10.1 6.0 - 12.0 fL    Platelets 191 140 - 450 10*3/mm3    Neutrophil % 74.8 42.7 - 76.0 %    Lymphocyte % 14.3 (L) 19.6 - 45.3 %    Monocyte % 7.8 5.0 - 12.0 %    Eosinophil % 2.3 0.3 - 6.2 %    Basophil % 0.4 0.0 - 1.5 %    Immature Grans % 0.4 0.0 - 0.5 %    Neutrophils, Absolute 1.93 1.70 - 7.00 10*3/mm3    Lymphocytes, Absolute 0.37 (L) 0.70 - 3.10 10*3/mm3    Monocytes, Absolute 0.20 0.10 - 0.90 10*3/mm3    Eosinophils, Absolute 0.06 0.00 - 0.40 10*3/mm3    Basophils, Absolute 0.01 0.00 - 0.20 10*3/mm3    Immature Grans, Absolute 0.01 0.00 - 0.05 10*3/mm3    nRBC 0.0 0.0 - 0.2 /100 WBC   Cortisol    Collection Time: 07/05/25  8:24 AM    Specimen: Blood   Result Value Ref Range    Cortisol 2.52   mcg/dL   POC Glucose Once    Collection Time: 07/05/25 10:37 AM    Specimen: Blood   Result Value Ref Range    Glucose 125 70 - 130 mg/dL   ECG 12 Lead Rhythm Change    Collection Time: 07/05/25  4:03 PM   Result Value Ref Range    QT Interval 410 ms    QTC Interval 499 ms   POC Glucose Once    Collection Time: 07/05/25  4:13 PM    Specimen: Blood   Result Value Ref Range    Glucose 116 70 - 130 mg/dL   POC Glucose Once    Collection Time: 07/05/25 11:52 PM    Specimen: Blood   Result Value Ref Range    Glucose 107 70 - 130 mg/dL       Radiology:  Pertinent radiology studies were  reviewed as described above    Medications have been reviewed separately in chart review medication tab    ASSESSMENT:  REBECCA: Fairly stable post cath, may be near her new baseline    Hypotension, improved with fluids  Chronic diastolic heart failure, euvolemic today but with orthopnea  Elevated troponin  Elevated lactate, likely due to hypotension with organ hypoperfusion, rule out acute infection  Immobility  Atrial fibrillation on anticoagulation with Eliquis  New renal mass        DISCUSSION/PLAN:   BMP pending - Cr has been stable around 1.3-1.4 - possibly this is her new baseline.     Normal coronaries on left heart cath, elevated filling pressures on right heart cath  Continue with oral Bumex, Aldactone as able  On low dose Losartan  Monitor renal function and BP - having to hold meds at times due to borderline BP  Will yuriy Flynn MD  [facility]   Office phone number: [phone]  Answering service phone number: [phone]    07/06/25  07:10 EDT

## 2025-07-06 NOTE — PLAN OF CARE
Problem: Adult Inpatient Plan of Care  Goal: Absence of Hospital-Acquired Illness or Injury  Outcome: Progressing  Intervention: Identify and Manage Fall Risk  Recent Flowsheet Documentation  Taken 7/6/2025 0400 by Chelita Donovan RN  Safety Promotion/Fall Prevention:   safety round/check completed   fall prevention program maintained   clutter free environment maintained  Taken 7/6/2025 0300 by Chelita Donovan RN  Safety Promotion/Fall Prevention:   safety round/check completed   fall prevention program maintained   clutter free environment maintained  Taken 7/6/2025 0217 by Chelita Donovan RN  Safety Promotion/Fall Prevention:   safety round/check completed   fall prevention program maintained   clutter free environment maintained  Taken 7/6/2025 0100 by Chelita Donovan RN  Safety Promotion/Fall Prevention:   safety round/check completed   fall prevention program maintained   clutter free environment maintained  Taken 7/6/2025 0000 by Chelita Donovan RN  Safety Promotion/Fall Prevention:   safety round/check completed   fall prevention program maintained   clutter free environment maintained  Taken 7/5/2025 2200 by Chelita Donovan RN  Safety Promotion/Fall Prevention:   safety round/check completed   fall prevention program maintained   clutter free environment maintained  Taken 7/5/2025 2100 by Chelita Donovan RN  Safety Promotion/Fall Prevention:   safety round/check completed   fall prevention program maintained   clutter free environment maintained  Taken 7/5/2025 2030 by Chelita Donovan RN  Safety Promotion/Fall Prevention:   safety round/check completed   fall prevention program maintained   clutter free environment maintained  Intervention: Prevent Skin Injury  Recent Flowsheet Documentation  Taken 7/6/2025 0400 by Chelita Donovan RN  Body Position: position changed independently  Taken 7/6/2025 0300 by Chelita Donovan RN  Body Position: position changed independently  Taken 7/6/2025 0217 by Chelita Donovan RN  Body  Position: position changed independently  Taken 7/6/2025 0100 by Chelita Donovan RN  Body Position: position changed independently  Taken 7/6/2025 0000 by Chelita Donovan RN  Body Position: position changed independently  Taken 7/5/2025 2200 by Chelita Donovan RN  Body Position: position changed independently  Taken 7/5/2025 2100 by Chelita Donovan RN  Body Position: position changed independently  Taken 7/5/2025 2030 by Chelita Donovan RN  Body Position: position changed independently  Intervention: Prevent Infection  Recent Flowsheet Documentation  Taken 7/6/2025 0400 by Chelita Donovan RN  Infection Prevention:   hand hygiene promoted   single patient room provided  Taken 7/6/2025 0300 by Chelita Donovan RN  Infection Prevention:   hand hygiene promoted   single patient room provided  Taken 7/6/2025 0217 by Chelita Donovan RN  Infection Prevention:   hand hygiene promoted   single patient room provided  Taken 7/6/2025 0100 by Chelita Donovan RN  Infection Prevention:   hand hygiene promoted   single patient room provided  Taken 7/6/2025 0000 by Chelita Donovan RN  Infection Prevention:   hand hygiene promoted   single patient room provided  Taken 7/5/2025 2200 by Chelita Donovan RN  Infection Prevention:   hand hygiene promoted   single patient room provided  Taken 7/5/2025 2100 by Chelita Donovan RN  Infection Prevention:   hand hygiene promoted   single patient room provided  Taken 7/5/2025 2030 by Chelita Donovan RN  Infection Prevention:   hand hygiene promoted   single patient room provided   Goal Outcome Evaluation:  Plan of Care Reviewed With: patient        Progress: improving  Outcome Evaluation: VSS, AOx4, pt dangled at side of bed at beginning of shift, able to get self back into bed, tylenol given for pain one time, pt resting comfortably overnight

## 2025-07-06 NOTE — PROGRESS NOTES
"  PROGRESS NOTE  Patient Name: Marilu Avery  Age/Sex: 69 y.o. female  : 1955  MRN: [id number]    Date of Admission: 2025  Date of Encounter Visit: 25   LOS: 9 days   Patient Care Team:  Esperanza Melton APRN as PCP - General (Nurse Practitioner)  Anatoly Jimenez MD as PCP - Family Medicine    Chief Complaint: Shortness of breath and weakness with hypotension    Hospital course: Patient is doing better, stronger, almost back to her baseline, she is on room air with sats in the high 90s, she is afebrile.  Tolerating p.o.  Her blood pressure is still soft, the cortisol level came back low, she is currently on cosyntropin stimulation test by the primary team to further confirm adrenal insufficiency and to help decide on treatment         REVIEW OF SYSTEMS:   CONSTITUTIONAL: no fever or chills  CARDIOVASCULAR: No chest pain, chest pressure or chest discomfort. No palpitations or edema.   RESPIRATORY: No shortness of breath, cough or sputum.   GASTROINTESTINAL: No anorexia, nausea, vomiting or diarrhea. No abdominal pain or blood.   HEMATOLOGIC: No bleeding or bruising.     Ventilator/Non-Invasive Ventilation Settings (From admission, onward)      None              Vital Signs  Temp:  [97 °F (36.1 °C)-98.2 °F (36.8 °C)] 97 °F (36.1 °C)  Heart Rate:  [82-97] 82  Resp:  [16-23] 18  BP: ()/(39-82) 122/79  SpO2:  [97 %-98 %] 97 %  on  Flow (L/min) (Oxygen Therapy):  [0-2] 0 Device (Oxygen Therapy): room air    Intake/Output Summary (Last 24 hours) at 2025 1115  Last data filed at 2025 0807  Gross per 24 hour   Intake 730 ml   Output 900 ml   Net -170 ml     Flowsheet Rows      Flowsheet Row First Filed Value   Admission Height 177.8 cm (70\") Documented at 2025 1236   Admission Weight 104 kg (229 lb) Documented at 2025 1236          Body mass index is 34.42 kg/m².      25  0536 25  0545 25  0514   Weight: 109 kg (240 lb 4.8 oz) 109 kg (239 lb 13.8 oz) 109 " kg (239 lb 13.8 oz)       Physical Exam:  GEN:  No acute distress, alert, cooperative, well developed   EYES:   Sclerae clear. No icterus. PERRL. Normal EOM  ENT:   External ears/nose normal, no oral lesions, no thrush, mucous membranes moist  NECK:  Supple, midline trachea, no JVD  LUNGS: Normal chest on inspection, CTAB, no wheezes. No rhonchi. No crackles. Respirations regular, even and unlabored.   CV:  Regular rhythm and rate. Normal S1/S2. No murmurs, gallops, or rubs noted.  ABD:  Soft, nontender and nondistended. Normal bowel sounds. No guarding.  Ventral hernia  EXT:  Moves all extremities well. No cyanosis. No redness. No edema.   Skin: Dry, intact, no bleeding    Results Review:    Results From Last 14 Days   Lab Units 06/27/25  1544 06/27/25  1541 06/27/25  1244   LACTATE mmol/L 1.7  --  2.5*   TRIGLYCERIDES mg/dL  --  111  --      Results from last 7 days   Lab Units 07/06/25  0733 07/05/25  0824 07/04/25  0837 07/03/25  0908 07/02/25  0752 07/01/25  1808 07/01/25  1219 07/01/25  0552 06/30/25  1850 06/30/25  0615   SODIUM mmol/L 134* 136 137 137 134*  134*  134* 137 137 136  136   < > 137  137   POTASSIUM mmol/L 3.8 4.0 3.9 4.2 4.2  4.2  4.2 4.6 4.6 4.7  4.7   < > 4.4  4.4   CHLORIDE mmol/L 102 103 103 105 104  105  105 106 106 108*  108*   < > 108*  108*   CO2 mmol/L 20.0* 19.2* 19.9* 17.3* 14.5*  15.0*  15.0* 17.2* 16.4* 14.9*  14.9*   < > 17.7*  17.7*   BUN mg/dL 39.0* 43.0* 45.0* 46.0* 47.0*  46.0*  46.0* 46.0* 41.0* 42.0*  42.0*   < > 41.0*  41.0*   CREATININE mg/dL 1.29* 1.35* 1.33* 1.43* 1.40*  1.33*  1.33* 1.54* 1.44* 1.24*  1.24*   < > 1.17*  1.17*   CALCIUM mg/dL 8.7 9.0 8.9 9.2 9.0  9.2  9.2 9.0 9.4 9.2  9.2   < > 9.0  9.0   ANION GAP mmol/L 12.0 13.8 14.1 14.7 15.5*  14.0  14.0 13.8 14.6 13.1  13.1   < > 11.3  11.3   ALBUMIN g/dL 3.0* 3.0* 3.1* 3.5 3.3*  --   --  3.1*  --  2.9*    < > = values in this interval not displayed.                 Results from last  "7 days   Lab Units 07/06/25  0733 07/05/25  0824 07/04/25  0837 07/03/25  0908 07/02/25  0752 07/01/25  0552 06/30/25  1418 06/30/25  1413 06/30/25  0615   WBC 10*3/mm3 2.41* 2.58* 2.74* 3.83 3.70 3.83  --   --  4.05   HEMOGLOBIN g/dL 9.3* 10.0* 9.8* 10.4* 9.6* 9.7*  --   --  9.4*   HEMOGLOBIN, POC g/dL  --   --   --   --   --   --  9.9*   < >  --    HEMATOCRIT % 28.4* 32.0* 30.4* 32.0* 28.5* 30.3*  --   --  28.1*   HEMATOCRIT POC %  --   --   --   --   --   --  29*   < >  --    PLATELETS 10*3/mm3 217 191 191 182 155 154  --   --  125*   MCV fL 101.4* 104.9* 101.0* 103.2* 99.0* 102.0*  --   --  98.9*   NEUTROPHIL % % 68.5 74.8 74.5 77.4* 71.2 69.5  --   --  64.7   LYMPHOCYTE % % 15.8* 14.3* 12.8* 12.5* 14.9* 16.7*  --   --  20.0   MONOCYTES % % 11.6 7.8 7.3 7.0 11.4 11.0  --   --  13.1*   EOSINOPHIL % % 2.9 2.3 4.0 1.8 1.9 1.8  --   --  1.5   BASOPHIL % % 0.4 0.4 0.7 0.5 0.3 0.5  --   --  0.2   IMM GRAN % % 0.8* 0.4 0.7* 0.8* 0.3 0.5  --   --  0.5    < > = values in this interval not displayed.                   Invalid input(s): \"LDLCALC\"  Results from last 7 days   Lab Units 06/30/25  1418 06/30/25  1413   PH, ARTERIAL pH units 7.340* 7.410         Glucose   Date/Time Value Ref Range Status   07/05/2025 2352 107 70 - 130 mg/dL Final   07/05/2025 1613 116 70 - 130 mg/dL Final   07/05/2025 1037 125 70 - 130 mg/dL Final   07/05/2025 0521 103 70 - 130 mg/dL Final   07/05/2025 0004 150 (H) 70 - 130 mg/dL Final   07/04/2025 1824 129 70 - 130 mg/dL Final   07/04/2025 1122 107 70 - 130 mg/dL Final   07/04/2025 0524 105 70 - 130 mg/dL Final                               Imaging:   Imaging Results (All)               I reviewed the patient's new clinical results.  I personally viewed and interpreted the patient's imaging results:        Medication Review:   allopurinol, 100 mg, Oral, Daily  apixaban, 5 mg, Oral, BID  budesonide-formoterol, 2 puff, Inhalation, BID - RT  [START ON 7/7/2025] bumetanide, 1 mg, Oral, " Daily  bumetanide, 1 mg, Oral, Once  famotidine, 20 mg, Oral, BID  levothyroxine, 75 mcg, Oral, Q AM  Lidocaine, 1 patch, Transdermal, Q24H  metOLazone, 2.5 mg, Oral, Once  [START ON 7/7/2025] metoprolol succinate XL, 12.5 mg, Oral, Q24H  metoprolol succinate XL, 12.5 mg, Oral, Once  montelukast, 10 mg, Oral, Daily  mupirocin, 1 Application, Each Nare, BID  polyethylene glycol, 17 g, Oral, Daily  revefenacin, 175 mcg, Nebulization, Daily - RT  [START ON 7/7/2025] spironolactone, 12.5 mg, Oral, Daily  spironolactone, 12.5 mg, Oral, Once             ASSESSMENT:   Weakness  Acute kidney injury  Hyponatremia: Improved  Non-ST elevation MI  Atrial fibrillation  Hypothyroidism  Asthma/COPD  Tobacco use  Pulm hypertension: WHO group II  LISA    PLAN:  Repeat cortisol is substantially low suggestive of adrenal insufficiency, she is having cosyntropin stimulation test.  Adrenal insufficiency might be part of the syndrome that explain the collection of symptoms she had on initial presentation  Will decide on the cortisol replacement depending on the stimulation test  Patient was cleared to transfer out of the CCU she is currently a telemetry overflow  Since she is already on room air and the primary team is taking over the adrenal insufficiency management, we will sign off  Discussed with patient  Labs/Notes/films were independently reviewed and pertinent results are summarized above  The copied texts in this note were reviewed and they are accurate as of 07/06/25    Disposition: Per primary team    Tara Tomlin MD  07/06/25  11:15 EDT         Dictated utilizing Dragon dictation

## 2025-07-07 ENCOUNTER — READMISSION MANAGEMENT (OUTPATIENT)
Dept: CALL CENTER | Facility: HOSPITAL | Age: 70
End: 2025-07-07
Payer: COMMERCIAL

## 2025-07-07 VITALS
OXYGEN SATURATION: 96 % | DIASTOLIC BLOOD PRESSURE: 87 MMHG | SYSTOLIC BLOOD PRESSURE: 136 MMHG | RESPIRATION RATE: 24 BRPM | BODY MASS INDEX: 34.34 KG/M2 | HEART RATE: 105 BPM | HEIGHT: 70 IN | WEIGHT: 239.86 LBS | TEMPERATURE: 97.5 F

## 2025-07-07 LAB
ALBUMIN SERPL-MCNC: 2.9 G/DL (ref 3.5–5.2)
ANION GAP SERPL CALCULATED.3IONS-SCNC: 11.9 MMOL/L (ref 5–15)
BASOPHILS # BLD AUTO: 0.01 10*3/MM3 (ref 0–0.2)
BASOPHILS NFR BLD AUTO: 0.4 % (ref 0–1.5)
BUN SERPL-MCNC: 39 MG/DL (ref 8–23)
BUN/CREAT SERPL: 34.5 (ref 7–25)
CALCIUM SPEC-SCNC: 9 MG/DL (ref 8.6–10.5)
CHLORIDE SERPL-SCNC: 102 MMOL/L (ref 98–107)
CO2 SERPL-SCNC: 21.1 MMOL/L (ref 22–29)
CREAT SERPL-MCNC: 1.13 MG/DL (ref 0.57–1)
DEPRECATED RDW RBC AUTO: 53.1 FL (ref 37–54)
EGFRCR SERPLBLD CKD-EPI 2021: 53.1 ML/MIN/1.73
EOSINOPHIL # BLD AUTO: 0.08 10*3/MM3 (ref 0–0.4)
EOSINOPHIL NFR BLD AUTO: 3.2 % (ref 0.3–6.2)
ERYTHROCYTE [DISTWIDTH] IN BLOOD BY AUTOMATED COUNT: 14.1 % (ref 12.3–15.4)
GLUCOSE BLDC GLUCOMTR-MCNC: 113 MG/DL (ref 70–130)
GLUCOSE BLDC GLUCOMTR-MCNC: 99 MG/DL (ref 70–130)
GLUCOSE SERPL-MCNC: 96 MG/DL (ref 65–99)
HBA1C MFR BLD: 5.7 % (ref 4.8–5.6)
HCT VFR BLD AUTO: 29.7 % (ref 34–46.6)
HGB BLD-MCNC: 9.7 G/DL (ref 12–15.9)
IMM GRANULOCYTES # BLD AUTO: 0.01 10*3/MM3 (ref 0–0.05)
IMM GRANULOCYTES NFR BLD AUTO: 0.4 % (ref 0–0.5)
LYMPHOCYTES # BLD AUTO: 0.46 10*3/MM3 (ref 0.7–3.1)
LYMPHOCYTES NFR BLD AUTO: 18.5 % (ref 19.6–45.3)
MCH RBC QN AUTO: 33.7 PG (ref 26.6–33)
MCHC RBC AUTO-ENTMCNC: 32.7 G/DL (ref 31.5–35.7)
MCV RBC AUTO: 103.1 FL (ref 79–97)
MONOCYTES # BLD AUTO: 0.32 10*3/MM3 (ref 0.1–0.9)
MONOCYTES NFR BLD AUTO: 12.9 % (ref 5–12)
NEUTROPHILS NFR BLD AUTO: 1.6 10*3/MM3 (ref 1.7–7)
NEUTROPHILS NFR BLD AUTO: 64.6 % (ref 42.7–76)
NRBC BLD AUTO-RTO: 0 /100 WBC (ref 0–0.2)
PHOSPHATE SERPL-MCNC: 3.2 MG/DL (ref 2.5–4.5)
PLATELET # BLD AUTO: 207 10*3/MM3 (ref 140–450)
PMV BLD AUTO: 9.7 FL (ref 6–12)
POTASSIUM SERPL-SCNC: 4 MMOL/L (ref 3.5–5.2)
RBC # BLD AUTO: 2.88 10*6/MM3 (ref 3.77–5.28)
SODIUM SERPL-SCNC: 135 MMOL/L (ref 136–145)
WBC NRBC COR # BLD AUTO: 2.48 10*3/MM3 (ref 3.4–10.8)

## 2025-07-07 PROCEDURE — 63710000001 REVEFENACIN 175 MCG/3ML SOLUTION: Performed by: STUDENT IN AN ORGANIZED HEALTH CARE EDUCATION/TRAINING PROGRAM

## 2025-07-07 PROCEDURE — 94799 UNLISTED PULMONARY SVC/PX: CPT

## 2025-07-07 PROCEDURE — 99232 SBSQ HOSP IP/OBS MODERATE 35: CPT | Performed by: INTERNAL MEDICINE

## 2025-07-07 PROCEDURE — 80069 RENAL FUNCTION PANEL: CPT | Performed by: INTERNAL MEDICINE

## 2025-07-07 PROCEDURE — 97110 THERAPEUTIC EXERCISES: CPT

## 2025-07-07 PROCEDURE — 94664 DEMO&/EVAL PT USE INHALER: CPT

## 2025-07-07 PROCEDURE — 94760 N-INVAS EAR/PLS OXIMETRY 1: CPT

## 2025-07-07 PROCEDURE — 82948 REAGENT STRIP/BLOOD GLUCOSE: CPT

## 2025-07-07 PROCEDURE — 83036 HEMOGLOBIN GLYCOSYLATED A1C: CPT | Performed by: INTERNAL MEDICINE

## 2025-07-07 PROCEDURE — 36415 COLL VENOUS BLD VENIPUNCTURE: CPT | Performed by: INTERNAL MEDICINE

## 2025-07-07 PROCEDURE — 85025 COMPLETE CBC W/AUTO DIFF WBC: CPT | Performed by: INTERNAL MEDICINE

## 2025-07-07 PROCEDURE — 97530 THERAPEUTIC ACTIVITIES: CPT

## 2025-07-07 PROCEDURE — 94761 N-INVAS EAR/PLS OXIMETRY MLT: CPT

## 2025-07-07 RX ORDER — SPIRONOLACTONE 25 MG/1
12.5 TABLET ORAL 2 TIMES DAILY
Qty: 30 TABLET | Refills: 0 | Status: SHIPPED | OUTPATIENT
Start: 2025-07-07

## 2025-07-07 RX ORDER — BUMETANIDE 1 MG/1
1 TABLET ORAL
Status: DISCONTINUED | OUTPATIENT
Start: 2025-07-07 | End: 2025-07-07 | Stop reason: HOSPADM

## 2025-07-07 RX ORDER — METOPROLOL SUCCINATE 25 MG/1
25 TABLET, EXTENDED RELEASE ORAL
Qty: 30 TABLET | Refills: 0 | Status: SHIPPED | OUTPATIENT
Start: 2025-07-08

## 2025-07-07 RX ORDER — METOPROLOL SUCCINATE 25 MG/1
25 TABLET, EXTENDED RELEASE ORAL
Status: DISCONTINUED | OUTPATIENT
Start: 2025-07-07 | End: 2025-07-07 | Stop reason: HOSPADM

## 2025-07-07 RX ADMIN — METOPROLOL SUCCINATE 25 MG: 25 TABLET, EXTENDED RELEASE ORAL at 08:56

## 2025-07-07 RX ADMIN — REVEFENACIN 175 MCG: 175 SOLUTION RESPIRATORY (INHALATION) at 06:51

## 2025-07-07 RX ADMIN — APIXABAN 5 MG: 5 TABLET, FILM COATED ORAL at 08:56

## 2025-07-07 RX ADMIN — ACETAMINOPHEN 650 MG: 325 TABLET, FILM COATED ORAL at 15:00

## 2025-07-07 RX ADMIN — SPIRONOLACTONE 12.5 MG: 25 TABLET ORAL at 15:01

## 2025-07-07 RX ADMIN — LIDOCAINE 1 PATCH: 4 PATCH TOPICAL at 08:53

## 2025-07-07 RX ADMIN — FAMOTIDINE 20 MG: 20 TABLET, FILM COATED ORAL at 08:56

## 2025-07-07 RX ADMIN — BUMETANIDE 1 MG: 1 TABLET ORAL at 17:57

## 2025-07-07 RX ADMIN — ALLOPURINOL 100 MG: 100 TABLET ORAL at 08:56

## 2025-07-07 RX ADMIN — MONTELUKAST 10 MG: 10 TABLET, FILM COATED ORAL at 08:56

## 2025-07-07 RX ADMIN — BUDESONIDE AND FORMOTEROL FUMARATE DIHYDRATE 2 PUFF: 160; 4.5 AEROSOL RESPIRATORY (INHALATION) at 06:52

## 2025-07-07 RX ADMIN — LEVOTHYROXINE SODIUM 75 MCG: 0.07 TABLET ORAL at 06:23

## 2025-07-07 NOTE — PROGRESS NOTES
"RENAL/KCC:     LOS: 10 days     Chief Complaint/ Reason for encounter: CKD, REBECCA, CHF and diuretic management    Subjective     7/7: She is feeling a little better denies any significant new complaints today  Still with orthopnea.  We again discussed the CT but she is not willing to try it again she is not able to lie flat      Vital Signs  Temp:  [97.5 °F (36.4 °C)-97.9 °F (36.6 °C)] 97.5 °F (36.4 °C)  Heart Rate:  [] 103  Resp:  [17-24] 24  BP: (122-133)/(77-93) 130/77       Wt Readings from Last 1 Encounters:   07/07/25 0600 109 kg (239 lb 13.8 oz)   07/05/25 0514 109 kg (239 lb 13.8 oz)   07/04/25 0545 109 kg (239 lb 13.8 oz)   07/03/25 0536 109 kg (240 lb 4.8 oz)   07/01/25 0100 109 kg (239 lb 6.7 oz)   06/28/25 1133 106 kg (233 lb)   06/28/25 0548 106 kg (233 lb 7.5 oz)   06/27/25 1929 106 kg (233 lb 7.5 oz)   06/27/25 1236 104 kg (229 lb)       Objective:  Vital signs: (most recent): Blood pressure 130/77, pulse 103, temperature 97.5 °F (36.4 °C), temperature source Oral, resp. rate 24, height 177.8 cm (70\"), weight 109 kg (239 lb 13.8 oz), SpO2 94%, not currently breastfeeding.              Objective:  General Appearance:  Comfortable, chronically ill, obese-appearing, no acute distress   HEENT: Mucous membranes moist, atraumatic  Lungs:  Normal effort and normal respiratory rate.  Breath sounds clear to auscultation: No rhonchi/Rales.  No  respiratory distress.   Heart:  S1, S2 normal.   Abdomen: Abdomen is soft, nontender/nondistended  Extremities: Trace edema of bilateral lower extremities  Skin:  Warm and dry       Results Review:    Intake/Output:     Intake/Output Summary (Last 24 hours) at 7/7/2025 1316  Last data filed at 7/7/2025 0334  Gross per 24 hour   Intake --   Output 2100 ml   Net -2100 ml       Labs:   Recent Results (from the past 24 hours)   POC Glucose Once    Collection Time: 07/06/25  5:27 PM    Specimen: Blood   Result Value Ref Range    Glucose 130 70 - 130 mg/dL   POC Glucose " Once    Collection Time: 07/06/25 11:55 PM    Specimen: Blood   Result Value Ref Range    Glucose 123 70 - 130 mg/dL   POC Glucose Once    Collection Time: 07/07/25  6:07 AM    Specimen: Blood   Result Value Ref Range    Glucose 99 70 - 130 mg/dL   Renal Function Panel    Collection Time: 07/07/25  6:38 AM    Specimen: Hand, Right; Blood   Result Value Ref Range    Glucose 96 65 - 99 mg/dL    BUN 39.0 (H) 8.0 - 23.0 mg/dL    Creatinine 1.13 (H) 0.57 - 1.00 mg/dL    Sodium 135 (L) 136 - 145 mmol/L    Potassium 4.0 3.5 - 5.2 mmol/L    Chloride 102 98 - 107 mmol/L    CO2 21.1 (L) 22.0 - 29.0 mmol/L    Calcium 9.0 8.6 - 10.5 mg/dL    Albumin 2.9 (L) 3.5 - 5.2 g/dL    Phosphorus 3.2 2.5 - 4.5 mg/dL    Anion Gap 11.9 5.0 - 15.0 mmol/L    BUN/Creatinine Ratio 34.5 (H) 7.0 - 25.0    eGFR 53.1 (L) >60.0 mL/min/1.73   CBC Auto Differential    Collection Time: 07/07/25  6:38 AM    Specimen: Hand, Right; Blood   Result Value Ref Range    WBC 2.48 (L) 3.40 - 10.80 10*3/mm3    RBC 2.88 (L) 3.77 - 5.28 10*6/mm3    Hemoglobin 9.7 (L) 12.0 - 15.9 g/dL    Hematocrit 29.7 (L) 34.0 - 46.6 %    .1 (H) 79.0 - 97.0 fL    MCH 33.7 (H) 26.6 - 33.0 pg    MCHC 32.7 31.5 - 35.7 g/dL    RDW 14.1 12.3 - 15.4 %    RDW-SD 53.1 37.0 - 54.0 fl    MPV 9.7 6.0 - 12.0 fL    Platelets 207 140 - 450 10*3/mm3    Neutrophil % 64.6 42.7 - 76.0 %    Lymphocyte % 18.5 (L) 19.6 - 45.3 %    Monocyte % 12.9 (H) 5.0 - 12.0 %    Eosinophil % 3.2 0.3 - 6.2 %    Basophil % 0.4 0.0 - 1.5 %    Immature Grans % 0.4 0.0 - 0.5 %    Neutrophils, Absolute 1.60 (L) 1.70 - 7.00 10*3/mm3    Lymphocytes, Absolute 0.46 (L) 0.70 - 3.10 10*3/mm3    Monocytes, Absolute 0.32 0.10 - 0.90 10*3/mm3    Eosinophils, Absolute 0.08 0.00 - 0.40 10*3/mm3    Basophils, Absolute 0.01 0.00 - 0.20 10*3/mm3    Immature Grans, Absolute 0.01 0.00 - 0.05 10*3/mm3    nRBC 0.0 0.0 - 0.2 /100 WBC   Hemoglobin A1c    Collection Time: 07/07/25  6:38 AM    Specimen: Hand, Right; Blood   Result  Value Ref Range    Hemoglobin A1C 5.70 (H) 4.80 - 5.60 %   POC Glucose Once    Collection Time: 07/07/25 11:44 AM    Specimen: Blood   Result Value Ref Range    Glucose 113 70 - 130 mg/dL       Radiology:  Pertinent radiology studies were reviewed as described above    Medications have been reviewed separately in chart review medication tab    ASSESSMENT:  REBECCA: Fairly stable post cath, may be near her new baseline    Hypotension, improved with fluids  Chronic diastolic heart failure, euvolemic today but with orthopnea  Elevated troponin  Elevated lactate, likely due to hypotension with organ hypoperfusion, rule out acute infection  Immobility  Atrial fibrillation on anticoagulation with Eliquis  renal mass, unable to perform CT due to orthopnea        DISCUSSION/PLAN:   Her renal function is further improved today, creatinine 1.13  Increase Bumex back to twice daily dosing  Continue spironolactone and low-dose ARB  Unfortunately, due to her persistent orthopnea she is not able to lie flat in order to have a CT renal mass protocol performed.  She does understand that there is no way to rule out a solid neoplasm without a CT or an MRI.  She wishes to discuss further on an outpatient basis  Stable for discharge at any time from a renal standpoint       Baltazar Alcantar MD  [facility]   Office phone number: [phone]  Answering service phone number: [phone]    07/07/25  13:16 EDT

## 2025-07-07 NOTE — OUTREACH NOTE
Prep Survey      Flowsheet Row Responses   [facility] facility patient discharged from? [facility]   Is LACE score < 7 ? No   Eligibility Readm Mgmt   Discharge diagnosis Hypotension   Does the patient have one of the following disease processes/diagnoses(primary or secondary)? Other   Prep survey completed? Yes            Stephany DELGADILLO - Registered Nurse

## 2025-07-07 NOTE — DISCHARGE SUMMARY
Patient Name: Marilu Avery  : 1955  MRN: [id number]    Date of Admission: 2025  Date of Discharge:  2025  Primary Care Physician: Esperanza Melton APRN      Chief Complaint:   Weakness - Generalized      Discharge Diagnoses     Active Hospital Problems    Diagnosis  POA    **Hypotension [I95.9]  Yes    Hyponatremia [E87.1]  Unknown    Takotsubo cardiomyopathy [I51.81]  Unknown    Obesity (BMI 30-39.9) [E66.9]  Yes    Elevated troponin [R79.89]  Yes    Chronic alcohol use [F10.90]  Yes    Chronic anticoagulation [Z79.01]  Not Applicable    Tobacco abuse [Z72.0]  Yes    COPD (chronic obstructive pulmonary disease) [J44.9]  Yes    REBECCA (acute kidney injury) [N17.9]  Yes    Atrial fibrillation, persistent [I48.19]  Yes    HTN (hypertension) [I10]  Yes    HLD (hyperlipidemia) [E78.5]  Yes    Ventral hernia [K43.9]  Yes      Resolved Hospital Problems   No resolved problems to display.        Hospital Course         This is a 69-year-old woman with a past medical history of stage III CKD who presented to hospital with shortness of air endorsed from the edema.  She was found to have stress cardiomyopathy, REBECCA and CKD stage III, and hyponatremia.  He was initially admitted to the ICU due to hypotension with that did not require pressor support.  She was seen by nephrology and cardiology.  She tolerated diuresis, and her blood pressure got better with adjustments of her medications, and she was discharged home.      Physical Exam:  Temp:  [97.5 °F (36.4 °C)-97.9 °F (36.6 °C)] 97.5 °F (36.4 °C)  Heart Rate:  [] 103  Resp:  [17-24] 24  BP: (122-133)/(77-93) 130/77  Body mass index is 34.42 kg/m².  Physical Exam  Constitutional:       General: She is not in acute distress.     Comments: Chronically ill appearing    HENT:      Head: Normocephalic.   Cardiovascular:      Rate and Rhythm: Normal rate and regular rhythm.   Pulmonary:      Effort: Pulmonary effort is normal. No respiratory distress.    Abdominal:      General: There is no distension.      Palpations: Abdomen is soft.      Tenderness: There is no abdominal tenderness.   Neurological:      Mental Status: She is alert and oriented to person, place, and time.         Consultants     Consult Orders (all) (From admission, onward)       Start     Ordered    07/02/25 1514  Inpatient Access Center Consult  Once        Provider:  (Not yet assigned)    07/02/25 1513    06/29/25 1113  Inpatient Nephrology Consult  Once        Specialty:  Nephrology  Provider:  Molly Laurent MD    06/29/25 1112    06/28/25 1034  Inpatient Hospitalist Consult  Once        Specialty:  Hospitalist  Provider:  Moisés Cates MD    06/28/25 1033    06/27/25 1444  Inpatient Nephrology Consult  Once        Specialty:  Nephrology  Provider:  Baltazar Alcantar MD    06/27/25 1443    06/27/25 1444  Inpatient Cardiology Consult  Once        Specialty:  Cardiology  Provider:  Sanjay Byrd MD    06/27/25 1443    06/27/25 1431  Pulmonology (on-call MD unless specified)  Once        Specialty:  Pulmonary Disease  Provider:  (Not yet assigned)    06/27/25 1430    06/27/25 1414  [facility] (on-call MD unless specified) Details  Once,   Status:  Canceled        Specialty:  Hospitalist  Provider:  (Not yet assigned)    06/27/25 1413    06/27/25 1414  [facility] (on-call MD unless specified)  Once        Specialty:  Cardiology  Provider:  (Not yet assigned)    06/27/25 1413                  Procedures     Imaging Results (All)       Procedure Component Value Units Date/Time    XR Chest 1 View [[id number]] Collected: 07/02/25 1534     Updated: 07/02/25 1543    Narrative:      XR CHEST 1 VW-     HISTORY: Female who is 69 years-old, dyspnea     TECHNIQUE: Frontal views of the chest     COMPARISON: 6/27/2025     FINDINGS: The heart size is borderline. Pulmonary vasculature is  unremarkable. Small atelectasis or infiltrate at the bases, follow-up  advised. No large pleural effusion, or pneumothorax.  No acute osseous  process.       Impression:      As described.     This report was finalized on 7/2/2025 3:40 PM by Dr. Herrera Mistry M.D on Workstation: [id number]       US Renal Bilateral [[id number]] Collected: 06/28/25 0837     Updated: 06/28/25 0845    Narrative:      US RENAL BILATERAL-     INDICATIONS: Acute renal failure     TECHNIQUE: ULTRASOUND OF THE KIDNEYS AND URINARY BLADDER.     COMPARISON: 2/14/2025, CT from 6/13/2021     FINDINGS:     The right kidney measures 8.7 centimeters, the left kidney measures 10.0  centimeters.     A previous hyperechoic lesion of the right kidney, compatible with  angiomyolipoma, is also apparent on this exam, but measures larger, as  much as 3.8 cm (previously 2.8 cm), suggesting interval increase versus  differences in measurement technique. A right renal cyst measures 1.8  cm, and a couple left renal cysts are noted, the larger measuring 1.5  cm. Questionable appearance of a 2.6 cm cystic mass of the left upper  kidney, but no corresponding finding is seen on the prior exam, CT  correlation is advised.     No hydronephrosis or echogenic nephrolithiasis.     The urinary bladder is empty, precluding its assessment.       Impression:      Possible new mass of the left upper kidney, as described, CT correlation  advised.     No hydronephrosis or echogenic nephrolithiasis.        This report was finalized on 6/28/2025 8:42 AM by Dr. Herrera Mistry M.D on Workstation: [id number]       CT Head Without Contrast [[id number]] Collected: 06/27/25 1522     Updated: 06/28/25 0812    Narrative:      EMERGENCY CT SCAN OF THE HEAD WITHOUT CONTRAST ON 06/27/2025     CLINICAL HISTORY: This is a 69-year-old female patient who slipped on  floor today and is on blood thinners-Eliquis.     TECHNIQUE: Spiral CT images were obtained from the base of the skull to  the vertex without intravenous contrast. The images were reformatted and  are submitted in 2 mm thick axial, sagittal, and  coronal CT sections  with brain algorithm, 2 mm thick axial CT sections with high-resolution  bone algorithm.     There are no prior head CTs from [facility] for  comparison.     FINDINGS: There is some mild patchy low-density in the periventricular  white matter, consistent with mild small vessel disease. The remainder  of the brain parenchyma is normal in attenuation. The ventricles are  normal in size. I see no focal mass effect. There is no midline shift.  No extra-axial fluid collections are identified. There is no evidence of  acute intracranial hemorrhage. No acute skull fracture is identified.  The calvarium and the skull base are normal in appearance. The paranasal  sinuses and the mastoid air cells and middle ear cavities are clear.       Impression:      1. No acute intracranial abnormality is identified.     2. There is very mild small vessel disease in the cerebral white matter.  The remainder the head CT is within normal limits with no acute skull  fracture or intracranial hemorrhage identified.     Radiation dose reduction techniques were utilized, including automated  exposure control and exposure modulation based on body size.           This report was finalized on 6/28/2025 8:09 AM by Dr. John Sanches M.D  on Workstation: [id number]       XR Chest 1 View [[id number]] Collected: 06/27/25 1404     Updated: 06/27/25 1420    Narrative:      XR CHEST 1 VW-     HISTORY: Female who is 69 years-old, short of breath     TECHNIQUE: Frontal view of the chest     COMPARISON: 2/13/2025     FINDINGS: Heart, mediastinum and pulmonary vasculature are unremarkable.  Minimal left basilar atelectasis or infiltrate, follow-up as indications  persist. No large pleural effusion, or pneumothorax. No acute osseous  process.       Impression:      As described.     This report was finalized on 6/27/2025 2:17 PM by Dr. Herrera Mistry M.D on Workstation: [id number]               Pertinent Labs     Results  "from last 7 days   Lab Units 07/07/25  0638 07/06/25  0733 07/05/25  0824 07/04/25  0837   WBC 10*3/mm3 2.48* 2.41* 2.58* 2.74*   HEMOGLOBIN g/dL 9.7* 9.3* 10.0* 9.8*   PLATELETS 10*3/mm3 207 217 191 191     Results from last 7 days   Lab Units 07/07/25  0638 07/06/25  0733 07/05/25  0824 07/04/25  0837   SODIUM mmol/L 135* 134* 136 137   POTASSIUM mmol/L 4.0 3.8 4.0 3.9   CHLORIDE mmol/L 102 102 103 103   CO2 mmol/L 21.1* 20.0* 19.2* 19.9*   BUN mg/dL 39.0* 39.0* 43.0* 45.0*   CREATININE mg/dL 1.13* 1.29* 1.35* 1.33*   GLUCOSE mg/dL 96 101* 112* 125*   Estimated Creatinine Clearance: 63.7 mL/min (A) (by C-G formula based on SCr of 1.13 mg/dL (H)).  Results from last 7 days   Lab Units 07/07/25  0638 07/06/25  0733 07/05/25  0824 07/04/25  0837   ALBUMIN g/dL 2.9* 3.0* 3.0* 3.1*     Results from last 7 days   Lab Units 07/07/25  0638 07/06/25  0733 07/05/25  0824 07/04/25  0837   CALCIUM mg/dL 9.0 8.7 9.0 8.9   ALBUMIN g/dL 2.9* 3.0* 3.0* 3.1*   PHOSPHORUS mg/dL 3.2 3.3 3.5 4.1               Invalid input(s): \"LDLCALC\"        Test Results Pending at Discharge     Pending Labs       Order Current Status    ACTH In process            Discharge Details        Discharge Medications        New Medications        Instructions Start Date   metoprolol succinate XL 25 MG 24 hr tablet  Commonly known as: TOPROL-XL   25 mg, Oral, Every 24 Hours Scheduled   Start Date: July 8, 2025            Changes to Medications        Instructions Start Date   spironolactone 25 MG tablet  Commonly known as: ALDACTONE  What changed: how much to take   12.5 mg, Oral, 2 Times Daily             Continue These Medications        Instructions Start Date   albuterol sulfate  (90 Base) MCG/ACT inhaler  Commonly known as: PROVENTIL HFA;VENTOLIN HFA;PROAIR HFA   2 puffs, Inhalation, Every 4 Hours PRN      allopurinol 100 MG tablet  Commonly known as: ZYLOPRIM   100 mg, Daily      apixaban 5 MG tablet tablet  Commonly known as: ELIQUIS   5 mg, " Oral, 2 Times Daily      budesonide-formoterol 160-4.5 MCG/ACT inhaler  Commonly known as: Symbicort   2 puffs, Inhalation, 2 Times Daily - RT      bumetanide 2 MG tablet  Commonly known as: BUMEX   2 mg, 2 Times Daily      famotidine 20 MG tablet  Commonly known as: PEPCID   20 mg, Oral, 2 Times Daily Before Meals      levothyroxine 75 MCG tablet  Commonly known as: SYNTHROID, LEVOTHROID   75 mcg, Daily      montelukast 10 MG tablet  Commonly known as: SINGULAIR   10 mg, Daily      polyethylene glycol 17 g packet  Commonly known as: MIRALAX   17 g, Oral, Daily      tiotropium 18 MCG per inhalation capsule  Commonly known as: Spiriva HandiHaler   1 capsule, Inhalation, Daily - RT      Trelegy Ellipta 100-62.5-25 MCG/ACT inhaler  Generic drug: Fluticasone-Umeclidin-Vilant   INHALE 1 PUFF BY INHALATION ROUTE ONCE DAILY AT THE SAME TIME EACH DAY      vitamin D3 125 MCG (5000 UT) capsule capsule   2,000 Units, Daily             Stop These Medications      metoprolol tartrate 50 MG tablet  Commonly known as: LOPRESSOR     torsemide 20 MG tablet  Commonly known as: DEMADEX              No Known Allergies      Discharge Disposition:  Home or Self Care    Discharge Diet:  Diet Order   Procedures    Diet: Cardiac; Healthy Heart (2-3 Na+); Fluid Consistency: Thin (IDDSI 0)       Discharge Activity:       CODE STATUS:    Code Status and Medical Interventions: CPR (Attempt to Resuscitate); Full Support   Ordered at: 06/27/25 1517     Code Status (Patient has no pulse and is not breathing):    CPR (Attempt to Resuscitate)     Medical Interventions (Patient has pulse or is breathing):    Full Support       Future Appointments   Date Time Provider Department Center   8/11/2025  2:30 PM Melissa Vargas APRN [facility] None     Additional Instructions for the Follow-ups that You Need to Schedule       Ambulatory Referral to Sleep Medicine   As directed      Follow-up needed: Yes               Follow-up Information        Esperanza Melton, APRN .    Specialty: Nurse Practitioner  Contact information:  [address] [facility] [address]  [phone]               Anatoly Jimenez MD .    Specialty: Internal Medicine  Contact information:  [address] [address] [id number] [phone]                             Additional Instructions for the Follow-ups that You Need to Schedule       Ambulatory Referral to Sleep Medicine   As directed      Follow-up needed: Yes            Time Spent on Discharge:  Greater than 30 minutes      Antelmo Wellington MD  [facility]  07/07/25  15:24 EDT

## 2025-07-07 NOTE — THERAPY TREATMENT NOTE
Patient Name: Marilu Avery  : 1955    MRN: [id number]                              Today's Date: 2025       Admit Date: 2025    Visit Dx:     ICD-10-CM ICD-9-CM   1. Hypotension, unspecified hypotension type  I95.9 458.9   2. Near syncope  R55 780.2   3. Acute kidney injury  N17.9 584.9   4. Elevated troponin  R79.89 790.6   5. Longstanding persistent atrial fibrillation  I48.11 427.31   6. Coagulopathy: Eliquis induced  D68.9 286.9   7. Decreased activities of daily living (ADL)  Z78.9 V49.89   8. Left kidney mass  N28.89 593.9   9. Obesity (BMI 30-39.9)  E66.9 278.00   10. LISA (obstructive sleep apnea)  G47.33 327.23     Patient Active Problem List   Diagnosis    Incisional hernia, without obstruction or gangrene    Ventral hernia    Atrial fibrillation, persistent    HLD (hyperlipidemia)    HTN (hypertension)    LISA (obstructive sleep apnea)    REBECCA (acute kidney injury)    Chronic anticoagulation    SBO (small bowel obstruction)    Tobacco abuse    Morbid obesity with BMI of 40.0-44.9, adult    COPD (chronic obstructive pulmonary disease)    Hypopotassemia    Gout    Chronic anticoagulation    Prolonged Q-T interval on ECG    REBECCA (acute kidney injury)    Acute on chronic diastolic (congestive) heart failure    Hypotension    Obesity (BMI 30-39.9)    Elevated troponin    Chronic alcohol use    Hyponatremia    Takotsubo cardiomyopathy     Past Medical History:   Diagnosis Date    Arthritis     Asthma     Atrial fibrillation     Colon polyps     hyperplastic rectal polyp    Hyperlipidemia     Hypertension     LISA on CPAP     Sleep apnea      Past Surgical History:   Procedure Laterality Date    BACK SURGERY N/A     CARDIAC CATHETERIZATION N/A 2025    Procedure: Right and Left Heart Cath;  Surgeon: Julien Perez MD;  Location: [facility] CATH INVASIVE LOCATION;  Service: Cardiovascular;  Laterality: N/A;    COLONOSCOPY N/A 10/22/2015    Rectal polyp-Dr. Elías Keating    HYSTERECTOMY N/A  2000    LAPAROSCOPIC CHOLECYSTECTOMY N/A 01/04/2010    Dr. Heath Whitlock    OOPHORECTOMY  2001    REPLACEMENT TOTAL KNEE Left 06/22/2015    Dr. Yo Aguayo    REPLACEMENT TOTAL KNEE Right 02/26/2015    Dr. Yo Aguayo    VENTRAL HERNIA REPAIR N/A 10/22/2015    Open reduction of incarcerated ventral hernia with layered closure-Dr. Elías Keating    VENTRAL/INCISIONAL HERNIA REPAIR N/A 6/14/2021    Procedure: OPEN VENTRAL/INCISIONAL HERNIA REPAIR WITH MESH AND APPENDECTOMY;  Surgeon: Arnulfo Leonard MD;  Location: [facility] MAIN OR;  Service: General;  Laterality: N/A;      General Information       Row Name 07/07/25 1327          Physical Therapy Time and Intention    Document Type therapy note (daily note) (P)   -AG     Mode of Treatment physical therapy (P)   -AG       Row Name 07/07/25 1327          General Information    Existing Precautions/Restrictions fall;oxygen therapy device and L/min (P)   -AG       Row Name 07/07/25 1327          Cognition    Orientation Status (Cognition) oriented x 4 (P)   -AG       Row Name 07/07/25 1327          Safety Issues/Impairments Affecting Functional Mobility    Impairments Affecting Function (Mobility) endurance/activity tolerance;strength (P)   -AG               User Key  (r) = Recorded By, (t) = Taken By, (c) = Cosigned By      Initials Name Provider Type    AG Marywchristina, Kerline, PT Student PT Student                   Mobility       Row Name 07/07/25 1426          Bed Mobility    Bed Mobility sit-supine (P)   -AG     Sit-Supine [place] (Bed Mobility) minimum assist (75% patient effort) (P)   -AG     Comment, (Bed Mobility) pt was sitting in bed upon arrival. (P)   -AG       Row Name 07/07/25 1426          Sit-Stand Transfer    Sit-Stand [place] (Transfers) standby assist (P)   -AG     Assistive Device (Sit-Stand Transfers) walker, front-wheeled (P)   -AG       Row Name 07/07/25 1426          Gait/Stairs (Locomotion)    [place] Level (Gait) standby assist  (P)   -AG     Assistive Device (Gait) walker, front-wheeled (P)   -AG     Patient was able to Ambulate yes (P)   -AG     Distance in Feet (Gait) 20 (P)   20 x2  -AG     Deviations/Abnormal Patterns (Gait) gait speed decreased (P)   -AG     Bilateral Gait Deviations forward flexed posture (P)   -AG     Comment, (Gait/Stairs) pt was able to ambulate 20 ft x2. seated rest break. pt was steady on their feet. (P)   -AG               User Key  (r) = Recorded By, (t) = Taken By, (c) = Cosigned By      Initials Name Provider Type    AG Marywchristina Kerline, PT Student PT Student                   Obj/Interventions       Row Name 07/07/25 1429          Balance    Balance Assessment sitting static balance;standing static balance;standing dynamic balance (P)   -AG     Static Sitting Balance independent (P)   -AG     Position, Sitting Balance sitting edge of bed (P)   -AG     Static Standing Balance standby assist (P)   -AG     Dynamic Standing Balance standby assist (P)   -AG     Position/Device Used, Standing Balance walker, front-wheeled (P)   -AG     Balance Interventions sitting;standing;sit to stand;supported;static;dynamic (P)   -AG               User Key  (r) = Recorded By, (t) = Taken By, (c) = Cosigned By      Initials Name Provider Type    AG Blanca Kerline, PT Student PT Student                   Goals/Plan    No documentation.                  Clinical Impression       Row Name 07/07/25 1429          Pain    Pretreatment Pain Rating 0/10 - no pain (P)   -AG     Posttreatment Pain Rating 0/10 - no pain (P)   -AG       Row Name 07/07/25 1429          Plan of Care Review    Plan of Care Reviewed With patient (P)   -AG     Progress improving (P)   -AG     Outcome Evaluation pt was eager to participate in PT today. upon arrival pt was sitting in bed. STS SBA. ambulated 20 ft x2 SBA and RW with seated rest break. pt showed increase endurance during ambulation. O2 stats stable. pt showed some fatigue after ambulation. pt  requested to lay back in bed. PT will cont to follow and encourages home with assist. (P)   -AG       Row Name 07/07/25 1429          Positioning and Restraints    Pre-Treatment Position in bed (P)   -AG     Post Treatment Position bed (P)   -AG     In Bed notified nsg;supine;call light within reach;encouraged to call for assist;exit alarm on (P)   -AG               User Key  (r) = Recorded By, (t) = Taken By, (c) = Cosigned By      Initials Name Provider Type    Kerline Pacheco, PT Student PT Student                   Outcome Measures       Row Name 07/07/25 1432          How much help from another person do you currently need...    Turning from your back to your side while in flat bed without using bedrails? 4 (P)   -AG     Moving from lying on back to sitting on the side of a flat bed without bedrails? 3 (P)   -AG     Moving to and from a bed to a chair (including a wheelchair)? 3 (P)   -AG     Standing up from a chair using your arms (e.g., wheelchair, bedside chair)? 3 (P)   -AG     Climbing 3-5 steps with a railing? 3 (P)   -AG     To walk in hospital room? 3 (P)   -AG     AM-PAC 6 Clicks Score (PT) 19 (P)   -AG     Highest Level of Mobility Goal Walk 10 Steps or More-6 (P)   -AG               User Key  (r) = Recorded By, (t) = Taken By, (c) = Cosigned By      Initials Name Provider Type    Kerline Pacheco, PT Student PT Student                                 Physical Therapy Education       Title: PT OT SLP Therapies (Done)       Topic: Physical Therapy (Done)       Point: Mobility training (Done)       Learning Progress Summary            Patient Acceptance, E, VU,DU by AG at 7/7/2025 1432    Acceptance, E, VU by EM at 7/5/2025 1115    Acceptance, E, VU,DU by AG at 7/1/2025 1600    Acceptance, E, NR,VU by CW at 6/28/2025 1244                      Point: Home exercise program (Done)       Learning Progress Summary            Patient Acceptance, E, VU,DU by AG at 7/7/2025 1432    Acceptance, E, VU by EM  at 7/5/2025 1115                      Point: Body mechanics (Done)       Learning Progress Summary            Patient Acceptance, E, VU,DU by  at 7/7/2025 1432                      Point: Precautions (Done)       Learning Progress Summary            Patient Acceptance, E, VU,DU by  at 7/7/2025 1432                                      User Key       Initials Effective Dates Name Provider Type Discipline    EM 06/16/21 -  Tamra Vu, PT Physical Therapist PT     12/13/22 -  Rachael Guerrero PT Physical Therapist PT     06/03/25 -  Kerline Rios, PT Student PT Student PT                  PT Recommendation and Plan     Progress: (P) improving  Outcome Evaluation: (P) pt was eager to participate in PT today. upon arrival pt was sitting in bed. STS SBA. ambulated 20 ft x2 SBA and RW with seated rest break. pt showed increase endurance during ambulation. O2 stats stable. pt showed some fatigue after ambulation. pt requested to lay back in bed. PT will cont to follow and encourages home with assist.     Time Calculation:         PT Charges       Row Name 07/07/25 1432             Time Calculation    Start Time 1300 (P)   -AG      Stop Time 1317 (P)   -AG      Time Calculation (min) 17 min (P)   -AG      PT Received On 07/07/25 (P)   -AG      PT - Next Appointment 07/08/25 (P)   -AG         Timed Charges    00334 - PT Therapeutic Activity Minutes 17 (P)   -AG         Total Minutes    Timed Charges Total Minutes 17 (P)   -AG       Total Minutes 17 (P)   -AG                User Key  (r) = Recorded By, (t) = Taken By, (c) = Cosigned By      Initials Name Provider Type    AG Kerline Rios, PT Student PT Student                      PT G-Codes  Outcome Measure Options: AM-PAC 6 Clicks Daily Activity (OT), Modified [place]  AM-PAC 6 Clicks Score (PT): (P) 19  AM-PAC 6 Clicks Score (OT): 16  Modified [place] Scale: 0 - No Symptoms at all.  PT Discharge Summary  Anticipated Discharge Disposition (PT): skilled  nursing facility    Kerline Rios, PT Student  7/7/2025

## 2025-07-07 NOTE — THERAPY TREATMENT NOTE
Patient Name: Marilu Avery  : 1955    MRN: [id number]                              Today's Date: 2025       Admit Date: 2025    Visit Dx:     ICD-10-CM ICD-9-CM   1. Hypotension, unspecified hypotension type  I95.9 458.9   2. Near syncope  R55 780.2   3. Acute kidney injury  N17.9 584.9   4. Elevated troponin  R79.89 790.6   5. Longstanding persistent atrial fibrillation  I48.11 427.31   6. Coagulopathy: Eliquis induced  D68.9 286.9   7. Decreased activities of daily living (ADL)  Z78.9 V49.89   8. Left kidney mass  N28.89 593.9   9. Obesity (BMI 30-39.9)  E66.9 278.00   10. LISA (obstructive sleep apnea)  G47.33 327.23     Patient Active Problem List   Diagnosis    Incisional hernia, without obstruction or gangrene    Ventral hernia    Atrial fibrillation, persistent    HLD (hyperlipidemia)    HTN (hypertension)    LISA (obstructive sleep apnea)    REBECCA (acute kidney injury)    Chronic anticoagulation    SBO (small bowel obstruction)    Tobacco abuse    Morbid obesity with BMI of 40.0-44.9, adult    COPD (chronic obstructive pulmonary disease)    Hypopotassemia    Gout    Chronic anticoagulation    Prolonged Q-T interval on ECG    REBECCA (acute kidney injury)    Acute on chronic diastolic (congestive) heart failure    Hypotension    Obesity (BMI 30-39.9)    Elevated troponin    Chronic alcohol use    Hyponatremia    Takotsubo cardiomyopathy     Past Medical History:   Diagnosis Date    Arthritis     Asthma     Atrial fibrillation     Colon polyps     hyperplastic rectal polyp    Hyperlipidemia     Hypertension     LISA on CPAP     Sleep apnea      Past Surgical History:   Procedure Laterality Date    BACK SURGERY N/A     CARDIAC CATHETERIZATION N/A 2025    Procedure: Right and Left Heart Cath;  Surgeon: Julien Perez MD;  Location: [facility] CATH INVASIVE LOCATION;  Service: Cardiovascular;  Laterality: N/A;    COLONOSCOPY N/A 10/22/2015    Rectal polyp-Dr. Elías Keating    HYSTERECTOMY N/A  2000    LAPAROSCOPIC CHOLECYSTECTOMY N/A 01/04/2010    Dr. Heath Whitlock    OOPHORECTOMY  2001    REPLACEMENT TOTAL KNEE Left 06/22/2015    Dr. Yo Aguayo    REPLACEMENT TOTAL KNEE Right 02/26/2015    Dr. Yo Aguayo    VENTRAL HERNIA REPAIR N/A 10/22/2015    Open reduction of incarcerated ventral hernia with layered closure-Dr. Elías Keating    VENTRAL/INCISIONAL HERNIA REPAIR N/A 6/14/2021    Procedure: OPEN VENTRAL/INCISIONAL HERNIA REPAIR WITH MESH AND APPENDECTOMY;  Surgeon: Arnulfo Leonard MD;  Location: [facility] MAIN OR;  Service: General;  Laterality: N/A;      General Information       Row Name 07/07/25 1502          OT Time and Intention    Document Type therapy note (daily note)  -ES     Mode of Treatment individual therapy;occupational therapy  -ES     Patient Effort good  -ES       Row Name 07/07/25 1502          General Information    Existing Precautions/Restrictions fall  -ES       Row Name 07/07/25 1502          Cognition    Orientation Status (Cognition) oriented x 3  patient pleasant and cooperative, agreeable to therapy participation  -ES       Row Name 07/07/25 1502          Safety Issues/Impairments Affecting Functional Mobility    Safety Issues Affecting Function (Mobility) awareness of need for assistance;insight into deficits/self-awareness  -ES     Impairments Affecting Function (Mobility) endurance/activity tolerance;strength;balance  -ES               User Key  (r) = Recorded By, (t) = Taken By, (c) = Cosigned By      Initials Name Provider Type    ES Jeaneth Mckeon, OTR/L, CSRS Occupational Therapist                     Mobility/ADL's       Row Name 07/07/25 1504          Bed Mobility    Bed Mobility supine-sit  -ES     Supine-Sit [place] (Bed Mobility) standby assist  -ES     Sit-Supine [place] (Bed Mobility) minimum assist (75% patient effort)  -ES     Comment, (Bed Mobility) patient requires assist with LEs back to bed  -ES       Row Name 07/07/25 1509           Transfers    Comment, (Transfers) STS x4 completed this session for functional strengthening. bed heigh elevated  -ES       Row Name 07/07/25 1504          Sit-Stand Transfer    Sit-Stand [place] (Transfers) contact guard  -ES     Assistive Device (Sit-Stand Transfers) walker, front-wheeled  -ES       Row Name 07/07/25 1504          Functional Mobility    Functional Mobility- Ind. Level not tested  -ES       Row Name 07/07/25 1504          Activities of Daily Living    BADL Assessment/Intervention lower body dressing  -ES       Row Name 07/07/25 1504          Lower Body Dressing Assessment/Training    [place] Level (Lower Body Dressing) lower body dressing skills;don;socks;moderate assist (50% patient effort)  -ES     Position (Lower Body Dressing) edge of bed sitting  -ES               User Key  (r) = Recorded By, (t) = Taken By, (c) = Cosigned By      Initials Name Provider Type    ES Jeaneth Mckeon, NAYELI/L, CSRS Occupational Therapist                   Obj/Interventions       Row Name 07/07/25 1507          Shoulder (Therapeutic Exercise)    Shoulder (Therapeutic Exercise) strengthening exercise  -ES     Shoulder Strengthening (Therapeutic Exercise) bilateral;flexion;horizontal aBduction/aDduction;sitting;resistance band;yellow;10 repetitions  -ES       Row Name 07/07/25 1507          Elbow/Forearm (Therapeutic Exercise)    Elbow/Forearm (Therapeutic Exercise) strengthening exercise  -ES     Elbow/Forearm Strengthening (Therapeutic Exercise) bilateral;flexion;extension;resistance band;yellow;10 repetitions  -ES       Row Name 07/07/25 1507          Motor Skills    Therapeutic Exercise shoulder;elbow/forearm  -ES       Row Name 07/07/25 1507          Balance    Balance Assessment sitting dynamic balance;standing static balance  -ES     Dynamic Sitting Balance supervision  -ES     Position, Sitting Balance unsupported;sitting edge of bed  -ES     Static Standing Balance contact guard  -ES      Position/Device Used, Standing Balance supported;walker, front-wheeled  -ES               User Key  (r) = Recorded By, (t) = Taken By, (c) = Cosigned By      Initials Name Provider Type    Jeaneth Sanchez OTR/L, AGATHA Occupational Therapist                   Goals/Plan    No documentation.                  Clinical Impression       Row Name 07/07/25 1513          Pain Assessment    Pretreatment Pain Rating 0/10 - no pain  -ES     Posttreatment Pain Rating 0/10 - no pain  -ES       Row Name 07/07/25 1513          Plan of Care Review    Plan of Care Reviewed With patient  -ES     Progress improving  -ES     Outcome Evaluation Patient with good participation in OT session this PM. Therapeutic intervention focused on funcitonal strength and endurance through patient completion of UE weighted exercises and functional transfer from varying surface heights to promote overall functional strength and increase endurance. patient continues to require CGA with transfers, fatigues quickly requiring severl rest breaks. Patient tolerance is main barrier at this time. Patient will benefit from continued skilled OT intervention to promote return to baseline.  -ES       Row Name 07/07/25 1513          Therapy Plan Review/Discharge Plan (OT)    Anticipated Discharge Disposition (OT) sub acute care setting  -ES       Row Name 07/07/25 1513          Positioning and Restraints    Pre-Treatment Position in bed  -ES     Post Treatment Position bed  -ES     In Bed fowlers;call light within reach;encouraged to call for assist;exit alarm on  -ES               User Key  (r) = Recorded By, (t) = Taken By, (c) = Cosigned By      Initials Name Provider Type    Jeaneth Sanchez OTR/L, AGATHA Occupational Therapist                   Outcome Measures       Row Name 07/07/25 1517          How much help from another is currently needed...    Putting on and taking off regular lower body clothing? 2  -ES     Bathing (including washing, rinsing, and  drying) 2  -ES     Toileting (which includes using toilet bed pan or urinal) 2  -ES     Putting on and taking off regular upper body clothing 3  -ES     Taking care of personal grooming (such as brushing teeth) 3  -ES     Eating meals 4  -ES     AM-PAC 6 Clicks Score (OT) 16  -ES       Row Name 07/07/25 1432          How much help from another person do you currently need...    Turning from your back to your side while in flat bed without using bedrails? 4 (P)   -AG     Moving from lying on back to sitting on the side of a flat bed without bedrails? 3 (P)   -AG     Moving to and from a bed to a chair (including a wheelchair)? 3 (P)   -AG     Standing up from a chair using your arms (e.g., wheelchair, bedside chair)? 3 (P)   -AG     Climbing 3-5 steps with a railing? 3 (P)   -AG     To walk in hospital room? 3 (P)   -AG     AM-PAC 6 Clicks Score (PT) 19 (P)   -AG     Highest Level of Mobility Goal Walk 10 Steps or More-6 (P)   -AG       Row Name 07/07/25 1517          Functional Assessment    Outcome Measure Options AM-PAC 6 Clicks Daily Activity (OT)  -ES               User Key  (r) = Recorded By, (t) = Taken By, (c) = Cosigned By      Initials Name Provider Type    Jeaneth Sanchez, OTR/L, CSRS Occupational Therapist     Kerline Rios, PT Student PT Student                      OT Recommendation and Plan  Planned Therapy Interventions (OT): activity tolerance training, BADL retraining, functional balance retraining, occupation/activity based interventions, patient/caregiver education/training, ROM/therapeutic exercise, strengthening exercise, transfer/mobility retraining  Therapy Frequency (OT): 5 times/wk  Plan of Care Review  Plan of Care Reviewed With: patient  Progress: improving  Outcome Evaluation: Patient with good participation in OT session this PM. Therapeutic intervention focused on funcitonal strength and endurance through patient completion of UE weighted exercises and functional transfer from  varying surface heights to promote overall functional strength and increase endurance. patient continues to require CGA with transfers, fatigues quickly requiring severl rest breaks. Patient tolerance is main barrier at this time. Patient will benefit from continued skilled OT intervention to promote return to baseline.     Time Calculation:         Time Calculation- OT       Row Name 07/07/25 1517             Time Calculation- OT    OT Start Time 1402  -ES      OT Stop Time 1420  -ES      OT Time Calculation (min) 18 min  -ES      Total Timed Code Minutes- OT 18 minute(s)  -ES      OT Received On 07/07/25  -ES      OT - Next Appointment 07/08/25  -ES         Timed Charges    35175 - OT Therapeutic Exercise Minutes 18  -ES         Total Minutes    Timed Charges Total Minutes 18  -ES       Total Minutes 18  -ES                User Key  (r) = Recorded By, (t) = Taken By, (c) = Cosigned By      Initials Name Provider Type    Jeaneth Sanchez OTR/L, CSRS Occupational Therapist                  Therapy Charges for Today       Code Description Service Date Service Provider Modifiers Qty    88455680652 HC OT THER PROC EA 15 MIN 7/7/2025 Jeaneth Mckeon OTR/L, CSRS GO 1                 NAYELI Norman/L, CSRS  7/7/2025

## 2025-07-07 NOTE — CONSULTS
"Chp followed up with Pt. Chp and Pt discussed how she is feeling. Pt shared she feels she is \"making progress\". Chp offered supportive presence and active listening. Chp remains available.  "

## 2025-07-07 NOTE — PLAN OF CARE
Problem: Adult Inpatient Plan of Care  Goal: Absence of Hospital-Acquired Illness or Injury  Intervention: Identify and Manage Fall Risk  Recent Flowsheet Documentation  Taken 7/7/2025 0227 by Chelita Donovan RN  Safety Promotion/Fall Prevention:   safety round/check completed   fall prevention program maintained   clutter free environment maintained  Taken 7/7/2025 0000 by Chelita Donovan RN  Safety Promotion/Fall Prevention:   safety round/check completed   fall prevention program maintained   clutter free environment maintained  Taken 7/6/2025 2200 by Chelita Donovan RN  Safety Promotion/Fall Prevention:   safety round/check completed   fall prevention program maintained   clutter free environment maintained  Taken 7/6/2025 2045 by Chelita Donovan RN  Safety Promotion/Fall Prevention:   safety round/check completed   fall prevention program maintained   clutter free environment maintained   Goal Outcome Evaluation:  Plan of Care Reviewed With: patient        Progress: improving  Outcome Evaluation: VSS, AOx4, pt dangled at side of bed at beginning of shift, able to get self back into bed, tylenol given for pain one time, pt resting comfortably overnight

## 2025-07-07 NOTE — PLAN OF CARE
Goal Outcome Evaluation:  Plan of Care Reviewed With: patient        Progress: improving  Outcome Evaluation: Patient with good participation in OT session this PM. Therapeutic intervention focused on funcitonal strength and endurance through patient completion of UE weighted exercises and functional transfer from varying surface heights to promote overall functional strength and increase endurance. patient continues to require CGA with transfers, fatigues quickly requiring severl rest breaks. Patient tolerance is main barrier at this time. Patient will benefit from continued skilled OT intervention to promote return to baseline.    Anticipated Discharge Disposition (OT): sub acute care setting

## 2025-07-07 NOTE — PROGRESS NOTES
Name: Marilu Avery ADMIT: 2025   : 1955  PCP: Esperanza Melton APRN    MRN: [id number] LOS: 10 days   AGE/SEX: 69 y.o. female  ROOM: [facility]     Subjective   Subjective     BP improved after stopping losartan.        Objective   Objective   Vital Signs  Temp:  [97.5 °F (36.4 °C)-97.9 °F (36.6 °C)] 97.5 °F (36.4 °C)  Heart Rate:  [] 103  Resp:  [17-24] 24  BP: (122-133)/(77-93) 130/77  SpO2:  [94 %-100 %] 94 %  on   ;   Device (Oxygen Therapy): room air  Body mass index is 34.42 kg/m².  Physical Exam  Constitutional:       General: She is not in acute distress.     Appearance: She is not toxic-appearing.   Cardiovascular:      Rate and Rhythm: Normal rate and regular rhythm.      Heart sounds: Normal heart sounds.   Pulmonary:      Effort: Pulmonary effort is normal.      Breath sounds: Normal breath sounds.   Abdominal:      General: Bowel sounds are normal.      Palpations: Abdomen is soft.      Hernia: A hernia is present.   Musculoskeletal:      Right lower leg: Edema present.      Left lower leg: Edema present.   Neurological:      Mental Status: She is alert.   Psychiatric:         Mood and Affect: Mood normal.         Behavior: Behavior normal.     Exam reviewed and updated on 25    Results Review     I reviewed the patient's new clinical results.  Results from last 7 days   Lab Units 25  0638 25  0733 25  0824 25  0837   WBC 10*3/mm3 2.48* 2.41* 2.58* 2.74*   HEMOGLOBIN g/dL 9.7* 9.3* 10.0* 9.8*   PLATELETS 10*3/mm3 207 217 191 191     Results from last 7 days   Lab Units 25  0638 25  0733 25  0824 25  0837   SODIUM mmol/L 135* 134* 136 137   POTASSIUM mmol/L 4.0 3.8 4.0 3.9   CHLORIDE mmol/L 102 102 103 103   CO2 mmol/L 21.1* 20.0* 19.2* 19.9*   BUN mg/dL 39.0* 39.0* 43.0* 45.0*   CREATININE mg/dL 1.13* 1.29* 1.35* 1.33*   GLUCOSE mg/dL 96 101* 112* 125*   Estimated Creatinine Clearance: 63.7 mL/min (A) (by C-G formula based on  SCr of 1.13 mg/dL (H)).  Results from last 7 days   Lab Units 07/07/25  0638 07/06/25  0733 07/05/25  0824 07/04/25  0837   ALBUMIN g/dL 2.9* 3.0* 3.0* 3.1*     Results from last 7 days   Lab Units 07/07/25  0638 07/06/25  0733 07/05/25  0824 07/04/25  0837   CALCIUM mg/dL 9.0 8.7 9.0 8.9   ALBUMIN g/dL 2.9* 3.0* 3.0* 3.1*   PHOSPHORUS mg/dL 3.2 3.3 3.5 4.1           COVID19   Date Value Ref Range Status   08/24/2021 Not Detected Not Detected - Ref. Range Final   06/13/2021 Not Detected Not Detected - Ref. Range Final     Hemoglobin A1C   Date/Time Value Ref Range Status   07/07/2025 0638 5.70 (H) 4.80 - 5.60 % Final     Glucose   Date/Time Value Ref Range Status   07/07/2025 1144 113 70 - 130 mg/dL Final   07/07/2025 0607 99 70 - 130 mg/dL Final   07/06/2025 2355 123 70 - 130 mg/dL Final   07/06/2025 1727 130 70 - 130 mg/dL Final   07/06/2025 1138 109 70 - 130 mg/dL Final   07/05/2025 2352 107 70 - 130 mg/dL Final   07/05/2025 1613 116 70 - 130 mg/dL Final       XR Chest 1 View  Narrative: XR CHEST 1 VW-     HISTORY: Female who is 69 years-old, dyspnea     TECHNIQUE: Frontal views of the chest     COMPARISON: 6/27/2025     FINDINGS: The heart size is borderline. Pulmonary vasculature is  unremarkable. Small atelectasis or infiltrate at the bases, follow-up  advised. No large pleural effusion, or pneumothorax. No acute osseous  process.     Impression: As described.     This report was finalized on 7/2/2025 3:40 PM by Dr. Herrera Mistry M.D on Workstation: [id number]       Scheduled Medications  allopurinol, 100 mg, Oral, Daily  apixaban, 5 mg, Oral, BID  budesonide-formoterol, 2 puff, Inhalation, BID - RT  bumetanide, 1 mg, Oral, Daily  famotidine, 20 mg, Oral, BID  levothyroxine, 75 mcg, Oral, Q AM  Lidocaine, 1 patch, Transdermal, Q24H  metOLazone, 2.5 mg, Oral, Once  metoprolol succinate XL, 25 mg, Oral, Q24H  montelukast, 10 mg, Oral, Daily  polyethylene glycol, 17 g, Oral, Daily  revefenacin, 175 mcg,  Nebulization, Daily - RT  spironolactone, 12.5 mg, Oral, Daily    Infusions   Diet  Diet: Cardiac; Healthy Heart (2-3 Na+); Fluid Consistency: Thin (IDDSI 0)       Assessment/Plan     Active Hospital Problems    Diagnosis  POA    **Hypotension [I95.9]  Yes    Hyponatremia [E87.1]  Unknown    Takotsubo cardiomyopathy [I51.81]  Unknown    Obesity (BMI 30-39.9) [E66.9]  Yes    Elevated troponin [R79.89]  Yes    Chronic alcohol use [F10.90]  Yes    Chronic anticoagulation [Z79.01]  Not Applicable    Tobacco abuse [Z72.0]  Yes    COPD (chronic obstructive pulmonary disease) [J44.9]  Yes    REBECCA (acute kidney injury) [N17.9]  Yes    Atrial fibrillation, persistent [I48.19]  Yes    HTN (hypertension) [I10]  Yes    HLD (hyperlipidemia) [E78.5]  Yes    Ventral hernia [K43.9]  Yes      Resolved Hospital Problems   No resolved problems to display.       69 y.o. female admitted with Hypotension.    69 year old woman with CKD 3 who presented initially with shortness of breath on exertion and lower extremity swelling and was found to have stress cardiomyopathy, an REBECCA on CKD 3, and hyponatremia     Acute systolic heart failure due to stress cardiomyopathy-initially placed in the ICU due to hypotension, but did not require pressors. Cardiac catheterization showed normal coronaries. Now on bumex, metoprolol, spironolactone. Diuresis per cards and renal  Low am cortisol-stim test responded appropriately ruling out adrenal insufficiency  REBECCA on CKD 3-improved with fluid resuscitation   Hyponatremia-resolved with fluid resuscitation. Now back on diuretics  Chronic afib-eliquis, metoprolol  Hypothyroidism-synthroid  COPD/asthma-lama/laba/ics, montelukast   Gout-allopurinol  LISA-pap if available  Back pain-voltaren gel, lidocaine patch, tylenol  Left renal mass-she's unable to lie flat currently for a CT scan to better characterize the mass. She'll need to be referred to urology at discharge.  Eliquis (home med) for DVT  prophylaxis.  Full code.  Discussed with patient and nursing staff.  Anticipate discharge home with home health tomorrow      Antelmo Wlelington MD  [facility]  07/07/25  12:32 EDT

## 2025-07-07 NOTE — PROGRESS NOTES
"CC:  Follow-up atrial fibrillation, Takotsubo cardiomyopathy     Interval History: No acute events, BP has improved following discontinuation of losartan.      Vital Signs  Temp:  [97.3 °F (36.3 °C)-97.9 °F (36.6 °C)] 97.9 °F (36.6 °C)  Heart Rate:  [79-96] 96  Resp:  [17-21] 20  BP: (110-133)/(79-93) 124/84    Intake/Output Summary (Last 24 hours) at 7/7/2025 0721  Last data filed at 7/7/2025 0334  Gross per 24 hour   Intake 430 ml   Output 3400 ml   Net -2970 ml     Flowsheet Rows      Flowsheet Row First Filed Value   Admission Height 177.8 cm (70\") Documented at 06/27/2025 1236   Admission Weight 104 kg (229 lb) Documented at 06/27/2025 1236            Physical Exam  Constitutional:       Appearance: Normal appearance.   Cardiovascular:      Rate and Rhythm: Normal rate. Rhythm irregular.   Pulmonary:      Effort: Pulmonary effort is normal.      Breath sounds: Normal breath sounds.   Abdominal:      Comments: Large ventral/umbilical hernia   Musculoskeletal:      Cervical back: Normal range of motion and neck supple.   Neurological:      Mental Status: She is alert.             Results Review:    Results from last 7 days   Lab Units 07/06/25  0733   SODIUM mmol/L 134*   POTASSIUM mmol/L 3.8   CHLORIDE mmol/L 102   CO2 mmol/L 20.0*   BUN mg/dL 39.0*   CREATININE mg/dL 1.29*   GLUCOSE mg/dL 101*   CALCIUM mg/dL 8.7         Results from last 7 days   Lab Units 07/07/25  0638   WBC 10*3/mm3 2.48*   HEMOGLOBIN g/dL 9.7*   HEMATOCRIT % 29.7*   PLATELETS 10*3/mm3 207                     I reviewed the patient's new clinical results.  I personally viewed and interpreted the patient's EKG/Telemetry data- a fib        Medication Review:   Meds reviewed         Assessment/Plan      Takotsubo cardiomyopathy: Initial high-sensitivity troponin 266 -> 729 on admission.  Echo showed newly reduced LVEF at 30-35% with extensive apical wall motion abnormality, strongly suggesting stress-induced (Takotsubo) cardiomyopathy.  Left " heart cath showed grossly normal coronary arteries.  Her BP had limited GDMT up titration.    Goal-directed medical therapy:  - ionotrope: N/A  - BB: On Toprol-XL 12.5 daily, will uptitrate to 25 daily today.  - ACE/ARB/ARNI: Losartan was discontinued yesterday, may discuss with nephrology given improved BP.  - MRA: On spironolactone 12.5 daily but multiple doses were held over the weekend  - SGLT2 inhibitor: May consider adding, prior prediabetes.  - loop diuretic: Torsemide 20 daily at home, currently getting oral Bumex 1 daily with intermittent augmentation with metolazone.  She is net -12L this admission; defer management to nephrology, appreciate input.        Chronic atrial fibrillation: CHADS2 Vasc score 2-3, resume home Eliquis 5 bid.  Per patient, she was on home Lopressor 100 bid which was held given hypotension, dehydration and REBECCA, see above regarding low-dose Toprol XL.  Weakness/mechanical fall  REBECCA: Treatment per nephrology.  Left renal mass-awaiting CT when patient can lay flat more comfortably after diureses.  Anemia  Hypothyroidism  Gout  Tobacco abuse in remission        Yao Chen MD  07/07/25  07:21 EDT

## 2025-07-07 NOTE — PLAN OF CARE
Goal Outcome Evaluation:  Plan of Care Reviewed With: (P) patient        Progress: (P) improving  Outcome Evaluation: (P) pt was eager to participate in PT today. upon arrival pt was sitting in bed. STS SBA. ambulated 20 ft x2 SBA and RW with seated rest break. pt showed increase endurance during ambulation. O2 stats stable. pt showed some fatigue after ambulation. pt requested to lay back in bed. PT will cont to follow and encourages home with assist.    Anticipated Discharge Disposition (PT): skilled nursing facility

## 2025-07-08 LAB — ACTH PLAS-MCNC: 9.5 PG/ML (ref 7.2–63.3)

## 2025-07-10 ENCOUNTER — READMISSION MANAGEMENT (OUTPATIENT)
Dept: CALL CENTER | Facility: HOSPITAL | Age: 70
End: 2025-07-10
Payer: COMMERCIAL

## 2025-07-10 NOTE — OUTREACH NOTE
Medical Week 1 Survey      Flowsheet Row Responses   [facility] patient discharged from? [place]   Does the patient have one of the following disease processes/diagnoses(primary or secondary)? Other   Week 1 attempt successful? No   Unsuccessful attempts Attempt 1            Doris Owusu Registered Nurse

## 2025-07-14 NOTE — CASE MANAGEMENT/SOCIAL WORK
Case Management Discharge Note      Final Note: home    Provided Post Acute Provider List?: Yes  Provided Post Acute Provider Quality & Resource List?: Yes  Post Acute Provider Quality and Resource List: Home Health, Inpatient Rehab, Nursing Home  Delivered To: Patient, Support Person  Method of Delivery: In person    Selected Continued Care - Discharged on 7/7/2025 Admission date: 6/27/2025 - Discharge disposition: Home or Self Care             Transportation Services  Transportation: Private Transportation  Private: Car    Final Discharge Disposition Code: 01 - home or self-care

## 2025-07-15 ENCOUNTER — READMISSION MANAGEMENT (OUTPATIENT)
Dept: CALL CENTER | Facility: HOSPITAL | Age: 70
End: 2025-07-15
Payer: COMMERCIAL

## 2025-07-15 NOTE — OUTREACH NOTE
Medical Week 1 Survey      Flowsheet Row Responses   [facility] patient discharged from? [place]   Does the patient have one of the following disease processes/diagnoses(primary or secondary)? Other   Week 1 attempt successful? No   Unsuccessful attempts Attempt 2            Corin SIMON - Registered Nurse

## (undated) DEVICE — LARGE VISCERA RETAINER: Brand: VISCERA RETAINER, FISH

## (undated) DEVICE — SUT VIC 2/0 TIES 18IN J111T

## (undated) DEVICE — Device

## (undated) DEVICE — GW HITORQUE/BAL MID/WT J W/HCOAT .014 3X190CM

## (undated) DEVICE — VIOLET BRAIDED (POLYGLACTIN 910), SYNTHETIC ABSORBABLE SUTURE: Brand: COATED VICRYL

## (undated) DEVICE — TOTAL TRAY, 16FR 10ML SIL FOLEY, URN: Brand: MEDLINE

## (undated) DEVICE — PK CATH CARD 40

## (undated) DEVICE — GLV SURG BIOGEL LTX PF 8 1/2

## (undated) DEVICE — PK PROC MAJ 40

## (undated) DEVICE — JACKSON-PRATT 100CC BULB RESERVOIR: Brand: CARDINAL HEALTH

## (undated) DEVICE — BALN PRESS WEDGE 5F 110CM

## (undated) DEVICE — GW INQWIRE FC PTFE J/3MM .021 180

## (undated) DEVICE — CATH DIAG CARD PERFORM JR4.0 BT 4F100CM

## (undated) DEVICE — TR BAND RADIAL ARTERY COMPRESSION DEVICE: Brand: TR BAND

## (undated) DEVICE — INTENDED FOR TISSUE SEPARATION, AND OTHER PROCEDURES THAT REQUIRE A SHARP SURGICAL BLADE TO PUNCTURE OR CUT.: Brand: BARD-PARKER ® CARBON RIB-BACK BLADES

## (undated) DEVICE — NDL HYPO PRECISIONGLIDE REG 25G 1 1/2

## (undated) DEVICE — APPL CHLORAPREP HI/LITE 26ML ORNG

## (undated) DEVICE — CATH DIAG CARD PERFORMA JL3.5 BT 4F100CM

## (undated) DEVICE — GLIDESHEATH BASIC HYDROPHILIC COATED INTRODUCER SHEATH: Brand: GLIDESHEATH

## (undated) DEVICE — CATH VENT MIV RADL PIG ST TIP 4F 110CM

## (undated) DEVICE — KT MANIFLD CARDIAC

## (undated) DEVICE — DGW .035 FC J3MM 260CM TEF: Brand: EMERALD

## (undated) DEVICE — DRSNG WND BORDR/ADHS NONADHR/GZ LF 4X14IN STRL

## (undated) DEVICE — STPLR SKIN VISISTAT WD 35CT

## (undated) DEVICE — POOLE SUCTION HANDLE: Brand: CARDINAL HEALTH

## (undated) DEVICE — SUT PDS 2/0 CT1 27IN DYED Z339H

## (undated) DEVICE — MARKR SKIN W/RULR AND LBL

## (undated) DEVICE — PENCL ES MEGADINE EZ/CLEAN BUTN W/HOLSTR 10FT